# Patient Record
Sex: FEMALE | Race: WHITE | NOT HISPANIC OR LATINO | Employment: OTHER | URBAN - METROPOLITAN AREA
[De-identification: names, ages, dates, MRNs, and addresses within clinical notes are randomized per-mention and may not be internally consistent; named-entity substitution may affect disease eponyms.]

---

## 2017-01-09 DIAGNOSIS — K21.9 GASTROESOPHAGEAL REFLUX DISEASE, ESOPHAGITIS PRESENCE NOT SPECIFIED: ICD-10-CM

## 2017-01-16 ENCOUNTER — IMMUNIZATION (OUTPATIENT)
Dept: FAMILY MEDICINE | Facility: CLINIC | Age: 72
End: 2017-01-16
Payer: MEDICARE

## 2017-01-16 ENCOUNTER — OFFICE VISIT (OUTPATIENT)
Dept: NEPHROLOGY | Facility: CLINIC | Age: 72
End: 2017-01-16
Payer: MEDICARE

## 2017-01-16 ENCOUNTER — OFFICE VISIT (OUTPATIENT)
Dept: UROLOGY | Facility: CLINIC | Age: 72
End: 2017-01-16
Payer: MEDICARE

## 2017-01-16 VITALS
OXYGEN SATURATION: 98 % | HEART RATE: 84 BPM | TEMPERATURE: 99 F | HEIGHT: 63 IN | SYSTOLIC BLOOD PRESSURE: 126 MMHG | DIASTOLIC BLOOD PRESSURE: 74 MMHG | WEIGHT: 181.44 LBS | BODY MASS INDEX: 32.15 KG/M2

## 2017-01-16 VITALS
BODY MASS INDEX: 31.64 KG/M2 | HEART RATE: 88 BPM | HEIGHT: 63 IN | DIASTOLIC BLOOD PRESSURE: 74 MMHG | WEIGHT: 178.56 LBS | SYSTOLIC BLOOD PRESSURE: 127 MMHG

## 2017-01-16 DIAGNOSIS — D69.6 THROMBOCYTOPENIA: Chronic | ICD-10-CM

## 2017-01-16 DIAGNOSIS — I10 ESSENTIAL HYPERTENSION: Chronic | ICD-10-CM

## 2017-01-16 DIAGNOSIS — R35.0 INCREASED URINARY FREQUENCY: ICD-10-CM

## 2017-01-16 DIAGNOSIS — N39.41 URGE INCONTINENCE: ICD-10-CM

## 2017-01-16 DIAGNOSIS — E11.21 CONTROLLED TYPE 2 DIABETES MELLITUS WITH DIABETIC NEPHROPATHY, WITHOUT LONG-TERM CURRENT USE OF INSULIN: ICD-10-CM

## 2017-01-16 DIAGNOSIS — R76.8 ANA POSITIVE: ICD-10-CM

## 2017-01-16 DIAGNOSIS — J43.8 OTHER EMPHYSEMA: ICD-10-CM

## 2017-01-16 DIAGNOSIS — M35.00 SJOGREN'S SYNDROME: ICD-10-CM

## 2017-01-16 DIAGNOSIS — R76.8 RHEUMATOID FACTOR POSITIVE: ICD-10-CM

## 2017-01-16 DIAGNOSIS — D50.9 IRON DEFICIENCY ANEMIA, UNSPECIFIED IRON DEFICIENCY ANEMIA TYPE: ICD-10-CM

## 2017-01-16 DIAGNOSIS — I51.89 DIASTOLIC DYSFUNCTION: Chronic | ICD-10-CM

## 2017-01-16 DIAGNOSIS — N30.10 IC (INTERSTITIAL CYSTITIS): ICD-10-CM

## 2017-01-16 DIAGNOSIS — R33.9 URINARY RETENTION: Primary | ICD-10-CM

## 2017-01-16 DIAGNOSIS — N18.30 CKD (CHRONIC KIDNEY DISEASE) STAGE 3, GFR 30-59 ML/MIN: Primary | ICD-10-CM

## 2017-01-16 LAB — POC RESIDUAL URINE VOLUME: 73 ML (ref 0–100)

## 2017-01-16 PROCEDURE — 99999 PR PBB SHADOW E&M-EST. PATIENT-LVL III: CPT | Mod: PBBFAC,,, | Performed by: UROLOGY

## 2017-01-16 PROCEDURE — G0008 ADMIN INFLUENZA VIRUS VAC: HCPCS | Mod: PBBFAC,PO | Performed by: FAMILY MEDICINE

## 2017-01-16 PROCEDURE — 99204 OFFICE O/P NEW MOD 45 MIN: CPT | Mod: S$PBB,,, | Performed by: INTERNAL MEDICINE

## 2017-01-16 PROCEDURE — 99213 OFFICE O/P EST LOW 20 MIN: CPT | Mod: S$PBB,,, | Performed by: UROLOGY

## 2017-01-16 PROCEDURE — 99999 PR PBB SHADOW E&M-EST. PATIENT-LVL V: CPT | Mod: PBBFAC,,, | Performed by: INTERNAL MEDICINE

## 2017-01-16 RX ORDER — PEDIATRIC MULTIVITAMIN NO.238
1 TABLET,CHEWABLE ORAL DAILY
COMMUNITY

## 2017-01-16 RX ORDER — ASCORBIC ACID 500 MG
1000 TABLET,CHEWABLE ORAL DAILY
COMMUNITY
End: 2018-08-28

## 2017-01-16 NOTE — LETTER
January 18, 2017      Ninfa Wilson MD  [address]  [facility] [address]           [facility] Nephrology [address] [facility] [address] [place] [address]  Phone: [phone]          Patient: Pili Teixeira   MR Number: [id number]   YOB: 1945   Date of Visit: 1/16/2017       Dear Dr. Ninfa Wilson:    Thank you for referring Pili Teixeira to me for evaluation. Attached you will find relevant portions of my assessment and plan of care.    If you have questions, please do not hesitate to call me. I look forward to following Pili Teixeira along with you.    Sincerely,    Victor M Taylor MD    Enclosure  CC:  No Recipients    If you would like to receive this communication electronically, please contact [email or web address] or [phone] to request more information on [facility] access.    For providers and/or their staff who would like to refer a patient to [facility], please contact us through our one-stop-shop provider referral line, [facility], [facility], at [phone].    If you feel you have received this communication in error or would no longer like to receive these types of communications, please e-mail [email or web address]

## 2017-01-16 NOTE — MR AVS SNAPSHOT
[facility] Urology  [address] [facility]  [facility] [address]  Phone: [phone]                  Pili Teixeira   2017 11:00 AM   Office Visit    Description:  Female : 1945   Provider:  TIFF Crane MD   Department:  [facility] Urology           Reason for Visit     Follow-up     Cystitis                To Do List           Future Appointments        Provider Department Dept Phone    2017 2:00 PM [facility] [phone]    2017 4:00 PM Ninfa Wilson MD [place] - Rheumatology [phone]    2017 10:00 AM [facility] [phone]    2017 10:20 AM Do Huggins MD [place] - Hematology Oncology [phone]      Goals (5 Years of Data)     None      [facility] On Call     [facility] On Call Nurse Care Line -  Assistance  Registered nurses in the [facility] On Call Center provide clinical advisement, health education, appointment booking, and other advisory services.  Call for this free service at [phone].             Medications           Message regarding Medications     Verify the changes and/or additions to your medication regime listed below are the same as discussed with your clinician today.  If any of these changes or additions are incorrect, please notify your healthcare provider.             Verify that the below list of medications is an accurate representation of the medications you are currently taking.  If none reported, the list may be blank. If incorrect, please contact your healthcare provider. Carry this list with you in case of emergency.           Current Medications     (Magic mouthwash) 1:1:1 Benadryl 12.5mg/5ml liq, aluminum & magnesium hydroxide-simehticone (Maalox), lidocaine viscous 2% Swish and spit 15 mLs every 4 (four) hours. for mouth sores    ascorbic acid, vitamin C, (VITAMIN C) 500 mg Chew Take 1,000 mg by mouth once daily.    BENADRYL ALLERGY 25 mg  tablet Take 2 tablets (50 mg total) by mouth as directed. Take 50mg of Benadryl (Diphenhydramine) by mouth 1 hour prior to CT scan    blood sugar diagnostic (ACCU-CHEK JACINTO PLUS TEST STRP) Strp TEST TWICE DAILY ONLY    clopidogrel (PLAVIX) 75 mg tablet TAKE ONE TABLET BY MOUTH ONCE DAILY    cyanocobalamin (VITAMIN B-12) 1000 MCG tablet Take 100 mcg by mouth once daily.    cyclobenzaprine (FLEXERIL) 10 MG tablet Take 10 mg by mouth nightly as needed. Pt does not take often    diltiazem (CARDIZEM CD) 120 MG Cp24 TAKE ONE CAPSULE BY MOUTH IN THE EVENING    fexofenadine (ALLEGRA) 180 MG tablet Take 180 mg by mouth once daily.    fluticasone (FLONASE) 50 mcg/actuation nasal spray 2 sprays by Each Nare route once daily.    folic acid (FOLVITE) 1 MG tablet Take 1 tablet (1 mg total) by mouth once daily.    glimepiride (AMARYL) 4 MG tablet TAKE ONE TABLET BY MOUTH IN THE MORNING    hydroxychloroquine (PLAQUENIL) 200 mg tablet Take 1 tablet (200 mg total) by mouth 2 (two) times daily.    lactobac cmb #3-fos-pantethine (PROBIOTIC & ACIDOPHILUS) 300-250 million cell-mg Cap Take 1 capsule by mouth once daily.    lancets (ACCU-CHEK SOFTCLIX LANCETS) Misc Test TWICE DAILY;Twice a day    metformin (GLUCOPHAGE) 500 MG tablet TAKE ONE TABLET BY MOUTH TWICE DAILY WITH FOOD    methotrexate 2.5 MG Tab Take 4 tablets (10 mg total) by mouth every 7 days.    multivit with min-folic acid (WOMEN'S MULTIVITAMIN GUMMIES) 200 mcg Chew Take by mouth once daily.    olopatadine (PATANOL) 0.1 % ophthalmic solution Place 1 drop into both eyes daily as needed.     ondansetron (ZOFRAN-ODT) 8 MG TbDL Take 1 tablet (8 mg total) by mouth every 12 (twelve) hours as needed.    ONGLYZA 5 mg Tab tablet TAKE ONE TABLET BY MOUTH ONCE DAILY    oxybutynin (DITROPAN-XL) 10 MG 24 hr tablet Take 1 tablet (10 mg total) by mouth once daily.    oxycodone-acetaminophen  mg (PERCOCET)  mg per tablet Take 1 tablet by mouth 4 (four) times daily as needed.   "   pantoprazole (PROTONIX) 40 MG tablet TAKE ONE TABLET BY MOUTH ONCE DAILY    phenazopyridine (PYRIDIUM) 100 MG tablet 100 mg daily as needed.     ranitidine (ZANTAC) 300 MG tablet TAKE ONE TABLET BY MOUTH IN THE EVENING    saliva stimulant agents comb.3 (BIOTENE MOISTURIZING MOUTH) Spry 1-2 sprays by Mucous Membrane route 4 (four) times daily as needed (Non prescription).    simvastatin (ZOCOR) 40 MG tablet TAKE ONE TABLET BY MOUTH AT BEDTIME    sucralfate (CARAFATE) 1 gram tablet Take 1 tablet (1 g total) by mouth 4 (four) times daily before meals and nightly.    trazodone (DESYREL) 50 MG tablet TAKE ONE TABLET BY MOUTH IN THE EVENING    valsartan-hydrochlorothiazide (DIOVAN-HCT) 160-25 mg per tablet TAKE ONE TABLET BY MOUTH ONCE DAILY           Clinical Reference Information           Vital Signs - Last Recorded  Most recent update: 1/16/2017 11:13 AM by Andrews Rodriguez LPN    BP Pulse Ht Wt BMI    127/74 88 5' 3" (1.6 m) 81 kg (178 lb 9.2 oz) 31.63 kg/m2      Blood Pressure          Most Recent Value    BP  127/74      Allergies as of 1/16/2017     Codeine    Clindamycin    Iodinated Contrast Media - Iv Dye      Immunizations Administered on Date of Encounter - 1/16/2017     None      "

## 2017-01-16 NOTE — MR AVS SNAPSHOT
[facility] Nephrology  [address] [facility]  [facility] [address]  Phone: [phone]                  Pili Teixeira   2017 10:00 AM   Office Visit    Description:  Female : 1945   Provider:  Victor M Taylor MD   Department:  [facility] Nephrology                To Do List           Future Appointments        Provider Department Dept Phone    2017 11:00 AM TIFF Crane MD [facility] Urology [phone]    2017 2:00 PM [facility] [phone]    2017 4:00 PM Ninfa Wilsno MD [place] - Rheumatology [phone]    2017 10:00 AM LAB[facility] [phone]    2017 10:20 AM Do Huggins MD [place] - Hematology Oncology [phone]      Goals (5 Years of Data)     None      [facility] On Call     [facility] On [facility] Nurse Care Line -  Assistance  Registered nurses in the [facility] Center provide clinical advisement, health education, appointment booking, and other advisory services.  Call for this free service at [phone].             Medications           Message regarding Medications     Verify the changes and/or additions to your medication regime listed below are the same as discussed with your clinician today.  If any of these changes or additions are incorrect, please notify your healthcare provider.        STOP taking these medications     coconut oil 1,000 mg Cap Take 1 capsule by mouth once daily.    predniSONE (DELTASONE) 50 MG Tab Take 1 tablet (50 mg total) by mouth as directed.    silver sulfADIAZINE 1% (SILVADENE) 1 % cream Apply topically once daily.           Verify that the below list of medications is an accurate representation of the medications you are currently taking.  If none reported, the list may be blank. If incorrect, please contact your healthcare provider. Carry this list with you in case of emergency.           Current Medications     (Magic  mouthwash) 1:1:1 Benadryl 12.5mg/5ml liq, aluminum & magnesium hydroxide-simehticone (Maalox), lidocaine viscous 2% Swish and spit 15 mLs every 4 (four) hours. for mouth sores    ascorbic acid, vitamin C, (VITAMIN C) 500 mg Chew Take 1,000 mg by mouth once daily.    BENADRYL ALLERGY 25 mg tablet Take 2 tablets (50 mg total) by mouth as directed. Take 50mg of Benadryl (Diphenhydramine) by mouth 1 hour prior to CT scan    blood sugar diagnostic (ACCU-CHEK JACINTO PLUS TEST STRP) Strp TEST TWICE DAILY ONLY    clopidogrel (PLAVIX) 75 mg tablet TAKE ONE TABLET BY MOUTH ONCE DAILY    cyanocobalamin (VITAMIN B-12) 1000 MCG tablet Take 100 mcg by mouth once daily.    cyclobenzaprine (FLEXERIL) 10 MG tablet Take 10 mg by mouth nightly as needed. Pt does not take often    diltiazem (CARDIZEM CD) 120 MG Cp24 TAKE ONE CAPSULE BY MOUTH IN THE EVENING    fexofenadine (ALLEGRA) 180 MG tablet Take 180 mg by mouth once daily.    fluticasone (FLONASE) 50 mcg/actuation nasal spray 2 sprays by Each Nare route once daily.    folic acid (FOLVITE) 1 MG tablet Take 1 tablet (1 mg total) by mouth once daily.    glimepiride (AMARYL) 4 MG tablet TAKE ONE TABLET BY MOUTH IN THE MORNING    hydroxychloroquine (PLAQUENIL) 200 mg tablet Take 1 tablet (200 mg total) by mouth 2 (two) times daily.    lactobac cmb #3-fos-pantethine (PROBIOTIC & ACIDOPHILUS) 300-250 million cell-mg Cap Take 1 capsule by mouth once daily.    lancets (ACCU-CHEK SOFTCLIX LANCETS) Misc Test TWICE DAILY;Twice a day    metformin (GLUCOPHAGE) 500 MG tablet TAKE ONE TABLET BY MOUTH TWICE DAILY WITH FOOD    methotrexate 2.5 MG Tab Take 4 tablets (10 mg total) by mouth every 7 days.    multivit with min-folic acid (WOMEN'S MULTIVITAMIN GUMMIES) 200 mcg Chew Take by mouth once daily.    olopatadine (PATANOL) 0.1 % ophthalmic solution Place 1 drop into both eyes daily as needed.     ondansetron (ZOFRAN-ODT) 8 MG TbDL Take 1 tablet (8 mg total) by mouth every 12 (twelve) hours as  "needed.    ONGLYZA 5 mg Tab tablet TAKE ONE TABLET BY MOUTH ONCE DAILY    oxybutynin (DITROPAN-XL) 10 MG 24 hr tablet Take 1 tablet (10 mg total) by mouth once daily.    oxycodone-acetaminophen  mg (PERCOCET)  mg per tablet Take 1 tablet by mouth 4 (four) times daily as needed.     pantoprazole (PROTONIX) 40 MG tablet TAKE ONE TABLET BY MOUTH ONCE DAILY    phenazopyridine (PYRIDIUM) 100 MG tablet 100 mg daily as needed.     ranitidine (ZANTAC) 300 MG tablet TAKE ONE TABLET BY MOUTH IN THE EVENING    saliva stimulant agents comb.3 (BIOTENE MOISTURIZING MOUTH) Spry 1-2 sprays by Mucous Membrane route 4 (four) times daily as needed (Non prescription).    simvastatin (ZOCOR) 40 MG tablet TAKE ONE TABLET BY MOUTH AT BEDTIME    sucralfate (CARAFATE) 1 gram tablet Take 1 tablet (1 g total) by mouth 4 (four) times daily before meals and nightly.    trazodone (DESYREL) 50 MG tablet TAKE ONE TABLET BY MOUTH IN THE EVENING    valsartan-hydrochlorothiazide (DIOVAN-HCT) 160-25 mg per tablet TAKE ONE TABLET BY MOUTH ONCE DAILY           Clinical Reference Information           Vital Signs - Last Recorded  Most recent update: 1/16/2017 10:08 AM by Ina Stallworth    BP Pulse Temp Ht Wt SpO2    126/74 (BP Location: Right arm, Patient Position: Sitting) 84 98.5 °F (36.9 °C) 5' 3" (1.6 m) 82.3 kg (181 lb 7 oz) 98%    BMI                32.14 kg/m2          Blood Pressure          Most Recent Value    BP  126/74      Allergies as of 1/16/2017     Codeine    Clindamycin    Iodinated Contrast Media - Iv Dye      Immunizations Administered on Date of Encounter - 1/16/2017     None      "

## 2017-01-16 NOTE — PROGRESS NOTES
Subjective:       Patient ID: Pili Teixeira is a 71 y.o. female.    Chief Complaint: Follow-up (3 month ) and Cystitis    HPI     71 year old with intermittent gross hematuria for the last couple of years. Cysto 3 months ago was negative.  She also complains of incontinence.  She was given a trial of oxybutynin for the last 3 months that she really hasn't taking it consistently.  She denies dysuria.  She still complains of a crampy sensation in her lower abdomen.  She denies constipation.  Urine dipstick shows negative for all components.  post void residual 73 ml    Review of Systems   Constitutional: Negative for fever.   Genitourinary: Negative for dysuria and hematuria.       Objective:      Physical Exam   Constitutional: She is oriented to person, place, and time. She appears well-developed and well-nourished.   Pulmonary/Chest: Effort normal. No respiratory distress.   Neurological: She is alert and oriented to person, place, and time.   Skin: Skin is warm.   Psychiatric: She has a normal mood and affect. Her behavior is normal.   Vitals reviewed.      Assessment:       1. Urinary retention    2. Increased urinary frequency    3. Urge incontinence        Plan:       Urinary retention  -     POCT Bladder Scan; Future; Expected date: 1/16/17    Increased urinary frequency    Urge incontinence      take oxybutynin daily follow-up 4 months.

## 2017-01-18 ENCOUNTER — TELEPHONE (OUTPATIENT)
Dept: NEPHROLOGY | Facility: CLINIC | Age: 72
End: 2017-01-18

## 2017-01-18 PROBLEM — N18.30 CKD (CHRONIC KIDNEY DISEASE) STAGE 3, GFR 30-59 ML/MIN: Status: ACTIVE | Noted: 2017-01-18

## 2017-01-18 NOTE — PROGRESS NOTES
Subjective:       Patient ID: Pili Teixeira is a 71 y.o. White female who presents for new evaluation of Chronic Kidney Disease    HPI     She is referred by her Rheumatologist for decreased GFR    She has a significant history of DM, HTN, diastolic dysfunction and auto-immune disease. She also has a history of kidney stones. She has a history of frequent UTIs accompanied by gross hematuria, followed by Urology. She admits to a high sodium diet but does push fluids, mostly water. She can develop edema but no SOB. She is unsure if she has received a blood transfusion. No known kidney disease in her family    Review of Systems   Constitutional: Positive for fatigue. Negative for activity change, appetite change and unexpected weight change.   HENT: Negative for facial swelling.    Eyes: Negative for visual disturbance.   Respiratory: Negative for shortness of breath.    Cardiovascular: Positive for leg swelling. Negative for chest pain.   Gastrointestinal: Positive for abdominal pain (ventral hernias). Negative for constipation and diarrhea.   Genitourinary: Negative for difficulty urinating, dysuria, frequency and urgency.   Musculoskeletal: Positive for arthralgias, back pain and joint swelling.   Neurological: Negative for weakness and headaches.       Objective:      Physical Exam   Constitutional: She is oriented to person, place, and time. She appears well-nourished. No distress.   HENT:   Mouth/Throat: Oropharynx is clear and moist.   Neck: No JVD present.   Cardiovascular: S1 normal and S2 normal.  Exam reveals no friction rub.    Pulmonary/Chest: Breath sounds normal. She has no wheezes. She has no rales.   Abdominal: Soft.   Musculoskeletal: She exhibits edema.   Neurological: She is alert and oriented to person, place, and time.   Skin: Skin is warm and dry.   Psychiatric: She has a normal mood and affect.   Vitals reviewed.      Assessment:       1. CKD (chronic kidney disease) stage 3, GFR 30-59 ml/min     2. Essential hypertension    3. Iron deficiency anemia, unspecified iron deficiency anemia type    4. Controlled type 2 diabetes mellitus with diabetic nephropathy, without long-term current use of insulin    5. Sjogren's syndrome    6. Diastolic dysfunction    7. Thrombocytopenia    8. Rheumatoid factor positive    9. JOAO positive    10. Other emphysema    11. IC (interstitial cystitis)        Plan:             CKD appearing to be at Stage 3 with multiple risk factors. She will undergo further work up with urine studies, renal U/S and a few select serologies. Paraprotein disease will also be ruled out.    She was advised to continue good glycemic control, avoid NSAIDs and significantly reduce sodium intake      Labs and renal U/S next week (not on a Friday) then RTC to review results

## 2017-01-23 ENCOUNTER — HOSPITAL ENCOUNTER (OUTPATIENT)
Dept: RADIOLOGY | Facility: CLINIC | Age: 72
Discharge: HOME | End: 2017-01-23
Attending: INTERNAL MEDICINE
Payer: MEDICARE

## 2017-01-23 DIAGNOSIS — N18.30 CKD (CHRONIC KIDNEY DISEASE) STAGE 3, GFR 30-59 ML/MIN: ICD-10-CM

## 2017-01-23 PROCEDURE — 76770 US EXAM ABDO BACK WALL COMP: CPT | Mod: 26,,, | Performed by: RADIOLOGY

## 2017-01-23 PROCEDURE — 76770 US EXAM ABDO BACK WALL COMP: CPT | Mod: TC,PO

## 2017-02-01 RX ORDER — DILTIAZEM HYDROCHLORIDE 120 MG/1
CAPSULE, EXTENDED RELEASE ORAL
Qty: 90 CAPSULE | Refills: 5 | Status: SHIPPED | OUTPATIENT
Start: 2017-02-01 | End: 2017-06-12 | Stop reason: SDUPTHER

## 2017-03-01 DIAGNOSIS — G47.00 INSOMNIA: ICD-10-CM

## 2017-03-01 DIAGNOSIS — Z79.4 TYPE 2 DIABETES MELLITUS WITHOUT COMPLICATION, WITH LONG-TERM CURRENT USE OF INSULIN: ICD-10-CM

## 2017-03-01 DIAGNOSIS — E11.9 TYPE 2 DIABETES MELLITUS WITHOUT COMPLICATION, WITH LONG-TERM CURRENT USE OF INSULIN: ICD-10-CM

## 2017-03-01 DIAGNOSIS — E11.9 TYPE 2 DIABETES MELLITUS WITHOUT COMPLICATION: ICD-10-CM

## 2017-03-01 DIAGNOSIS — G47.00 INSOMNIA, UNSPECIFIED TYPE: ICD-10-CM

## 2017-03-01 DIAGNOSIS — K21.9 GASTROESOPHAGEAL REFLUX DISEASE, ESOPHAGITIS PRESENCE NOT SPECIFIED: ICD-10-CM

## 2017-03-02 NOTE — TELEPHONE ENCOUNTER
----- Message from Romana Lange sent at 3/2/2017  2:21 PM CST -----  Contact: self  Patient cell phone is [phone] (please call cell first) or [phone] (home) is calling to leave another message as she left a message yesterday about having her medications refilled/patient was scheduled to come into office today 03 02 17 but the doctor's office rescheduled the appt until 03 13 17 /  3 medications were diabetic medications and 1 was for sleeping medication and then there is another one that needs to be filled/she did not have the names of the medications/[facility] on [address] in [place] is saying they did send for refill request as patient is out of her medications as of today/please call patient to advise as Angie is saying they have not received anything back from Dr Dove's office

## 2017-03-03 ENCOUNTER — LAB VISIT (OUTPATIENT)
Dept: LAB | Facility: HOSPITAL | Age: 72
End: 2017-03-03
Attending: INTERNAL MEDICINE
Payer: MEDICARE

## 2017-03-03 DIAGNOSIS — E78.5 TYPE 2 DIABETES MELLITUS WITH HYPERLIPIDEMIA: ICD-10-CM

## 2017-03-03 DIAGNOSIS — E11.69 TYPE 2 DIABETES MELLITUS WITH HYPERLIPIDEMIA: ICD-10-CM

## 2017-03-03 LAB
CHOLEST/HDLC SERPL: 3.5 {RATIO}
HDL/CHOLESTEROL RATIO: 28.7 %
HDLC SERPL-MCNC: 157 MG/DL
HDLC SERPL-MCNC: 45 MG/DL
LDLC SERPL CALC-MCNC: 73.2 MG/DL
NONHDLC SERPL-MCNC: 112 MG/DL
TRIGL SERPL-MCNC: 194 MG/DL

## 2017-03-03 PROCEDURE — 36415 COLL VENOUS BLD VENIPUNCTURE: CPT | Mod: PO

## 2017-03-03 PROCEDURE — 80061 LIPID PANEL: CPT

## 2017-03-03 RX ORDER — TRAZODONE HYDROCHLORIDE 50 MG/1
50 TABLET ORAL NIGHTLY
Qty: 30 TABLET | Refills: 0 | Status: SHIPPED | OUTPATIENT
Start: 2017-03-03 | End: 2017-03-06 | Stop reason: SDUPTHER

## 2017-03-03 RX ORDER — METFORMIN HYDROCHLORIDE 500 MG/1
500 TABLET ORAL 2 TIMES DAILY WITH MEALS
Qty: 60 TABLET | Refills: 0 | Status: SHIPPED | OUTPATIENT
Start: 2017-03-03 | End: 2017-04-05 | Stop reason: SDUPTHER

## 2017-03-06 RX ORDER — SIMVASTATIN 40 MG/1
TABLET, FILM COATED ORAL
Qty: 90 TABLET | Refills: 0 | Status: SHIPPED | OUTPATIENT
Start: 2017-03-06 | End: 2017-06-12

## 2017-03-06 RX ORDER — TRAZODONE HYDROCHLORIDE 50 MG/1
50 TABLET ORAL NIGHTLY
Qty: 30 TABLET | Refills: 0 | Status: SHIPPED | OUTPATIENT
Start: 2017-03-06 | End: 2017-05-02 | Stop reason: SDUPTHER

## 2017-03-06 RX ORDER — SAXAGLIPTIN 5 MG/1
5 TABLET, FILM COATED ORAL DAILY
Qty: 30 TABLET | Refills: 0 | Status: SHIPPED | OUTPATIENT
Start: 2017-03-06 | End: 2017-04-05 | Stop reason: SDUPTHER

## 2017-03-06 RX ORDER — GLIMEPIRIDE 4 MG/1
4 TABLET ORAL EVERY MORNING
Qty: 30 TABLET | Refills: 0 | Status: SHIPPED | OUTPATIENT
Start: 2017-03-06 | End: 2017-04-05 | Stop reason: SDUPTHER

## 2017-03-06 NOTE — TELEPHONE ENCOUNTER
----- Message from July Wilkins sent at 3/6/2017  9:30 AM CST -----  Contact: Clyde Alejandre, patient [phone] or [phone], Patient is calling for a refill on her medication, Rx Metformin , Omeglyza,  Glimepiride and trazodone to [facility] on [address] at [phone] She was schedule to see the doctor but the office rescheduled her to later. She is out of medication and next appointment is 3/13/17and has called several times. Please advise. Thanks.

## 2017-03-06 NOTE — TELEPHONE ENCOUNTER
Dr Dove   Pt has upcoming appt and has put in request for diabetic meds. Says she is out and cant wait any longer

## 2017-03-13 ENCOUNTER — OFFICE VISIT (OUTPATIENT)
Dept: INTERNAL MEDICINE | Facility: CLINIC | Age: 72
End: 2017-03-13
Payer: MEDICARE

## 2017-03-13 VITALS
BODY MASS INDEX: 32.07 KG/M2 | OXYGEN SATURATION: 98 % | TEMPERATURE: 98 F | HEIGHT: 63 IN | HEART RATE: 81 BPM | DIASTOLIC BLOOD PRESSURE: 80 MMHG | WEIGHT: 181 LBS | SYSTOLIC BLOOD PRESSURE: 118 MMHG

## 2017-03-13 DIAGNOSIS — Z00.00 HEALTH CARE MAINTENANCE: ICD-10-CM

## 2017-03-13 DIAGNOSIS — Z12.31 ENCOUNTER FOR SCREENING MAMMOGRAM FOR MALIGNANT NEOPLASM OF BREAST: ICD-10-CM

## 2017-03-13 DIAGNOSIS — I15.2 HYPERTENSION ASSOCIATED WITH DIABETES: ICD-10-CM

## 2017-03-13 DIAGNOSIS — E78.5 TYPE 2 DIABETES MELLITUS WITH HYPERLIPIDEMIA: ICD-10-CM

## 2017-03-13 DIAGNOSIS — E11.59 HYPERTENSION ASSOCIATED WITH DIABETES: ICD-10-CM

## 2017-03-13 DIAGNOSIS — E11.69 TYPE 2 DIABETES MELLITUS WITH HYPERLIPIDEMIA: ICD-10-CM

## 2017-03-13 PROCEDURE — 99214 OFFICE O/P EST MOD 30 MIN: CPT | Mod: S$PBB,,, | Performed by: INTERNAL MEDICINE

## 2017-03-13 PROCEDURE — 99999 PR PBB SHADOW E&M-EST. PATIENT-LVL III: CPT | Mod: PBBFAC,,, | Performed by: INTERNAL MEDICINE

## 2017-03-13 PROCEDURE — 99213 OFFICE O/P EST LOW 20 MIN: CPT | Mod: PBBFAC,PO | Performed by: INTERNAL MEDICINE

## 2017-03-13 RX ORDER — NYSTATIN 100000 U/G
CREAM TOPICAL 3 TIMES DAILY
Refills: 3 | COMMUNITY
Start: 2017-02-04 | End: 2020-07-23 | Stop reason: SDUPTHER

## 2017-03-13 NOTE — PROGRESS NOTES
HISTORY OF PRESENT ILLNESS:  Pt. is a 71 y.o. female presents for monitoring of DM type 2, HTN, hyperlipidemia.  She is at goal for her LDL.  HTN is in control on current med that includes ARB.  She is having problems with both ears, itching and had problems getting cotton out of ear.    Lab Results   Component Value Date    WBC 8.10 12/16/2016    HGB 13.1 12/16/2016    HCT 39.3 12/16/2016     12/16/2016    CHOL 157 03/03/2017    TRIG 194 (H) 03/03/2017    HDL 45 03/03/2017    ALT 23 12/16/2016    AST 24 12/16/2016     01/23/2017    K 3.4 (L) 01/23/2017     01/23/2017    CREATININE 0.9 01/23/2017    BUN 18 01/23/2017    CO2 33 (H) 01/23/2017    TSH 1.581 07/17/2015    PSA <0.01 05/11/2009    INR 1.0 08/21/2014    HGBA1C 6.3 (H) 01/23/2017     Lab Results   Component Value Date    LDLCALC 73.2 03/03/2017             ROS:  GENERAL: No fever, chills, fatigability or weight loss.  SKIN: No rashes, itching or changes in color or texture of skin.  HEAD: No headaches or recent head trauma.  EARS: Denies ear pain, discharge or vertigo.  NOSE: No loss of smell, no epistaxis or postnasal drip.  MOUTH & THROAT: No hoarseness or change in voice. No excessive gum bleeding.  NODES: Denies swollen glands.  CHEST: Denies BONILLA, cyanosis, wheezing, cough and sputum production.  CARDIOVASCULAR: Denies chest pain, PND, orthopnea or reduced exercise tolerance.  ABDOMEN: Appetite fine. No weight loss. Positive constipation, no diarrhea, abdominal pain, hematemesis or blood in stool; positive hernia;  URINARY: No flank pain, dysuria or hematuria.  PERIPHERAL VASCULAR: No claudication or cyanosis. No edema.  MUSCULOSKELETAL: No joint stiffness or swelling. Positive back pain; positive cervicalgia;  NEUROLOGIC: Positive numbness to feet;    PE:   Vitals:   Vitals:    03/13/17 1416   BP: 118/80   Pulse: 81   Temp: 98.3 °F (36.8 °C)     GENERAL: no acute distress, A&Ox3, comfortable.  Female with BMI of 32   HEENT: tympanic  membranes clear, nasal mucosa pink, no pharyngeal erythema or exudate  NECK: supple, no cervical lymphadenopathy, no thyromegaly; no supraclavicular nodes;   CHEST:  clear to auscultation bilaterally, no crackles or wheeze; no increased work of breathing;  CARDIOVASCULAR: regular rate and rhythm, no rubs, murmurs or gallops.  ABDOMEN: normal bowel sounds, soft non-tender, non-distended; no palpable organomegaly;   EXT: no clubbing, cyanosis or edema.     ASSESSMENT/PLAN:    Diabetes is in control with HTN, on current med that includes ARB, Lipid panel is at goal on simvastatin 40 mg po q day;    Hypertension associated with diabetes  -     Hemoglobin A1c; Future; Expected date: 7/6/17  -     Comprehensive metabolic panel; Future; Expected date: 7/6/17    Type 2 diabetes mellitus with hyperlipidemia    Health care maintenance  -     Mammo Digital Screening Bilat with CAD; Future; Expected date: 3/13/17    Encounter for screening mammogram for malignant neoplasm of breast   -     Mammo Digital Screening Bilat with CAD; Future; Expected date: 3/13/17      Call if condition changes or worsens.

## 2017-03-13 NOTE — MR AVS SNAPSHOT
[facility]  [address]  [facility] [address]  Phone: [phone]  Fax: [phone]                  Pili Teixeira   3/13/2017 2:20 PM   Office Visit    Description:  Female : 1945   Provider:  Herlinda Dove MD   Department:  [facility]           Reason for Visit     Diabetes           Diagnoses this Visit        Comments    Health care maintenance    -  Primary     Hypertension associated with diabetes         Encounter for screening mammogram for malignant neoplasm of breast                To Do List           Future Appointments        Provider Department Dept Phone    2017 3:45 PM SLIC MAMMO1 [facility]- Mammography [phone]    5/15/2017 2:00 PM TIFF Crane MD [facility] Urology [phone]    2017 8:00 AM LAB, [place] [facility] [phone]    2017 10:00 AM LAB, [facility] [phone]    2017 10:20 AM Do Huggins MD [place] - Hematology Oncology [phone]      Goals (5 Years of Data)     None      [facility]     [facility] -  Assistance  Registered nurses in the [facility] provide clinical advisement, health education, appointment booking, and other advisory services.  Call for this free service at [phone].             Medications           Message regarding Medications     Verify the changes and/or additions to your medication regime listed below are the same as discussed with your clinician today.  If any of these changes or additions are incorrect, please notify your healthcare provider.             Verify that the below list of medications is an accurate representation of the medications you are currently taking.  If none reported, the list may be blank. If incorrect, please contact your healthcare provider. Carry this list with you in case of emergency.           Current Medications      (Magic mouthwash) 1:1:1 Benadryl 12.5mg/5ml liq, aluminum & magnesium hydroxide-simehticone (Maalox), lidocaine viscous 2% Swish and spit 15 mLs every 4 (four) hours. for mouth sores    ascorbic acid, vitamin C, (VITAMIN C) 500 mg Chew Take 1,000 mg by mouth once daily.    BENADRYL ALLERGY 25 mg tablet Take 2 tablets (50 mg total) by mouth as directed. Take 50mg of Benadryl (Diphenhydramine) by mouth 1 hour prior to CT scan    CARTIA  mg Cp24 TAKE ONE CAPSULE BY MOUTH IN THE EVENING    clopidogrel (PLAVIX) 75 mg tablet TAKE ONE TABLET BY MOUTH ONCE DAILY    cyanocobalamin (VITAMIN B-12) 1000 MCG tablet Take 100 mcg by mouth once daily.    cyclobenzaprine (FLEXERIL) 10 MG tablet Take 10 mg by mouth nightly as needed. Pt does not take often    fexofenadine (ALLEGRA) 180 MG tablet Take 180 mg by mouth once daily.    fluticasone (FLONASE) 50 mcg/actuation nasal spray 2 sprays by Each Nare route once daily.    folic acid (FOLVITE) 1 MG tablet Take 1 tablet (1 mg total) by mouth once daily.    glimepiride (AMARYL) 4 MG tablet Take 1 tablet (4 mg total) by mouth every morning.    hydroxychloroquine (PLAQUENIL) 200 mg tablet Take 1 tablet (200 mg total) by mouth 2 (two) times daily.    lactobac cmb #3-fos-pantethine (PROBIOTIC & ACIDOPHILUS) 300-250 million cell-mg Cap Take 1 capsule by mouth once daily.    lancets (ACCU-CHEK SOFTCLIX LANCETS) Misc Test TWICE DAILY;Twice a day    metformin (GLUCOPHAGE) 500 MG tablet Take 1 tablet (500 mg total) by mouth 2 (two) times daily with meals.    methotrexate 2.5 MG Tab Take 4 tablets (10 mg total) by mouth every 7 days.    multivit with min-folic acid (WOMEN'S MULTIVITAMIN GUMMIES) 200 mcg Chew Take by mouth once daily.    nystatin (MYCOSTATIN) cream Apply topically 3 (three) times daily.    olopatadine (PATANOL) 0.1 % ophthalmic solution Place 1 drop into both eyes daily as needed.     ondansetron (ZOFRAN-ODT) 8 MG TbDL DISSOLVE ONE TABLET IN MOUTH EVERY 12 HOURS AS NEEDED  "   oxybutynin (DITROPAN-XL) 10 MG 24 hr tablet Take 1 tablet (10 mg total) by mouth once daily.    oxycodone-acetaminophen  mg (PERCOCET)  mg per tablet Take 1 tablet by mouth 4 (four) times daily as needed.     pantoprazole (PROTONIX) 40 MG tablet TAKE ONE TABLET BY MOUTH ONCE DAILY    phenazopyridine (PYRIDIUM) 100 MG tablet 100 mg daily as needed.     ranitidine (ZANTAC) 300 MG tablet TAKE ONE TABLET BY MOUTH IN THE EVENING    saliva stimulant agents comb.3 (BIOTENE MOISTURIZING MOUTH) Spry 1-2 sprays by Mucous Membrane route 4 (four) times daily as needed (Non prescription).    SAXagliptin (ONGLYZA) 5 mg Tab tablet Take 1 tablet (5 mg total) by mouth once daily.    simvastatin (ZOCOR) 40 MG tablet TAKE ONE TABLET BY MOUTH AT BEDTIME    sucralfate (CARAFATE) 1 gram tablet Take 1 tablet (1 g total) by mouth 4 (four) times daily before meals and nightly.    trazodone (DESYREL) 50 MG tablet Take 1 tablet (50 mg total) by mouth every evening.    valsartan-hydrochlorothiazide (DIOVAN-HCT) 160-25 mg per tablet TAKE ONE TABLET BY MOUTH ONCE DAILY    blood sugar diagnostic (ACCU-CHEK JACINTO PLUS TEST STRP) Strp TEST TWICE DAILY ONLY           Clinical Reference Information           Your Vitals Were     BP Pulse Temp Height Weight SpO2    118/80 (BP Location: Left arm, Patient Position: Sitting, BP Method: Manual) 81 98.3 °F (36.8 °C) (Oral) 5' 3" (1.6 m) 82.1 kg (181 lb) 98%    BMI                32.06 kg/m2          Blood Pressure          Most Recent Value    BP  118/80      Allergies as of 3/13/2017     Codeine    Clindamycin    Iodinated Contrast Media - Iv Dye      Immunizations Administered on Date of Encounter - 3/13/2017     None      Orders Placed During Today's Visit     Future Labs/Procedures Expected by Expires    Mammo Digital Screening Bilat with CAD  3/13/2017 5/15/2018    Comprehensive metabolic panel  7/6/2017 6/11/2018    Hemoglobin A1c  7/6/2017 6/11/2018      Language Assistance Services  "    ATTENTION: Language assistance services are available, free of charge. Please call [phone].      ATENCIÓN: Si habla zach, tiene a ortez disposición servicios gratuitos de asistencia lingüística. Llame al [phone].     CHÚ Ý: N?u b?n nói Ti?ng Vi?t, có các d?ch v? h? tr? ngôn ng? mi?n phí dành cho b?n. G?i s? [phone].         [facility] complies with applicable Federal civil rights laws and does not discriminate on the basis of race, color, national origin, age, disability, or sex.

## 2017-03-14 PROBLEM — E11.59 HYPERTENSION ASSOCIATED WITH DIABETES: Status: ACTIVE | Noted: 2017-03-14

## 2017-03-14 PROBLEM — E78.5 TYPE 2 DIABETES MELLITUS WITH HYPERLIPIDEMIA: Status: ACTIVE | Noted: 2017-03-14

## 2017-03-14 PROBLEM — I15.2 HYPERTENSION ASSOCIATED WITH DIABETES: Status: ACTIVE | Noted: 2017-03-14

## 2017-03-14 PROBLEM — E11.69 TYPE 2 DIABETES MELLITUS WITH HYPERLIPIDEMIA: Status: ACTIVE | Noted: 2017-03-14

## 2017-03-29 ENCOUNTER — TELEPHONE (OUTPATIENT)
Dept: CARDIOLOGY | Facility: CLINIC | Age: 72
End: 2017-03-29

## 2017-03-29 DIAGNOSIS — K21.00 REFLUX ESOPHAGITIS: ICD-10-CM

## 2017-03-29 NOTE — TELEPHONE ENCOUNTER
Attempted to call pt re muscle cramp's per [facility]. No answer, unable to LVM as mailbox is not set up yet.

## 2017-03-29 NOTE — TELEPHONE ENCOUNTER
----- Message from Carly Liu sent at 3/29/2017  3:25 PM CDT -----  [facility] called stated that the pt is on Simvastatin 40 mg and she's having muscle cramps and pains wanted to know let you know to contact the pt regard this

## 2017-03-30 RX ORDER — PANTOPRAZOLE SODIUM 40 MG/1
TABLET, DELAYED RELEASE ORAL
Qty: 90 TABLET | Refills: 0 | Status: SHIPPED | OUTPATIENT
Start: 2017-03-30 | End: 2017-06-29 | Stop reason: SDUPTHER

## 2017-03-31 ENCOUNTER — TELEPHONE (OUTPATIENT)
Dept: CARDIOLOGY | Facility: CLINIC | Age: 72
End: 2017-03-31

## 2017-03-31 DIAGNOSIS — E78.5 HYPERLIPIDEMIA, UNSPECIFIED HYPERLIPIDEMIA TYPE: ICD-10-CM

## 2017-03-31 DIAGNOSIS — G47.33 OSA (OBSTRUCTIVE SLEEP APNEA): Primary | ICD-10-CM

## 2017-03-31 DIAGNOSIS — M79.10 MUSCLE PAIN: ICD-10-CM

## 2017-03-31 DIAGNOSIS — R06.02 SOB (SHORTNESS OF BREATH): ICD-10-CM

## 2017-03-31 DIAGNOSIS — Z13.220 SCREENING FOR HYPERLIPIDEMIA: ICD-10-CM

## 2017-03-31 NOTE — TELEPHONE ENCOUNTER
Dr Page,    FRANDY Re: your msg:     roslynnt seen her 2 yrs. Dc simvastatin start crestor 10mg 2 weeks later come see me in 6-8 weeks     Spoke with patient re: pharmacy's report of her muscle aches. 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    She is due appt with you. I scheduled labs: cmp, cbc, lipid panel and ck. She's scheduled to see you 6/12.    Pt opted to stay on simvastatin and wait to go on Crestor after her appt with you and lab results.

## 2017-04-05 DIAGNOSIS — E11.9 TYPE 2 DIABETES MELLITUS WITHOUT COMPLICATION, WITH LONG-TERM CURRENT USE OF INSULIN: ICD-10-CM

## 2017-04-05 DIAGNOSIS — Z79.4 TYPE 2 DIABETES MELLITUS WITHOUT COMPLICATION, WITH LONG-TERM CURRENT USE OF INSULIN: ICD-10-CM

## 2017-04-06 NOTE — TELEPHONE ENCOUNTER
----- Message from Leanne Rodriguez sent at 4/5/2017  9:27 AM CDT -----  Contact: Patient  Patient needs refills on Onglyza, 5 mg; Metformin, 500 mg; Glimepiride, 4 mg sent to [facility] in [place]. Any questions call patient [phone] or [phone]. Patient was under the impression that these prescriptions were to be sent at her last visit. No she is out of the,

## 2017-04-07 RX ORDER — TRAZODONE HYDROCHLORIDE 50 MG/1
TABLET ORAL
Qty: 30 TABLET | Refills: 0 | OUTPATIENT
Start: 2017-04-07

## 2017-04-07 RX ORDER — GLIMEPIRIDE 4 MG/1
TABLET ORAL
Qty: 30 TABLET | Refills: 0 | OUTPATIENT
Start: 2017-04-07

## 2017-04-07 RX ORDER — METFORMIN HYDROCHLORIDE 500 MG/1
TABLET ORAL
Qty: 60 TABLET | Refills: 0 | OUTPATIENT
Start: 2017-04-07

## 2017-04-07 RX ORDER — SAXAGLIPTIN 5 MG/1
TABLET, FILM COATED ORAL
Qty: 30 TABLET | Refills: 5 | Status: SHIPPED | OUTPATIENT
Start: 2017-04-07 | End: 2017-09-21 | Stop reason: SDUPTHER

## 2017-04-07 RX ORDER — SAXAGLIPTIN 5 MG/1
TABLET, FILM COATED ORAL
Qty: 30 TABLET | Refills: 0 | OUTPATIENT
Start: 2017-04-07

## 2017-04-07 RX ORDER — METFORMIN HYDROCHLORIDE 500 MG/1
TABLET ORAL
Qty: 60 TABLET | Refills: 5 | Status: SHIPPED | OUTPATIENT
Start: 2017-04-07 | End: 2017-09-21 | Stop reason: SDUPTHER

## 2017-04-07 RX ORDER — GLIMEPIRIDE 4 MG/1
TABLET ORAL
Qty: 30 TABLET | Refills: 5 | Status: SHIPPED | OUTPATIENT
Start: 2017-04-07 | End: 2017-09-21 | Stop reason: SDUPTHER

## 2017-04-17 ENCOUNTER — HOSPITAL ENCOUNTER (OUTPATIENT)
Dept: RADIOLOGY | Facility: CLINIC | Age: 72
Discharge: HOME | End: 2017-04-17
Attending: INTERNAL MEDICINE
Payer: MEDICARE

## 2017-04-17 DIAGNOSIS — Z00.00 HEALTH CARE MAINTENANCE: ICD-10-CM

## 2017-04-17 DIAGNOSIS — Z12.31 ENCOUNTER FOR SCREENING MAMMOGRAM FOR MALIGNANT NEOPLASM OF BREAST: ICD-10-CM

## 2017-04-17 PROCEDURE — 77067 SCR MAMMO BI INCL CAD: CPT | Mod: TC

## 2017-04-17 PROCEDURE — 77067 SCR MAMMO BI INCL CAD: CPT | Mod: 26,,, | Performed by: RADIOLOGY

## 2017-04-17 PROCEDURE — 77063 BREAST TOMOSYNTHESIS BI: CPT | Mod: 26,,, | Performed by: RADIOLOGY

## 2017-05-02 DIAGNOSIS — K21.9 GASTROESOPHAGEAL REFLUX DISEASE, ESOPHAGITIS PRESENCE NOT SPECIFIED: ICD-10-CM

## 2017-05-02 DIAGNOSIS — G47.00 INSOMNIA, UNSPECIFIED TYPE: ICD-10-CM

## 2017-05-02 RX ORDER — CLOPIDOGREL BISULFATE 75 MG/1
TABLET ORAL
Qty: 90 TABLET | Refills: 0 | Status: CANCELLED | OUTPATIENT
Start: 2017-05-02

## 2017-05-04 RX ORDER — TRAZODONE HYDROCHLORIDE 50 MG/1
TABLET ORAL
Qty: 30 TABLET | Refills: 5 | Status: SHIPPED | OUTPATIENT
Start: 2017-05-04 | End: 2017-10-20 | Stop reason: SDUPTHER

## 2017-05-04 NOTE — TELEPHONE ENCOUNTER
Spoke with patient. Patient is out of medication and last appt with dr Page was 7/13/2015.  She got medication refilled 4/2016 for a year.  She has an appt with Dr Page on June 12th.  Dr Page out of office this week. Sent script to Dr Romeo to see if he will fill until appt with Kaylee in June.

## 2017-05-04 NOTE — TELEPHONE ENCOUNTER
----- Message from Felicity Tyler sent at 5/4/2017  4:26 PM CDT -----  Contact: Patient  Patient called for rx refill of valsartan-hydrochlorothiazide (DIOVAN-HCT) 160-25 mg per tablet and clopidogrel (PLAVIX) 75 mg tablet - Please send [facility] [address], [place] - [address].

## 2017-05-05 RX ORDER — VALSARTAN AND HYDROCHLOROTHIAZIDE 160; 25 MG/1; MG/1
1 TABLET ORAL DAILY
Qty: 60 TABLET | Refills: 0 | Status: SHIPPED | OUTPATIENT
Start: 2017-05-05 | End: 2017-06-12

## 2017-05-05 RX ORDER — CLOPIDOGREL BISULFATE 75 MG/1
75 TABLET ORAL DAILY
Qty: 60 TABLET | Refills: 0 | Status: SHIPPED | OUTPATIENT
Start: 2017-05-05 | End: 2017-06-12 | Stop reason: SDUPTHER

## 2017-05-08 RX ORDER — VALSARTAN AND HYDROCHLOROTHIAZIDE 160; 25 MG/1; MG/1
TABLET ORAL
Qty: 90 TABLET | Refills: 0 | Status: SHIPPED | OUTPATIENT
Start: 2017-05-08 | End: 2017-06-12 | Stop reason: SDUPTHER

## 2017-05-23 DIAGNOSIS — M54.12 RADICULOPATHY, CERVICAL REGION: ICD-10-CM

## 2017-05-23 DIAGNOSIS — M47.22 OTHER SPONDYLOSIS WITH RADICULOPATHY, CERVICAL REGION: ICD-10-CM

## 2017-05-23 DIAGNOSIS — M50.20 OTHER CERVICAL DISC DISPLACEMENT, UNSPECIFIED CERVICAL REGION: Primary | ICD-10-CM

## 2017-05-23 DIAGNOSIS — M54.2 CERVICALGIA: ICD-10-CM

## 2017-05-29 ENCOUNTER — HOSPITAL ENCOUNTER (OUTPATIENT)
Dept: RADIOLOGY | Facility: HOSPITAL | Age: 72
Discharge: HOME | End: 2017-05-29
Attending: PAIN MEDICINE
Payer: MEDICARE

## 2017-05-29 DIAGNOSIS — M54.12 RADICULOPATHY, CERVICAL REGION: ICD-10-CM

## 2017-05-29 DIAGNOSIS — M54.2 CERVICALGIA: ICD-10-CM

## 2017-05-29 DIAGNOSIS — M47.22 OTHER SPONDYLOSIS WITH RADICULOPATHY, CERVICAL REGION: ICD-10-CM

## 2017-05-29 DIAGNOSIS — M50.20 OTHER CERVICAL DISC DISPLACEMENT, UNSPECIFIED CERVICAL REGION: ICD-10-CM

## 2017-05-29 PROCEDURE — 72141 MRI NECK SPINE W/O DYE: CPT | Mod: TC

## 2017-05-29 PROCEDURE — 72141 MRI NECK SPINE W/O DYE: CPT | Mod: 26,,, | Performed by: RADIOLOGY

## 2017-06-05 ENCOUNTER — LAB VISIT (OUTPATIENT)
Dept: LAB | Facility: HOSPITAL | Age: 72
End: 2017-06-05
Attending: INTERNAL MEDICINE
Payer: MEDICARE

## 2017-06-05 DIAGNOSIS — R06.02 SOB (SHORTNESS OF BREATH): ICD-10-CM

## 2017-06-05 DIAGNOSIS — M79.10 MUSCLE PAIN: ICD-10-CM

## 2017-06-05 DIAGNOSIS — Z13.220 SCREENING FOR HYPERLIPIDEMIA: ICD-10-CM

## 2017-06-05 DIAGNOSIS — G47.33 OSA (OBSTRUCTIVE SLEEP APNEA): ICD-10-CM

## 2017-06-05 DIAGNOSIS — E78.5 HYPERLIPIDEMIA, UNSPECIFIED HYPERLIPIDEMIA TYPE: ICD-10-CM

## 2017-06-05 LAB
ALBUMIN SERPL BCP-MCNC: 4 G/DL
ALP SERPL-CCNC: 57 U/L
ALT SERPL W/O P-5'-P-CCNC: 28 U/L
ANION GAP SERPL CALC-SCNC: 10 MMOL/L
AST SERPL-CCNC: 20 U/L
BASOPHILS # BLD AUTO: 0.04 K/UL
BASOPHILS NFR BLD: 0.4 %
BILIRUB SERPL-MCNC: 0.3 MG/DL
BUN SERPL-MCNC: 27 MG/DL
CALCIUM SERPL-MCNC: 10.3 MG/DL
CHLORIDE SERPL-SCNC: 100 MMOL/L
CHOLEST/HDLC SERPL: 3.1 {RATIO}
CK SERPL-CCNC: 70 U/L
CO2 SERPL-SCNC: 28 MMOL/L
CREAT SERPL-MCNC: 0.9 MG/DL
DIFFERENTIAL METHOD: ABNORMAL
EOSINOPHIL # BLD AUTO: 0.1 K/UL
EOSINOPHIL NFR BLD: 1.4 %
ERYTHROCYTE [DISTWIDTH] IN BLOOD BY AUTOMATED COUNT: 13.4 %
EST. GFR  (AFRICAN AMERICAN): >60 ML/MIN/1.73 M^2
EST. GFR  (NON AFRICAN AMERICAN): >60 ML/MIN/1.73 M^2
GLUCOSE SERPL-MCNC: 99 MG/DL
HCT VFR BLD AUTO: 38.6 %
HDL/CHOLESTEROL RATIO: 32.5 %
HDLC SERPL-MCNC: 157 MG/DL
HDLC SERPL-MCNC: 51 MG/DL
HGB BLD-MCNC: 12.4 G/DL
LDLC SERPL CALC-MCNC: 74.8 MG/DL
LYMPHOCYTES # BLD AUTO: 4.1 K/UL
LYMPHOCYTES NFR BLD: 42.2 %
MCH RBC QN AUTO: 28.4 PG
MCHC RBC AUTO-ENTMCNC: 32.1 %
MCV RBC AUTO: 88 FL
MONOCYTES # BLD AUTO: 0.8 K/UL
MONOCYTES NFR BLD: 8.4 %
NEUTROPHILS # BLD AUTO: 4.5 K/UL
NEUTROPHILS NFR BLD: 47.2 %
NONHDLC SERPL-MCNC: 106 MG/DL
PLATELET # BLD AUTO: 68 K/UL
PMV BLD AUTO: 12.4 FL
POTASSIUM SERPL-SCNC: 3.3 MMOL/L
PROT SERPL-MCNC: 7.3 G/DL
RBC # BLD AUTO: 4.37 M/UL
SODIUM SERPL-SCNC: 138 MMOL/L
TRIGL SERPL-MCNC: 156 MG/DL
WBC # BLD AUTO: 9.59 K/UL

## 2017-06-05 PROCEDURE — 80061 LIPID PANEL: CPT

## 2017-06-05 PROCEDURE — 82550 ASSAY OF CK (CPK): CPT

## 2017-06-05 PROCEDURE — 80053 COMPREHEN METABOLIC PANEL: CPT

## 2017-06-05 PROCEDURE — 85025 COMPLETE CBC W/AUTO DIFF WBC: CPT

## 2017-06-05 PROCEDURE — 36415 COLL VENOUS BLD VENIPUNCTURE: CPT | Mod: PO

## 2017-06-12 ENCOUNTER — OFFICE VISIT (OUTPATIENT)
Dept: CARDIOLOGY | Facility: CLINIC | Age: 72
End: 2017-06-12
Payer: MEDICARE

## 2017-06-12 VITALS
WEIGHT: 182.75 LBS | BODY MASS INDEX: 32.38 KG/M2 | SYSTOLIC BLOOD PRESSURE: 128 MMHG | HEART RATE: 85 BPM | DIASTOLIC BLOOD PRESSURE: 78 MMHG | HEIGHT: 63 IN

## 2017-06-12 DIAGNOSIS — G45.8 OTHER SPECIFIED TRANSIENT CEREBRAL ISCHEMIAS: Chronic | ICD-10-CM

## 2017-06-12 DIAGNOSIS — R06.02 SOB (SHORTNESS OF BREATH): ICD-10-CM

## 2017-06-12 DIAGNOSIS — G47.33 OSA (OBSTRUCTIVE SLEEP APNEA): ICD-10-CM

## 2017-06-12 DIAGNOSIS — E11.59 HYPERTENSION ASSOCIATED WITH DIABETES: Primary | ICD-10-CM

## 2017-06-12 DIAGNOSIS — N18.30 CKD (CHRONIC KIDNEY DISEASE) STAGE 3, GFR 30-59 ML/MIN: ICD-10-CM

## 2017-06-12 DIAGNOSIS — I15.2 HYPERTENSION ASSOCIATED WITH DIABETES: Primary | ICD-10-CM

## 2017-06-12 PROCEDURE — 99999 PR PBB SHADOW E&M-EST. PATIENT-LVL III: CPT | Mod: PBBFAC,,, | Performed by: INTERNAL MEDICINE

## 2017-06-12 PROCEDURE — 1159F MED LIST DOCD IN RCRD: CPT | Mod: ,,, | Performed by: INTERNAL MEDICINE

## 2017-06-12 PROCEDURE — 3044F HG A1C LEVEL LT 7.0%: CPT | Mod: ,,, | Performed by: INTERNAL MEDICINE

## 2017-06-12 PROCEDURE — 1126F AMNT PAIN NOTED NONE PRSNT: CPT | Mod: ,,, | Performed by: INTERNAL MEDICINE

## 2017-06-12 PROCEDURE — 99213 OFFICE O/P EST LOW 20 MIN: CPT | Mod: PBBFAC,PO | Performed by: INTERNAL MEDICINE

## 2017-06-12 PROCEDURE — 3066F NEPHROPATHY DOC TX: CPT | Mod: ,,, | Performed by: INTERNAL MEDICINE

## 2017-06-12 PROCEDURE — 99214 OFFICE O/P EST MOD 30 MIN: CPT | Mod: S$PBB,,, | Performed by: INTERNAL MEDICINE

## 2017-06-12 RX ORDER — VALSARTAN AND HYDROCHLOROTHIAZIDE 160; 25 MG/1; MG/1
TABLET ORAL
Qty: 90 TABLET | Refills: 5 | Status: SHIPPED | OUTPATIENT
Start: 2017-06-12 | End: 2018-07-25 | Stop reason: SDUPTHER

## 2017-06-12 RX ORDER — ROSUVASTATIN CALCIUM 10 MG/1
10 TABLET, COATED ORAL NIGHTLY
Qty: 90 TABLET | Refills: 4 | Status: ON HOLD | OUTPATIENT
Start: 2017-06-12 | End: 2017-07-08

## 2017-06-12 RX ORDER — DILTIAZEM HYDROCHLORIDE 120 MG/1
120 CAPSULE, COATED, EXTENDED RELEASE ORAL NIGHTLY
Qty: 90 CAPSULE | Refills: 5 | Status: SHIPPED | OUTPATIENT
Start: 2017-06-12 | End: 2018-08-10 | Stop reason: SDUPTHER

## 2017-06-12 RX ORDER — CLOPIDOGREL BISULFATE 75 MG/1
75 TABLET ORAL DAILY
Qty: 90 TABLET | Refills: 5 | Status: SHIPPED | OUTPATIENT
Start: 2017-06-12 | End: 2018-07-17 | Stop reason: SDUPTHER

## 2017-06-12 NOTE — PROGRESS NOTES
Subjective:    Patient ID:  Pili Teixeira is a 72 y.o. female who presents for follow-up of No chief complaint on file.      HPI   Here for f/u TIA/SOB/REJI. Patients states is doing well no chest pain, SOB or change in exertional tolerence. Patient does not exercise but remains very active with out change in exertional tolerance or chest pain. C/o diffuse myalgias.     Review of Systems   Constitution: Negative for chills, decreased appetite, weakness and night sweats.   HENT: Negative for headaches and nosebleeds.    Eyes: Negative for blurred vision and visual disturbance.   Cardiovascular: Negative for chest pain, claudication, cyanosis, dyspnea on exertion, irregular heartbeat, leg swelling, near-syncope, orthopnea, palpitations, paroxysmal nocturnal dyspnea and syncope.   Respiratory: Negative for cough and shortness of breath.    Endocrine: Negative for polyuria.   Hematologic/Lymphatic: Does not bruise/bleed easily.   Skin: Negative for flushing and rash.   Musculoskeletal: Negative for arthritis, joint pain, muscle cramps and myalgias.   Gastrointestinal: Negative for abdominal pain, change in bowel habit and heartburn.   Genitourinary: Negative for bladder incontinence.   Neurological: Negative for dizziness, light-headedness and loss of balance.   Psychiatric/Behavioral: Negative for altered mental status.        Objective:    Physical Exam   Constitutional: She is oriented to person, place, and time. She appears well-developed and well-nourished.   HENT:   Head: Normocephalic.   Eyes: Conjunctivae are normal.   Neck: Normal range of motion. Neck supple. No JVD present.   Cardiovascular: Normal rate, regular rhythm, normal heart sounds and intact distal pulses.    Pulses:       Carotid pulses are 2+ on the right side, and 2+ on the left side.       Radial pulses are 2+ on the right side, and 2+ on the left side.        Dorsalis pedis pulses are 2+ on the right side, and 2+ on the left side.         Posterior tibial pulses are 2+ on the right side, and 2+ on the left side.   Pulmonary/Chest: Effort normal and breath sounds normal.   Abdominal: Soft. Bowel sounds are normal.   Musculoskeletal: She exhibits no edema or tenderness.   Neurological: She is alert and oriented to person, place, and time. Gait normal.   Skin: Skin is warm, dry and intact. No cyanosis. Nails show no clubbing.   Psychiatric: She has a normal mood and affect. Her speech is normal and behavior is normal. Thought content normal.             ..    Chemistry        Component Value Date/Time     06/05/2017 0837    K 3.3 (L) 06/05/2017 0837     06/05/2017 0837    CO2 28 06/05/2017 0837    BUN 27 (H) 06/05/2017 0837    CREATININE 0.9 06/05/2017 0837    CREATININE 0.9 09/01/2012 1320    GLU 99 06/05/2017 0837        Component Value Date/Time    CALCIUM 10.3 06/05/2017 0837    CALCIUM 9.9 09/01/2012 1320    ALKPHOS 57 06/05/2017 0837    ALKPHOS 80 09/01/2012 1320    AST 20 06/05/2017 0837    AST 17 09/01/2012 1320    ALT 28 06/05/2017 0837    BILITOT 0.3 06/05/2017 0837            ..  Lab Results   Component Value Date    CHOL 157 06/05/2017    CHOL 157 03/03/2017    CHOL 140 07/17/2015     Lab Results   Component Value Date    HDL 51 06/05/2017    HDL 45 03/03/2017    HDL 40 07/17/2015     Lab Results   Component Value Date    LDLCALC 74.8 06/05/2017    LDLCALC 73.2 03/03/2017    LDLCALC 68.6 07/17/2015     Lab Results   Component Value Date    TRIG 156 (H) 06/05/2017    TRIG 194 (H) 03/03/2017    TRIG 157 (H) 07/17/2015     Lab Results   Component Value Date    CHOLHDL 32.5 06/05/2017    CHOLHDL 28.7 03/03/2017    CHOLHDL 28.6 07/17/2015     ..  Lab Results   Component Value Date    WBC 9.59 06/05/2017    HGB 12.4 06/05/2017    HCT 38.6 06/05/2017    MCV 88 06/05/2017    PLT 68 (L) 06/05/2017       Test(s) Reviewed  I have reviewed the following in detail:  [] Stress test   [] Angiography   [x] Echocardiogram   [x] Labs   [] Other:        Assessment:         ICD-10-CM ICD-9-CM   1. Hypertension associated with diabetes E11.59 250.80    I10 401.9   2. SOB (shortness of breath) R06.02 786.05   3. REJI (obstructive sleep apnea) G47.33 327.23   4. Other specified transient cerebral ischemias G45.8 435.8   5. CKD (chronic kidney disease) stage 3, GFR 30-59 ml/min N18.3 585.3     Problem List Items Addressed This Visit     CKD (chronic kidney disease) stage 3, GFR 30-59 ml/min    Hypertension associated with diabetes - Primary    REJI (obstructive sleep apnea)    SOB (shortness of breath)    Overview     [facility]  1. Normal coronary arteries.  2. Normal single renal arteries bilaterally.  3. Diastolic dysfunction.         TIA (transient ischemic attack) (Chronic)      Other Visit Diagnoses    None.          Plan:           Return to clinic 9 months   Aerobic exercise 5x's/wk. Heart healthy diet and risk factor modification.    See labs and med orders.  Told to [facility] rheumatologist due to thrombocytopenia since on med.  Hold simvastatin for 1 months then try lower dose crestor follow sx's.

## 2017-06-29 DIAGNOSIS — K21.00 REFLUX ESOPHAGITIS: ICD-10-CM

## 2017-06-29 RX ORDER — PANTOPRAZOLE SODIUM 40 MG/1
TABLET, DELAYED RELEASE ORAL
Qty: 90 TABLET | Refills: 0 | Status: SHIPPED | OUTPATIENT
Start: 2017-06-29 | End: 2017-10-03 | Stop reason: SDUPTHER

## 2017-06-30 DIAGNOSIS — K21.9 GASTROESOPHAGEAL REFLUX DISEASE, ESOPHAGITIS PRESENCE NOT SPECIFIED: ICD-10-CM

## 2017-07-08 ENCOUNTER — HOSPITAL ENCOUNTER (INPATIENT)
Facility: HOSPITAL | Age: 72
LOS: 6 days | Discharge: HOME | DRG: 395 | End: 2017-07-11
Attending: EMERGENCY MEDICINE
Payer: MEDICARE

## 2017-07-08 DIAGNOSIS — K56.609 SBO (SMALL BOWEL OBSTRUCTION): Primary | ICD-10-CM

## 2017-07-08 LAB
ALBUMIN SERPL BCP-MCNC: 4.3 G/DL
ALP SERPL-CCNC: 57 U/L
ALT SERPL W/O P-5'-P-CCNC: 27 U/L
ANION GAP SERPL CALC-SCNC: 14 MMOL/L
AST SERPL-CCNC: 23 U/L
BACTERIA #/AREA URNS HPF: ABNORMAL /HPF
BASOPHILS # BLD AUTO: 0 K/UL
BASOPHILS NFR BLD: 0.2 %
BILIRUB SERPL-MCNC: 0.5 MG/DL
BILIRUB UR QL STRIP: NEGATIVE
BUN SERPL-MCNC: 24 MG/DL
CALCIUM SERPL-MCNC: 10.5 MG/DL
CHLORIDE SERPL-SCNC: 98 MMOL/L
CLARITY UR: CLEAR
CO2 SERPL-SCNC: 27 MMOL/L
COLOR UR: YELLOW
CREAT SERPL-MCNC: 1 MG/DL
DIFFERENTIAL METHOD: ABNORMAL
EOSINOPHIL # BLD AUTO: 0.1 K/UL
EOSINOPHIL NFR BLD: 0.4 %
ERYTHROCYTE [DISTWIDTH] IN BLOOD BY AUTOMATED COUNT: 13.3 %
EST. GFR  (AFRICAN AMERICAN): >60 ML/MIN/1.73 M^2
EST. GFR  (NON AFRICAN AMERICAN): 57 ML/MIN/1.73 M^2
GLUCOSE SERPL-MCNC: 118 MG/DL
GLUCOSE UR QL STRIP: NEGATIVE
HCT VFR BLD AUTO: 42.6 %
HGB BLD-MCNC: 13.9 G/DL
HGB UR QL STRIP: ABNORMAL
KETONES UR QL STRIP: NEGATIVE
LEUKOCYTE ESTERASE UR QL STRIP: ABNORMAL
LIPASE SERPL-CCNC: 80 U/L
LYMPHOCYTES # BLD AUTO: 3.2 K/UL
LYMPHOCYTES NFR BLD: 20.7 %
MCH RBC QN AUTO: 28.3 PG
MCHC RBC AUTO-ENTMCNC: 32.7 %
MCV RBC AUTO: 86 FL
MICROSCOPIC COMMENT: ABNORMAL
MONOCYTES # BLD AUTO: 0.8 K/UL
MONOCYTES NFR BLD: 5.3 %
NEUTROPHILS # BLD AUTO: 11.5 K/UL
NEUTROPHILS NFR BLD: 73.4 %
NITRITE UR QL STRIP: NEGATIVE
PH UR STRIP: 6 [PH] (ref 5–8)
PLATELET # BLD AUTO: 135 K/UL
PMV BLD AUTO: 13.1 FL
POCT GLUCOSE: 100 MG/DL (ref 70–110)
POCT GLUCOSE: 118 MG/DL (ref 70–110)
POTASSIUM SERPL-SCNC: 3.7 MMOL/L
PROT SERPL-MCNC: 7.7 G/DL
PROT UR QL STRIP: NEGATIVE
RBC # BLD AUTO: 4.93 M/UL
RBC #/AREA URNS HPF: 4 /HPF (ref 0–4)
SODIUM SERPL-SCNC: 139 MMOL/L
SP GR UR STRIP: 1.02 (ref 1–1.03)
SQUAMOUS #/AREA URNS HPF: 8 /HPF
URATE CRY URNS QL MICRO: ABNORMAL
URN SPEC COLLECT METH UR: ABNORMAL
UROBILINOGEN UR STRIP-ACNC: NEGATIVE EU/DL
WBC # BLD AUTO: 15.7 K/UL
WBC #/AREA URNS HPF: 32 /HPF (ref 0–5)

## 2017-07-08 PROCEDURE — 96374 THER/PROPH/DIAG INJ IV PUSH: CPT

## 2017-07-08 PROCEDURE — 83690 ASSAY OF LIPASE: CPT

## 2017-07-08 PROCEDURE — 94761 N-INVAS EAR/PLS OXIMETRY MLT: CPT

## 2017-07-08 PROCEDURE — 25000242 PHARM REV CODE 250 ALT 637 W/ HCPCS: Performed by: NURSE PRACTITIONER

## 2017-07-08 PROCEDURE — 25000003 PHARM REV CODE 250: Performed by: NURSE PRACTITIONER

## 2017-07-08 PROCEDURE — 80053 COMPREHEN METABOLIC PANEL: CPT

## 2017-07-08 PROCEDURE — 96376 TX/PRO/DX INJ SAME DRUG ADON: CPT

## 2017-07-08 PROCEDURE — 25000003 PHARM REV CODE 250: Performed by: EMERGENCY MEDICINE

## 2017-07-08 PROCEDURE — 25000003 PHARM REV CODE 250: Performed by: HOSPITALIST

## 2017-07-08 PROCEDURE — 96361 HYDRATE IV INFUSION ADD-ON: CPT

## 2017-07-08 PROCEDURE — 96375 TX/PRO/DX INJ NEW DRUG ADDON: CPT

## 2017-07-08 PROCEDURE — 63600175 PHARM REV CODE 636 W HCPCS: Performed by: EMERGENCY MEDICINE

## 2017-07-08 PROCEDURE — 36415 COLL VENOUS BLD VENIPUNCTURE: CPT

## 2017-07-08 PROCEDURE — 63600175 PHARM REV CODE 636 W HCPCS: Performed by: NURSE PRACTITIONER

## 2017-07-08 PROCEDURE — 94640 AIRWAY INHALATION TREATMENT: CPT

## 2017-07-08 PROCEDURE — 85025 COMPLETE CBC W/AUTO DIFF WBC: CPT

## 2017-07-08 PROCEDURE — 99900035 HC TECH TIME PER 15 MIN (STAT)

## 2017-07-08 PROCEDURE — 99285 EMERGENCY DEPT VISIT HI MDM: CPT | Mod: 25

## 2017-07-08 PROCEDURE — 81000 URINALYSIS NONAUTO W/SCOPE: CPT

## 2017-07-08 PROCEDURE — 63600175 PHARM REV CODE 636 W HCPCS: Performed by: HOSPITALIST

## 2017-07-08 PROCEDURE — 99223 1ST HOSP IP/OBS HIGH 75: CPT | Mod: ,,, | Performed by: SURGERY

## 2017-07-08 PROCEDURE — 11000001 HC ACUTE MED/SURG PRIVATE ROOM

## 2017-07-08 RX ORDER — MORPHINE SULFATE 2 MG/ML
2 INJECTION, SOLUTION INTRAMUSCULAR; INTRAVENOUS EVERY 4 HOURS PRN
Status: DISCONTINUED | OUTPATIENT
Start: 2017-07-08 | End: 2017-07-08

## 2017-07-08 RX ORDER — GLUCAGON 1 MG
1 KIT INJECTION
Status: DISCONTINUED | OUTPATIENT
Start: 2017-07-08 | End: 2017-07-08

## 2017-07-08 RX ORDER — SODIUM CHLORIDE, SODIUM LACTATE, POTASSIUM CHLORIDE, CALCIUM CHLORIDE 600; 310; 30; 20 MG/100ML; MG/100ML; MG/100ML; MG/100ML
INJECTION, SOLUTION INTRAVENOUS CONTINUOUS
Status: DISCONTINUED | OUTPATIENT
Start: 2017-07-08 | End: 2017-07-11 | Stop reason: HOSPADM

## 2017-07-08 RX ORDER — ACETAMINOPHEN 10 MG/ML
1000 INJECTION, SOLUTION INTRAVENOUS EVERY 8 HOURS
Status: COMPLETED | OUTPATIENT
Start: 2017-07-08 | End: 2017-07-09

## 2017-07-08 RX ORDER — SIMVASTATIN 40 MG/1
40 TABLET, FILM COATED ORAL NIGHTLY
COMMUNITY
End: 2018-06-05 | Stop reason: SDUPTHER

## 2017-07-08 RX ORDER — IBUPROFEN 200 MG
16 TABLET ORAL
Status: DISCONTINUED | OUTPATIENT
Start: 2017-07-08 | End: 2017-07-08

## 2017-07-08 RX ORDER — DICYCLOMINE HYDROCHLORIDE 10 MG/ML
20 INJECTION INTRAMUSCULAR EVERY 6 HOURS PRN
Status: DISCONTINUED | OUTPATIENT
Start: 2017-07-08 | End: 2017-07-11 | Stop reason: HOSPADM

## 2017-07-08 RX ORDER — GLUCAGON 1 MG
1 KIT INJECTION
Status: DISCONTINUED | OUTPATIENT
Start: 2017-07-08 | End: 2017-07-11 | Stop reason: HOSPADM

## 2017-07-08 RX ORDER — ACETAMINOPHEN 325 MG/1
650 TABLET ORAL EVERY 4 HOURS PRN
Status: DISCONTINUED | OUTPATIENT
Start: 2017-07-08 | End: 2017-07-11 | Stop reason: HOSPADM

## 2017-07-08 RX ORDER — MORPHINE SULFATE 2 MG/ML
6 INJECTION, SOLUTION INTRAMUSCULAR; INTRAVENOUS
Status: COMPLETED | OUTPATIENT
Start: 2017-07-08 | End: 2017-07-08

## 2017-07-08 RX ORDER — METOPROLOL TARTRATE 1 MG/ML
5 INJECTION, SOLUTION INTRAVENOUS
Status: DISCONTINUED | OUTPATIENT
Start: 2017-07-08 | End: 2017-07-08

## 2017-07-08 RX ORDER — PREDNISONE 20 MG/1
50 TABLET ORAL DAILY PRN
COMMUNITY
End: 2020-07-31

## 2017-07-08 RX ORDER — MORPHINE SULFATE 4 MG/ML
4 INJECTION, SOLUTION INTRAMUSCULAR; INTRAVENOUS EVERY 4 HOURS PRN
Status: DISCONTINUED | OUTPATIENT
Start: 2017-07-08 | End: 2017-07-08

## 2017-07-08 RX ORDER — MORPHINE SULFATE 4 MG/ML
4 INJECTION, SOLUTION INTRAMUSCULAR; INTRAVENOUS ONCE
Status: COMPLETED | OUTPATIENT
Start: 2017-07-08 | End: 2017-07-08

## 2017-07-08 RX ORDER — ONDANSETRON 2 MG/ML
4 INJECTION INTRAMUSCULAR; INTRAVENOUS EVERY 6 HOURS PRN
Status: DISCONTINUED | OUTPATIENT
Start: 2017-07-08 | End: 2017-07-08

## 2017-07-08 RX ORDER — ONDANSETRON 2 MG/ML
4 INJECTION INTRAMUSCULAR; INTRAVENOUS
Status: COMPLETED | OUTPATIENT
Start: 2017-07-08 | End: 2017-07-08

## 2017-07-08 RX ORDER — ALBUTEROL SULFATE 2.5 MG/.5ML
2.5 SOLUTION RESPIRATORY (INHALATION) EVERY 6 HOURS
Status: DISCONTINUED | OUTPATIENT
Start: 2017-07-08 | End: 2017-07-08

## 2017-07-08 RX ORDER — IBUPROFEN 200 MG
24 TABLET ORAL
Status: DISCONTINUED | OUTPATIENT
Start: 2017-07-08 | End: 2017-07-08

## 2017-07-08 RX ORDER — ALBUTEROL SULFATE 2.5 MG/.5ML
2.5 SOLUTION RESPIRATORY (INHALATION)
Status: DISCONTINUED | OUTPATIENT
Start: 2017-07-09 | End: 2017-07-11 | Stop reason: HOSPADM

## 2017-07-08 RX ORDER — INSULIN ASPART 100 [IU]/ML
0-5 INJECTION, SOLUTION INTRAVENOUS; SUBCUTANEOUS EVERY 6 HOURS PRN
Status: DISCONTINUED | OUTPATIENT
Start: 2017-07-08 | End: 2017-07-11 | Stop reason: HOSPADM

## 2017-07-08 RX ORDER — MORPHINE SULFATE 10 MG/ML
5 INJECTION INTRAMUSCULAR; INTRAVENOUS; SUBCUTANEOUS EVERY 4 HOURS PRN
Status: DISCONTINUED | OUTPATIENT
Start: 2017-07-08 | End: 2017-07-11 | Stop reason: HOSPADM

## 2017-07-08 RX ORDER — HYDRALAZINE HYDROCHLORIDE 20 MG/ML
10 INJECTION INTRAMUSCULAR; INTRAVENOUS EVERY 4 HOURS PRN
Status: DISCONTINUED | OUTPATIENT
Start: 2017-07-08 | End: 2017-07-11 | Stop reason: HOSPADM

## 2017-07-08 RX ORDER — MORPHINE SULFATE 2 MG/ML
3 INJECTION, SOLUTION INTRAMUSCULAR; INTRAVENOUS EVERY 4 HOURS PRN
Status: DISCONTINUED | OUTPATIENT
Start: 2017-07-08 | End: 2017-07-11 | Stop reason: HOSPADM

## 2017-07-08 RX ORDER — METOPROLOL TARTRATE 1 MG/ML
5 INJECTION, SOLUTION INTRAVENOUS EVERY 6 HOURS
Status: DISCONTINUED | OUTPATIENT
Start: 2017-07-08 | End: 2017-07-08

## 2017-07-08 RX ORDER — INSULIN ASPART 100 [IU]/ML
0-5 INJECTION, SOLUTION INTRAVENOUS; SUBCUTANEOUS
Status: DISCONTINUED | OUTPATIENT
Start: 2017-07-08 | End: 2017-07-08

## 2017-07-08 RX ORDER — LEFLUNOMIDE 20 MG/1
20 TABLET ORAL DAILY
COMMUNITY
End: 2018-03-20

## 2017-07-08 RX ORDER — SODIUM CHLORIDE 9 MG/ML
INJECTION, SOLUTION INTRAVENOUS CONTINUOUS
Status: DISCONTINUED | OUTPATIENT
Start: 2017-07-08 | End: 2017-07-08

## 2017-07-08 RX ORDER — ONDANSETRON 2 MG/ML
8 INJECTION INTRAMUSCULAR; INTRAVENOUS EVERY 6 HOURS PRN
Status: DISCONTINUED | OUTPATIENT
Start: 2017-07-08 | End: 2017-07-11 | Stop reason: HOSPADM

## 2017-07-08 RX ADMIN — MORPHINE SULFATE 5 MG: 10 INJECTION INTRAMUSCULAR; INTRAVENOUS; SUBCUTANEOUS at 09:07

## 2017-07-08 RX ADMIN — MORPHINE SULFATE 6 MG: 2 INJECTION, SOLUTION INTRAMUSCULAR; INTRAVENOUS at 03:07

## 2017-07-08 RX ADMIN — MORPHINE SULFATE 4 MG: 4 INJECTION, SOLUTION INTRAMUSCULAR; INTRAVENOUS at 12:07

## 2017-07-08 RX ADMIN — SODIUM CHLORIDE, SODIUM LACTATE, POTASSIUM CHLORIDE, AND CALCIUM CHLORIDE: .6; .31; .03; .02 INJECTION, SOLUTION INTRAVENOUS at 09:07

## 2017-07-08 RX ADMIN — ACETAMINOPHEN 1000 MG: 10 INJECTION, SOLUTION INTRAVENOUS at 01:07

## 2017-07-08 RX ADMIN — ALBUTEROL SULFATE 2.5 MG: 2.5 SOLUTION RESPIRATORY (INHALATION) at 01:07

## 2017-07-08 RX ADMIN — MORPHINE SULFATE 4 MG: 4 INJECTION, SOLUTION INTRAMUSCULAR; INTRAVENOUS at 09:07

## 2017-07-08 RX ADMIN — MORPHINE SULFATE 5 MG: 10 INJECTION INTRAMUSCULAR; INTRAVENOUS; SUBCUTANEOUS at 03:07

## 2017-07-08 RX ADMIN — ONDANSETRON 4 MG: 2 INJECTION INTRAMUSCULAR; INTRAVENOUS at 03:07

## 2017-07-08 RX ADMIN — PROMETHAZINE HYDROCHLORIDE 25 MG: 25 INJECTION INTRAMUSCULAR; INTRAVENOUS at 07:07

## 2017-07-08 RX ADMIN — ACETAMINOPHEN 1000 MG: 10 INJECTION, SOLUTION INTRAVENOUS at 09:07

## 2017-07-08 RX ADMIN — SODIUM CHLORIDE 1000 ML: 0.9 INJECTION, SOLUTION INTRAVENOUS at 03:07

## 2017-07-08 RX ADMIN — SODIUM CHLORIDE: 0.9 INJECTION, SOLUTION INTRAVENOUS at 09:07

## 2017-07-08 RX ADMIN — MORPHINE SULFATE 6 MG: 2 INJECTION, SOLUTION INTRAMUSCULAR; INTRAVENOUS at 05:07

## 2017-07-08 RX ADMIN — ONDANSETRON 8 MG: 2 INJECTION INTRAMUSCULAR; INTRAVENOUS at 06:07

## 2017-07-08 NOTE — PROGRESS NOTES
Patient seen by Dr. Sebastian, patient current drinking contrast for SBS, patient daughter at bedside, no signs of distress at this time

## 2017-07-08 NOTE — HPI
72-year-old female with a past medical history of diabetes mellitus, hypertension, and diverticulitis presents with a chief complaint of lower abdominal pain.  She reports the pain started 1 day ago and is getting progressively worse.  She reports that it is associated with nausea and vomiting.  She denies any associated fever/chills, diarrhea, hematochezia, melena, or hematuria.  The pain is unrelieved with her home Percocet.  There are no alleviating factors.   She underwent CT scan in the ED which was suggestive of SBO secondary to incisional hernia.  She has a previous history of multiple surgeries, including bowel resection and ovarian cysts.  She has had the incisional hernia for several years but states it is getting larger lately

## 2017-07-08 NOTE — PROGRESS NOTES
[facility] met with patient and family at bedside to complete discharge planning assessment.  Patient verified all demographic information on facesheet is correct.  Patient verified PCP is Dr. Herlinda Dove. Patient verified primary health insurance is Medicare and secondary is [place] Medicaid.  Patient's states she do not feel she will need home health.              Discharge Planning Assessment    Patient Identification  Name: Pili Teixeira  Age: 72 y.o.   Gender: female.  Admitting Diagnosis: <principal problem not specified>  PCP: Herlinda Dove MD   PCP Address: [address] / [place] [address]  PCP Phone Number: [phone]     Telephone Contacts  Extended Emergency Contact Information  Primary Emergency Contact: AislinnKareen reyes  Address: [address] [place] [id number] [place] [address]  Mobile Phone: [phone]  Relation: Daughter  Preferred language: English   needed? No    Alert/Orientation: yes, xs4   If patient is unable to sign for self, who is handling affairs?: DaughterKareen  Is this person the HPOA? yes  Does patient have a living will? no    Arrived from: home  Lives with: roommate Raymond  Support System: family  Who do you want involved in your discharge planning? daughter  Are they available to help you at discharge? yes  Prior Level of Functioning: independent  Were you able to care for yourself at home? yes  Who assists with Medications, Meal Prep, Housekeeping, Laundry, Shopping, MD Appts?: self    Have you been in the hospital in the last 30 days?: no  If so, were you admitted for the same reason? n/a  If not, why were you in the hospital? n/a    Services received prior to admit:  none  DME used at home: CPAP, but do not use, nebulizer    Capacity for self-care: independent  Services patient will need at discharge: none  DME pt will need at discharge: none    [facility]  Able to afford  meds?: yes    How do you get to your MD appointments / Do you have transportation or depend on someone else: self  Are you taking Coumadin at home?:  no If so, who monitors your INR: n/a  Are you on Dialysis?:  no If so where do you attend dialysis: n/a    Community Resources & Referrals Needed: none      Discharge plan 1:  Home with family  Discharge plan 2:

## 2017-07-08 NOTE — SUBJECTIVE & OBJECTIVE
No current facility-administered medications on file prior to encounter.      Current Outpatient Prescriptions on File Prior to Encounter   Medication Sig    ascorbic acid, vitamin C, (VITAMIN C) 500 mg Chew Take 1,000 mg by mouth once daily.    BENADRYL ALLERGY 25 mg tablet Take 2 tablets (50 mg total) by mouth as directed. Take 50mg of Benadryl (Diphenhydramine) by mouth 1 hour prior to CT scan (Patient taking differently: Take 25 mg by mouth nightly as needed for Insomnia. )    clopidogrel (PLAVIX) 75 mg tablet Take 1 tablet (75 mg total) by mouth once daily. Patient needs appt for further refills. Appt June 12th    cyanocobalamin (VITAMIN B-12) 1000 MCG tablet Take 100 mcg by mouth once daily.    cyclobenzaprine (FLEXERIL) 10 MG tablet Take 10 mg by mouth nightly as needed. Pt does not take often    diltiaZEM (CARTIA XT) 120 MG Cp24 Take 1 capsule (120 mg total) by mouth every evening.    fexofenadine (ALLEGRA) 180 MG tablet Take 180 mg by mouth once daily.    folic acid (FOLVITE) 1 MG tablet Take 1 tablet (1 mg total) by mouth once daily.    glimepiride (AMARYL) 4 MG tablet TAKE ONE TABLET BY MOUTH IN THE MORNING    hydroxychloroquine (PLAQUENIL) 200 mg tablet Take 1 tablet (200 mg total) by mouth 2 (two) times daily.    lactobac cmb #3-fos-pantethine (PROBIOTIC & ACIDOPHILUS) 300-250 million cell-mg Cap Take 1 capsule by mouth once daily.    metformin (GLUCOPHAGE) 500 MG tablet TAKE ONE TABLET BY MOUTH TWICE DAILY WITH MEALS    multivit with min-folic acid (WOMEN'S MULTIVITAMIN GUMMIES) 200 mcg Chew Take by mouth once daily.    nystatin (MYCOSTATIN) cream Apply topically 3 (three) times daily.    olopatadine (PATANOL) 0.1 % ophthalmic solution Place 1 drop into both eyes daily as needed.     ondansetron (ZOFRAN-ODT) 8 MG TbDL DISSOLVE ONE TABLET IN MOUTH EVERY 12 HOURS AS NEEDED    ONGLYZA 5 mg Tab tablet TAKE ONE TABLET BY MOUTH ONCE DAILY    oxycodone-acetaminophen  mg (PERCOCET)   mg per tablet Take 1 tablet by mouth 4 (four) times daily as needed.     pantoprazole (PROTONIX) 40 MG tablet TAKE ONE TABLET BY MOUTH ONCE DAILY    ranitidine (ZANTAC) 300 MG tablet TAKE ONE TABLET BY MOUTH IN THE EVENING    sucralfate (CARAFATE) 1 gram tablet Take 1 tablet (1 g total) by mouth 4 (four) times daily before meals and nightly.    trazodone (DESYREL) 50 MG tablet TAKE ONE TABLET BY MOUTH IN THE EVENING    valsartan-hydrochlorothiazide (DIOVAN-HCT) 160-25 mg per tablet TAKE ONE TABLET BY MOUTH ONCE DAILY (NEED  APPT)    [DISCONTINUED] rosuvastatin (CRESTOR) 10 MG tablet Take 1 tablet (10 mg total) by mouth every evening.    blood sugar diagnostic (ACCU-CHEK JACINTO PLUS TEST STRP) Strp TEST TWICE DAILY ONLY    fluticasone (FLONASE) 50 mcg/actuation nasal spray 2 sprays by Each Nare route once daily. (Patient taking differently: 1 spray by Each Nare route as needed. )    lancets (ACCU-CHEK SOFTCLIX LANCETS) Misc Test TWICE DAILY;Twice a day    phenazopyridine (PYRIDIUM) 100 MG tablet 100 mg daily as needed.     saliva stimulant agents comb.3 (BIOTENE MOISTURIZING MOUTH) Spry 1-2 sprays by Mucous Membrane route 4 (four) times daily as needed (Non prescription).    [DISCONTINUED] (Magic mouthwash) 1:1:1 Benadryl 12.5mg/5ml liq, aluminum & magnesium hydroxide-simehticone (Maalox), lidocaine viscous 2% Swish and spit 15 mLs every 4 (four) hours. for mouth sores    [DISCONTINUED] methotrexate 2.5 MG Tab Take 4 tablets (10 mg total) by mouth every 7 days.       Review of patient's allergies indicates:   Allergen Reactions    Codeine Other (See Comments)     Chest pain    Clindamycin Other (See Comments)     Difficulty swallowing after taking, feels a something sits in epigastric area     Iodinated contrast- oral and iv dye Hives and Rash       Past Medical History:   Diagnosis Date    Allergy     Anemia 2012    with thrombocytopenia; iron deficiency    Arthritis     Cervical spinal  stenosis     with neuropathy, chronic neck,back,leg pain    Colon polyp     Coronary artery disease     Diabetes mellitus     , sugars run 190's per patient    Diverticulitis     Diverticulosis     Hx diverticulitis,still has chronic diarrhea, abd pain    Dyspnea on exertion     sometimes with nausea, fatigue,dizziness, had cardiac cath June 2012, normal    Fracture 2012    right thumb    GERD (gastroesophageal reflux disease)     Feb 2012, Hx H Pylori    Glaucoma     had LAser surgey, on no eye drops    HEARING LOSS     Hemangioma     fatty liver    History of earache     frequent    Hyperlipidemia     Hypertension     Laryngeal polyp     Myocardial infarction 2006 last    also MI in distant past    REJI (obstructive sleep apnea)     does not use CPAP    Otitis media     Renal stone     Sjogren's syndrome     Stroke     TIA, now on Plavix    TIA (transient ischemic attack)     TMJ disorder     UTI (lower urinary tract infection)     frequent    Venous insufficiency     with Hx blood clot in leg     Past Surgical History:   Procedure Laterality Date    ABDOMINAL SURGERY  2010 x2    expoloratory lap for pelvic cyst,adhesions; partial colonectomy     adhesiolysis   10/11/2012    CARDIAC CATHETERIZATION      no current blockages.    CHOLECYSTECTOMY      COLON SURGERY      r/t diverticuli    COLONOSCOPY  08/2014    repeat in 5 years    EPIDURAL BLOCK INJECTION  5/15/12    back,neck for pain    EXPLORATORY LAPAROTOMY W/ BOWEL RESECTION  10/11/2012    EYE SURGERY      Laser for glaucoma    EYE SURGERY  May 2012    cataracts    HYSTERECTOMY      LARYNX SURGERY      polypectomy    OOPHORECTOMY      TONSILLECTOMY      with adenoidectomy    UPPER GASTROINTESTINAL ENDOSCOPY  12/2015    VASCULAR SURGERY      laser to varicose vein leg     Family History     Problem Relation (Age of Onset)    Diverticulitis Sister    Pancreatic cancer Maternal Aunt (55), Cousin (58)    Stroke Father     Throat cancer Brother, Mother        Social History Main Topics    Smoking status: Never Smoker    Smokeless tobacco: Never Used    Alcohol use No    Drug use:      Types: Oxycodone    Sexual activity: No      Comment: hysterectomy     Review of Systems   Constitutional: Negative for appetite change, chills, diaphoresis, fatigue, fever and unexpected weight change.   HENT: Negative for hearing loss, sore throat, trouble swallowing and voice change.    Eyes: Negative for visual disturbance.   Respiratory: Negative for cough, shortness of breath and wheezing.    Cardiovascular: Negative for chest pain, palpitations and leg swelling.   Gastrointestinal: Positive for abdominal distention, abdominal pain, diarrhea, nausea and vomiting. Negative for anal bleeding, blood in stool, constipation and rectal pain.   Genitourinary: Negative for difficulty urinating, dysuria, flank pain, frequency, hematuria, menstrual problem and urgency.   Musculoskeletal: Negative for arthralgias, back pain, joint swelling, myalgias and neck pain.   Skin: Negative for pallor and rash.   Neurological: Negative for dizziness, syncope, weakness and headaches.   Hematological: Negative for adenopathy. Does not bruise/bleed easily.   Psychiatric/Behavioral: Negative for suicidal ideas. The patient is not nervous/anxious.      Objective:     Vital Signs (Most Recent):  Temp: 98.7 °F (37.1 °C) (07/08/17 0612)  Pulse: 80 (07/08/17 0612)  Resp: 18 (07/08/17 0612)  BP: 125/60 (07/08/17 0612)  SpO2: 97 % (07/08/17 0612) Vital Signs (24h Range):  Temp:  [98.1 °F (36.7 °C)-98.7 °F (37.1 °C)] 98.7 °F (37.1 °C)  Pulse:  [80-98] 80  Resp:  [16-18] 18  SpO2:  [94 %-100 %] 97 %  BP: (124-139)/(56-70) 125/60     Weight: 81.6 kg (180 lb)  Body mass index is 31.89 kg/m².    Physical Exam   Constitutional: She is oriented to person, place, and time. She appears well-developed and well-nourished. No distress.   HENT:   Head: Normocephalic and atraumatic.    Right Ear: External ear normal.   Left Ear: External ear normal.   Eyes: Conjunctivae are normal. Pupils are equal, round, and reactive to light. Right eye exhibits no discharge. Left eye exhibits no discharge.   Neck: No tracheal deviation present. No thyromegaly present.   Cardiovascular: Normal rate and regular rhythm.    Pulmonary/Chest: Effort normal. No respiratory distress.   Abdominal: Soft. She exhibits no distension. There is no guarding.       Musculoskeletal: She exhibits no edema or tenderness.   Lymphadenopathy:     She has no cervical adenopathy.   Neurological: She is alert and oriented to person, place, and time. No cranial nerve deficit.   Skin: Skin is warm and dry. No rash noted. She is not diaphoretic. No pallor.   Psychiatric: She has a normal mood and affect. Her behavior is normal. Judgment and thought content normal.       Significant Labs:  CBC:   Recent Labs  Lab 07/08/17 0328   WBC 15.70*   RBC 4.93   HGB 13.9   HCT 42.6   *   MCV 86   MCH 28.3   MCHC 32.7     CMP:   Recent Labs  Lab 07/08/17 0328   *   CALCIUM 10.5   ALBUMIN 4.3   PROT 7.7      K 3.7   CO2 27   CL 98   BUN 24*   CREATININE 1.0   ALKPHOS 57   ALT 27   AST 23   BILITOT 0.5       Significant Diagnostics:  I have reviewed all pertinent imaging results/findings within the past 24 hours.

## 2017-07-08 NOTE — ASSESSMENT & PLAN NOTE
Point of obstruction appears to be incisional hernia on CT.  This was easily and completely reduced by me at the bedside.  Will get SBS today to evaluate for other pathology, as CT scan was done without oral contrast.

## 2017-07-08 NOTE — ED NOTES
Pt presents with c/o generalized abdominal pain since yesterday in am with nausea; 1 episode of emesis PTA.

## 2017-07-08 NOTE — CONSULTS
[facility]  General Surgery  Consult Note    Patient Name: Pili Teixeira  MRN: [id number]  Code Status: Full Code  Admission Date: 7/8/2017  Hospital Length of Stay: 0 days  Attending Physician: Mc Juan MD  Primary Care Provider: Herlinda Dove MD    Patient information was obtained from patient, past medical records and ER records.     Inpatient consult to General Surgery  Consult performed by: DAVID MARROQUIN  Consult ordered by: ANGELA WALLACE        Subjective:     Principal Problem: <principal problem not specified>    History of Present Illness: 72-year-old female with a past medical history of diabetes mellitus, hypertension, and diverticulitis presents with a chief complaint of lower abdominal pain.  She reports the pain started 1 day ago and is getting progressively worse.  She reports that it is associated with nausea and vomiting.  She denies any associated fever/chills, diarrhea, hematochezia, melena, or hematuria.  The pain is unrelieved with her home Percocet.  There are no alleviating factors.   She underwent CT scan in the ED which was suggestive of SBO secondary to incisional hernia.  She has a previous history of multiple surgeries, including bowel resection and ovarian cysts.  She has had the incisional hernia for several years but states it is getting larger lately          No current facility-administered medications on file prior to encounter.      Current Outpatient Prescriptions on File Prior to Encounter   Medication Sig    ascorbic acid, vitamin C, (VITAMIN C) 500 mg Chew Take 1,000 mg by mouth once daily.    BENADRYL ALLERGY 25 mg tablet Take 2 tablets (50 mg total) by mouth as directed. Take 50mg of Benadryl (Diphenhydramine) by mouth 1 hour prior to CT scan (Patient taking differently: Take 25 mg by mouth nightly as needed for Insomnia. )    clopidogrel (PLAVIX) 75 mg tablet Take 1 tablet (75 mg total) by mouth once daily. Patient needs appt for further  refills. Appt June 12th    cyanocobalamin (VITAMIN B-12) 1000 MCG tablet Take 100 mcg by mouth once daily.    cyclobenzaprine (FLEXERIL) 10 MG tablet Take 10 mg by mouth nightly as needed. Pt does not take often    diltiaZEM (CARTIA XT) 120 MG Cp24 Take 1 capsule (120 mg total) by mouth every evening.    fexofenadine (ALLEGRA) 180 MG tablet Take 180 mg by mouth once daily.    folic acid (FOLVITE) 1 MG tablet Take 1 tablet (1 mg total) by mouth once daily.    glimepiride (AMARYL) 4 MG tablet TAKE ONE TABLET BY MOUTH IN THE MORNING    hydroxychloroquine (PLAQUENIL) 200 mg tablet Take 1 tablet (200 mg total) by mouth 2 (two) times daily.    lactobac cmb #3-fos-pantethine (PROBIOTIC & ACIDOPHILUS) 300-250 million cell-mg Cap Take 1 capsule by mouth once daily.    metformin (GLUCOPHAGE) 500 MG tablet TAKE ONE TABLET BY MOUTH TWICE DAILY WITH MEALS    multivit with min-folic acid (WOMEN'S MULTIVITAMIN GUMMIES) 200 mcg Chew Take by mouth once daily.    nystatin (MYCOSTATIN) cream Apply topically 3 (three) times daily.    olopatadine (PATANOL) 0.1 % ophthalmic solution Place 1 drop into both eyes daily as needed.     ondansetron (ZOFRAN-ODT) 8 MG TbDL DISSOLVE ONE TABLET IN MOUTH EVERY 12 HOURS AS NEEDED    ONGLYZA 5 mg Tab tablet TAKE ONE TABLET BY MOUTH ONCE DAILY    oxycodone-acetaminophen  mg (PERCOCET)  mg per tablet Take 1 tablet by mouth 4 (four) times daily as needed.     pantoprazole (PROTONIX) 40 MG tablet TAKE ONE TABLET BY MOUTH ONCE DAILY    ranitidine (ZANTAC) 300 MG tablet TAKE ONE TABLET BY MOUTH IN THE EVENING    sucralfate (CARAFATE) 1 gram tablet Take 1 tablet (1 g total) by mouth 4 (four) times daily before meals and nightly.    trazodone (DESYREL) 50 MG tablet TAKE ONE TABLET BY MOUTH IN THE EVENING    valsartan-hydrochlorothiazide (DIOVAN-HCT) 160-25 mg per tablet TAKE ONE TABLET BY MOUTH ONCE DAILY (NEED  APPT)    [DISCONTINUED] rosuvastatin (CRESTOR) 10 MG tablet Take  1 tablet (10 mg total) by mouth every evening.    blood sugar diagnostic (ACCU-CHEK JACINTO PLUS TEST STRP) Strp TEST TWICE DAILY ONLY    fluticasone (FLONASE) 50 mcg/actuation nasal spray 2 sprays by Each Nare route once daily. (Patient taking differently: 1 spray by Each Nare route as needed. )    lancets (ACCU-CHEK SOFTCLIX LANCETS) Misc Test TWICE DAILY;Twice a day    phenazopyridine (PYRIDIUM) 100 MG tablet 100 mg daily as needed.     saliva stimulant agents comb.3 (BIOTENE MOISTURIZING MOUTH) Spry 1-2 sprays by Mucous Membrane route 4 (four) times daily as needed (Non prescription).    [DISCONTINUED] (Magic mouthwash) 1:1:1 Benadryl 12.5mg/5ml liq, aluminum & magnesium hydroxide-simehticone (Maalox), lidocaine viscous 2% Swish and spit 15 mLs every 4 (four) hours. for mouth sores    [DISCONTINUED] methotrexate 2.5 MG Tab Take 4 tablets (10 mg total) by mouth every 7 days.       Review of patient's allergies indicates:   Allergen Reactions    Codeine Other (See Comments)     Chest pain    Clindamycin Other (See Comments)     Difficulty swallowing after taking, feels a something sits in epigastric area     Iodinated contrast- oral and iv dye Hives and Rash       Past Medical History:   Diagnosis Date    Allergy     Anemia 2012    with thrombocytopenia; iron deficiency    Arthritis     Cervical spinal stenosis     with neuropathy, chronic neck,back,leg pain    Colon polyp     Coronary artery disease     Diabetes mellitus     , sugars run 190's per patient    Diverticulitis     Diverticulosis     Hx diverticulitis,still has chronic diarrhea, abd pain    Dyspnea on exertion     sometimes with nausea, fatigue,dizziness, had cardiac cath June 2012, normal    Fracture 2012    right thumb    GERD (gastroesophageal reflux disease)     Feb 2012, Hx H Pylori    Glaucoma     had LAser surgey, on no eye drops    HEARING LOSS     Hemangioma     fatty liver    History of earache     frequent     Hyperlipidemia     Hypertension     Laryngeal polyp     Myocardial infarction 2006 last    also MI in distant past    REJI (obstructive sleep apnea)     does not use CPAP    Otitis media     Renal stone     Sjogren's syndrome     Stroke     TIA, now on Plavix    TIA (transient ischemic attack)     TMJ disorder     UTI (lower urinary tract infection)     frequent    Venous insufficiency     with Hx blood clot in leg     Past Surgical History:   Procedure Laterality Date    ABDOMINAL SURGERY  2010 x2    expoloratory lap for pelvic cyst,adhesions; partial colonectomy     adhesiolysis   10/11/2012    CARDIAC CATHETERIZATION      no current blockages.    CHOLECYSTECTOMY      COLON SURGERY      r/t diverticuli    COLONOSCOPY  08/2014    repeat in 5 years    EPIDURAL BLOCK INJECTION  5/15/12    back,neck for pain    EXPLORATORY LAPAROTOMY W/ BOWEL RESECTION  10/11/2012    EYE SURGERY      Laser for glaucoma    EYE SURGERY  May 2012    cataracts    HYSTERECTOMY      LARYNX SURGERY      polypectomy    OOPHORECTOMY      TONSILLECTOMY      with adenoidectomy    UPPER GASTROINTESTINAL ENDOSCOPY  12/2015    VASCULAR SURGERY      laser to varicose vein leg     Family History     Problem Relation (Age of Onset)    Diverticulitis Sister    Pancreatic cancer Maternal Aunt (55), Cousin (58)    Stroke Father    Throat cancer Brother, Mother        Social History Main Topics    Smoking status: Never Smoker    Smokeless tobacco: Never Used    Alcohol use No    Drug use:      Types: Oxycodone    Sexual activity: No      Comment: hysterectomy     Review of Systems   Constitutional: Negative for appetite change, chills, diaphoresis, fatigue, fever and unexpected weight change.   HENT: Negative for hearing loss, sore throat, trouble swallowing and voice change.    Eyes: Negative for visual disturbance.   Respiratory: Negative for cough, shortness of breath and wheezing.    Cardiovascular: Negative for chest  pain, palpitations and leg swelling.   Gastrointestinal: Positive for abdominal distention, abdominal pain, diarrhea, nausea and vomiting. Negative for anal bleeding, blood in stool, constipation and rectal pain.   Genitourinary: Negative for difficulty urinating, dysuria, flank pain, frequency, hematuria, menstrual problem and urgency.   Musculoskeletal: Negative for arthralgias, back pain, joint swelling, myalgias and neck pain.   Skin: Negative for pallor and rash.   Neurological: Negative for dizziness, syncope, weakness and headaches.   Hematological: Negative for adenopathy. Does not bruise/bleed easily.   Psychiatric/Behavioral: Negative for suicidal ideas. The patient is not nervous/anxious.      Objective:     Vital Signs (Most Recent):  Temp: 98.7 °F (37.1 °C) (07/08/17 0612)  Pulse: 80 (07/08/17 0612)  Resp: 18 (07/08/17 0612)  BP: 125/60 (07/08/17 0612)  SpO2: 97 % (07/08/17 0612) Vital Signs (24h Range):  Temp:  [98.1 °F (36.7 °C)-98.7 °F (37.1 °C)] 98.7 °F (37.1 °C)  Pulse:  [80-98] 80  Resp:  [16-18] 18  SpO2:  [94 %-100 %] 97 %  BP: (124-139)/(56-70) 125/60     Weight: 81.6 kg (180 lb)  Body mass index is 31.89 kg/m².    Physical Exam   Constitutional: She is oriented to person, place, and time. She appears well-developed and well-nourished. No distress.   HENT:   Head: Normocephalic and atraumatic.   Right Ear: External ear normal.   Left Ear: External ear normal.   Eyes: Conjunctivae are normal. Pupils are equal, round, and reactive to light. Right eye exhibits no discharge. Left eye exhibits no discharge.   Neck: No tracheal deviation present. No thyromegaly present.   Cardiovascular: Normal rate and regular rhythm.    Pulmonary/Chest: Effort normal. No respiratory distress.   Abdominal: Soft. She exhibits no distension. There is no guarding.       Musculoskeletal: She exhibits no edema or tenderness.   Lymphadenopathy:     She has no cervical adenopathy.   Neurological: She is alert and oriented  to person, place, and time. No cranial nerve deficit.   Skin: Skin is warm and dry. No rash noted. She is not diaphoretic. No pallor.   Psychiatric: She has a normal mood and affect. Her behavior is normal. Judgment and thought content normal.       Significant Labs:  CBC:   Recent Labs  Lab 07/08/17 0328   WBC 15.70*   RBC 4.93   HGB 13.9   HCT 42.6   *   MCV 86   MCH 28.3   MCHC 32.7     CMP:   Recent Labs  Lab 07/08/17 0328   *   CALCIUM 10.5   ALBUMIN 4.3   PROT 7.7      K 3.7   CO2 27   CL 98   BUN 24*   CREATININE 1.0   ALKPHOS 57   ALT 27   AST 23   BILITOT 0.5       Significant Diagnostics:  I have reviewed all pertinent imaging results/findings within the past 24 hours.    Assessment/Plan:     SBO (small bowel obstruction)    Point of obstruction appears to be incisional hernia on CT.  This was easily and completely reduced by me at the bedside.  Will get SBS today to evaluate for other pathology, as CT scan was done without oral contrast.          VTE Risk Mitigation         Ordered     Medium Risk of VTE  Once      07/08/17 0857     Place KELSEY hose  Until discontinued      07/08/17 0857     Place sequential compression device  Until discontinued      07/08/17 0857          Thank you for your consult. I will follow-up with patient. Please contact us if you have any additional questions.    Eloy Sebastian MD  General Surgery  [facility]

## 2017-07-08 NOTE — PROGRESS NOTES
Pt remains free of falls, VS stable, RR even and unlabored, Abd distended, BS in all four quadrants, clear speech, makes needs known, bed in low position with wheels locked call light in reach, teds and SCDS on, family at bedside.

## 2017-07-08 NOTE — ED PROVIDER NOTES
Encounter Date: 7/8/2017       History     Chief Complaint   Patient presents with    Abdominal Pain     and nausea and vomiting     72-year-old female with a past medical history of diabetes mellitus, hypertension, and diverticulitis presents with a chief complaint of lower abdominal pain.  She reports the pain started 1 day ago and is getting progressively worse.  She reports that it is associated with nausea and vomiting.  She denies any associated fever/chills, diarrhea, hematochezia, melena, or hematuria.  The pain is unrelieved with her home Percocet.  There are no alleviating factors.           Review of patient's allergies indicates:   Allergen Reactions    Codeine Other (See Comments)     Chest pain    Clindamycin Other (See Comments)     Difficulty swallowing after taking, feels a something sits in epigastric area     Iodinated contrast- oral and iv dye Hives and Rash     Past Medical History:   Diagnosis Date    Allergy     Anemia 2012    with thrombocytopenia; iron deficiency    Arthritis     Cervical spinal stenosis     with neuropathy, chronic neck,back,leg pain    Colon polyp     COPD (chronic obstructive pulmonary disease)     Coronary artery disease     Diabetes mellitus     , sugars run 190's per patient    Diverticulitis     Diverticulosis     Hx diverticulitis,still has chronic diarrhea, abd pain    Dyspnea on exertion     sometimes with nausea, fatigue,dizziness, had cardiac cath June 2012, normal    Fracture 2012    right thumb    GERD (gastroesophageal reflux disease)     Feb 2012, Hx H Pylori    Glaucoma     had LAser surgey, on no eye drops    HEARING LOSS     Hemangioma     fatty liver    History of earache     frequent    Hyperlipidemia     Hypertension     Laryngeal polyp     Myocardial infarction 2006 last    also MI in distant past    REJI (obstructive sleep apnea)     does not use CPAP    Otitis media     Renal stone     Sjogren's syndrome     Stroke      TIA, now on Plavix    TIA (transient ischemic attack)     TMJ disorder     UTI (lower urinary tract infection)     frequent    Venous insufficiency     with Hx blood clot in leg     Past Surgical History:   Procedure Laterality Date    ABDOMINAL SURGERY  2010 x2    expoloratory lap for pelvic cyst,adhesions; partial colonectomy     adhesiolysis   10/11/2012    CARDIAC CATHETERIZATION      no current blockages.    CHOLECYSTECTOMY      COLON SURGERY      r/t diverticuli    COLONOSCOPY  08/2014    repeat in 5 years    EPIDURAL BLOCK INJECTION  5/15/12    back,neck for pain    EXPLORATORY LAPAROTOMY W/ BOWEL RESECTION  10/11/2012    EYE SURGERY      Laser for glaucoma    EYE SURGERY  May 2012    cataracts    HYSTERECTOMY      LARYNX SURGERY      polypectomy    OOPHORECTOMY      TONSILLECTOMY      with adenoidectomy    UPPER GASTROINTESTINAL ENDOSCOPY  12/2015    VASCULAR SURGERY      laser to varicose vein leg     Family History   Problem Relation Age of Onset    Diverticulitis Sister     Throat cancer Brother     Throat cancer Mother     Pancreatic cancer Maternal Aunt 55    Pancreatic cancer Cousin 58    Breast cancer Neg Hx     Ovarian cancer Neg Hx     Colon cancer Neg Hx     Colon polyps Neg Hx     Celiac disease Neg Hx     Cirrhosis Neg Hx     Crohn's disease Neg Hx     Stomach cancer Neg Hx     Ulcerative colitis Neg Hx     Rectal cancer Neg Hx     Esophageal cancer Neg Hx     Inflammatory bowel disease Neg Hx      Social History   Substance Use Topics    Smoking status: Never Smoker    Smokeless tobacco: Never Used    Alcohol use No     Review of Systems   Constitutional: Negative for chills and fever.   HENT: Negative for congestion.    Respiratory: Negative for cough and shortness of breath.    Cardiovascular: Negative for chest pain.   Gastrointestinal: Positive for abdominal pain, nausea and vomiting.   Genitourinary: Negative for dysuria.   Musculoskeletal: Negative  for gait problem.   Skin: Negative for color change.   Neurological: Negative for dizziness and numbness.   Psychiatric/Behavioral: Negative for agitation.       Physical Exam     Initial Vitals [07/08/17 0301]   BP Pulse Resp Temp SpO2   138/70 98 18 98.1 °F (36.7 °C) 100 %      MAP       92.67         Physical Exam    Nursing note and vitals reviewed.  Constitutional: She appears well-developed and well-nourished.   HENT:   Head: Atraumatic.   Eyes: EOM are normal. Pupils are equal, round, and reactive to light.   Neck: Normal range of motion.   Cardiovascular: Normal rate and regular rhythm.   Pulmonary/Chest: Breath sounds normal.   Abdominal: Soft. Bowel sounds are normal. She exhibits no distension and no mass. There is tenderness. There is no rebound and no guarding.   Tenderness across the lower abdomen.  No rebound or guarding noted.  Large ventral hernia noted.   Musculoskeletal: Normal range of motion.        Right shoulder: Normal.        Left shoulder: Normal.   Neurological: She is alert and oriented to person, place, and time.   Skin: Skin is warm and dry.   Psychiatric: She has a normal mood and affect.         ED Course   Procedures  Labs Reviewed   CBC W/ AUTO DIFFERENTIAL   COMPREHENSIVE METABOLIC PANEL   LIPASE   URINALYSIS             Medical Decision Making:   Initial Assessment:   72-year-old female presented with a chief complaint of lower abdominal pain.  Differential Diagnosis:   Initial differential diagnosis included but not limited to diverticulitis, small bowel obstruction, ileus, and constipation.  Clinical Tests:   Lab Tests: Ordered and Reviewed  Radiological Study: Ordered and Reviewed  ED Management:  The patient was emergently evaluated in the ED, her evaluation was significant for an elderly female with lower abdominal tenderness.  The patient's symptoms were acutely treated with IV morphine, IV fluids, and IV Zofran.  The patient's labs were significant for an elevated white  blood cell count.  The patient's CT scan of her abdomen and pelvis was significant for a small bowel obstruction.  I will admit the patient to the hospitalist service for further care.  I have discussed the case with the [facility] on-call hospitalist service.  She has accepted the patient for admission.  Other:   I have discussed this case with another health care provider.                   ED Course     Clinical Impression:   The encounter diagnosis was SBO (small bowel obstruction).                           Liang Salomon MD  07/08/17 0558

## 2017-07-09 LAB
ANION GAP SERPL CALC-SCNC: 11 MMOL/L
BASOPHILS # BLD AUTO: 0 K/UL
BASOPHILS NFR BLD: 0.2 %
BUN SERPL-MCNC: 15 MG/DL
CALCIUM SERPL-MCNC: 9.8 MG/DL
CHLORIDE SERPL-SCNC: 103 MMOL/L
CO2 SERPL-SCNC: 27 MMOL/L
CREAT SERPL-MCNC: 0.8 MG/DL
DIFFERENTIAL METHOD: ABNORMAL
EOSINOPHIL # BLD AUTO: 0.1 K/UL
EOSINOPHIL NFR BLD: 0.7 %
ERYTHROCYTE [DISTWIDTH] IN BLOOD BY AUTOMATED COUNT: 13.5 %
EST. GFR  (AFRICAN AMERICAN): >60 ML/MIN/1.73 M^2
EST. GFR  (NON AFRICAN AMERICAN): >60 ML/MIN/1.73 M^2
ESTIMATED AVG GLUCOSE: 117 MG/DL
GLUCOSE SERPL-MCNC: 108 MG/DL
HBA1C MFR BLD HPLC: 5.7 %
HCT VFR BLD AUTO: 40.4 %
HGB BLD-MCNC: 13.6 G/DL
LYMPHOCYTES # BLD AUTO: 2.5 K/UL
LYMPHOCYTES NFR BLD: 24.9 %
MCH RBC QN AUTO: 29 PG
MCHC RBC AUTO-ENTMCNC: 33.6 %
MCV RBC AUTO: 86 FL
MONOCYTES # BLD AUTO: 1 K/UL
MONOCYTES NFR BLD: 9.6 %
NEUTROPHILS # BLD AUTO: 6.4 K/UL
NEUTROPHILS NFR BLD: 64.6 %
PLATELET # BLD AUTO: 135 K/UL
PMV BLD AUTO: 10.5 FL
POCT GLUCOSE: 106 MG/DL (ref 70–110)
POCT GLUCOSE: 108 MG/DL (ref 70–110)
POCT GLUCOSE: 152 MG/DL (ref 70–110)
POTASSIUM SERPL-SCNC: 3.6 MMOL/L
RBC # BLD AUTO: 4.68 M/UL
SODIUM SERPL-SCNC: 141 MMOL/L
WBC # BLD AUTO: 9.9 K/UL

## 2017-07-09 PROCEDURE — 94761 N-INVAS EAR/PLS OXIMETRY MLT: CPT

## 2017-07-09 PROCEDURE — 11000001 HC ACUTE MED/SURG PRIVATE ROOM

## 2017-07-09 PROCEDURE — 80048 BASIC METABOLIC PNL TOTAL CA: CPT

## 2017-07-09 PROCEDURE — 85025 COMPLETE CBC W/AUTO DIFF WBC: CPT

## 2017-07-09 PROCEDURE — 99232 SBSQ HOSP IP/OBS MODERATE 35: CPT | Mod: ,,, | Performed by: SURGERY

## 2017-07-09 PROCEDURE — 36415 COLL VENOUS BLD VENIPUNCTURE: CPT

## 2017-07-09 PROCEDURE — 63600175 PHARM REV CODE 636 W HCPCS: Performed by: HOSPITALIST

## 2017-07-09 PROCEDURE — 25000242 PHARM REV CODE 250 ALT 637 W/ HCPCS: Performed by: HOSPITALIST

## 2017-07-09 PROCEDURE — 25000003 PHARM REV CODE 250: Performed by: HOSPITALIST

## 2017-07-09 PROCEDURE — 83036 HEMOGLOBIN GLYCOSYLATED A1C: CPT

## 2017-07-09 PROCEDURE — 94640 AIRWAY INHALATION TREATMENT: CPT

## 2017-07-09 RX ORDER — LEFLUNOMIDE 20 MG/1
20 TABLET ORAL DAILY
Status: DISCONTINUED | OUTPATIENT
Start: 2017-07-09 | End: 2017-07-11 | Stop reason: HOSPADM

## 2017-07-09 RX ORDER — TRAZODONE HYDROCHLORIDE 50 MG/1
50 TABLET ORAL NIGHTLY
Status: DISCONTINUED | OUTPATIENT
Start: 2017-07-09 | End: 2017-07-11 | Stop reason: HOSPADM

## 2017-07-09 RX ORDER — SIMVASTATIN 40 MG/1
40 TABLET, FILM COATED ORAL NIGHTLY
Status: DISCONTINUED | OUTPATIENT
Start: 2017-07-09 | End: 2017-07-11 | Stop reason: HOSPADM

## 2017-07-09 RX ORDER — HYDROXYCHLOROQUINE SULFATE 200 MG/1
200 TABLET, FILM COATED ORAL 2 TIMES DAILY
Status: DISCONTINUED | OUTPATIENT
Start: 2017-07-09 | End: 2017-07-11 | Stop reason: HOSPADM

## 2017-07-09 RX ADMIN — SODIUM CHLORIDE, SODIUM LACTATE, POTASSIUM CHLORIDE, AND CALCIUM CHLORIDE: .6; .31; .03; .02 INJECTION, SOLUTION INTRAVENOUS at 11:07

## 2017-07-09 RX ADMIN — LEFLUNOMIDE 20 MG: 20 TABLET, FILM COATED ORAL at 04:07

## 2017-07-09 RX ADMIN — ALBUTEROL SULFATE 2.5 MG: 2.5 SOLUTION RESPIRATORY (INHALATION) at 07:07

## 2017-07-09 RX ADMIN — SIMVASTATIN 40 MG: 40 TABLET, FILM COATED ORAL at 08:07

## 2017-07-09 RX ADMIN — TRAZODONE HYDROCHLORIDE 50 MG: 50 TABLET ORAL at 08:07

## 2017-07-09 RX ADMIN — ACETAMINOPHEN 1000 MG: 10 INJECTION, SOLUTION INTRAVENOUS at 06:07

## 2017-07-09 RX ADMIN — MORPHINE SULFATE 3 MG: 2 INJECTION, SOLUTION INTRAMUSCULAR; INTRAVENOUS at 06:07

## 2017-07-09 RX ADMIN — ONDANSETRON 8 MG: 2 INJECTION INTRAMUSCULAR; INTRAVENOUS at 11:07

## 2017-07-09 RX ADMIN — MORPHINE SULFATE 3 MG: 2 INJECTION, SOLUTION INTRAMUSCULAR; INTRAVENOUS at 10:07

## 2017-07-09 RX ADMIN — HYDROXYCHLOROQUINE SULFATE 200 MG: 200 TABLET, FILM COATED ORAL at 08:07

## 2017-07-09 RX ADMIN — MORPHINE SULFATE 5 MG: 10 INJECTION INTRAMUSCULAR; INTRAVENOUS; SUBCUTANEOUS at 03:07

## 2017-07-09 RX ADMIN — MORPHINE SULFATE 5 MG: 10 INJECTION INTRAMUSCULAR; INTRAVENOUS; SUBCUTANEOUS at 01:07

## 2017-07-09 RX ADMIN — ONDANSETRON 8 MG: 2 INJECTION INTRAMUSCULAR; INTRAVENOUS at 03:07

## 2017-07-09 RX ADMIN — ALBUTEROL SULFATE 2.5 MG: 2.5 SOLUTION RESPIRATORY (INHALATION) at 01:07

## 2017-07-09 NOTE — SUBJECTIVE & OBJECTIVE
Past Medical History:   Diagnosis Date    Allergy     Anemia 2012    with thrombocytopenia; iron deficiency    Arthritis     Cervical spinal stenosis     with neuropathy, chronic neck,back,leg pain    Colon polyp     Coronary artery disease     Diabetes mellitus     , sugars run 190's per patient    Diverticulitis     Diverticulosis     Hx diverticulitis,still has chronic diarrhea, abd pain    Dyspnea on exertion     sometimes with nausea, fatigue,dizziness, had cardiac cath June 2012, normal    Fracture 2012    right thumb    GERD (gastroesophageal reflux disease)     Feb 2012, Hx H Pylori    Glaucoma     had LAser surgey, on no eye drops    HEARING LOSS     Hemangioma     fatty liver    History of earache     frequent    Hyperlipidemia     Hypertension     Laryngeal polyp     Myocardial infarction 2006 last    also MI in distant past    REJI (obstructive sleep apnea)     does not use CPAP    Otitis media     Renal stone     Sjogren's syndrome     Stroke     TIA, now on Plavix    TIA (transient ischemic attack)     TMJ disorder     UTI (lower urinary tract infection)     frequent    Venous insufficiency     with Hx blood clot in leg       Past Surgical History:   Procedure Laterality Date    ABDOMINAL SURGERY  2010 x2    expoloratory lap for pelvic cyst,adhesions; partial colonectomy     adhesiolysis   10/11/2012    CARDIAC CATHETERIZATION      no current blockages.    CHOLECYSTECTOMY      COLON SURGERY      r/t diverticuli    COLONOSCOPY  08/2014    repeat in 5 years    EPIDURAL BLOCK INJECTION  5/15/12    back,neck for pain    EXPLORATORY LAPAROTOMY W/ BOWEL RESECTION  10/11/2012    EYE SURGERY      Laser for glaucoma    EYE SURGERY  May 2012    cataracts    HYSTERECTOMY      LARYNX SURGERY      polypectomy    OOPHORECTOMY      TONSILLECTOMY      with adenoidectomy    UPPER GASTROINTESTINAL ENDOSCOPY  12/2015    VASCULAR SURGERY      laser to varicose vein leg        Review of patient's allergies indicates:   Allergen Reactions    Codeine Other (See Comments)     Chest pain    Clindamycin Other (See Comments)     Difficulty swallowing after taking, feels a something sits in epigastric area     Iodinated contrast- oral and iv dye Hives and Rash       No current facility-administered medications on file prior to encounter.      Current Outpatient Prescriptions on File Prior to Encounter   Medication Sig    ascorbic acid, vitamin C, (VITAMIN C) 500 mg Chew Take 1,000 mg by mouth once daily.    BENADRYL ALLERGY 25 mg tablet Take 2 tablets (50 mg total) by mouth as directed. Take 50mg of Benadryl (Diphenhydramine) by mouth 1 hour prior to CT scan (Patient taking differently: Take 25 mg by mouth nightly as needed for Insomnia. )    clopidogrel (PLAVIX) 75 mg tablet Take 1 tablet (75 mg total) by mouth once daily. Patient needs appt for further refills. Appt June 12th    cyanocobalamin (VITAMIN B-12) 1000 MCG tablet Take 100 mcg by mouth once daily.    cyclobenzaprine (FLEXERIL) 10 MG tablet Take 10 mg by mouth nightly as needed. Pt does not take often    diltiaZEM (CARTIA XT) 120 MG Cp24 Take 1 capsule (120 mg total) by mouth every evening.    fexofenadine (ALLEGRA) 180 MG tablet Take 180 mg by mouth once daily.    folic acid (FOLVITE) 1 MG tablet Take 1 tablet (1 mg total) by mouth once daily.    glimepiride (AMARYL) 4 MG tablet TAKE ONE TABLET BY MOUTH IN THE MORNING    hydroxychloroquine (PLAQUENIL) 200 mg tablet Take 1 tablet (200 mg total) by mouth 2 (two) times daily.    lactobac cmb #3-fos-pantethine (PROBIOTIC & ACIDOPHILUS) 300-250 million cell-mg Cap Take 1 capsule by mouth once daily.    metformin (GLUCOPHAGE) 500 MG tablet TAKE ONE TABLET BY MOUTH TWICE DAILY WITH MEALS    multivit with min-folic acid (WOMEN'S MULTIVITAMIN GUMMIES) 200 mcg Chew Take by mouth once daily.    nystatin (MYCOSTATIN) cream Apply topically 3 (three) times daily.     olopatadine (PATANOL) 0.1 % ophthalmic solution Place 1 drop into both eyes daily as needed.     ondansetron (ZOFRAN-ODT) 8 MG TbDL DISSOLVE ONE TABLET IN MOUTH EVERY 12 HOURS AS NEEDED    ONGLYZA 5 mg Tab tablet TAKE ONE TABLET BY MOUTH ONCE DAILY    oxycodone-acetaminophen  mg (PERCOCET)  mg per tablet Take 1 tablet by mouth 4 (four) times daily as needed.     pantoprazole (PROTONIX) 40 MG tablet TAKE ONE TABLET BY MOUTH ONCE DAILY    ranitidine (ZANTAC) 300 MG tablet TAKE ONE TABLET BY MOUTH IN THE EVENING    sucralfate (CARAFATE) 1 gram tablet Take 1 tablet (1 g total) by mouth 4 (four) times daily before meals and nightly.    trazodone (DESYREL) 50 MG tablet TAKE ONE TABLET BY MOUTH IN THE EVENING    valsartan-hydrochlorothiazide (DIOVAN-HCT) 160-25 mg per tablet TAKE ONE TABLET BY MOUTH ONCE DAILY (NEED  APPT)    [DISCONTINUED] rosuvastatin (CRESTOR) 10 MG tablet Take 1 tablet (10 mg total) by mouth every evening.    blood sugar diagnostic (ACCU-CHEK JACINTO PLUS TEST STRP) Strp TEST TWICE DAILY ONLY    fluticasone (FLONASE) 50 mcg/actuation nasal spray 2 sprays by Each Nare route once daily. (Patient taking differently: 1 spray by Each Nare route as needed. )    lancets (ACCU-CHEK SOFTCLIX LANCETS) Misc Test TWICE DAILY;Twice a day    phenazopyridine (PYRIDIUM) 100 MG tablet 100 mg daily as needed.     saliva stimulant agents comb.3 (BIOTENE MOISTURIZING MOUTH) Spry 1-2 sprays by Mucous Membrane route 4 (four) times daily as needed (Non prescription).    [DISCONTINUED] (Magic mouthwash) 1:1:1 Benadryl 12.5mg/5ml liq, aluminum & magnesium hydroxide-simehticone (Maalox), lidocaine viscous 2% Swish and spit 15 mLs every 4 (four) hours. for mouth sores    [DISCONTINUED] methotrexate 2.5 MG Tab Take 4 tablets (10 mg total) by mouth every 7 days.     Family History     Problem Relation (Age of Onset)    Diverticulitis Sister    Pancreatic cancer Maternal Aunt (55), Cousin (58)    Stroke  Father    Throat cancer Brother, Mother        Social History Main Topics    Smoking status: Never Smoker    Smokeless tobacco: Never Used    Alcohol use No    Drug use:      Types: Oxycodone    Sexual activity: No      Comment: hysterectomy     Review of Systems   Constitutional: Positive for fatigue. Negative for chills, diaphoresis and fever.   HENT: Negative for nosebleeds, sinus pressure, sore throat and trouble swallowing.    Eyes: Negative for pain, redness and visual disturbance.   Respiratory: Negative for cough, shortness of breath and wheezing.    Cardiovascular: Negative for chest pain, palpitations and leg swelling.   Gastrointestinal: Positive for abdominal distention, abdominal pain, nausea and vomiting. Negative for diarrhea.   Genitourinary: Negative for difficulty urinating and dysuria.   Musculoskeletal: Negative for arthralgias, back pain, joint swelling and myalgias.   Skin: Negative for color change and rash.   Neurological: Negative for weakness, numbness and headaches.   Hematological: Negative for adenopathy. Does not bruise/bleed easily.   Psychiatric/Behavioral: Positive for sleep disturbance. Negative for decreased concentration and dysphoric mood.     Objective:     Vital Signs (Most Recent):  Temp: 98.1 °F (36.7 °C) (07/08/17 1904)  Pulse: 84 (07/08/17 1926)  Resp: 20 (07/08/17 1926)  BP: 136/68 (07/08/17 1904)  SpO2: 96 % (07/08/17 1926) Vital Signs (24h Range):  Temp:  [98.1 °F (36.7 °C)-98.7 °F (37.1 °C)] 98.1 °F (36.7 °C)  Pulse:  [80-98] 84  Resp:  [16-20] 20  SpO2:  [94 %-100 %] 96 %  BP: (119-139)/(56-70) 136/68     Weight: 81.6 kg (180 lb)  Body mass index is 31.89 kg/m².    Physical Exam   Constitutional: She is oriented to person, place, and time.   Ill appearing   HENT:   Head: Normocephalic and atraumatic.   Eyes: Conjunctivae and EOM are normal. Pupils are equal, round, and reactive to light.   Neck: Normal range of motion. Neck supple. No JVD present.    Cardiovascular: Normal rate.    No murmur heard.  Pulmonary/Chest: Effort normal and breath sounds normal. No stridor. She has no wheezes. She has no rales.   Abdominal: Soft. She exhibits distension. There is tenderness. There is no rebound.   hypoactive bowel sounds, are present   Musculoskeletal: Normal range of motion. She exhibits no edema.   Neurological: She is alert and oriented to person, place, and time. No cranial nerve deficit.   Skin: Skin is warm and dry. Capillary refill takes less than 2 seconds. She is not diaphoretic. No erythema.   Psychiatric: She has a normal mood and affect. Her behavior is normal.        Significant Labs: All pertinent labs within the past 24 hours have been reviewed.    Significant Imaging: I have reviewed all pertinent imaging results/findings within the past 24 hours.

## 2017-07-09 NOTE — ASSESSMENT & PLAN NOTE
Monitor for now. Prn hydralazine.  Resume home agents when able. If persistently hypertensive, my start clonidine patch.

## 2017-07-09 NOTE — PROGRESS NOTES
SBS reviewed.  Contrast has passed into colon.  As hernia is reducible,  will get delayed film in AM.  If still evidence of obstruction, will decide on surgical intervention.

## 2017-07-09 NOTE — PLAN OF CARE
Problem: Patient Care Overview  Goal: Plan of Care Review  PT AAO X4. PT able to verbalize needs/want.Plan of care reviewed with patient at the beginning of the shift. Patient verbalized understanding. Pain/nausea  monitored and treated with prn IV pain medication. IV fluids infusing as ordered. Patient ambulates to bathroom with stand by assist. Diet advanced from NPO to full liquid, tolerating well. Daughter at bedside attentive to patient.  Patient has remained free from fall/injury. Side rails up X2, bed in lowest, locked position, needs attended to,  call light within reach will continue to monitor.

## 2017-07-09 NOTE — HPI
Elderly woman with h/o RA, HTN, CAD, DM presents with increased abdominal pain, nausea, vomiting since Friday am.  She had felt well previously. She was unable to eat. Initially she had brown, feculent vomitus. Nothing made symptoms better.  Pain in abdomen is generalized. No cough, fevers, chills, rashes, dysuria.

## 2017-07-09 NOTE — SUBJECTIVE & OBJECTIVE
Interval History: Feeling better.  Abdomen soft.  KUB:   There is residual oral contrast material present within the colon.  There are a few opacified prominent loops of small bowel in the midline of the lower pelvis.  No definite pneumoperitoneum.  There is degenerative change of the lower lumbar spine and hip joints.  There is a mild dextroconvex curvature of the lumbar spine.    Medications:  Continuous Infusions:   lactated Ringers 60 mL/hr at 07/08/17 2120     Scheduled Meds:   albuterol sulfate  2.5 mg Nebulization Q6H WAKE     PRN Meds:acetaminophen, dextrose 50%, dicyclomine, glucagon (human recombinant), hydrALAZINE, insulin aspart, morphine, morphine, ondansetron, promethazine (PHENERGAN) IVPB     Review of patient's allergies indicates:   Allergen Reactions    Codeine Other (See Comments)     Chest pain    Clindamycin Other (See Comments)     Difficulty swallowing after taking, feels a something sits in epigastric area     Iodinated contrast- oral and iv dye Hives and Rash     Objective:     Vital Signs (Most Recent):  Temp: 98.9 °F (37.2 °C) (07/09/17 0329)  Pulse: 89 (07/09/17 0717)  Resp: 18 (07/09/17 0717)  BP: (!) 167/81 (nurse notified) (07/09/17 0329)  SpO2: (!) 94 % (07/09/17 0717) Vital Signs (24h Range):  Temp:  [98.1 °F (36.7 °C)-98.9 °F (37.2 °C)] 98.9 °F (37.2 °C)  Pulse:  [82-90] 89  Resp:  [17-20] 18  SpO2:  [93 %-97 %] 94 %  BP: (119-167)/(58-81) 167/81     Weight: 81.6 kg (180 lb)  Body mass index is 31.89 kg/m².    Intake/Output - Last 3 Shifts       07/07 0700 - 07/08 0659 07/08 0700 - 07/09 0659 07/09 0700 - 07/10 0659           Stool Occurrence  4 x           Physical Exam   Constitutional: She is oriented to person, place, and time. She appears well-developed and well-nourished. No distress.   HENT:   Head: Normocephalic and atraumatic.   Right Ear: External ear normal.   Left Ear: External ear normal.   Eyes: Conjunctivae are normal. Pupils are equal, round, and reactive to  light. Right eye exhibits no discharge. Left eye exhibits no discharge.   Neck: No tracheal deviation present. No thyromegaly present.   Cardiovascular: Normal rate and regular rhythm.    Pulmonary/Chest: Effort normal. No respiratory distress.   Abdominal: Soft. She exhibits no distension. There is no guarding.       Musculoskeletal: She exhibits no edema or tenderness.   Lymphadenopathy:     She has no cervical adenopathy.   Neurological: She is alert and oriented to person, place, and time. No cranial nerve deficit.   Skin: Skin is warm and dry. No rash noted. She is not diaphoretic. No pallor.   Psychiatric: She has a normal mood and affect. Her behavior is normal. Judgment and thought content normal.       Significant Labs:  CBC:   Recent Labs  Lab 07/09/17  0513   WBC 9.90   RBC 4.68   HGB 13.6   HCT 40.4   *   MCV 86   MCH 29.0   MCHC 33.6     CMP:   Recent Labs  Lab 07/08/17  0328 07/09/17  0513   * 108   CALCIUM 10.5 9.8   ALBUMIN 4.3  --    PROT 7.7  --     141   K 3.7 3.6   CO2 27 27   CL 98 103   BUN 24* 15   CREATININE 1.0 0.8   ALKPHOS 57  --    ALT 27  --    AST 23  --    BILITOT 0.5  --        Significant Diagnostics:  I have reviewed all pertinent imaging results/findings within the past 24 hours.

## 2017-07-09 NOTE — ASSESSMENT & PLAN NOTE
Surgery consulted, appreciate assistance. Seen on CT ab/pelvis.  Small bowel follow through study pending. NPO, supportive care.  H/o adhesions, likely causative.

## 2017-07-09 NOTE — ASSESSMENT & PLAN NOTE
On immunosuppresive regimen outpatient. She stopped her MTX without calling Dr. Wilson.  No acute flare by exam presently.

## 2017-07-09 NOTE — PROGRESS NOTES
[facility]  General Surgery  Progress Note    Subjective:     History of Present Illness:  72-year-old female with a past medical history of diabetes mellitus, hypertension, and diverticulitis presents with a chief complaint of lower abdominal pain.  She reports the pain started 1 day ago and is getting progressively worse.  She reports that it is associated with nausea and vomiting.  She denies any associated fever/chills, diarrhea, hematochezia, melena, or hematuria.  The pain is unrelieved with her home Percocet.  There are no alleviating factors.   She underwent CT scan in the ED which was suggestive of SBO secondary to incisional hernia.  She has a previous history of multiple surgeries, including bowel resection and ovarian cysts.  She has had the incisional hernia for several years but states it is getting larger lately          Post-Op Info:  * No surgery found *         Interval History: Feeling better.  Abdomen soft.  KUB:   There is residual oral contrast material present within the colon.  There are a few opacified prominent loops of small bowel in the midline of the lower pelvis.  No definite pneumoperitoneum.  There is degenerative change of the lower lumbar spine and hip joints.  There is a mild dextroconvex curvature of the lumbar spine.    Medications:  Continuous Infusions:   lactated Ringers 60 mL/hr at 07/08/17 2120     Scheduled Meds:   albuterol sulfate  2.5 mg Nebulization Q6H WAKE     PRN Meds:acetaminophen, dextrose 50%, dicyclomine, glucagon (human recombinant), hydrALAZINE, insulin aspart, morphine, morphine, ondansetron, promethazine (PHENERGAN) IVPB     Review of patient's allergies indicates:   Allergen Reactions    Codeine Other (See Comments)     Chest pain    Clindamycin Other (See Comments)     Difficulty swallowing after taking, feels a something sits in epigastric area     Iodinated contrast- oral and iv dye Hives and Rash     Objective:     Vital Signs  (Most Recent):  Temp: 98.9 °F (37.2 °C) (07/09/17 0329)  Pulse: 89 (07/09/17 0717)  Resp: 18 (07/09/17 0717)  BP: (!) 167/81 (nurse notified) (07/09/17 0329)  SpO2: (!) 94 % (07/09/17 0717) Vital Signs (24h Range):  Temp:  [98.1 °F (36.7 °C)-98.9 °F (37.2 °C)] 98.9 °F (37.2 °C)  Pulse:  [82-90] 89  Resp:  [17-20] 18  SpO2:  [93 %-97 %] 94 %  BP: (119-167)/(58-81) 167/81     Weight: 81.6 kg (180 lb)  Body mass index is 31.89 kg/m².    Intake/Output - Last 3 Shifts       07/07 0700 - 07/08 0659 07/08 0700 - 07/09 0659 07/09 0700 - 07/10 0659           Stool Occurrence  4 x           Physical Exam   Constitutional: She is oriented to person, place, and time. She appears well-developed and well-nourished. No distress.   HENT:   Head: Normocephalic and atraumatic.   Right Ear: External ear normal.   Left Ear: External ear normal.   Eyes: Conjunctivae are normal. Pupils are equal, round, and reactive to light. Right eye exhibits no discharge. Left eye exhibits no discharge.   Neck: No tracheal deviation present. No thyromegaly present.   Cardiovascular: Normal rate and regular rhythm.    Pulmonary/Chest: Effort normal. No respiratory distress.   Abdominal: Soft. She exhibits no distension. There is no guarding.       Musculoskeletal: She exhibits no edema or tenderness.   Lymphadenopathy:     She has no cervical adenopathy.   Neurological: She is alert and oriented to person, place, and time. No cranial nerve deficit.   Skin: Skin is warm and dry. No rash noted. She is not diaphoretic. No pallor.   Psychiatric: She has a normal mood and affect. Her behavior is normal. Judgment and thought content normal.       Significant Labs:  CBC:   Recent Labs  Lab 07/09/17  0513   WBC 9.90   RBC 4.68   HGB 13.6   HCT 40.4   *   MCV 86   MCH 29.0   MCHC 33.6     CMP:   Recent Labs  Lab 07/08/17  0328 07/09/17  0513   * 108   CALCIUM 10.5 9.8   ALBUMIN 4.3  --    PROT 7.7  --     141   K 3.7 3.6   CO2 27 27   CL 98  103   BUN 24* 15   CREATININE 1.0 0.8   ALKPHOS 57  --    ALT 27  --    AST 23  --    BILITOT 0.5  --        Significant Diagnostics:  I have reviewed all pertinent imaging results/findings within the past 24 hours.    Assessment/Plan:     * SBO (small bowel obstruction)    Resolving SBO.  Begin po fluids.  Hernia repair discussed with patient who is very reluctant to have it done because of all her previous problems after surgery.  Wants to try abdominal binder first.  Advised to contact me if she changes her mind.  Will sign off.  Call prn.             Eloy Sebastian MD  General Surgery  [facility]

## 2017-07-09 NOTE — PLAN OF CARE
Problem: Patient Care Overview  Goal: Plan of Care Review  Outcome: Ongoing (interventions implemented as appropriate)  Pt on room air with Q6 while awake albuterol treatments,

## 2017-07-09 NOTE — ASSESSMENT & PLAN NOTE
Resolving SBO.  Begin po fluids.  Hernia repair discussed with patient who is very reluctant to have it done because of all her previous problems after surgery.  Wants to try abdominal binder first.  Advised to contact me if she changes her mind.  Will sign off.  Call prn.

## 2017-07-09 NOTE — PLAN OF CARE
Problem: Patient Care Overview  Goal: Plan of Care Review  Outcome: Ongoing (interventions implemented as appropriate)  Pt is ordered aerosol therapy Q6 while awake.  Pt refused 1900 round due to nausea.  RN was aware.

## 2017-07-10 LAB
ANION GAP SERPL CALC-SCNC: 10 MMOL/L
BASOPHILS # BLD AUTO: 0 K/UL
BASOPHILS NFR BLD: 0.5 %
BUN SERPL-MCNC: 15 MG/DL
CALCIUM SERPL-MCNC: 9.5 MG/DL
CHLORIDE SERPL-SCNC: 104 MMOL/L
CO2 SERPL-SCNC: 27 MMOL/L
CREAT SERPL-MCNC: 0.8 MG/DL
DIFFERENTIAL METHOD: ABNORMAL
EOSINOPHIL # BLD AUTO: 0.1 K/UL
EOSINOPHIL NFR BLD: 1.1 %
ERYTHROCYTE [DISTWIDTH] IN BLOOD BY AUTOMATED COUNT: 13.6 %
EST. GFR  (AFRICAN AMERICAN): >60 ML/MIN/1.73 M^2
EST. GFR  (NON AFRICAN AMERICAN): >60 ML/MIN/1.73 M^2
GLUCOSE SERPL-MCNC: 104 MG/DL
HCT VFR BLD AUTO: 35.7 %
HGB BLD-MCNC: 12.2 G/DL
LYMPHOCYTES # BLD AUTO: 3.2 K/UL
LYMPHOCYTES NFR BLD: 38.9 %
MCH RBC QN AUTO: 29.5 PG
MCHC RBC AUTO-ENTMCNC: 34.1 %
MCV RBC AUTO: 86 FL
MONOCYTES # BLD AUTO: 0.9 K/UL
MONOCYTES NFR BLD: 10.9 %
NEUTROPHILS # BLD AUTO: 4 K/UL
NEUTROPHILS NFR BLD: 48.6 %
PLATELET # BLD AUTO: ABNORMAL K/UL
PLATELET BLD QL SMEAR: ABNORMAL
PMV BLD AUTO: ABNORMAL FL
POCT GLUCOSE: 112 MG/DL (ref 70–110)
POCT GLUCOSE: 121 MG/DL (ref 70–110)
POCT GLUCOSE: 130 MG/DL (ref 70–110)
POTASSIUM SERPL-SCNC: 3.7 MMOL/L
RBC # BLD AUTO: 4.13 M/UL
SODIUM SERPL-SCNC: 141 MMOL/L
WBC # BLD AUTO: 8.2 K/UL

## 2017-07-10 PROCEDURE — 11000001 HC ACUTE MED/SURG PRIVATE ROOM

## 2017-07-10 PROCEDURE — 99900035 HC TECH TIME PER 15 MIN (STAT)

## 2017-07-10 PROCEDURE — 25000003 PHARM REV CODE 250: Performed by: HOSPITALIST

## 2017-07-10 PROCEDURE — 63600175 PHARM REV CODE 636 W HCPCS: Performed by: HOSPITALIST

## 2017-07-10 PROCEDURE — 94761 N-INVAS EAR/PLS OXIMETRY MLT: CPT

## 2017-07-10 PROCEDURE — 85025 COMPLETE CBC W/AUTO DIFF WBC: CPT

## 2017-07-10 PROCEDURE — 36415 COLL VENOUS BLD VENIPUNCTURE: CPT

## 2017-07-10 PROCEDURE — 25000242 PHARM REV CODE 250 ALT 637 W/ HCPCS: Performed by: HOSPITALIST

## 2017-07-10 PROCEDURE — 63600175 PHARM REV CODE 636 W HCPCS: Performed by: NURSE PRACTITIONER

## 2017-07-10 PROCEDURE — 94640 AIRWAY INHALATION TREATMENT: CPT

## 2017-07-10 PROCEDURE — 80048 BASIC METABOLIC PNL TOTAL CA: CPT

## 2017-07-10 RX ORDER — DIPHENHYDRAMINE HYDROCHLORIDE 50 MG/ML
25 INJECTION INTRAMUSCULAR; INTRAVENOUS EVERY 6 HOURS PRN
Status: DISCONTINUED | OUTPATIENT
Start: 2017-07-10 | End: 2017-07-11 | Stop reason: HOSPADM

## 2017-07-10 RX ORDER — BISACODYL 10 MG
10 SUPPOSITORY, RECTAL RECTAL DAILY PRN
Status: DISCONTINUED | OUTPATIENT
Start: 2017-07-10 | End: 2017-07-11 | Stop reason: HOSPADM

## 2017-07-10 RX ADMIN — MORPHINE SULFATE 5 MG: 10 INJECTION INTRAMUSCULAR; INTRAVENOUS; SUBCUTANEOUS at 06:07

## 2017-07-10 RX ADMIN — LEFLUNOMIDE 20 MG: 20 TABLET, FILM COATED ORAL at 08:07

## 2017-07-10 RX ADMIN — HYDROXYCHLOROQUINE SULFATE 200 MG: 200 TABLET, FILM COATED ORAL at 08:07

## 2017-07-10 RX ADMIN — TRAZODONE HYDROCHLORIDE 50 MG: 50 TABLET ORAL at 10:07

## 2017-07-10 RX ADMIN — ALBUTEROL SULFATE 2.5 MG: 2.5 SOLUTION RESPIRATORY (INHALATION) at 07:07

## 2017-07-10 RX ADMIN — SIMVASTATIN 40 MG: 40 TABLET, FILM COATED ORAL at 10:07

## 2017-07-10 RX ADMIN — DIPHENHYDRAMINE HYDROCHLORIDE 25 MG: 50 INJECTION, SOLUTION INTRAMUSCULAR; INTRAVENOUS at 10:07

## 2017-07-10 RX ADMIN — HYDROXYCHLOROQUINE SULFATE 200 MG: 200 TABLET, FILM COATED ORAL at 10:07

## 2017-07-10 RX ADMIN — MORPHINE SULFATE 5 MG: 10 INJECTION INTRAMUSCULAR; INTRAVENOUS; SUBCUTANEOUS at 01:07

## 2017-07-10 RX ADMIN — MORPHINE SULFATE 5 MG: 10 INJECTION INTRAMUSCULAR; INTRAVENOUS; SUBCUTANEOUS at 12:07

## 2017-07-10 RX ADMIN — MORPHINE SULFATE 5 MG: 10 INJECTION INTRAMUSCULAR; INTRAVENOUS; SUBCUTANEOUS at 11:07

## 2017-07-10 RX ADMIN — ALBUTEROL SULFATE 2.5 MG: 2.5 SOLUTION RESPIRATORY (INHALATION) at 01:07

## 2017-07-10 RX ADMIN — SODIUM CHLORIDE, SODIUM LACTATE, POTASSIUM CHLORIDE, AND CALCIUM CHLORIDE: .6; .31; .03; .02 INJECTION, SOLUTION INTRAVENOUS at 10:07

## 2017-07-10 NOTE — PLAN OF CARE
07/09/17 1954   Patient Assessment/Suction   All Lung Fields Breath Sounds clear   PRE-TX-O2-ETCO2   O2 Device (Oxygen Therapy) room air   SpO2 95 %   Pulse Oximetry Type Intermittent   $ Pulse Oximetry - Multiple Charge Pulse Oximetry - Multiple   Pulse 85   Resp 16   Aerosol Therapy   $ Aerosol Therapy Charges Aerosol Treatment   Respiratory Treatment Status given   SVN/Inhaler Treatment Route mouthpiece   Position During Treatment HOB at 45 degrees   Patient Tolerance good   Post-Treatment   Post-treatment Heart Rate (beats/min) 88   Post-treatment Resp Rate (breaths/min) 16   All Fields Breath Sounds unchanged

## 2017-07-10 NOTE — PLAN OF CARE
07/10/17 0733   Patient Assessment/Suction   Level of Consciousness (AVPU) alert   Respiratory Effort Normal;Unlabored   Expansion/Accessory Muscles/Retractions no retractions;no use of accessory muscles   All Lung Fields Breath Sounds clear;diminished   Rhythm/Pattern, Respiratory depth regular;pattern regular;unlabored   Cough Frequency infrequent   Cough Type good;nonproductive   PRE-TX-O2-ETCO2   O2 Device (Oxygen Therapy) room air   SpO2 96 %   Pulse Oximetry Type Intermittent   $ Pulse Oximetry - Multiple Charge Pulse Oximetry - Multiple   Pulse 86   Resp 18   Aerosol Therapy   $ Aerosol Therapy Charges Aerosol Treatment   Respiratory Treatment Status given   SVN/Inhaler Treatment Route mouthpiece   Position During Treatment HOB at 45 degrees   Patient Tolerance good   Post-Treatment   Post-treatment Heart Rate (beats/min) 89   Post-treatment Resp Rate (breaths/min) 18   All Fields Breath Sounds unchanged       Aerosol treatment q6 w/a. Patient tolerated well.

## 2017-07-10 NOTE — PROGRESS NOTES
[facility] Hospital Medicine  Progress Note    Patient Name: Pili Teixeira  MRN: [id number]  Patient Class: IP- Inpatient   Admission Date: 7/8/2017  Length of Stay: 2 days  Attending Physician: Mc Juan MD  Primary Care Provider: Herlinda Dove MD        Subjective:     Principal Problem:SBO (small bowel obstruction)    HPI:  Elderly woman with h/o RA, HTN, CAD, DM presents with increased abdominal pain, nausea, vomiting since Friday am.  She had felt well previously. She was unable to eat. Initially she had brown, feculent vomitus. Nothing made symptoms better.  Pain in abdomen is generalized. No cough, fevers, chills, rashes, dysuria.     Hospital Course:  No notes on file    Interval History: resolving SBO, advancing diet, normotensive today off of oral agents    Review of Systems   Constitutional: Negative for fever.   Respiratory: Negative for cough and shortness of breath.    Cardiovascular: Negative for chest pain and palpitations.   Gastrointestinal: Positive for abdominal distention, abdominal pain (improving) and nausea. Negative for vomiting.   Genitourinary: Negative for hematuria.   Neurological: Negative for weakness.   Psychiatric/Behavioral: Negative for dysphoric mood.     Objective:     Vital Signs (Most Recent):  Temp: 98.4 °F (36.9 °C) (07/09/17 2021)  Pulse: 95 (07/09/17 2021)  Resp: 17 (07/09/17 2021)  BP: 124/64 (07/09/17 2021)  SpO2: 95 % (07/09/17 2021) Vital Signs (24h Range):  Temp:  [98 °F (36.7 °C)-99 °F (37.2 °C)] 98.4 °F (36.9 °C)  Pulse:  [85-97] 95  Resp:  [16-18] 17  SpO2:  [93 %-97 %] 95 %  BP: (108-167)/(58-81) 124/64     Weight: 81.6 kg (180 lb)  Body mass index is 31.89 kg/m².    Intake/Output Summary (Last 24 hours) at 07/09/17 2111  Last data filed at 07/09/17 1300   Gross per 24 hour   Intake              240 ml   Output                0 ml   Net              240 ml      Physical Exam   Constitutional: She is oriented to person, place, and time.    Ill appearing   HENT:   Head: Normocephalic and atraumatic.   Eyes: Conjunctivae and EOM are normal. Pupils are equal, round, and reactive to light.   Neck: Normal range of motion. Neck supple. No JVD present.   Cardiovascular: Normal rate.    No murmur heard.  Pulmonary/Chest: Effort normal and breath sounds normal. No stridor. She has no wheezes. She has no rales.   Abdominal: Soft. She exhibits no distension. There is tenderness (less tender). There is no rebound.   hypoactive bowel sounds, are present   Musculoskeletal: Normal range of motion. She exhibits no edema.   Neurological: She is alert and oriented to person, place, and time. No cranial nerve deficit.   Skin: Skin is warm and dry. Capillary refill takes less than 2 seconds. She is not diaphoretic. No erythema.   Psychiatric: She has a normal mood and affect. Her behavior is normal.       Significant Labs: All pertinent labs within the past 24 hours have been reviewed.    Significant Imaging: I have reviewed all pertinent imaging results/findings within the past 24 hours.    Assessment/Plan:      Hypertension associated with diabetes    Monitor for now. Prn hydralazine.  Resume home agents when able.        CKD (chronic kidney disease) stage 3, GFR 30-59 ml/min    Baseline renal function. Avoid nephrotoxic drugs.           Inflammatory arthritis    On immunosuppresive regimen outpatient. She stopped her MTX without calling Dr. Wilson.  No acute flare by exam presently.           NICOLAS (iron deficiency anemia)    Stable, monitor in house on serial labs. Resume iron when able.           Diastolic dysfunction    Maintain euvolemia to prevent volume overload. Hypertensive heart disease.           COPD (chronic obstructive pulmonary disease)    No acute exacerbation present. Prn nebs.           History of TIA (transient ischemic attack)    Statin and antiplatelet therapy on hold. Resume when able.           REJI (obstructive sleep apnea)    Defer CPAP given  risk of vomiting and aspiration presently.           DM (diabetes mellitus)    NPO, sliding scale insulin, BG Q6h          * SBO (small bowel obstruction)    Surgery consulted, appreciate assistance. Resolving. Advance diet slowly. Supportive care. Patient wishes to defer repair of incarcerated hernia as well and would prefer abdominal binder.           VTE Risk Mitigation         Ordered     Medium Risk of VTE  Once      07/08/17 0857     Place KELSEY hose  Until discontinued      07/08/17 0857     Place sequential compression device  Until discontinued      07/08/17 0857          Mc Juan MD  Department of Hospital Medicine   [facility]

## 2017-07-10 NOTE — SUBJECTIVE & OBJECTIVE
Interval History: resolving SBO, advancing diet, normotensive today off of oral agents    Review of Systems   Constitutional: Negative for fever.   Respiratory: Negative for cough and shortness of breath.    Cardiovascular: Negative for chest pain and palpitations.   Gastrointestinal: Positive for abdominal distention, abdominal pain (improving) and nausea. Negative for vomiting.   Genitourinary: Negative for hematuria.   Neurological: Negative for weakness.   Psychiatric/Behavioral: Negative for dysphoric mood.     Objective:     Vital Signs (Most Recent):  Temp: 98.4 °F (36.9 °C) (07/09/17 2021)  Pulse: 95 (07/09/17 2021)  Resp: 17 (07/09/17 2021)  BP: 124/64 (07/09/17 2021)  SpO2: 95 % (07/09/17 2021) Vital Signs (24h Range):  Temp:  [98 °F (36.7 °C)-99 °F (37.2 °C)] 98.4 °F (36.9 °C)  Pulse:  [85-97] 95  Resp:  [16-18] 17  SpO2:  [93 %-97 %] 95 %  BP: (108-167)/(58-81) 124/64     Weight: 81.6 kg (180 lb)  Body mass index is 31.89 kg/m².    Intake/Output Summary (Last 24 hours) at 07/09/17 2111  Last data filed at 07/09/17 1300   Gross per 24 hour   Intake              240 ml   Output                0 ml   Net              240 ml      Physical Exam   Constitutional: She is oriented to person, place, and time.   Ill appearing   HENT:   Head: Normocephalic and atraumatic.   Eyes: Conjunctivae and EOM are normal. Pupils are equal, round, and reactive to light.   Neck: Normal range of motion. Neck supple. No JVD present.   Cardiovascular: Normal rate.    No murmur heard.  Pulmonary/Chest: Effort normal and breath sounds normal. No stridor. She has no wheezes. She has no rales.   Abdominal: Soft. She exhibits no distension. There is tenderness (less tender). There is no rebound.   hypoactive bowel sounds, are present   Musculoskeletal: Normal range of motion. She exhibits no edema.   Neurological: She is alert and oriented to person, place, and time. No cranial nerve deficit.   Skin: Skin is warm and dry. Capillary  refill takes less than 2 seconds. She is not diaphoretic. No erythema.   Psychiatric: She has a normal mood and affect. Her behavior is normal.       Significant Labs: All pertinent labs within the past 24 hours have been reviewed.    Significant Imaging: I have reviewed all pertinent imaging results/findings within the past 24 hours.

## 2017-07-10 NOTE — ASSESSMENT & PLAN NOTE
Maintain euvolemia to prevent volume overload. Hypertensive heart disease.   Gentle hydration overnight as po intake minimal today due to increased pain

## 2017-07-10 NOTE — ASSESSMENT & PLAN NOTE
Baseline renal function. Avoid nephrotoxic drugs.    CKD (chronic kidney disease) stage 3, GFR 30-59 ml/min renal dose all meds; no nephrotoxic agents

## 2017-07-10 NOTE — PLAN OF CARE
Problem: Patient Care Overview  Goal: Plan of Care Review  Outcome: Ongoing (interventions implemented as appropriate)  Hourly rounding utilized. Pt. Able to verbalize needs. POC reviewed with patient and her daughter at beginning of the shift. Both verbalized understanding to same. No complaints of nausea. Pain well controlled with IV pain medication. IV fluids infusing as ordered. Patient ambulates to bathroom with stand by assist. Tolerating full liquid diet. Patient free from injury. Side rails up X2, bed an locked. Call light within reach

## 2017-07-10 NOTE — ASSESSMENT & PLAN NOTE
Surgery consulted, appreciate assistance. Resolving. Advance diet slowly. Supportive care. Patient wishes to defer repair of incarcerated hernia as well and would prefer abdominal binder.

## 2017-07-10 NOTE — SUBJECTIVE & OBJECTIVE
Interval History: having slighlty more abdominal pain over hernia and feeling pressure. No n/v    Review of Systems   Respiratory: Negative.    Cardiovascular: Negative.      Objective:     Vital Signs (Most Recent):  Temp: 98.2 °F (36.8 °C) (07/10/17 1100)  Pulse: 81 (07/10/17 1325)  Resp: 18 (07/10/17 1325)  BP: 128/60 (07/10/17 1100)  SpO2: 98 % (07/10/17 1325) Vital Signs (24h Range):  Temp:  [97.9 °F (36.6 °C)-98.4 °F (36.9 °C)] 98.2 °F (36.8 °C)  Pulse:  [81-95] 81  Resp:  [16-18] 18  SpO2:  [93 %-98 %] 98 %  BP: (122-132)/(58-69) 128/60     Weight: 81.6 kg (180 lb)  Body mass index is 31.89 kg/m².    Intake/Output Summary (Last 24 hours) at 07/10/17 1524  Last data filed at 07/10/17 0600   Gross per 24 hour   Intake             2060 ml   Output                0 ml   Net             2060 ml      Physical Exam   Constitutional: She is oriented to person, place, and time.   Ill appearing   HENT:   Head: Normocephalic and atraumatic.   Eyes: Conjunctivae and EOM are normal. Pupils are equal, round, and reactive to light.   Neck: Normal range of motion. Neck supple. No JVD present.   Cardiovascular: Normal rate.    No murmur heard.  Pulmonary/Chest: Effort normal and breath sounds normal. No stridor. She has no wheezes. She has no rales.   Abdominal: Soft. She exhibits no distension. There is tenderness (over hernia lower abdomen). There is no rebound.   hypoactive bowel sounds, are present   Musculoskeletal: Normal range of motion. She exhibits no edema.   Neurological: She is alert and oriented to person, place, and time. No cranial nerve deficit.   Skin: Skin is warm and dry. Capillary refill takes less than 2 seconds. She is not diaphoretic. No erythema.   Psychiatric: She has a normal mood and affect. Her behavior is normal.       Significant Labs: All pertinent labs within the past 24 hours have been reviewed.    Significant Imaging: I have reviewed and interpreted all pertinent imaging results/findings  within the past 24 hours.

## 2017-07-10 NOTE — PROGRESS NOTES
[facility]  Progress Note    Patient Name: Pili Teixeira  MRN: [id number]  Patient Class: IP- Inpatient   Admission Date: 7/8/2017  Length of Stay: 4 days  Attending Physician: Carol Quigley MD  Primary Care Provider: Herlinda Dove MD        Subjective:     Principal Problem:SBO (small bowel obstruction)    HPI:  Elderly woman with h/o RA, HTN, CAD, DM presents with increased abdominal pain, nausea, vomiting since Friday am.  She had felt well previously. She was unable to eat. Initially she had brown, feculent vomitus. Nothing made symptoms better.  Pain in abdomen is generalized. No cough, fevers, chills, rashes, dysuria.     Hospital Course:  No notes on file    Interval History: having slighlty more abdominal pain over hernia and feeling pressure. No n/v    Review of Systems   Respiratory: Negative.    Cardiovascular: Negative.      Objective:     Vital Signs (Most Recent):  Temp: 98.2 °F (36.8 °C) (07/10/17 1100)  Pulse: 81 (07/10/17 1325)  Resp: 18 (07/10/17 1325)  BP: 128/60 (07/10/17 1100)  SpO2: 98 % (07/10/17 1325) Vital Signs (24h Range):  Temp:  [97.9 °F (36.6 °C)-98.4 °F (36.9 °C)] 98.2 °F (36.8 °C)  Pulse:  [81-95] 81  Resp:  [16-18] 18  SpO2:  [93 %-98 %] 98 %  BP: (122-132)/(58-69) 128/60     Weight: 81.6 kg (180 lb)  Body mass index is 31.89 kg/m².    Intake/Output Summary (Last 24 hours) at 07/10/17 1524  Last data filed at 07/10/17 0600   Gross per 24 hour   Intake             2060 ml   Output                0 ml   Net             2060 ml      Physical Exam   Constitutional: She is oriented to person, place, and time.   Ill appearing   HENT:   Head: Normocephalic and atraumatic.   Eyes: Conjunctivae and EOM are normal. Pupils are equal, round, and reactive to light.   Neck: Normal range of motion. Neck supple. No JVD present.   Cardiovascular: Normal rate.    No murmur heard.  Pulmonary/Chest: Effort normal and breath sounds normal. No stridor. She has  no wheezes. She has no rales.   Abdominal: Soft. She exhibits no distension. There is tenderness (over hernia lower abdomen). There is no rebound.   hypoactive bowel sounds, are present   Musculoskeletal: Normal range of motion. She exhibits no edema.   Neurological: She is alert and oriented to person, place, and time. No cranial nerve deficit.   Skin: Skin is warm and dry. Capillary refill takes less than 2 seconds. She is not diaphoretic. No erythema.   Psychiatric: She has a normal mood and affect. Her behavior is normal.       Significant Labs: All pertinent labs within the past 24 hours have been reviewed.    Significant Imaging: I have reviewed and interpreted all pertinent imaging results/findings within the past 24 hours.    Assessment/Plan:      Hypertension associated with diabetes    Monitor for now. Prn hydralazine.  Resume home agents when able.        CKD (chronic kidney disease) stage 3, GFR 30-59 ml/min    Baseline renal function. Avoid nephrotoxic drugs.    CKD (chronic kidney disease) stage 3, GFR 30-59 ml/min renal dose all meds; no nephrotoxic agents               Inflammatory arthritis    On immunosuppresive regimen outpatient. She stopped her MTX without calling Dr. Wilson.  No acute flare by exam presently.           NICOLAS (iron deficiency anemia)    Stable, monitor in house on serial labs. Resume iron when able.           Diastolic dysfunction    Maintain euvolemia to prevent volume overload. Hypertensive heart disease.   Gentle hydration overnight as po intake minimal today due to increased pain           COPD (chronic obstructive pulmonary disease)    No acute exacerbation present. Prn nebs.           History of TIA (transient ischemic attack)    Statin and antiplatelet therapy on hold. Resume when able.           REJI (obstructive sleep apnea)    Defer CPAP given risk of vomiting and aspiration presently.           DM (diabetes mellitus)    Diabetic clear liquids   sliding scale insulin, BG  Q6h          * SBO (small bowel obstruction)    Surgery consulted, appreciate assistance  . Resolving-slowly --pain increased with advance to full liquid-return to clear liquid and ivf/ivabx and restart in 24 hours if improved. -  . Advance diet slowly. Supportive care. Patient wishes to defer repair of incarcerated hernia as well and would prefer abdominal binder.           VTE Risk Mitigation         Ordered     Medium Risk of VTE  Once      07/08/17 0857     Place KELSEY hose  Until discontinued      07/08/17 0857     Place sequential compression device  Until discontinued      07/08/17 0857          Carol Quigley MD  Department of Hospital Medicine   [facility]

## 2017-07-10 NOTE — ASSESSMENT & PLAN NOTE
Surgery consulted, appreciate assistance  . Resolving-slowly --pain increased with advance to full liquid-return to clear liquid and ivf/ivabx and restart in 24 hours if improved. -  . Advance diet slowly. Supportive care. Patient wishes to defer repair of incarcerated hernia as well and would prefer abdominal binder.

## 2017-07-11 ENCOUNTER — TELEPHONE (OUTPATIENT)
Dept: MEDSURG UNIT | Facility: HOSPITAL | Age: 72
End: 2017-07-11

## 2017-07-11 VITALS
WEIGHT: 180 LBS | RESPIRATION RATE: 18 BRPM | BODY MASS INDEX: 31.89 KG/M2 | DIASTOLIC BLOOD PRESSURE: 63 MMHG | HEIGHT: 63 IN | SYSTOLIC BLOOD PRESSURE: 131 MMHG | HEART RATE: 92 BPM | TEMPERATURE: 98 F | OXYGEN SATURATION: 96 %

## 2017-07-11 LAB
ANION GAP SERPL CALC-SCNC: 7 MMOL/L
BASOPHILS NFR BLD: 0 %
BUN SERPL-MCNC: 10 MG/DL
CALCIUM SERPL-MCNC: 9.4 MG/DL
CHLORIDE SERPL-SCNC: 105 MMOL/L
CO2 SERPL-SCNC: 29 MMOL/L
CREAT SERPL-MCNC: 0.7 MG/DL
DIFFERENTIAL METHOD: ABNORMAL
EOSINOPHIL NFR BLD: 0 %
ERYTHROCYTE [DISTWIDTH] IN BLOOD BY AUTOMATED COUNT: 12.9 %
EST. GFR  (AFRICAN AMERICAN): >60 ML/MIN/1.73 M^2
EST. GFR  (NON AFRICAN AMERICAN): >60 ML/MIN/1.73 M^2
GLUCOSE SERPL-MCNC: 108 MG/DL
HCT VFR BLD AUTO: 31.8 %
HGB BLD-MCNC: 10.9 G/DL
LYMPHOCYTES NFR BLD: 25 %
MCH RBC QN AUTO: 29.3 PG
MCHC RBC AUTO-ENTMCNC: 34.5 %
MCV RBC AUTO: 85 FL
MONOCYTES NFR BLD: 6 %
NEUTROPHILS NFR BLD: 68 %
NEUTS BAND NFR BLD MANUAL: 1 %
PLATELET # BLD AUTO: ABNORMAL K/UL
PLATELET BLD QL SMEAR: ABNORMAL
PMV BLD AUTO: ABNORMAL FL
POCT GLUCOSE: 124 MG/DL (ref 70–110)
POTASSIUM SERPL-SCNC: 3.5 MMOL/L
RBC # BLD AUTO: 3.73 M/UL
SODIUM SERPL-SCNC: 141 MMOL/L
WBC # BLD AUTO: 8 K/UL

## 2017-07-11 PROCEDURE — 25000242 PHARM REV CODE 250 ALT 637 W/ HCPCS: Performed by: HOSPITALIST

## 2017-07-11 PROCEDURE — 80048 BASIC METABOLIC PNL TOTAL CA: CPT

## 2017-07-11 PROCEDURE — 25000003 PHARM REV CODE 250: Performed by: NURSE PRACTITIONER

## 2017-07-11 PROCEDURE — 94761 N-INVAS EAR/PLS OXIMETRY MLT: CPT

## 2017-07-11 PROCEDURE — 36415 COLL VENOUS BLD VENIPUNCTURE: CPT

## 2017-07-11 PROCEDURE — 94640 AIRWAY INHALATION TREATMENT: CPT

## 2017-07-11 PROCEDURE — 85007 BL SMEAR W/DIFF WBC COUNT: CPT

## 2017-07-11 PROCEDURE — 25000003 PHARM REV CODE 250: Performed by: HOSPITALIST

## 2017-07-11 PROCEDURE — 85027 COMPLETE CBC AUTOMATED: CPT

## 2017-07-11 RX ADMIN — ACETAMINOPHEN 650 MG: 325 TABLET, FILM COATED ORAL at 12:07

## 2017-07-11 RX ADMIN — LEFLUNOMIDE 20 MG: 20 TABLET, FILM COATED ORAL at 08:07

## 2017-07-11 RX ADMIN — HYDROXYCHLOROQUINE SULFATE 200 MG: 200 TABLET, FILM COATED ORAL at 08:07

## 2017-07-11 RX ADMIN — ALBUTEROL SULFATE 2.5 MG: 2.5 SOLUTION RESPIRATORY (INHALATION) at 07:07

## 2017-07-11 RX ADMIN — ALBUTEROL SULFATE 2.5 MG: 2.5 SOLUTION RESPIRATORY (INHALATION) at 01:07

## 2017-07-11 NOTE — DISCHARGE SUMMARY
[facility] Hospital Medicine  Discharge Summary      Patient Name: Benjy Ferris  MRN: [id number]  Admission Date: 7/8/2017  Hospital Length of Stay: 6 days  Discharge Date and Time:  07/11/2017 10:58 AM  Attending Physician: Carol Quigley MD   Discharging Provider: Carol Quigley MD  Primary Care Provider: Herlinda Dove MD      HPI:   Elderly woman with h/o RA, HTN, CAD, DM presents with increased abdominal pain, nausea, vomiting since Friday am.  She had felt well previously. She was unable to eat. Initially she had brown, feculent vomitus. Nothing made symptoms better.  Pain in abdomen is generalized. No cough, fevers, chills, rashes, dysuria.     * No surgery found *      Indwelling Lines/Drains at time of discharge:   Lines/Drains/Airways          No matching active lines, drains, or airways        Hospital Course:   Partial SBO--resolved with bowel rest.   Labs remained stable.      Consults:   Consults         Status Ordering Provider     Inpatient consult to General Surgery  Once     Specialty:  General Surgery  Provider:  Eloy Sebastian MD    Completed ANGELA WALLACE          Significant Diagnostic Studies:   Results for BENJY FERRIS (MRN [id number]) as of 7/11/2017 10:57   Ref. Range 7/9/2017 05:13 7/10/2017 05:32 7/11/2017 05:42   WBC Latest Ref Range: 3.90 - 12.70 K/uL 9.90 8.20 8.00   RBC Latest Ref Range: 4.00 - 5.40 M/uL 4.68 4.13 3.73 (L)   Hemoglobin Latest Ref Range: 12.0 - 16.0 g/dL 13.6 12.2 10.9 (L)   Hematocrit Latest Ref Range: 37.0 - 48.5 % 40.4 35.7 (L) 31.8 (L)   MCV Latest Ref Range: 82 - 98 fL 86 86 85   MCH Latest Ref Range: 27.0 - 31.0 pg 29.0 29.5 29.3   MCHC Latest Ref Range: 32.0 - 36.0 % 33.6 34.1 34.5   RDW Latest Ref Range: 11.5 - 14.5 % 13.5 13.6 12.9   Platelets Latest Ref Range: 150 - 350 K/uL 135 (L) SEE COMMENT SEE COMMENT   MPV Latest Ref Range: 9.2 - 12.9 fL 10.5 SEE COMMENT SEE COMMENT       Results for BENJY FERRIS (MRN [id number]) as of  7/11/2017 10:57   Ref. Range 7/10/2017 05:09 7/11/2017 05:42   Sodium Latest Ref Range: 136 - 145 mmol/L 141 141   Potassium Latest Ref Range: 3.5 - 5.1 mmol/L 3.7 3.5   Chloride Latest Ref Range: 95 - 110 mmol/L 104 105   CO2 Latest Ref Range: 23 - 29 mmol/L 27 29   Anion Gap Latest Ref Range: 8 - 16 mmol/L 10 7 (L)   BUN, Bld Latest Ref Range: 8 - 23 mg/dL 15 10   Creatinine Latest Ref Range: 0.5 - 1.4 mg/dL 0.8 0.7   eGFR if non African American Latest Ref Range: >60 mL/min/1.73 m^2 >60 >60   eGFR if  Latest Ref Range: >60 mL/min/1.73 m^2 >60 >60   Glucose Latest Ref Range: 70 - 110 mg/dL 104 108   Calcium Latest Ref Range: 8.7 - 10.5 mg/dL 9.5 9.4     Results for BENJY FERRIS (MRN [id number]) as of 7/11/2017 10:57   Ref. Range 7/9/2017 05:13   Hemoglobin A1C Latest Ref Range: 4.0 - 5.6 % 5.7 (H)   Estimated Avg Glucose Latest Ref Range: 68 - 131 mg/dL 117         Xray --  Narrative     Comparison: The small bowel biepym-qymuuch-1/8/2017    Technique: A KUB of the abdomen and pelvis was acquired.    Findings:     There is residual oral contrast material present within the colon.  There are a few opacified prominent loops of small bowel in the midline of the lower pelvis.  No definite pneumoperitoneum.  There is degenerative change of the lower lumbar spine and hip joints.  There is a mild dextroconvex curvature of the lumbar spine.         Pending Diagnostic Studies:     None        Final Active Diagnoses:    Diagnosis Date Noted POA    PRINCIPAL PROBLEM:  SBO (small bowel obstruction) [K56.69] 07/08/2017 Yes    Hypertension associated with diabetes [E11.59, I10] 03/14/2017 Yes    CKD (chronic kidney disease) stage 3, GFR 30-59 ml/min [N18.3] 01/18/2017 Yes    Inflammatory arthritis [M19.90] 01/11/2016 Yes    NICOLAS (iron deficiency anemia) [D50.9] 09/02/2015 Yes    Diastolic dysfunction [I51.9] 07/13/2015 Yes     Chronic    COPD (chronic obstructive pulmonary disease) [J44.9] 01/07/2014 Yes     History of TIA (transient ischemic attack) [Z86.73] 05/28/2013 Not Applicable    DM (diabetes mellitus) [E11.9] 03/12/2012 Yes    REJI (obstructive sleep apnea) [G47.33] 03/12/2012 Yes      Problems Resolved During this Admission:    Diagnosis Date Noted Date Resolved POA      No new Assessment & Plan notes have been filed under this hospital service since the last note was generated.  Service: Hospital Medicine      Discharged Condition: good    Disposition: Home or Self Care    Follow Up:  Follow-up Information     Herlinda Dove MD.    Specialty:  Internal Medicine  Contact information:  [address] [place] [address]  [phone]             Follow up In 1 week.               Patient Instructions:     Diet Diabetic 1800 Calories   Scheduling Instructions: Low fiber for 1-2 weeks and advance as tolerated     Activity as tolerated       Medications:  Reconciled Home Medications:   Current Discharge Medication List      CONTINUE these medications which have NOT CHANGED    Details   ascorbic acid, vitamin C, (VITAMIN C) 500 mg Chew Take 1,000 mg by mouth once daily.      BENADRYL ALLERGY 25 mg tablet Take 2 tablets (50 mg total) by mouth as directed. Take 50mg of Benadryl (Diphenhydramine) by mouth 1 hour prior to CT scan  Qty: 2 tablet, Refills: 0    Associated Diagnoses: Abdominal pain, unspecified abdominal location; Loose stools; Ventral incisional hernia      clopidogrel (PLAVIX) 75 mg tablet Take 1 tablet (75 mg total) by mouth once daily. Patient needs appt for further refills. Appt June 12th  Qty: 90 tablet, Refills: 5    Associated Diagnoses: Hypertension associated with diabetes; SOB (shortness of breath); Other specified transient cerebral ischemias; CKD (chronic kidney disease) stage 3, GFR 30-59 ml/min      cyanocobalamin (VITAMIN B-12) 1000 MCG tablet Take 100 mcg by mouth once daily.      cyclobenzaprine (FLEXERIL) 10 MG tablet Take 10 mg by mouth nightly as needed. Pt does not take  often  Refills: 5      diltiaZEM (CARTIA XT) 120 MG Cp24 Take 1 capsule (120 mg total) by mouth every evening.  Qty: 90 capsule, Refills: 5    Associated Diagnoses: Hypertension associated with diabetes; SOB (shortness of breath); Other specified transient cerebral ischemias; CKD (chronic kidney disease) stage 3, GFR 30-59 ml/min      fexofenadine (ALLEGRA) 180 MG tablet Take 180 mg by mouth once daily.      folic acid (FOLVITE) 1 MG tablet Take 1 tablet (1 mg total) by mouth once daily.  Qty: 30 tablet, Refills: 5    Associated Diagnoses: Rheumatoid factor positive      glimepiride (AMARYL) 4 MG tablet TAKE ONE TABLET BY MOUTH IN THE MORNING  Qty: 30 tablet, Refills: 5      hydroxychloroquine (PLAQUENIL) 200 mg tablet Take 1 tablet (200 mg total) by mouth 2 (two) times daily.  Qty: 180 tablet, Refills: 0    Comments: **Patient requests 90 days supply**  Associated Diagnoses: Rheumatoid factor positive      lactobac cmb #3-fos-pantethine (PROBIOTIC & ACIDOPHILUS) 300-250 million cell-mg Cap Take 1 capsule by mouth once daily.  Qty: 20 capsule, Refills: 0      leflunomide (ARAVA) 20 MG Tab Take 20 mg by mouth once daily.      metformin (GLUCOPHAGE) 500 MG tablet TAKE ONE TABLET BY MOUTH TWICE DAILY WITH MEALS  Qty: 60 tablet, Refills: 5    Associated Diagnoses: Type 2 diabetes mellitus without complication, with long-term current use of insulin      multivit with min-folic acid (WOMEN'S MULTIVITAMIN GUMMIES) 200 mcg Chew Take by mouth once daily.      nystatin (MYCOSTATIN) cream Apply topically 3 (three) times daily.  Refills: 3      olopatadine (PATANOL) 0.1 % ophthalmic solution Place 1 drop into both eyes daily as needed.       ondansetron (ZOFRAN-ODT) 8 MG TbDL DISSOLVE ONE TABLET IN MOUTH EVERY 12 HOURS AS NEEDED  Qty: 30 tablet, Refills: 0    Associated Diagnoses: Nausea and vomiting      ONGLYZA 5 mg Tab tablet TAKE ONE TABLET BY MOUTH ONCE DAILY  Qty: 30 tablet, Refills: 5      oxycodone-acetaminophen   mg (PERCOCET)  mg per tablet Take 1 tablet by mouth 4 (four) times daily as needed.       pantoprazole (PROTONIX) 40 MG tablet TAKE ONE TABLET BY MOUTH ONCE DAILY  Qty: 90 tablet, Refills: 0    Associated Diagnoses: Reflux esophagitis      predniSONE (DELTASONE) 20 MG tablet Take 50 mg by mouth once daily. As directed      ranitidine (ZANTAC) 300 MG tablet TAKE ONE TABLET BY MOUTH IN THE EVENING  Qty: 30 tablet, Refills: 0    Associated Diagnoses: Gastroesophageal reflux disease, esophagitis presence not specified      simvastatin (ZOCOR) 40 MG tablet Take 40 mg by mouth every evening.      sucralfate (CARAFATE) 1 gram tablet Take 1 tablet (1 g total) by mouth 4 (four) times daily before meals and nightly.  Qty: 60 tablet, Refills: 0      trazodone (DESYREL) 50 MG tablet TAKE ONE TABLET BY MOUTH IN THE EVENING  Qty: 30 tablet, Refills: 5    Associated Diagnoses: Insomnia, unspecified type      valsartan-hydrochlorothiazide (DIOVAN-HCT) 160-25 mg per tablet TAKE ONE TABLET BY MOUTH ONCE DAILY (NEED  APPT)  Qty: 90 tablet, Refills: 5    Associated Diagnoses: Hypertension associated with diabetes; SOB (shortness of breath); Other specified transient cerebral ischemias; CKD (chronic kidney disease) stage 3, GFR 30-59 ml/min      blood sugar diagnostic (ACCU-CHEK JACINTO PLUS TEST STRP) Strp TEST TWICE DAILY ONLY  Qty: 200 strip, Refills: 0    Comments: Dx: E11..9      fluticasone (FLONASE) 50 mcg/actuation nasal spray 2 sprays by Each Nare route once daily.  Qty: 1 Bottle, Refills: 0    Associated Diagnoses: AR (allergic rhinitis)      lancets (ACCU-CHEK SOFTCLIX LANCETS) Misc Test TWICE DAILY;Twice a day  Qty: 100 each, Refills: 3      phenazopyridine (PYRIDIUM) 100 MG tablet 100 mg daily as needed.   Refills: 1      saliva stimulant agents comb.3 (BIOTENE MOISTURIZING MOUTH) Spry 1-2 sprays by Mucous Membrane route 4 (four) times daily as needed (Non prescription).  Qty: 1 Bottle, Refills: 12         STOP taking  these medications       rosuvastatin (CRESTOR) 10 MG tablet Comments:   Reason for Stopping:         (Magic mouthwash) 1:1:1 Benadryl 12.5mg/5ml liq, aluminum & magnesium hydroxide-simehticone (Maalox), lidocaine viscous 2% Comments:   Reason for Stopping:         methotrexate 2.5 MG Tab Comments:   Reason for Stopping:             Time spent on the discharge of patient: 32 minutes  Discussed diet     Carol Quigley MD  Department of Hospital Medicine  [facility]

## 2017-07-11 NOTE — PLAN OF CARE
07/11/17 0730   Patient Assessment/Suction   Level of Consciousness (AVPU) alert   Respiratory Effort Normal;Unlabored   Expansion/Accessory Muscles/Retractions no retractions;no use of accessory muscles   All Lung Fields Breath Sounds clear;diminished   Rhythm/Pattern, Respiratory depth regular;pattern regular;unlabored   Cough Frequency infrequent   Cough Type good;nonproductive   PRE-TX-O2-ETCO2   O2 Device (Oxygen Therapy) room air   SpO2 97 %   Pulse Oximetry Type Intermittent   $ Pulse Oximetry - Multiple Charge Pulse Oximetry - Multiple   Pulse 69   Resp 18   Aerosol Therapy   $ Aerosol Therapy Charges Aerosol Treatment   Respiratory Treatment Status given   SVN/Inhaler Treatment Route mouthpiece   Position During Treatment HOB at 30 degrees   Patient Tolerance good   Post-Treatment   Post-treatment Heart Rate (beats/min) 74   Post-treatment Resp Rate (breaths/min) 18   All Fields Breath Sounds unchanged       Aerosol treatments q6 w/a. Patient tolerated well.

## 2017-07-11 NOTE — PLAN OF CARE
07/11/17 1311   Final Note   Assessment Type Final Discharge Note   Discharge Disposition Home   Discharge planning education complete? Yes

## 2017-07-12 ENCOUNTER — TELEPHONE (OUTPATIENT)
Dept: FAMILY MEDICINE | Facility: CLINIC | Age: 72
End: 2017-07-12

## 2017-07-12 ENCOUNTER — PATIENT OUTREACH (OUTPATIENT)
Dept: ADMINISTRATIVE | Facility: CLINIC | Age: 72
End: 2017-07-12

## 2017-07-12 NOTE — PROGRESS NOTES
C3 nurse attempted to contact patient. The following occurred:   C3 nurse attempted to contact Pili Teixeira for a TCC post hospital discharge follow up call. The patient is unable to conduct the call @ this time. The patient requested a callback.    The patient does not have a scheduled HOSFU appointment within 7-14 days post hospital discharge date 7/11/17. Message sent to Physician staff to assist with HOSFU appointment scheduling.

## 2017-07-12 NOTE — TELEPHONE ENCOUNTER
----- Message from Neha Garcia sent at 7/12/2017  9:49 AM CDT -----  Angelica CRAWFORD Staff  Caller: Estella navarrete/[facility] [phone] (Yesterday,  1:09 PM)         Please call patient to schedule a hospital follow up appt within 1 week, she is being discharged from [facility] today.  She was admitted for small bowel obstruction.  Thank you!

## 2017-07-28 DIAGNOSIS — K21.9 GASTROESOPHAGEAL REFLUX DISEASE, ESOPHAGITIS PRESENCE NOT SPECIFIED: ICD-10-CM

## 2017-08-17 DIAGNOSIS — R11.2 NAUSEA AND VOMITING: ICD-10-CM

## 2017-08-18 RX ORDER — ONDANSETRON 8 MG/1
TABLET, ORALLY DISINTEGRATING ORAL
Qty: 30 TABLET | Refills: 0 | Status: SHIPPED | OUTPATIENT
Start: 2017-08-18 | End: 2018-03-08 | Stop reason: SDUPTHER

## 2017-09-15 DIAGNOSIS — R76.8 RHEUMATOID FACTOR POSITIVE: ICD-10-CM

## 2017-09-20 RX ORDER — HYDROXYCHLOROQUINE SULFATE 200 MG/1
TABLET, FILM COATED ORAL
Qty: 180 TABLET | Refills: 0 | Status: SHIPPED | OUTPATIENT
Start: 2017-09-20 | End: 2018-03-20

## 2017-09-21 DIAGNOSIS — Z79.4 TYPE 2 DIABETES MELLITUS WITHOUT COMPLICATION, WITH LONG-TERM CURRENT USE OF INSULIN: ICD-10-CM

## 2017-09-21 DIAGNOSIS — E11.9 TYPE 2 DIABETES MELLITUS WITHOUT COMPLICATION, WITH LONG-TERM CURRENT USE OF INSULIN: ICD-10-CM

## 2017-09-21 RX ORDER — METFORMIN HYDROCHLORIDE 500 MG/1
TABLET ORAL
Qty: 60 TABLET | Refills: 0 | Status: SHIPPED | OUTPATIENT
Start: 2017-09-21 | End: 2017-10-20 | Stop reason: SDUPTHER

## 2017-09-21 RX ORDER — GLIMEPIRIDE 4 MG/1
TABLET ORAL
Qty: 30 TABLET | Refills: 0 | Status: SHIPPED | OUTPATIENT
Start: 2017-09-21 | End: 2017-10-20 | Stop reason: SDUPTHER

## 2017-09-21 RX ORDER — SAXAGLIPTIN 5 MG/1
TABLET, FILM COATED ORAL
Qty: 30 TABLET | Refills: 0 | Status: SHIPPED | OUTPATIENT
Start: 2017-09-21 | End: 2017-10-20 | Stop reason: SDUPTHER

## 2017-09-25 ENCOUNTER — TELEPHONE (OUTPATIENT)
Dept: CARDIOLOGY | Facility: CLINIC | Age: 72
End: 2017-09-25

## 2017-09-25 NOTE — TELEPHONE ENCOUNTER
Request for Cardiac Clearance    Pili S Adyas  is having Intervential Pain Management Procedure and needs clearance.     Patient is currently taking: Plavix     Please advise on clearance for procedure and holding medications.

## 2017-10-03 DIAGNOSIS — K21.00 REFLUX ESOPHAGITIS: ICD-10-CM

## 2017-10-03 RX ORDER — PANTOPRAZOLE SODIUM 40 MG/1
TABLET, DELAYED RELEASE ORAL
Qty: 90 TABLET | Refills: 0 | Status: SHIPPED | OUTPATIENT
Start: 2017-10-03 | End: 2017-12-27 | Stop reason: SDUPTHER

## 2017-10-13 DIAGNOSIS — K21.9 GASTROESOPHAGEAL REFLUX DISEASE, ESOPHAGITIS PRESENCE NOT SPECIFIED: ICD-10-CM

## 2017-10-20 ENCOUNTER — TELEPHONE (OUTPATIENT)
Dept: GASTROENTEROLOGY | Facility: CLINIC | Age: 72
End: 2017-10-20

## 2017-10-20 DIAGNOSIS — E11.9 TYPE 2 DIABETES MELLITUS WITHOUT COMPLICATION, WITH LONG-TERM CURRENT USE OF INSULIN: ICD-10-CM

## 2017-10-20 DIAGNOSIS — G47.00 INSOMNIA, UNSPECIFIED TYPE: ICD-10-CM

## 2017-10-20 DIAGNOSIS — Z79.4 TYPE 2 DIABETES MELLITUS WITHOUT COMPLICATION, WITH LONG-TERM CURRENT USE OF INSULIN: ICD-10-CM

## 2017-10-20 RX ORDER — METFORMIN HYDROCHLORIDE 500 MG/1
TABLET ORAL
Qty: 60 TABLET | Refills: 0 | Status: SHIPPED | OUTPATIENT
Start: 2017-10-20 | End: 2017-11-13 | Stop reason: SDUPTHER

## 2017-10-20 RX ORDER — SAXAGLIPTIN 5 MG/1
TABLET, FILM COATED ORAL
Qty: 30 TABLET | Refills: 0 | Status: SHIPPED | OUTPATIENT
Start: 2017-10-20 | End: 2017-11-13 | Stop reason: SDUPTHER

## 2017-10-20 RX ORDER — TRAZODONE HYDROCHLORIDE 50 MG/1
TABLET ORAL
Qty: 30 TABLET | Refills: 0 | Status: SHIPPED | OUTPATIENT
Start: 2017-10-20 | End: 2017-11-13 | Stop reason: SDUPTHER

## 2017-10-20 RX ORDER — GLIMEPIRIDE 4 MG/1
TABLET ORAL
Qty: 30 TABLET | Refills: 0 | Status: SHIPPED | OUTPATIENT
Start: 2017-10-20 | End: 2017-11-01 | Stop reason: DRUGHIGH

## 2017-10-20 NOTE — TELEPHONE ENCOUNTER
Pt. Needs appt. With me or available provider ASAP,  Her last hgb a1C was low and needs lab.  She may be able to decrease some of her medss.  Are her glucoses running low?  Have put in for repeat hgb A1C.

## 2017-10-20 NOTE — TELEPHONE ENCOUNTER
----- Message from Iwona Ornelas sent at 10/20/2017 11:45 AM CDT -----  Contact: pt  Pt needs refills.Rx-ONGLYZA 5 mg Tab tablet,glimepiride (AMARYL) 4 MG tablet,metformin (GLUCOPHAGE) 500 MG tablet,trazodone (DESYREL) 50 MG tablet,would like 90 day supply...[phone](please notify when sent)        .  [facility] MICHEL PERDOMO - [address] [place] [address]  [place] [address]  Phone: [phone] Fax: [phone]

## 2017-10-30 ENCOUNTER — LAB VISIT (OUTPATIENT)
Dept: LAB | Facility: HOSPITAL | Age: 72
End: 2017-10-30
Attending: INTERNAL MEDICINE
Payer: MEDICARE

## 2017-10-30 DIAGNOSIS — E11.9 TYPE 2 DIABETES MELLITUS WITHOUT COMPLICATION, WITH LONG-TERM CURRENT USE OF INSULIN: ICD-10-CM

## 2017-10-30 DIAGNOSIS — Z79.4 TYPE 2 DIABETES MELLITUS WITHOUT COMPLICATION, WITH LONG-TERM CURRENT USE OF INSULIN: ICD-10-CM

## 2017-10-30 PROCEDURE — 36415 COLL VENOUS BLD VENIPUNCTURE: CPT | Mod: PO

## 2017-10-30 PROCEDURE — 83036 HEMOGLOBIN GLYCOSYLATED A1C: CPT

## 2017-10-31 LAB
ESTIMATED AVG GLUCOSE: 123 MG/DL
HBA1C MFR BLD HPLC: 5.9 %

## 2017-11-01 ENCOUNTER — TELEPHONE (OUTPATIENT)
Dept: FAMILY MEDICINE | Facility: CLINIC | Age: 72
End: 2017-11-01

## 2017-11-01 DIAGNOSIS — E11.8 TYPE 2 DIABETES MELLITUS WITH COMPLICATION, UNSPECIFIED LONG TERM INSULIN USE STATUS: Primary | ICD-10-CM

## 2017-11-03 RX ORDER — GLIMEPIRIDE 2 MG/1
2 TABLET ORAL
Qty: 90 TABLET | Refills: 0 | Status: SHIPPED | OUTPATIENT
Start: 2017-11-03 | End: 2017-11-13 | Stop reason: SDUPTHER

## 2017-11-09 ENCOUNTER — TELEPHONE (OUTPATIENT)
Dept: INTERNAL MEDICINE | Facility: CLINIC | Age: 72
End: 2017-11-09

## 2017-11-09 NOTE — TELEPHONE ENCOUNTER
----- Message from Madalyn Da Silva sent at 11/9/2017 10:45 AM CST -----  Blood sugar reading for last week to current are as follows:  11-2/98 11-2/118, 11-3/92 11-3/80, 11-4/113 11-4/81, 11-5/80 11-5/130,  11-6/95 11-6/130, 11-7/107 11-7/130, 11-8/277 pm only and today 150 for the am.  Any questions call [phone].

## 2017-11-13 ENCOUNTER — OFFICE VISIT (OUTPATIENT)
Dept: FAMILY MEDICINE | Facility: CLINIC | Age: 72
End: 2017-11-13
Payer: MEDICARE

## 2017-11-13 VITALS
BODY MASS INDEX: 32.46 KG/M2 | HEART RATE: 83 BPM | DIASTOLIC BLOOD PRESSURE: 80 MMHG | WEIGHT: 183.19 LBS | HEIGHT: 63 IN | TEMPERATURE: 99 F | SYSTOLIC BLOOD PRESSURE: 124 MMHG | RESPIRATION RATE: 17 BRPM | OXYGEN SATURATION: 97 %

## 2017-11-13 DIAGNOSIS — E11.9 TYPE 2 DIABETES MELLITUS WITHOUT COMPLICATION, WITH LONG-TERM CURRENT USE OF INSULIN: ICD-10-CM

## 2017-11-13 DIAGNOSIS — E11.8 TYPE 2 DIABETES MELLITUS WITH COMPLICATION, UNSPECIFIED LONG TERM INSULIN USE STATUS: ICD-10-CM

## 2017-11-13 DIAGNOSIS — G47.00 INSOMNIA, UNSPECIFIED TYPE: ICD-10-CM

## 2017-11-13 DIAGNOSIS — K21.9 GASTROESOPHAGEAL REFLUX DISEASE, ESOPHAGITIS PRESENCE NOT SPECIFIED: ICD-10-CM

## 2017-11-13 DIAGNOSIS — Z79.4 TYPE 2 DIABETES MELLITUS WITHOUT COMPLICATION, WITH LONG-TERM CURRENT USE OF INSULIN: ICD-10-CM

## 2017-11-13 DIAGNOSIS — E11.21 CONTROLLED TYPE 2 DIABETES MELLITUS WITH DIABETIC NEPHROPATHY, WITHOUT LONG-TERM CURRENT USE OF INSULIN: Primary | ICD-10-CM

## 2017-11-13 DIAGNOSIS — F51.01 PRIMARY INSOMNIA: ICD-10-CM

## 2017-11-13 PROCEDURE — 99999 PR PBB SHADOW E&M-EST. PATIENT-LVL III: CPT | Mod: PBBFAC,,, | Performed by: PHYSICIAN ASSISTANT

## 2017-11-13 PROCEDURE — G0008 ADMIN INFLUENZA VIRUS VAC: HCPCS | Mod: PBBFAC,PO

## 2017-11-13 PROCEDURE — 99214 OFFICE O/P EST MOD 30 MIN: CPT | Mod: S$PBB,,, | Performed by: PHYSICIAN ASSISTANT

## 2017-11-13 PROCEDURE — 99213 OFFICE O/P EST LOW 20 MIN: CPT | Mod: PBBFAC,PO | Performed by: PHYSICIAN ASSISTANT

## 2017-11-13 RX ORDER — METFORMIN HYDROCHLORIDE 500 MG/1
500 TABLET ORAL 2 TIMES DAILY WITH MEALS
Qty: 180 TABLET | Refills: 1 | Status: SHIPPED | OUTPATIENT
Start: 2017-11-13 | End: 2018-03-02 | Stop reason: SDUPTHER

## 2017-11-13 RX ORDER — GLIMEPIRIDE 2 MG/1
2 TABLET ORAL
Qty: 90 TABLET | Refills: 1 | Status: SHIPPED | OUTPATIENT
Start: 2017-11-13 | End: 2018-03-20

## 2017-11-13 RX ORDER — SAXAGLIPTIN 5 MG/1
5 TABLET, FILM COATED ORAL DAILY
Qty: 90 TABLET | Refills: 1 | Status: SHIPPED | OUTPATIENT
Start: 2017-11-13 | End: 2018-03-05 | Stop reason: SDUPTHER

## 2017-11-13 RX ORDER — GABAPENTIN 100 MG/1
1 CAPSULE ORAL 3 TIMES DAILY
Refills: 6 | COMMUNITY
Start: 2017-09-15 | End: 2018-06-05 | Stop reason: SDUPTHER

## 2017-11-13 RX ORDER — TRAZODONE HYDROCHLORIDE 50 MG/1
50 TABLET ORAL NIGHTLY
Qty: 90 TABLET | Refills: 1 | Status: SHIPPED | OUTPATIENT
Start: 2017-11-13 | End: 2018-03-02 | Stop reason: SDUPTHER

## 2017-11-13 NOTE — PROGRESS NOTES
Subjective:       Patient ID: Pili Teixeira is a 72 y.o. female.    Chief Complaint: Medication Refill    HPI   Pt needs refill on diabetes meds and sleep med  Doing well  Glucose level checks good  Last A1c looks good  Review of Systems   Constitutional: Positive for fatigue. Negative for activity change, appetite change, chills, diaphoresis, fever and unexpected weight change.   HENT: Negative.    Eyes: Negative.    Respiratory: Negative.    Cardiovascular: Negative.    Gastrointestinal: Negative.    Endocrine: Negative.    Genitourinary: Negative.    Musculoskeletal: Negative.    Skin: Negative.  Negative for rash.   Psychiatric/Behavioral: Positive for sleep disturbance. Negative for dysphoric mood. The patient is nervous/anxious.        Objective:      Physical Exam   Constitutional: She appears well-developed and well-nourished. No distress.   HENT:   Head: Normocephalic and atraumatic.   Eyes: Conjunctivae are normal. No scleral icterus.   Neck: Normal range of motion. Neck supple. No tracheal deviation present. No thyromegaly present.   Cardiovascular: Normal rate, regular rhythm, normal heart sounds and intact distal pulses.  Exam reveals no gallop and no friction rub.    No murmur heard.  Pulmonary/Chest: Effort normal and breath sounds normal. No respiratory distress. She has no wheezes. She has no rales.   Musculoskeletal: She exhibits no edema.   Lymphadenopathy:     She has no cervical adenopathy.   Skin: Skin is dry. No rash noted.   Psychiatric: She has a normal mood and affect. Her behavior is normal. Judgment and thought content normal.   Vitals reviewed.      Assessment:       1. Controlled type 2 diabetes mellitus with diabetic nephropathy, without long-term current use of insulin    2. Primary insomnia    3. Type 2 diabetes mellitus with complication, unspecified long term insulin use status    4. Type 2 diabetes mellitus without complication, with long-term current use of insulin    5.  Insomnia, unspecified type        Plan:       Pili was seen today for medication refill.    Diagnoses and all orders for this visit:    Controlled type 2 diabetes mellitus with diabetic nephropathy, without long-term current use of insulin    Primary insomnia    Type 2 diabetes mellitus with complication, unspecified long term insulin use status  -     glimepiride (AMARYL) 2 MG tablet; Take 1 tablet (2 mg total) by mouth before breakfast.  -     SAXagliptin (ONGLYZA) 5 mg Tab tablet; Take 1 tablet (5 mg total) by mouth once daily.    Type 2 diabetes mellitus without complication, with long-term current use of insulin  -     metFORMIN (GLUCOPHAGE) 500 MG tablet; Take 1 tablet (500 mg total) by mouth 2 (two) times daily with meals.  -     SAXagliptin (ONGLYZA) 5 mg Tab tablet; Take 1 tablet (5 mg total) by mouth once daily.    Insomnia, unspecified type  -     traZODone (DESYREL) 50 MG tablet; Take 1 tablet (50 mg total) by mouth every evening.    Other orders  -     Influenza - High Dose (65+) (PF) (IM)    pt is wearing panty hose today and does not want foot exam today  Pt will f/u with PCP

## 2017-12-14 ENCOUNTER — TELEPHONE (OUTPATIENT)
Dept: HEMATOLOGY/ONCOLOGY | Facility: CLINIC | Age: 72
End: 2017-12-14

## 2017-12-14 NOTE — TELEPHONE ENCOUNTER
----- Message from Laura Alfred sent at 12/14/2017  3:30 PM CST -----  Contact: self   Patient need to cancel upcoming appointment on December 18,2017, any questions please call back at [phone] or [phone]

## 2017-12-22 DIAGNOSIS — R76.8 RHEUMATOID FACTOR POSITIVE: ICD-10-CM

## 2017-12-26 RX ORDER — HYDROXYCHLOROQUINE SULFATE 200 MG/1
TABLET, FILM COATED ORAL
Qty: 180 TABLET | Refills: 0 | OUTPATIENT
Start: 2017-12-26

## 2017-12-27 DIAGNOSIS — K21.00 REFLUX ESOPHAGITIS: ICD-10-CM

## 2017-12-27 RX ORDER — PANTOPRAZOLE SODIUM 40 MG/1
TABLET, DELAYED RELEASE ORAL
Qty: 90 TABLET | Refills: 0 | Status: SHIPPED | OUTPATIENT
Start: 2017-12-27 | End: 2018-03-29 | Stop reason: SDUPTHER

## 2017-12-29 NOTE — H&P
[facility] Medicine  History & Physical    Patient Name: Pili Teixeira  MRN: [id number]  Admission Date: 7/8/2017  Attending Physician: Mc Juan MD  Primary Care Provider: Herlinda Dove MD         Patient information was obtained from patient, relative(s), past medical records and ER records.     Subjective:     Principal Problem:SBO (small bowel obstruction)    Chief Complaint:   Chief Complaint   Patient presents with    Abdominal Pain     and nausea and vomiting        HPI: Elderly woman with h/o RA, HTN, CAD, DM presents with increased abdominal pain, nausea, vomiting since Friday am.  She had felt well previously. She was unable to eat. Initially she had brown, feculent vomitus. Nothing made symptoms better.  Pain in abdomen is generalized. No cough, fevers, chills, rashes, dysuria.     Past Medical History:   Diagnosis Date    Allergy     Anemia 2012    with thrombocytopenia; iron deficiency    Arthritis     Cervical spinal stenosis     with neuropathy, chronic neck,back,leg pain    Colon polyp     Coronary artery disease     Diabetes mellitus     , sugars run 190's per patient    Diverticulitis     Diverticulosis     Hx diverticulitis,still has chronic diarrhea, abd pain    Dyspnea on exertion     sometimes with nausea, fatigue,dizziness, had cardiac cath June 2012, normal    Fracture 2012    right thumb    GERD (gastroesophageal reflux disease)     Feb 2012, Hx H Pylori    Glaucoma     had LAser surgey, on no eye drops    HEARING LOSS     Hemangioma     fatty liver    History of earache     frequent    Hyperlipidemia     Hypertension     Laryngeal polyp     Myocardial infarction 2006 last    also MI in distant past    REJI (obstructive sleep apnea)     does not use CPAP    Otitis media     Renal stone     Sjogren's syndrome     Stroke     TIA, now on Plavix    TIA (transient ischemic attack)     TMJ disorder     UTI (lower urinary tract  infection)     frequent    Venous insufficiency     with Hx blood clot in leg       Past Surgical History:   Procedure Laterality Date    ABDOMINAL SURGERY  2010 x2    expoloratory lap for pelvic cyst,adhesions; partial colonectomy     adhesiolysis   10/11/2012    CARDIAC CATHETERIZATION      no current blockages.    CHOLECYSTECTOMY      COLON SURGERY      r/t diverticuli    COLONOSCOPY  08/2014    repeat in 5 years    EPIDURAL BLOCK INJECTION  5/15/12    back,neck for pain    EXPLORATORY LAPAROTOMY W/ BOWEL RESECTION  10/11/2012    EYE SURGERY      Laser for glaucoma    EYE SURGERY  May 2012    cataracts    HYSTERECTOMY      LARYNX SURGERY      polypectomy    OOPHORECTOMY      TONSILLECTOMY      with adenoidectomy    UPPER GASTROINTESTINAL ENDOSCOPY  12/2015    VASCULAR SURGERY      laser to varicose vein leg       Review of patient's allergies indicates:   Allergen Reactions    Codeine Other (See Comments)     Chest pain    Clindamycin Other (See Comments)     Difficulty swallowing after taking, feels a something sits in epigastric area     Iodinated contrast- oral and iv dye Hives and Rash       No current facility-administered medications on file prior to encounter.      Current Outpatient Prescriptions on File Prior to Encounter   Medication Sig    ascorbic acid, vitamin C, (VITAMIN C) 500 mg Chew Take 1,000 mg by mouth once daily.    BENADRYL ALLERGY 25 mg tablet Take 2 tablets (50 mg total) by mouth as directed. Take 50mg of Benadryl (Diphenhydramine) by mouth 1 hour prior to CT scan (Patient taking differently: Take 25 mg by mouth nightly as needed for Insomnia. )    clopidogrel (PLAVIX) 75 mg tablet Take 1 tablet (75 mg total) by mouth once daily. Patient needs appt for further refills. Appt June 12th    cyanocobalamin (VITAMIN B-12) 1000 MCG tablet Take 100 mcg by mouth once daily.    cyclobenzaprine (FLEXERIL) 10 MG tablet Take 10 mg by mouth nightly as needed. Pt does not take  shoulder pain often    diltiaZEM (CARTIA XT) 120 MG Cp24 Take 1 capsule (120 mg total) by mouth every evening.    fexofenadine (ALLEGRA) 180 MG tablet Take 180 mg by mouth once daily.    folic acid (FOLVITE) 1 MG tablet Take 1 tablet (1 mg total) by mouth once daily.    glimepiride (AMARYL) 4 MG tablet TAKE ONE TABLET BY MOUTH IN THE MORNING    hydroxychloroquine (PLAQUENIL) 200 mg tablet Take 1 tablet (200 mg total) by mouth 2 (two) times daily.    lactobac cmb #3-fos-pantethine (PROBIOTIC & ACIDOPHILUS) 300-250 million cell-mg Cap Take 1 capsule by mouth once daily.    metformin (GLUCOPHAGE) 500 MG tablet TAKE ONE TABLET BY MOUTH TWICE DAILY WITH MEALS    multivit with min-folic acid (WOMEN'S MULTIVITAMIN GUMMIES) 200 mcg Chew Take by mouth once daily.    nystatin (MYCOSTATIN) cream Apply topically 3 (three) times daily.    olopatadine (PATANOL) 0.1 % ophthalmic solution Place 1 drop into both eyes daily as needed.     ondansetron (ZOFRAN-ODT) 8 MG TbDL DISSOLVE ONE TABLET IN MOUTH EVERY 12 HOURS AS NEEDED    ONGLYZA 5 mg Tab tablet TAKE ONE TABLET BY MOUTH ONCE DAILY    oxycodone-acetaminophen  mg (PERCOCET)  mg per tablet Take 1 tablet by mouth 4 (four) times daily as needed.     pantoprazole (PROTONIX) 40 MG tablet TAKE ONE TABLET BY MOUTH ONCE DAILY    ranitidine (ZANTAC) 300 MG tablet TAKE ONE TABLET BY MOUTH IN THE EVENING    sucralfate (CARAFATE) 1 gram tablet Take 1 tablet (1 g total) by mouth 4 (four) times daily before meals and nightly.    trazodone (DESYREL) 50 MG tablet TAKE ONE TABLET BY MOUTH IN THE EVENING    valsartan-hydrochlorothiazide (DIOVAN-HCT) 160-25 mg per tablet TAKE ONE TABLET BY MOUTH ONCE DAILY (NEED  APPT)    [DISCONTINUED] rosuvastatin (CRESTOR) 10 MG tablet Take 1 tablet (10 mg total) by mouth every evening.    blood sugar diagnostic (ACCU-CHEK JACINTO PLUS TEST STRP) Strp TEST TWICE DAILY ONLY    fluticasone (FLONASE) 50 mcg/actuation nasal spray 2 sprays by Each  Nare route once daily. (Patient taking differently: 1 spray by Each Nare route as needed. )    lancets (ACCU-CHEK SOFTCLIX LANCETS) Misc Test TWICE DAILY;Twice a day    phenazopyridine (PYRIDIUM) 100 MG tablet 100 mg daily as needed.     saliva stimulant agents comb.3 (BIOTENE MOISTURIZING MOUTH) Spry 1-2 sprays by Mucous Membrane route 4 (four) times daily as needed (Non prescription).    [DISCONTINUED] (Magic mouthwash) 1:1:1 Benadryl 12.5mg/5ml liq, aluminum & magnesium hydroxide-simehticone (Maalox), lidocaine viscous 2% Swish and spit 15 mLs every 4 (four) hours. for mouth sores    [DISCONTINUED] methotrexate 2.5 MG Tab Take 4 tablets (10 mg total) by mouth every 7 days.     Family History     Problem Relation (Age of Onset)    Diverticulitis Sister    Pancreatic cancer Maternal Aunt (55), Cousin (58)    Stroke Father    Throat cancer Brother, Mother        Social History Main Topics    Smoking status: Never Smoker    Smokeless tobacco: Never Used    Alcohol use No    Drug use:      Types: Oxycodone    Sexual activity: No      Comment: hysterectomy     Review of Systems   Constitutional: Positive for fatigue. Negative for chills, diaphoresis and fever.   HENT: Negative for nosebleeds, sinus pressure, sore throat and trouble swallowing.    Eyes: Negative for pain, redness and visual disturbance.   Respiratory: Negative for cough, shortness of breath and wheezing.    Cardiovascular: Negative for chest pain, palpitations and leg swelling.   Gastrointestinal: Positive for abdominal distention, abdominal pain, nausea and vomiting. Negative for diarrhea.   Genitourinary: Negative for difficulty urinating and dysuria.   Musculoskeletal: Negative for arthralgias, back pain, joint swelling and myalgias.   Skin: Negative for color change and rash.   Neurological: Negative for weakness, numbness and headaches.   Hematological: Negative for adenopathy. Does not bruise/bleed easily.   Psychiatric/Behavioral:  Positive for sleep disturbance. Negative for decreased concentration and dysphoric mood.     Objective:     Vital Signs (Most Recent):  Temp: 98.1 °F (36.7 °C) (07/08/17 1904)  Pulse: 84 (07/08/17 1926)  Resp: 20 (07/08/17 1926)  BP: 136/68 (07/08/17 1904)  SpO2: 96 % (07/08/17 1926) Vital Signs (24h Range):  Temp:  [98.1 °F (36.7 °C)-98.7 °F (37.1 °C)] 98.1 °F (36.7 °C)  Pulse:  [80-98] 84  Resp:  [16-20] 20  SpO2:  [94 %-100 %] 96 %  BP: (119-139)/(56-70) 136/68     Weight: 81.6 kg (180 lb)  Body mass index is 31.89 kg/m².    Physical Exam   Constitutional: She is oriented to person, place, and time.   Ill appearing   HENT:   Head: Normocephalic and atraumatic.   Eyes: Conjunctivae and EOM are normal. Pupils are equal, round, and reactive to light.   Neck: Normal range of motion. Neck supple. No JVD present.   Cardiovascular: Normal rate.    No murmur heard.  Pulmonary/Chest: Effort normal and breath sounds normal. No stridor. She has no wheezes. She has no rales.   Abdominal: Soft. She exhibits distension. There is tenderness. There is no rebound.   hypoactive bowel sounds, are present   Musculoskeletal: Normal range of motion. She exhibits no edema.   Neurological: She is alert and oriented to person, place, and time. No cranial nerve deficit.   Skin: Skin is warm and dry. Capillary refill takes less than 2 seconds. She is not diaphoretic. No erythema.   Psychiatric: She has a normal mood and affect. Her behavior is normal.        Significant Labs: All pertinent labs within the past 24 hours have been reviewed.    Significant Imaging: I have reviewed all pertinent imaging results/findings within the past 24 hours.    Assessment/Plan:     Hypertension associated with diabetes    Monitor for now. Prn hydralazine.  Resume home agents when able. If persistently hypertensive, my start clonidine patch.         Inflammatory arthritis    On immunosuppresive regimen outpatient. She stopped her MTX without calling   Katie.  No acute flare by exam presently.           NICOLAS (iron deficiency anemia)    Stable, monitor in house on serial labs. Resume iron when able.           Diastolic dysfunction    Maintain euvolemia to prevent volume overload. Hypertensive heart disease.           COPD (chronic obstructive pulmonary disease)    No acute exacerbation present. Prn nebs.           History of TIA (transient ischemic attack)    Statin and antiplatelet therapy on hold. Resume when able.           REJI (obstructive sleep apnea)    Defer CPAP given risk of vomiting and aspiration presently.           DM (diabetes mellitus)    NPO, sliding scale insulin, BG Q6h          * SBO (small bowel obstruction)    Surgery consulted, appreciate assistance. Seen on CT ab/pelvis.  Small bowel follow through study pending. NPO, supportive care.  H/o adhesions, likely causative.           VTE Risk Mitigation         Ordered     Medium Risk of VTE  Once      07/08/17 0857     Place KELSEY hose  Until discontinued      07/08/17 0857     Place sequential compression device  Until discontinued      07/08/17 0857        Mc Juan MD  Department of Hospital Medicine   [facility]

## 2018-01-31 ENCOUNTER — OFFICE VISIT (OUTPATIENT)
Dept: INTERNAL MEDICINE | Facility: CLINIC | Age: 73
End: 2018-01-31
Payer: MEDICARE

## 2018-01-31 VITALS
BODY MASS INDEX: 33.17 KG/M2 | SYSTOLIC BLOOD PRESSURE: 102 MMHG | DIASTOLIC BLOOD PRESSURE: 76 MMHG | OXYGEN SATURATION: 97 % | HEART RATE: 75 BPM | HEIGHT: 63 IN | WEIGHT: 187.19 LBS

## 2018-01-31 DIAGNOSIS — E11.59 HYPERTENSION ASSOCIATED WITH DIABETES: ICD-10-CM

## 2018-01-31 DIAGNOSIS — M35.00 SJOGREN'S SYNDROME, WITH UNSPECIFIED ORGAN INVOLVEMENT: ICD-10-CM

## 2018-01-31 DIAGNOSIS — I15.2 HYPERTENSION ASSOCIATED WITH DIABETES: ICD-10-CM

## 2018-01-31 DIAGNOSIS — E78.5 TYPE 2 DIABETES MELLITUS WITH HYPERLIPIDEMIA: ICD-10-CM

## 2018-01-31 DIAGNOSIS — M06.9 RHEUMATOID ARTHRITIS, INVOLVING UNSPECIFIED SITE, UNSPECIFIED RHEUMATOID FACTOR PRESENCE: ICD-10-CM

## 2018-01-31 DIAGNOSIS — E11.69 TYPE 2 DIABETES MELLITUS WITH HYPERLIPIDEMIA: ICD-10-CM

## 2018-01-31 DIAGNOSIS — S31.109A WOUND OF ABDOMEN: Primary | ICD-10-CM

## 2018-01-31 PROCEDURE — 99214 OFFICE O/P EST MOD 30 MIN: CPT | Mod: PBBFAC,PO | Performed by: INTERNAL MEDICINE

## 2018-01-31 PROCEDURE — 1159F MED LIST DOCD IN RCRD: CPT | Mod: ,,, | Performed by: INTERNAL MEDICINE

## 2018-01-31 PROCEDURE — 99214 OFFICE O/P EST MOD 30 MIN: CPT | Mod: S$PBB,,, | Performed by: INTERNAL MEDICINE

## 2018-01-31 PROCEDURE — 1125F AMNT PAIN NOTED PAIN PRSNT: CPT | Mod: ,,, | Performed by: INTERNAL MEDICINE

## 2018-01-31 PROCEDURE — 99999 PR PBB SHADOW E&M-EST. PATIENT-LVL IV: CPT | Mod: PBBFAC,,, | Performed by: INTERNAL MEDICINE

## 2018-01-31 RX ORDER — DICLOFENAC SODIUM 10 MG/G
2 GEL TOPICAL DAILY
COMMUNITY

## 2018-01-31 RX ORDER — MELOXICAM 7.5 MG/1
7.5 TABLET ORAL DAILY
Status: ON HOLD | COMMUNITY
End: 2021-01-14 | Stop reason: HOSPADM

## 2018-01-31 RX ORDER — DOXYCYCLINE 100 MG/1
100 CAPSULE ORAL 2 TIMES DAILY
Qty: 14 CAPSULE | Refills: 0 | Status: SHIPPED | OUTPATIENT
Start: 2018-01-31 | End: 2018-02-07

## 2018-01-31 RX ORDER — MUPIROCIN 20 MG/G
OINTMENT TOPICAL 3 TIMES DAILY
Qty: 22 G | Refills: 0 | Status: ON HOLD | OUTPATIENT
Start: 2018-01-31 | End: 2021-01-14

## 2018-01-31 NOTE — PROGRESS NOTES
"HISTORY OF PRESENT ILLNESS:  Pt. is a 72 y.o. female presents  for monitoring of DM type 2, HTN, hyperlipidemia.  She is at goal for her LDL, with labs done 6.5/17.  HTN is on current med that includes ARB.  Her last hgb A1C was decreased at 5.9, sh saw the PA 3 months ago, unsure if changes were made to meds.  For the last several weeks, she has had a wound to her abdomen, unsure of how it started as a "sore", she has been treating with peroxide and more recently mupirocin.  She states she also said she needs to get appt. With Dr. Oliveira.  She states AMs: 100-120; PMs: 130-140.  She has been using apple cider vinegar and grape juice for her glucose control.  She had been on an antibiotic a month ago for URI, unknown type.    Health Maintenance Topics with due status: Not Due       Topic Last Completion Date    Colonoscopy 08/14/2014    DEXA SCAN 10/16/2015    TETANUS VACCINE 03/13/2017    Mammogram 04/19/2017    Pneumococcal (65+) 05/20/2017    Lipid Panel 06/05/2017    Eye Exam 06/27/2017    Hemoglobin A1c 10/30/2017     Health Maintenance Due   Topic Date Due    Foot Exam  07/01/2017       Lab Results   Component Value Date    WBC 8.00 07/11/2017    HGB 10.9 (L) 07/11/2017    HCT 31.8 (L) 07/11/2017    PLT SEE COMMENT 07/11/2017    CHOL 157 06/05/2017    TRIG 156 (H) 06/05/2017    HDL 51 06/05/2017    LDLCALC 74.8 06/05/2017    ALT 27 07/08/2017    AST 23 07/08/2017     07/11/2017    K 3.5 07/11/2017     07/11/2017    CREATININE 0.7 07/11/2017    BUN 10 07/11/2017    CO2 29 07/11/2017    ALBUMIN 4.3 07/08/2017    TSH 1.581 07/17/2015    PSA <0.01 05/11/2009    INR 1.0 08/21/2014    HGBA1C 5.9 (H) 10/30/2017       Past Medical History:   Diagnosis Date    Allergy     Anemia 2012    with thrombocytopenia; iron deficiency    Arthritis     Cervical spinal stenosis     with neuropathy, chronic neck,back,leg pain    Colon polyp     Coronary artery disease     Diabetes mellitus     , sugars run " 190's per patient    Diverticulitis     Diverticulosis     Hx diverticulitis,still has chronic diarrhea, abd pain    Dyspnea on exertion     sometimes with nausea, fatigue,dizziness, had cardiac cath June 2012, normal    Fracture 2012    right thumb    GERD (gastroesophageal reflux disease)     Feb 2012, Hx H Pylori    Glaucoma     had LAser surgey, on no eye drops    HEARING LOSS     Hemangioma     fatty liver    History of earache     frequent    Hyperlipidemia     Hypertension     Laryngeal polyp     Myocardial infarction 2006 last    also MI in distant past    REJI (obstructive sleep apnea)     does not use CPAP    Otitis media     Renal stone     Sjogren's syndrome     Stroke     TIA, now on Plavix    TIA (transient ischemic attack)     TMJ disorder     UTI (lower urinary tract infection)     frequent    Venous insufficiency     with Hx blood clot in leg       Past Surgical History:   Procedure Laterality Date    ABDOMINAL SURGERY  2010 x2    expoloratory lap for pelvic cyst,adhesions; partial colonectomy     adhesiolysis   10/11/2012    CARDIAC CATHETERIZATION      no current blockages.    CHOLECYSTECTOMY      COLON SURGERY      r/t diverticuli    COLONOSCOPY  08/2014    repeat in 5 years    EPIDURAL BLOCK INJECTION  5/15/12    back,neck for pain    EXPLORATORY LAPAROTOMY W/ BOWEL RESECTION  10/11/2012    EYE SURGERY      Laser for glaucoma    EYE SURGERY  May 2012    cataracts    HYSTERECTOMY      LARYNX SURGERY      polypectomy    OOPHORECTOMY      TONSILLECTOMY      with adenoidectomy    UPPER GASTROINTESTINAL ENDOSCOPY  12/2015    VASCULAR SURGERY      laser to varicose vein leg       Social History     Social History    Marital status:      Spouse name: N/A    Number of children: N/A    Years of education: N/A     Social History Main Topics    Smoking status: Never Smoker    Smokeless tobacco: Never Used    Alcohol use No    Drug use: Yes     Types:  Oxycodone    Sexual activity: No      Comment: hysterectomy     Other Topics Concern    Not on file     Social History Narrative    No narrative on file       ROS:  GENERAL: No fever, chills, positive fatigability; no weight loss.  SKIN: No rashes, itching or changes in color or texture of skin; positive wound to abdomen;  HEAD: No headaches or recent head trauma.  EARS: Denies ear pain, discharge or vertigo.  NOSE: No loss of smell, no epistaxis; positive postnasal drip.  MOUTH & THROAT: No hoarseness or change in voice. No excessive gum bleeding.  NODES: Denies swollen glands.  CHEST: Denies BONILLA, cyanosis, wheezing, cough and sputum production.  CARDIOVASCULAR: Denies chest pain, PND, orthopnea or reduced exercise tolerance.  ABDOMEN: Appetite fine. No weight loss. Denies constipation, diarrhea, abdominal pain, hematemesis or blood in stool.  URINARY: No flank pain, dysuria or hematuria.  PERIPHERAL VASCULAR: No claudication or cyanosis. No edema.  MUSCULOSKELETAL: Positive joint stiffness to hands/feet, legs , elbows, shoulders; occ swelling to hands. Positive back pain/goes to Dr. Rubio;   NEUROLOGIC: Positive numbness to hands/feet; L5S1;    PE:   Vitals:   Vitals:    01/31/18 1320   BP: 102/76   Pulse: 75     GENERAL: no acute distress, A&Ox3, comfortable.  Female with BMI of 33   HEENT: tympanic membranes clear, nasal mucosa pink, no pharyngeal erythema or exudate  NECK: supple, no cervical lymphadenopathy, no thyromegaly; no supraclavicular nodes;   CHEST:  clear to auscultation bilaterally, no crackles or wheeze; no increased work of breathing;  CARDIOVASCULAR: regular rate and rhythm, no rubs, murmurs or gallops.  ABDOMEN: normal bowel sounds, soft non-tender, non-distended; no palpable organomegaly; wound to abdomen;slight cellulitis around wound;  EXT: no clubbing, cyanosis or edema.     Protective Sensation (w/ 10 gram monofilament):  Right: intact  Left: Intact    Visual Inspection: dry;    Pedal  Pulses:   Right: Present  Left: Present    Posterior tibialis:   Right:Present  Left: Present        ASSESSMENT/PLAN:    Wound of abdomen  -     Cancel: Ambulatory Referral to General Surgery  -     CBC auto differential; Future; Expected date: 01/31/2018  -     Ambulatory Referral to General Surgery  -     doxycycline (MONODOX) 100 MG capsule; Take 1 capsule (100 mg total) by mouth 2 (two) times daily.  Dispense: 14 capsule; Refill: 0  -     mupirocin (BACTROBAN) 2 % ointment; Apply topically 3 (three) times daily.  Dispense: 22 g; Refill: 0    Rheumatoid arthritis, involving unspecified site, unspecified rheumatoid factor presence/Sjogren's syndrome, with unspecified organ involvement:  -     Ambulatory Referral to Rheumatology      Hypertension associated with diabetes  -     Comprehensive metabolic panel; Future; Expected date: 01/31/2018  -     Lipid panel; Future; Expected date: 01/31/2018  -     Hemoglobin A1c; Standing; Expected date: 01/31/2018  -     Ambulatory Referral to General Surgery    Type 2 diabetes mellitus with hyperlipidemia  -     Comprehensive metabolic panel; Future; Expected date: 01/31/2018  -     Lipid panel; Future; Expected date: 01/31/2018  -     Hemoglobin A1c; Standing; Expected date: 01/31/2018          Medication List with Changes/Refills   New Medications    DOXYCYCLINE (MONODOX) 100 MG CAPSULE    Take 1 capsule (100 mg total) by mouth 2 (two) times daily.    MUPIROCIN (BACTROBAN) 2 % OINTMENT    Apply topically 3 (three) times daily.   Current Medications    ASCORBIC ACID, VITAMIN C, (VITAMIN C) 500 MG CHEW    Take 1,000 mg by mouth once daily.    BENADRYL ALLERGY 25 MG TABLET    Take 2 tablets (50 mg total) by mouth as directed. Take 50mg of Benadryl (Diphenhydramine) by mouth 1 hour prior to CT scan    BLOOD SUGAR DIAGNOSTIC (ACCU-CHEK JACINTO PLUS TEST STRP) STRP    TEST TWICE DAILY ONLY    CLOPIDOGREL (PLAVIX) 75 MG TABLET    Take 1 tablet (75 mg total) by mouth once daily.  Patient needs appt for further refills. Appt June 12th    CYANOCOBALAMIN (VITAMIN B-12) 1000 MCG TABLET    Take 100 mcg by mouth once daily.    CYCLOBENZAPRINE (FLEXERIL) 10 MG TABLET    Take 10 mg by mouth nightly as needed. Pt does not take often    DICLOFENAC SODIUM 1 % GEL    Apply 2 g topically once daily.    DILTIAZEM (CARTIA XT) 120 MG CP24    Take 1 capsule (120 mg total) by mouth every evening.    FEXOFENADINE (ALLEGRA) 180 MG TABLET    Take 180 mg by mouth once daily.    FLUTICASONE (FLONASE) 50 MCG/ACTUATION NASAL SPRAY    2 sprays by Each Nare route once daily.    FOLIC ACID (FOLVITE) 1 MG TABLET    Take 1 tablet (1 mg total) by mouth once daily.    GABAPENTIN (NEURONTIN) 100 MG CAPSULE    Take 1 capsule by mouth 3 (three) times daily.    GLIMEPIRIDE (AMARYL) 2 MG TABLET    Take 1 tablet (2 mg total) by mouth before breakfast.    HYDROXYCHLOROQUINE (PLAQUENIL) 200 MG TABLET    TAKE 1 TABLET BY MOUTH TWICE DAILY    LACTOBAC CMB #3-FOS-PANTETHINE (PROBIOTIC & ACIDOPHILUS) 300-250 MILLION CELL-MG CAP    Take 1 capsule by mouth once daily.    LANCETS (ACCU-CHEK SOFTCLIX LANCETS) MISC    Test TWICE DAILY;Twice a day    LEFLUNOMIDE (ARAVA) 20 MG TAB    Take 20 mg by mouth once daily.    MELOXICAM (MOBIC) 7.5 MG TABLET    Take 7.5 mg by mouth once daily.    METFORMIN (GLUCOPHAGE) 500 MG TABLET    Take 1 tablet (500 mg total) by mouth 2 (two) times daily with meals.    MULTIVIT WITH MIN-FOLIC ACID (WOMEN'S MULTIVITAMIN GUMMIES) 200 MCG CHEW    Take by mouth once daily.    NYSTATIN (MYCOSTATIN) CREAM    Apply topically 3 (three) times daily.    OLOPATADINE (PATANOL) 0.1 % OPHTHALMIC SOLUTION    Place 1 drop into both eyes daily as needed.     ONDANSETRON (ZOFRAN-ODT) 8 MG TBDL    DISSOLVE ONE TABLET IN MOUTH EVERY 12 HOURS AS NEEDED    OXYCODONE-ACETAMINOPHEN  MG (PERCOCET)  MG PER TABLET    Take 1 tablet by mouth 4 (four) times daily as needed.     PANTOPRAZOLE (PROTONIX) 40 MG TABLET    TAKE ONE  TABLET BY MOUTH ONCE DAILY    PHENAZOPYRIDINE (PYRIDIUM) 100 MG TABLET    100 mg daily as needed.     PREDNISONE (DELTASONE) 20 MG TABLET    Take 50 mg by mouth daily as needed. As directed     RANITIDINE (ZANTAC) 300 MG TABLET    TAKE ONE TABLET BY MOUTH IN THE EVENING    SALIVA STIMULANT AGENTS COMB.3 (BIOTENE MOISTURIZING MOUTH) SPRY    1-2 sprays by Mucous Membrane route 4 (four) times daily as needed (Non prescription).    SAXAGLIPTIN (ONGLYZA) 5 MG TAB TABLET    Take 1 tablet (5 mg total) by mouth once daily.    SIMVASTATIN (ZOCOR) 40 MG TABLET    Take 40 mg by mouth every evening.    SUCRALFATE (CARAFATE) 1 GRAM TABLET    Take 1 tablet (1 g total) by mouth 4 (four) times daily before meals and nightly.    TRAZODONE (DESYREL) 50 MG TABLET    Take 1 tablet (50 mg total) by mouth every evening.    VALSARTAN-HYDROCHLOROTHIAZIDE (DIOVAN-HCT) 160-25 MG PER TABLET    TAKE ONE TABLET BY MOUTH ONCE DAILY (NEED  APPT)     Call if condition changes or worsens.

## 2018-02-01 ENCOUNTER — LAB VISIT (OUTPATIENT)
Dept: LAB | Facility: HOSPITAL | Age: 73
End: 2018-02-01
Attending: INTERNAL MEDICINE
Payer: MEDICARE

## 2018-02-01 ENCOUNTER — OFFICE VISIT (OUTPATIENT)
Dept: SURGERY | Facility: CLINIC | Age: 73
End: 2018-02-01
Payer: MEDICARE

## 2018-02-01 VITALS — WEIGHT: 187.19 LBS | HEIGHT: 63 IN | BODY MASS INDEX: 33.17 KG/M2

## 2018-02-01 DIAGNOSIS — S31.109A OPEN WOUND OF ABDOMINAL WALL, INITIAL ENCOUNTER: Primary | ICD-10-CM

## 2018-02-01 DIAGNOSIS — E78.5 TYPE 2 DIABETES MELLITUS WITH HYPERLIPIDEMIA: ICD-10-CM

## 2018-02-01 DIAGNOSIS — S31.109A WOUND OF ABDOMEN: ICD-10-CM

## 2018-02-01 DIAGNOSIS — E11.59 HYPERTENSION ASSOCIATED WITH DIABETES: ICD-10-CM

## 2018-02-01 DIAGNOSIS — E11.69 TYPE 2 DIABETES MELLITUS WITH HYPERLIPIDEMIA: ICD-10-CM

## 2018-02-01 DIAGNOSIS — I15.2 HYPERTENSION ASSOCIATED WITH DIABETES: ICD-10-CM

## 2018-02-01 LAB
ALBUMIN SERPL BCP-MCNC: 3.8 G/DL
ALP SERPL-CCNC: 56 U/L
ALT SERPL W/O P-5'-P-CCNC: 22 U/L
ANION GAP SERPL CALC-SCNC: 7 MMOL/L
AST SERPL-CCNC: 20 U/L
BASOPHILS # BLD AUTO: 0.04 K/UL
BASOPHILS NFR BLD: 0.5 %
BILIRUB SERPL-MCNC: 0.4 MG/DL
BUN SERPL-MCNC: 27 MG/DL
CALCIUM SERPL-MCNC: 10.5 MG/DL
CHLORIDE SERPL-SCNC: 102 MMOL/L
CHOLEST SERPL-MCNC: 144 MG/DL
CHOLEST/HDLC SERPL: 3.1 {RATIO}
CO2 SERPL-SCNC: 31 MMOL/L
CREAT SERPL-MCNC: 1 MG/DL
DIFFERENTIAL METHOD: ABNORMAL
EOSINOPHIL # BLD AUTO: 0.1 K/UL
EOSINOPHIL NFR BLD: 1.5 %
ERYTHROCYTE [DISTWIDTH] IN BLOOD BY AUTOMATED COUNT: 12.3 %
EST. GFR  (AFRICAN AMERICAN): >60 ML/MIN/1.73 M^2
EST. GFR  (NON AFRICAN AMERICAN): 56.8 ML/MIN/1.73 M^2
GLUCOSE SERPL-MCNC: 97 MG/DL
HCT VFR BLD AUTO: 34.7 %
HDLC SERPL-MCNC: 47 MG/DL
HDLC SERPL: 32.6 %
HGB BLD-MCNC: 11.3 G/DL
IMM GRANULOCYTES # BLD AUTO: 0.03 K/UL
IMM GRANULOCYTES NFR BLD AUTO: 0.4 %
LDLC SERPL CALC-MCNC: 64.8 MG/DL
LYMPHOCYTES # BLD AUTO: 3.8 K/UL
LYMPHOCYTES NFR BLD: 48.1 %
MCH RBC QN AUTO: 28.4 PG
MCHC RBC AUTO-ENTMCNC: 32.6 G/DL
MCV RBC AUTO: 87 FL
MONOCYTES # BLD AUTO: 0.7 K/UL
MONOCYTES NFR BLD: 8.9 %
NEUTROPHILS # BLD AUTO: 3.3 K/UL
NEUTROPHILS NFR BLD: 40.6 %
NONHDLC SERPL-MCNC: 97 MG/DL
NRBC BLD-RTO: 0 /100 WBC
PLATELET # BLD AUTO: 136 K/UL
PMV BLD AUTO: 13.1 FL
POTASSIUM SERPL-SCNC: 3.7 MMOL/L
PROT SERPL-MCNC: 7.2 G/DL
RBC # BLD AUTO: 3.98 M/UL
SODIUM SERPL-SCNC: 140 MMOL/L
TRIGL SERPL-MCNC: 161 MG/DL
WBC # BLD AUTO: 7.99 K/UL

## 2018-02-01 PROCEDURE — 80053 COMPREHEN METABOLIC PANEL: CPT

## 2018-02-01 PROCEDURE — 1159F MED LIST DOCD IN RCRD: CPT | Mod: ,,, | Performed by: SURGERY

## 2018-02-01 PROCEDURE — 99212 OFFICE O/P EST SF 10 MIN: CPT | Mod: PBBFAC,PO | Performed by: SURGERY

## 2018-02-01 PROCEDURE — 80061 LIPID PANEL: CPT

## 2018-02-01 PROCEDURE — 85025 COMPLETE CBC W/AUTO DIFF WBC: CPT

## 2018-02-01 PROCEDURE — 1126F AMNT PAIN NOTED NONE PRSNT: CPT | Mod: ,,, | Performed by: SURGERY

## 2018-02-01 PROCEDURE — 99214 OFFICE O/P EST MOD 30 MIN: CPT | Mod: S$PBB,,, | Performed by: SURGERY

## 2018-02-01 PROCEDURE — 99999 PR PBB SHADOW E&M-EST. PATIENT-LVL II: CPT | Mod: PBBFAC,,, | Performed by: SURGERY

## 2018-02-01 PROCEDURE — 36415 COLL VENOUS BLD VENIPUNCTURE: CPT | Mod: PO

## 2018-02-01 NOTE — PROGRESS NOTES
"Subjective:       Patient ID: Pili Teixeira is a 72 y.o. female.    Chief Complaint: Consult (wound on abdomen x 2 weeks)    Referred by Dr. Dove with non healing wound of abdomen.    For the last several weeks, she has had a wound to her abdomen, unsure of how it started as a "sore", she has been treating with peroxide and more recently mupirocin.  She saw Dr. Dove who ordered doxycycline but she hasn't started it yet.    Has history of multiple abdominal surgeries with complications, but this doesn't appear to be near a scar.      Review of Systems   Constitutional: Negative for appetite change, chills, diaphoresis, fatigue, fever and unexpected weight change.   HENT: Negative for hearing loss, sore throat, trouble swallowing and voice change.    Eyes: Negative for visual disturbance.   Respiratory: Negative for cough, shortness of breath and wheezing.    Cardiovascular: Negative for chest pain, palpitations and leg swelling.   Gastrointestinal: Negative for abdominal distention, abdominal pain, anal bleeding, blood in stool, constipation, diarrhea, nausea, rectal pain and vomiting.   Genitourinary: Negative for difficulty urinating, dysuria, flank pain, frequency, hematuria, menstrual problem and urgency.   Musculoskeletal: Negative for arthralgias, back pain, joint swelling, myalgias and neck pain.   Skin: Positive for wound. Negative for pallor and rash.   Neurological: Negative for dizziness, syncope, weakness and headaches.   Hematological: Negative for adenopathy. Does not bruise/bleed easily.   Psychiatric/Behavioral: Negative for suicidal ideas. The patient is not nervous/anxious.        Objective:      Physical Exam   Constitutional: She is oriented to person, place, and time. She appears well-developed and well-nourished. No distress.   HENT:   Head: Normocephalic and atraumatic.   Right Ear: External ear normal.   Left Ear: External ear normal.   Eyes: Conjunctivae are normal. Pupils are equal, " round, and reactive to light. Right eye exhibits no discharge. Left eye exhibits no discharge.   Neck: No tracheal deviation present. No thyromegaly present.   Cardiovascular: Normal rate and regular rhythm.    Pulmonary/Chest: Effort normal. No respiratory distress.   Abdominal: Soft. She exhibits no distension. There is no guarding.   Musculoskeletal: She exhibits no edema or tenderness.   Lymphadenopathy:     She has no cervical adenopathy.   Neurological: She is alert and oriented to person, place, and time. No cranial nerve deficit.   Skin: Skin is warm and dry. No rash noted. She is not diaphoretic. No pallor.        Psychiatric: She has a normal mood and affect. Her behavior is normal. Judgment and thought content normal.   Nursing note and vitals reviewed.          Assessment:       1. Open wound of abdominal wall, initial encounter        Plan:       Wound cleaned and redressed.  Advised to begin antibiotics and stop Plavix.  Will re-evaluate in 4 days and debride if necessary.  Call if worsens.

## 2018-02-01 NOTE — LETTER
February 1, 2018      Herlinda Dove MD  [address] [facility] [address] [place] [address]           [facility]  [address]  [facility] [address]  Phone: [phone]          Patient: Pili Teixeira   MR Number: [id number]   YOB: 1945   Date of Visit: 2/1/2018       Dear Dr. Herlinda Dove:    Thank you for referring Pili Teixeira to me for evaluation. Attached you will find relevant portions of my assessment and plan of care.    If you have questions, please do not hesitate to call me. I look forward to following Pili Teixeira along with you.    Sincerely,    Eloy Sebastian MD    Enclosure  CC:  No Recipients    If you would like to receive this communication electronically, please contact [email or web address] or [phone] to request more information on [facility] Link access.    For providers and/or their staff who would like to refer a patient to [facility], please contact us through our one-stop-shop provider referral line, [facility], at [phone].    If you feel you have received this communication in error or would no longer like to receive these types of communications, please e-mail [email or web address]

## 2018-02-05 ENCOUNTER — OFFICE VISIT (OUTPATIENT)
Dept: SURGERY | Facility: CLINIC | Age: 73
End: 2018-02-05
Payer: MEDICARE

## 2018-02-05 ENCOUNTER — APPOINTMENT (OUTPATIENT)
Dept: LAB | Facility: HOSPITAL | Age: 73
End: 2018-02-05
Attending: SURGERY
Payer: MEDICARE

## 2018-02-05 VITALS — BODY MASS INDEX: 33.17 KG/M2 | HEIGHT: 63 IN | WEIGHT: 187.19 LBS

## 2018-02-05 DIAGNOSIS — S31.109D OPEN WOUND OF ABDOMINAL WALL, SUBSEQUENT ENCOUNTER: Primary | ICD-10-CM

## 2018-02-05 PROCEDURE — 12032 INTMD RPR S/A/T/EXT 2.6-7.5: CPT | Mod: 51,PBBFAC,PO | Performed by: SURGERY

## 2018-02-05 PROCEDURE — 1159F MED LIST DOCD IN RCRD: CPT | Mod: ,,, | Performed by: SURGERY

## 2018-02-05 PROCEDURE — 99999 PR PBB SHADOW E&M-EST. PATIENT-LVL II: CPT | Mod: PBBFAC,,, | Performed by: SURGERY

## 2018-02-05 PROCEDURE — 1126F AMNT PAIN NOTED NONE PRSNT: CPT | Mod: ,,, | Performed by: SURGERY

## 2018-02-05 PROCEDURE — 11404 EXC TR-EXT B9+MARG 3.1-4 CM: CPT | Mod: PBBFAC,PO | Performed by: SURGERY

## 2018-02-05 PROCEDURE — 88305 TISSUE EXAM BY PATHOLOGIST: CPT | Performed by: PATHOLOGY

## 2018-02-05 PROCEDURE — 99212 OFFICE O/P EST SF 10 MIN: CPT | Mod: PBBFAC,PO | Performed by: SURGERY

## 2018-02-05 PROCEDURE — 12032 INTMD RPR S/A/T/EXT 2.6-7.5: CPT | Mod: S$PBB,,, | Performed by: SURGERY

## 2018-02-05 PROCEDURE — 99213 OFFICE O/P EST LOW 20 MIN: CPT | Mod: 25,S$PBB,, | Performed by: SURGERY

## 2018-02-05 PROCEDURE — 11404 EXC TR-EXT B9+MARG 3.1-4 CM: CPT | Mod: 51,S$PBB,, | Performed by: SURGERY

## 2018-02-05 NOTE — PROCEDURES
"Exc, Benign Lesion  Date/Time: 2/5/2018 4:28 PM  Performed by: DAVID MARROQUIN.  Authorized by: DAVID MARROQUIN.     Consent Done?:  Yes (Verbal)  Time out: Immediately prior to procedure a "time out" was called to verify the correct patient, procedure, equipment, support staff and site/side marked as required.      STAFF:  Assistants?: No    I was present for the entire procedure.  INDICATIONS:: non helaing wound.    ANESTHESIA:  Anesthesia:  Local infiltration  Local anesthetic:  Lidocaine 1% with epinephrine    PROCEDURE DETAILS:  Excision type:  Skin  Malignancy:  Benign  Excision size (cm):  3.2  Scalpel size:  15  Incision type:  Elliptical  Specimens?: Yes    Specimens submitted to pathology.  Hemostasis was obtained.  Estimated blood loss (cc):  2  Wound closure:  Intermediate layered  Wound repair size (cm):  3.2  Sutures:  4-0 Monocryl  Post-op diagnosis: Same as pre-op diagnosis.  Needle, instrument, and sponge counts were correct.  Patient tolerated the procedure well with no immediate complications.  Post-operative instructions were provided for the patient.      "

## 2018-02-05 NOTE — PROGRESS NOTES
"Subjective:       Patient ID: Pili Teixeira is a 72 y.o. female.    Chief Complaint: Follow-up    2/1/18    Referred by Dr. Dove with non healing wound of abdomen.    For the last several weeks, she has had a wound to her abdomen, unsure of how it started as a "sore", she has been treating with peroxide and more recently mupirocin.  She saw Dr. Dove who ordered doxycycline but she hasn't started it yet.    Has history of multiple abdominal surgeries with complications, but this doesn't appear to be near a scar.    2/5/18    Returns for re-evaluation of abdominal wound.  Less red, but center still necrotic  Off plavix.            Review of Systems   Constitutional: Negative for appetite change, chills, diaphoresis, fatigue, fever and unexpected weight change.   HENT: Negative for hearing loss, sore throat, trouble swallowing and voice change.    Eyes: Negative for visual disturbance.   Respiratory: Negative for cough, shortness of breath and wheezing.    Cardiovascular: Negative for chest pain, palpitations and leg swelling.   Gastrointestinal: Negative for abdominal distention, abdominal pain, anal bleeding, blood in stool, constipation, diarrhea, nausea, rectal pain and vomiting.   Genitourinary: Negative for difficulty urinating, dysuria, flank pain, frequency, hematuria, menstrual problem and urgency.   Musculoskeletal: Negative for arthralgias, back pain, joint swelling, myalgias and neck pain.   Skin: Positive for wound. Negative for pallor and rash.   Neurological: Negative for dizziness, syncope, weakness and headaches.   Hematological: Negative for adenopathy. Does not bruise/bleed easily.   Psychiatric/Behavioral: Negative for suicidal ideas. The patient is not nervous/anxious.        Objective:      Physical Exam   Constitutional: She is oriented to person, place, and time. She appears well-developed and well-nourished. No distress.   HENT:   Head: Normocephalic and atraumatic.   Right Ear: External " ear normal.   Left Ear: External ear normal.   Eyes: Conjunctivae are normal. Pupils are equal, round, and reactive to light. Right eye exhibits no discharge. Left eye exhibits no discharge.   Neck: No tracheal deviation present. No thyromegaly present.   Cardiovascular: Normal rate and regular rhythm.    Pulmonary/Chest: Effort normal. No respiratory distress.   Abdominal: Soft. She exhibits no distension. There is no guarding.   Musculoskeletal: She exhibits no edema or tenderness.   Lymphadenopathy:     She has no cervical adenopathy.   Neurological: She is alert and oriented to person, place, and time. No cranial nerve deficit.   Skin: Skin is warm and dry. No rash noted. She is not diaphoretic. No pallor.        Psychiatric: She has a normal mood and affect. Her behavior is normal. Judgment and thought content normal.   Nursing note and vitals reviewed.          Assessment:       1. Open wound of abdominal wall, subsequent encounter        Plan:       See procedure note.

## 2018-03-02 ENCOUNTER — TELEPHONE (OUTPATIENT)
Dept: FAMILY MEDICINE | Facility: CLINIC | Age: 73
End: 2018-03-02

## 2018-03-02 DIAGNOSIS — G47.00 INSOMNIA, UNSPECIFIED TYPE: ICD-10-CM

## 2018-03-02 DIAGNOSIS — E11.9 TYPE 2 DIABETES MELLITUS WITHOUT COMPLICATION, WITH LONG-TERM CURRENT USE OF INSULIN: ICD-10-CM

## 2018-03-02 DIAGNOSIS — Z79.4 TYPE 2 DIABETES MELLITUS WITHOUT COMPLICATION, WITH LONG-TERM CURRENT USE OF INSULIN: ICD-10-CM

## 2018-03-03 RX ORDER — TRAZODONE HYDROCHLORIDE 50 MG/1
50 TABLET ORAL NIGHTLY
Qty: 90 TABLET | Refills: 9 | Status: SHIPPED | OUTPATIENT
Start: 2018-03-03 | End: 2018-06-05 | Stop reason: SDUPTHER

## 2018-03-03 RX ORDER — METFORMIN HYDROCHLORIDE 500 MG/1
500 TABLET ORAL 2 TIMES DAILY WITH MEALS
Qty: 180 TABLET | Refills: 0 | Status: SHIPPED | OUTPATIENT
Start: 2018-03-03 | End: 2018-06-05 | Stop reason: SDUPTHER

## 2018-03-05 ENCOUNTER — TELEPHONE (OUTPATIENT)
Dept: FAMILY MEDICINE | Facility: CLINIC | Age: 73
End: 2018-03-05

## 2018-03-05 DIAGNOSIS — E11.8 TYPE 2 DIABETES MELLITUS WITH COMPLICATION, UNSPECIFIED LONG TERM INSULIN USE STATUS: ICD-10-CM

## 2018-03-05 DIAGNOSIS — E11.9 TYPE 2 DIABETES MELLITUS WITHOUT COMPLICATION, WITH LONG-TERM CURRENT USE OF INSULIN: ICD-10-CM

## 2018-03-05 DIAGNOSIS — Z79.4 TYPE 2 DIABETES MELLITUS WITHOUT COMPLICATION, WITH LONG-TERM CURRENT USE OF INSULIN: ICD-10-CM

## 2018-03-05 RX ORDER — SAXAGLIPTIN 5 MG/1
5 TABLET, FILM COATED ORAL DAILY
Qty: 90 TABLET | Refills: 0 | Status: SHIPPED | OUTPATIENT
Start: 2018-03-05 | End: 2018-06-05 | Stop reason: SDUPTHER

## 2018-03-05 NOTE — TELEPHONE ENCOUNTER
Please notify pt. That I would like her to try to stop her amaryl, as her glucose average returned very low.  Please adjust medlist and have her call ine week with prebreakfast and predinner readings.

## 2018-03-05 NOTE — TELEPHONE ENCOUNTER
Notified patient of advise to stop the amaryl and to call back with Blood sugars reading in one week patient verbalized understanding.

## 2018-03-08 ENCOUNTER — TELEPHONE (OUTPATIENT)
Dept: HEMATOLOGY/ONCOLOGY | Facility: CLINIC | Age: 73
End: 2018-03-08

## 2018-03-08 DIAGNOSIS — R11.2 NAUSEA AND VOMITING: ICD-10-CM

## 2018-03-08 RX ORDER — ONDANSETRON 8 MG/1
TABLET, ORALLY DISINTEGRATING ORAL
Qty: 30 TABLET | Refills: 0 | Status: SHIPPED | OUTPATIENT
Start: 2018-03-08 | End: 2018-11-12 | Stop reason: SDUPTHER

## 2018-03-15 ENCOUNTER — TELEPHONE (OUTPATIENT)
Dept: FAMILY MEDICINE | Facility: CLINIC | Age: 73
End: 2018-03-15

## 2018-03-15 NOTE — TELEPHONE ENCOUNTER
----- Message from Frank Box sent at 3/15/2018 10:29 AM CDT -----  Contact: Patient  Pili, [phone], calling to give sugar readings for seven days as requested by Dr. Dove. Please advise. Thanks.

## 2018-03-15 NOTE — TELEPHONE ENCOUNTER
----- Message from Dru Marquez sent at 3/15/2018 10:40 AM CDT -----  Contact: same  Unsuccessful call placed to office.  Patient called in and stated she missed a call back about her blood readings?      3/6/18 @ 9:30am -- reading was 95  3/6/18 @ 5;15pm -- reading was 114    3/7/18 @ 9:15am -- reading was 115  3/7/18 @ 6:10pm -- reading was 112    3/8/18 @ 9:45am -- reading was 139  3/8/18 @ 5:20pm -- reading was 119    3/9/18 @ 10am -- reading was 121  3/9/18 @ 6:30pm -- reading was 90    3/10/18 @ 9:30am -- reading was 113  3/10/18 @ 6:pm  --  reading was 159    3/11/18 @ 10am -- reading was 111  3/11/18 @ 6:15pm -- reading was 132    3/12/18 @ 9:30am -- reading was 103  3/12/18 @ 5:45pm -- reading was 131    3/13/18 @ 9:30am -- reading was 113  3/13/18 @ 6:00pm -- reading was 120    3/14/18 @ 10:30am -- reading was 126  3/14/18 @ 6:pm -- reading was 120    3/15/18 @ 10:30am -- reading was 126    Patient call back number is [phone]

## 2018-03-20 ENCOUNTER — OFFICE VISIT (OUTPATIENT)
Dept: RHEUMATOLOGY | Facility: CLINIC | Age: 73
End: 2018-03-20
Payer: MEDICARE

## 2018-03-20 VITALS
SYSTOLIC BLOOD PRESSURE: 133 MMHG | WEIGHT: 182.75 LBS | HEART RATE: 84 BPM | DIASTOLIC BLOOD PRESSURE: 75 MMHG | BODY MASS INDEX: 32.38 KG/M2 | HEIGHT: 63 IN

## 2018-03-20 DIAGNOSIS — K21.9 GASTROESOPHAGEAL REFLUX DISEASE, ESOPHAGITIS PRESENCE NOT SPECIFIED: ICD-10-CM

## 2018-03-20 DIAGNOSIS — M79.7 FIBROMYALGIA: ICD-10-CM

## 2018-03-20 DIAGNOSIS — E87.6 HYPOKALEMIA: ICD-10-CM

## 2018-03-20 DIAGNOSIS — R76.8 ANA POSITIVE: ICD-10-CM

## 2018-03-20 DIAGNOSIS — Z91.199 NONCOMPLIANCE: ICD-10-CM

## 2018-03-20 DIAGNOSIS — R76.8 RHEUMATOID FACTOR POSITIVE: Primary | ICD-10-CM

## 2018-03-20 DIAGNOSIS — M35.01 SJOGREN'S SYNDROME WITH KERATOCONJUNCTIVITIS SICCA: ICD-10-CM

## 2018-03-20 DIAGNOSIS — M19.90 INFLAMMATORY ARTHRITIS: ICD-10-CM

## 2018-03-20 PROCEDURE — 99213 OFFICE O/P EST LOW 20 MIN: CPT | Mod: PBBFAC,PO | Performed by: INTERNAL MEDICINE

## 2018-03-20 PROCEDURE — 99999 PR PBB SHADOW E&M-EST. PATIENT-LVL III: CPT | Mod: PBBFAC,,, | Performed by: INTERNAL MEDICINE

## 2018-03-20 PROCEDURE — 99215 OFFICE O/P EST HI 40 MIN: CPT | Mod: S$PBB,,, | Performed by: INTERNAL MEDICINE

## 2018-03-20 RX ORDER — FOLIC ACID 1 MG/1
1 TABLET ORAL DAILY
Qty: 30 TABLET | Refills: 5 | Status: SHIPPED | OUTPATIENT
Start: 2018-03-20 | End: 2018-12-07 | Stop reason: SDUPTHER

## 2018-03-20 RX ORDER — VALACYCLOVIR HYDROCHLORIDE 500 MG/1
TABLET, FILM COATED ORAL
Refills: 1 | COMMUNITY
Start: 2018-02-10 | End: 2020-07-31

## 2018-03-20 ASSESSMENT — ROUTINE ASSESSMENT OF PATIENT INDEX DATA (RAPID3)
MDHAQ FUNCTION SCORE: 1.2
WHEN YOU AWAKENED IN THE MORNING OVER THE LAST WEEK, PLEASE INDICATE THE AMOUNT OF TIME IT TAKES UNTIL YOU ARE AS LIMBER AS YOU WILL BE FOR THE DAY: 30 MIN
PSYCHOLOGICAL DISTRESS SCORE: 3.3
AM STIFFNESS SCORE: 1, YES
FATIGUE SCORE: 8
TOTAL RAPID3 SCORE: 5.67
PAIN SCORE: 9
PATIENT GLOBAL ASSESSMENT SCORE: 4

## 2018-03-20 NOTE — PROGRESS NOTES
"Subjective:       Patient ID: Pili Teixeira is a 72 y.o. female.    Chief Complaint: Sjogren syndrome and severe flare of inflammatory arthritis  HPI 71y/o female is here for follow-up for Sjogren syndrome and inflammatory arthritis.  Last seen by me in December 2016.  She has been lost to follow-up since then.   Previously on  leflunomide caused diarrhea and abdominal pain.  Plaquenil was expensive.  Never took methotrexate  She returns with severe flare of inflammatory arthritis.  She complains of bilateral wrist pain and swelling.  Grades her pain as 9 out of 10 Pain in is constant, aching, worse at night, better with rest.   Has dry eyes for which she takes over the counter drops. Water helps dry mouth   She denies fever, weight loss,photosensitivity, rash, ulcer, raynaud's phenomenon, alopecia, dysphagia, diarrhea or blood in the stools  Has FH of Lymphoma and throat cancer     Review of Systems   Constitutional: Positive for fatigue.   HENT: Negative for ear pain and facial swelling.    Eyes: Negative for pain and redness.   Respiratory: Negative for cough and shortness of breath.    Cardiovascular: Negative for chest pain and leg swelling.   Gastrointestinal: Negative for abdominal pain and abdominal distention.   Genitourinary: Negative for hematuria and genital sores.   Neurological: Negative for tremors and seizures.   Psychiatric/Behavioral: Negative for behavioral problems and agitation.   Skin: Negative for rash and wounds    Objective:     /75 (BP Location: Right arm, Patient Position: Sitting)   Pulse 84   Ht 5' 3" (1.6 m)   Wt 82.9 kg (182 lb 12.2 oz)   BMI 32.37 kg/m²      Physical Exam   Constitutional: She is oriented to person, place, and time and well-developed, well-nourished, and in no distress.   HENT:   Head: Normocephalic and atraumatic.   Neck: Normal range of motion. Neck supple.   Cardiovascular: Normal rate and regular rhythm.    Pulmonary/Chest: Effort normal and breath " sounds normal.   Abdominal: Soft. Bowel sounds are normal.  no mass      Right Side Rheumatological Exam     Examination finds the shoulder, elbow, knee, 1st PIP, 1st MCP, 2nd PIP,2nd MCP 3rd PIP, 3rd MCP, 4th PIP, 4th MCP, 5th PIP and 5th MCP normal.    The patient is tender to palpation of the wrist    She has swelling of the wrist    Left Side Rheumatological Exam     Examination finds the shoulder, elbow, knee, 1st PIP, 1st MCP, 2nd PIP,2nd MCP 3rd PIP, 3rd MCP, 4th PIP, 4th MCP, 5th PIP and 5th MCP normal.    The patient is tender to palpation of the wrist    She has swelling of the wrist      Neurological: She is alert and oriented to person, place, and time.  normal tone  Skin: Skin is warm and dry.     Psychiatric: Mood and affect normal.         Lab Results   Component Value Date    WBC 7.80 03/21/2018    HGB 12.4 03/21/2018    HCT 38.6 03/21/2018    MCV 85 03/21/2018     (L) 03/21/2018     CMP  Sodium   Date Value Ref Range Status   03/21/2018 139 136 - 145 mmol/L Final     Potassium   Date Value Ref Range Status   03/21/2018 3.1 (L) 3.5 - 5.1 mmol/L Final     Chloride   Date Value Ref Range Status   03/21/2018 99 95 - 110 mmol/L Final     CO2   Date Value Ref Range Status   03/21/2018 29 23 - 29 mmol/L Final     Glucose   Date Value Ref Range Status   03/21/2018 123 (H) 70 - 110 mg/dL Final     BUN, Bld   Date Value Ref Range Status   03/21/2018 19 8 - 23 mg/dL Final     Creatinine   Date Value Ref Range Status   03/21/2018 0.9 0.5 - 1.4 mg/dL Final   09/01/2012 0.9 0.2 - 1.4 mg/dl Final     Calcium   Date Value Ref Range Status   03/21/2018 10.2 8.7 - 10.5 mg/dL Final   09/01/2012 9.9 8.6 - 10.2 mg/dl Final     Total Protein   Date Value Ref Range Status   03/21/2018 7.6 6.0 - 8.4 g/dL Final     Albumin   Date Value Ref Range Status   03/21/2018 4.1 3.5 - 5.2 g/dL Final     Total Bilirubin   Date Value Ref Range Status   03/21/2018 0.6 0.1 - 1.0 mg/dL Final     Comment:     For infants and  newborns, interpretation of results should be based  on gestational age, weight and in agreement with clinical  observations.  Premature Infant recommended reference ranges:  Up to 24 hours.............<8.0 mg/dL  Up to 48 hours............<12.0 mg/dL  3-5 days..................<15.0 mg/dL  6-29 days.................<15.0 mg/dL       Alkaline Phosphatase   Date Value Ref Range Status   03/21/2018 53 (L) 55 - 135 U/L Final   09/01/2012 80 23 - 119 UNIT/L Final     AST   Date Value Ref Range Status   03/21/2018 25 10 - 40 U/L Final   09/01/2012 17 10 - 30 UNIT/L Final     ALT   Date Value Ref Range Status   03/21/2018 26 10 - 44 U/L Final     Anion Gap   Date Value Ref Range Status   03/21/2018 11 8 - 16 mmol/L Final   09/01/2012 13 5 - 15 meq/L Final     eGFR if    Date Value Ref Range Status   03/21/2018 >60 >60 mL/min/1.73 m^2 Final     eGFR if non    Date Value Ref Range Status   03/21/2018 >60 >60 mL/min/1.73 m^2 Final     Comment:     Calculation used to obtain the estimated glomerular filtration  rate (eGFR) is the CKD-EPI equation.        Lab Results   Component Value Date    RF 21.0 (H) 11/17/2015     Lab Results   Component Value Date    JOAO Pos, Titer to follow (A) 07/16/2013     Lab Results   Component Value Date    SEDRATE 14 03/21/2018     Lab Results   Component Value Date    CRP 1.3 03/21/2018       Assessment:   Severe flare of inflammatory arthritis RUSS 28- 3.09   Sjogren's syndrome--with sicca symptoms  Noncompliance   Fibromyalgia-  Thrombocytopenia-monitor counts   Hypokalemia - eat a banana every day   Noncompliance  Plan:   Start methotrexate 10 mg a week   Discussed side effects of methotrexate  Check QuantiFERON gold and baseline chest x-ray  We will check toxicity labs in 1 month  Start folic acid 1 mg a day   Monitor platelet counts  Advised to eat a banana every day   Continue conservative treatment for sicca symptoms  Patient educated about Sjogren's  syndrome and inflammatory arthritis.  Discussed consequences and prognosis of untreated disease.  She voiced understanding  Counseled to be compliant  Return to clinic in 3 months with labs    -Patient seen face to face for 45 minutes and greater than 50% spent in counseling regarding above diagnosis and chart

## 2018-03-20 NOTE — LETTER
March 22, 2018      Herlinda Dove MD  [address] [facility] [address] [place] [address]           [facility]  [address]  [facility] [address]  Phone: [phone]  Fax: [phone]          Patient: Pili Teixeira   MR Number: [id number]   YOB: 1945   Date of Visit: 3/20/2018       Dear Dr. Herlinda Dove:    Thank you for referring Pili Teixeira to me for evaluation. Attached you will find relevant portions of my assessment and plan of care.    If you have questions, please do not hesitate to call me. I look forward to following Pili Teixeira along with you.    Sincerely,    Ninfa Wilson MD    Enclosure  CC:  No Recipients    If you would like to receive this communication electronically, please contact [email or web address] or [phone] to request more information on [facility] Link access.    For providers and/or their staff who would like to refer a patient to [facility], please contact us through our one-stop-shop provider referral line, [facility], at [phone].    If you feel you have received this communication in error or would no longer like to receive these types of communications, please e-mail [email or web address]

## 2018-03-21 ENCOUNTER — HOSPITAL ENCOUNTER (OUTPATIENT)
Dept: RADIOLOGY | Facility: HOSPITAL | Age: 73
Discharge: HOME | End: 2018-03-21
Attending: INTERNAL MEDICINE
Payer: MEDICARE

## 2018-03-21 DIAGNOSIS — R76.8 RHEUMATOID FACTOR POSITIVE: ICD-10-CM

## 2018-03-21 DIAGNOSIS — M19.90 INFLAMMATORY ARTHRITIS: ICD-10-CM

## 2018-03-21 PROBLEM — Z91.199 NONCOMPLIANCE: Status: ACTIVE | Noted: 2018-03-21

## 2018-03-21 PROCEDURE — 71046 X-RAY EXAM CHEST 2 VIEWS: CPT | Mod: TC,FY

## 2018-03-21 PROCEDURE — 77077 JOINT SURVEY SINGLE VIEW: CPT | Mod: TC

## 2018-03-21 PROCEDURE — 77077 JOINT SURVEY SINGLE VIEW: CPT | Mod: 26,,, | Performed by: RADIOLOGY

## 2018-03-21 PROCEDURE — 71046 X-RAY EXAM CHEST 2 VIEWS: CPT | Mod: 26,,, | Performed by: RADIOLOGY

## 2018-03-21 ASSESSMENT — DISEASE ACTIVITY SCORE (DAS28)
SWOLLEN_JOINTS_COUNT: 2
GLOBAL_HEALTH_SCORE: 4
ESR_MM_PER_HR: 14
TENDER_JOINTS_COUNT: 2
TOTAL_SCORE_ESR: 3.09

## 2018-03-22 RX ORDER — METHOTREXATE 2.5 MG/1
10 TABLET ORAL
Qty: 20 TABLET | Refills: 0 | Status: SHIPPED | OUTPATIENT
Start: 2018-03-22 | End: 2018-04-24 | Stop reason: SDUPTHER

## 2018-03-23 DIAGNOSIS — E11.9 TYPE 2 DIABETES MELLITUS WITHOUT COMPLICATION: ICD-10-CM

## 2018-03-26 NOTE — TELEPHONE ENCOUNTER
----- Message from Mary Farias sent at 3/26/2018  2:55 PM CDT -----  Contact: self  [phone]  Patient returning the nurse phone call. Thanks!

## 2018-03-29 DIAGNOSIS — K21.00 REFLUX ESOPHAGITIS: ICD-10-CM

## 2018-03-29 RX ORDER — PANTOPRAZOLE SODIUM 40 MG/1
TABLET, DELAYED RELEASE ORAL
Qty: 90 TABLET | Refills: 0 | Status: SHIPPED | OUTPATIENT
Start: 2018-03-29 | End: 2018-06-30 | Stop reason: SDUPTHER

## 2018-04-10 DIAGNOSIS — K21.9 GASTROESOPHAGEAL REFLUX DISEASE, ESOPHAGITIS PRESENCE NOT SPECIFIED: ICD-10-CM

## 2018-04-24 ENCOUNTER — LAB VISIT (OUTPATIENT)
Dept: LAB | Facility: HOSPITAL | Age: 73
End: 2018-04-24
Attending: INTERNAL MEDICINE
Payer: MEDICARE

## 2018-04-24 ENCOUNTER — PATIENT MESSAGE (OUTPATIENT)
Dept: RHEUMATOLOGY | Facility: CLINIC | Age: 73
End: 2018-04-24

## 2018-04-24 DIAGNOSIS — M19.90 INFLAMMATORY ARTHRITIS: ICD-10-CM

## 2018-04-24 DIAGNOSIS — R76.8 RHEUMATOID FACTOR POSITIVE: ICD-10-CM

## 2018-04-24 DIAGNOSIS — R76.8 ANA POSITIVE: ICD-10-CM

## 2018-04-24 LAB
ALBUMIN SERPL BCP-MCNC: 4.1 G/DL
ALP SERPL-CCNC: 62 U/L
ALT SERPL W/O P-5'-P-CCNC: 27 U/L
ANION GAP SERPL CALC-SCNC: 12 MMOL/L
AST SERPL-CCNC: 27 U/L
BASOPHILS # BLD AUTO: 0 K/UL
BASOPHILS NFR BLD: 0.3 %
BILIRUB SERPL-MCNC: 0.3 MG/DL
BUN SERPL-MCNC: 22 MG/DL
CALCIUM SERPL-MCNC: 10 MG/DL
CHLORIDE SERPL-SCNC: 103 MMOL/L
CO2 SERPL-SCNC: 27 MMOL/L
CREAT SERPL-MCNC: 1 MG/DL
DIFFERENTIAL METHOD: ABNORMAL
EOSINOPHIL # BLD AUTO: 0.1 K/UL
EOSINOPHIL NFR BLD: 1 %
ERYTHROCYTE [DISTWIDTH] IN BLOOD BY AUTOMATED COUNT: 13.4 %
EST. GFR  (AFRICAN AMERICAN): >60 ML/MIN/1.73 M^2
EST. GFR  (NON AFRICAN AMERICAN): 57 ML/MIN/1.73 M^2
GLUCOSE SERPL-MCNC: 91 MG/DL
HCT VFR BLD AUTO: 36.4 %
HGB BLD-MCNC: 12.1 G/DL
LYMPHOCYTES # BLD AUTO: 3.1 K/UL
LYMPHOCYTES NFR BLD: 29.4 %
MCH RBC QN AUTO: 28.4 PG
MCHC RBC AUTO-ENTMCNC: 33.4 G/DL
MCV RBC AUTO: 85 FL
MONOCYTES # BLD AUTO: 0.8 K/UL
MONOCYTES NFR BLD: 7.2 %
NEUTROPHILS # BLD AUTO: 6.6 K/UL
NEUTROPHILS NFR BLD: 62.1 %
PLATELET # BLD AUTO: 132 K/UL
PMV BLD AUTO: 11.6 FL
POTASSIUM SERPL-SCNC: 3.5 MMOL/L
PROT SERPL-MCNC: 7.4 G/DL
RBC # BLD AUTO: 4.28 M/UL
SODIUM SERPL-SCNC: 142 MMOL/L
WBC # BLD AUTO: 10.6 K/UL

## 2018-04-24 PROCEDURE — 85025 COMPLETE CBC W/AUTO DIFF WBC: CPT

## 2018-04-24 PROCEDURE — 80053 COMPREHEN METABOLIC PANEL: CPT

## 2018-04-24 PROCEDURE — 36415 COLL VENOUS BLD VENIPUNCTURE: CPT

## 2018-04-24 RX ORDER — METHOTREXATE 2.5 MG/1
15 TABLET ORAL
Qty: 30 TABLET | Refills: 0 | Status: SHIPPED | OUTPATIENT
Start: 2018-04-24 | End: 2018-05-24 | Stop reason: SDUPTHER

## 2018-05-23 ENCOUNTER — LAB VISIT (OUTPATIENT)
Dept: LAB | Facility: HOSPITAL | Age: 73
End: 2018-05-23
Attending: INTERNAL MEDICINE
Payer: MEDICARE

## 2018-05-23 DIAGNOSIS — M19.90 INFLAMMATORY ARTHRITIS: ICD-10-CM

## 2018-05-23 DIAGNOSIS — R76.8 RHEUMATOID FACTOR POSITIVE: ICD-10-CM

## 2018-05-23 LAB
ALBUMIN SERPL BCP-MCNC: 4.2 G/DL
ALP SERPL-CCNC: 60 U/L
ALT SERPL W/O P-5'-P-CCNC: 28 U/L
ANION GAP SERPL CALC-SCNC: 9 MMOL/L
AST SERPL-CCNC: 25 U/L
BASOPHILS # BLD AUTO: 0 K/UL
BASOPHILS NFR BLD: 0.3 %
BILIRUB SERPL-MCNC: 0.5 MG/DL
BUN SERPL-MCNC: 23 MG/DL
CALCIUM SERPL-MCNC: 10.7 MG/DL
CHLORIDE SERPL-SCNC: 102 MMOL/L
CO2 SERPL-SCNC: 30 MMOL/L
CREAT SERPL-MCNC: 0.9 MG/DL
CRP SERPL-MCNC: 1.2 MG/L
DIFFERENTIAL METHOD: ABNORMAL
EOSINOPHIL # BLD AUTO: 0.1 K/UL
EOSINOPHIL NFR BLD: 1.1 %
ERYTHROCYTE [DISTWIDTH] IN BLOOD BY AUTOMATED COUNT: 13.8 %
ERYTHROCYTE [SEDIMENTATION RATE] IN BLOOD BY WESTERGREN METHOD: 12 MM/HR
EST. GFR  (AFRICAN AMERICAN): >60 ML/MIN/1.73 M^2
EST. GFR  (NON AFRICAN AMERICAN): >60 ML/MIN/1.73 M^2
GLUCOSE SERPL-MCNC: 145 MG/DL
HCT VFR BLD AUTO: 36.2 %
HGB BLD-MCNC: 12.2 G/DL
LYMPHOCYTES # BLD AUTO: 3.5 K/UL
LYMPHOCYTES NFR BLD: 39 %
MCH RBC QN AUTO: 28.7 PG
MCHC RBC AUTO-ENTMCNC: 33.6 G/DL
MCV RBC AUTO: 85 FL
MONOCYTES # BLD AUTO: 0.7 K/UL
MONOCYTES NFR BLD: 8.1 %
NEUTROPHILS # BLD AUTO: 4.6 K/UL
NEUTROPHILS NFR BLD: 51.5 %
PLATELET # BLD AUTO: ABNORMAL K/UL
PLATELET BLD QL SMEAR: ABNORMAL
PMV BLD AUTO: ABNORMAL FL
POTASSIUM SERPL-SCNC: 3.6 MMOL/L
PROT SERPL-MCNC: 7.4 G/DL
RBC # BLD AUTO: 4.24 M/UL
SODIUM SERPL-SCNC: 141 MMOL/L
WBC # BLD AUTO: 9 K/UL

## 2018-05-23 PROCEDURE — 36415 COLL VENOUS BLD VENIPUNCTURE: CPT

## 2018-05-23 PROCEDURE — 86140 C-REACTIVE PROTEIN: CPT

## 2018-05-23 PROCEDURE — 80053 COMPREHEN METABOLIC PANEL: CPT

## 2018-05-23 PROCEDURE — 85651 RBC SED RATE NONAUTOMATED: CPT

## 2018-05-23 PROCEDURE — 85007 BL SMEAR W/DIFF WBC COUNT: CPT

## 2018-05-23 PROCEDURE — 85027 COMPLETE CBC AUTOMATED: CPT

## 2018-05-24 ENCOUNTER — OFFICE VISIT (OUTPATIENT)
Dept: RHEUMATOLOGY | Facility: CLINIC | Age: 73
End: 2018-05-24
Payer: MEDICARE

## 2018-05-24 VITALS
HEART RATE: 82 BPM | HEIGHT: 63 IN | DIASTOLIC BLOOD PRESSURE: 73 MMHG | SYSTOLIC BLOOD PRESSURE: 116 MMHG | BODY MASS INDEX: 32.23 KG/M2 | WEIGHT: 181.88 LBS

## 2018-05-24 DIAGNOSIS — M19.90 INFLAMMATORY ARTHRITIS: Primary | ICD-10-CM

## 2018-05-24 DIAGNOSIS — Z79.631 METHOTREXATE, LONG TERM, CURRENT USE: ICD-10-CM

## 2018-05-24 DIAGNOSIS — D69.6 THROMBOCYTOPENIA: ICD-10-CM

## 2018-05-24 PROCEDURE — 99213 OFFICE O/P EST LOW 20 MIN: CPT | Mod: PBBFAC,PO | Performed by: INTERNAL MEDICINE

## 2018-05-24 PROCEDURE — 99214 OFFICE O/P EST MOD 30 MIN: CPT | Mod: S$PBB,,, | Performed by: INTERNAL MEDICINE

## 2018-05-24 PROCEDURE — 99999 PR PBB SHADOW E&M-EST. PATIENT-LVL III: CPT | Mod: PBBFAC,,, | Performed by: INTERNAL MEDICINE

## 2018-05-24 RX ORDER — METHOTREXATE 2.5 MG/1
15 TABLET ORAL
Qty: 30 TABLET | Refills: 2 | Status: SHIPPED | OUTPATIENT
Start: 2018-05-24 | End: 2018-08-28 | Stop reason: SDUPTHER

## 2018-05-24 ASSESSMENT — ROUTINE ASSESSMENT OF PATIENT INDEX DATA (RAPID3)
PSYCHOLOGICAL DISTRESS SCORE: 4.4
WHEN YOU AWAKENED IN THE MORNING OVER THE LAST WEEK, PLEASE INDICATE THE AMOUNT OF TIME IT TAKES UNTIL YOU ARE AS LIMBER AS YOU WILL BE FOR THE DAY: 45 MIN
FATIGUE SCORE: 8.5
MDHAQ FUNCTION SCORE: 1.2
TOTAL RAPID3 SCORE: 5.5
PAIN SCORE: 8.5
PATIENT GLOBAL ASSESSMENT SCORE: 4
AM STIFFNESS SCORE: 1, YES

## 2018-05-24 NOTE — PROGRESS NOTES
"Subjective:       Patient ID: Pili Teixeira is a 73 y.o. female.    Chief Complaint: Sjogren syndrome and  inflammatory arthritis  HPI 72y/o female is here for follow-up for Sjogren syndrome and inflammatory arthritis.    Previously on  leflunomide caused diarrhea and abdominal pain.  Plaquenil was expensive.     currently on methotrexate 15 mg a week and folic acid 1 mg daily.  Reports significant improvement in joint pain and swelling.  Still has OA pain in knees.  Pain in is constant, aching, worse at night, better with rest.   She denies fever, weight loss,photosensitivity, rash, ulcer, raynaud's phenomenon, alopecia, dysphagia, diarrhea or blood in the stools  Has FH of Lymphoma and throat cancer     Review of Systems   Constitutional: Positive for fatigue.   HENT: Negative for ear pain and facial swelling.    Eyes: Negative for pain and redness.   Respiratory: Negative for cough and shortness of breath.    Cardiovascular: Negative for chest pain and leg swelling.   Gastrointestinal: Negative for abdominal pain and abdominal distention.   Genitourinary: Negative for hematuria and genital sores.    musculoskeletal:  Positive for arthralgia and joint swelling  Skin: Negative for rash and wounds    Objective:     /73 (BP Location: Left arm, Patient Position: Sitting)   Pulse 82   Ht 5' 3" (1.6 m)   Wt 82.5 kg (181 lb 14.1 oz)   BMI 32.22 kg/m²      Physical Exam   Constitutional: She is oriented to person, place, and time and well-developed, well-nourished, and in no distress.   HENT:   Head: Normocephalic and atraumatic.   Neck: Normal range of motion. Neck supple.   Cardiovascular: Normal rate and regular rhythm.    Pulmonary/Chest: Effort normal and breath sounds normal.   Abdominal: Soft. Bowel sounds are normal.  no mass      Right Side Rheumatological Exam     Examination finds the shoulder, elbow, knee,wrist 1st PIP, 1st MCP, 2nd PIP,2nd MCP 3rd PIP, 3rd MCP, 4th PIP, 4th MCP, 5th PIP and 5th " MCP normal.        Left Side Rheumatological Exam     Examination finds the shoulder, elbow, knee, [id number] PIP, 1st MCP, 2nd PIP,2nd MCP 3rd PIP, 3rd MCP, 4th PIP, 4th MCP, 5th PIP and 5th MCP normal.          Neurological: She is alert and oriented to person, place, and time.    Skin: Skin is warm and dry.     Psychiatric: Mood and affect normal.         Lab Results   Component Value Date    WBC 9.00 05/23/2018    HGB 12.2 05/23/2018    HCT 36.2 (L) 05/23/2018    MCV 85 05/23/2018    PLT SEE COMMENT 05/23/2018     CMP  Sodium   Date Value Ref Range Status   05/23/2018 141 136 - 145 mmol/L Final     Potassium   Date Value Ref Range Status   05/23/2018 3.6 3.5 - 5.1 mmol/L Final     Chloride   Date Value Ref Range Status   05/23/2018 102 95 - 110 mmol/L Final     CO2   Date Value Ref Range Status   05/23/2018 30 (H) 23 - 29 mmol/L Final     Glucose   Date Value Ref Range Status   05/23/2018 145 (H) 70 - 110 mg/dL Final     BUN, Bld   Date Value Ref Range Status   05/23/2018 23 8 - 23 mg/dL Final     Creatinine   Date Value Ref Range Status   05/23/2018 0.9 0.5 - 1.4 mg/dL Final   09/01/2012 0.9 0.2 - 1.4 mg/dl Final     Calcium   Date Value Ref Range Status   05/23/2018 10.7 (H) 8.7 - 10.5 mg/dL Final   09/01/2012 9.9 8.6 - 10.2 mg/dl Final     Total Protein   Date Value Ref Range Status   05/23/2018 7.4 6.0 - 8.4 g/dL Final     Albumin   Date Value Ref Range Status   05/23/2018 4.2 3.5 - 5.2 g/dL Final     Total Bilirubin   Date Value Ref Range Status   05/23/2018 0.5 0.1 - 1.0 mg/dL Final     Comment:     For infants and newborns, interpretation of results should be based  on gestational age, weight and in agreement with clinical  observations.  Premature Infant recommended reference ranges:  Up to 24 hours.............<8.0 mg/dL  Up to 48 hours............<12.0 mg/dL  3-5 days..................<15.0 mg/dL  6-29 days.................<15.0 mg/dL       Alkaline Phosphatase   Date Value Ref Range Status   05/23/2018  60 55 - 135 U/L Final   09/01/2012 80 23 - 119 UNIT/L Final     AST   Date Value Ref Range Status   05/23/2018 25 10 - 40 U/L Final   09/01/2012 17 10 - 30 UNIT/L Final     ALT   Date Value Ref Range Status   05/23/2018 28 10 - 44 U/L Final     Anion Gap   Date Value Ref Range Status   05/23/2018 9 8 - 16 mmol/L Final   09/01/2012 13 5 - 15 meq/L Final     eGFR if    Date Value Ref Range Status   05/23/2018 >60 >60 mL/min/1.73 m^2 Final     eGFR if non    Date Value Ref Range Status   05/23/2018 >60 >60 mL/min/1.73 m^2 Final     Comment:     Calculation used to obtain the estimated glomerular filtration  rate (eGFR) is the CKD-EPI equation.        Lab Results   Component Value Date    RF 21.0 (H) 11/17/2015     Lab Results   Component Value Date    JOAO Pos, Titer to follow (A) 07/16/2013     Lab Results   Component Value Date    SEDRATE 12 05/23/2018     Lab Results   Component Value Date    CRP 1.2 05/23/2018       Assessment:   inflammatory arthritis RUSS 28- 1.8  Sjogren's syndrome--with sicca symptoms   thrombocytopenia- recheck platelets count  Fibromyalgia  Plan:   Cont  methotrexate 15 mg a week   Cont folic acid 1 mg a day    Recheck platelets count  Return to clinic in 3 months with labs

## 2018-05-25 ENCOUNTER — TELEPHONE (OUTPATIENT)
Dept: RHEUMATOLOGY | Facility: CLINIC | Age: 73
End: 2018-05-25

## 2018-05-25 ASSESSMENT — DISEASE ACTIVITY SCORE (DAS28)
GLOBAL_HEALTH_SCORE: 4
ESR_MM_PER_HR: 12
SWOLLEN_JOINTS_COUNT: 0
TOTAL_SCORE_ESR: 1.8
TENDER_JOINTS_COUNT: 0

## 2018-05-26 ENCOUNTER — LAB VISIT (OUTPATIENT)
Dept: LAB | Facility: HOSPITAL | Age: 73
End: 2018-05-26
Attending: INTERNAL MEDICINE
Payer: MEDICARE

## 2018-05-26 DIAGNOSIS — Z79.899 NEED FOR PROPHYLACTIC CHEMOTHERAPY: Primary | ICD-10-CM

## 2018-05-26 LAB
PLATELET # BLD AUTO: 245 K/UL
PMV BLD AUTO: 7.6 FL

## 2018-05-26 PROCEDURE — 85049 AUTOMATED PLATELET COUNT: CPT

## 2018-05-26 PROCEDURE — 36415 COLL VENOUS BLD VENIPUNCTURE: CPT

## 2018-05-31 DIAGNOSIS — K21.9 GASTROESOPHAGEAL REFLUX DISEASE, ESOPHAGITIS PRESENCE NOT SPECIFIED: ICD-10-CM

## 2018-06-05 ENCOUNTER — LAB VISIT (OUTPATIENT)
Dept: LAB | Facility: HOSPITAL | Age: 73
End: 2018-06-05
Attending: FAMILY MEDICINE
Payer: MEDICARE

## 2018-06-05 ENCOUNTER — OFFICE VISIT (OUTPATIENT)
Dept: FAMILY MEDICINE | Facility: CLINIC | Age: 73
End: 2018-06-05
Payer: MEDICARE

## 2018-06-05 VITALS
HEART RATE: 81 BPM | TEMPERATURE: 99 F | SYSTOLIC BLOOD PRESSURE: 119 MMHG | WEIGHT: 182.13 LBS | BODY MASS INDEX: 32.27 KG/M2 | RESPIRATION RATE: 18 BRPM | DIASTOLIC BLOOD PRESSURE: 80 MMHG | HEIGHT: 63 IN | OXYGEN SATURATION: 95 %

## 2018-06-05 DIAGNOSIS — E11.9 TYPE 2 DIABETES MELLITUS WITHOUT COMPLICATION, WITH LONG-TERM CURRENT USE OF INSULIN: Primary | ICD-10-CM

## 2018-06-05 DIAGNOSIS — Z79.4 TYPE 2 DIABETES MELLITUS WITHOUT COMPLICATION, WITH LONG-TERM CURRENT USE OF INSULIN: ICD-10-CM

## 2018-06-05 DIAGNOSIS — Z23 NEED FOR PNEUMOCOCCAL VACCINATION: ICD-10-CM

## 2018-06-05 DIAGNOSIS — E11.9 TYPE 2 DIABETES MELLITUS WITHOUT COMPLICATION, WITH LONG-TERM CURRENT USE OF INSULIN: ICD-10-CM

## 2018-06-05 DIAGNOSIS — E78.5 HYPERLIPIDEMIA, UNSPECIFIED HYPERLIPIDEMIA TYPE: ICD-10-CM

## 2018-06-05 DIAGNOSIS — I15.2 HYPERTENSION ASSOCIATED WITH DIABETES: ICD-10-CM

## 2018-06-05 DIAGNOSIS — Z79.4 TYPE 2 DIABETES MELLITUS WITHOUT COMPLICATION, WITH LONG-TERM CURRENT USE OF INSULIN: Primary | ICD-10-CM

## 2018-06-05 DIAGNOSIS — I51.89 DIASTOLIC DYSFUNCTION: Chronic | ICD-10-CM

## 2018-06-05 DIAGNOSIS — E11.59 HYPERTENSION ASSOCIATED WITH DIABETES: ICD-10-CM

## 2018-06-05 DIAGNOSIS — M79.7 FIBROMYALGIA: ICD-10-CM

## 2018-06-05 DIAGNOSIS — G47.00 INSOMNIA, UNSPECIFIED TYPE: ICD-10-CM

## 2018-06-05 LAB
ESTIMATED AVG GLUCOSE: 123 MG/DL
HBA1C MFR BLD HPLC: 5.9 %

## 2018-06-05 PROCEDURE — 99214 OFFICE O/P EST MOD 30 MIN: CPT | Mod: S$PBB,,, | Performed by: FAMILY MEDICINE

## 2018-06-05 PROCEDURE — 99214 OFFICE O/P EST MOD 30 MIN: CPT | Mod: PBBFAC,PO,25 | Performed by: FAMILY MEDICINE

## 2018-06-05 PROCEDURE — 99999 PR PBB SHADOW E&M-EST. PATIENT-LVL IV: CPT | Mod: PBBFAC,,, | Performed by: FAMILY MEDICINE

## 2018-06-05 PROCEDURE — 36415 COLL VENOUS BLD VENIPUNCTURE: CPT | Mod: PO

## 2018-06-05 PROCEDURE — 83036 HEMOGLOBIN GLYCOSYLATED A1C: CPT

## 2018-06-05 PROCEDURE — G0009 ADMIN PNEUMOCOCCAL VACCINE: HCPCS | Mod: PBBFAC,PO

## 2018-06-05 RX ORDER — MUPIROCIN 20 MG/G
OINTMENT TOPICAL 3 TIMES DAILY
Qty: 22 G | Refills: 0 | Status: CANCELLED | OUTPATIENT
Start: 2018-06-05

## 2018-06-05 RX ORDER — GABAPENTIN 100 MG/1
100 CAPSULE ORAL 3 TIMES DAILY
Qty: 270 CAPSULE | Refills: 1 | Status: SHIPPED | OUTPATIENT
Start: 2018-06-05 | End: 2018-12-07 | Stop reason: SDUPTHER

## 2018-06-05 RX ORDER — SAXAGLIPTIN 5 MG/1
5 TABLET, FILM COATED ORAL DAILY
Qty: 90 TABLET | Refills: 3 | Status: SHIPPED | OUTPATIENT
Start: 2018-06-05 | End: 2019-06-03 | Stop reason: SDUPTHER

## 2018-06-05 RX ORDER — METFORMIN HYDROCHLORIDE 500 MG/1
500 TABLET ORAL 2 TIMES DAILY WITH MEALS
Qty: 180 TABLET | Refills: 3 | Status: SHIPPED | OUTPATIENT
Start: 2018-06-05 | End: 2019-04-16 | Stop reason: SDUPTHER

## 2018-06-05 RX ORDER — NYSTATIN 100000 U/G
CREAM TOPICAL 3 TIMES DAILY
Refills: 3 | Status: CANCELLED | OUTPATIENT
Start: 2018-06-05

## 2018-06-05 RX ORDER — TRAZODONE HYDROCHLORIDE 50 MG/1
50 TABLET ORAL NIGHTLY
Qty: 180 TABLET | Refills: 1 | Status: SHIPPED | OUTPATIENT
Start: 2018-06-05 | End: 2018-12-07 | Stop reason: SDUPTHER

## 2018-06-05 RX ORDER — SIMVASTATIN 40 MG/1
40 TABLET, FILM COATED ORAL NIGHTLY
Qty: 90 TABLET | Refills: 3 | Status: SHIPPED | OUTPATIENT
Start: 2018-06-05 | End: 2018-07-13

## 2018-06-05 NOTE — PROGRESS NOTES
"Subjective:       Patient ID: Pili Teixeira is a 73 y.o. female.    Chief Complaint: Establish Care and Medication Refill    This pt is new to me.  Pt is here for followup of chronic medical issues.  She is back on 2 mg glimepiride--her glucoses ar home are 110 fasting--not over 160 post prandial.  Pt is followed by rheumatology and pain mgt--she is almost a 1 year s/p a nerve block and is starting to hurt in her neck again.  Pt is unsure of all the meds she takes--she cannot name them.      Review of Systems   Constitutional: Negative for activity change, appetite change, fatigue and unexpected weight change.   Eyes: Negative for visual disturbance.   Respiratory: Negative for cough, chest tightness and shortness of breath.    Cardiovascular: Negative for chest pain, palpitations and leg swelling.   Gastrointestinal: Negative for abdominal pain, constipation, diarrhea, nausea and vomiting.   Endocrine: Negative for cold intolerance, heat intolerance and polyuria.   Genitourinary: Negative for decreased urine volume and dysuria.   Musculoskeletal: Positive for arthralgias, gait problem, myalgias and neck pain. Negative for back pain.   Skin: Negative for rash.   Neurological: Positive for numbness (hands and feet). Negative for headaches.   Psychiatric/Behavioral: Positive for sleep disturbance (trazodone 50 mg does not work--she often takes 2 at a time).       Objective:       Vitals:    06/05/18 1421   BP: 119/80   BP Location: Left arm   Patient Position: Sitting   BP Method: Large (Manual)   Pulse: 81   Resp: 18   Temp: 98.7 °F (37.1 °C)   TempSrc: Oral   SpO2: 95%   Weight: 82.6 kg (182 lb 1.6 oz)   Height: 5' 3" (1.6 m)     Physical Exam   Constitutional: She appears well-developed and well-nourished. No distress.   HENT:   Head: Normocephalic and atraumatic.   Eyes: Conjunctivae are normal. No scleral icterus.   Neck: Normal range of motion. Neck supple. No tracheal deviation present. No thyromegaly " present.   Cardiovascular: Normal rate, regular rhythm, normal heart sounds and intact distal pulses.  Exam reveals no gallop and no friction rub.    No murmur heard.  Pulmonary/Chest: Effort normal and breath sounds normal. No respiratory distress. She has no wheezes. She has no rales.   Musculoskeletal: She exhibits tenderness (diffuse muscles). She exhibits no edema.   Lymphadenopathy:     She has no cervical adenopathy.   Skin: Skin is dry. No rash noted.   Psychiatric: She has a normal mood and affect. Her behavior is normal. Judgment and thought content normal.   Vitals reviewed.      Assessment:       1. Type 2 diabetes mellitus without complication, with long-term current use of insulin    2. Insomnia, unspecified type    3. Need for pneumococcal vaccination    4. Fibromyalgia    5. Hypertension associated with diabetes    6. Diastolic dysfunction    7. Hyperlipidemia, unspecified hyperlipidemia type        Plan:       Pili was seen today for establish care and medication refill.    Diagnoses and all orders for this visit:    Type 2 diabetes mellitus without complication, with long-term current use of insulin  -     metFORMIN (GLUCOPHAGE) 500 MG tablet; Take 1 tablet (500 mg total) by mouth 2 (two) times daily with meals.  -     SAXagliptin (ONGLYZA) 5 mg Tab tablet; Take 1 tablet (5 mg total) by mouth once daily.  -     Hemoglobin A1c; Future    Insomnia, unspecified type  -     traZODone (DESYREL) 50 MG tablet; Take 1 tablet (50 mg total) by mouth every evening. May take an additional tab as needed    Need for pneumococcal vaccination  -     (In Office Administered) Pneumococcal Polysaccharide Vaccine (23 Valent) (SQ/IM)    Fibromyalgia  -     gabapentin (NEURONTIN) 100 MG capsule; Take 1 capsule (100 mg total) by mouth 3 (three) times daily.    Hypertension associated with diabetes    Diastolic dysfunction    Hyperlipidemia, unspecified hyperlipidemia type  -     simvastatin (ZOCOR) 40 MG tablet; Take  1 tablet (40 mg total) by mouth every evening.    Other orders  -     Cancel: mupirocin (BACTROBAN) 2 % ointment; Apply topically 3 (three) times daily.  -     Cancel: nystatin (MYCOSTATIN) cream; Apply topically 3 (three) times daily.      During this visit, I reviewed the pt's history, medications, allergies, and problem list.

## 2018-06-06 ENCOUNTER — TELEPHONE (OUTPATIENT)
Dept: FAMILY MEDICINE | Facility: CLINIC | Age: 73
End: 2018-06-06

## 2018-06-30 DIAGNOSIS — K21.00 REFLUX ESOPHAGITIS: ICD-10-CM

## 2018-06-30 RX ORDER — PANTOPRAZOLE SODIUM 40 MG/1
TABLET, DELAYED RELEASE ORAL
Qty: 90 TABLET | Refills: 0 | Status: SHIPPED | OUTPATIENT
Start: 2018-06-30 | End: 2018-09-17 | Stop reason: SDUPTHER

## 2018-07-13 DIAGNOSIS — N18.30 CKD (CHRONIC KIDNEY DISEASE) STAGE 3, GFR 30-59 ML/MIN: ICD-10-CM

## 2018-07-13 DIAGNOSIS — G45.8 OTHER SPECIFIED TRANSIENT CEREBRAL ISCHEMIAS: Chronic | ICD-10-CM

## 2018-07-13 DIAGNOSIS — R06.02 SOB (SHORTNESS OF BREATH): ICD-10-CM

## 2018-07-13 DIAGNOSIS — E11.59 HYPERTENSION ASSOCIATED WITH DIABETES: ICD-10-CM

## 2018-07-13 DIAGNOSIS — I15.2 HYPERTENSION ASSOCIATED WITH DIABETES: ICD-10-CM

## 2018-07-13 RX ORDER — ROSUVASTATIN CALCIUM 10 MG/1
TABLET, COATED ORAL
Qty: 80 TABLET | Refills: 5 | Status: SHIPPED | OUTPATIENT
Start: 2018-07-13 | End: 2019-08-31 | Stop reason: SDUPTHER

## 2018-07-17 DIAGNOSIS — G45.8 OTHER SPECIFIED TRANSIENT CEREBRAL ISCHEMIAS: Chronic | ICD-10-CM

## 2018-07-17 DIAGNOSIS — R06.02 SOB (SHORTNESS OF BREATH): ICD-10-CM

## 2018-07-17 DIAGNOSIS — E11.59 HYPERTENSION ASSOCIATED WITH DIABETES: ICD-10-CM

## 2018-07-17 DIAGNOSIS — K21.9 GASTROESOPHAGEAL REFLUX DISEASE, ESOPHAGITIS PRESENCE NOT SPECIFIED: ICD-10-CM

## 2018-07-17 DIAGNOSIS — N18.30 CKD (CHRONIC KIDNEY DISEASE) STAGE 3, GFR 30-59 ML/MIN: ICD-10-CM

## 2018-07-17 DIAGNOSIS — I15.2 HYPERTENSION ASSOCIATED WITH DIABETES: ICD-10-CM

## 2018-07-18 RX ORDER — CLOPIDOGREL BISULFATE 75 MG/1
TABLET ORAL
Qty: 90 TABLET | Refills: 4 | Status: SHIPPED | OUTPATIENT
Start: 2018-07-18 | End: 2019-08-08 | Stop reason: SDUPTHER

## 2018-07-19 DIAGNOSIS — E11.8 TYPE 2 DIABETES MELLITUS WITH COMPLICATION: ICD-10-CM

## 2018-07-19 RX ORDER — GLIMEPIRIDE 2 MG/1
TABLET ORAL
Qty: 90 TABLET | Refills: 0 | Status: SHIPPED | OUTPATIENT
Start: 2018-07-19 | End: 2018-11-27 | Stop reason: SDUPTHER

## 2018-07-25 DIAGNOSIS — E11.59 HYPERTENSION ASSOCIATED WITH DIABETES: ICD-10-CM

## 2018-07-25 DIAGNOSIS — G45.8 OTHER SPECIFIED TRANSIENT CEREBRAL ISCHEMIAS: Chronic | ICD-10-CM

## 2018-07-25 DIAGNOSIS — N18.30 CKD (CHRONIC KIDNEY DISEASE) STAGE 3, GFR 30-59 ML/MIN: ICD-10-CM

## 2018-07-25 DIAGNOSIS — R06.02 SOB (SHORTNESS OF BREATH): ICD-10-CM

## 2018-07-25 DIAGNOSIS — I15.2 HYPERTENSION ASSOCIATED WITH DIABETES: ICD-10-CM

## 2018-07-26 RX ORDER — VALSARTAN AND HYDROCHLOROTHIAZIDE 160; 25 MG/1; MG/1
TABLET ORAL
Qty: 80 TABLET | Refills: 4 | Status: SHIPPED | OUTPATIENT
Start: 2018-07-26 | End: 2019-05-16 | Stop reason: SDUPTHER

## 2018-08-07 RX ORDER — BLOOD SUGAR DIAGNOSTIC
STRIP MISCELLANEOUS
Qty: 100 STRIP | Refills: 1 | Status: CANCELLED | OUTPATIENT
Start: 2018-08-07

## 2018-08-10 DIAGNOSIS — R06.02 SOB (SHORTNESS OF BREATH): ICD-10-CM

## 2018-08-10 DIAGNOSIS — N18.30 CKD (CHRONIC KIDNEY DISEASE) STAGE 3, GFR 30-59 ML/MIN: ICD-10-CM

## 2018-08-10 DIAGNOSIS — G45.8 OTHER SPECIFIED TRANSIENT CEREBRAL ISCHEMIAS: Chronic | ICD-10-CM

## 2018-08-10 DIAGNOSIS — I15.2 HYPERTENSION ASSOCIATED WITH DIABETES: ICD-10-CM

## 2018-08-10 DIAGNOSIS — E11.59 HYPERTENSION ASSOCIATED WITH DIABETES: ICD-10-CM

## 2018-08-10 RX ORDER — DILTIAZEM HYDROCHLORIDE 120 MG/1
CAPSULE, EXTENDED RELEASE ORAL
Qty: 90 CAPSULE | Refills: 5 | Status: SHIPPED | OUTPATIENT
Start: 2018-08-10 | End: 2019-09-06 | Stop reason: SDUPTHER

## 2018-08-10 NOTE — TELEPHONE ENCOUNTER
----- Message from Bhaavna Randolph sent at 8/10/2018 12:03 PM CDT -----  Contact: Patient  Type:  RX Refill Request    Who Called:  Patient  Refill or New Rx:  refill  RX Name and Strength:  blood sugar diagnostic (ACCU-CHEK JACINTO PLUS TEST STRP) Strp  How is the patient currently taking it? (ex. 1XDay):  2x   Is this a 30 day or 90 day RX:  30 day  Preferred Pharmacy with phone number:    [facility] [address] [place], [place] - [address] [place], [place] [address] [address]  Phone: [phone] Fax: [phone]    Local or Mail Order: Local  Ordering Provider:Priscilla Huggins  Best Call Back Number:  [phone] (home)

## 2018-08-24 ENCOUNTER — LAB VISIT (OUTPATIENT)
Dept: LAB | Facility: HOSPITAL | Age: 73
End: 2018-08-24
Attending: INTERNAL MEDICINE
Payer: MEDICARE

## 2018-08-24 DIAGNOSIS — Z79.631 METHOTREXATE, LONG TERM, CURRENT USE: ICD-10-CM

## 2018-08-24 DIAGNOSIS — M19.90 INFLAMMATORY ARTHRITIS: ICD-10-CM

## 2018-08-24 LAB
ALBUMIN SERPL BCP-MCNC: 4.3 G/DL
ALP SERPL-CCNC: 59 U/L
ALT SERPL W/O P-5'-P-CCNC: 32 U/L
ANION GAP SERPL CALC-SCNC: 13 MMOL/L
AST SERPL-CCNC: 29 U/L
BASOPHILS # BLD AUTO: 0.1 K/UL
BASOPHILS NFR BLD: 0.8 %
BILIRUB SERPL-MCNC: 0.6 MG/DL
BUN SERPL-MCNC: 32 MG/DL
CALCIUM SERPL-MCNC: 10.6 MG/DL
CHLORIDE SERPL-SCNC: 99 MMOL/L
CO2 SERPL-SCNC: 27 MMOL/L
CREAT SERPL-MCNC: 1 MG/DL
CRP SERPL-MCNC: 1.4 MG/L
DIFFERENTIAL METHOD: NORMAL
EOSINOPHIL # BLD AUTO: 0.1 K/UL
EOSINOPHIL NFR BLD: 1 %
ERYTHROCYTE [DISTWIDTH] IN BLOOD BY AUTOMATED COUNT: 14.1 %
ERYTHROCYTE [SEDIMENTATION RATE] IN BLOOD BY WESTERGREN METHOD: 20 MM/HR
EST. GFR  (AFRICAN AMERICAN): >60 ML/MIN/1.73 M^2
EST. GFR  (NON AFRICAN AMERICAN): 56 ML/MIN/1.73 M^2
GLUCOSE SERPL-MCNC: 110 MG/DL
HCT VFR BLD AUTO: 37.3 %
HGB BLD-MCNC: 12 G/DL
LYMPHOCYTES # BLD AUTO: 3.4 K/UL
LYMPHOCYTES NFR BLD: 42 %
MCH RBC QN AUTO: 28.5 PG
MCHC RBC AUTO-ENTMCNC: 32.1 G/DL
MCV RBC AUTO: 89 FL
MONOCYTES # BLD AUTO: 0.5 K/UL
MONOCYTES NFR BLD: 6.8 %
NEUTROPHILS # BLD AUTO: 4 K/UL
NEUTROPHILS NFR BLD: 49.4 %
PLATELET # BLD AUTO: 180 K/UL
PMV BLD AUTO: 10 FL
POTASSIUM SERPL-SCNC: 3.6 MMOL/L
PROT SERPL-MCNC: 7.5 G/DL
RBC # BLD AUTO: 4.21 M/UL
SODIUM SERPL-SCNC: 139 MMOL/L
WBC # BLD AUTO: 8 K/UL

## 2018-08-24 PROCEDURE — 36415 COLL VENOUS BLD VENIPUNCTURE: CPT

## 2018-08-24 PROCEDURE — 85025 COMPLETE CBC W/AUTO DIFF WBC: CPT

## 2018-08-24 PROCEDURE — 80053 COMPREHEN METABOLIC PANEL: CPT

## 2018-08-24 PROCEDURE — 86140 C-REACTIVE PROTEIN: CPT

## 2018-08-24 PROCEDURE — 85651 RBC SED RATE NONAUTOMATED: CPT

## 2018-08-28 ENCOUNTER — OFFICE VISIT (OUTPATIENT)
Dept: RHEUMATOLOGY | Facility: CLINIC | Age: 73
End: 2018-08-28
Payer: MEDICARE

## 2018-08-28 VITALS
DIASTOLIC BLOOD PRESSURE: 60 MMHG | SYSTOLIC BLOOD PRESSURE: 106 MMHG | HEIGHT: 63 IN | BODY MASS INDEX: 30.86 KG/M2 | HEART RATE: 78 BPM | WEIGHT: 174.19 LBS

## 2018-08-28 DIAGNOSIS — M05.742 RHEUMATOID ARTHRITIS INVOLVING BOTH HANDS WITH POSITIVE RHEUMATOID FACTOR: ICD-10-CM

## 2018-08-28 DIAGNOSIS — Z79.631 LONG TERM METHOTREXATE USER: Primary | ICD-10-CM

## 2018-08-28 DIAGNOSIS — M05.741 RHEUMATOID ARTHRITIS INVOLVING BOTH HANDS WITH POSITIVE RHEUMATOID FACTOR: ICD-10-CM

## 2018-08-28 DIAGNOSIS — R76.8 RHEUMATOID FACTOR POSITIVE: ICD-10-CM

## 2018-08-28 PROCEDURE — 99213 OFFICE O/P EST LOW 20 MIN: CPT | Mod: PBBFAC,PO | Performed by: INTERNAL MEDICINE

## 2018-08-28 PROCEDURE — 99213 OFFICE O/P EST LOW 20 MIN: CPT | Mod: S$PBB,,, | Performed by: INTERNAL MEDICINE

## 2018-08-28 PROCEDURE — 99999 PR PBB SHADOW E&M-EST. PATIENT-LVL III: CPT | Mod: PBBFAC,,, | Performed by: INTERNAL MEDICINE

## 2018-08-28 RX ORDER — FOLIC ACID 1 MG/1
1 TABLET ORAL DAILY
Qty: 30 TABLET | Refills: 11 | Status: SHIPPED | OUTPATIENT
Start: 2018-08-28 | End: 2019-08-28

## 2018-08-28 RX ORDER — METHOTREXATE 2.5 MG/1
15 TABLET ORAL
Qty: 30 TABLET | Refills: 3 | Status: SHIPPED | OUTPATIENT
Start: 2018-08-28 | End: 2019-12-02

## 2018-08-28 ASSESSMENT — DISEASE ACTIVITY SCORE (DAS28)
TOTAL_SCORE_ESR: 2.21
TENDER_JOINTS_COUNT: 0
ESR_MM_PER_HR: 20
SWOLLEN_JOINTS_COUNT: 0
GLOBAL_HEALTH_SCORE: 8

## 2018-08-28 ASSESSMENT — ROUTINE ASSESSMENT OF PATIENT INDEX DATA (RAPID3)
WHEN YOU AWAKENED IN THE MORNING OVER THE LAST WEEK, PLEASE INDICATE THE AMOUNT OF TIME IT TAKES UNTIL YOU ARE AS LIMBER AS YOU WILL BE FOR THE DAY: ALL DAY
AM STIFFNESS SCORE: 1, YES
FATIGUE SCORE: 8

## 2018-09-17 DIAGNOSIS — K21.00 REFLUX ESOPHAGITIS: ICD-10-CM

## 2018-09-17 RX ORDER — PANTOPRAZOLE SODIUM 40 MG/1
TABLET, DELAYED RELEASE ORAL
Qty: 90 TABLET | Refills: 0 | Status: SHIPPED | OUTPATIENT
Start: 2018-09-17 | End: 2018-12-29 | Stop reason: SDUPTHER

## 2018-10-13 NOTE — TELEPHONE ENCOUNTER
----- Message from Ninfa Wilson MD sent at 5/25/2018  8:22 AM CDT -----  She did not get lab for platelet count yesterday? SS  
Spoke to pt states she went for labs. Spoke to lab and they stated patients blood keeps clotting. They have drawn the blood twice and patients blood is still clotting. Lab states they will call patient back for another draw as they also tried using a heal warmer to keep blood from clotting.  
normal bilaterally

## 2018-10-30 ENCOUNTER — TELEPHONE (OUTPATIENT)
Dept: CARDIOLOGY | Facility: CLINIC | Age: 73
End: 2018-10-30

## 2018-10-30 NOTE — TELEPHONE ENCOUNTER
----- Message from Pretty Monique sent at 10/30/2018 10:55 AM CDT -----  Type:  Pharmacy Calling to Clarify an RX    Name of Caller:  Alfie  Pharmacy Name:  [facility]  Prescription Name:  rosuvastatin (CRESTOR) 10 MG tablet  What do they need to clarify?: clarification on duplicate  Best Call Back Number:  [phone]  Additional Information:

## 2018-10-30 NOTE — TELEPHONE ENCOUNTER
Spoke to oleg, stated he needed to know what is the pt. Current therapy with the statins, pt. Stated she is taking two different medications. Last medication was prescribed by PCP. Informed oleg he may need to speak to pt. PCP due to pt. Medication list only has Crestor on it. Oleg verbalized understanding.

## 2018-10-31 ENCOUNTER — TELEPHONE (OUTPATIENT)
Dept: FAMILY MEDICINE | Facility: CLINIC | Age: 73
End: 2018-10-31

## 2018-10-31 NOTE — TELEPHONE ENCOUNTER
----- Message from Laura Alfred sent at 10/30/2018  2:51 PM CDT -----  Contact: Alfie with [facility] [employer]   Alfie with [employer] called, he need clarification on patient simvastatin. Please call back at [phone].    [facility] [id number]  [place] [place] - [address] [facility].  [address] [place] [address]  [place] [address]  Phone: [phone] Fax: [phone]

## 2018-10-31 NOTE — TELEPHONE ENCOUNTER
Spoke with Alfie- pharmacist to verify that simvastatin was d/c in June and to just confirm that it was not on current medication list. He verbally understood.

## 2018-11-12 DIAGNOSIS — K21.9 GASTROESOPHAGEAL REFLUX DISEASE, ESOPHAGITIS PRESENCE NOT SPECIFIED: ICD-10-CM

## 2018-11-12 DIAGNOSIS — R11.2 NAUSEA AND VOMITING: ICD-10-CM

## 2018-11-13 RX ORDER — ONDANSETRON 8 MG/1
TABLET, ORALLY DISINTEGRATING ORAL
Qty: 30 TABLET | Refills: 0 | Status: SHIPPED | OUTPATIENT
Start: 2018-11-13 | End: 2019-12-02 | Stop reason: SDUPTHER

## 2018-11-27 DIAGNOSIS — E11.8 TYPE 2 DIABETES MELLITUS WITH COMPLICATION: ICD-10-CM

## 2018-11-28 RX ORDER — GLIMEPIRIDE 2 MG/1
TABLET ORAL
Qty: 90 TABLET | Refills: 0 | Status: SHIPPED | OUTPATIENT
Start: 2018-11-28 | End: 2019-02-22 | Stop reason: SDUPTHER

## 2018-12-03 ENCOUNTER — LAB VISIT (OUTPATIENT)
Dept: LAB | Facility: HOSPITAL | Age: 73
End: 2018-12-03
Attending: FAMILY MEDICINE
Payer: MEDICARE

## 2018-12-03 ENCOUNTER — OFFICE VISIT (OUTPATIENT)
Dept: FAMILY MEDICINE | Facility: CLINIC | Age: 73
End: 2018-12-03
Payer: MEDICARE

## 2018-12-03 VITALS
DIASTOLIC BLOOD PRESSURE: 78 MMHG | HEART RATE: 88 BPM | OXYGEN SATURATION: 97 % | TEMPERATURE: 99 F | HEIGHT: 63 IN | WEIGHT: 173.5 LBS | SYSTOLIC BLOOD PRESSURE: 118 MMHG | BODY MASS INDEX: 30.74 KG/M2

## 2018-12-03 DIAGNOSIS — I15.2 HYPERTENSION ASSOCIATED WITH DIABETES: Primary | ICD-10-CM

## 2018-12-03 DIAGNOSIS — E11.69 TYPE 2 DIABETES MELLITUS WITH HYPERLIPIDEMIA: ICD-10-CM

## 2018-12-03 DIAGNOSIS — I51.89 DIASTOLIC DYSFUNCTION: Chronic | ICD-10-CM

## 2018-12-03 DIAGNOSIS — E11.59 HYPERTENSION ASSOCIATED WITH DIABETES: Primary | ICD-10-CM

## 2018-12-03 DIAGNOSIS — E78.5 TYPE 2 DIABETES MELLITUS WITH HYPERLIPIDEMIA: ICD-10-CM

## 2018-12-03 DIAGNOSIS — M47.817 DJD (DEGENERATIVE JOINT DISEASE), LUMBOSACRAL: ICD-10-CM

## 2018-12-03 DIAGNOSIS — Z12.39 BREAST CANCER SCREENING: ICD-10-CM

## 2018-12-03 DIAGNOSIS — Z78.0 POST-MENOPAUSAL: ICD-10-CM

## 2018-12-03 DIAGNOSIS — J43.8 OTHER EMPHYSEMA: ICD-10-CM

## 2018-12-03 DIAGNOSIS — Z79.631 LONG TERM METHOTREXATE USER: ICD-10-CM

## 2018-12-03 PROBLEM — N18.30 CKD (CHRONIC KIDNEY DISEASE) STAGE 3, GFR 30-59 ML/MIN: Status: RESOLVED | Noted: 2017-01-18 | Resolved: 2018-12-03

## 2018-12-03 PROBLEM — K56.609 SBO (SMALL BOWEL OBSTRUCTION): Status: RESOLVED | Noted: 2017-07-08 | Resolved: 2018-12-03

## 2018-12-03 LAB
ALBUMIN SERPL BCP-MCNC: 4.3 G/DL
ALP SERPL-CCNC: 79 U/L
ALT SERPL W/O P-5'-P-CCNC: 26 U/L
ANION GAP SERPL CALC-SCNC: 9 MMOL/L
AST SERPL-CCNC: 27 U/L
BASOPHILS # BLD AUTO: 0.08 K/UL
BASOPHILS NFR BLD: 0.5 %
BILIRUB SERPL-MCNC: 0.3 MG/DL
BUN SERPL-MCNC: 28 MG/DL
CALCIUM SERPL-MCNC: 10.8 MG/DL
CHLORIDE SERPL-SCNC: 100 MMOL/L
CO2 SERPL-SCNC: 29 MMOL/L
CREAT SERPL-MCNC: 1.1 MG/DL
CRP SERPL-MCNC: 1.1 MG/L
DIFFERENTIAL METHOD: ABNORMAL
EOSINOPHIL # BLD AUTO: 0.1 K/UL
EOSINOPHIL NFR BLD: 0.8 %
ERYTHROCYTE [DISTWIDTH] IN BLOOD BY AUTOMATED COUNT: 12.8 %
ERYTHROCYTE [SEDIMENTATION RATE] IN BLOOD BY WESTERGREN METHOD: 15 MM/HR
EST. GFR  (AFRICAN AMERICAN): 58 ML/MIN/1.73 M^2
EST. GFR  (NON AFRICAN AMERICAN): 50.3 ML/MIN/1.73 M^2
ESTIMATED AVG GLUCOSE: 103 MG/DL
GLUCOSE SERPL-MCNC: 56 MG/DL
HBA1C MFR BLD HPLC: 5.2 %
HCT VFR BLD AUTO: 39.9 %
HGB BLD-MCNC: 12.8 G/DL
IMM GRANULOCYTES # BLD AUTO: 0.05 K/UL
IMM GRANULOCYTES NFR BLD AUTO: 0.3 %
LYMPHOCYTES # BLD AUTO: 6.4 K/UL
LYMPHOCYTES NFR BLD: 41.6 %
MCH RBC QN AUTO: 28.1 PG
MCHC RBC AUTO-ENTMCNC: 32.1 G/DL
MCV RBC AUTO: 88 FL
MONOCYTES # BLD AUTO: 1.2 K/UL
MONOCYTES NFR BLD: 7.7 %
NEUTROPHILS # BLD AUTO: 7.5 K/UL
NEUTROPHILS NFR BLD: 49.1 %
NRBC BLD-RTO: 0 /100 WBC
PLATELET # BLD AUTO: 125 K/UL
PMV BLD AUTO: 13 FL
POTASSIUM SERPL-SCNC: 4 MMOL/L
PROT SERPL-MCNC: 8.1 G/DL
RBC # BLD AUTO: 4.55 M/UL
SODIUM SERPL-SCNC: 138 MMOL/L
WBC # BLD AUTO: 15.38 K/UL

## 2018-12-03 PROCEDURE — 83036 HEMOGLOBIN GLYCOSYLATED A1C: CPT

## 2018-12-03 PROCEDURE — 86140 C-REACTIVE PROTEIN: CPT

## 2018-12-03 PROCEDURE — 85025 COMPLETE CBC W/AUTO DIFF WBC: CPT

## 2018-12-03 PROCEDURE — 99999 PR PBB SHADOW E&M-EST. PATIENT-LVL III: CPT | Mod: PBBFAC,,, | Performed by: FAMILY MEDICINE

## 2018-12-03 PROCEDURE — 90662 IIV NO PRSV INCREASED AG IM: CPT | Mod: PBBFAC,PO

## 2018-12-03 PROCEDURE — 99213 OFFICE O/P EST LOW 20 MIN: CPT | Mod: PBBFAC,PO,25 | Performed by: FAMILY MEDICINE

## 2018-12-03 PROCEDURE — 80053 COMPREHEN METABOLIC PANEL: CPT

## 2018-12-03 PROCEDURE — 36415 COLL VENOUS BLD VENIPUNCTURE: CPT | Mod: PO

## 2018-12-03 PROCEDURE — 99214 OFFICE O/P EST MOD 30 MIN: CPT | Mod: S$PBB,,, | Performed by: FAMILY MEDICINE

## 2018-12-03 PROCEDURE — 85651 RBC SED RATE NONAUTOMATED: CPT | Mod: PO

## 2018-12-03 NOTE — PROGRESS NOTES
"Subjective:       Patient ID: Pili Teixeira is a 73 y.o. female.    Chief Complaint: Follow-up    Pt is known to me.  Pt is here for followup of chronic medical issues.  The pt thinks that her glucoses are "about the same"--good control.  The pt has a dx of COPD--she has never smoked but had a significant hx of second hand smoke--she is not having any issues with cough.  She says that pain mgt told her the only help for her chronic back pain is surgery--she says she is high risk for anesthesia ("I flat lined.")        Review of Systems   Constitutional: Negative for activity change, appetite change, fatigue and unexpected weight change.   Eyes: Negative for visual disturbance.   Respiratory: Negative for cough, chest tightness and shortness of breath.    Cardiovascular: Negative for chest pain, palpitations and leg swelling.   Gastrointestinal: Negative for abdominal pain, constipation, diarrhea, nausea and vomiting.   Endocrine: Negative for cold intolerance, heat intolerance and polyuria.   Genitourinary: Negative for decreased urine volume and dysuria.   Musculoskeletal: Positive for arthralgias, gait problem, myalgias and neck pain. Negative for back pain.   Skin: Negative for rash.   Neurological: Positive for numbness (hands and feet). Negative for headaches.   Psychiatric/Behavioral: Negative for sleep disturbance.       Objective:       Vitals:    12/03/18 1327   BP: 118/78   Pulse: 88   Temp: 98.7 °F (37.1 °C)   TempSrc: Oral   SpO2: 97%   Weight: 78.7 kg (173 lb 8 oz)   Height: 5' 3" (1.6 m)     Physical Exam   Constitutional: She is oriented to person, place, and time. She appears well-developed and well-nourished.   HENT:   Head: Normocephalic.   Eyes: Conjunctivae and EOM are normal. Pupils are equal, round, and reactive to light.   Neck: Normal range of motion. Neck supple. No thyromegaly present.   Cardiovascular: Normal rate, regular rhythm and normal heart sounds.   Pulmonary/Chest: Effort normal " and breath sounds normal.   Abdominal: Soft. Bowel sounds are normal. There is no tenderness.   Musculoskeletal: Normal range of motion. She exhibits no tenderness or deformity.   Lymphadenopathy:     She has no cervical adenopathy.   Neurological: She is alert and oriented to person, place, and time. She displays normal reflexes. No cranial nerve deficit. She exhibits normal muscle tone. Coordination normal.   Skin: Skin is warm and dry.   Psychiatric: She has a normal mood and affect. Her behavior is normal.       Assessment:       1. Hypertension associated with diabetes    2. Post-menopausal    3. Diastolic dysfunction    4. Other emphysema    5. Type 2 diabetes mellitus with hyperlipidemia    6. DJD (degenerative joint disease), lumbosacral    7. Breast cancer screening        Plan:       Pili was seen today for follow-up.    Diagnoses and all orders for this visit:    Hypertension associated with diabetes    Post-menopausal  -     DXA Bone Density Spine And Hip; Future    Diastolic dysfunction    Other emphysema    Type 2 diabetes mellitus with hyperlipidemia  -     Hemoglobin A1c; Future    DJD (degenerative joint disease), lumbosacral    Breast cancer screening  -     Mammo Digital Screening Bilateral With CAD; Future      During this visit, I reviewed the pt's history, medications, allergies, and problem list.

## 2018-12-07 DIAGNOSIS — R76.8 RHEUMATOID FACTOR POSITIVE: ICD-10-CM

## 2018-12-07 DIAGNOSIS — M79.7 FIBROMYALGIA: ICD-10-CM

## 2018-12-07 DIAGNOSIS — M19.90 INFLAMMATORY ARTHRITIS: ICD-10-CM

## 2018-12-07 DIAGNOSIS — G47.00 INSOMNIA, UNSPECIFIED TYPE: ICD-10-CM

## 2018-12-07 RX ORDER — GABAPENTIN 100 MG/1
CAPSULE ORAL
Qty: 270 CAPSULE | Refills: 1 | Status: SHIPPED | OUTPATIENT
Start: 2018-12-07 | End: 2019-06-03 | Stop reason: SDUPTHER

## 2018-12-07 RX ORDER — FOLIC ACID 1 MG/1
TABLET ORAL
Qty: 30 TABLET | Refills: 5 | Status: SHIPPED | OUTPATIENT
Start: 2018-12-07 | End: 2019-12-11 | Stop reason: SDUPTHER

## 2018-12-07 RX ORDER — TRAZODONE HYDROCHLORIDE 50 MG/1
TABLET ORAL
Qty: 180 TABLET | Refills: 1 | Status: SHIPPED | OUTPATIENT
Start: 2018-12-07 | End: 2019-06-26 | Stop reason: SDUPTHER

## 2018-12-07 RX ORDER — BLOOD SUGAR DIAGNOSTIC
STRIP MISCELLANEOUS
Qty: 100 STRIP | Refills: 1 | Status: SHIPPED | OUTPATIENT
Start: 2018-12-07 | End: 2019-08-30 | Stop reason: SDUPTHER

## 2018-12-29 DIAGNOSIS — K21.00 REFLUX ESOPHAGITIS: ICD-10-CM

## 2018-12-29 RX ORDER — PANTOPRAZOLE SODIUM 40 MG/1
TABLET, DELAYED RELEASE ORAL
Qty: 90 TABLET | Refills: 0 | Status: SHIPPED | OUTPATIENT
Start: 2018-12-29 | End: 2019-03-29 | Stop reason: SDUPTHER

## 2019-01-10 ENCOUNTER — HOSPITAL ENCOUNTER (OUTPATIENT)
Dept: RADIOLOGY | Facility: CLINIC | Age: 74
Discharge: HOME | End: 2019-01-10
Attending: FAMILY MEDICINE
Payer: MEDICARE

## 2019-01-10 DIAGNOSIS — Z78.0 POST-MENOPAUSAL: ICD-10-CM

## 2019-01-10 DIAGNOSIS — Z12.39 BREAST CANCER SCREENING: ICD-10-CM

## 2019-01-10 PROCEDURE — 77080 DEXA BONE DENSITY SPINE HIP: ICD-10-PCS | Mod: 26,,, | Performed by: RADIOLOGY

## 2019-01-10 PROCEDURE — 77067 MAMMO DIGITAL SCREENING BILAT WITH TOMOSYNTHESIS_CAD: ICD-10-PCS | Mod: 26,,, | Performed by: RADIOLOGY

## 2019-01-10 PROCEDURE — 77063 BREAST TOMOSYNTHESIS BI: CPT | Mod: 26,,, | Performed by: RADIOLOGY

## 2019-01-10 PROCEDURE — 77063 MAMMO DIGITAL SCREENING BILAT WITH TOMOSYNTHESIS_CAD: ICD-10-PCS | Mod: 26,,, | Performed by: RADIOLOGY

## 2019-01-10 PROCEDURE — 77067 SCR MAMMO BI INCL CAD: CPT | Mod: 26,,, | Performed by: RADIOLOGY

## 2019-01-10 PROCEDURE — 77080 DXA BONE DENSITY AXIAL: CPT | Mod: TC,PO

## 2019-01-10 PROCEDURE — 77067 SCR MAMMO BI INCL CAD: CPT | Mod: TC,PO

## 2019-01-10 PROCEDURE — 77080 DXA BONE DENSITY AXIAL: CPT | Mod: 26,,, | Performed by: RADIOLOGY

## 2019-01-22 ENCOUNTER — TELEPHONE (OUTPATIENT)
Dept: FAMILY MEDICINE | Facility: CLINIC | Age: 74
End: 2019-01-22

## 2019-01-22 NOTE — TELEPHONE ENCOUNTER
----- Message from Yuki Newsome sent at 1/22/2019 10:47 AM CST -----  Contact: Brock navarrete/ [facility] ph# [phone]  Brock navarrete/ [employer] ph# [phone]  Patient need a new script which includes diagnosis code

## 2019-01-23 DIAGNOSIS — E08.00 DIABETES MELLITUS DUE TO UNDERLYING CONDITION WITH HYPEROSMOLARITY WITHOUT COMA, WITHOUT LONG-TERM CURRENT USE OF INSULIN: Primary | ICD-10-CM

## 2019-01-23 DIAGNOSIS — E11.69 TYPE 2 DIABETES MELLITUS WITH HYPERLIPIDEMIA: ICD-10-CM

## 2019-01-23 DIAGNOSIS — E78.5 TYPE 2 DIABETES MELLITUS WITH HYPERLIPIDEMIA: ICD-10-CM

## 2019-01-23 RX ORDER — LANCETS
EACH MISCELLANEOUS
Qty: 100 EACH | Refills: 2 | Status: SHIPPED | OUTPATIENT
Start: 2019-01-23 | End: 2022-01-07 | Stop reason: SDUPTHER

## 2019-01-23 RX ORDER — INSULIN PUMP SYRINGE, 3 ML
EACH MISCELLANEOUS
Qty: 1 EACH | Refills: 0 | Status: SHIPPED | OUTPATIENT
Start: 2019-01-23 | End: 2023-08-01

## 2019-01-23 NOTE — TELEPHONE ENCOUNTER
----- Message from Yuki Newsome sent at 1/23/2019 11:57 AM CST -----  Contact: Lana navarrete/ [facility] ph# [phone]  Lana navarrete/ [facility] ph# [phone]   calling in regards to test script prescription that been pending since 12/7/18, need diagnosis code and medical necessity on actual script, has to be escribed over

## 2019-02-22 DIAGNOSIS — E11.8 TYPE 2 DIABETES MELLITUS WITH COMPLICATION: ICD-10-CM

## 2019-02-25 RX ORDER — GLIMEPIRIDE 2 MG/1
TABLET ORAL
Qty: 90 TABLET | Refills: 0 | Status: SHIPPED | OUTPATIENT
Start: 2019-02-25 | End: 2019-04-16 | Stop reason: SDUPTHER

## 2019-03-29 DIAGNOSIS — K21.9 GASTROESOPHAGEAL REFLUX DISEASE, ESOPHAGITIS PRESENCE NOT SPECIFIED: ICD-10-CM

## 2019-03-29 DIAGNOSIS — K21.00 REFLUX ESOPHAGITIS: ICD-10-CM

## 2019-03-29 RX ORDER — PANTOPRAZOLE SODIUM 40 MG/1
TABLET, DELAYED RELEASE ORAL
Qty: 90 TABLET | Refills: 0 | Status: SHIPPED | OUTPATIENT
Start: 2019-03-29 | End: 2019-06-25 | Stop reason: SDUPTHER

## 2019-04-15 DIAGNOSIS — K21.9 GASTROESOPHAGEAL REFLUX DISEASE, ESOPHAGITIS PRESENCE NOT SPECIFIED: ICD-10-CM

## 2019-04-16 DIAGNOSIS — E11.8 TYPE 2 DIABETES MELLITUS WITH COMPLICATION: ICD-10-CM

## 2019-04-16 DIAGNOSIS — E11.9 TYPE 2 DIABETES MELLITUS WITHOUT COMPLICATION, WITH LONG-TERM CURRENT USE OF INSULIN: ICD-10-CM

## 2019-04-16 DIAGNOSIS — Z79.4 TYPE 2 DIABETES MELLITUS WITHOUT COMPLICATION, WITH LONG-TERM CURRENT USE OF INSULIN: ICD-10-CM

## 2019-04-17 RX ORDER — GLIMEPIRIDE 2 MG/1
TABLET ORAL
Qty: 90 TABLET | Refills: 0 | Status: SHIPPED | OUTPATIENT
Start: 2019-04-17 | End: 2019-06-03 | Stop reason: SDUPTHER

## 2019-04-17 RX ORDER — METFORMIN HYDROCHLORIDE 500 MG/1
TABLET ORAL
Qty: 180 TABLET | Refills: 3 | Status: SHIPPED | OUTPATIENT
Start: 2019-04-17 | End: 2019-06-03 | Stop reason: SDUPTHER

## 2019-05-16 DIAGNOSIS — I15.2 HYPERTENSION ASSOCIATED WITH DIABETES: ICD-10-CM

## 2019-05-16 DIAGNOSIS — R06.02 SOB (SHORTNESS OF BREATH): ICD-10-CM

## 2019-05-16 DIAGNOSIS — E11.59 HYPERTENSION ASSOCIATED WITH DIABETES: ICD-10-CM

## 2019-05-16 DIAGNOSIS — N18.30 CKD (CHRONIC KIDNEY DISEASE) STAGE 3, GFR 30-59 ML/MIN: ICD-10-CM

## 2019-05-16 DIAGNOSIS — G45.8 OTHER SPECIFIED TRANSIENT CEREBRAL ISCHEMIAS: Chronic | ICD-10-CM

## 2019-05-17 DIAGNOSIS — K21.9 GASTROESOPHAGEAL REFLUX DISEASE, ESOPHAGITIS PRESENCE NOT SPECIFIED: ICD-10-CM

## 2019-05-17 RX ORDER — VALSARTAN AND HYDROCHLOROTHIAZIDE 160; 25 MG/1; MG/1
TABLET ORAL
Qty: 90 TABLET | Refills: 4 | Status: SHIPPED | OUTPATIENT
Start: 2019-05-17 | End: 2020-01-15

## 2019-05-22 ENCOUNTER — PATIENT OUTREACH (OUTPATIENT)
Dept: ADMINISTRATIVE | Facility: HOSPITAL | Age: 74
End: 2019-05-22

## 2019-05-22 ENCOUNTER — TELEPHONE (OUTPATIENT)
Dept: FAMILY MEDICINE | Facility: CLINIC | Age: 74
End: 2019-05-22

## 2019-05-22 NOTE — LETTER
May 28, 2019    Pili Teixeira  [address] [place] [id number] [facility] is committed to your overall health.  To help you get the most out of each of your visits, we will review your information to make sure you are up to date on all of your recommended tests and/or procedures.       ANKIT Huggins MD  has found that your chart shows you may be due for the following:     Foot Exam   Lipid Panel     If you have had any of the above done at another facility, please bring the records with you or FAX them to [phone] so that your record at [facility] will be complete.  If you have not had any of these tests or procedures done recently and would like to complete this testing, please call [phone] or send a message through your [facility] portal to your provider's office.     If you have an upcoming scheduled appointment for the above test and/or procedures, please disregard this letter.        Sincerely,     Fernanda William MA  [phone]

## 2019-05-22 NOTE — PROGRESS NOTES
Health Maintenance Due   Topic Date Due    Foot Exam  01/31/2019    Lipid Panel  02/01/2019       Chart reviewed/scrubbed on 05/22/2019\

## 2019-06-03 ENCOUNTER — OFFICE VISIT (OUTPATIENT)
Dept: FAMILY MEDICINE | Facility: CLINIC | Age: 74
End: 2019-06-03
Payer: MEDICARE

## 2019-06-03 VITALS
SYSTOLIC BLOOD PRESSURE: 114 MMHG | HEART RATE: 80 BPM | BODY MASS INDEX: 30.59 KG/M2 | HEIGHT: 63 IN | WEIGHT: 172.63 LBS | DIASTOLIC BLOOD PRESSURE: 70 MMHG

## 2019-06-03 DIAGNOSIS — D50.9 IRON DEFICIENCY ANEMIA, UNSPECIFIED IRON DEFICIENCY ANEMIA TYPE: ICD-10-CM

## 2019-06-03 DIAGNOSIS — M05.741 RHEUMATOID ARTHRITIS INVOLVING BOTH HANDS WITH POSITIVE RHEUMATOID FACTOR: ICD-10-CM

## 2019-06-03 DIAGNOSIS — M47.817 DJD (DEGENERATIVE JOINT DISEASE), LUMBOSACRAL: ICD-10-CM

## 2019-06-03 DIAGNOSIS — M35.01 SJOGREN'S SYNDROME WITH KERATOCONJUNCTIVITIS SICCA: ICD-10-CM

## 2019-06-03 DIAGNOSIS — I15.2 HYPERTENSION ASSOCIATED WITH DIABETES: Primary | ICD-10-CM

## 2019-06-03 DIAGNOSIS — E11.8 TYPE 2 DIABETES MELLITUS WITH COMPLICATION: ICD-10-CM

## 2019-06-03 DIAGNOSIS — E78.2 MIXED HYPERLIPIDEMIA: ICD-10-CM

## 2019-06-03 DIAGNOSIS — E11.9 TYPE 2 DIABETES MELLITUS WITHOUT COMPLICATION, WITH LONG-TERM CURRENT USE OF INSULIN: ICD-10-CM

## 2019-06-03 DIAGNOSIS — M05.742 RHEUMATOID ARTHRITIS INVOLVING BOTH HANDS WITH POSITIVE RHEUMATOID FACTOR: ICD-10-CM

## 2019-06-03 DIAGNOSIS — E11.69 TYPE 2 DIABETES MELLITUS WITH HYPERLIPIDEMIA: ICD-10-CM

## 2019-06-03 DIAGNOSIS — M79.7 FIBROMYALGIA: ICD-10-CM

## 2019-06-03 DIAGNOSIS — E78.5 TYPE 2 DIABETES MELLITUS WITH HYPERLIPIDEMIA: ICD-10-CM

## 2019-06-03 DIAGNOSIS — Z79.4 TYPE 2 DIABETES MELLITUS WITHOUT COMPLICATION, WITH LONG-TERM CURRENT USE OF INSULIN: ICD-10-CM

## 2019-06-03 DIAGNOSIS — E11.59 HYPERTENSION ASSOCIATED WITH DIABETES: Primary | ICD-10-CM

## 2019-06-03 PROCEDURE — 99999 PR PBB SHADOW E&M-EST. PATIENT-LVL III: ICD-10-PCS | Mod: PBBFAC,,, | Performed by: FAMILY MEDICINE

## 2019-06-03 PROCEDURE — 99213 OFFICE O/P EST LOW 20 MIN: CPT | Mod: PBBFAC,PO | Performed by: FAMILY MEDICINE

## 2019-06-03 PROCEDURE — 99214 OFFICE O/P EST MOD 30 MIN: CPT | Mod: S$PBB,,, | Performed by: FAMILY MEDICINE

## 2019-06-03 PROCEDURE — 99214 PR OFFICE/OUTPT VISIT, EST, LEVL IV, 30-39 MIN: ICD-10-PCS | Mod: S$PBB,,, | Performed by: FAMILY MEDICINE

## 2019-06-03 PROCEDURE — 99999 PR PBB SHADOW E&M-EST. PATIENT-LVL III: CPT | Mod: PBBFAC,,, | Performed by: FAMILY MEDICINE

## 2019-06-03 RX ORDER — SAXAGLIPTIN 5 MG/1
5 TABLET, FILM COATED ORAL DAILY
Qty: 90 TABLET | Refills: 3 | Status: SHIPPED | OUTPATIENT
Start: 2019-06-03 | End: 2019-09-09 | Stop reason: SDUPTHER

## 2019-06-03 RX ORDER — GLIMEPIRIDE 2 MG/1
TABLET ORAL
Qty: 90 TABLET | Refills: 3 | Status: SHIPPED | OUTPATIENT
Start: 2019-06-03 | End: 2020-05-20 | Stop reason: SDUPTHER

## 2019-06-03 RX ORDER — GABAPENTIN 100 MG/1
100 CAPSULE ORAL 3 TIMES DAILY
Qty: 270 CAPSULE | Refills: 1 | Status: SHIPPED | OUTPATIENT
Start: 2019-06-03 | End: 2021-02-04 | Stop reason: SDUPTHER

## 2019-06-03 RX ORDER — METFORMIN HYDROCHLORIDE 500 MG/1
500 TABLET ORAL 2 TIMES DAILY WITH MEALS
Qty: 180 TABLET | Refills: 3 | Status: SHIPPED | OUTPATIENT
Start: 2019-06-03 | End: 2020-06-17

## 2019-06-03 NOTE — PROGRESS NOTES
"Subjective:       Patient ID: Pili Teixeira is a 74 y.o. female.    Chief Complaint: Annual Exam    Pt is known to me.  Pt is here for followup of chronic medical issues.  The pt is doing well and says her glucose are "up and down."  Her high glucose is about 170 but that is rare.  She continues to see her rheumatologist--she is still on oxycodone.  She sees pain mgt.    Review of Systems   Constitutional: Negative for activity change, appetite change, fatigue and unexpected weight change.   Eyes: Negative for visual disturbance.   Respiratory: Negative for cough, chest tightness and shortness of breath.    Cardiovascular: Negative for chest pain, palpitations and leg swelling.   Gastrointestinal: Negative for abdominal pain, constipation, diarrhea, nausea and vomiting.   Endocrine: Negative for cold intolerance, heat intolerance and polyuria.   Genitourinary: Negative for decreased urine volume and dysuria.   Musculoskeletal: Positive for arthralgias. Negative for back pain.   Skin: Negative for rash.   Neurological: Negative for numbness and headaches.       Objective:       Vitals:    06/03/19 1347   BP: 114/70   BP Location: Right arm   Patient Position: Sitting   BP Method: Medium (Manual)   Pulse: 80   Weight: 78.3 kg (172 lb 9.9 oz)   Height: 5' 3" (1.6 m)     Physical Exam   Constitutional: She appears well-developed and well-nourished. No distress.   HENT:   Head: Normocephalic and atraumatic.   Eyes: Conjunctivae are normal. No scleral icterus.   Neck: Normal range of motion. Neck supple. No tracheal deviation present. No thyromegaly present.   Cardiovascular: Normal rate, regular rhythm, normal heart sounds and intact distal pulses. Exam reveals no gallop and no friction rub.   No murmur heard.  Pulses:       Dorsalis pedis pulses are 2+ on the right side, and 2+ on the left side.        Posterior tibial pulses are 2+ on the right side, and 2+ on the left side.   Pulmonary/Chest: Effort normal and " breath sounds normal. No respiratory distress. She has no wheezes. She has no rales.   Musculoskeletal: She exhibits tenderness (diffuse muscles). She exhibits no edema.        Right foot: There is normal range of motion and no deformity.        Left foot: There is normal range of motion and no deformity.   Feet:   Right Foot:   Protective Sensation: 7 sites tested. 7 sites sensed.   Skin Integrity: Negative for ulcer.   Left Foot:   Protective Sensation: 7 sites tested. 7 sites sensed.   Skin Integrity: Negative for ulcer.   Lymphadenopathy:     She has no cervical adenopathy.   Skin: Skin is dry. No rash noted.   Psychiatric: She has a normal mood and affect. Her behavior is normal. Judgment and thought content normal.   Vitals reviewed.      Assessment:       1. Hypertension associated with diabetes    2. Mixed hyperlipidemia    3. DJD (degenerative joint disease), lumbosacral    4. Sjogren's syndrome with keratoconjunctivitis sicca    5. Iron deficiency anemia, unspecified iron deficiency anemia type    6. Type 2 diabetes mellitus with hyperlipidemia    7. Rheumatoid arthritis involving both hands with positive rheumatoid factor    8. Type 2 diabetes mellitus without complication, with long-term current use of insulin    9. Type 2 diabetes mellitus with complication    10. Fibromyalgia        Plan:       Pili was seen today for annual exam.    Diagnoses and all orders for this visit:    Hypertension associated with diabetes  -     Comprehensive metabolic panel; Future    Mixed hyperlipidemia  -     Lipid panel; Future    DJD (degenerative joint disease), lumbosacral    Sjogren's syndrome with keratoconjunctivitis sicca    Iron deficiency anemia, unspecified iron deficiency anemia type  -     CBC auto differential; Future    Type 2 diabetes mellitus with hyperlipidemia  -     Hemoglobin A1c; Future    Rheumatoid arthritis involving both hands with positive rheumatoid factor    Type 2 diabetes mellitus without  complication, with long-term current use of insulin  -     metFORMIN (GLUCOPHAGE) 500 MG tablet; Take 1 tablet (500 mg total) by mouth 2 (two) times daily with meals.  -     SAXagliptin (ONGLYZA) 5 mg Tab tablet; Take 1 tablet (5 mg total) by mouth once daily.    Type 2 diabetes mellitus with complication  -     glimepiride (AMARYL) 2 MG tablet; TAKE 1 TABLET BY MOUTH IN THE MORNING BEFORE BREAKFAST    Fibromyalgia  -     gabapentin (NEURONTIN) 100 MG capsule; Take 1 capsule (100 mg total) by mouth 3 (three) times daily.      During this visit, I reviewed the pt's history, medications, allergies, and problem list.

## 2019-06-25 DIAGNOSIS — K21.00 REFLUX ESOPHAGITIS: ICD-10-CM

## 2019-06-26 DIAGNOSIS — G47.00 INSOMNIA, UNSPECIFIED TYPE: ICD-10-CM

## 2019-06-26 RX ORDER — TRAZODONE HYDROCHLORIDE 50 MG/1
TABLET ORAL
Qty: 180 TABLET | Refills: 1 | Status: SHIPPED | OUTPATIENT
Start: 2019-06-26 | End: 2021-02-13 | Stop reason: SDUPTHER

## 2019-06-26 RX ORDER — PANTOPRAZOLE SODIUM 40 MG/1
TABLET, DELAYED RELEASE ORAL
Qty: 90 TABLET | Refills: 0 | Status: SHIPPED | OUTPATIENT
Start: 2019-06-26 | End: 2019-09-21 | Stop reason: SDUPTHER

## 2019-07-02 ENCOUNTER — OUTPATIENT CASE MANAGEMENT (OUTPATIENT)
Dept: ADMINISTRATIVE | Facility: OTHER | Age: 74
End: 2019-07-02

## 2019-07-02 DIAGNOSIS — E11.59 HYPERTENSION ASSOCIATED WITH DIABETES: ICD-10-CM

## 2019-07-02 DIAGNOSIS — I15.2 HYPERTENSION ASSOCIATED WITH DIABETES: ICD-10-CM

## 2019-07-02 DIAGNOSIS — D69.6 THROMBOCYTOPENIA: Primary | Chronic | ICD-10-CM

## 2019-07-02 NOTE — PROGRESS NOTES
Please note, patient's information has been sent to Outpatient Care Management for high risk screening.    Reason for Referral:   Thrombocytopenia  hypertension    Please contact O[facility] with any questions (ext. [phone]).    Thank you,    Nimisha Carter RN,CCM

## 2019-07-12 ENCOUNTER — OUTPATIENT CASE MANAGEMENT (OUTPATIENT)
Dept: ADMINISTRATIVE | Facility: OTHER | Age: 74
End: 2019-07-12

## 2019-07-12 NOTE — LETTER
July 15, 2019    Pili Teixeira  [address] [facility] [place] [address]             [facility] [address] [place] [address] Dear MS Teixeira:    Welcome to [facility]s Complex Care Management Program.  It was a pleasure talking with you today.  My name is Yana Gallagher RN CCM and I look forward to being your Care Manager.  My goal is to help you function at the healthiest and highest level possible.  You can contact me directly at [phone].    As an [facility] patient, some of the services we may be able to provide include:      Development of an individualized care plan with a Registered Nurse    Connection with a    Connection with available resources and services     Coordinate communication among your care team members    Provide coaching and education    Help you understand your doctors treatment plan   Help you obtain information about your insurance coverage.     All services provided by [facility]s Complex Care Managers and other care team members are coordinated with and communicated to your primary care team.    As part of your enrollment, you will be receiving education materials and more information about these services in your [facility] account, by phone or through the mail.  If you do not wish to participate or receive information, please contact our office at [phone].      Sincerely,        Yana Gallagher RN CCM   [facility]   Out-patient RN Complex Care Manager

## 2019-07-15 NOTE — PROGRESS NOTES
07/15/19-SUMMARY-  Called and spoke to Pili Teixeira, 74 year old female with a PMhx of HTN, diabetes, DJD, falls, iron deficiency anemia,Sjogren's syndrome and RA. Patient lives alone. Her children and grandchildren are in and out of the house. Patient or daughter, Kareen drives patient to her appointments. A1C on 12/03/18 was 5.9. Patient checks her blood glucose two times a day. Patient knows the signs and symptoms of hypoglycemia/hyperglycemia. Patient's son in law had a massive MI on 04/26/19. Patient is grieving but declines grief support information. Patient eats two to three meals a day but does not like the taste of Glucerna. Patient drinks milk. Patient has a blood pressure cuff and takes her blood pressure if needed. Patient needs a hearing aide but can not afford one. Patient has sleep apena but has not wore her CPAP in years. The machine is over 10 years old. Patient understands she would need to have a new sleep study. Patient complaining of fatigue and shortness of breath. Patient has iron deficiency anemia. Patient is going to come this week to get her labs drawn. Patient receives $900.00 a month but states that she is ok financially. Continue to educate about falls/safety and anemia. Review signs and symptoms of  Anemia. Encourage patient to follow medication and treatment regiment. Encourage patient to maintain follow up with doctors.     INTERVENTIONS-  Med Rec done  Message to Dr. Wilson- patient stopped taking Methotrexate 4 months ago- asking if she sHOULD  be taking this medication.  Patient declines [facility] LCSW at this time.     PLAN-  Follow up in two weeks- prefers to be called in the am   Mail [facility]  info for hearing aides.   Mail senior resource guide - patient asking about dental services   Mail educational info for falls/safety, anemia and nutrition      Todays O[facility] Self-Management Care Plan was developed with the patients/caregivers input and was based on  identified barriers from todays assessment.  Goals were written today with the patient/caregiver and the patient has agreed to work towards these goals to improve his/her overall well-being. Patient verbalized understanding of the care plan, goals, and all of today's instructions. Encouraged patient/caregiver to communicate with his/her physician and health care team about health conditions and the treatment plan.  Provided my contact information today and encouraged patient/caregiver to call me with any questions as needed.

## 2019-07-29 ENCOUNTER — OUTPATIENT CASE MANAGEMENT (OUTPATIENT)
Dept: ADMINISTRATIVE | Facility: OTHER | Age: 74
End: 2019-07-29

## 2019-07-29 NOTE — PROGRESS NOTES
07/29/19-SUMMARY-  Attempted to follow up with  patient for outpatient case management. Patient is leaving to go to her eye appointment. Will follow up with patient tomorrow afternoon per her request. 1'st follow up attempt.   07/30/19-  Called and spoke to patient. States that she is lying in bed. She picked up a case of water and now her hernias are hurting her. Patient declines going to the ER. Patient states that she needs surgery but she is a high risk. Patient declines having a message sent to her PCP. Patient received the material mailed to her but has not looked at it. Will follow up with patient next week.     INTERVENTIONS-  Message to Dr. Wilson- patient stopped taking Methotrexate 4 months ago- asking if she sHOULD  be taking this medication.  Patient declines [facility] LCSW at this time.     PLAN-  Follow up next week - prefers to be called in the am   Review  [facility]  info for hearing aides.   Review senior resource guide - patient asking about dental services   Review  educational info for falls/safety, anemia and nutrition      Todays [facility] Self-Management Care Plan was developed with the patients/caregivers input and was based on identified barriers from todays assessment.  Goals were written today with the patient/caregiver and the patient has agreed to work towards these goals to improve his/her overall well-being. Patient verbalized understanding of the care plan, goals, and all of today's instructions. Encouraged patient/caregiver to communicate with his/her physician and health care team about health conditions and the treatment plan.  Provided my contact information today and encouraged patient/caregiver to call me with any questions as needed.

## 2019-08-06 ENCOUNTER — OUTPATIENT CASE MANAGEMENT (OUTPATIENT)
Dept: ADMINISTRATIVE | Facility: OTHER | Age: 74
End: 2019-08-06

## 2019-08-06 NOTE — PROGRESS NOTES
08/06/19-SUMMARY-  Attempted to follow up with  patient for outpatient case management. No answer. Left message requesting call back. RN OPCM 1'st follow up attempt.   08/13/19-  Called and spoke to patient. Patient states that she is tired but she feels better. Gave patient the phone number to call to have her labs drawn since she missed her appointment on 06/11/19. Reviewed with patient, signs  and symptoms of anemia and the importance of early intervention, verbalized understanding.Patint with no falls. Patient received educational material mailed to her but has not looked at it. Patient is going to have her daughter look at the information mailed to her.  Continue to educate about falls/safety and anemia. Review signs and symptoms of  Anemia. Encourage patient to follow medication and treatment regiment. Encourage patient to maintain follow up with doctors.      INTERVENTIONS-  Follow up   · Encourage Dietary Compliance.  · Encourage Medication Compliance.  · Recognize and provide educational material (JEISON).  · Review eating/nutrition habits.    PLAN-  Follow up in two weeks- prefers to be called in the am   Mailed [facility]/deaf  info for hearing aides. - done   Mailed senior resource guide - patient asking about dental services - done   Patient/caregiver will verbalize importance of having a safe home environment by keeping room free of clutter, making sure no electrical cords placed in walk ways, making sure rooms are well lit, placing non-skid bath mats and removing throw rugs within 2 weeks.           [facility] Self-Management Care Plan was developed with the patients/caregivers input and was based on identified barriers from todays assessment.  Goals were written today with the patient/caregiver and the patient has agreed to work towards these goals to improve his/her overall well-being. Patient verbalized understanding of the care plan, goals, and all of today's instructions.  Encouraged patient/caregiver to communicate with his/her physician and health care team about health conditions and the treatment plan.  Provided my contact information today and encouraged patient/caregiver to call me with any questions as needed.

## 2019-08-08 DIAGNOSIS — I15.2 HYPERTENSION ASSOCIATED WITH DIABETES: ICD-10-CM

## 2019-08-08 DIAGNOSIS — N18.30 CKD (CHRONIC KIDNEY DISEASE) STAGE 3, GFR 30-59 ML/MIN: ICD-10-CM

## 2019-08-08 DIAGNOSIS — E11.59 HYPERTENSION ASSOCIATED WITH DIABETES: ICD-10-CM

## 2019-08-08 DIAGNOSIS — G45.8 OTHER SPECIFIED TRANSIENT CEREBRAL ISCHEMIAS: Chronic | ICD-10-CM

## 2019-08-08 DIAGNOSIS — R06.02 SOB (SHORTNESS OF BREATH): ICD-10-CM

## 2019-08-08 RX ORDER — CLOPIDOGREL BISULFATE 75 MG/1
TABLET ORAL
Qty: 90 TABLET | Refills: 4 | Status: SHIPPED | OUTPATIENT
Start: 2019-08-08 | End: 2020-10-20

## 2019-08-21 ENCOUNTER — LAB VISIT (OUTPATIENT)
Dept: LAB | Facility: HOSPITAL | Age: 74
End: 2019-08-21
Attending: FAMILY MEDICINE
Payer: MEDICARE

## 2019-08-21 DIAGNOSIS — I15.2 HYPERTENSION ASSOCIATED WITH DIABETES: ICD-10-CM

## 2019-08-21 DIAGNOSIS — E78.2 MIXED HYPERLIPIDEMIA: ICD-10-CM

## 2019-08-21 DIAGNOSIS — E11.69 TYPE 2 DIABETES MELLITUS WITH HYPERLIPIDEMIA: ICD-10-CM

## 2019-08-21 DIAGNOSIS — E11.59 HYPERTENSION ASSOCIATED WITH DIABETES: ICD-10-CM

## 2019-08-21 DIAGNOSIS — D50.9 IRON DEFICIENCY ANEMIA, UNSPECIFIED IRON DEFICIENCY ANEMIA TYPE: ICD-10-CM

## 2019-08-21 DIAGNOSIS — E78.5 TYPE 2 DIABETES MELLITUS WITH HYPERLIPIDEMIA: ICD-10-CM

## 2019-08-21 LAB
ALBUMIN SERPL BCP-MCNC: 4 G/DL (ref 3.5–5.2)
ALP SERPL-CCNC: 57 U/L (ref 55–135)
ALT SERPL W/O P-5'-P-CCNC: 23 U/L (ref 10–44)
ANION GAP SERPL CALC-SCNC: 11 MMOL/L (ref 8–16)
AST SERPL-CCNC: 29 U/L (ref 10–40)
BASOPHILS # BLD AUTO: 0.05 K/UL (ref 0–0.2)
BASOPHILS NFR BLD: 0.6 % (ref 0–1.9)
BILIRUB SERPL-MCNC: 0.5 MG/DL (ref 0.1–1)
BUN SERPL-MCNC: 19 MG/DL (ref 8–23)
CALCIUM SERPL-MCNC: 10.8 MG/DL (ref 8.7–10.5)
CHLORIDE SERPL-SCNC: 102 MMOL/L (ref 95–110)
CHOLEST SERPL-MCNC: 137 MG/DL (ref 120–199)
CHOLEST/HDLC SERPL: 2.7 {RATIO} (ref 2–5)
CO2 SERPL-SCNC: 28 MMOL/L (ref 23–29)
CREAT SERPL-MCNC: 0.9 MG/DL (ref 0.5–1.4)
DIFFERENTIAL METHOD: ABNORMAL
EOSINOPHIL # BLD AUTO: 0.1 K/UL (ref 0–0.5)
EOSINOPHIL NFR BLD: 1.4 % (ref 0–8)
ERYTHROCYTE [DISTWIDTH] IN BLOOD BY AUTOMATED COUNT: 12.9 % (ref 11.5–14.5)
EST. GFR  (AFRICAN AMERICAN): >60 ML/MIN/1.73 M^2
EST. GFR  (NON AFRICAN AMERICAN): >60 ML/MIN/1.73 M^2
ESTIMATED AVG GLUCOSE: 117 MG/DL (ref 68–131)
GLUCOSE SERPL-MCNC: 113 MG/DL (ref 70–110)
HBA1C MFR BLD HPLC: 5.7 % (ref 4–5.6)
HCT VFR BLD AUTO: 39.7 % (ref 37–48.5)
HDLC SERPL-MCNC: 51 MG/DL (ref 40–75)
HDLC SERPL: 37.2 % (ref 20–50)
HGB BLD-MCNC: 12.4 G/DL (ref 12–16)
IMM GRANULOCYTES # BLD AUTO: 0.04 K/UL (ref 0–0.04)
IMM GRANULOCYTES NFR BLD AUTO: 0.5 % (ref 0–0.5)
LDLC SERPL CALC-MCNC: 62.4 MG/DL (ref 63–159)
LYMPHOCYTES # BLD AUTO: 3 K/UL (ref 1–4.8)
LYMPHOCYTES NFR BLD: 37.5 % (ref 18–48)
MCH RBC QN AUTO: 27.6 PG (ref 27–31)
MCHC RBC AUTO-ENTMCNC: 31.2 G/DL (ref 32–36)
MCV RBC AUTO: 88 FL (ref 82–98)
MONOCYTES # BLD AUTO: 0.6 K/UL (ref 0.3–1)
MONOCYTES NFR BLD: 7.5 % (ref 4–15)
NEUTROPHILS # BLD AUTO: 4.3 K/UL (ref 1.8–7.7)
NEUTROPHILS NFR BLD: 52.5 % (ref 38–73)
NONHDLC SERPL-MCNC: 86 MG/DL
NRBC BLD-RTO: 0 /100 WBC
PLATELET # BLD AUTO: 129 K/UL (ref 150–350)
PMV BLD AUTO: 12.8 FL (ref 9.2–12.9)
POTASSIUM SERPL-SCNC: 3.7 MMOL/L (ref 3.5–5.1)
PROT SERPL-MCNC: 7.4 G/DL (ref 6–8.4)
RBC # BLD AUTO: 4.49 M/UL (ref 4–5.4)
SODIUM SERPL-SCNC: 141 MMOL/L (ref 136–145)
TRIGL SERPL-MCNC: 118 MG/DL (ref 30–150)
WBC # BLD AUTO: 8.11 K/UL (ref 3.9–12.7)

## 2019-08-21 PROCEDURE — 80061 LIPID PANEL: CPT

## 2019-08-21 PROCEDURE — 83036 HEMOGLOBIN GLYCOSYLATED A1C: CPT

## 2019-08-21 PROCEDURE — 36415 COLL VENOUS BLD VENIPUNCTURE: CPT | Mod: PO

## 2019-08-21 PROCEDURE — 80053 COMPREHEN METABOLIC PANEL: CPT

## 2019-08-21 PROCEDURE — 85025 COMPLETE CBC W/AUTO DIFF WBC: CPT

## 2019-08-28 ENCOUNTER — OUTPATIENT CASE MANAGEMENT (OUTPATIENT)
Dept: ADMINISTRATIVE | Facility: OTHER | Age: 74
End: 2019-08-28

## 2019-08-28 NOTE — PROGRESS NOTES
08/28/19-SUMMARY-  Attempt to follow up with  patient for outpatient case management. No answer. Left message requesting call back. RN OPCM 1'st follow up attempt.   09/05/19-  Called and patient is not available at this time. RN OPCM 2'nd follow up attempt. Will mail letter to patient.   09/16/19-   Called and spoke to patient who states that she is doing good. She has my contact information if she would need OPCM in the future. Will close case and dis-enroll patient since she does not require OPCM at this time.     INTERVENTIONS-  Case Closrue     PLAN-  Case Closure            Todays OPCM Self-Management Care Plan was developed with the patients/caregivers input and was based on identified barriers from todays assessment.  Goals were written today with the patient/caregiver and the patient has agreed to work towards these goals to improve his/her overall well-being. Patient verbalized understanding of the care plan, goals, and all of today's instructions. Encouraged patient/caregiver to communicate with his/her physician and health care team about health conditions and the treatment plan.  Provided my contact information today and encouraged patient/caregiver to call me with any questions as needed.

## 2019-08-28 NOTE — LETTER
September 5, 2019    Pili Teixeira  [address] [address]             [facility] [address] [place] [address] Dear MS Teixeira:    I work with [facility]s Outpatient Case Management Department. I recently tried to contact you to see how you have been doing. I was unable to reach you. If you would like to discuss any concerns with your health maintenance or progress or services, you may call Outpatient Case Management. My phone number is [phone].    The Outpatient Case Management Department can be reached at [phone] from 8:00 am to 4:30 pm on Monday thru Friday. [facility] also has a program where a nurse is available 24/7 to answer questions or provide medical advice, their phone number is      [phone].    Sincerely,       Yana Gallagher RN [place] [facility]  Outpatient Complex Care Management    [phone]

## 2019-08-29 NOTE — TELEPHONE ENCOUNTER
----- Message from Angelica Meza sent at 8/29/2019  3:09 PM CDT -----  Contact: self [phone]  She is calling to follow up on the test strips.  Shun told her it needs a prior authorization.  She is out of them and unable to test her blood.  Thank you!

## 2019-08-29 NOTE — TELEPHONE ENCOUNTER
----- Message from Marjorie Lind sent at 8/29/2019  9:29 AM CDT -----  Contact: Anna navarrete/ [facility]  Type:  Pharmacy Calling to Clarify an RX    Name of Caller:  Anna  Ph[facility] Name:  [facility]  Prescription Name:  ACCU-CHEK JACINTO PLUS TEST STRP Strp  What do they need to clarify?:  Anna adv'd their system will not allow them to fill it. Pls send over a new script for this pt. They are not able to pull up the rx/computer issues  Best Call Back Number:  [phone]  Additional Information:  Dx code  and  pls send in dx code on script. Pls call if any questions. Pls send electronically

## 2019-08-29 NOTE — TELEPHONE ENCOUNTER
Pharmacy requesting a new Rx for ACCU-CHEK JACINTO PLUS TEST STRP, because pharmacy is unable to fill rx due to computer issues and they are unable to pull it up. Please advise.    Dx code  and  pls send in dx code on script. Pls call if any questions. Pls send electronically per Pharmacy.

## 2019-08-30 DIAGNOSIS — E78.5 TYPE 2 DIABETES MELLITUS WITH HYPERLIPIDEMIA: ICD-10-CM

## 2019-08-30 DIAGNOSIS — E11.69 TYPE 2 DIABETES MELLITUS WITH HYPERLIPIDEMIA: ICD-10-CM

## 2019-08-30 DIAGNOSIS — E08.00 DIABETES MELLITUS DUE TO UNDERLYING CONDITION WITH HYPEROSMOLARITY WITHOUT COMA, WITHOUT LONG-TERM CURRENT USE OF INSULIN: ICD-10-CM

## 2019-08-30 RX ORDER — BLOOD SUGAR DIAGNOSTIC
STRIP MISCELLANEOUS
Qty: 300 STRIP | Refills: 1 | Status: SHIPPED | OUTPATIENT
Start: 2019-08-30 | End: 2020-12-29 | Stop reason: SDUPTHER

## 2019-08-30 NOTE — TELEPHONE ENCOUNTER
Spoke with pt informed her that medication refill request has been sent to Dr Huggins for review she verbalized that she understood

## 2019-08-30 NOTE — TELEPHONE ENCOUNTER
"Pharmacy is requesting you send the Rx exactly how you sent it however in notes add "diagnosis code E11.9" in order to bill Medicare Part B.  "

## 2019-08-30 NOTE — TELEPHONE ENCOUNTER
----- Message from Faby Bryan sent at 8/30/2019  9:53 AM CDT -----  Contact: pt [phone]  Patient called Monday she has been trying to get a refill and Authorization on her Test strips to be called into [facility]'s pharmacy. Patient is out of her test strips.

## 2019-08-30 NOTE — TELEPHONE ENCOUNTER
----- Message from Suzie Cotto sent at 8/30/2019 10:06 AM CDT -----  Contact: Jadyn/[facility]  Type:  Pharmacy Calling to Clarify an RX    Name of Caller:  Jadyn  Pharmacy Name:  [facility]  Prescription Name:  ACCU-ENDER JACINTO PLUS TEST STRP Strp  What do they need to clarify?:  Medicare requires diagnosis code before they will pay for it   Best Call Back Number:  [phone]  Additional Information:na

## 2019-08-31 DIAGNOSIS — R06.02 SOB (SHORTNESS OF BREATH): ICD-10-CM

## 2019-08-31 DIAGNOSIS — N18.30 CKD (CHRONIC KIDNEY DISEASE) STAGE 3, GFR 30-59 ML/MIN: ICD-10-CM

## 2019-08-31 DIAGNOSIS — G45.8 OTHER SPECIFIED TRANSIENT CEREBRAL ISCHEMIAS: Chronic | ICD-10-CM

## 2019-08-31 DIAGNOSIS — E11.59 HYPERTENSION ASSOCIATED WITH DIABETES: ICD-10-CM

## 2019-08-31 DIAGNOSIS — I15.2 HYPERTENSION ASSOCIATED WITH DIABETES: ICD-10-CM

## 2019-09-02 RX ORDER — ROSUVASTATIN CALCIUM 10 MG/1
TABLET, COATED ORAL
Qty: 70 TABLET | Refills: 4 | Status: SHIPPED | OUTPATIENT
Start: 2019-09-02 | End: 2020-09-15

## 2019-09-06 DIAGNOSIS — E11.59 HYPERTENSION ASSOCIATED WITH DIABETES: ICD-10-CM

## 2019-09-06 DIAGNOSIS — N18.30 CKD (CHRONIC KIDNEY DISEASE) STAGE 3, GFR 30-59 ML/MIN: ICD-10-CM

## 2019-09-06 DIAGNOSIS — R06.02 SOB (SHORTNESS OF BREATH): ICD-10-CM

## 2019-09-06 DIAGNOSIS — I15.2 HYPERTENSION ASSOCIATED WITH DIABETES: ICD-10-CM

## 2019-09-06 DIAGNOSIS — G45.8 OTHER SPECIFIED TRANSIENT CEREBRAL ISCHEMIAS: Chronic | ICD-10-CM

## 2019-09-09 DIAGNOSIS — Z79.4 TYPE 2 DIABETES MELLITUS WITHOUT COMPLICATION, WITH LONG-TERM CURRENT USE OF INSULIN: ICD-10-CM

## 2019-09-09 DIAGNOSIS — E11.9 TYPE 2 DIABETES MELLITUS WITHOUT COMPLICATION, WITH LONG-TERM CURRENT USE OF INSULIN: ICD-10-CM

## 2019-09-09 RX ORDER — DILTIAZEM HYDROCHLORIDE 120 MG/1
CAPSULE, EXTENDED RELEASE ORAL
Qty: 90 CAPSULE | Refills: 4 | Status: SHIPPED | OUTPATIENT
Start: 2019-09-09 | End: 2020-11-16

## 2019-09-09 RX ORDER — SAXAGLIPTIN 5 MG/1
5 TABLET, FILM COATED ORAL DAILY
Qty: 90 TABLET | Refills: 3 | Status: SHIPPED | OUTPATIENT
Start: 2019-09-09 | End: 2020-04-02 | Stop reason: SDUPTHER

## 2019-09-09 NOTE — TELEPHONE ENCOUNTER
----- Message from Mary Farias sent at 9/7/2019 10:28 AM CDT -----  Type:  RX Refill Request    Who Called:  Self   Refill or New Rx:  Refill   RX Name and Strength:  SAXagliptin (ONGLYZA) 5 mg Tab tablet  How is the patient currently taking it? (ex. 1XDay):  Is this a 30 day or 90 day RX:    Preferred Pharmacy with phone number: [facility] pharmacy - [phone]  Local or Mail Order:  Local Ordering Provider:  [facility] Best Call Back Number:  [phone]  Additional Information:

## 2019-09-10 ENCOUNTER — DOCUMENTATION ONLY (OUTPATIENT)
Dept: FAMILY MEDICINE | Facility: CLINIC | Age: 74
End: 2019-09-10

## 2019-09-10 NOTE — PROGRESS NOTES
PA initiated for Onglyza 5 mg tablets  M:  [id number]  ADELINA  -----------------------------------------  Per DEANDRE / Adelina: Medication is available without an authorization

## 2019-09-21 DIAGNOSIS — K21.00 REFLUX ESOPHAGITIS: ICD-10-CM

## 2019-09-23 RX ORDER — PANTOPRAZOLE SODIUM 40 MG/1
TABLET, DELAYED RELEASE ORAL
Qty: 90 TABLET | Refills: 0 | Status: SHIPPED | OUTPATIENT
Start: 2019-09-23 | End: 2019-12-23

## 2019-10-14 ENCOUNTER — PES CALL (OUTPATIENT)
Dept: ADMINISTRATIVE | Facility: CLINIC | Age: 74
End: 2019-10-14

## 2019-12-02 ENCOUNTER — LAB VISIT (OUTPATIENT)
Dept: LAB | Facility: HOSPITAL | Age: 74
End: 2019-12-02
Attending: FAMILY MEDICINE
Payer: MEDICARE

## 2019-12-02 ENCOUNTER — OFFICE VISIT (OUTPATIENT)
Dept: FAMILY MEDICINE | Facility: CLINIC | Age: 74
End: 2019-12-02
Payer: MEDICARE

## 2019-12-02 VITALS
BODY MASS INDEX: 28.59 KG/M2 | WEIGHT: 161.38 LBS | HEIGHT: 63 IN | HEART RATE: 84 BPM | SYSTOLIC BLOOD PRESSURE: 126 MMHG | DIASTOLIC BLOOD PRESSURE: 80 MMHG | OXYGEN SATURATION: 97 %

## 2019-12-02 VITALS
TEMPERATURE: 99 F | DIASTOLIC BLOOD PRESSURE: 80 MMHG | HEIGHT: 63 IN | BODY MASS INDEX: 28.59 KG/M2 | OXYGEN SATURATION: 97 % | WEIGHT: 161.38 LBS | SYSTOLIC BLOOD PRESSURE: 126 MMHG | HEART RATE: 84 BPM

## 2019-12-02 DIAGNOSIS — E11.59 HYPERTENSION ASSOCIATED WITH DIABETES: ICD-10-CM

## 2019-12-02 DIAGNOSIS — E11.69 TYPE 2 DIABETES MELLITUS WITH HYPERLIPIDEMIA: ICD-10-CM

## 2019-12-02 DIAGNOSIS — R63.4 WEIGHT LOSS: ICD-10-CM

## 2019-12-02 DIAGNOSIS — R10.13 EPIGASTRIC PAIN: ICD-10-CM

## 2019-12-02 DIAGNOSIS — I15.2 HYPERTENSION ASSOCIATED WITH DIABETES: ICD-10-CM

## 2019-12-02 DIAGNOSIS — M05.741 RHEUMATOID ARTHRITIS INVOLVING BOTH HANDS WITH POSITIVE RHEUMATOID FACTOR: ICD-10-CM

## 2019-12-02 DIAGNOSIS — Z86.73 HISTORY OF TIA (TRANSIENT ISCHEMIC ATTACK): ICD-10-CM

## 2019-12-02 DIAGNOSIS — Z86.010 HISTORY OF COLON POLYPS: ICD-10-CM

## 2019-12-02 DIAGNOSIS — K21.9 GASTROESOPHAGEAL REFLUX DISEASE, ESOPHAGITIS PRESENCE NOT SPECIFIED: ICD-10-CM

## 2019-12-02 DIAGNOSIS — J43.8 OTHER EMPHYSEMA: ICD-10-CM

## 2019-12-02 DIAGNOSIS — D69.6 THROMBOCYTOPENIA: Chronic | ICD-10-CM

## 2019-12-02 DIAGNOSIS — Z00.00 ENCOUNTER FOR PREVENTIVE HEALTH EXAMINATION: Primary | ICD-10-CM

## 2019-12-02 DIAGNOSIS — E78.2 MIXED HYPERLIPIDEMIA: ICD-10-CM

## 2019-12-02 DIAGNOSIS — E78.5 TYPE 2 DIABETES MELLITUS WITH HYPERLIPIDEMIA: ICD-10-CM

## 2019-12-02 DIAGNOSIS — Z12.31 ENCOUNTER FOR SCREENING MAMMOGRAM FOR BREAST CANCER: ICD-10-CM

## 2019-12-02 DIAGNOSIS — R11.0 NAUSEA: Primary | ICD-10-CM

## 2019-12-02 DIAGNOSIS — M05.742 RHEUMATOID ARTHRITIS INVOLVING BOTH HANDS WITH POSITIVE RHEUMATOID FACTOR: ICD-10-CM

## 2019-12-02 DIAGNOSIS — M35.01 SJOGREN'S SYNDROME WITH KERATOCONJUNCTIVITIS SICCA: ICD-10-CM

## 2019-12-02 DIAGNOSIS — G47.33 OSA (OBSTRUCTIVE SLEEP APNEA): ICD-10-CM

## 2019-12-02 PROCEDURE — 84439 ASSAY OF FREE THYROXINE: CPT

## 2019-12-02 PROCEDURE — 99999 PR PBB SHADOW E&M-EST. PATIENT-LVL III: CPT | Mod: PBBFAC,,, | Performed by: FAMILY MEDICINE

## 2019-12-02 PROCEDURE — 80053 COMPREHEN METABOLIC PANEL: CPT

## 2019-12-02 PROCEDURE — G0439 PR MEDICARE ANNUAL WELLNESS SUBSEQUENT VISIT: ICD-10-PCS | Mod: S$GLB,,, | Performed by: NURSE PRACTITIONER

## 2019-12-02 PROCEDURE — 99213 OFFICE O/P EST LOW 20 MIN: CPT | Mod: PBBFAC,PO | Performed by: FAMILY MEDICINE

## 2019-12-02 PROCEDURE — 85025 COMPLETE CBC W/AUTO DIFF WBC: CPT

## 2019-12-02 PROCEDURE — G0439 PPPS, SUBSEQ VISIT: HCPCS | Mod: S$GLB,,, | Performed by: NURSE PRACTITIONER

## 2019-12-02 PROCEDURE — 99214 PR OFFICE/OUTPT VISIT, EST, LEVL IV, 30-39 MIN: ICD-10-PCS | Mod: S$PBB,,, | Performed by: FAMILY MEDICINE

## 2019-12-02 PROCEDURE — 1126F AMNT PAIN NOTED NONE PRSNT: CPT | Mod: ,,, | Performed by: FAMILY MEDICINE

## 2019-12-02 PROCEDURE — 99999 PR PBB SHADOW E&M-EST. PATIENT-LVL V: ICD-10-PCS | Mod: PBBFAC,,, | Performed by: NURSE PRACTITIONER

## 2019-12-02 PROCEDURE — 99214 OFFICE O/P EST MOD 30 MIN: CPT | Mod: S$PBB,,, | Performed by: FAMILY MEDICINE

## 2019-12-02 PROCEDURE — 1159F MED LIST DOCD IN RCRD: CPT | Mod: ,,, | Performed by: FAMILY MEDICINE

## 2019-12-02 PROCEDURE — 1159F PR MEDICATION LIST DOCUMENTED IN MEDICAL RECORD: ICD-10-PCS | Mod: ,,, | Performed by: FAMILY MEDICINE

## 2019-12-02 PROCEDURE — 99215 OFFICE O/P EST HI 40 MIN: CPT | Mod: PBBFAC,27,PO | Performed by: NURSE PRACTITIONER

## 2019-12-02 PROCEDURE — 1126F PR PAIN SEVERITY QUANTIFIED, NO PAIN PRESENT: ICD-10-PCS | Mod: ,,, | Performed by: FAMILY MEDICINE

## 2019-12-02 PROCEDURE — 84443 ASSAY THYROID STIM HORMONE: CPT

## 2019-12-02 PROCEDURE — 99999 PR PBB SHADOW E&M-EST. PATIENT-LVL III: ICD-10-PCS | Mod: PBBFAC,,, | Performed by: FAMILY MEDICINE

## 2019-12-02 PROCEDURE — 36415 COLL VENOUS BLD VENIPUNCTURE: CPT | Mod: PO

## 2019-12-02 PROCEDURE — 99999 PR PBB SHADOW E&M-EST. PATIENT-LVL V: CPT | Mod: PBBFAC,,, | Performed by: NURSE PRACTITIONER

## 2019-12-02 RX ORDER — ONDANSETRON 8 MG/1
TABLET, ORALLY DISINTEGRATING ORAL
Qty: 30 TABLET | Refills: 1 | Status: ON HOLD | OUTPATIENT
Start: 2019-12-02 | End: 2021-01-14 | Stop reason: SDUPTHER

## 2019-12-02 NOTE — PATIENT INSTRUCTIONS
Counseling and Referral of Other Preventative  (Italic type indicates deductible and co-insurance are waived)    Patient Name: Pili Teixeira  Today's Date: 12/2/2019    Health Maintenance       Date Due Completion Date    Shingles Vaccine (1 of 2) 05/21/1996 ---    Colonoscopy 08/14/2019 8/14/2014    Influenza Vaccine (1) 09/01/2019 12/3/2018    Override on 2/1/2016: Declined    Mammogram 01/10/2020 1/10/2019    Override on 2/5/2013: (N/S)    Hemoglobin A1c 02/21/2020 8/21/2019    Foot Exam 06/03/2020 6/3/2019    Override on 6/3/2019: Done    Eye Exam 07/30/2020 7/30/2019    Override on 1/28/2016: Done (Dr. Garcia)    Override on 5/8/2014: (N/S)    Lipid Panel 08/21/2020 8/21/2019    DEXA SCAN 01/10/2022 1/10/2019    TETANUS VACCINE 03/13/2027 3/13/2017        No orders of the defined types were placed in this encounter.    The following information is provided to all patients.  This information is to help you find resources for any of the problems found today that may be affecting your health:                Living healthy guide: www.[facility].[place].gov      Understanding Diabetes: www.diabetes.org      Eating healthy: www.cdc.gov/healthyweight      CDC home safety checklist: www.cdc.gov/steadi/patient.html      Agency on Aging: www.goea.[place].gov      Alcoholics anonymous (AA): www.aa.org      Physical Activity: www.paris.nih.gov/cg0ilhc      Tobacco use: www.quitwithusla.org

## 2019-12-02 NOTE — PROGRESS NOTES
"Subjective:       Patient ID: Pili Teixeira is a 74 y.o. female.    Chief Complaint: Follow-up (6 month~pt feeling off balance with some nausea )    Pt is known to me.  Pt is here for followup of chronic medical issues.  The pt is concerned that her appetite is decreased.  She says she gets full quickly.  She has lost 11 pounds since June.  She occasionally gets nauseated and she feels tired.  She is having some upper abd pain.  She is overdue for her colonoscopy--she has a hx of polyps.  She denies any changes in bowel habits.  The pt does not often check her glucoses--her HgA1c is good.  She continues to see rheumatologist Dr. Wilson.  She continues to see pain mgt and she is still on oxycodone.  The pt is unsure about what meds she takes.    Review of Systems    Objective:       Vitals:    12/02/19 1335   BP: 126/80   BP Location: Right arm   Patient Position: Sitting   BP Method: Medium (Manual)   Pulse: 84   Temp: 98.5 °F (36.9 °C)   TempSrc: Oral   SpO2: 97%   Weight: 73.2 kg (161 lb 6 oz)   Height: 5' 3" (1.6 m)     Physical Exam   Constitutional: She appears well-developed and well-nourished. No distress.   HENT:   Head: Normocephalic and atraumatic.   Eyes: Conjunctivae are normal. No scleral icterus.   Neck: Normal range of motion. Neck supple. No tracheal deviation present. No thyromegaly present.   Cardiovascular: Normal rate, regular rhythm, normal heart sounds and intact distal pulses. Exam reveals no gallop and no friction rub.   No murmur heard.  Pulses:       Dorsalis pedis pulses are 2+ on the right side, and 2+ on the left side.        Posterior tibial pulses are 2+ on the right side, and 2+ on the left side.   Pulmonary/Chest: Effort normal and breath sounds normal. No respiratory distress. She has no wheezes. She has no rales.   Abdominal: Soft. Bowel sounds are normal. She exhibits no mass. There is no tenderness.   Musculoskeletal: She exhibits tenderness (diffuse muscles). She exhibits no " edema.        Right foot: There is normal range of motion and no deformity.        Left foot: There is normal range of motion and no deformity.   Feet:   Right Foot:   Protective Sensation: 7 sites tested. 7 sites sensed.   Skin Integrity: Negative for ulcer.   Left Foot:   Protective Sensation: 7 sites tested. 7 sites sensed.   Skin Integrity: Negative for ulcer.   Lymphadenopathy:     She has no cervical adenopathy.   Skin: Skin is dry. No rash noted.   Psychiatric: She has a normal mood and affect. Her behavior is normal. Judgment and thought content normal.   Vitals reviewed.      Assessment:       1. Nausea    2. Epigastric pain    3. Weight loss    4. History of colon polyps    5. Encounter for screening mammogram for breast cancer    6. Hypertension associated with diabetes    7. Mixed hyperlipidemia    8. Type 2 diabetes mellitus with hyperlipidemia    9. Nausea and vomiting        Plan:       Pili was seen today for follow-up.    Diagnoses and all orders for this visit:    Nausea  -     Ambulatory consult to Gastroenterology  -     ondansetron (ZOFRAN-ODT) 8 MG TbDL; DISSOLVE 1 TABLET IN MOUTH EVERY 12 HOURS AS NEEDED for nausea    Epigastric pain  -     Ambulatory consult to Gastroenterology  -     CBC auto differential; Future    Weight loss  -     Ambulatory consult to Gastroenterology  -     CBC auto differential; Future  -     Comprehensive metabolic panel; Future  -     TSH; Future  -     T4, free; Future    History of colon polyps  -     Ambulatory consult to Gastroenterology    Encounter for screening mammogram for breast cancer  -     Mammo Digital Screening Bilateral With CAD; Future    Hypertension associated with diabetes  -     Comprehensive metabolic panel; Future    Mixed hyperlipidemia    Type 2 diabetes mellitus with hyperlipidemia    Nausea and vomiting      During this visit, I reviewed the pt's history, medications, allergies, and problem list.

## 2019-12-02 NOTE — PROGRESS NOTES
"Pili Teixeira presented for a  Medicare AWV and comprehensive Health Risk Assessment today. The following components were reviewed and updated:    · Medical history  · Family History  · Social history  · Allergies and Current Medications  · Health Risk Assessment  · Health Maintenance  · Care Team     ** See Completed Assessments for Annual Wellness Visit within the encounter summary.**       The following assessments were completed:  · Living Situation  · CAGE  · Depression Screening  · Timed Get Up and Go  · Whisper Test  · Cognitive Function Screening          · Nutrition Screening  · ADL Screening  · PAQ Screening    Vitals:    12/02/19 1453   BP: 126/80   BP Location: Left arm   Patient Position: Sitting   BP Method: Medium (Manual)   Pulse: 84   SpO2: 97%   Weight: 73.2 kg (161 lb 6 oz)   Height: 5' 3" (1.6 m)     Body mass index is 28.59 kg/m².  Physical Exam   Constitutional: She is oriented to person, place, and time. She appears well-developed and well-nourished.   HENT:   Head: Normocephalic.   Eyes: Conjunctivae are normal.   Neck: Neck supple. No thyromegaly present.   Cardiovascular: Normal rate, regular rhythm and normal heart sounds.   Pulmonary/Chest: Effort normal and breath sounds normal. No respiratory distress. She has no wheezes.   Musculoskeletal: Normal range of motion. She exhibits no tenderness or deformity.   Neurological: She is alert and oriented to person, place, and time. She displays normal reflexes. No cranial nerve deficit. She exhibits normal muscle tone.   Skin: Skin is warm and dry.   Psychiatric: She has a normal mood and affect. Her behavior is normal.         Diagnoses and health risks identified today and associated recommendations/orders:    1. Encounter for preventive health examination  Reviewed health maintenance and provided recommendations   Flu vaccine today    2. History of TIA (transient ischemic attack)    - Ambulatory referral to Cardiology    3. Hypertension " associated with diabetes    - Ambulatory referral to Cardiology    4. REJI (obstructive sleep apnea)    - Ambulatory referral to Cardiology    5. Other emphysema  Continue to monitor  Followed by ANKIT Huggins MD .      6. Sjogren's syndrome with keratoconjunctivitis sicca  Continue to monitor  Followed by Katie.      7. Thrombocytopenia  Continue to monitor  Followed by ANKIT Huggins MD .      8. Rheumatoid arthritis involving both hands with positive rheumatoid factor  Continue to monitor  Followed by Katie  Follow rheum recs.        Provided Pili with a 5-10 year written screening schedule and personal prevention plan. Recommendations were developed using the USPSTF age appropriate recommendations. Education, counseling, and referrals were provided as needed. After Visit Summary printed and given to patient which includes a list of additional screenings\tests needed.    Follow up in about 1 year (around 12/2/2020).    Lupe Gutierrez NP  I offered to discuss end of life issues, including information on how to make advance directives that the patient could use to name someone who would make medical decisions on their behalf if they became too ill to make themselves.    ___Patient declined  _X_Patient is interested, I provided paper work and offered to discuss.

## 2019-12-03 LAB
ALBUMIN SERPL BCP-MCNC: 4.4 G/DL (ref 3.5–5.2)
ALP SERPL-CCNC: 72 U/L (ref 55–135)
ALT SERPL W/O P-5'-P-CCNC: 20 U/L (ref 10–44)
ANION GAP SERPL CALC-SCNC: 12 MMOL/L (ref 8–16)
AST SERPL-CCNC: 22 U/L (ref 10–40)
BASOPHILS # BLD AUTO: 0.05 K/UL (ref 0–0.2)
BASOPHILS NFR BLD: 0.4 % (ref 0–1.9)
BILIRUB SERPL-MCNC: 0.3 MG/DL (ref 0.1–1)
BUN SERPL-MCNC: 25 MG/DL (ref 8–23)
CALCIUM SERPL-MCNC: 11 MG/DL (ref 8.7–10.5)
CHLORIDE SERPL-SCNC: 101 MMOL/L (ref 95–110)
CO2 SERPL-SCNC: 27 MMOL/L (ref 23–29)
CREAT SERPL-MCNC: 1 MG/DL (ref 0.5–1.4)
DIFFERENTIAL METHOD: ABNORMAL
EOSINOPHIL # BLD AUTO: 0.1 K/UL (ref 0–0.5)
EOSINOPHIL NFR BLD: 1 % (ref 0–8)
ERYTHROCYTE [DISTWIDTH] IN BLOOD BY AUTOMATED COUNT: 12.6 % (ref 11.5–14.5)
EST. GFR  (AFRICAN AMERICAN): >60 ML/MIN/1.73 M^2
EST. GFR  (NON AFRICAN AMERICAN): 56 ML/MIN/1.73 M^2
GLUCOSE SERPL-MCNC: 63 MG/DL (ref 70–110)
HCT VFR BLD AUTO: 41.2 % (ref 37–48.5)
HGB BLD-MCNC: 12.5 G/DL (ref 12–16)
IMM GRANULOCYTES # BLD AUTO: 0.07 K/UL (ref 0–0.04)
IMM GRANULOCYTES NFR BLD AUTO: 0.6 % (ref 0–0.5)
LYMPHOCYTES # BLD AUTO: 4.2 K/UL (ref 1–4.8)
LYMPHOCYTES NFR BLD: 33.9 % (ref 18–48)
MCH RBC QN AUTO: 27.3 PG (ref 27–31)
MCHC RBC AUTO-ENTMCNC: 30.3 G/DL (ref 32–36)
MCV RBC AUTO: 90 FL (ref 82–98)
MONOCYTES # BLD AUTO: 1 K/UL (ref 0.3–1)
MONOCYTES NFR BLD: 7.7 % (ref 4–15)
NEUTROPHILS # BLD AUTO: 7 K/UL (ref 1.8–7.7)
NEUTROPHILS NFR BLD: 56.4 % (ref 38–73)
NRBC BLD-RTO: 0 /100 WBC
PLATELET # BLD AUTO: 222 K/UL (ref 150–350)
PLATELET BLD QL SMEAR: ABNORMAL
PMV BLD AUTO: 13.1 FL (ref 9.2–12.9)
POTASSIUM SERPL-SCNC: 3.9 MMOL/L (ref 3.5–5.1)
PROT SERPL-MCNC: 8 G/DL (ref 6–8.4)
RBC # BLD AUTO: 4.58 M/UL (ref 4–5.4)
SODIUM SERPL-SCNC: 140 MMOL/L (ref 136–145)
T4 FREE SERPL-MCNC: 1.02 NG/DL (ref 0.71–1.51)
TSH SERPL DL<=0.005 MIU/L-ACNC: 1.41 UIU/ML (ref 0.4–4)
WBC # BLD AUTO: 12.36 K/UL (ref 3.9–12.7)

## 2019-12-11 DIAGNOSIS — M19.90 INFLAMMATORY ARTHRITIS: ICD-10-CM

## 2019-12-11 DIAGNOSIS — R76.8 RHEUMATOID FACTOR POSITIVE: ICD-10-CM

## 2019-12-11 RX ORDER — FOLIC ACID 1 MG/1
1000 TABLET ORAL DAILY
Qty: 30 TABLET | Refills: 5 | Status: SHIPPED | OUTPATIENT
Start: 2019-12-11 | End: 2020-06-02

## 2019-12-11 NOTE — PROGRESS NOTES
Refill Routing Note     Medication(s) are appropriate for refill:    Medication Outside of Protocol    Appointments  past 15m or future 3m with PCP    Date Provider   Last Visit   12/2/2019 LUÍS Huggins MD   Next Visit   Visit date not found LUÍS Huggins MD       Automatic Epic Protocol Generated Data:    Requested Prescriptions   Pending Prescriptions Disp Refills    folic acid (FOLVITE) 1 MG tablet 30 tablet 5     Sig: Take 1 tablet (1,000 mcg total) by mouth once daily.       There is no refill protocol information for this order

## 2019-12-11 NOTE — TELEPHONE ENCOUNTER
----- Message from Ruthie Miguel sent at 12/11/2019 12:29 PM CST -----  Contact: Pt  Pt would like to know if Doctor can refill this medication    Type:  RX Refill Request    Who Called:  Pt  Refill or New Rx:  Refill  RX Name and Strength:  folic acid (FOLVITE) 1 MG tablet  How is the patient currently taking it? (ex. 1XDay):  As directed  Is this a 30 day or 90 day RX:  30  Preferred Pharmacy with phone number:    [id number]  MICHEL PERDOMO - [address] [place] [address]  [place] [address]  Phone: [phone] Fax: [phone]  Local or Mail Order:  local  Ordering Provider:  Maddy Mendes Call Back Number:  [phone]  Additional Information:  Please Advise ---Thank you

## 2019-12-11 NOTE — TELEPHONE ENCOUNTER
----- Message from Keila Martinez LPN sent at 12/11/2019  2:44 PM CST -----  Contact: PT      ----- Message -----  From: Ruthie Miguel  Sent: 12/11/2019   2:30 PM CST  To: Janneth MATTHEW Staff    Type: Needs Medical Advice    Who Called:  Pt  Best Call Back Number: [phone]  Additional Information: Requesting a call back regarding getting a steroid injection   Please Advise ---Thank you

## 2019-12-18 ENCOUNTER — PATIENT MESSAGE (OUTPATIENT)
Dept: FAMILY MEDICINE | Facility: CLINIC | Age: 74
End: 2019-12-18

## 2019-12-18 DIAGNOSIS — E83.52 HYPERCALCEMIA: Primary | ICD-10-CM

## 2019-12-18 NOTE — PROGRESS NOTES
Let pt know that on her recent lab her calcium level is high.  I would like to repeat it and check her parathyroid hormone level.  I will order.

## 2019-12-21 ENCOUNTER — LAB VISIT (OUTPATIENT)
Dept: LAB | Facility: HOSPITAL | Age: 74
End: 2019-12-21
Attending: FAMILY MEDICINE
Payer: MEDICARE

## 2019-12-21 DIAGNOSIS — E83.52 HYPERCALCEMIA: ICD-10-CM

## 2019-12-21 LAB
ALBUMIN SERPL BCP-MCNC: 4.2 G/DL (ref 3.5–5.2)
ALP SERPL-CCNC: 59 U/L (ref 55–135)
ALT SERPL W/O P-5'-P-CCNC: 17 U/L (ref 10–44)
ANION GAP SERPL CALC-SCNC: 11 MMOL/L (ref 8–16)
AST SERPL-CCNC: 23 U/L (ref 10–40)
BILIRUB SERPL-MCNC: 0.5 MG/DL (ref 0.1–1)
BUN SERPL-MCNC: 24 MG/DL (ref 8–23)
CALCIUM SERPL-MCNC: 10.6 MG/DL (ref 8.7–10.5)
CHLORIDE SERPL-SCNC: 99 MMOL/L (ref 95–110)
CO2 SERPL-SCNC: 29 MMOL/L (ref 23–29)
CREAT SERPL-MCNC: 1.1 MG/DL (ref 0.5–1.4)
EST. GFR  (AFRICAN AMERICAN): 57.6 ML/MIN/1.73 M^2
EST. GFR  (NON AFRICAN AMERICAN): 49.9 ML/MIN/1.73 M^2
GLUCOSE SERPL-MCNC: 122 MG/DL (ref 70–110)
POTASSIUM SERPL-SCNC: 3.7 MMOL/L (ref 3.5–5.1)
PROT SERPL-MCNC: 7.4 G/DL (ref 6–8.4)
PTH-INTACT SERPL-MCNC: 76 PG/ML (ref 9–77)
SODIUM SERPL-SCNC: 139 MMOL/L (ref 136–145)

## 2019-12-21 PROCEDURE — 80053 COMPREHEN METABOLIC PANEL: CPT

## 2019-12-21 PROCEDURE — 36415 COLL VENOUS BLD VENIPUNCTURE: CPT | Mod: PO

## 2019-12-21 PROCEDURE — 83970 ASSAY OF PARATHORMONE: CPT

## 2019-12-23 DIAGNOSIS — K21.00 REFLUX ESOPHAGITIS: ICD-10-CM

## 2019-12-23 RX ORDER — PANTOPRAZOLE SODIUM 40 MG/1
TABLET, DELAYED RELEASE ORAL
Qty: 90 TABLET | Refills: 0 | Status: SHIPPED | OUTPATIENT
Start: 2019-12-23 | End: 2020-03-16

## 2019-12-24 NOTE — PROGRESS NOTES
Let pt know her parathyroid hormone level is normal and her calcium has gone down.  We will continue to follow.

## 2019-12-27 ENCOUNTER — TELEPHONE (OUTPATIENT)
Dept: GASTROENTEROLOGY | Facility: CLINIC | Age: 74
End: 2019-12-27

## 2019-12-27 NOTE — TELEPHONE ENCOUNTER
Spoke with pt. Pt rescheduled appointment to be seen sooner in [place] as she could not wait until January 9. Pt verbalized understanding to appointment date, time & location. Pt also advised to go to ER if pain becomes unbearable. Pt verbalized understanding.

## 2019-12-27 NOTE — TELEPHONE ENCOUNTER
----- Message from Cristobal Huggins sent at 12/27/2019  1:38 PM CST -----  Contact: pt   Type:  Sooner Apoointment Request    Caller is requesting a sooner appointment.  Caller declined first available appointment listed below.  Caller will not accept being placed on the waitlist and is requesting a message be sent to doctor.    Name of Caller:  Pt   When is the first available appointment?  01/09/2020   Symptoms:    Best Call Back Number:     Additional Information:  Pt would like to be seen earlier than the 01/09/2020 if possible. Pt says she cant eat and she losing weight. She thinks she cant make it.

## 2019-12-30 ENCOUNTER — OFFICE VISIT (OUTPATIENT)
Dept: GASTROENTEROLOGY | Facility: CLINIC | Age: 74
End: 2019-12-30
Payer: MEDICARE

## 2019-12-30 VITALS
HEART RATE: 74 BPM | RESPIRATION RATE: 18 BRPM | BODY MASS INDEX: 27.93 KG/M2 | SYSTOLIC BLOOD PRESSURE: 117 MMHG | HEIGHT: 63 IN | WEIGHT: 157.63 LBS | DIASTOLIC BLOOD PRESSURE: 56 MMHG

## 2019-12-30 DIAGNOSIS — K21.9 GASTROESOPHAGEAL REFLUX DISEASE WITHOUT ESOPHAGITIS: ICD-10-CM

## 2019-12-30 DIAGNOSIS — K55.1 MESENTERIC ISCHEMIA, CHRONIC: ICD-10-CM

## 2019-12-30 DIAGNOSIS — R10.32 ABDOMINAL PAIN, LLQ (LEFT LOWER QUADRANT): Primary | ICD-10-CM

## 2019-12-30 PROCEDURE — 99215 OFFICE O/P EST HI 40 MIN: CPT | Mod: PBBFAC,PO | Performed by: INTERNAL MEDICINE

## 2019-12-30 PROCEDURE — 1125F PR PAIN SEVERITY QUANTIFIED, PAIN PRESENT: ICD-10-PCS | Mod: ,,, | Performed by: INTERNAL MEDICINE

## 2019-12-30 PROCEDURE — 99999 PR PBB SHADOW E&M-EST. PATIENT-LVL V: ICD-10-PCS | Mod: PBBFAC,,, | Performed by: INTERNAL MEDICINE

## 2019-12-30 PROCEDURE — 99215 PR OFFICE/OUTPT VISIT, EST, LEVL V, 40-54 MIN: ICD-10-PCS | Mod: S$PBB,,, | Performed by: INTERNAL MEDICINE

## 2019-12-30 PROCEDURE — 1159F MED LIST DOCD IN RCRD: CPT | Mod: ,,, | Performed by: INTERNAL MEDICINE

## 2019-12-30 PROCEDURE — 99215 OFFICE O/P EST HI 40 MIN: CPT | Mod: S$PBB,,, | Performed by: INTERNAL MEDICINE

## 2019-12-30 PROCEDURE — 1125F AMNT PAIN NOTED PAIN PRSNT: CPT | Mod: ,,, | Performed by: INTERNAL MEDICINE

## 2019-12-30 PROCEDURE — 1159F PR MEDICATION LIST DOCUMENTED IN MEDICAL RECORD: ICD-10-PCS | Mod: ,,, | Performed by: INTERNAL MEDICINE

## 2019-12-30 PROCEDURE — 99999 PR PBB SHADOW E&M-EST. PATIENT-LVL V: CPT | Mod: PBBFAC,,, | Performed by: INTERNAL MEDICINE

## 2019-12-30 NOTE — LETTER
December 30, 2019      LUÍS Huggins MD  [address] [facility] [address] [place] [address]           [facility] Gastroenterology  [address]  [facility] [address]  Phone: [phone]          Patient: Pili Teixeira   MR Number: [id number]   YOB: 1945   Date of Visit: 12/30/2019       Dear Dr. LUÍS Huggins:    Thank you for referring Pili Teixeira to me for evaluation. Attached you will find relevant portions of my assessment and plan of care.    If you have questions, please do not hesitate to call me. I look forward to following Pili Teixeira along with you.    Sincerely,    Kb Nguyen MD    Enclosure  CC:  No Recipients    If you would like to receive this communication electronically, please contact [email or web address] or [phone] to request more information on [employer] Link access.    For providers and/or their staff who would like to refer a patient to [facility], please contact us through our one-stop-shop provider referral line, [facility], at [phone].    If you feel you have received this communication in error or would no longer like to receive these types of communications, please e-mail [email or web address]

## 2019-12-30 NOTE — PROGRESS NOTES
CC: abdominal pain    HPI 74 y.o. female with history of COPD, DMII, fibromyalgia, GERD, TIA (on plavix) who presents for evaluation of chronic, persistent, moderate epigastric abdominal pain associated with nausea. She states the nausea has been so severe over the past several months that she has lost approximately 12 lbs. She has not tried losing weight but has difficulty eating due to nausea and early satiety. This has caused her to avoid meals all together per her daughter. She has been taking Zofran as needed but it causes constipation. She is also on opioid medications for chronic pain. She endorses constipation but tries not to strain during bowel movements due to abdominal hernias. She also has a history of colon polyps and is also due for colonoscopy.    Medical records reviewed. Additional history supplemented by daughter at bedside.    Past Medical History:   Diagnosis Date    Allergy     Anemia 2012    with thrombocytopenia; iron deficiency    Arthritis     Cervical spinal stenosis     with neuropathy, chronic neck,back,leg pain    Colon polyp     COPD (chronic obstructive pulmonary disease)     Coronary artery disease     Diabetes mellitus     , sugars run 190's per patient    Diverticulitis     Diverticulosis     Hx diverticulitis,still has chronic diarrhea, abd pain    Dyspnea on exertion     sometimes with nausea, fatigue,dizziness, had cardiac cath June 2012, normal    Fibromyalgia     Fracture 2012    right thumb    GERD (gastroesophageal reflux disease)     Feb 2012, Hx H Pylori    Glaucoma     had LAser surgey, on no eye drops    HEARING LOSS     Hemangioma     fatty liver    History of earache     frequent    Hyperlipidemia     Hypertension     Laryngeal polyp     Myocardial infarction 2006 last    also MI in distant past    REJI (obstructive sleep apnea)     does not use CPAP    Otitis media     Renal stone     Sjogren's syndrome     Stroke     TIA, now on Plavix     TIA (transient ischemic attack)     TMJ disorder     UTI (lower urinary tract infection)     frequent    Venous insufficiency     with Hx blood clot in leg     Past Surgical History:   Procedure Laterality Date    ABDOMINAL SURGERY  2010 x2    expoloratory lap for pelvic cyst,adhesions; partial colonectomy     adhesiolysis   10/11/2012    CARDIAC CATHETERIZATION      no current blockages.    CHOLECYSTECTOMY      COLON SURGERY      r/t diverticuli    COLONOSCOPY  08/2014    repeat in 5 years    EPIDURAL BLOCK INJECTION  5/15/12    back,neck for pain    EXPLORATORY LAPAROTOMY W/ BOWEL RESECTION  10/11/2012    EYE SURGERY      Laser for glaucoma    EYE SURGERY  May 2012    cataracts    HYSTERECTOMY      LARYNX SURGERY      polypectomy    OOPHORECTOMY      TONSILLECTOMY      with adenoidectomy    UPPER GASTROINTESTINAL ENDOSCOPY  12/2015    VASCULAR SURGERY      laser to varicose vein leg     Social History  Social History     Tobacco Use    Smoking status: Never Smoker    Smokeless tobacco: Never Used   Substance Use Topics    Alcohol use: No    Drug use: Yes     Types: Oxycodone     Family History   Problem Relation Age of Onset    Diverticulitis Sister     Throat cancer Brother     Cancer Brother     Throat cancer Mother     Cancer Mother     Diabetes Mellitus Mother     Stroke Father     Hypertension Father     Heart disease Father     Pancreatic cancer Maternal Aunt 55    Pancreatic cancer Cousin 58    Thyroid disease Daughter     Lupus Daughter     Breast cancer Neg Hx     Ovarian cancer Neg Hx     Colon cancer Neg Hx     Colon polyps Neg Hx     Celiac disease Neg Hx     Cirrhosis Neg Hx     Crohn's disease Neg Hx     Stomach cancer Neg Hx     Ulcerative colitis Neg Hx     Rectal cancer Neg Hx     Esophageal cancer Neg Hx     Inflammatory bowel disease Neg Hx     Rheum arthritis Neg Hx     Psoriasis Neg Hx     Osteoarthritis Neg Hx     Kidney disease Neg Hx      "Hyperlipidemia Neg Hx     COPD Neg Hx     Depression Neg Hx     Chronic back pain Neg Hx     Asthma Neg Hx     Alcohol abuse Neg Hx      Review of Systems  General ROS: negative for chills, fever, +weight loss  Psychological ROS: negative for hallucination, depression or suicidal ideation  Ophthalmic ROS: negative for blurry vision, photophobia or eye pain  ENT ROS: negative for epistaxis, sore throat or rhinorrhea  Respiratory ROS: no cough, shortness of breath, or wheezing  Cardiovascular ROS: no chest pain, +shortness of breath   Gastrointestinal ROS: +nausea, +early satiety, no abdominal pain, +constipation  Genito-Urinary ROS: no dysuria, trouble voiding, or hematuria  Musculoskeletal ROS: negative for gait disturbance or muscular weakness  Neurological ROS: no syncope or seizures; no ataxia  Dermatological ROS: negative for pruritis, rash and jaundice    Physical Examination  BP (!) 117/56   Pulse 74   Resp 18   Ht 5' 3" (1.6 m)   Wt 71.5 kg (157 lb 10.1 oz)   BMI 27.92 kg/m²   General appearance: elderly female, alert, cooperative, no distress  HENT: Normocephalic, atraumatic, neck symmetrical, no nasal discharge   Eyes: conjunctivae/corneas clear, PERRL, EOM's intact  Lungs: clear to auscultation bilaterally, symmetric chest wall expansion bilaterally  Heart: regular rate and rhythm without rub; no displacement of the PMI   Abdomen: soft, non-tender; bowel sounds normoactive; no organomegaly  Extremities: extremities symmetric; no clubbing, cyanosis, or edema  Integument: Skin color, texture, turgor normal; no rashes; hair distrubution normal  Neurologic: Alert and oriented X 3, normal strength, normal coordination and gait  Psychiatric: no pressured speech; normal affect; no evidence of impaired cognition     Labs:  H/H 12.5/41.2  Plt 222    Imaging:  CXR: No acute cardiopulmonary disease    Independently reviewed    Assessment:   74 y.o. female with history of COPD, DMII, fibromyalgia, GERD, TIA " (on plavix) who presents for evaluation of epigastric abdominal pain associated with nausea and early satiety, weight loss. Symptoms could be related to underlying constipation and I have asked her to increase bowel regimen with Miralax 17 g daily. CTA will be ordered to rule out chronic mesenteric ischemia. EGD/Colonoscopy for further evaluation of abdominal pain and history of colon polyps    1. Epigastric abdominal pain  2. Early satiety  3. Weight loss  4. History of colon polyps    Plan:   -Schedule EGD for evaluation of abdominal pain and colonoscopy due to history of colon polyps  -Hold plavix for 5 days prior to procedure  -CTA abd/pelvis for further evaluation of chronic mesenteric ischemia  -Miralax 17 g PO daily for management of constipation, if fails to improve consider Linzess or Amitiza in future    Kb Nguyen MD  [facility]  [address] [place] [address]  Office: [phone]  Fax: [phone]

## 2019-12-31 ENCOUNTER — TELEPHONE (OUTPATIENT)
Dept: GASTROENTEROLOGY | Facility: CLINIC | Age: 74
End: 2019-12-31

## 2019-12-31 ENCOUNTER — HOSPITAL ENCOUNTER (OUTPATIENT)
Dept: RADIOLOGY | Facility: HOSPITAL | Age: 74
Discharge: HOME | End: 2019-12-31
Attending: INTERNAL MEDICINE
Payer: MEDICARE

## 2019-12-31 DIAGNOSIS — K55.1 MESENTERIC ISCHEMIA, CHRONIC: ICD-10-CM

## 2019-12-31 NOTE — TELEPHONE ENCOUNTER
----- Message from Romana Lange sent at 12/31/2019  9:24 AM CST -----  Type: Needs Medical Advice    Who Called:  Gabriella with [facility]  Symptoms (please be specific):  na  How long has patient had these symptoms:  na  Pharmacy name and phone #:    [id number] - [place] - [address] [facility]  [address]  [place] [address]  Phone: [phone] Fax: [phone]  Best Call Back Number: [phone]  Additional Information: patient is needing a prescription for prednisone because she has a contrast allergy/her CT scan  is rescheduled for 01 02 20 for 10:30am/please order for patient

## 2019-12-31 NOTE — TELEPHONE ENCOUNTER
----- Message from Janie Tolbert sent at 12/31/2019 11:10 AM CST -----  Contact: Patient  Type: Needs Medical Advice    Who Called:  Patient  Pharmacy name and phone #:    [id number] - [place] - [address]  [address]  [place] [address]  Phone: [phone] Fax: [phone]    Best Call Back Number: [phone]  Additional Information: Patient is stating that she needs another script for Bendaryl to do the ct scan with contrast because she is allergic to the DYE.Please call back and advise.

## 2020-01-02 ENCOUNTER — TELEPHONE (OUTPATIENT)
Dept: GASTROENTEROLOGY | Facility: CLINIC | Age: 75
End: 2020-01-02

## 2020-01-02 NOTE — TELEPHONE ENCOUNTER
----- Message from Faby Bryan sent at 1/2/2020 12:20 PM CST -----  Contact: pt [phone]  Patient called and asked if you will send a prescription for Benadryl for her test to be done tomorrow . Send to [facility]  [address]. Patient sent a request this morning she is just asking for a call back to confirm this will be done today.  Please call back the test needs to be reschedule due to the Benadryl.

## 2020-01-07 ENCOUNTER — TELEPHONE (OUTPATIENT)
Dept: GASTROENTEROLOGY | Facility: CLINIC | Age: 75
End: 2020-01-07

## 2020-01-07 ENCOUNTER — ANESTHESIA (OUTPATIENT)
Dept: ENDOSCOPY | Facility: HOSPITAL | Age: 75
End: 2020-01-07
Payer: MEDICARE

## 2020-01-07 ENCOUNTER — HOSPITAL ENCOUNTER (OUTPATIENT)
Facility: HOSPITAL | Age: 75
Discharge: HOME | End: 2020-01-07
Attending: INTERNAL MEDICINE | Admitting: INTERNAL MEDICINE
Payer: MEDICARE

## 2020-01-07 ENCOUNTER — ANESTHESIA EVENT (OUTPATIENT)
Dept: ENDOSCOPY | Facility: HOSPITAL | Age: 75
End: 2020-01-07
Payer: MEDICARE

## 2020-01-07 DIAGNOSIS — R07.9 CHEST PAIN: ICD-10-CM

## 2020-01-07 DIAGNOSIS — R19.4 CHANGE IN BOWEL HABITS: ICD-10-CM

## 2020-01-07 DIAGNOSIS — Z86.010 HISTORY OF COLON POLYPS: Primary | ICD-10-CM

## 2020-01-07 PROCEDURE — D9220A PRA ANESTHESIA: ICD-10-PCS | Mod: PT,ANES,, | Performed by: ANESTHESIOLOGY

## 2020-01-07 PROCEDURE — D9220A PRA ANESTHESIA: ICD-10-PCS | Mod: PT,CRNA,, | Performed by: NURSE ANESTHETIST, CERTIFIED REGISTERED

## 2020-01-07 PROCEDURE — 88342 IMHCHEM/IMCYTCHM 1ST ANTB: CPT | Performed by: PATHOLOGY

## 2020-01-07 PROCEDURE — 43239 EGD BIOPSY SINGLE/MULTIPLE: CPT | Performed by: INTERNAL MEDICINE

## 2020-01-07 PROCEDURE — 27201012 HC FORCEPS, HOT/COLD, DISP: Performed by: INTERNAL MEDICINE

## 2020-01-07 PROCEDURE — 43239 PR EGD, FLEX, W/BIOPSY, SGL/MULTI: ICD-10-PCS | Mod: 51,,, | Performed by: INTERNAL MEDICINE

## 2020-01-07 PROCEDURE — D9220A PRA ANESTHESIA: Mod: PT,ANES,, | Performed by: ANESTHESIOLOGY

## 2020-01-07 PROCEDURE — 88305 TISSUE EXAM BY PATHOLOGIST: ICD-10-PCS | Mod: 26,,, | Performed by: PATHOLOGY

## 2020-01-07 PROCEDURE — 37000008 HC ANESTHESIA 1ST 15 MINUTES: Performed by: INTERNAL MEDICINE

## 2020-01-07 PROCEDURE — 88342 CHG IMMUNOCYTOCHEMISTRY: ICD-10-PCS | Mod: 26,,, | Performed by: PATHOLOGY

## 2020-01-07 PROCEDURE — 93005 ELECTROCARDIOGRAM TRACING: CPT | Mod: 59

## 2020-01-07 PROCEDURE — 45380 COLONOSCOPY AND BIOPSY: CPT | Performed by: INTERNAL MEDICINE

## 2020-01-07 PROCEDURE — 88305 TISSUE EXAM BY PATHOLOGIST: CPT | Mod: 26,,, | Performed by: PATHOLOGY

## 2020-01-07 PROCEDURE — D9220A PRA ANESTHESIA: Mod: PT,CRNA,, | Performed by: NURSE ANESTHETIST, CERTIFIED REGISTERED

## 2020-01-07 PROCEDURE — 43239 EGD BIOPSY SINGLE/MULTIPLE: CPT | Mod: 51,,, | Performed by: INTERNAL MEDICINE

## 2020-01-07 PROCEDURE — 88305 TISSUE EXAM BY PATHOLOGIST: CPT | Performed by: PATHOLOGY

## 2020-01-07 PROCEDURE — 63600175 PHARM REV CODE 636 W HCPCS: Performed by: NURSE ANESTHETIST, CERTIFIED REGISTERED

## 2020-01-07 PROCEDURE — 37000009 HC ANESTHESIA EA ADD 15 MINS: Performed by: INTERNAL MEDICINE

## 2020-01-07 PROCEDURE — 45380 PR COLONOSCOPY,BIOPSY: ICD-10-PCS | Mod: PT,,, | Performed by: INTERNAL MEDICINE

## 2020-01-07 PROCEDURE — 88342 IMHCHEM/IMCYTCHM 1ST ANTB: CPT | Mod: 26,,, | Performed by: PATHOLOGY

## 2020-01-07 PROCEDURE — 45380 COLONOSCOPY AND BIOPSY: CPT | Mod: PT,,, | Performed by: INTERNAL MEDICINE

## 2020-01-07 PROCEDURE — 63600175 PHARM REV CODE 636 W HCPCS: Performed by: INTERNAL MEDICINE

## 2020-01-07 RX ORDER — LIDOCAINE HCL/PF 100 MG/5ML
SYRINGE (ML) INTRAVENOUS
Status: DISCONTINUED | OUTPATIENT
Start: 2020-01-07 | End: 2020-01-07

## 2020-01-07 RX ORDER — PREDNISONE 10 MG/1
50 TABLET ORAL SEE ADMIN INSTRUCTIONS
Qty: 15 TABLET | Refills: 0 | Status: SHIPPED | OUTPATIENT
Start: 2020-01-07 | End: 2020-07-31

## 2020-01-07 RX ORDER — SODIUM CHLORIDE 9 MG/ML
INJECTION, SOLUTION INTRAVENOUS CONTINUOUS
Status: DISCONTINUED | OUTPATIENT
Start: 2020-01-07 | End: 2020-01-07 | Stop reason: HOSPADM

## 2020-01-07 RX ORDER — PROPOFOL 10 MG/ML
VIAL (ML) INTRAVENOUS
Status: DISCONTINUED | OUTPATIENT
Start: 2020-01-07 | End: 2020-01-07

## 2020-01-07 RX ORDER — PHENYLEPHRINE HYDROCHLORIDE 10 MG/ML
INJECTION INTRAVENOUS
Status: DISCONTINUED | OUTPATIENT
Start: 2020-01-07 | End: 2020-01-07

## 2020-01-07 RX ADMIN — PROPOFOL 30 MG: 10 INJECTION, EMULSION INTRAVENOUS at 01:01

## 2020-01-07 RX ADMIN — PHENYLEPHRINE HYDROCHLORIDE 200 MCG: 10 INJECTION INTRAVENOUS at 01:01

## 2020-01-07 RX ADMIN — PROPOFOL 20 MG: 10 INJECTION, EMULSION INTRAVENOUS at 01:01

## 2020-01-07 RX ADMIN — LIDOCAINE HYDROCHLORIDE 50 MG: 20 INJECTION, SOLUTION INTRAVENOUS at 01:01

## 2020-01-07 RX ADMIN — SODIUM CHLORIDE 1000 ML: 0.9 INJECTION, SOLUTION INTRAVENOUS at 12:01

## 2020-01-07 RX ADMIN — PROPOFOL 70 MG: 10 INJECTION, EMULSION INTRAVENOUS at 01:01

## 2020-01-07 NOTE — TELEPHONE ENCOUNTER
----- Message from Kb Nguyen MD sent at 1/7/2020  2:19 PM CST -----  Please schedule CTA for patient. I prescribed steroid pre-medication for the contrast allergy and went over it with the patient. She will take Prednisone 50 mg at 13 hours prior to imaging study, then 7 hours prior then again 1 hour prior with Benadryl 50 mg oral once.

## 2020-01-07 NOTE — ANESTHESIA PREPROCEDURE EVALUATION
01/07/2020  Pili Teixeira is a 74 y.o., female.    Pre-op Assessment      I have reviewed the Medications.     Review of Systems  Anesthesia Hx:  No problems with previous Anesthesia    Social:  Non-Smoker, No Alcohol Use    Cardiovascular:   Hypertension Past MI CAD   hyperlipidemia 2015:  Impression: NORMAL MYOCARDIAL PERFUSION  1. The perfusion scan is free of evidence for myocardial ischemia or injury.   2. Resting wall motion is physiologic.   3. Resting LV function is normal.   4. The ventricular volumes are normal at rest and stress.   5. The extracardiac distribution of radioactivity is normal.    Pulmonary:   COPD Shortness of breath Sleep Apnea    Renal/:   renal calculi    Hepatic/GI:   Bowel Prep. GERD Epigastric pain   Musculoskeletal:   Arthritis   Spine Disorders: cervical Degenerative disease    Neurological:   TIA, (2010) CVA Neuromuscular Disease,    Endocrine:   Diabetes, type 2        Physical Exam  General:  Obesity    Airway/Jaw/Neck:  Airway Findings: Mouth Opening: Normal General Airway Assessment: Adult  Mallampati: II  Jaw/Neck Findings:  Neck ROM: Extension Decreased, Mild      Dental:  Dental Findings: Upper Dentures, Lower Dentures   Chest/Lungs:  Chest/Lungs Findings: Clear to auscultation, Normal Respiratory Rate     Heart/Vascular:  Heart Findings: Rate: Normal  Rhythm: Regular Rhythm  Sounds: Normal  Heart murmur: negative Vascular Findings: Normal (No carotid bruits.)       Mental Status:  Mental Status Findings:  Cooperative, Alert and Oriented         Anesthesia Plan  Type of Anesthesia, risks & benefits discussed:  Anesthesia Type:  general  Patient's Preference:   Intra-op Monitoring Plan:   Intra-op Monitoring Plan Comments:   Post Op Pain Control Plan:   Post Op Pain Control Plan Comments:   Induction:   IV  Beta Blocker:  Patient is not currently on a Beta-Blocker  "(No further documentation required).       Informed Consent: Patient understands risks and agrees with Anesthesia plan.  Questions answered. Anesthesia consent signed with patient.  ASA Score: 3     Day of Surgery Review of History & Physical:        Anesthesia Plan Notes: Pt endorses some "chest pain" and SOB although it seems unrelated to activity.  She states it is present at rest and sems related to eating.  She states she is to see cardiology on 1/15/20.  EKG obtained prior to procedure which is unchanged from prior EKG.          Ready For Surgery From Anesthesia Perspective.       "

## 2020-01-07 NOTE — PLAN OF CARE
Vss,denies pain, ambulates easily. IV removed, catheter intact. Discharge instructions provided and states understanding. States ready to go home.  Discharged from facility with family per wheelchair.

## 2020-01-07 NOTE — [employer] PATIENT INSTRUCTIONS
Discharge Summary/Instructions after an Endoscopic Procedure  Patient Name: Pili Teixeira  Patient MRN: [id number]  Patient YOB: 1945 Tuesday, January 07, 2020  Kb Nguyen MD  RESTRICTIONS:  During your procedure today, you received medications for sedation.  These   medications may affect your judgment, balance and coordination.  Therefore,   for 24 hours, you have the following restrictions:   - DO NOT drive a car, operate machinery, make legal/financial decisions,   sign important papers or drink alcohol.    ACTIVITY:  Today: no heavy lifting, straining or running due to procedural   sedation/anesthesia.  The following day: return to full activity including work.  DIET:  Eat and drink normally unless instructed otherwise.     TREATMENT FOR COMMON SIDE EFFECTS:  - Mild abdominal pain, nausea, belching, bloating or excessive gas:  rest,   eat lightly and use a heating pad.  - Sore Throat: treat with throat lozenges and/or gargle with warm salt   water.  - Because air was used during the procedure, expelling large amounts of air   from your rectum or belching is normal.  - If a bowel prep was taken, you may not have a bowel movement for 1-3 days.    This is normal.  SYMPTOMS TO WATCH FOR AND REPORT TO YOUR PHYSICIAN:  1. Abdominal pain or bloating, other than gas cramps.  2. Chest pain.  3. Back pain.  4. Signs of infection such as: chills or fever occurring within 24 hours   after the procedure.  5. Rectal bleeding, which would show as bright red, maroon, or black stools.   (A tablespoon of blood from the rectum is not serious, especially if   hemorrhoids are present.)  6. Vomiting.  7. Weakness or dizziness.  GO DIRECTLY TO THE NEAREST EMERGENCY ROOM IF YOU HAVE ANY OF THE FOLLOWING:      Difficulty breathing              Chills and/or fever over 101 F   Persistent vomiting and/or vomiting blood   Severe abdominal pain   Severe chest pain   Black, tarry stools   Bleeding- more than one  tablespoon   Any other symptom or condition that you feel may need urgent attention  Your doctor recommends these additional instructions:  If any biopsies were taken, your doctors clinic will contact you in 1 to 2   weeks with any results.  - Discharge patient to home.   - Advance diet as tolerated.   - Continue present medications.   - Await pathology results.   - Nausea and early satiety could be related to constipation, opioid use or   reducible abdominal hernias that have previously caused small bowel   obstruction in the past   - Recommend symptomatic treatment with anti-emetics, bowel regimen and   minimizing opioid medications  For questions, problems or results please call your physician - Kb Nguyen MD at Work:  [phone].  [facility], EMERGENCY ROOM PHONE NUMBER: [phone]  IF A COMPLICATION OR EMERGENCY SITUATION ARISES AND YOU ARE UNABLE TO REACH   YOUR PHYSICIAN - GO DIRECTLY TO THE EMERGENCY ROOM.  Kb Nguyen MD  1/7/2020 1:44:35 PM  This report has been verified and signed electronically.  [employer]

## 2020-01-07 NOTE — DISCHARGE INSTRUCTIONS

## 2020-01-07 NOTE — PR[employer]ATION PATIENT INSTRUCTIONS
Discharge Summary/Instructions after an Endoscopic Procedure  Patient Name: Pili Teixeira  Patient MRN: [id number]  Patient YOB: 1945 Tuesday, January 07, 2020  Kb Nguyen MD  RESTRICTIONS:  During your procedure today, you received medications for sedation.  These   medications may affect your judgment, balance and coordination.  Therefore,   for 24 hours, you have the following restrictions:   - DO NOT drive a car, operate machinery, make legal/financial decisions,   sign important papers or drink alcohol.    ACTIVITY:  Today: no heavy lifting, straining or running due to procedural   sedation/anesthesia.  The following day: return to full activity including work.  DIET:  Eat and drink normally unless instructed otherwise.     TREATMENT FOR COMMON SIDE EFFECTS:  - Mild abdominal pain, nausea, belching, bloating or excessive gas:  rest,   eat lightly and use a heating pad.  - Sore Throat: treat with throat lozenges and/or gargle with warm salt   water.  - Because air was used during the procedure, expelling large amounts of air   from your rectum or belching is normal.  - If a bowel prep was taken, you may not have a bowel movement for 1-3 days.    This is normal.  SYMPTOMS TO WATCH FOR AND REPORT TO YOUR PHYSICIAN:  1. Abdominal pain or bloating, other than gas cramps.  2. Chest pain.  3. Back pain.  4. Signs of infection such as: chills or fever occurring within 24 hours   after the procedure.  5. Rectal bleeding, which would show as bright red, maroon, or black stools.   (A tablespoon of blood from the rectum is not serious, especially if   hemorrhoids are present.)  6. Vomiting.  7. Weakness or dizziness.  GO DIRECTLY TO THE NEAREST EMERGENCY ROOM IF YOU HAVE ANY OF THE FOLLOWING:      Difficulty breathing              Chills and/or fever over 101 F   Persistent vomiting and/or vomiting blood   Severe abdominal pain   Severe chest pain   Black, tarry stools   Bleeding- more than one  tablespoon   Any other symptom or condition that you feel may need urgent attention  Your doctor recommends these additional instructions:  If any biopsies were taken, your doctors clinic will contact you in 1 to 2   weeks with any results.  - Discharge patient to home.   - Advance diet as tolerated.   - Continue present medications.   - Await pathology results.   - Repeat colonoscopy in 5 years for surveillance  if polyp returns   adenomatous  - Written discharge instructions were provided to the patient.  For questions, problems or results please call your physician - Kb Nguyen MD at Work:  [phone].  [facility], EMERGENCY ROOM PHONE NUMBER: [phone]  IF A COMPLICATION OR EMERGENCY SITUATION ARISES AND YOU ARE UNABLE TO REACH   YOUR PHYSICIAN - GO DIRECTLY TO THE EMERGENCY ROOM.  Kb Nguyen MD  1/7/2020 1:53:39 PM  This report has been verified and signed electronically.  [employer]

## 2020-01-07 NOTE — TRANSFER OF CARE
"Anesthesia Transfer of Care Note    Patient: Pili Teixeira    Procedure(s) Performed: Procedure(s) (LRB):  EGD (ESOPHAGOGASTRODUODENOSCOPY) (N/A)  COLONOSCOPY (N/A)    Patient location: PACU    Anesthesia Type: general    Transport from OR: Transported from OR on room air with adequate spontaneous ventilation    Post pain: adequate analgesia    Post assessment: no apparent anesthetic complications    Post vital signs: stable    Level of consciousness: awake    Nausea/Vomiting: no nausea/vomiting    Complications: none    Transfer of care protocol was followed      Last vitals:   Visit Vitals  BP (!) 119/56   Pulse 61   Temp 36.6 °C (97.9 °F) (Skin)   Resp 20   Ht 5' 3" (1.6 m)   Wt 70.3 kg (155 lb)   SpO2 99%   Breastfeeding? No   BMI 27.46 kg/m²     "

## 2020-01-07 NOTE — H&P
CC: abdominal pain    HPI 74 y.o. female with history of COPD, DMII, fibromyalgia, GERD, TIA (on plavix) who presents for evaluation of chronic, persistent, moderate epigastric abdominal pain associated with nausea. She states the nausea has been so severe over the past several months that she has lost approximately 12 lbs. She has not tried losing weight but has difficulty eating due to nausea and early satiety. This has caused her to avoid meals all together per her daughter. She has been taking Zofran as needed but it causes constipation. She is also on opioid medications for chronic pain. She endorses constipation but tries not to strain during bowel movements due to abdominal hernias. She also has a history of colon polyps and is also due for colonoscopy.    Past Medical History:   Diagnosis Date    Allergy     Anemia 2012    with thrombocytopenia; iron deficiency    Arthritis     Cervical spinal stenosis     with neuropathy, chronic neck,back,leg pain    Colon polyp     COPD (chronic obstructive pulmonary disease)     Coronary artery disease     Diabetes mellitus     , sugars run 190's per patient    Diverticulitis     Diverticulosis     Hx diverticulitis,still has chronic diarrhea, abd pain    Dyspnea on exertion     sometimes with nausea, fatigue,dizziness, had cardiac cath June 2012, normal    Fibromyalgia     Fracture 2012    right thumb    GERD (gastroesophageal reflux disease)     Feb 2012, Hx H Pylori    Glaucoma     had LAser surgey, on no eye drops    HEARING LOSS     Hemangioma     fatty liver    History of earache     frequent    Hyperlipidemia     Hypertension     Laryngeal polyp     Myocardial infarction 2006 last    also MI in distant past    REJI (obstructive sleep apnea)     does not use CPAP    Otitis media     Renal stone     Sjogren's syndrome     Stroke     TIA, now on Plavix    TIA (transient ischemic attack)     TMJ disorder     UTI (lower urinary tract  "infection)     frequent    Venous insufficiency     with Hx blood clot in leg     Review of Systems  General ROS: negative for chills, fever, +weight loss  Psychological ROS: negative for hallucination, depression or suicidal ideation  Ophthalmic ROS: negative for blurry vision, photophobia or eye pain  ENT ROS: negative for epistaxis, sore throat or rhinorrhea  Respiratory ROS: no cough, shortness of breath, or wheezing  Cardiovascular ROS: no chest pain, +shortness of breath   Gastrointestinal ROS: +nausea, +early satiety, no abdominal pain, +constipation  Genito-Urinary ROS: no dysuria, trouble voiding, or hematuria  Musculoskeletal ROS: negative for gait disturbance or muscular weakness  Neurological ROS: no syncope or seizures; no ataxia  Dermatological ROS: negative for pruritis, rash and jaundice    Physical Examination  /64   Pulse 71   Temp 97.9 °F (36.6 °C) (Skin)   Resp 20   Ht 5' 3" (1.6 m)   Wt 70.3 kg (155 lb)   SpO2 (!) 93%   Breastfeeding? No   BMI 27.46 kg/m²   General appearance: elderly female, alert, cooperative, no distress  HENT: Normocephalic, atraumatic, neck symmetrical, no nasal discharge   Eyes: conjunctivae/corneas clear, PERRL, EOM's intact  Lungs: clear to auscultation bilaterally, symmetric chest wall expansion bilaterally  Heart: regular rate and rhythm without rub; no displacement of the PMI   Abdomen: soft, non-tender; bowel sounds normoactive; no organomegaly  Extremities: extremities symmetric; no clubbing, cyanosis, or edema  Integument: Skin color, texture, turgor normal; no rashes; hair distrubution normal  Neurologic: Alert and oriented X 3, normal strength, normal coordination and gait  Psychiatric: no pressured speech; normal affect; no evidence of impaired cognition     Assessment:   74 y.o. female with history of COPD, DMII, fibromyalgia, GERD, TIA (on plavix) who presents for evaluation of epigastric abdominal pain associated with nausea and early satiety, " weight loss. Symptoms could be related to underlying constipation and I have asked her to increase bowel regimen with Miralax 17 g daily. CTA will be ordered to rule out chronic mesenteric ischemia. EGD/Colonoscopy for further evaluation of abdominal pain and history of colon polyps    1. Epigastric abdominal pain  2. Early satiety  3. Weight loss  4. History of colon polyps    Plan:   - EGD for evaluation of abdominal pain and colonoscopy due to history of colon polyps  -Hold plavix for 5 days prior to procedure    Kb Nguyen MD  [facility]  [address] [place] [address]  Office: [phone]  Fax: [phone]

## 2020-01-08 VITALS
TEMPERATURE: 98 F | SYSTOLIC BLOOD PRESSURE: 137 MMHG | OXYGEN SATURATION: 100 % | HEART RATE: 65 BPM | WEIGHT: 155 LBS | RESPIRATION RATE: 20 BRPM | HEIGHT: 63 IN | DIASTOLIC BLOOD PRESSURE: 64 MMHG | BODY MASS INDEX: 27.46 KG/M2

## 2020-01-08 NOTE — ANESTHESIA POSTPROCEDURE EVALUATION
Anesthesia Post Evaluation    Patient: Pili Teixeira    Procedure(s) Performed: Procedure(s) (LRB):  EGD (ESOPHAGOGASTRODUODENOSCOPY) (N/A)  COLONOSCOPY (N/A)    Final Anesthesia Type: general    Patient location during evaluation: PACU  Patient participation: Yes- Able to Participate  Level of consciousness: awake and alert and oriented  Post-procedure vital signs: reviewed and stable  Pain management: adequate  Airway patency: patent    PONV status at discharge: No PONV  Anesthetic complications: no      Cardiovascular status: blood pressure returned to baseline  Respiratory status: unassisted, spontaneous ventilation and room air  Hydration status: euvolemic  Follow-up not needed.          Vitals Value Taken Time   /64 1/7/2020  2:05 PM   Temp 36.4 °C (97.5 °F) 1/7/2020  1:40 PM   Pulse 65 1/7/2020  2:05 PM   Resp 20 1/7/2020  2:05 PM   SpO2 100 % 1/7/2020  2:05 PM         Event Time     Out of Recovery 14:23:23          Pain/Enoc Score: Enoc Score: 10 (1/7/2020  2:05 PM)

## 2020-01-10 ENCOUNTER — HOSPITAL ENCOUNTER (OUTPATIENT)
Dept: RADIOLOGY | Facility: HOSPITAL | Age: 75
Discharge: HOME | End: 2020-01-10
Attending: INTERNAL MEDICINE
Payer: MEDICARE

## 2020-01-10 DIAGNOSIS — K55.1 MESENTERIC ISCHEMIA, CHRONIC: ICD-10-CM

## 2020-01-10 PROCEDURE — 25500020 PHARM REV CODE 255: Performed by: INTERNAL MEDICINE

## 2020-01-10 PROCEDURE — 74174 CTA ABDOMEN AND PELVIS: ICD-10-PCS | Mod: 26,,, | Performed by: RADIOLOGY

## 2020-01-10 PROCEDURE — 74174 CTA ABD&PLVS W/CONTRAST: CPT | Mod: 26,,, | Performed by: RADIOLOGY

## 2020-01-10 PROCEDURE — 74174 CTA ABD&PLVS W/CONTRAST: CPT | Mod: TC

## 2020-01-10 RX ADMIN — IOHEXOL 75 ML: 350 INJECTION, SOLUTION INTRAVENOUS at 10:01

## 2020-01-15 ENCOUNTER — OFFICE VISIT (OUTPATIENT)
Dept: CARDIOLOGY | Facility: CLINIC | Age: 75
End: 2020-01-15
Payer: MEDICARE

## 2020-01-15 VITALS
HEART RATE: 80 BPM | DIASTOLIC BLOOD PRESSURE: 58 MMHG | SYSTOLIC BLOOD PRESSURE: 122 MMHG | HEIGHT: 63 IN | WEIGHT: 155.19 LBS | BODY MASS INDEX: 27.5 KG/M2

## 2020-01-15 DIAGNOSIS — E78.2 MIXED HYPERLIPIDEMIA: Primary | ICD-10-CM

## 2020-01-15 DIAGNOSIS — I15.2 HYPERTENSION ASSOCIATED WITH DIABETES: ICD-10-CM

## 2020-01-15 DIAGNOSIS — D69.6 THROMBOCYTOPENIA: Chronic | ICD-10-CM

## 2020-01-15 DIAGNOSIS — E11.59 HYPERTENSION ASSOCIATED WITH DIABETES: ICD-10-CM

## 2020-01-15 DIAGNOSIS — R06.02 SOB (SHORTNESS OF BREATH): ICD-10-CM

## 2020-01-15 DIAGNOSIS — I50.813 ACUTE ON CHRONIC RIGHT HEART FAILURE: ICD-10-CM

## 2020-01-15 PROCEDURE — 99999 PR PBB SHADOW E&M-EST. PATIENT-LVL III: CPT | Mod: PBBFAC,,, | Performed by: INTERNAL MEDICINE

## 2020-01-15 PROCEDURE — 1126F PR PAIN SEVERITY QUANTIFIED, NO PAIN PRESENT: ICD-10-PCS | Mod: ,,, | Performed by: INTERNAL MEDICINE

## 2020-01-15 PROCEDURE — 99999 PR PBB SHADOW E&M-EST. PATIENT-LVL III: ICD-10-PCS | Mod: PBBFAC,,, | Performed by: INTERNAL MEDICINE

## 2020-01-15 PROCEDURE — 1159F MED LIST DOCD IN RCRD: CPT | Mod: ,,, | Performed by: INTERNAL MEDICINE

## 2020-01-15 PROCEDURE — 99215 OFFICE O/P EST HI 40 MIN: CPT | Mod: S$PBB,,, | Performed by: INTERNAL MEDICINE

## 2020-01-15 PROCEDURE — 99215 PR OFFICE/OUTPT VISIT, EST, LEVL V, 40-54 MIN: ICD-10-PCS | Mod: S$PBB,,, | Performed by: INTERNAL MEDICINE

## 2020-01-15 PROCEDURE — 1159F PR MEDICATION LIST DOCUMENTED IN MEDICAL RECORD: ICD-10-PCS | Mod: ,,, | Performed by: INTERNAL MEDICINE

## 2020-01-15 PROCEDURE — 99213 OFFICE O/P EST LOW 20 MIN: CPT | Mod: PBBFAC,PO | Performed by: INTERNAL MEDICINE

## 2020-01-15 PROCEDURE — 1126F AMNT PAIN NOTED NONE PRSNT: CPT | Mod: ,,, | Performed by: INTERNAL MEDICINE

## 2020-01-15 RX ORDER — VALSARTAN AND HYDROCHLOROTHIAZIDE 160; 12.5 MG/1; MG/1
1 TABLET, FILM COATED ORAL DAILY
Qty: 90 TABLET | Refills: 3 | Status: SHIPPED | OUTPATIENT
Start: 2020-01-15 | End: 2020-05-20 | Stop reason: SDUPTHER

## 2020-01-15 NOTE — PROGRESS NOTES
Subjective:    Patient ID:  Pili Teixeira is a 74 y.o. female who presents for follow-up of Consult HTN; Shortness of Breath; and Chest Pain      HPI  Here for f/u TIA/SOB/REJI. Last seen 2 years ago. C/o sever BONILLA and SOB minimal exertion. Has lost wt. C/o early satiety.     Review of Systems   Constitution: Negative for malaise/fatigue.   Eyes: Negative for blurred vision.   Cardiovascular: Negative for chest pain, claudication, cyanosis, dyspnea on exertion, irregular heartbeat, leg swelling, near-syncope, orthopnea, palpitations, paroxysmal nocturnal dyspnea and syncope.   Respiratory: Negative for cough and shortness of breath.    Hematologic/Lymphatic: Does not bruise/bleed easily.   Musculoskeletal: Negative for back pain, falls, joint pain, muscle cramps, muscle weakness and myalgias.   Gastrointestinal: Negative for abdominal pain, change in bowel habit, nausea and vomiting.   Genitourinary: Negative for urgency.   Neurological: Negative for dizziness, focal weakness and light-headedness.       Past Medical History:   Diagnosis Date    Allergy     Anemia 2012    with thrombocytopenia; iron deficiency    Arthritis     Cervical spinal stenosis     with neuropathy, chronic neck,back,leg pain    Colon polyp     COPD (chronic obstructive pulmonary disease)     Coronary artery disease     Diabetes mellitus     , sugars run 190's per patient    Diastolic dysfunction 7/13/2015    Diverticulitis     Diverticulosis     Hx diverticulitis,still has chronic diarrhea, abd pain    Dyspnea on exertion     sometimes with nausea, fatigue,dizziness, had cardiac cath June 2012, normal    Fibromyalgia     Fracture 2012    right thumb    GERD (gastroesophageal reflux disease)     Feb 2012, Hx H Pylori    Glaucoma     had LAser surgey, on no eye drops    HEARING LOSS     Hemangioma     fatty liver    History of earache     frequent    History of TIA (transient ischemic attack) 5/28/2013    Hyperlipidemia      Hypertension     Hypertension associated with diabetes 3/14/2017    Laryngeal polyp     Myocardial infarction 2006 last    also MI in distant past    REJI (obstructive sleep apnea)     does not use CPAP    Otitis media     Renal stone     Sjogren's syndrome     SOB (shortness of breath) 6/8/2012    [address] [place] [address] 1. Normal coronary arteries. 2. Normal single renal arteries bilaterally. 3. Diastolic dysfunction.     Stroke     TIA, now on Plavix    TIA (transient ischemic attack)     TMJ disorder     Type II or unspecified type diabetes mellitus without mention of complication, uncontrolled 5/8/2014    UTI (lower urinary tract infection)     frequent    Venous insufficiency     with Hx blood clot in leg          Objective:     Vitals:    01/15/20 1256   BP: (!) 122/58   Pulse: 80        Physical Exam   Constitutional: She is oriented to person, place, and time. She appears well-developed and well-nourished.   HENT:   Head: Normocephalic.   Eyes: Conjunctivae are normal.   Neck: Normal range of motion. Neck supple. No JVD present.   Cardiovascular: Normal rate, regular rhythm, normal heart sounds and intact distal pulses.   Pulses:       Carotid pulses are 2+ on the right side, and 2+ on the left side.       Radial pulses are 2+ on the right side, and 2+ on the left side.        Dorsalis pedis pulses are 2+ on the right side, and 2+ on the left side.        Posterior tibial pulses are 2+ on the right side, and 2+ on the left side.   Pulmonary/Chest: Effort normal and breath sounds normal.   Abdominal: Soft. Bowel sounds are normal.   Musculoskeletal: She exhibits no edema or tenderness.   Neurological: She is alert and oriented to person, place, and time. Gait normal.   Skin: Skin is warm, dry and intact. No cyanosis. Nails show no clubbing.   Psychiatric: She has a normal mood and affect. Her speech is normal and behavior is normal. Thought content normal.   Nursing note and vitals reviewed.             ..    Chemistry        Component Value Date/Time     12/21/2019 1046    K 3.7 12/21/2019 1046    CL 99 12/21/2019 1046    CO2 29 12/21/2019 1046    BUN 24 (H) 12/21/2019 1046    CREATININE 1.1 12/21/2019 1046    CREATININE 0.9 09/01/2012 1320     (H) 12/21/2019 1046        Component Value Date/Time    CALCIUM 10.6 (H) 12/21/2019 1046    CALCIUM 9.9 09/01/2012 1320    ALKPHOS 59 12/21/2019 1046    ALKPHOS 80 09/01/2012 1320    AST 23 12/21/2019 1046    AST 17 09/01/2012 1320    ALT 17 12/21/2019 1046    BILITOT 0.5 12/21/2019 1046    ESTGFRAFRICA 57.6 (A) 12/21/2019 1046    EGFRNONAA 49.9 (A) 12/21/2019 1046            ..  Lab Results   Component Value Date    CHOL 137 08/21/2019    CHOL 144 02/01/2018    CHOL 157 06/05/2017     Lab Results   Component Value Date    HDL 51 08/21/2019    HDL 47 02/01/2018    HDL 51 06/05/2017     Lab Results   Component Value Date    LDLCALC 62.4 (L) 08/21/2019    LDLCALC 64.8 02/01/2018    LDLCALC 74.8 06/05/2017     Lab Results   Component Value Date    TRIG 118 08/21/2019    TRIG 161 (H) 02/01/2018    TRIG 156 (H) 06/05/2017     Lab Results   Component Value Date    CHOLHDL 37.2 08/21/2019    CHOLHDL 32.6 02/01/2018    CHOLHDL 32.5 06/05/2017     ..  Lab Results   Component Value Date    WBC 12.36 12/02/2019    HGB 12.5 12/02/2019    HCT 41.2 12/02/2019    MCV 90 12/02/2019     12/02/2019       Test(s) Reviewed  I have reviewed the following in detail:  [] Stress test   [] Angiography   [x] Echocardiogram   [x] Labs   [x] Other:       Assessment:         ICD-10-CM ICD-9-CM   1. Mixed hyperlipidemia E78.2 272.2   2. Hypertension associated with diabetes E11.59 250.80    I10 401.9   3. Thrombocytopenia D69.6 287.5   4. SOB (shortness of breath) R06.02 786.05     Problem List Items Addressed This Visit     Thrombocytopenia (Chronic)    SOB (shortness of breath)    Overview     2012 [facility]  1. Normal coronary arteries.  2. Normal single renal arteries  bilaterally.  3. Diastolic dysfunction.         Hypertension associated with diabetes    Hyperlipidemia - Primary           Plan:           Return to clinic 3 months   Low level/low impact aerobic exercise 5x's/wk. Heart healthy diet and risk factor modification.    See labs and med orders.  marcus stress cfd  Blood pressure log at home (different times a day) bring log and machine here next visit.  Decrease HCTZ to 12.5    Portions of this note may have been created with voice recognition software.  Grammatical, syntax and spelling errors may be inevitable.

## 2020-01-15 NOTE — LETTER
January 15, 2020      Lupe Gutierrez, ZIA  [address] [facility] [address] [place] [address]           [place] - Cardiology  [address] [place] [address]  Phone: [phone]          Patient: Pili Teixeira   MR Number: [id number]   YOB: 1945   Date of Visit: 1/15/2020       Dear Lupe Gutierrez:    Thank you for referring Pili Teixeira to me for evaluation. Attached you will find relevant portions of my assessment and plan of care.    If you have questions, please do not hesitate to call me. I look forward to following Pili Teixeira along with you.    Sincerely,    Leonidas Page MD    Enclosure  CC:  No Recipients    If you would like to receive this communication electronically, please contact [email or web address] or [phone] to request more information on [employer] access.    For providers and/or their staff who would like to refer a patient to [facility], please contact us through our one-stop-shop provider referral line, [facility] , at [phone].    If you feel you have received this communication in error or would no longer like to receive these types of communications, please e-mail [email or web address]

## 2020-01-24 ENCOUNTER — CLINICAL SUPPORT (OUTPATIENT)
Dept: CARDIOLOGY | Facility: CLINIC | Age: 75
End: 2020-01-24
Attending: INTERNAL MEDICINE
Payer: MEDICARE

## 2020-01-24 VITALS — HEIGHT: 63 IN | WEIGHT: 155 LBS | BODY MASS INDEX: 27.46 KG/M2 | HEART RATE: 75 BPM

## 2020-01-24 PROCEDURE — 93306 TTE W/DOPPLER COMPLETE: CPT | Mod: PBBFAC,PO | Performed by: INTERNAL MEDICINE

## 2020-01-24 PROCEDURE — 99999 PR PBB SHADOW E&M-EST. PATIENT-LVL I: CPT | Mod: PBBFAC,,,

## 2020-01-24 PROCEDURE — 93306 ECHO (CUPID ONLY): ICD-10-PCS | Mod: 26,S$PBB,, | Performed by: INTERNAL MEDICINE

## 2020-01-24 PROCEDURE — 99211 OFF/OP EST MAY X REQ PHY/QHP: CPT | Mod: PBBFAC,PO,25

## 2020-01-24 PROCEDURE — 99999 PR PBB SHADOW E&M-EST. PATIENT-LVL I: ICD-10-PCS | Mod: PBBFAC,,,

## 2020-01-27 LAB
ASCENDING AORTA: 2.63 CM
AV INDEX (PROSTH): 0.73
AV MEAN GRADIENT: 3 MMHG
AV PEAK GRADIENT: 5 MMHG
AV VALVE AREA: 2.72 CM2
AV VELOCITY RATIO: 0.64
BSA FOR ECHO PROCEDURE: 1.77 M2
CV ECHO LV RWT: 0.48 CM
DOP CALC AO PEAK VEL: 1.08 M/S
DOP CALC AO VTI: 23.75 CM
DOP CALC LVOT AREA: 3.7 CM2
DOP CALC LVOT DIAMETER: 2.18 CM
DOP CALC LVOT PEAK VEL: 0.69 M/S
DOP CALC LVOT STROKE VOLUME: 64.61 CM3
DOP CALCLVOT PEAK VEL VTI: 17.32 CM
E WAVE DECELERATION TIME: 166.98 MSEC
E/A RATIO: 0.84
E/E' RATIO: 11.29 M/S
ECHO LV POSTERIOR WALL: 1.04 CM (ref 0.6–1.1)
FRACTIONAL SHORTENING: 43 % (ref 28–44)
INTERVENTRICULAR SEPTUM: 1.02 CM (ref 0.6–1.1)
IVRT: 0.1 MSEC
LA MAJOR: 4.36 CM
LA MINOR: 4.58 CM
LA WIDTH: 2.69 CM
LEFT ATRIUM SIZE: 3.35 CM
LEFT ATRIUM VOLUME INDEX: 19.7 ML/M2
LEFT ATRIUM VOLUME: 34.22 CM3
LEFT INTERNAL DIMENSION IN SYSTOLE: 2.48 CM (ref 2.1–4)
LEFT VENTRICLE DIASTOLIC VOLUME INDEX: 48.94 ML/M2
LEFT VENTRICLE DIASTOLIC VOLUME: 84.93 ML
LEFT VENTRICLE MASS INDEX: 87 G/M2
LEFT VENTRICLE SYSTOLIC VOLUME INDEX: 12.6 ML/M2
LEFT VENTRICLE SYSTOLIC VOLUME: 21.83 ML
LEFT VENTRICULAR INTERNAL DIMENSION IN DIASTOLE: 4.34 CM (ref 3.5–6)
LEFT VENTRICULAR MASS: 150.69 G
LV LATERAL E/E' RATIO: 11.29 M/S
LV SEPTAL E/E' RATIO: 11.29 M/S
MV PEAK A VEL: 0.94 M/S
MV PEAK E VEL: 0.79 M/S
PISA TR MAX VEL: 3.03 M/S
PULM VEIN S/D RATIO: 1.78
PV PEAK D VEL: 0.32 M/S
PV PEAK S VEL: 0.57 M/S
RA MAJOR: 3.91 CM
RA PRESSURE: 3 MMHG
RA WIDTH: 3.21 CM
RIGHT VENTRICULAR END-DIASTOLIC DIMENSION: 3.07 CM
SINUS: 2.65 CM
STJ: 2.36 CM
TDI LATERAL: 0.07 M/S
TDI SEPTAL: 0.07 M/S
TDI: 0.07 M/S
TR MAX PG: 37 MMHG
TRICUSPID ANNULAR PLANE SYSTOLIC EXCURSION: 2.11 CM
TV REST PULMONARY ARTERY PRESSURE: 40 MMHG

## 2020-01-30 ENCOUNTER — HOSPITAL ENCOUNTER (OUTPATIENT)
Dept: RADIOLOGY | Facility: HOSPITAL | Age: 75
Discharge: HOME | End: 2020-01-30
Attending: INTERNAL MEDICINE
Payer: MEDICARE

## 2020-01-30 DIAGNOSIS — E78.2 MIXED HYPERLIPIDEMIA: ICD-10-CM

## 2020-01-30 DIAGNOSIS — R06.02 SOB (SHORTNESS OF BREATH): ICD-10-CM

## 2020-01-30 DIAGNOSIS — I15.2 HYPERTENSION ASSOCIATED WITH DIABETES: ICD-10-CM

## 2020-01-30 DIAGNOSIS — E11.59 HYPERTENSION ASSOCIATED WITH DIABETES: ICD-10-CM

## 2020-01-30 PROCEDURE — 78452 HT MUSCLE IMAGE SPECT MULT: CPT | Mod: 26,,, | Performed by: INTERNAL MEDICINE

## 2020-01-30 PROCEDURE — 93016 CV STRESS TEST SUPVJ ONLY: CPT | Mod: ,,, | Performed by: INTERNAL MEDICINE

## 2020-01-30 PROCEDURE — 93018 PR CARDIAC STRESS TST,INTERP/REPT ONLY: ICD-10-PCS | Mod: ,,, | Performed by: INTERNAL MEDICINE

## 2020-01-30 PROCEDURE — 93016 STRESS TEST WITH MYOCARDIAL PERFUSION (CUPID ONLY): ICD-10-PCS | Mod: ,,, | Performed by: INTERNAL MEDICINE

## 2020-01-30 PROCEDURE — 93018 CV STRESS TEST I&R ONLY: CPT | Mod: ,,, | Performed by: INTERNAL MEDICINE

## 2020-01-30 PROCEDURE — 78452 STRESS TEST WITH MYOCARDIAL PERFUSION (CUPID ONLY): ICD-10-PCS | Mod: 26,,, | Performed by: INTERNAL MEDICINE

## 2020-01-31 LAB
CV STRESS BASE HR: 66 BPM
DIASTOLIC BLOOD PRESSURE: 67 MMHG
NUC REST DIASTOLIC VOLUME INDEX: 53
NUC REST EJECTION FRACTION: 78
NUC REST SYSTOLIC VOLUME INDEX: 13
OHS CV CPX 1 MINUTE RECOVERY HEART RATE: 94 BPM
OHS CV CPX 85 PERCENT MAX PREDICTED HEART RATE MALE: 120
OHS CV CPX MAX PREDICTED HEART RATE: 142
OHS CV CPX PATIENT IS FEMALE: 1
OHS CV CPX PATIENT IS MALE: 0
OHS CV CPX PEAK DIASTOLIC BLOOD PRESSURE: 61 MMHG
OHS CV CPX PEAK HEAR RATE: 100 BPM
OHS CV CPX PEAK RATE PRESSURE PRODUCT: NORMAL
OHS CV CPX PEAK SYSTOLIC BLOOD PRESSURE: 129 MMHG
OHS CV CPX PERCENT MAX PREDICTED HEART RATE ACHIEVED: 71
OHS CV CPX RATE PRESSURE PRODUCT PRESENTING: 8382
STRESS ECHO TARGET HR: 125 BPM
SYSTOLIC BLOOD PRESSURE: 127 MMHG

## 2020-02-07 LAB
COMMENT: NORMAL
FINAL PATHOLOGIC DIAGNOSIS: NORMAL
GROSS: NORMAL

## 2020-02-17 ENCOUNTER — TELEPHONE (OUTPATIENT)
Dept: CARDIOLOGY | Facility: CLINIC | Age: 75
End: 2020-02-17

## 2020-03-16 DIAGNOSIS — K21.00 REFLUX ESOPHAGITIS: ICD-10-CM

## 2020-03-16 RX ORDER — PANTOPRAZOLE SODIUM 40 MG/1
TABLET, DELAYED RELEASE ORAL
Qty: 90 TABLET | Refills: 0 | Status: SHIPPED | OUTPATIENT
Start: 2020-03-16 | End: 2020-06-15

## 2020-04-02 DIAGNOSIS — Z79.4 TYPE 2 DIABETES MELLITUS WITHOUT COMPLICATION, WITH LONG-TERM CURRENT USE OF INSULIN: ICD-10-CM

## 2020-04-02 DIAGNOSIS — E11.9 TYPE 2 DIABETES MELLITUS WITHOUT COMPLICATION, WITH LONG-TERM CURRENT USE OF INSULIN: ICD-10-CM

## 2020-04-02 NOTE — TELEPHONE ENCOUNTER
----- Message from Angelica Duvallza sent at 4/2/2020  4:49 PM CDT -----  Type:  RX Refill Request    Who Called:  Pili  Refill or New Rx: refill  RX Name and Strength: SAXagliptin (ONGLYZA) 5 mg Tab tablet  How is the patient currently taking it? (ex. 1XDay):  One daily  Is this a 30 day or 90 day RX:  90  Preferred Pharmacy with phone number:  /  [id number] [address] [place] [address] [place] [address]  Phone: [phone] Fax: [phone]  Local or Mail Order:  local  Ordering Provider:  Dr Joseluis Mendes Call Back Number:  [phone]  Additional Information:  Thank you!

## 2020-04-03 ENCOUNTER — DOCUMENTATION ONLY (OUTPATIENT)
Dept: FAMILY MEDICINE | Facility: CLINIC | Age: 75
End: 2020-04-03

## 2020-04-03 ENCOUNTER — TELEPHONE (OUTPATIENT)
Dept: FAMILY MEDICINE | Facility: CLINIC | Age: 75
End: 2020-04-03

## 2020-04-03 RX ORDER — SAXAGLIPTIN 5 MG/1
5 TABLET, FILM COATED ORAL DAILY
Qty: 90 TABLET | Refills: 1 | Status: SHIPPED | OUTPATIENT
Start: 2020-04-03 | End: 2020-10-20 | Stop reason: SDUPTHER

## 2020-04-03 NOTE — PROGRESS NOTES
PA:   ONGLYZA 5mg tablet    [facility] key:  [id number]  Case ID:   [id number]  [employer]  ----------------------------  4/4/2020  APPROVED  Valid 4/3/2020 - 12/31/2020

## 2020-04-03 NOTE — TELEPHONE ENCOUNTER
----- Message from Pretty Monique sent at 4/3/2020 11:02 AM CDT -----  Type:  RX Refill Request    Who Called:  Patient  RX Name and Strength:  SAXagliptin (ONGLYZA) 5 mg Tab tablet  Preferred Pharmacy with phone number: [facility]  Best Call Back Number:  [phone]  Additional Information:  Diabetic patient left previous message/no response/has been without medication for two days

## 2020-04-03 NOTE — TELEPHONE ENCOUNTER
----- Message from Dru WAHL Jimmy sent at 4/3/2020  2:27 PM CDT -----  Contact: same  Patient called in and stated she is having issues refilling her Rx for Onglyza 5 mg.  Patient has 5 refills but her insurance company is now requiring a PA.      [id number] - [place] - [address] [place] [address]  [place] [address]  Phone: [phone] Fax: [phone]    Patient call back number is [phone]

## 2020-04-03 NOTE — PROGRESS NOTES
Refill Authorization Note     is requesting a refill authorization.    Brief assessment and rationale for refill: APPROVE: prr          Medication Therapy Plan: Last ordered 9/19 for 12 month supply by PCP; Approved 6 more to reflect previous order                              Comments:     Refill Center Care Gap Closure protocols temporarily suspended.   Requested Prescriptions   Signed Prescriptions Disp Refills    SAXagliptin (ONGLYZA) 5 mg Tab tablet 90 tablet 1     Sig: Take 1 tablet (5 mg total) by mouth once daily.       Endocrinology:  Diabetes - DPP-4 Inhibitors Failed - 4/3/2020 11:18 AM        Failed - HBA1C is 7.9 or below and within 180 days     Hemoglobin A1C   Date Value Ref Range Status   08/21/2019 5.7 (H) 4.0 - 5.6 % Final     Comment:     ADA Screening Guidelines:  5.7-6.4%  Consistent with prediabetes  >or=6.5%  Consistent with diabetes  High levels of fetal hemoglobin interfere with the HbA1C  assay. Heterozygous hemoglobin variants (HbS, HgC, etc)do  not significantly interfere with this assay.   However, presence of multiple variants may affect accuracy.     12/03/2018 5.2 4.0 - 5.6 % Final     Comment:     ADA Screening Guidelines:  5.7-6.4%  Consistent with prediabetes  >or=6.5%  Consistent with diabetes  High levels of fetal hemoglobin interfere with the HbA1C  assay. Heterozygous hemoglobin variants (HbS, HgC, etc)do  not significantly interfere with this assay.   However, presence of multiple variants may affect accuracy.     06/05/2018 5.9 (H) 4.0 - 5.6 % Final     Comment:     ADA Screening Guidelines:  5.7-6.4%  Consistent with prediabetes  >or=6.5%  Consistent with diabetes  High levels of fetal hemoglobin interfere with the HbA1C  assay. Heterozygous hemoglobin variants (HbS, HgC, etc)do  not significantly interfere with this assay.   However, presence of multiple variants may affect accuracy.                Failed - eGFR is 60 or above and within 360 days     Nisa Serna  Rate, Est    Date Value Ref Range Status   09/01/2012 >60 >60 Final     Nisa samuels Rate, Est non-   Date Value Ref Range Status   09/01/2012 >60 >60 Final     Comment:     eGFR INTERPRETATION: THE eGFR VALUES ARE CALCULATED USING THE ISOTOPE  DILUTION MASS SPECTROMETRY (IDMS)-TRACEABLE MODIFICATION OF DIET IN RENAL  DISEASE (MDRD) STUDY EQUIATION. VALUES ARE RACE, SEX AND AGE DEPENDENT AND  UNITS ARE ml/min/1.732 m2. IN THE FOLLOWING CLINICAL SETTINGS AN ACTUAL  CLEARANCE MEASUREMENT MAY BE REQUIRED: EXTREMES OF AGE OR BODY SIZE,  SEVERE MALNUTRITION OR OBESITY, DISEASES OF SKELETAL MUSCLE, PARAPLEGIA OR  QUADRAPLAGIA, STRICT VEGETARIAN DIET, RAPIDLY CHANGING KIDNEY FUNCTION OR  PRIOR TO DOSING DRUGS WITH SIGNIFICANT TOXICITY THAT ARE EXCRETED BY THE  KIDNEY. DECREASED eGFR FOR >3 MONTHS TO LEVELS OF 30-59 IS CLASSIFIED BY  THE NATIONAL [employer] AS CHRONIC RENAL DISEASE, STAGE 3. 15-29  IS CLASSIFIED AS STAGE 4.     eGFR if non    Date Value Ref Range Status   12/21/2019 49.9 (A) >60 mL/min/1.73 m^2 Final     Comment:     Calculation used to obtain the estimated glomerular filtration  rate (eGFR) is the CKD-EPI equation.      12/02/2019 56.0 (A) >60 mL/min/1.73 m^2 Final     Comment:     Calculation used to obtain the estimated glomerular filtration  rate (eGFR) is the CKD-EPI equation.      08/21/2019 >60.0 >60 mL/min/1.73 m^2 Final     Comment:     Calculation used to obtain the estimated glomerular filtration  rate (eGFR) is the CKD-EPI equation.        eGFR if    Date Value Ref Range Status   12/21/2019 57.6 (A) >60 mL/min/1.73 m^2 Final   12/02/2019 >60.0 >60 mL/min/1.73 m^2 Final   08/21/2019 >60.0 >60 mL/min/1.73 m^2 Final              Passed - Patient is at least 18 years old        Passed - Office visit in past 12 months or future 90 days.     Recent Outpatient Visits            2 months ago Mixed hyperlipidemia    [place] -  Cardiology Leonidas Page MD    3 months ago Abdominal pain, LLQ (left lower quadrant)    [place] [facility] - Gastroenterology Kb Nguyen MD    4 months ago Encounter for preventive health examination    [facility] Lupe Gutierrez, NP    4 months ago [facility]ausea    [facility]    10 months ago Hypertension associated with diabetes    [facility] LUÍS Huggins MD          Future Appointments              In 2 months Leonidas Page MD [facility], [place]                Passed - Cr is 1.3 or below and within 360 days     Creatinine   Date Value Ref Range Status   12/21/2019 1.1 0.5 - 1.4 mg/dL Final   12/02/2019 1.0 0.5 - 1.4 mg/dL Final   08/21/2019 0.9 0.5 - 1.4 mg/dL Final   09/01/2012 0.9 0.2 - 1.4 mg/dl Final               Appointments  past 12m or future 3m with PCP    Date Provider   Last Visit   12/2/2019 LUÍS Huggins MD   Next Visit   Visit date not found LUÍS Huggins MD   .  ED visits in past 90 days: 0       Note composed:11:23 AM 04/03/2020

## 2020-05-05 ENCOUNTER — PATIENT MESSAGE (OUTPATIENT)
Dept: ADMINISTRATIVE | Facility: HOSPITAL | Age: 75
End: 2020-05-05

## 2020-05-15 DIAGNOSIS — E11.8 TYPE 2 DIABETES MELLITUS WITH COMPLICATION: ICD-10-CM

## 2020-05-18 DIAGNOSIS — I15.2 HYPERTENSION ASSOCIATED WITH DIABETES: ICD-10-CM

## 2020-05-18 DIAGNOSIS — N18.30 CKD (CHRONIC KIDNEY DISEASE) STAGE 3, GFR 30-59 ML/MIN: ICD-10-CM

## 2020-05-18 DIAGNOSIS — E11.59 HYPERTENSION ASSOCIATED WITH DIABETES: ICD-10-CM

## 2020-05-18 DIAGNOSIS — E11.8 TYPE 2 DIABETES MELLITUS WITH COMPLICATION: ICD-10-CM

## 2020-05-18 DIAGNOSIS — G45.8 OTHER SPECIFIED TRANSIENT CEREBRAL ISCHEMIAS: Chronic | ICD-10-CM

## 2020-05-18 DIAGNOSIS — R06.02 SOB (SHORTNESS OF BREATH): ICD-10-CM

## 2020-05-18 NOTE — PROGRESS NOTES
Refill Routing Note    Medication(s) are not appropriate for processing by [facility]:       Patient has been hospitalized since the last PCP visit         Will follow up with your staff to schedule labs after your decision.    Medication-related problems identified: Requires labs  Medication Therapy Plan: NTBO(A1C); HOSPITALIZATION(1/7/20-History of colon polyps, change in bowel movements, chest pain), defer to you  Medication reconciliation completed: No      Automatic Epic Protocol Generated Data:    Requested Prescriptions   Pending Prescriptions Disp Refills    glimepiride (AMARYL) 2 MG tablet [Pharmacy Med Name: Glimepiride 2 MG Oral Tablet] 90 tablet 0     Sig: TAKE 1 TABLET BY MOUTH IN THE MORNING BEFORE BREAKFAST       Endocrinology:  Diabetes - Sulfonylureas Failed - 5/15/2020 10:21 AM        Failed - HBA1C is 7.9 or below and within 180 days     Hemoglobin A1C   Date Value Ref Range Status   08/21/2019 5.7 (H) 4.0 - 5.6 % Final     Comment:     ADA Screening Guidelines:  5.7-6.4%  Consistent with prediabetes  >or=6.5%  Consistent with diabetes  High levels of fetal hemoglobin interfere with the HbA1C  assay. Heterozygous hemoglobin variants (HbS, HgC, etc)do  not significantly interfere with this assay.   However, presence of multiple variants may affect accuracy.     12/03/2018 5.2 4.0 - 5.6 % Final     Comment:     ADA Screening Guidelines:  5.7-6.4%  Consistent with prediabetes  >or=6.5%  Consistent with diabetes  High levels of fetal hemoglobin interfere with the HbA1C  assay. Heterozygous hemoglobin variants (HbS, HgC, etc)do  not significantly interfere with this assay.   However, presence of multiple variants may affect accuracy.     06/05/2018 5.9 (H) 4.0 - 5.6 % Final     Comment:     ADA Screening Guidelines:  5.7-6.4%  Consistent with prediabetes  >or=6.5%  Consistent with diabetes  High levels of fetal hemoglobin interfere with the HbA1C  assay. Heterozygous hemoglobin variants  (HbS, HgC, etc)do  not significantly interfere with this assay.   However, presence of multiple variants may affect accuracy.                Passed - Patient is at least 18 years old        Passed - Office visit in past 12 months or future 90 days.     Recent Outpatient Visits            4 months ago Mixed hyperlipidemia    [facility] Cardiology Leonidas Page MD    4 months ago Abdominal pain, LLQ (left lower quadrant)    [facility] - Gastroenterology Kb Nguyen MD    5 months ago Encounter for preventive health examination    [facility] Lupe Gutierrez NP    5 months ago Nausea    [facility] LUÍS Huggins MD    11 months ago Hypertension associated with diabetes    [facility] LUÍS Huggins MD          Future Appointments              In 4 weeks Leonidas Page MD [facility]                Passed - Cr is 1.3 or below and within 360 days     Creatinine   Date Value Ref Range Status   12/21/2019 1.1 0.5 - 1.4 mg/dL Final   12/02/2019 1.0 0.5 - 1.4 mg/dL Final   08/21/2019 0.9 0.5 - 1.4 mg/dL Final   09/01/2012 0.9 0.2 - 1.4 mg/dl Final              Passed - eGFR is 30 or above and within 360 days     Glom Filt Rate, Est    Date Value Ref Range Status   09/01/2012 >60 >60 Final     Glom filt Rate, Est non-   Date Value Ref Range Status   09/01/2012 >60 >60 Final     Comment:     eGFR INTERPRETATION: THE eGFR VALUES ARE CALCULATED USING THE ISOTOPE  DILUTION MASS SPECTROMETRY (IDMS)-TRACEABLE MODIFICATION OF DIET IN RENAL  DISEASE (MDRD) STUDY EQUIATION. VALUES ARE RACE, SEX AND AGE DEPENDENT AND  UNITS ARE ml/min/1.732 m2. IN THE FOLLOWING CLINICAL SETTINGS AN ACTUAL  CLEARANCE MEASUREMENT MAY BE REQUIRED: EXTREMES OF AGE OR BODY SIZE,  SEVERE MALNUTRITION OR OBESITY, DISEASES OF SKELETAL MUSCLE, PARAPLEGIA OR  QUADRAPLAGIA, STRICT VEGETARIAN DIET, RAPIDLY CHANGING KIDNEY FUNCTION OR  PRIOR TO  DOSING DRUGS WITH SIGNIFICANT TOXICITY THAT ARE EXCRETED BY THE  KIDNEY. DECREASED eGFR FOR >3 MONTHS TO LEVELS OF 30-59 IS CLASSIFIED BY  THE [employer] AS CHRONIC RENAL DISEASE, STAGE 3. 15-29  IS CLASSIFIED AS STAGE 4.     eGFR if non    Date Value Ref Range Status   12/21/2019 49.9 (A) >60 mL/min/1.73 m^2 Final     Comment:     Calculation used to obtain the estimated glomerular filtration  rate (eGFR) is the CKD-EPI equation.      12/02/2019 56.0 (A) >60 mL/min/1.73 m^2 Final     Comment:     Calculation used to obtain the estimated glomerular filtration  rate (eGFR) is the CKD-EPI equation.      08/21/2019 >60.0 >60 mL/min/1.73 m^2 Final     Comment:     Calculation used to obtain the estimated glomerular filtration  rate (eGFR) is the CKD-EPI equation.        eGFR if    Date Value Ref Range Status   12/21/2019 57.6 (A) >60 mL/min/1.73 m^2 Final   12/02/2019 >60.0 >60 mL/min/1.73 m^2 Final   08/21/2019 >60.0 >60 mL/min/1.73 m^2 Final                 Appointments  past 12m or future 3m with PCP    Date Provider   Last Visit   12/2/2019 LUÍS Huggins MD   Next Visit   5/18/2020 LUÍS Huggins MD   ED visits in past 90 days: 0     Note composed:1:41 PM 05/18/2020

## 2020-05-18 NOTE — TELEPHONE ENCOUNTER
----- Message from Janie Tolbert sent at 5/18/2020  1:29 PM CDT -----  Contact: Pt  Type: Needs Medical Advice  Who Called:  pt  Best Call Back Number: [phone] (home)   Additional Information: Patient is calling to check refill request on glimepiride (AMARYL) 2 MG tablet.Please call back and advise.

## 2020-05-18 NOTE — TELEPHONE ENCOUNTER
Care Due:                  Date            Visit Type   Department     Provider  --------------------------------------------------------------------------------                                ESTABLISHED                              PATIENT      [facility] FAMILY  Last Visit: 12-      [facility]        STIVEN GOULD  Next Visit: None Scheduled  None         None Found                                                            Last  Test          Frequency    Reason                     Performed    Due Date  --------------------------------------------------------------------------------    Office Visit  6 months...  predniSONE,                Not Found    Overdue                             valsartan-hydrochlorothia                             kirstinde.....................    Cr..........  6 months...  valsartan-hydrochlorothia  12- 06-                             zide.....................    eGFR........  6 months...  SAXagliptin, gabapentin,   Not Found    Overdue                             metFORMIN,                             valsartan-hydrochlorothia                             zide.....................    HBA1C.......  6 months...  SAXagliptin, metFORMIN...  08- 02-    K...........  6 months...  valsartan-hydrochlorothia  12- 06-                             zide.....................    Na..........  6 months...  valsartan-hydrochlorothia  12- 06-                             zide.....................    Powered by [employer]. Reference number: [id number]. 5/18/2020 1:44:43 PM CDT

## 2020-05-19 NOTE — TELEPHONE ENCOUNTER
No new care gaps identified.  Powered by [employer]. Reference number: [id number]. 5/19/2020 3:00:41 PM CDT

## 2020-05-19 NOTE — TELEPHONE ENCOUNTER
----- Message from Jelena Rodriguez sent at 5/19/2020  2:50 PM CDT -----  Contact: Self  Type: Needs Medical Advice  Who Called:  Patient  Pharmacy name and phone #:  [facility]   Best Call Back Number: [phone] (home)   Additional Information: the patient is calling about the Rx glimepride 2mg called in to the pharm today she is out of this medication and she needs it today

## 2020-05-20 DIAGNOSIS — I15.2 HYPERTENSION ASSOCIATED WITH DIABETES: Primary | ICD-10-CM

## 2020-05-20 DIAGNOSIS — E11.8 TYPE 2 DIABETES MELLITUS WITH COMPLICATION: ICD-10-CM

## 2020-05-20 DIAGNOSIS — E11.59 HYPERTENSION ASSOCIATED WITH DIABETES: Primary | ICD-10-CM

## 2020-05-20 RX ORDER — VALSARTAN AND HYDROCHLOROTHIAZIDE 160; 12.5 MG/1; MG/1
1 TABLET, FILM COATED ORAL DAILY
Qty: 90 TABLET | Refills: 3 | Status: ON HOLD | OUTPATIENT
Start: 2020-05-20 | End: 2021-01-14

## 2020-05-20 RX ORDER — GLIMEPIRIDE 2 MG/1
TABLET ORAL
Qty: 90 TABLET | Refills: 0 | Status: SHIPPED | OUTPATIENT
Start: 2020-05-20 | End: 2020-07-31

## 2020-05-20 RX ORDER — GLIMEPIRIDE 2 MG/1
TABLET ORAL
Qty: 90 TABLET | Refills: 3 | OUTPATIENT
Start: 2020-05-20

## 2020-05-20 NOTE — TELEPHONE ENCOUNTER
----- Message from Angelica Meza sent at 5/20/2020 11:08 AM CDT -----  Patient is calling to follow up on the refill request for glimepiride (AMARYL) 2 MG tablet. She said she requested the refill on Saturday and no one has returned her call.  Please call her with the status at [phone].  She is out of this medication. Thank you!

## 2020-05-20 NOTE — TELEPHONE ENCOUNTER
Quick DC. Duplicate Request   Refill Authorization Note    is requesting a refill authorization.    Brief assessment and rationale for refill: QUICK DC: duplicate               Medication reconciliation completed: No                          Comments:   Pended Medication(s)   Requested Prescriptions     Refused Prescriptions Disp Refills    glimepiride (AMARYL) 2 MG tablet 90 tablet 3     Sig: TAKE 1 TABLET BY MOUTH IN THE MORNING BEFORE BREAKFAST     Refused By: ALYCE LESTER     Reason for Refusal: Request already responded to by other means (e.g. phone or fax)        Duplicate Pended Encounter(s)/ Last Prescribed Details:    Pharmacy:  [facility] [address]. [place] #:  --     Pharmacy Comments:  --          Fill quantity remaining:  -- Fill quantity used:  -- Next fill due: --       Outpatient Medication Detail      Disp Refills Start End    glimepiride (AMARYL) 2 MG tablet 90 tablet 0 5/20/2020     Sig: TAKE 1 TABLET BY MOUTH IN THE MORNING BEFORE BREAKFAST    Sent to pharmacy as: glimepiride (AMARYL) 2 MG tablet    E-Prescribing Status: Receipt confirmed by pharmacy (5/20/2020 11:31 AM CDT)    Ordering Encounter Report     Associated Reports   View Encounter                Note composed:11:34 AM 05/20/2020

## 2020-05-20 NOTE — TELEPHONE ENCOUNTER
Provider Staff:     Please schedule patient for Labs (A1C)    Please also check with your provider if any further labs need to be added and scheduled together.    Thanks!  [facility]     Appointments  past 12m or future 3m with PCP    Date Provider   Last Visit   12/2/2019 LUÍS Huggins MD   Next Visit   Visit date not found LUÍS Huggins MD     Note composed:11:32 AM 05/20/2020

## 2020-05-20 NOTE — TELEPHONE ENCOUNTER
----- Message from Mary Farias sent at 5/20/2020 11:38 AM CDT -----  Type:  RX Refill Request    Who Called:  self  Refill or New Rx:  Refill RX Name and Strength:  glimepiride (AMARYL) 2 MG tablet  How is the patient currently taking it? (ex. 1XDay):    Is this a 30 day or 90 day RX:  30 day supply   Preferred Pharmacy with phone number:  [facility]  Local or Mail Order:  local  Ordering Provider:  Dr Martha Huggins  Best Call Back Number:  [phone]  Additional Information:  Patient is out of the medication requesting to get rx refilled

## 2020-05-22 RX ORDER — GLIMEPIRIDE 2 MG/1
TABLET ORAL
Qty: 90 TABLET | Refills: 0 | OUTPATIENT
Start: 2020-05-22

## 2020-05-25 RX ORDER — VALSARTAN AND HYDROCHLOROTHIAZIDE 160; 25 MG/1; MG/1
TABLET ORAL
Qty: 90 TABLET | Refills: 0 | Status: SHIPPED | OUTPATIENT
Start: 2020-05-25 | End: 2021-03-22

## 2020-06-02 DIAGNOSIS — M19.90 INFLAMMATORY ARTHRITIS: ICD-10-CM

## 2020-06-02 DIAGNOSIS — R76.8 RHEUMATOID FACTOR POSITIVE: ICD-10-CM

## 2020-06-02 RX ORDER — FOLIC ACID 1 MG/1
TABLET ORAL
Qty: 30 TABLET | Refills: 5 | Status: SHIPPED | OUTPATIENT
Start: 2020-06-02 | End: 2021-02-13 | Stop reason: SDUPTHER

## 2020-06-02 NOTE — PROGRESS NOTES
Refill Routing Note    Medication(s) are not appropriate for processing by [facility]:       Outside of protocol           Medication reconciliation completed: No      Automatic Epic Protocol Generated Data:    Requested Prescriptions   Pending Prescriptions Disp Refills    folic acid (FOLVITE) 1 MG tablet [Pharmacy Med Name: Folic Acid 1 MG Oral Tablet] 30 tablet 0     Sig: Take 1 tablet by mouth once daily       There is no refill protocol information for this order           Appointments  past 12m or future 3m with PCP    Date Provider   Last Visit   12/2/2019 LUÍS Huggins MD   Next Visit   7/31/2020 LUÍS Huggins MD   ED visits in past 90 days: 0     Note composed:11:02 AM 06/02/2020

## 2020-06-17 DIAGNOSIS — Z79.4 TYPE 2 DIABETES MELLITUS WITHOUT COMPLICATION, WITH LONG-TERM CURRENT USE OF INSULIN: ICD-10-CM

## 2020-06-17 DIAGNOSIS — E11.9 TYPE 2 DIABETES MELLITUS WITHOUT COMPLICATION, WITH LONG-TERM CURRENT USE OF INSULIN: ICD-10-CM

## 2020-06-17 DIAGNOSIS — E78.2 MIXED HYPERLIPIDEMIA: Primary | ICD-10-CM

## 2020-06-17 RX ORDER — METFORMIN HYDROCHLORIDE 500 MG/1
TABLET ORAL
Qty: 180 TABLET | Refills: 0 | Status: SHIPPED | OUTPATIENT
Start: 2020-06-17 | End: 2020-09-24 | Stop reason: SDUPTHER

## 2020-06-17 NOTE — PROGRESS NOTES
Refill Routing Note    Medication(s) are not appropriate for processing by [facility]:       Drug-Disease Interaction (DIARRHEA)     Will follow up with your staff to schedule labs after your decision.    Medication-related problems identified:   Drug-disease interaction  Requires labs  Medication Therapy Plan: DDzi(DIARRHEA); NTBS(A1C)/NTBO(LIPID), DEFER TO YOU    Medication reconciliation completed: No      Automatic Epic Protocol Generated Data:    Requested Prescriptions   Pending Prescriptions Disp Refills    metFORMIN (GLUCOPHAGE) 500 MG tablet [Pharmacy Med Name: metFORMIN HCl 500 MG Oral Tablet] 180 tablet 0     Sig: TAKE 1 TABLET BY MOUTH TWICE DAILY WITH MEALS       Endocrinology:  Diabetes - Biguanides Failed - 6/17/2020 12:18 PM        Failed - HBA1C is 7.9 or below and within 180 days     Hemoglobin A1C   Date Value Ref Range Status   08/21/2019 5.7 (H) 4.0 - 5.6 % Final     Comment:     ADA Screening Guidelines:  5.7-6.4%  Consistent with prediabetes  >or=6.5%  Consistent with diabetes  High levels of fetal hemoglobin interfere with the HbA1C  assay. Heterozygous hemoglobin variants (HbS, HgC, etc)do  not significantly interfere with this assay.   However, presence of multiple variants may affect accuracy.     12/03/2018 5.2 4.0 - 5.6 % Final     Comment:     ADA Screening Guidelines:  5.7-6.4%  Consistent with prediabetes  >or=6.5%  Consistent with diabetes  High levels of fetal hemoglobin interfere with the HbA1C  assay. Heterozygous hemoglobin variants (HbS, HgC, etc)do  not significantly interfere with this assay.   However, presence of multiple variants may affect accuracy.     06/05/2018 5.9 (H) 4.0 - 5.6 % Final     Comment:     ADA Screening Guidelines:  5.7-6.4%  Consistent with prediabetes  >or=6.5%  Consistent with diabetes  High levels of fetal hemoglobin interfere with the HbA1C  assay. Heterozygous hemoglobin variants (HbS, HgC, etc)do  not significantly interfere with this  assay.   However, presence of multiple variants may affect accuracy.                Passed - Patient is at least 18 years old        Passed - Office visit in past 12 months or future 90 days.     Recent Outpatient Visits            5 months ago Mixed hyperlipidemia    [facility] Leonidas Page MD    5 months ago Abdominal pain, LLQ (left lower quadrant)    [facility] [place]    6 months ago Encounter for preventive health examination    [facility] Lupe Gutierrez NP    6 months ago Nausea    [facility] LUÍS Huggins MD    1 year ago Hypertension associated with diabetes    [facility] LUÍS Huggins MD          Future Appointments              In 1 month LAB, [facility] [place]    In 1 month LUÍS Huggins MD [facility]                Passed - Cr is 1.3 or below and within 360 days     Creatinine   Date Value Ref Range Status   12/21/2019 1.1 0.5 - 1.4 mg/dL Final   12/02/2019 1.0 0.5 - 1.4 mg/dL Final   08/21/2019 0.9 0.5 - 1.4 mg/dL Final   09/01/2012 0.9 0.2 - 1.4 mg/dl Final              Passed - eGFR is 30 or above and within 360 days     Glom Filt Rate, Est    Date Value Ref Range Status   09/01/2012 >60 >60 Final     Glom filt Rate, Est non-   Date Value Ref Range Status   09/01/2012 >60 >60 Final     Comment:     eGFR INTERPRETATION: THE eGFR VALUES ARE CALCULATED USING THE ISOTOPE  DILUTION MASS SPECTROMETRY (IDMS)-TRACEABLE MODIFICATION OF DIET IN RENAL  DISEASE (MDRD) STUDY EQUIATION. VALUES ARE RACE, SEX AND AGE DEPENDENT AND  UNITS ARE ml/min/1.732 m2. IN THE FOLLOWING CLINICAL SETTINGS AN ACTUAL  CLEARANCE MEASUREMENT MAY BE REQUIRED: EXTREMES OF AGE OR BODY SIZE,  SEVERE MALNUTRITION OR OBESITY, DISEASES OF SKELETAL MUSCLE, PARAPLEGIA OR  QUADRAPLAGIA, STRICT VEGETARIAN DIET, RAPIDLY CHANGING KIDNEY FUNCTION  OR  PRIOR TO DOSING DRUGS WITH SIGNIFICANT TOXICITY THAT ARE EXCRETED BY THE  KIDNEY. DECREASED eGFR FOR >3 MONTHS TO LEVELS OF 30-59 IS CLASSIFIED BY  THE N[employer] AS CHRONIC RENAL DISEASE, STAGE 3. 15-29  IS CLASSIFIED AS STAGE 4.     eGFR if non    Date Value Ref Range Status   12/21/2019 49.9 (A) >60 mL/min/1.73 m^2 Final     Comment:     Calculation used to obtain the estimated glomerular filtration  rate (eGFR) is the CKD-EPI equation.      12/02/2019 56.0 (A) >60 mL/min/1.73 m^2 Final     Comment:     Calculation used to obtain the estimated glomerular filtration  rate (eGFR) is the CKD-EPI equation.      08/21/2019 >60.0 >60 mL/min/1.73 m^2 Final     Comment:     Calculation used to obtain the estimated glomerular filtration  rate (eGFR) is the CKD-EPI equation.        eGFR if    Date Value Ref Range Status   12/21/2019 57.6 (A) >60 mL/min/1.73 m^2 Final   12/02/2019 >60.0 >60 mL/min/1.73 m^2 Final   08/21/2019 >60.0 >60 mL/min/1.73 m^2 Final                    Appointments  past 12m or future 3m with PCP    Date Provider   Last Visit   12/2/2019 LUÍS Huggins MD   Next Visit   7/31/2020 LUÍS Huggins MD   ED visits in past 90 days: 0     Note composed:12:47 PM 06/17/2020

## 2020-07-20 ENCOUNTER — LAB VISIT (OUTPATIENT)
Dept: LAB | Facility: HOSPITAL | Age: 75
End: 2020-07-20
Attending: FAMILY MEDICINE
Payer: MEDICARE

## 2020-07-20 DIAGNOSIS — E11.8 TYPE 2 DIABETES MELLITUS WITH COMPLICATION: ICD-10-CM

## 2020-07-20 LAB
ESTIMATED AVG GLUCOSE: 91 MG/DL (ref 68–131)
HBA1C MFR BLD HPLC: 4.8 % (ref 4–5.6)

## 2020-07-20 PROCEDURE — 36415 COLL VENOUS BLD VENIPUNCTURE: CPT | Mod: PO

## 2020-07-20 PROCEDURE — 83036 HEMOGLOBIN GLYCOSYLATED A1C: CPT

## 2020-07-23 ENCOUNTER — OFFICE VISIT (OUTPATIENT)
Dept: FAMILY MEDICINE | Facility: CLINIC | Age: 75
End: 2020-07-23
Payer: MEDICARE

## 2020-07-23 ENCOUNTER — TELEPHONE (OUTPATIENT)
Dept: NEUROLOGY | Facility: CLINIC | Age: 75
End: 2020-07-23

## 2020-07-23 DIAGNOSIS — Z00.00 ENCOUNTER FOR PREVENTIVE HEALTH EXAMINATION: Primary | ICD-10-CM

## 2020-07-23 DIAGNOSIS — M05.742 RHEUMATOID ARTHRITIS INVOLVING BOTH HANDS WITH POSITIVE RHEUMATOID FACTOR: ICD-10-CM

## 2020-07-23 DIAGNOSIS — I15.2 HYPERTENSION ASSOCIATED WITH DIABETES: ICD-10-CM

## 2020-07-23 DIAGNOSIS — R26.9 ABNORMALITY OF GAIT AND MOBILITY: ICD-10-CM

## 2020-07-23 DIAGNOSIS — M35.01 SJOGREN'S SYNDROME WITH KERATOCONJUNCTIVITIS SICCA: ICD-10-CM

## 2020-07-23 DIAGNOSIS — M05.741 RHEUMATOID ARTHRITIS INVOLVING BOTH HANDS WITH POSITIVE RHEUMATOID FACTOR: ICD-10-CM

## 2020-07-23 DIAGNOSIS — J44.9 CHRONIC OBSTRUCTIVE PULMONARY DISEASE, UNSPECIFIED COPD TYPE: ICD-10-CM

## 2020-07-23 DIAGNOSIS — J43.8 OTHER EMPHYSEMA: ICD-10-CM

## 2020-07-23 DIAGNOSIS — M79.7 FIBROMYALGIA: ICD-10-CM

## 2020-07-23 DIAGNOSIS — I51.89 DIASTOLIC DYSFUNCTION: Chronic | ICD-10-CM

## 2020-07-23 DIAGNOSIS — E11.59 HYPERTENSION ASSOCIATED WITH DIABETES: ICD-10-CM

## 2020-07-23 DIAGNOSIS — D69.6 THROMBOCYTOPENIA: Chronic | ICD-10-CM

## 2020-07-23 DIAGNOSIS — E78.5 TYPE 2 DIABETES MELLITUS WITH HYPERLIPIDEMIA: ICD-10-CM

## 2020-07-23 DIAGNOSIS — E78.2 MIXED HYPERLIPIDEMIA: ICD-10-CM

## 2020-07-23 DIAGNOSIS — R41.3 MEMORY DEFICIT: ICD-10-CM

## 2020-07-23 DIAGNOSIS — E11.69 TYPE 2 DIABETES MELLITUS WITH HYPERLIPIDEMIA: ICD-10-CM

## 2020-07-23 DIAGNOSIS — E11.9 DIABETES MELLITUS, STABLE: ICD-10-CM

## 2020-07-23 PROCEDURE — 99214 OFFICE O/P EST MOD 30 MIN: CPT | Mod: 95,G0,ICN, | Performed by: NURSE PRACTITIONER

## 2020-07-23 PROCEDURE — 99999 PR PBB SHADOW E&M-EST. PATIENT-LVL IV: ICD-10-PCS | Mod: PBBFAC,,, | Performed by: NURSE PRACTITIONER

## 2020-07-23 PROCEDURE — 99999 PR PBB SHADOW E&M-EST. PATIENT-LVL IV: CPT | Mod: PBBFAC,,, | Performed by: NURSE PRACTITIONER

## 2020-07-23 PROCEDURE — 99214 PR OFFICE/OUTPT VISIT, EST, LEVL IV, 30-39 MIN: ICD-10-PCS | Mod: 95,G0,ICN, | Performed by: NURSE PRACTITIONER

## 2020-07-23 PROCEDURE — 99214 OFFICE O/P EST MOD 30 MIN: CPT | Mod: PBBFAC,PO | Performed by: NURSE PRACTITIONER

## 2020-07-23 RX ORDER — NYSTATIN 100000 U/G
CREAM TOPICAL 3 TIMES DAILY
Qty: 30 G | Refills: 3 | Status: ON HOLD | OUTPATIENT
Start: 2020-07-23 | End: 2021-01-14 | Stop reason: HOSPADM

## 2020-07-23 NOTE — TELEPHONE ENCOUNTER
----- Message from Mary Farias sent at 7/23/2020  2:42 PM CDT -----  Regarding: appointment  Contact: self  Type:  Sooner Apoointment Request    Caller is requesting a sooner appointment.  Caller declined first available appointment listed below.  Caller will not accept being placed on the waitlist and is requesting a message be sent to doctor.    Name of Caller:  self  When is the first available appointment?  No appointments  Symptoms: memory loss  Best Call Back Number:  [phone]  Additional Information:  Patient requesting a late morning appointment.

## 2020-07-23 NOTE — PATIENT INSTRUCTIONS
Counseling and Referral of Other Preventative  (Italic type indicates deductible and co-insurance are waived)    Patient Name: Pili Teixeira  Today's Date: 7/23/2020    Health Maintenance       Date Due Completion Date    Shingles Vaccine (1 of 2) 05/21/1996 ---    Mammogram 01/10/2020 1/10/2019    Override on 2/5/2013: (N/S)    Eye Exam 07/30/2020 7/30/2019    Override on 1/28/2016: Done (Dr. Garcia)    Override on 5/8/2014: (N/S)    Lipid Panel 08/21/2020 8/21/2019    Influenza Vaccine (1) 09/01/2020 2/4/2020    Override on 2/1/2016: Declined    Foot Exam 12/02/2020 12/2/2019    Override on 6/3/2019: Done    Hemoglobin A1c 01/20/2021 7/20/2020    DEXA SCAN 01/10/2022 1/10/2019    Colorectal Cancer Screening 01/07/2025 1/7/2020    TETANUS VACCINE 03/13/2027 3/13/2017        Orders Placed This Encounter   Procedures    Ambulatory referral/consult to Neurology     The following information is provided to all patients.  This information is to help you find resources for any of the problems found today that may be affecting your health:                Living healthy guide: www.[facility].[place].gov      Understanding Diabetes: www.diabetes.org      Eating healthy: www.cdc.gov/healthyweight      CDC home safety checklist: www.cdc.gov/steadi/patient.html      Agency on Aging: www.goea.[place].[facility]      Alcoholics anonymous (AA): www.aa.org      Physical Activity: www.paris.nih.gov/om0hgxy      Tobacco use: www.quitwithusla.org

## 2020-07-23 NOTE — TELEPHONE ENCOUNTER
Returned call to pt and scheduled memory consult with Dr. Morales. Date/time/location confirmed. Verbalized understanding.

## 2020-07-24 NOTE — PROGRESS NOTES
The patient location is: home in [place]  The chief complaint leading to consultation is: Annual Wellness    Visit type: audiovisual    Face to Face time with patient: 45 minutes with poor connection. Called patient via phone to complete visit  65 minutes of total time spent on the encounter, which includes face to face time and non-face to face time preparing to see the patient (eg, review of tests), Obtaining and/or reviewing separately obtained history, Documenting clinical information in the electronic or other health record, Independently interpreting results (not separately reported) and communicating results to the patient/family/caregiver, or Care coordination (not separately reported).         Each patient to whom he or she provides medical services by telemedicine is:  (1) informed of the relationship between the physician and patient and the respective role of any other health care provider with respect to management of the patient; and (2) notified that he or she may decline to receive medical services by telemedicine and may withdraw from such care at any time.      Pili Teixeira presented for a  Medicare AWV and comprehensive Health Risk Assessment today. The following components were reviewed and updated:    · Medical history  · Family History  · Social history  · Allergies and Current Medications  · Health Risk Assessment  · Health Maintenance  · Care Team     ** See Completed Assessments for Annual Wellness Visit within the encounter summary.**       The following assessments were completed:  · Living Situation  · CAGE  · Depression Screening  · Fall Risk Assessment (MACH 10)  · Hearing Assessment(HHI)  · Cognitive Function Screening  · Nutrition Screening  · ADL Screening  · PAQ Screening      There were no vitals filed for this visit.  There is no height or weight on file to calculate BMI.  Physical Exam  Vitals signs reviewed.   Constitutional:       Appearance: Normal appearance.    Cardiovascular:      Rate and Rhythm: Normal rate and regular rhythm.      Heart sounds: Normal heart sounds.   Pulmonary:      Effort: Pulmonary effort is normal.      Breath sounds: Normal breath sounds.   Skin:     General: Skin is warm and dry.   Neurological:      Mental Status: She is alert and oriented to person, place, and time.           Diagnoses and health risks identified today and associated recommendations/orders:    1. Encounter for preventive health examination  Reviewed and discussed health maintenance.      2. Memory deficit  - Ambulatory referral/consult to Neurology; Future    3. Abnormality of gait and mobility  Safety issues discussed and precautions reviewed    4. Other emphysema  Stable- continue current treatment and follow up routinely with PCP     5. Diastolic dysfunction  Stable- continue current treatment and follow up routinely with PCP     6. Mixed hyperlipidemia  Stable- continue current treatment and follow up routinely with PCP     7. Hypertension associated with diabetes  Stable- continue current treatment and follow up routinely with PCP      8. Type 2 diabetes mellitus with hyperlipidemia  Stable- continue current treatment and follow up routinely with PCP     9. Rheumatoid arthritis involving both hands with positive rheumatoid factor  Stable- continue current treatment and follow up routinely with PCP     10. Fibromyalgia  Stable- continue current treatment and follow up routinely with PCP     11. Thrombocytopenia  Stable- continue current treatment and follow up routinely with PCP     12. Sjogren's syndrome with keratoconjunctivitis sicca  Stable- continue current treatment and follow up routinely with PCP     13. Chronic obstructive pulmonary disease, unspecified COPD type  Stable- continue current treatment and follow up routinely with PCP     14. Diabetes mellitus, stable  Stable- continue current treatment and follow up routinely with PCP   Encouraged healthy eating,  weight loss and routine exercise     Provided Pili with a 5-10 year written screening schedule and personal prevention plan. Recommendations were developed using the USPSTF age appropriate recommendations. Education, counseling, and referrals were provided as needed. After Visit Summary printed and given to patient which includes a list of additional screenings\tests needed.    Lilian Avendaño NP

## 2020-07-27 ENCOUNTER — HOSPITAL ENCOUNTER (OUTPATIENT)
Dept: RADIOLOGY | Facility: CLINIC | Age: 75
Discharge: HOME | End: 2020-07-27
Attending: FAMILY MEDICINE
Payer: MEDICARE

## 2020-07-27 DIAGNOSIS — Z12.31 ENCOUNTER FOR SCREENING MAMMOGRAM FOR BREAST CANCER: ICD-10-CM

## 2020-07-27 PROCEDURE — 77067 SCR MAMMO BI INCL CAD: CPT | Mod: 26,,, | Performed by: RADIOLOGY

## 2020-07-27 PROCEDURE — 77067 SCR MAMMO BI INCL CAD: CPT | Mod: TC,PO

## 2020-07-27 PROCEDURE — 77063 BREAST TOMOSYNTHESIS BI: CPT | Mod: 26,,, | Performed by: RADIOLOGY

## 2020-07-27 PROCEDURE — 77063 MAMMO DIGITAL SCREENING BILAT WITH TOMOSYNTHESIS_CAD: ICD-10-PCS | Mod: 26,,, | Performed by: RADIOLOGY

## 2020-07-27 PROCEDURE — 77067 MAMMO DIGITAL SCREENING BILAT WITH TOMOSYNTHESIS_CAD: ICD-10-PCS | Mod: 26,,, | Performed by: RADIOLOGY

## 2020-07-28 ENCOUNTER — TELEPHONE (OUTPATIENT)
Dept: RADIOLOGY | Facility: HOSPITAL | Age: 75
End: 2020-07-28

## 2020-07-31 ENCOUNTER — OFFICE VISIT (OUTPATIENT)
Dept: FAMILY MEDICINE | Facility: CLINIC | Age: 75
End: 2020-07-31
Payer: MEDICARE

## 2020-07-31 VITALS
HEIGHT: 63 IN | TEMPERATURE: 98 F | HEART RATE: 73 BPM | DIASTOLIC BLOOD PRESSURE: 77 MMHG | WEIGHT: 142.88 LBS | BODY MASS INDEX: 25.32 KG/M2 | OXYGEN SATURATION: 97 % | SYSTOLIC BLOOD PRESSURE: 120 MMHG

## 2020-07-31 DIAGNOSIS — E78.5 TYPE 2 DIABETES MELLITUS WITH HYPERLIPIDEMIA: ICD-10-CM

## 2020-07-31 DIAGNOSIS — R10.13 EPIGASTRIC PAIN: ICD-10-CM

## 2020-07-31 DIAGNOSIS — I15.2 HYPERTENSION ASSOCIATED WITH DIABETES: Primary | ICD-10-CM

## 2020-07-31 DIAGNOSIS — E11.69 TYPE 2 DIABETES MELLITUS WITH HYPERLIPIDEMIA: ICD-10-CM

## 2020-07-31 DIAGNOSIS — E11.59 HYPERTENSION ASSOCIATED WITH DIABETES: Primary | ICD-10-CM

## 2020-07-31 DIAGNOSIS — E78.2 MIXED HYPERLIPIDEMIA: ICD-10-CM

## 2020-07-31 DIAGNOSIS — R76.8 RHEUMATOID FACTOR POSITIVE: ICD-10-CM

## 2020-07-31 DIAGNOSIS — M35.01 SJOGREN'S SYNDROME WITH KERATOCONJUNCTIVITIS SICCA: ICD-10-CM

## 2020-07-31 PROCEDURE — 99215 OFFICE O/P EST HI 40 MIN: CPT | Mod: PBBFAC,PO | Performed by: FAMILY MEDICINE

## 2020-07-31 PROCEDURE — 99999 PR PBB SHADOW E&M-EST. PATIENT-LVL V: ICD-10-PCS | Mod: PBBFAC,,, | Performed by: FAMILY MEDICINE

## 2020-07-31 PROCEDURE — 99999 PR PBB SHADOW E&M-EST. PATIENT-LVL V: CPT | Mod: PBBFAC,,, | Performed by: FAMILY MEDICINE

## 2020-07-31 PROCEDURE — 99214 PR OFFICE/OUTPT VISIT, EST, LEVL IV, 30-39 MIN: ICD-10-PCS | Mod: S$PBB,,, | Performed by: FAMILY MEDICINE

## 2020-07-31 PROCEDURE — 99214 OFFICE O/P EST MOD 30 MIN: CPT | Mod: S$PBB,,, | Performed by: FAMILY MEDICINE

## 2020-07-31 NOTE — PROGRESS NOTES
"Subjective:       Patient ID: Pili Teixeira is a 75 y.o. female.    Chief Complaint: Follow-up    Pt is known to me.  Pt is here for followup of chronic medical issues.  The pt had a workup per GI for chronic abd pain.  She had an EGD, colonoscopy and CT with no findings to explain her pain and nausea after eating.  The pt has lost weight.  She eats very little.  The pt recently had a HbA1c of 4.8.  We stopped her glimepiride about a week ago.  She says her glucoses are up a little.  She has been taking a half of a BP pill daily due to low blood pressures.  The pt has an upcoming diagnostic mammogram and ultrasound.  She has pain with lifting her right arm--she lies on that side in her bed.  She is no longer seeing a rheumatologist--Dr. Wilson has moved.    Review of Systems   Constitutional: Negative for activity change, appetite change, fatigue and unexpected weight change.   Eyes: Negative for visual disturbance.   Respiratory: Negative for cough, chest tightness and shortness of breath.    Cardiovascular: Negative for chest pain, palpitations and leg swelling.   Gastrointestinal: Positive for abdominal pain and nausea. Negative for constipation, diarrhea and vomiting.   Endocrine: Negative for cold intolerance, heat intolerance and polyuria.   Genitourinary: Negative for decreased urine volume and dysuria.   Musculoskeletal: Positive for arthralgias. Negative for back pain.   Skin: Negative for rash.   Neurological: Negative for numbness and headaches.       Objective:       Vitals:    07/31/20 1351   BP: 120/77   Pulse: 73   Temp: 98.1 °F (36.7 °C)   TempSrc: Oral   SpO2: 97%   Weight: 64.8 kg (142 lb 13.7 oz)   Height: 5' 3" (1.6 m)     Physical Exam  Vitals signs reviewed.   Constitutional:       General: She is not in acute distress.     Appearance: She is well-developed.   HENT:      Head: Normocephalic and atraumatic.   Eyes:      General: No scleral icterus.     Conjunctiva/sclera: Conjunctivae normal. "   Neck:      Musculoskeletal: Normal range of motion and neck supple.      Thyroid: No thyromegaly.      Trachea: No tracheal deviation.   Cardiovascular:      Rate and Rhythm: Normal rate and regular rhythm.      Pulses:           Dorsalis pedis pulses are 2+ on the right side and 2+ on the left side.        Posterior tibial pulses are 2+ on the right side and 2+ on the left side.      Heart sounds: Normal heart sounds. No murmur. No friction rub. No gallop.    Pulmonary:      Effort: Pulmonary effort is normal. No respiratory distress.      Breath sounds: Normal breath sounds. No wheezing or rales.   Musculoskeletal:         General: Tenderness (diffuse muscles; right shoulder over rotator cuff) present.      Right foot: Normal range of motion. No deformity.      Left foot: Normal range of motion. No deformity.   Feet:      Right foot:      Protective Sensation: 7 sites tested. 7 sites sensed.      Skin integrity: No ulcer.      Left foot:      Protective Sensation: 7 sites tested. 7 sites sensed.      Skin integrity: No ulcer.   Lymphadenopathy:      Cervical: No cervical adenopathy.   Skin:     General: Skin is dry.      Findings: No rash.   Psychiatric:         Behavior: Behavior normal.         Thought Content: Thought content normal.         Judgment: Judgment normal.         Assessment:       1. Hypertension associated with diabetes    2. Mixed hyperlipidemia    3. Rheumatoid factor positive    4. Sjogren's syndrome with keratoconjunctivitis sicca    5. Type 2 diabetes mellitus with hyperlipidemia    6. Epigastric pain        Plan:       Pili was seen today for follow-up.    Diagnoses and all orders for this visit:    Hypertension associated with diabetes    Mixed hyperlipidemia  -     Lipid Panel; Future    Rheumatoid factor positive  -     Ambulatory referral/consult to Rheumatology; Future    Sjogren's syndrome with keratoconjunctivitis sicca  -     Ambulatory referral/consult to Rheumatology;  Future    Type 2 diabetes mellitus with hyperlipidemia  -     Hemoglobin A1C; Future    Epigastric pain    Pt refuses an appetite stimulant.     During this visit, I reviewed the pt's history, medications, allergies, and problem list.

## 2020-08-05 ENCOUNTER — HOSPITAL ENCOUNTER (OUTPATIENT)
Dept: RADIOLOGY | Facility: HOSPITAL | Age: 75
Discharge: HOME | End: 2020-08-05
Attending: FAMILY MEDICINE
Payer: MEDICARE

## 2020-08-05 DIAGNOSIS — R92.8 ABNORMAL MAMMOGRAM OF RIGHT BREAST: ICD-10-CM

## 2020-08-05 PROCEDURE — 77061 MAMMO DIGITAL DIAGNOSTIC LEFT WITH TOMOSYNTHESIS_CAD: ICD-10-PCS | Mod: 26,LT,, | Performed by: RADIOLOGY

## 2020-08-05 PROCEDURE — 76642 US BREAST LEFT LIMITED: ICD-10-PCS | Mod: 26,LT,, | Performed by: RADIOLOGY

## 2020-08-05 PROCEDURE — 77065 DX MAMMO INCL CAD UNI: CPT | Mod: TC,LT

## 2020-08-05 PROCEDURE — 77061 BREAST TOMOSYNTHESIS UNI: CPT | Mod: 26,LT,, | Performed by: RADIOLOGY

## 2020-08-05 PROCEDURE — 76642 ULTRASOUND BREAST LIMITED: CPT | Mod: TC,LT

## 2020-08-05 PROCEDURE — 77065 DX MAMMO INCL CAD UNI: CPT | Mod: 26,LT,, | Performed by: RADIOLOGY

## 2020-08-05 PROCEDURE — 76642 ULTRASOUND BREAST LIMITED: CPT | Mod: 26,LT,, | Performed by: RADIOLOGY

## 2020-08-05 PROCEDURE — 77065 MAMMO DIGITAL DIAGNOSTIC LEFT WITH TOMOSYNTHESIS_CAD: ICD-10-PCS | Mod: 26,LT,, | Performed by: RADIOLOGY

## 2020-08-28 ENCOUNTER — PATIENT OUTREACH (OUTPATIENT)
Dept: ADMINISTRATIVE | Facility: OTHER | Age: 75
End: 2020-08-28

## 2020-08-28 NOTE — PROGRESS NOTES
Health Maintenance Due   Topic Date Due    Shingles Vaccine (1 of 2) 05/21/1996    Eye Exam  07/30/2020     Updates were requested from care everywhere.  Chart was reviewed for overdue Proactive [facility] Encounters (TOYA) topics (CRS, Breast Cancer Screening, Eye exam)  Health Maintenance has been updated.  LINKS immunization registry triggered.  Immunizations were reconciled.

## 2020-09-02 ENCOUNTER — TELEPHONE (OUTPATIENT)
Dept: NEUROLOGY | Facility: CLINIC | Age: 75
End: 2020-09-02

## 2020-09-02 ENCOUNTER — OFFICE VISIT (OUTPATIENT)
Dept: NEUROLOGY | Facility: CLINIC | Age: 75
End: 2020-09-02
Payer: MEDICARE

## 2020-09-02 VITALS
TEMPERATURE: 98 F | BODY MASS INDEX: 24.43 KG/M2 | SYSTOLIC BLOOD PRESSURE: 126 MMHG | WEIGHT: 137.88 LBS | RESPIRATION RATE: 16 BRPM | HEIGHT: 63 IN | DIASTOLIC BLOOD PRESSURE: 67 MMHG | HEART RATE: 82 BPM

## 2020-09-02 DIAGNOSIS — E78.5 TYPE 2 DIABETES MELLITUS WITH HYPERLIPIDEMIA: ICD-10-CM

## 2020-09-02 DIAGNOSIS — G60.3 IDIOPATHIC PROGRESSIVE NEUROPATHY: ICD-10-CM

## 2020-09-02 DIAGNOSIS — R41.3 MEMORY DEFICIT: Primary | ICD-10-CM

## 2020-09-02 DIAGNOSIS — E11.69 TYPE 2 DIABETES MELLITUS WITH HYPERLIPIDEMIA: ICD-10-CM

## 2020-09-02 DIAGNOSIS — Z79.899 ON FOUR OR MORE MEDICATIONS: ICD-10-CM

## 2020-09-02 DIAGNOSIS — Z86.73 HISTORY OF TIA (TRANSIENT ISCHEMIC ATTACK): ICD-10-CM

## 2020-09-02 PROCEDURE — 99215 OFFICE O/P EST HI 40 MIN: CPT | Mod: PBBFAC,PN | Performed by: PSYCHIATRY & NEUROLOGY

## 2020-09-02 PROCEDURE — 99204 OFFICE O/P NEW MOD 45 MIN: CPT | Mod: S$PBB,,, | Performed by: PSYCHIATRY & NEUROLOGY

## 2020-09-02 PROCEDURE — 99204 PR OFFICE/OUTPT VISIT, NEW, LEVL IV, 45-59 MIN: ICD-10-PCS | Mod: S$PBB,,, | Performed by: PSYCHIATRY & NEUROLOGY

## 2020-09-02 PROCEDURE — 99999 PR PBB SHADOW E&M-EST. PATIENT-LVL V: ICD-10-PCS | Mod: PBBFAC,,, | Performed by: PSYCHIATRY & NEUROLOGY

## 2020-09-02 PROCEDURE — 99999 PR PBB SHADOW E&M-EST. PATIENT-LVL V: CPT | Mod: PBBFAC,,, | Performed by: PSYCHIATRY & NEUROLOGY

## 2020-09-02 NOTE — PROGRESS NOTES
Date: 9/2/2020    Patient ID: Pili Teixeira is a 75 y.o. female.    Referring Provider:  Lilian Avendaño NP    Chief Complaint: Memory Loss      History of Present Illness:  Ms. Teixeira is a 75 y.o. female who presents referred by Lilian Avendaño NP today for evaluation of memory loss and dizziness.     She has been more forgetful. She will go to the fridge and forget why she was looking there. She will forget why she walked into a room. She is misplacing objects like her keys. She is not really forgetting conversations. She has some hearing loss. No getting lost when driving. Sometimes she gets briefly disoriented when driving like her mind wanders.     She has also noticed dizziness for the past year. She denies spinning and it is more a lightheaded feeling. It happens with sitting or standing. She doesn't have it when lying down or rolling over. Moving her head doesn't make the dizziness come on. She notices it is worse when she goes from sitting to standing. She takes her blood pressure and sugar during the dizziness and she states those are fine. She feels that she is off balance when she walks.     She has numbness in her hands and toes. She is always cold. She has some pains in the back of her head bilaterally. She has back and neck pains. She has rheumatoid arthritis.     She has been losing weight. She has lost 50 pounds in the past year. She has no appetite and she has had nausea after she eats. She has had some sinus issues. She has tinnitus.     Her mother's sister had dementia (age in her late 70s/early 80s). No other family with dementia. Her dad had a stroke, her mother had cancer, her brother had cancer.     She had a TIA. She had a headache and left facial numbness. They diagnosed TIA. She is on plavix.     Review of Systems:   14 systems reviewed - All other systems were reviewed and negative except as mentioned in the HPI    Allergies:  Review of patient's allergies indicates:   Allergen  Reactions    Codeine Other (See Comments)     Chest pain    Clindamycin Other (See Comments)     Difficulty swallowing after taking, feels a something sits in epigastric area     Iodinated contrast media Hives and Rash       Current Medications:  Current Outpatient Medications   Medication Sig Dispense Refill    ACCU-CHEK JACINTO PLUS TEST STRP Strp USE 1 STRIP TO CHECK GLUCOSE TWICE DAILY 300 strip 1    BENADRYL ALLERGY 25 mg tablet Take 2 tablets (50 mg total) by mouth as directed. Take 50mg of Benadryl (Diphenhydramine) by mouth 1 hour prior to CT scan (Patient taking differently: Take 25 mg by mouth nightly as needed for Insomnia. ) 2 tablet 0    blood sugar diagnostic Strp To check BG  2 times daily, to use with insurance preferred meter, medical necessity. 100 strip 3    blood-glucose meter kit To check BG 2 times daily, to use with insurance preferred meter. Medical necessity. 1 each 0    CARTIA  mg Cp24 TAKE 1 CAPSULE BY MOUTH IN THE EVENING 90 capsule 4    clopidogrel (PLAVIX) 75 mg tablet TAKE 1 TABLET BY MOUTH ONCE DAILY (NEED  APPT) 90 tablet 4    cyanocobalamin (VITAMIN B-12) 1000 MCG tablet Take 100 mcg by mouth once daily.      diclofenac sodium 1 % Gel Apply 2 g topically once daily.      fluticasone (FLONASE) 50 mcg/actuation nasal spray 2 sprays by Each Nare route once daily. 1 Bottle 0    folic acid (FOLVITE) 1 MG tablet Take 1 tablet by mouth once daily 30 tablet 5    gabapentin (NEURONTIN) 100 MG capsule Take 1 capsule (100 mg total) by mouth 3 (three) times daily. 270 capsule 1    lactobac cmb #3-fos-pantethine (PROBIOTIC & ACIDOPHILUS) 300-250 million cell-mg Cap Take 1 capsule by mouth once daily. 20 capsule 0    lancets (ACCU-CHEK SOFTCLIX LANCETS) Misc Test TWICE DAILY;Twice a day 100 each 3    lancets Misc To check BG  2 times daily, to use with insurance preferred meter, medical necessity. 100 each 2    meloxicam (MOBIC) 7.5 MG tablet Take 7.5 mg by mouth once daily.       metFORMIN (GLUCOPHAGE) 500 MG tablet TAKE 1 TABLET BY MOUTH TWICE DAILY WITH MEALS 180 tablet 0    multivit with min-folic acid (WOMEN'S MULTIVITAMIN GUMMIES) 200 mcg Chew Take by mouth once daily.      mupirocin (BACTROBAN) 2 % ointment Apply topically 3 (three) times daily. 22 g 0    nystatin (MYCOSTATIN) cream Apply topically 3 (three) times daily. 30 g 3    olopatadine (PATANOL) 0.1 % ophthalmic solution Place 1 drop into both eyes daily as needed.       ondansetron (ZOFRAN-ODT) 8 MG TbDL DISSOLVE 1 TABLET IN MOUTH EVERY 12 HOURS AS NEEDED for nausea 30 tablet 1    oxyCODONE-acetaminophen 7.5-325 mg TbBO Take 1 tablet by mouth 4 (four) times daily as needed.       pantoprazole (PROTONIX) 40 MG tablet TAKE 1 TABLET BY MOUTH EVERY DAY 90 tablet 0    ranitidine (ZANTAC) 300 MG tablet TAKE 1 TABLET BY MOUTH IN THE EVENING 30 tablet 0    rosuvastatin (CRESTOR) 10 MG tablet TAKE 1 TABLET BY MOUTH IN THE EVENING 70 tablet 4    SAXagliptin (ONGLYZA) 5 mg Tab tablet Take 1 tablet (5 mg total) by mouth once daily. 90 tablet 1    sucralfate (CARAFATE) 1 gram tablet Take 1 tablet (1 g total) by mouth 4 (four) times daily before meals and nightly. 60 tablet 0    traZODone (DESYREL) 50 MG tablet TAKE 1 TABLET BY MOUTH IN THE EVENING - (MAY TAKE AN ADDITIONAL TABLET AS NEEDED) 180 tablet 1    valsartan-hydrochlorothiazide (DIOVAN-HCT) 160-12.5 mg per tablet Take 1 tablet by mouth once daily. 90 tablet 3    valsartan-hydrochlorothiazide (DIOVAN-HCT) 160-25 mg per tablet Take 1 tablet by mouth once daily 90 tablet 0     No current facility-administered medications for this visit.        Past Medical History:  Past Medical History:   Diagnosis Date    Allergy     Anemia 2012    with thrombocytopenia; iron deficiency    Arthritis     Cervical spinal stenosis     with neuropathy, chronic neck,back,leg pain    Colon polyp     COPD (chronic obstructive pulmonary disease)     Coronary artery disease      Diabetes mellitus     , sugars run 190's per patient    Diastolic dysfunction 7/13/2015    Diverticulitis     Diverticulosis     Hx diverticulitis,still has chronic diarrhea, abd pain    Dyspnea on exertion     sometimes with nausea, fatigue,dizziness, had cardiac cath June 2012, normal    Fibromyalgia     Fracture 2012    right thumb    GERD (gastroesophageal reflux disease)     Feb 2012, Hx H Pylori    Glaucoma     had LAser surgey, on no eye drops    HEARING LOSS     Hemangioma     fatty liver    History of earache     frequent    History of TIA (transient ischemic attack) 5/28/2013    Hyperlipidemia     Hypertension     Hypertension associated with diabetes 3/14/2017    Laryngeal polyp     Myocardial infarction 2006 last    also MI in distant past    REJI (obstructive sleep apnea)     does not use CPAP    Otitis media     Renal stone     Sjogren's syndrome     SOB (shortness of breath) 6/8/2012 2012 [facility] 1. Normal coronary arteries. 2. Normal single renal arteries bilaterally. 3. Diastolic dysfunction.     Stroke     TIA, now on Plavix    TIA (transient ischemic attack)     TMJ disorder     Type II or unspecified type diabetes mellitus without mention of complication, uncontrolled 5/8/2014    UTI (lower urinary tract infection)     frequent    Venous insufficiency     with Hx blood clot in leg       Past Surgical History:  Past Surgical History:   Procedure Laterality Date    ABDOMINAL SURGERY  2010 x2    expoloratory lap for pelvic cyst,adhesions; partial colonectomy     adhesiolysis   10/11/2012    CARDIAC CATHETERIZATION      no current blockages.    CHOLECYSTECTOMY      COLON SURGERY      r/t diverticuli    COLONOSCOPY  08/2014    repeat in 5 years    COLONOSCOPY N/A 1/7/2020    Procedure: COLONOSCOPY;  Surgeon: Kb Nguyen MD;  Location[facility];  Service: Endoscopy;  Laterality: N/A;    EPIDURAL BLOCK INJECTION  5/15/12    back,neck for pain     "ESOPHAGOGASTRODUODENOSCOPY N/A 1/7/2020    Procedure: EGD (ESOPHAGOGASTRODUODENOSCOPY);  Surgeon: Kb Nguyen MD;  Location[facility];  Service: Endoscopy;  Laterality: N/A;    EXPLORATORY LAPAROTOMY W/ BOWEL RESECTION  10/11/2012    EYE SURGERY      Laser for glaucoma    EYE SURGERY  May 2012    cataracts    HYSTERECTOMY      LARYNX SURGERY      polypectomy    OOPHORECTOMY      TONSILLECTOMY      with adenoidectomy    UPPER GASTROINTESTINAL ENDOSCOPY  12/2015    VASCULAR SURGERY      laser to varicose vein leg       Family History:  family history includes Cancer in her brother and mother; Diabetes Mellitus in her mother; Diverticulitis in her sister; Heart disease in her father; Hypertension in her father; Lupus in her daughter; Pancreatic cancer (age of onset: 55) in her maternal aunt; Pancreatic cancer (age of onset: 58) in her cousin; Stroke in her father; Throat cancer in her brother and mother; Thyroid disease in her daughter.    Social History:   reports that she has never smoked. She has never used smokeless tobacco. She reports current drug use. Drug: Oxycodone. She reports that she does not drink alcohol.    Physical Exam:  Vitals:    09/02/20 1258 09/02/20 1415 09/02/20 1416 09/02/20 1417   BP: 123/64 (!) 118/59 (!) 118/57 126/67   Pulse: 83 85 80 82   Resp: 16      Temp: 98.3 °F (36.8 °C)      Weight: 62.5 kg (137 lb 14.4 oz)      Height: 5' 3" (1.6 m)        Body mass index is 24.43 kg/m².  General: Well developed, well nourished.  No acute distress.  Eyes: no ocular hemorrhages  Musculoskeletal: No obvious joint deformities, moves all extremities well.  Peripheral vascular: No edema noted    Neurological Exam:  Mental status: Awake, alert, and oriented to person, place, and time. Recent and remote memory appear to be intact. Attention, concentration, and fund of knowledge regarding recent events normal. MOCA 23/30  Speech/Language: No dysarthria or aphasia on conversation.   Cranial " nerves: Visual fields full. Pupils equal round and reactive to light, extraocular movements intact, facial strength and sensation intact bilaterally, palate and tongue midline, hearing grossly intact bilaterally. Shoulder shrug normal bilaterally.   Motor: 5 out of 5 strength throughout the upper and lower extremities bilaterally. Normal bulk and tone.   Sensation: Intact to light touch and vibration bilaterally. Diminished to pinprick to the lower shin bilaterally.   DTR: 2+ at the knees and biceps bilaterally.  Coordination: Finger-nose-finger testing and rapid alternating movements normal bilaterally. No tremor.   Gait: Normal gait on straightaway but cannot tandem walk. Negative Romberg.             Data:  I have personally reviewed the referring provider's notes, labs, & imaging made available to me today.       Labs:  CBC:   Lab Results   Component Value Date    WBC 12.36 12/02/2019    HGB 12.5 12/02/2019    HCT 41.2 12/02/2019     12/02/2019    MCV 90 12/02/2019    RDW 12.6 12/02/2019     BMP:   Lab Results   Component Value Date     12/21/2019    K 3.7 12/21/2019    CL 99 12/21/2019    CO2 29 12/21/2019    BUN 24 (H) 12/21/2019    CREATININE 1.1 12/21/2019     (H) 12/21/2019    CALCIUM 10.6 (H) 12/21/2019    MG 1.5 (L) 09/22/2016    PHOS 2.5 (L) 01/23/2017     LFTS;   Lab Results   Component Value Date    PROT 7.4 12/21/2019    ALBUMIN 4.2 12/21/2019    BILITOT 0.5 12/21/2019    AST 23 12/21/2019    ALKPHOS 59 12/21/2019    ALT 17 12/21/2019     COAGS:   Lab Results   Component Value Date    INR 1.0 08/21/2014     FLP:   Lab Results   Component Value Date    CHOL 137 08/21/2019    HDL 51 08/21/2019    LDLCALC 62.4 (L) 08/21/2019    TRIG 118 08/21/2019    CHOLHDL 37.2 08/21/2019         Imaging:  MRI brain 2011: 1.  SPHENOID, ETHMOID, AND MAXILLARY SINUS DISEASE.       2.  MILD PERIVENTRICULAR WHITE MATTER SIGNAL ALTERATION WHICH IS LIKELY   MICROVASCULAR IN ORIGIN.         3.  OTHERWISE  UNREMARKABLE MRI OF THE BRAIN WITH AND WITHOUT GADOLINIUM   FOR THE PATIENT'S AGE.       Assessment and Plan:  Ms. Teixeira is a 75 y.o. female referred to me by Lilian Avendaño NP for evaluation of dizziness and memory concerns.     For the dizziness, it sounds orthostatic but her BP on orthostatic vitals is stable. This could be related to the neuropathy leading to her feeling unsteady.     She has peripheral neuropathy, likely due to diabetes but we will check labs for other secondary causes.     For the memory concerns, we will obtain MRI brain, labs, and neuropsych testing.     She has a h/o TIA and is on plavix. Diabetes, BP, and lipid control per PCP.      Memory deficit  -     Ambulatory referral/consult to Neurology  -     MRI Brain Without Contrast; Future; Expected date: 09/02/2020  -     Ambulatory referral/consult to Neuropsychology; Future; Expected date: 09/09/2020  -     Vitamin B12; Future; Expected date: 09/02/2020  -     TSH; Future; Expected date: 09/02/2020  -     VITAMIN D; Future; Expected date: 09/02/2020  -     FOLATE; Future; Expected date: 09/02/2020  -     RPR; Future; Expected date: 09/02/2020  -     IMMUNOFIXATION ELECTROPHORESIS, SERUM; Future; Expected date: 09/02/2020  -     PROTEIN ELECTROPHORESIS, SERUM; Future; Expected date: 09/02/2020    Idiopathic progressive neuropathy  -     Vitamin B12; Future; Expected date: 09/02/2020  -     TSH; Future; Expected date: 09/02/2020  -     VITAMIN D; Future; Expected date: 09/02/2020  -     FOLATE; Future; Expected date: 09/02/2020  -     RPR; Future; Expected date: 09/02/2020  -     IMMUNOFIXATION ELECTROPHORESIS, SERUM; Future; Expected date: 09/02/2020  -     PROTEIN ELECTROPHORESIS, SERUM; Future; Expected date: 09/02/2020    On four or more medications  -     MRI Brain Without Contrast; Future; Expected date: 09/02/2020  -     Vitamin B12; Future; Expected date: 09/02/2020  -     TSH; Future; Expected date: 09/02/2020  -     VITAMIN D;  Future; Expected date: 09/02/2020  -     FOLATE; Future; Expected date: 09/02/2020  -     RPR; Future; Expected date: 09/02/2020  -     IMMUNOFIXATION ELECTROPHORESIS, SERUM; Future; Expected date: 09/02/2020  -     PROTEIN ELECTROPHORESIS, SERUM; Future; Expected date: 09/02/2020    History of TIA (transient ischemic attack)  -     MRI Brain Without Contrast; Future; Expected date: 09/02/2020  -     Vitamin B12; Future; Expected date: 09/02/2020  -     TSH; Future; Expected date: 09/02/2020  -     VITAMIN D; Future; Expected date: 09/02/2020  -     FOLATE; Future; Expected date: 09/02/2020  -     RPR; Future; Expected date: 09/02/2020  -     IMMUNOFIXATION ELECTROPHORESIS, SERUM; Future; Expected date: 09/02/2020  -     PROTEIN ELECTROPHORESIS, SERUM; Future; Expected date: 09/02/2020    Type 2 diabetes mellitus with hyperlipidemia  -     MRI Brain Without Contrast; Future; Expected date: 09/02/2020  -     Vitamin B12; Future; Expected date: 09/02/2020  -     TSH; Future; Expected date: 09/02/2020  -     VITAMIN D; Future; Expected date: 09/02/2020  -     FOLATE; Future; Expected date: 09/02/2020  -     RPR; Future; Expected date: 09/02/2020  -     IMMUNOFIXATION ELECTROPHORESIS, SERUM; Future; Expected date: 09/02/2020  -     PROTEIN ELECTROPHORESIS, SERUM; Future; Expected date: 09/02/2020

## 2020-09-02 NOTE — PATIENT INSTRUCTIONS
In regards to the memory, your memory screening test score (MOCA) is a 23/30 or just below normal (which is 26 points). We will get some labs looking for causes of memory trouble including vitamin deficiencies, thyroid function, etc. We will get a MRI of the brain to look for other causes like stroke. We will also send you for formal memory testing (neuropsych testing) for diagnosis.     You have neuropathy in the feet, probably from diabetes but also we will check labs for other causes. This can lead to the dizziness as well.     Your dizziness could be from the blood pressure dropping when you stand up too. If this is the case, drinking more fluids and wearing compression hose can help. Also change positions more slowly can help.

## 2020-09-02 NOTE — LETTER
September 2, 2020      Lilian Avendaño, NP  [address] [facility] [address] [place] [address]           [facility]  [address] [place] [address]  Phone: [phone]  Fax: [phone]          Patient: Pili Teixeira   MR Number: [id number]   YOB: 1945   Date of Visit: 9/2/2020       Dear Lilian Avendaño:    Thank you for referring Pili Teixeira to me for evaluation. Attached you will find relevant portions of my assessment and plan of care.    If you have questions, please do not hesitate to call me. I look forward to following Pili Teixeira along with you.    Sincerely,    Carol Morales MD    Enclosure  CC:  No Recipients    If you would like to receive this communication electronically, please contact [email or web address] or [phone] to request more information on [employer] access.    For providers and/or their staff who would like to refer a patient to [facility], please contact us through our one-stop-shop provider referral line, [facility], at [phone].    If you feel you have received this communication in error or would no longer like to receive these types of communications, please e-mail [email or web address]

## 2020-09-08 ENCOUNTER — HOSPITAL ENCOUNTER (OUTPATIENT)
Dept: RADIOLOGY | Facility: HOSPITAL | Age: 75
Discharge: HOME | End: 2020-09-08
Attending: PAIN MEDICINE
Payer: MEDICARE

## 2020-09-08 DIAGNOSIS — M47.892 OTHER SPONDYLOSIS, CERVICAL REGION: ICD-10-CM

## 2020-09-08 DIAGNOSIS — M47.817 SPONDYLOSIS WITHOUT MYELOPATHY OR RADICULOPATHY, LUMBOSACRAL REGION: ICD-10-CM

## 2020-09-08 DIAGNOSIS — M47.22 OTHER SPONDYLOSIS WITH RADICULOPATHY, CERVICAL REGION: ICD-10-CM

## 2020-09-08 DIAGNOSIS — M54.50 LOW BACK PAIN: ICD-10-CM

## 2020-09-08 DIAGNOSIS — G89.4 CHRONIC PAIN SYNDROME: ICD-10-CM

## 2020-09-08 DIAGNOSIS — M54.2 CERVICALGIA: ICD-10-CM

## 2020-09-08 PROCEDURE — 72148 MRI LUMBAR SPINE W/O DYE: CPT | Mod: TC

## 2020-09-08 PROCEDURE — 72141 MRI NECK SPINE W/O DYE: CPT | Mod: TC

## 2020-09-12 ENCOUNTER — HOSPITAL ENCOUNTER (EMERGENCY)
Facility: HOSPITAL | Age: 75
Discharge: HOME | End: 2020-09-12
Attending: EMERGENCY MEDICINE
Payer: MEDICARE

## 2020-09-12 VITALS
DIASTOLIC BLOOD PRESSURE: 67 MMHG | TEMPERATURE: 98 F | HEART RATE: 85 BPM | RESPIRATION RATE: 16 BRPM | SYSTOLIC BLOOD PRESSURE: 117 MMHG | OXYGEN SATURATION: 99 %

## 2020-09-12 DIAGNOSIS — S40.022A ARM CONTUSION, LEFT, INITIAL ENCOUNTER: ICD-10-CM

## 2020-09-12 DIAGNOSIS — S00.83XA CONTUSION OF FOREHEAD, INITIAL ENCOUNTER: ICD-10-CM

## 2020-09-12 DIAGNOSIS — S00.33XA NASAL CONTUSION: ICD-10-CM

## 2020-09-12 DIAGNOSIS — S80.12XA CONTUSION OF LEFT LOWER EXTREMITY, INITIAL ENCOUNTER: ICD-10-CM
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
## 2020-09-12 PROCEDURE — 99284 EMERGENCY DEPT VISIT MOD MDM: CPT | Mod: 25

## 2020-09-12 NOTE — PROGRESS NOTES
I have evaluated and performed a medical screening assessment on this patient while awaiting a thorough evaluation and treatment. All of the emergency department beds are occupied at this time. When appropriate, laboratory studies will be ordered from triage. The patient has been advised of this process and care plan. Patient complains of fall two days ago. Patient complains of headache. No visual changes. Feels dizzy and off balance. Nausea this AM. No LOC.     Orders pending: CT head  Disposition: q track

## 2020-09-13 NOTE — ED PROVIDER NOTES
Encounter Date: 9/12/2020    SCRIBE #1 NOTE: I, Kirt López, am scribing for, and in the presence of, oIn Krishna MD.       History     Chief Complaint   Patient presents with    Fall     Thursday night, struck forehead, bruising to face, chest, BUE, pain to back. Concerned b/c she is on coumadin.        Time seen by provider: 7:03 PM on 09/12/2020    Pili Teixeira is a 75 y.o. female with PMHx of HTN, HLD, venous insufficiency, DM, MI, and TIA who presents to the ED with an onset of left knee pain secondary to mechanically falling x2 days ago. Associated symptoms includes headache and light confusion noticed from daughter since the fall. The patient explains during the fall she hit her forehead, her left upper arm, and both shins. The patient endorses being on Plavix. The patient endorses taking percocet for chronic neck and back pain. The patient denies any other symptoms at this time. Pertinent PSHx includes cardiac catheterization.      The history is provided by the patient and a relative.     Review of patient's allergies indicates:   Allergen Reactions    Codeine Other (See Comments)     Chest pain    Clindamycin Other (See Comments)     Difficulty swallowing after taking, feels a something sits in epigastric area     Iodinated contrast media Hives and Rash     Past Medical History:   Diagnosis Date    Allergy     Anemia 2012    with thrombocytopenia; iron deficiency    Arthritis     Cervical spinal stenosis     with neuropathy, chronic neck,back,leg pain    Colon polyp     COPD (chronic obstructive pulmonary disease)     Coronary artery disease     Diabetes mellitus     , sugars run 190's per patient    Diastolic dysfunction 7/13/2015    Diverticulitis     Diverticulosis     Hx diverticulitis,still has chronic diarrhea, abd pain    Dyspnea on exertion     sometimes with nausea, fatigue,dizziness, had cardiac cath June 2012, normal    Fibromyalgia     Fracture 2012    right thumb     GERD (gastroesophageal reflux disease)     Feb 2012, Hx H Pylori    Glaucoma     had LAser surgey, on no eye drops    HEARING LOSS     Hemangioma     fatty liver    History of earache     frequent    History of TIA (transient ischemic attack) 5/28/2013    Hyperlipidemia     Hypertension     Hypertension associated with diabetes 3/14/2017    Laryngeal polyp     Myocardial infarction 2006 last    also MI in distant past    REJI (obstructive sleep apnea)     does not use CPAP    Otitis media     Renal stone     Sjogren's syndrome     SOB (shortness of breath) 6/8/2012    2012 [facility] 1. Normal coronary arteries. 2. Normal single renal arteries bilaterally. 3. Diastolic dysfunction.     Stroke     TIA, now on Plavix    TIA (transient ischemic attack)     TMJ disorder     Type II or unspecified type diabetes mellitus without mention of complication, uncontrolled 5/8/2014    UTI (lower urinary tract infection)     frequent    Venous insufficiency     with Hx blood clot in leg     Past Surgical History:   Procedure Laterality Date    ABDOMINAL SURGERY  2010 x2    expoloratory lap for pelvic cyst,adhesions; partial colonectomy     adhesiolysis   10/11/2012    CARDIAC CATHETERIZATION      no current blockages.    CHOLECYSTECTOMY      COLON SURGERY      r/t diverticuli    COLONOSCOPY  08/2014    repeat in 5 years    COLONOSCOPY N/A 1/7/2020    Procedure: COLONOSCOPY;  Surgeon: Kb Nguyen MD;  Location: [facility] ENDO;  Service: Endoscopy;  Laterality: N/A;    EPIDURAL BLOCK INJECTION  5/15/12    back,neck for pain    ESOPHAGOGASTRODUODENOSCOPY N/A 1/7/2020    Procedure: EGD (ESOPHAGOGASTRODUODENOSCOPY);  Surgeon: Kb Nguyen MD;  Location: [facility] ENDO;  Service: Endoscopy;  Laterality: N/A;    EXPLORATORY LAPAROTOMY W/ BOWEL RESECTION  10/11/2012    EYE SURGERY      Laser for glaucoma    EYE SURGERY  May 2012    cataracts    HYSTERECTOMY      LARYNX SURGERY      polypectomy     OOPHORECTOMY      TONSILLECTOMY      with adenoidectomy    UPPER GASTROINTESTINAL ENDOSCOPY  12/2015    VASCULAR SURGERY      laser to varicose vein leg     Family History   Problem Relation Age of Onset    Diverticulitis Sister     Throat cancer Brother     Cancer Brother     Throat cancer Mother     Cancer Mother     Diabetes Mellitus Mother     Stroke Father     Hypertension Father     Heart disease Father     Pancreatic cancer Maternal Aunt 55    Pancreatic cancer Cousin 58    Thyroid disease Daughter     Lupus Daughter     Breast cancer Neg Hx     Ovarian cancer Neg Hx     Colon cancer Neg Hx     Colon polyps Neg Hx     Celiac disease Neg Hx     Cirrhosis Neg Hx     Crohn's disease Neg Hx     Stomach cancer Neg Hx     Ulcerative colitis Neg Hx     Rectal cancer Neg Hx     Esophageal cancer Neg Hx     Inflammatory bowel disease Neg Hx     Rheum arthritis Neg Hx     Psoriasis Neg Hx     Osteoarthritis Neg Hx     Kidney disease Neg Hx     Hyperlipidemia Neg Hx     COPD Neg Hx     Depression Neg Hx     Chronic back pain Neg Hx     Asthma Neg Hx     Alcohol abuse Neg Hx      Social History     Tobacco Use    Smoking status: Never Smoker    Smokeless tobacco: Never Used   Substance Use Topics    Alcohol use: No    Drug use: Yes     Types: Oxycodone     Review of Systems   Constitutional: Negative for fever.   HENT: Negative for sore throat.    Respiratory: Negative for shortness of breath.    Cardiovascular: Negative for chest pain.   Gastrointestinal: Negative for nausea.   Genitourinary: Negative for dysuria.   Musculoskeletal: Negative for back pain.   Skin: Negative for rash.   Neurological: Negative for weakness.   Hematological: Does not bruise/bleed easily.       Physical Exam     Initial Vitals [09/12/20 1640]   BP Pulse Resp Temp SpO2   117/67 85 16 98.4 °F (36.9 °C) 99 %      MAP       --         Physical Exam    Nursing note and vitals reviewed.  Constitutional:  She appears well-nourished.   HENT:   Head: Normocephalic and atraumatic.   Swelling of the nasal bridge with ecchymosis. No septal hematoma. Mild frontal forehead contusion.    Eyes: Conjunctivae and EOM are normal.   Neck: Normal range of motion. Neck supple. No thyroid mass present.   Cardiovascular: Normal rate, regular rhythm and normal heart sounds. Exam reveals no gallop and no friction rub.    No murmur heard.  Pulmonary/Chest: Breath sounds normal. She has no wheezes. She has no rhonchi. She has no rales.   Abdominal: Soft. Normal appearance and bowel sounds are normal. There is no abdominal tenderness.   Neurological: She is alert and oriented to person, place, and time. She has normal strength. No cranial nerve deficit or sensory deficit.   Neurovascularly intact.   Skin: Skin is warm and dry. No rash noted. No erythema.   Bruising of the left arm with no gross bony abnormality. Mild bruising of the left anterior knee with underlying bony abnormality or open wounds.    Psychiatric: She has a normal mood and affect. Her speech is normal. Cognition and memory are normal.         ED Course   Procedures  Labs Reviewed - No data to display       Imaging Results          CT Head Without Contrast (In process)                  Medical Decision Making:   History:   Old Medical Records: I decided to obtain old medical records.  Clinical Tests:   Radiological Study: Ordered and Reviewed  ED Management:  Pt interviewed and examined emergengtly. VSS. Neurointact. CT head neg for evidence of trauma including SAH. No additional findings concerning for extremity fx of dislocation. Nose XR neg. Patient and daughter educated about contusions, possible mild post-concussive symptoms. Asked to f/u with PCP as needed and return to the ED immediately for any new, worsening or concerning symptoms. Pt DC'd in stable condition with daughter as .            Scribe Attestation:   Scribe #1: I performed the above scribed  service and the documentation accurately describes the services I performed. I attest to the accuracy of the note.    I, Dr. Ion Krishna, personally performed the services described in this documentation. All medical record entries made by the scribe were at my direction and in my presence.  I have reviewed the chart and agree that the record reflects my personal performance and is accurate and complete. Ion Krishna MD.  4:55 PM 09/13/2020                    Clinical Impression:       ICD-10-CM ICD-9-CM   1. Fall on same level from slipping, tripping or stumbling, initial encounter  W01.0XXA E885.9   2. Nasal contusion  S00.33XA 920   3. Contusion of forehead, initial encounter  S00.83XA 920   4. Arm contusion, left, initial encounter  S40.022A 923.9   5. Contusion of left lower extremity, initial encounter  S80.12XA 924.5         Disposition:   Disposition: Discharged  Condition: Stable                          Ion Krishna MD  09/13/20 3311

## 2020-09-14 ENCOUNTER — HOSPITAL ENCOUNTER (OUTPATIENT)
Dept: RADIOLOGY | Facility: HOSPITAL | Age: 75
Discharge: HOME | End: 2020-09-14
Attending: PSYCHIATRY & NEUROLOGY
Payer: MEDICARE

## 2020-09-14 ENCOUNTER — HOSPITAL ENCOUNTER (OUTPATIENT)
Dept: RADIOLOGY | Facility: HOSPITAL | Age: 75
Discharge: HOME | End: 2020-09-14
Attending: PAIN MEDICINE
Payer: MEDICARE

## 2020-09-14 DIAGNOSIS — M47.22 OTHER SPONDYLOSIS WITH RADICULOPATHY, CERVICAL REGION: ICD-10-CM

## 2020-09-14 DIAGNOSIS — M47.892 OTHER SPONDYLOSIS, CERVICAL REGION: ICD-10-CM

## 2020-09-14 DIAGNOSIS — M54.50 LOW BACK PAIN: ICD-10-CM

## 2020-09-14 DIAGNOSIS — G89.4 CHRONIC PAIN SYNDROME: ICD-10-CM

## 2020-09-14 DIAGNOSIS — M47.817 SPONDYLOSIS WITHOUT MYELOPATHY OR RADICULOPATHY, LUMBOSACRAL REGION: ICD-10-CM

## 2020-09-14 DIAGNOSIS — M54.2 CERVICALGIA: ICD-10-CM

## 2020-09-14 DIAGNOSIS — Z79.899 ON FOUR OR MORE MEDICATIONS: ICD-10-CM

## 2020-09-14 DIAGNOSIS — M54.16 RADICULOPATHY, LUMBAR REGION: ICD-10-CM

## 2020-09-14 DIAGNOSIS — E78.5 TYPE 2 DIABETES MELLITUS WITH HYPERLIPIDEMIA: ICD-10-CM

## 2020-09-14 DIAGNOSIS — R41.3 MEMORY DEFICIT: ICD-10-CM

## 2020-09-14 DIAGNOSIS — Z86.73 HISTORY OF TIA (TRANSIENT ISCHEMIC ATTACK): ICD-10-CM

## 2020-09-14 DIAGNOSIS — E11.69 TYPE 2 DIABETES MELLITUS WITH HYPERLIPIDEMIA: ICD-10-CM

## 2020-09-14 PROCEDURE — 70551 MRI BRAIN STEM W/O DYE: CPT | Mod: 26,,, | Performed by: RADIOLOGY

## 2020-09-14 PROCEDURE — 73030 X-RAY EXAM OF SHOULDER: CPT | Mod: 26,50,, | Performed by: RADIOLOGY

## 2020-09-14 PROCEDURE — 73030 XR SHOULDER COMPLETE 2 OR MORE VIEWS BILATERAL: ICD-10-PCS | Mod: 26,50,, | Performed by: RADIOLOGY

## 2020-09-14 PROCEDURE — 72120 X-RAY BEND ONLY L-S SPINE: CPT | Mod: TC,FY

## 2020-09-14 PROCEDURE — 72040 X-RAY EXAM NECK SPINE 2-3 VW: CPT | Mod: 26,,, | Performed by: RADIOLOGY

## 2020-09-14 PROCEDURE — 70551 MRI BRAIN STEM W/O DYE: CPT | Mod: TC

## 2020-09-14 PROCEDURE — 72040 X-RAY EXAM NECK SPINE 2-3 VW: CPT | Mod: TC,FY

## 2020-09-14 PROCEDURE — 72120 XR LUMBAR SPINE FLEXION AND EXTENSION ONLY: ICD-10-PCS | Mod: 26,,, | Performed by: RADIOLOGY

## 2020-09-14 PROCEDURE — 72120 X-RAY BEND ONLY L-S SPINE: CPT | Mod: 26,,, | Performed by: RADIOLOGY

## 2020-09-14 PROCEDURE — 70551 MRI BRAIN WITHOUT CONTRAST: ICD-10-PCS | Mod: 26,,, | Performed by: RADIOLOGY

## 2020-09-14 PROCEDURE — 73030 X-RAY EXAM OF SHOULDER: CPT | Mod: TC,50,FY

## 2020-09-14 PROCEDURE — 72040 XR CERVICAL SPINE FLEXION  AND EXTENSION ONLY: ICD-10-PCS | Mod: 26,,, | Performed by: RADIOLOGY

## 2020-09-15 DIAGNOSIS — E08.00 DIABETES MELLITUS DUE TO UNDERLYING CONDITION WITH HYPEROSMOLARITY WITHOUT COMA, WITHOUT LONG-TERM CURRENT USE OF INSULIN: ICD-10-CM

## 2020-09-15 DIAGNOSIS — G45.8 OTHER SPECIFIED TRANSIENT CEREBRAL ISCHEMIAS: Chronic | ICD-10-CM

## 2020-09-15 DIAGNOSIS — E78.5 TYPE 2 DIABETES MELLITUS WITH HYPERLIPIDEMIA: ICD-10-CM

## 2020-09-15 DIAGNOSIS — E11.69 TYPE 2 DIABETES MELLITUS WITH HYPERLIPIDEMIA: ICD-10-CM

## 2020-09-15 DIAGNOSIS — I15.2 HYPERTENSION ASSOCIATED WITH DIABETES: ICD-10-CM

## 2020-09-15 DIAGNOSIS — E11.59 HYPERTENSION ASSOCIATED WITH DIABETES: ICD-10-CM

## 2020-09-15 DIAGNOSIS — R06.02 SOB (SHORTNESS OF BREATH): ICD-10-CM

## 2020-09-15 DIAGNOSIS — N18.30 CKD (CHRONIC KIDNEY DISEASE) STAGE 3, GFR 30-59 ML/MIN: ICD-10-CM

## 2020-09-15 RX ORDER — ROSUVASTATIN CALCIUM 10 MG/1
TABLET, COATED ORAL
Qty: 80 TABLET | Refills: 4 | Status: SHIPPED | OUTPATIENT
Start: 2020-09-15 | End: 2020-09-21

## 2020-09-15 NOTE — TELEPHONE ENCOUNTER
No new care gaps identified.  Powered by [employer]. Reference number: [id number]. 9/15/2020 9:34:05 AM DONNA

## 2020-09-16 ENCOUNTER — PATIENT MESSAGE (OUTPATIENT)
Dept: NEUROLOGY | Facility: CLINIC | Age: 75
End: 2020-09-16

## 2020-09-16 RX ORDER — BLOOD SUGAR DIAGNOSTIC
STRIP MISCELLANEOUS
Qty: 200 EACH | Refills: 3 | Status: SHIPPED | OUTPATIENT
Start: 2020-09-16 | End: 2021-07-14 | Stop reason: SDUPTHER

## 2020-09-16 NOTE — PROGRESS NOTES
Refill Routing Note   Medication(s) are not appropriate for processing by [facility]:       - Patient has been seen in the Emergency Room/Department since the last PCP visit        Medication Therapy Plan: CDMR; Pt visited ED 9/19 for fall; Future OV with you; Defer to you       Automatic Epic Generated Protocol Data:        Requested Prescriptions   Pending Prescriptions Disp Refills    ACCU-CHEK JACINTO PLUS TEST STRP Strp [Pharmacy Med Name: Accu-Chek Jacinto Plus In Vitro Strip] 200 each 3     Sig: USE 1 STRIP TO CHECK GLUCOSE TWICE DAILY       There is no refill protocol information for this order           Appointments  past 12m or future 3m with PCP    Date Provider   Last Visit   7/31/2020 LUÍS Huggins MD   Next Visit   2/1/2021 LUÍS Huggins MD   ED visits in past 90 days: 1     Note composed:3:49 PM 09/16/2020

## 2020-09-16 NOTE — TELEPHONE ENCOUNTER
Pt advised that per Dr. Morales, her B12 level is lower than she wants to see it and  would recommend she take an additional 1000 mcg daily in addition to what she is  already taking. Pt verbalized understanding.

## 2020-09-19 DIAGNOSIS — E11.59 HYPERTENSION ASSOCIATED WITH DIABETES: ICD-10-CM

## 2020-09-19 DIAGNOSIS — N18.30 CKD (CHRONIC KIDNEY DISEASE) STAGE 3, GFR 30-59 ML/MIN: ICD-10-CM

## 2020-09-19 DIAGNOSIS — I15.2 HYPERTENSION ASSOCIATED WITH DIABETES: ICD-10-CM

## 2020-09-19 DIAGNOSIS — R06.02 SOB (SHORTNESS OF BREATH): ICD-10-CM

## 2020-09-19 DIAGNOSIS — G45.8 OTHER SPECIFIED TRANSIENT CEREBRAL ISCHEMIAS: Chronic | ICD-10-CM

## 2020-09-21 RX ORDER — ROSUVASTATIN CALCIUM 10 MG/1
TABLET, COATED ORAL
Qty: 80 TABLET | Refills: 4 | Status: SHIPPED | OUTPATIENT
Start: 2020-09-21 | End: 2020-11-20

## 2020-09-29 ENCOUNTER — PATIENT MESSAGE (OUTPATIENT)
Dept: OTHER | Facility: OTHER | Age: 75
End: 2020-09-29

## 2020-10-20 DIAGNOSIS — E11.9 TYPE 2 DIABETES MELLITUS WITHOUT COMPLICATION, WITH LONG-TERM CURRENT USE OF INSULIN: ICD-10-CM

## 2020-10-20 DIAGNOSIS — Z79.4 TYPE 2 DIABETES MELLITUS WITHOUT COMPLICATION, WITH LONG-TERM CURRENT USE OF INSULIN: ICD-10-CM

## 2020-10-20 RX ORDER — SAXAGLIPTIN 5 MG/1
TABLET, FILM COATED ORAL
Qty: 90 TABLET | Refills: 0 | Status: SHIPPED | OUTPATIENT
Start: 2020-10-20 | End: 2021-01-11 | Stop reason: CLARIF

## 2020-10-20 NOTE — TELEPHONE ENCOUNTER
Care Due:                  Date            Visit Type   Department     Provider  --------------------------------------------------------------------------------                                ESTABLISHED   [facility]  Last Visit: 07-      PATIENT      MEDICINE       LUÍS GOULD                              ESTABLISHED   [facility]  Next Visit: 02-      PATIENT      MEDICINE       LUÍS GOULD                                                            Last  Test          Frequency    Reason                     Performed    Due Date  --------------------------------------------------------------------------------    Cr..........  12 months..  SAXagliptin, metFORMIN...  12-   12-    HBA1C.......  6 months...  SAXagliptin, metFORMIN...  07- 01-    Powered by [employer]. Reference number: [id number]. 10/20/2020 9:43:24 AM   CDT

## 2020-10-20 NOTE — PROGRESS NOTES
Refill Routing Note   Medication(s) are not appropriate for processing by [facility] for the following reason(s):     - Patient has been seen in the Emergency Room/Department since the last PCP visit    [facility] actions taken in this encounter: Defer    Medication-related problems identified:   Requires labs  Requires appointment  Medication Therapy Plan: CDMR: Labs (CMP)-Link to labs on 11/2; Appt (ED f/u)  Medication reconciliation completed: No   Automatic Epic Generated Protocol Data:        Requested Prescriptions   Pending Prescriptions Disp Refills    ONGLYZA 5 mg Tab tablet [Pharmacy Med Name: Onglyza 5 MG Oral Tablet] 90 tablet 0     Sig: Take 1 tablet by mouth once daily       Endocrinology:  Diabetes - DPP-4 Inhibitors Failed - 10/20/2020  9:42 AM        Failed - eGFR is 60 or above and within 360 days     Glom Filt Rate, Est    Date Value Ref Range Status   09/01/2012 >60 >60 Final     Glom filt Rate, Est non-   Date Value Ref Range Status   09/01/2012 >60 >60 Final     Comment:     eGFR INTERPRETATION: THE eGFR VALUES ARE CALCULATED USING THE ISOTOPE  DILUTION MASS SPECTROMETRY (IDMS)-TRACEABLE MODIFICATION OF DIET IN RENAL  DISEASE (MDRD) STUDY EQUIATION. VALUES ARE RACE, SEX AND AGE DEPENDENT AND  UNITS ARE ml/min/1.732 m2. IN THE FOLLOWING CLINICAL SETTINGS AN ACTUAL  CLEARANCE MEASUREMENT MAY BE REQUIRED: EXTREMES OF AGE OR BODY SIZE,  SEVERE MALNUTRITION OR OBESITY, DISEASES OF SKELETAL MUSCLE, PARAPLEGIA OR  QUADRAPLAGIA, STRICT VEGETARIAN DIET, RAPIDLY CHANGING KIDNEY FUNCTION OR  PRIOR TO DOSING DRUGS WITH SIGNIFICANT TOXICITY THAT ARE EXCRETED BY THE  KIDNEY. DECREASED eGFR FOR >3 MONTHS TO LEVELS OF 30-59 IS CLASSIFIED BY  THE [employer] AS CHRONIC RENAL DISEASE, STAGE 3. 15-29  IS CLASSIFIED AS STAGE 4.     eGFR if non    Date Value Ref Range Status   12/21/2019 49.9 (A) >60 mL/min/1.73 m^2 Final     Comment:      Calculation used to obtain the estimated glomerular filtration  rate (eGFR) is the CKD-EPI equation.      12/02/2019 56.0 (A) >60 mL/min/1.73 m^2 Final     Comment:     Calculation used to obtain the estimated glomerular filtration  rate (eGFR) is the CKD-EPI equation.      08/21/2019 >60.0 >60 mL/min/1.73 m^2 Final     Comment:     Calculation used to obtain the estimated glomerular filtration  rate (eGFR) is the CKD-EPI equation.        eGFR if    Date Value Ref Range Status   12/21/2019 57.6 (A) >60 mL/min/1.73 m^2 Final   12/02/2019 >60.0 >60 mL/min/1.73 m^2 Final   08/21/2019 >60.0 >60 mL/min/1.73 m^2 Final              Passed - Patient is at least 18 years old        Passed - Office Visit within last 12 months or future 90 days.     Recent Outpatient Visits            1 month ago Memory deficit    [place] - Neurology Carol Morales MD    2 months ago Hypertension associated with diabetes    [facility] LUÍS Huggins MD    2 months ago Encounter for preventive health examination    [facility] Lilian Avendaño NP    9 months ago Mixed hyperlipidemia    [place] - Cardiology Leonidas Page MD    9 months ago Abdominal pain, LLQ (left lower quadrant)    [place] [facility] - Gastroenterology Kb Nguyen MD          Future Appointments              In 1 week LAB, [facility] Lab, [place]    In 2 weeks [facility] Monique MD [facility]    In 3 months LUÍS Huggins MD [facility], [place]                Passed - HBA1C is 7.9 or below and within 180 days     Hemoglobin A1C   Date Value Ref Range Status   07/20/2020 4.8 4.0 - 5.6 % Final     Comment:     ADA Screening Guidelines:  5.7-6.4%  Consistent with prediabetes  >or=6.5%  Consistent with diabetes  High levels of fetal hemoglobin interfere with the HbA1C  assay. Heterozygous hemoglobin variants (HbS, HgC, etc)do  not significantly interfere with this assay.    However, presence of multiple variants may affect accuracy.     08/21/2019 5.7 (H) 4.0 - 5.6 % Final     Comment:     ADA Screening Guidelines:  5.7-6.4%  Consistent with prediabetes  >or=6.5%  Consistent with diabetes  High levels of fetal hemoglobin interfere with the HbA1C  assay. Heterozygous hemoglobin variants (HbS, HgC, etc)do  not significantly interfere with this assay.   However, presence of multiple variants may affect accuracy.     12/03/2018 5.2 4.0 - 5.6 % Final     Comment:     ADA Screening Guidelines:  5.7-6.4%  Consistent with prediabetes  >or=6.5%  Consistent with diabetes  High levels of fetal hemoglobin interfere with the HbA1C  assay. Heterozygous hemoglobin variants (HbS, HgC, etc)do  not significantly interfere with this assay.   However, presence of multiple variants may affect accuracy.                Passed - Cr is 1.3 or below and within 360 days     Creatinine   Date Value Ref Range Status   12/21/2019 1.1 0.5 - 1.4 mg/dL Final   12/02/2019 1.0 0.5 - 1.4 mg/dL Final   08/21/2019 0.9 0.5 - 1.4 mg/dL Final   09/01/2012 0.9 0.2 - 1.4 mg/dl Final                    Appointments  past 12m or future 3m with PCP    Date Provider   Last Visit   7/31/2020 LUÍS Huggins MD   Next Visit   2/1/2021 LUÍS Huggins MD   ED visits in past 90 days: 1        Note composed:4:21 PM 10/20/2020

## 2020-10-27 ENCOUNTER — TELEPHONE (OUTPATIENT)
Dept: CARDIOLOGY | Facility: CLINIC | Age: 75
End: 2020-10-27

## 2020-10-31 DIAGNOSIS — E11.9 TYPE 2 DIABETES MELLITUS WITHOUT COMPLICATION, WITH LONG-TERM CURRENT USE OF INSULIN: ICD-10-CM

## 2020-10-31 DIAGNOSIS — Z79.4 TYPE 2 DIABETES MELLITUS WITHOUT COMPLICATION, WITH LONG-TERM CURRENT USE OF INSULIN: ICD-10-CM

## 2020-10-31 NOTE — TELEPHONE ENCOUNTER
No new care gaps identified.  Powered by [employer]. Reference number: [id number]. 10/31/2020 10:11:31 AM   DONNA

## 2020-11-02 ENCOUNTER — LAB VISIT (OUTPATIENT)
Dept: LAB | Facility: HOSPITAL | Age: 75
End: 2020-11-02
Attending: FAMILY MEDICINE
Payer: MEDICARE

## 2020-11-02 DIAGNOSIS — Z79.4 TYPE 2 DIABETES MELLITUS WITHOUT COMPLICATION, WITH LONG-TERM CURRENT USE OF INSULIN: ICD-10-CM

## 2020-11-02 DIAGNOSIS — E78.5 TYPE 2 DIABETES MELLITUS WITH HYPERLIPIDEMIA: ICD-10-CM

## 2020-11-02 DIAGNOSIS — E78.2 MIXED HYPERLIPIDEMIA: ICD-10-CM

## 2020-11-02 DIAGNOSIS — E11.9 TYPE 2 DIABETES MELLITUS WITHOUT COMPLICATION, WITH LONG-TERM CURRENT USE OF INSULIN: ICD-10-CM

## 2020-11-02 DIAGNOSIS — E11.69 TYPE 2 DIABETES MELLITUS WITH HYPERLIPIDEMIA: ICD-10-CM

## 2020-11-02 LAB
CHOLEST SERPL-MCNC: 127 MG/DL (ref 120–199)
CHOLEST/HDLC SERPL: 2.6 {RATIO} (ref 2–5)
ESTIMATED AVG GLUCOSE: 105 MG/DL (ref 68–131)
HBA1C MFR BLD HPLC: 5.3 % (ref 4–5.6)
HDLC SERPL-MCNC: 48 MG/DL (ref 40–75)
HDLC SERPL: 37.8 % (ref 20–50)
LDLC SERPL CALC-MCNC: 57.6 MG/DL (ref 63–159)
NONHDLC SERPL-MCNC: 79 MG/DL
TRIGL SERPL-MCNC: 107 MG/DL (ref 30–150)

## 2020-11-02 PROCEDURE — 36415 COLL VENOUS BLD VENIPUNCTURE: CPT

## 2020-11-02 PROCEDURE — 83036 HEMOGLOBIN GLYCOSYLATED A1C: CPT

## 2020-11-02 PROCEDURE — 80061 LIPID PANEL: CPT

## 2020-11-02 RX ORDER — SAXAGLIPTIN 5 MG/1
TABLET, FILM COATED ORAL
Qty: 90 TABLET | Refills: 0 | OUTPATIENT
Start: 2020-11-02

## 2020-11-02 NOTE — PROGRESS NOTES
Bautista EDWARD. Duplicate Request    Refill Authorization Note   Pili Teixeira is requesting a refill authorization.    Brief assessment and rationale for refill: Quick Discontinue       Medication Therapy Plan: CD  Medication reconciliation completed: No    Comments:   Pended Medication(s)       Requested Prescriptions     Refused Prescriptions Disp Refills    ONGLYZA 5 mg Tab tablet [Pharmacy Med Name: Onglyza 5 MG Oral Tablet] 90 tablet 0     Sig: Take 1 tablet by mouth once daily     Refused By: GUSTAVO SALGADO     Reason for Refusal: Duplicate        Duplicate Pended Encounter(s)/ Last Prescribed Details:    Ordering Provider: -- SOLOMON #:  -- NPI:  --    Authorizing Provider: LUÍS Huggins MD SOLOMON #:  [id number] NPI:  [id number]    Ordering User:  LUÍS Huggins MD            Diagnosis Association: Type 2 diabetes mellitus without complication, with long-term current use of insulin (E11.9 , Z79.4)      Original Order:  SAXagliptin (ONGLYZA) 5 mg Tab tablet [[id number]]      Pharmacy:  [facility] [address] [place] [address]   SOLOMON #:  --   Pharmacy Comments: --          Fill quantity remaining: -- Fill quantity used: -- Next fill due: --       Outpatient Medication Detail     Disp Refills Start End LALITO   ONGLYZA 5 mg Tab tablet 90 tablet 0 10/20/2020  No   Sig: Take 1 tablet by mouth once daily   Sent to pharmacy as: ONGLYZA 5 mg Tab tablet   Class: Normal   Order: [id number]   Date/Time Signed: 10/20/2020 22:53       E-Prescribing Status: Receipt confirmed by pharmacy (10/20/2020 10:53 PM CDT)        Note composed:1:19 PM 11/02/2020

## 2020-11-02 NOTE — TELEPHONE ENCOUNTER
----- Message from Thi Blow sent at 11/2/2020 10:08 AM CST -----  Regarding: Refill  Contact: pt  Type:  RX Refill Request    Who Called:  pt  Refill or New Rx:  refill  RX Name and Strength:  ONGLYZA 5 mg Tab tablet  How is the patient currently taking it? (ex. 1XDay):  5 mg 1 x day   Is this a 30 day or 90 day RX:  90  Preferred Pharmacy with phone number:    [facility] [address] [place], [place] - [address] [address]  Phone: [phone] Fax: [phone]  Local or Mail Order:  Local  Ordering Provider:  DONG Huggins Call Back Number:  [phone] (home)   Additional Information:  Pt states it was requested by pharmacy last week. She is now out of medication. Please call pt to advise. Thanks!

## 2020-11-02 NOTE — TELEPHONE ENCOUNTER
No new care gaps identified.  Powered by [employer]. Reference number: [id number]. 11/02/2020 10:14:37 AM   CST

## 2020-11-03 NOTE — PROGRESS NOTES
Bautista EDWARD. Duplicate Request    Refill Authorization Note   Pili Teixeira is requesting a refill authorization.    Brief assessment and rationale for refill: Quick Discontinue       Medication Therapy Plan: CDMR.   Medication reconciliation completed: Yes    Comments:   Pended Medication(s)       Requested Prescriptions     Refused Prescriptions Disp Refills    SAXagliptin (ONGLYZA) 5 mg Tab tablet 90 tablet 0     Sig: Take 1 tablet (5 mg total) by mouth once daily.     Refused By: GUSTAVO SALGADO     Reason for Refusal: Duplicate        Duplicate Pended Encounter(s)/ Last Prescribed Details:    Ordering Provider: -- SOLOMON #:  -- NPI:  --    Authorizing Provider: LUÍS Huggins MD SOLOMON #:  [id number] NPI:  [id number]    Ordering User:  LUÍS Huggins MD            Diagnosis Association: Type 2 diabetes mellitus without complication, with long-term current use of insulin (E11.9 , Z79.4)      Original Order:  SAXagliptin (ONGLYZA) 5 mg Tab tablet [[id number]]      Pharmacy:  [facility] [address] [place] [address]   SOLOMON #:  --   Pharmacy Comments: --          Fill quantity remaining: -- Fill quantity used: -- Next fill due: --       Outpatient Medication Detail     Disp Refills Start End LALITO   ONGLYZA 5 mg Tab tablet 90 tablet 0 10/20/2020  No   Sig: Take 1 tablet by mouth once daily   Sent to pharmacy as: ONGLYZA 5 mg Tab tablet   Class: Normal   Order: [id number]   Date/Time Signed: 10/20/2020 22:53       E-Prescribing Status: Receipt confirmed by pharmacy (10/20/2020 10:53 PM CDT)        Note composed:8:11 AM 11/04/2020

## 2020-11-04 RX ORDER — SAXAGLIPTIN 5 MG/1
5 TABLET, FILM COATED ORAL DAILY
Qty: 90 TABLET | Refills: 0 | OUTPATIENT
Start: 2020-11-04

## 2020-11-04 NOTE — PROGRESS NOTES
Pharmacy Call Documentation    Pharmacy Called:  DANNIE Call Time: 4:14PM   Spoke with: ATILIO 11/03/2020      Called to verify that the script that was sent on: 10/20/20 for 90   day supply was received by the pharmacy.     Script was received.  request is duplicate or refill too soon.    Request will be: refused     Additional actions required: no     Current Medication Requested: (refill encounter)   Requested Prescriptions     Refused Prescriptions Disp Refills    SAXagliptin (ONGLYZA) 5 mg Tab tablet 90 tablet 0     Sig: Take 1 tablet (5 mg total) by mouth once daily.     Refused By: GUSTAVO SALGADO     Reason for Refusal: Duplicate      [facility]     Note composed: 11/04/2020 8:48 AM

## 2020-11-09 ENCOUNTER — HOSPITAL ENCOUNTER (OUTPATIENT)
Dept: RADIOLOGY | Facility: HOSPITAL | Age: 75
Discharge: HOME | End: 2020-11-09
Attending: INTERNAL MEDICINE
Payer: MEDICARE

## 2020-11-09 ENCOUNTER — OFFICE VISIT (OUTPATIENT)
Dept: RHEUMATOLOGY | Facility: CLINIC | Age: 75
End: 2020-11-09
Payer: MEDICARE

## 2020-11-09 VITALS
HEART RATE: 80 BPM | HEIGHT: 63 IN | BODY MASS INDEX: 24.45 KG/M2 | WEIGHT: 138 LBS | SYSTOLIC BLOOD PRESSURE: 127 MMHG | DIASTOLIC BLOOD PRESSURE: 82 MMHG | TEMPERATURE: 97 F

## 2020-11-09 DIAGNOSIS — M35.01 SJOGREN'S SYNDROME WITH KERATOCONJUNCTIVITIS SICCA: ICD-10-CM

## 2020-11-09 DIAGNOSIS — R76.8 RHEUMATOID FACTOR POSITIVE: ICD-10-CM

## 2020-11-09 PROCEDURE — 73630 X-RAY EXAM OF FOOT: CPT | Mod: TC,50

## 2020-11-09 PROCEDURE — 99203 PR OFFICE/OUTPT VISIT, NEW, LEVL III, 30-44 MIN: ICD-10-PCS | Mod: S$GLB,,, | Performed by: INTERNAL MEDICINE

## 2020-11-09 PROCEDURE — 73560 X-RAY EXAM OF KNEE 1 OR 2: CPT | Mod: TC,50

## 2020-11-09 PROCEDURE — 73130 X-RAY EXAM OF HAND: CPT | Mod: TC,50

## 2020-11-09 PROCEDURE — 99203 OFFICE O/P NEW LOW 30 MIN: CPT | Mod: S$GLB,,, | Performed by: INTERNAL MEDICINE

## 2020-11-09 NOTE — PROGRESS NOTES
[facility] RHEUMATOLOGY        NEW PATIENT      Subjective:       Patient ID:   NAME: Pili Teixeira : 1945     75 y.o. female    Referring Doc: LUÍS Huggins MD  Other Physicians:    Chief Complaint:  Initial Visit      History of Present Illness:     New patient  with hx of Sjogren's and inflammatory arthritis,FM,OA ( +RF,JOAO 1:160,- CCP)diagnosed by rheum, Dr Wilson in   Pt states she took Methotrexate ,only took for a few wks ,acc to pt although notes state she was on it in 2018.  Has chronic low back pain, for 10 years ( cervical and lumbar) sees pain management. Has had epidural injections C and L spine years ago.  No hx of serositis  No prolonged AM stiffness.  Pain in wrists,PIP's,DIP's.Denies joint swelling      ROS:   GEN: no fevers night sweats or significant weight changes  +  Fatigue ( insomnia and interrupted sleep)  HEENT: no HA's( mainly from neck to occiput,occ sec to sinus drainage), changes in vision , no mouth ulcers, + sicca symptoms 3-5 years), no scalp tenderness, jaw claudication  CV: + occ  CP( sees cardiologist ,Dr Acosta)No SOB, PND,+ BONILLA ,No orthopnea,no palpitations  PULM:no SOB, cough, hemoptysis, sputum or pleuritic pain  GI: no abdominal pain,+ nausea,No vomiting, constipation, diarrhea, melanotic stools, BRBPR, or hematemesis, no dysphagia  : no hematuria, dysuria  NEURO:+ paresthesias arms,hands,feet for last year( sees neuro), headaches, visual disturbances, muscle weakness  SKIN:  no rashes , erythema, bruising, or swelling, no Raynauds, no photosensitivity  MUSCULOSKELETAL:no joint swelling, no prolonged AM stiffness, + back pain   PSYCH:   + Insomnia, -  depression, + occ anxiety  Upper endoscopy and colonoscopy 6-8 months ago.All ok except for benign polyps  Medications:    Current Outpatient Medications:     ACCU-CHEK JACINTO PLUS TEST STRP Strp, USE 1 STRIP TO CHECK GLUCOSE TWICE DAILY, Disp: 300 strip, Rfl: 1    ACCU-CHEK JACINTO PLUS TEST STRP Strp, USE 1  STRIP TO CHECK GLUCOSE TWICE DAILY, Disp: 200 each, Rfl: 3    BENADRYL ALLERGY 25 mg tablet, Take 2 tablets (50 mg total) by mouth as directed. Take 50mg of Benadryl (Diphenhydramine) by mouth 1 hour prior to CT scan (Patient taking differently: Take 25 mg by mouth nightly as needed for Insomnia. ), Disp: 2 tablet, Rfl: 0    blood-glucose meter kit, To check BG 2 times daily, to use with insurance preferred meter. Medical necessity., Disp: 1 each, Rfl: 0    CARTIA  mg Cp24, TAKE 1 CAPSULE BY MOUTH IN THE EVENING, Disp: 90 capsule, Rfl: 4    clopidogreL (PLAVIX) 75 mg tablet, TAKE 1 TABLET BY MOUTH ONCE DAILY (NEED  APPOINTMENT), Disp: 90 tablet, Rfl: 0    cyanocobalamin (VITAMIN B-12) 1000 MCG tablet, Take 100 mcg by mouth once daily., Disp: , Rfl:     diclofenac sodium 1 % Gel, Apply 2 g topically once daily., Disp: , Rfl:     fluticasone (FLONASE) 50 mcg/actuation nasal spray, 2 sprays by Each Nare route once daily., Disp: 1 Bottle, Rfl: 0    folic acid (FOLVITE) 1 MG tablet, Take 1 tablet by mouth once daily, Disp: 30 tablet, Rfl: 5    gabapentin (NEURONTIN) 100 MG capsule, Take 1 capsule (100 mg total) by mouth 3 (three) times daily., Disp: 270 capsule, Rfl: 1    lactobac cmb #3-fos-pantethine (PROBIOTIC & ACIDOPHILUS) 300-250 million cell-mg Cap, Take 1 capsule by mouth once daily., Disp: 20 capsule, Rfl: 0    lancets (ACCU-CHEK SOFTCLIX LANCETS) Misc, Test TWICE DAILY;Twice a day, Disp: 100 each, Rfl: 3    lancets Misc, To check BG  2 times daily, to use with insurance preferred meter, medical necessity., Disp: 100 each, Rfl: 2    meloxicam (MOBIC) 7.5 MG tablet, Take 7.5 mg by mouth once daily., Disp: , Rfl:     metFORMIN (GLUCOPHAGE) 500 MG tablet, TAKE 1 TABLET BY MOUTH TWICE DAILY WITH MEALS, Disp: 180 tablet, Rfl: 0    multivit with min-folic acid (WOMEN'S MULTIVITAMIN GUMMIES) 200 mcg Chew, Take by mouth once daily., Disp: , Rfl:     mupirocin (BACTROBAN) 2 % ointment, Apply  topically 3 (three) times daily., Disp: 22 g, Rfl: 0    nystatin (MYCOSTATIN) cream, Apply topically 3 (three) times daily., Disp: 30 g, Rfl: 3    olopatadine (PATANOL) 0.1 % ophthalmic solution, Place 1 drop into both eyes daily as needed. , Disp: , Rfl:     ondansetron (ZOFRAN-ODT) 8 MG TbDL, DISSOLVE 1 TABLET IN MOUTH EVERY 12 HOURS AS NEEDED for nausea, Disp: 30 tablet, Rfl: 1    ONGLYZA 5 mg Tab tablet, Take 1 tablet by mouth once daily, Disp: 90 tablet, Rfl: 0    oxyCODONE-acetaminophen 7.5-325 mg TbBO, Take 1 tablet by mouth 4 (four) times daily as needed. , Disp: , Rfl:     pantoprazole (PROTONIX) 40 MG tablet, TAKE 1 TABLET BY MOUTH EVERY DAY, Disp: 90 tablet, Rfl: 0    ranitidine (ZANTAC) 300 MG tablet, TAKE 1 TABLET BY MOUTH IN THE EVENING, Disp: 30 tablet, Rfl: 0    rosuvastatin (CRESTOR) 10 MG tablet, Take 1 tablet by mouth in the evening, Disp: 80 tablet, Rfl: 4    sucralfate (CARAFATE) 1 gram tablet, Take 1 tablet (1 g total) by mouth 4 (four) times daily before meals and nightly., Disp: 60 tablet, Rfl: 0    traZODone (DESYREL) 50 MG tablet, TAKE 1 TABLET BY MOUTH IN THE EVENING - (MAY TAKE AN ADDITIONAL TABLET AS NEEDED), Disp: 180 tablet, Rfl: 1    valsartan-hydrochlorothiazide (DIOVAN-HCT) 160-12.5 mg per tablet, Take 1 tablet by mouth once daily., Disp: 90 tablet, Rfl: 3    valsartan-hydrochlorothiazide (DIOVAN-HCT) 160-25 mg per tablet, Take 1 tablet by mouth once daily, Disp: 90 tablet, Rfl: 0    FAMILY HISTORY: negative for Connective Tissue Disease      PAST MEDICAL HISTORY:  Nephrolithiasis ? Type  DM for 6-7 years  HTN  TIA : 7 years ago  PAST SURGICAL HISTORY:  Cholecystectomy,Tonsillectomy  Partial colectomy sec Diverticulitis( pt states she had complications,hives,resp failure.Flat lined in ICU)  Hysterectomy  SOCIAL HISTORY:Retired Restaurant manag  Smoking: never  ETOH: rarely  ALLERGIES:  Codeine: abd pain        Objective:     Vitals:  Blood pressure 127/82, pulse 80,  "temperature 97.4 °F (36.3 °C), height 5' 3" (1.6 m), weight 62.6 kg (138 lb).    Physical Examination:   GEN: no apparent distress, comfortable; AAOx3  SKIN: no rashes, no lesions, no sclerodactyly or induration, no Raynaud's, no periungual erythema  HEAD: normal  EYES: no pallor, no icterus, PERRLA  ENT:  no thrush,no mucosal dryness or ulcerations  NECK: no masses, thyroid normal, trachea midline, no LAD/LN's, supple  CV:   S1 and S2 regular, no murmurs, gallop or rubs  CHEST: Normal respiratory effort;  normal breath sounds; no rubs, no wheezes, no crackles.   ABDOM: nontender and nondistended; soft; ; no rebound/guarding,no masses  MUSC/Skeletal: ROM normal; no crepitus; joints without synovitis, no deformities   EXTREM: no clubbing, cyanosis, edema, normal pulses.  NEURO: grossly intact; motorWNL; AAOx3; no tremors  PSYCH: normal mood, affect and behavior  LYMPH: normal cervical, supraclavicular            Labs:   @RESUFAST(WBC,HGB,HCT,MCV,PLT)  )@RESUFAST(NA,K,CL,CO2,GLU,BUN,Creatinine,Calcium,PROT,Albumin,Bilitot,Alkphos,AST,ALT,JOAO,Sed Rate,CRP,RF,CCP)      Radiology/Diagnostic Studies:    I have reviewed all available labs and XRay reports    Assessment/Plan:   75 y.o. female with history of Sjogren's and inflammatory arthritis.  No synovitis on physical exam.  She was never compliant with methotrexate or Plaquenil.  Osteoarthritis and fibromyalgia.  More symptomatic with fibromyalgia      PLAN:  Labs  X-rays of hands feet and knees  Follow-up in 4-5 weeks        Discussion:     I have explained all of the above in detail and the patient understands all of the current recommendation(s). I have answered all of their questions to the best of my ability and to their complete satisfaction.      I have reviewed the risks and benefits of the medication in detail with patient, who understands and wishes to proceed. Printed information regarding the disease and/or medication was also " provided.        RTC        Electronically signed by Bee Monique MD

## 2020-11-09 NOTE — LETTER
November 9, 2020      LUÍS Huggins MD  [address] [facility] [address] [place] [address]           [facility] - Rheumatology  [address]  [address]  [facility] [address]  Phone: [phone]  Fax: [phone]          Patient: Pili Teixeira   MR Number: [id number]   YOB: 1945   Date of Visit: 11/9/2020       Dear Dr. LUÍS Huggins:    Thank you for referring Pili Teixeira to me for evaluation. Attached you will find relevant portions of my assessment and plan of care.    If you have questions, please do not hesitate to call me. I look forward to following Pili Teixeira along with you.    Sincerely,    Bee Monique MD    Enclosure  CC:  No Recipients    If you would like to receive this communication electronically, please contact [email or web address] or [phone] to request more information on [employer] access.    For providers and/or their staff who would like to refer a patient to [facility], please contact us through our one-stop-shop provider referral line, [facility], at [phone].    If you feel you have received this communication in error or would no longer like to receive these types of communications, please e-mail [email or web address]

## 2020-11-13 DIAGNOSIS — G45.8 OTHER SPECIFIED TRANSIENT CEREBRAL ISCHEMIAS: Chronic | ICD-10-CM

## 2020-11-13 DIAGNOSIS — I15.2 HYPERTENSION ASSOCIATED WITH DIABETES: ICD-10-CM

## 2020-11-13 DIAGNOSIS — N18.30 CKD (CHRONIC KIDNEY DISEASE) STAGE 3, GFR 30-59 ML/MIN: ICD-10-CM

## 2020-11-13 DIAGNOSIS — R06.02 SOB (SHORTNESS OF BREATH): ICD-10-CM

## 2020-11-13 DIAGNOSIS — E11.59 HYPERTENSION ASSOCIATED WITH DIABETES: ICD-10-CM

## 2020-11-16 RX ORDER — DILTIAZEM HYDROCHLORIDE 120 MG/1
CAPSULE, COATED, EXTENDED RELEASE ORAL
Qty: 90 CAPSULE | Refills: 4 | Status: SHIPPED | OUTPATIENT
Start: 2020-11-16 | End: 2022-02-14 | Stop reason: SDUPTHER

## 2020-11-19 DIAGNOSIS — N18.30 CKD (CHRONIC KIDNEY DISEASE) STAGE 3, GFR 30-59 ML/MIN: ICD-10-CM

## 2020-11-19 DIAGNOSIS — R06.02 SOB (SHORTNESS OF BREATH): ICD-10-CM

## 2020-11-19 DIAGNOSIS — G45.8 OTHER SPECIFIED TRANSIENT CEREBRAL ISCHEMIAS: Chronic | ICD-10-CM

## 2020-11-19 DIAGNOSIS — E11.59 HYPERTENSION ASSOCIATED WITH DIABETES: ICD-10-CM

## 2020-11-19 DIAGNOSIS — I15.2 HYPERTENSION ASSOCIATED WITH DIABETES: ICD-10-CM

## 2020-11-20 RX ORDER — ROSUVASTATIN CALCIUM 10 MG/1
TABLET, COATED ORAL
Qty: 70 TABLET | Refills: 4 | Status: SHIPPED | OUTPATIENT
Start: 2020-11-20 | End: 2021-11-08

## 2020-12-09 ENCOUNTER — TELEPHONE (OUTPATIENT)
Dept: CARDIOLOGY | Facility: CLINIC | Age: 75
End: 2020-12-09

## 2020-12-09 ENCOUNTER — TELEPHONE (OUTPATIENT)
Dept: FAMILY MEDICINE | Facility: CLINIC | Age: 75
End: 2020-12-09

## 2020-12-09 NOTE — TELEPHONE ENCOUNTER
----- Message from Yuki Newsome sent at 12/7/2020 11:07 AM CST -----  Regarding: pharmacy  Contact: ion [facility]te/ [facility]   [id number] - [place] - [address]  [place] [address]  Phone: [phone] Fax: [phone]    Ion requesting a new prescription with new directions.   Ion reports medicare will only pay for non insulin dependent to test once a day  On the new prescription include diagnosis code.

## 2020-12-30 DIAGNOSIS — E11.9 TYPE 2 DIABETES MELLITUS WITHOUT COMPLICATION, WITH LONG-TERM CURRENT USE OF INSULIN: ICD-10-CM

## 2020-12-30 DIAGNOSIS — Z79.4 TYPE 2 DIABETES MELLITUS WITHOUT COMPLICATION, WITH LONG-TERM CURRENT USE OF INSULIN: ICD-10-CM

## 2020-12-30 RX ORDER — METFORMIN HYDROCHLORIDE 500 MG/1
500 TABLET ORAL 2 TIMES DAILY WITH MEALS
Qty: 180 TABLET | Refills: 1 | Status: SHIPPED | OUTPATIENT
Start: 2020-12-30 | End: 2021-02-13 | Stop reason: SDUPTHER

## 2020-12-30 NOTE — PROGRESS NOTES
Refill Authorization Note   Pili Teixeira  is requesting a refill authorization.  Brief Assessment and Rationale for Refill:  Approve     Medication Therapy Plan:  [facility]    Medication Reconciliation Completed: No   Comments:   Orders Placed This Encounter    metFORMIN (GLUCOPHAGE) 500 MG tablet      Requested Prescriptions   Signed Prescriptions Disp Refills    metFORMIN (GLUCOPHAGE) 500 MG tablet 180 tablet 1     Sig: Take 1 tablet (500 mg total) by mouth 2 (two) times daily with meals.       Endocrinology:  Diabetes - Biguanides Passed - 12/30/2020  1:23 PM        Passed - Patient is at least 18 years old        Passed - Office visit in past 12 months or future 90 days     Recent Outpatient Visits            1 month ago Rheumatoid factor positive    [facility] Rheumatology Bee Monique MD    3 months ago Memory deficit    [facility] Neurology Carol Morales MD    5 months ago Hypertension associated with diabetes    [facility] Family Medicine LUÍS Huggins MD    5 months ago Encounter for preventive health examination    [facility] Lilian Avendaño NP    11 months ago Mixed hyperlipidemia    [facility] Cardiology Leonidas Page MD          Future Appointments              In 1 month LUÍS Huggins MD [facility] Medicine, [place]    In 1 month Bee Monique MD [facility] Rheumatology, [id number]                Passed - Cr is 1.4 or below and within 360 days     Creatinine   Date Value Ref Range Status   11/09/2020 0.8 0.5 - 1.4 mg/dL Final   12/21/2019 1.1 0.5 - 1.4 mg/dL Final   12/02/2019 1.0 0.5 - 1.4 mg/dL Final   09/01/2012 0.9 0.2 - 1.4 mg/dl Final              Passed - HBA1C is 8 or below and within 180 days     Hemoglobin A1C   Date Value Ref Range Status   11/02/2020 5.3 4.0 - 5.6 % Final     Comment:     ADA Screening Guidelines:  5.7-6.4%  Consistent with prediabetes  >or=6.5%  Consistent with diabetes  High levels of fetal hemoglobin interfere with the HbA1C  assay.  Heterozygous hemoglobin variants (HbS, HgC, etc)do  not significantly interfere with this assay.   However, presence of multiple variants may affect accuracy.     07/20/2020 4.8 4.0 - 5.6 % Final     Comment:     ADA Screening Guidelines:  5.7-6.4%  Consistent with prediabetes  >or=6.5%  Consistent with diabetes  High levels of fetal hemoglobin interfere with the HbA1C  assay. Heterozygous hemoglobin variants (HbS, HgC, etc)do  not significantly interfere with this assay.   However, presence of multiple variants may affect accuracy.     08/21/2019 5.7 (H) 4.0 - 5.6 % Final     Comment:     ADA Screening Guidelines:  5.7-6.4%  Consistent with prediabetes  >or=6.5%  Consistent with diabetes  High levels of fetal hemoglobin interfere with the HbA1C  assay. Heterozygous hemoglobin variants (HbS, HgC, etc)do  not significantly interfere with this assay.   However, presence of multiple variants may affect accuracy.                Passed - eGFR is 30 or above and within 360 days     Glom Filt Rate, Est    Date Value Ref Range Status   09/01/2012 >60 >60 Final     Glom filt Rate, Est non-   Date Value Ref Range Status   09/01/2012 >60 >60 Final     Comment:     eGFR INTERPRETATION: THE eGFR VALUES ARE CALCULATED USING THE ISOTOPE  DILUTION MASS SPECTROMETRY (IDMS)-TRACEABLE MODIFICATION OF DIET IN RENAL  DISEASE (MDRD) STUDY EQUIATION. VALUES ARE RACE, SEX AND AGE DEPENDENT AND  UNITS ARE ml/min/1.732 m2. IN THE FOLLOWING CLINICAL SETTINGS AN ACTUAL  CLEARANCE MEASUREMENT MAY BE REQUIRED: EXTREMES OF AGE OR BODY SIZE,  SEVERE MALNUTRITION OR OBESITY, DISEASES OF SKELETAL MUSCLE, PARAPLEGIA OR  QUADRAPLAGIA, STRICT VEGETARIAN DIET, RAPIDLY CHANGING KIDNEY FUNCTION OR  PRIOR TO DOSING DRUGS WITH SIGNIFICANT TOXICITY THAT ARE EXCRETED BY THE  KIDNEY. DECREASED eGFR FOR >3 MONTHS TO LEVELS OF 30-59 IS CLASSIFIED BY  THE N[employer] AS CHRONIC RENAL DISEASE, STAGE 3. 15-29  IS  CLASSIFIED AS STAGE 4.     eGFR if non    Date Value Ref Range Status   11/09/2020 >60.0 >60 mL/min/1.73 m^2 Final     Comment:     Calculation used to obtain the estimated glomerular filtration  rate (eGFR) is the CKD-EPI equation.      12/21/2019 49.9 (A) >60 mL/min/1.73 m^2 Final     Comment:     Calculation used to obtain the estimated glomerular filtration  rate (eGFR) is the CKD-EPI equation.      12/02/2019 56.0 (A) >60 mL/min/1.73 m^2 Final     Comment:     Calculation used to obtain the estimated glomerular filtration  rate (eGFR) is the CKD-EPI equation.        eGFR if    Date Value Ref Range Status   11/09/2020 >60.0 >60 mL/min/1.73 m^2 Final   12/21/2019 57.6 (A) >60 mL/min/1.73 m^2 Final   12/02/2019 >60.0 >60 mL/min/1.73 m^2 Final                  Appointments  past 12m or future 3m with PCP    Date Provider   Last Visit   7/31/2020 LUÍS Huggins MD   Next Visit   2/1/2021 LUÍS Huggins MD   ED visits in past 90 days: 0     Note composed:4:19 PM 12/30/2020

## 2020-12-30 NOTE — TELEPHONE ENCOUNTER
----- Message from Birdie Galan sent at 12/30/2020 12:32 PM CST -----  Regarding: refill  Contact: patient  Type:  RX Refill Request    Who Called:  patient  Refill or New Rx:  refill  RX Name and Strength:  Metformin  How is the patient currently taking it? (ex. 1XDay):  n/a  Is this a 30 day or 90 day RX:  90    Preferred Pharmacy with phone number:    [facility] - [place], [place] - [address] [address]  Phone: [phone] Fax: [phone]    Local or Mail Order:  local  Ordering Provider:  Joseluis Mendes Call Back Number:  [phone] (home)     Additional Information:  please call to advise, sayligia person sent request 2 days ago also

## 2020-12-30 NOTE — TELEPHONE ENCOUNTER
No new care gaps identified.  Powered by [employer]. Reference number: [id number]. 12/30/2020 1:24:46 PM   CST

## 2021-01-04 ENCOUNTER — PATIENT MESSAGE (OUTPATIENT)
Dept: ADMINISTRATIVE | Facility: HOSPITAL | Age: 76
End: 2021-01-04

## 2021-01-06 ENCOUNTER — TELEPHONE (OUTPATIENT)
Dept: FAMILY MEDICINE | Facility: CLINIC | Age: 76
End: 2021-01-06

## 2021-01-06 ENCOUNTER — OFFICE VISIT (OUTPATIENT)
Dept: FAMILY MEDICINE | Facility: CLINIC | Age: 76
End: 2021-01-06
Payer: MEDICARE

## 2021-01-06 ENCOUNTER — PATIENT MESSAGE (OUTPATIENT)
Dept: FAMILY MEDICINE | Facility: CLINIC | Age: 76
End: 2021-01-06

## 2021-01-06 ENCOUNTER — HOSPITAL ENCOUNTER (OUTPATIENT)
Dept: RADIOLOGY | Facility: HOSPITAL | Age: 76
Discharge: HOME | End: 2021-01-06
Attending: PHYSICIAN ASSISTANT
Payer: MEDICARE

## 2021-01-06 VITALS
HEIGHT: 63 IN | TEMPERATURE: 98 F | SYSTOLIC BLOOD PRESSURE: 130 MMHG | HEART RATE: 87 BPM | WEIGHT: 138.44 LBS | DIASTOLIC BLOOD PRESSURE: 66 MMHG | RESPIRATION RATE: 16 BRPM | OXYGEN SATURATION: 99 % | BODY MASS INDEX: 24.53 KG/M2

## 2021-01-06 DIAGNOSIS — E11.9 TYPE 2 DIABETES MELLITUS WITHOUT COMPLICATION, UNSPECIFIED WHETHER LONG TERM INSULIN USE: ICD-10-CM

## 2021-01-06 DIAGNOSIS — K57.92 DIVERTICULITIS: ICD-10-CM

## 2021-01-06 DIAGNOSIS — R30.0 DYSURIA: Primary | ICD-10-CM

## 2021-01-06 DIAGNOSIS — N39.0 URINARY TRACT INFECTION WITH HEMATURIA, SITE UNSPECIFIED: ICD-10-CM

## 2021-01-06 DIAGNOSIS — R31.9 URINARY TRACT INFECTION WITH HEMATURIA, SITE UNSPECIFIED: ICD-10-CM

## 2021-01-06 DIAGNOSIS — R10.9 ABDOMINAL PAIN, UNSPECIFIED ABDOMINAL LOCATION: ICD-10-CM

## 2021-01-06 LAB
BILIRUB SERPL-MCNC: ABNORMAL MG/DL
BLOOD URINE, POC: ABNORMAL
CLARITY, POC UA: ABNORMAL
COLOR, POC UA: ABNORMAL
GLUCOSE UR QL STRIP: NORMAL
KETONES UR QL STRIP: ABNORMAL
LEUKOCYTE ESTERASE URINE, POC: ABNORMAL
NITRITE, POC UA: ABNORMAL
PH, POC UA: 5
PROTEIN, POC: ABNORMAL
SPECIFIC GRAVITY, POC UA: ABNORMAL
UROBILINOGEN, POC UA: NORMAL

## 2021-01-06 PROCEDURE — 74176 CT ABD & PELVIS W/O CONTRAST: CPT | Mod: 26,,, | Performed by: RADIOLOGY

## 2021-01-06 PROCEDURE — 96372 THER/PROPH/DIAG INJ SC/IM: CPT | Mod: PBBFAC,PO

## 2021-01-06 PROCEDURE — 25500020 PHARM REV CODE 255: Performed by: PHYSICIAN ASSISTANT

## 2021-01-06 PROCEDURE — 99999 PR PBB SHADOW E&M-EST. PATIENT-LVL V: CPT | Mod: PBBFAC,,, | Performed by: PHYSICIAN ASSISTANT

## 2021-01-06 PROCEDURE — 99215 OFFICE O/P EST HI 40 MIN: CPT | Mod: PBBFAC,PO | Performed by: PHYSICIAN ASSISTANT

## 2021-01-06 PROCEDURE — 87086 URINE CULTURE/COLONY COUNT: CPT

## 2021-01-06 PROCEDURE — 99999 PR PBB SHADOW E&M-EST. PATIENT-LVL V: ICD-10-PCS | Mod: PBBFAC,,, | Performed by: PHYSICIAN ASSISTANT

## 2021-01-06 PROCEDURE — 99214 OFFICE O/P EST MOD 30 MIN: CPT | Mod: S$PBB,,, | Performed by: PHYSICIAN ASSISTANT

## 2021-01-06 PROCEDURE — 81002 URINALYSIS NONAUTO W/O SCOPE: CPT | Mod: PBBFAC,PO | Performed by: PHYSICIAN ASSISTANT

## 2021-01-06 PROCEDURE — 74176 CT ABD & PELVIS W/O CONTRAST: CPT | Mod: TC

## 2021-01-06 PROCEDURE — A9698 NON-RAD CONTRAST MATERIALNOC: HCPCS | Performed by: PHYSICIAN ASSISTANT

## 2021-01-06 PROCEDURE — 99214 PR OFFICE/OUTPT VISIT, EST, LEVL IV, 30-39 MIN: ICD-10-PCS | Mod: S$PBB,,, | Performed by: PHYSICIAN ASSISTANT

## 2021-01-06 PROCEDURE — 74176 CT ABDOMEN PELVIS WITHOUT CONTRAST: ICD-10-PCS | Mod: 26,,, | Performed by: RADIOLOGY

## 2021-01-06 RX ORDER — CEFTRIAXONE 500 MG/1
500 INJECTION, POWDER, FOR SOLUTION INTRAMUSCULAR; INTRAVENOUS
Status: COMPLETED | OUTPATIENT
Start: 2021-01-06 | End: 2021-01-06

## 2021-01-06 RX ORDER — NITROFURANTOIN 25; 75 MG/1; MG/1
CAPSULE ORAL
COMMUNITY
Start: 2021-01-03 | End: 2021-01-11

## 2021-01-06 RX ORDER — AMOXICILLIN AND CLAVULANATE POTASSIUM 875; 125 MG/1; MG/1
1 TABLET, FILM COATED ORAL 2 TIMES DAILY
Qty: 20 TABLET | Refills: 0 | Status: ON HOLD | OUTPATIENT
Start: 2021-01-06 | End: 2021-01-14 | Stop reason: HOSPADM

## 2021-01-06 RX ORDER — FLUCONAZOLE 100 MG/1
100 TABLET ORAL DAILY
Qty: 30 TABLET | Refills: 0 | Status: ON HOLD | OUTPATIENT
Start: 2021-01-06 | End: 2021-01-14 | Stop reason: HOSPADM

## 2021-01-06 RX ADMIN — BARIUM SULFATE 900 ML: 20 SUSPENSION ORAL at 05:01

## 2021-01-06 RX ADMIN — CEFTRIAXONE SODIUM 500 MG: 500 INJECTION, POWDER, FOR SOLUTION INTRAMUSCULAR; INTRAVENOUS at 03:01

## 2021-01-08 LAB — BACTERIA UR CULT: NORMAL

## 2021-01-11 ENCOUNTER — HOSPITAL ENCOUNTER (INPATIENT)
Facility: HOSPITAL | Age: 76
LOS: 2 days | Discharge: HOME | DRG: 392 | End: 2021-01-14
Attending: EMERGENCY MEDICINE | Admitting: INTERNAL MEDICINE
Payer: MEDICARE

## 2021-01-11 ENCOUNTER — HOSPITAL ENCOUNTER (OUTPATIENT)
Dept: RADIOLOGY | Facility: HOSPITAL | Age: 76
Discharge: HOME | End: 2021-01-11
Attending: FAMILY MEDICINE
Payer: MEDICARE

## 2021-01-11 ENCOUNTER — OFFICE VISIT (OUTPATIENT)
Dept: FAMILY MEDICINE | Facility: CLINIC | Age: 76
End: 2021-01-11
Payer: MEDICARE

## 2021-01-11 VITALS
HEIGHT: 63 IN | SYSTOLIC BLOOD PRESSURE: 122 MMHG | HEART RATE: 77 BPM | WEIGHT: 135.56 LBS | OXYGEN SATURATION: 99 % | TEMPERATURE: 98 F | DIASTOLIC BLOOD PRESSURE: 62 MMHG | BODY MASS INDEX: 24.02 KG/M2

## 2021-01-11 DIAGNOSIS — R11.0 NAUSEA: ICD-10-CM

## 2021-01-11 DIAGNOSIS — I15.2 HYPERTENSION ASSOCIATED WITH DIABETES: ICD-10-CM

## 2021-01-11 DIAGNOSIS — R10.32 LEFT LOWER QUADRANT PAIN: ICD-10-CM

## 2021-01-11 DIAGNOSIS — R10.32 ABDOMINAL PAIN, LLQ (LEFT LOWER QUADRANT): ICD-10-CM

## 2021-01-11 DIAGNOSIS — K52.9 ENTERITIS: Primary | ICD-10-CM

## 2021-01-11 DIAGNOSIS — R07.9 CHEST PAIN: ICD-10-CM

## 2021-01-11 DIAGNOSIS — E11.59 HYPERTENSION ASSOCIATED WITH DIABETES: ICD-10-CM

## 2021-01-11 DIAGNOSIS — R10.32 ABDOMINAL PAIN, LLQ (LEFT LOWER QUADRANT): Primary | ICD-10-CM

## 2021-01-11 DIAGNOSIS — K52.9 COLITIS: ICD-10-CM

## 2021-01-11 LAB
ALBUMIN SERPL BCP-MCNC: 4.1 G/DL (ref 3.5–5.2)
ALP SERPL-CCNC: 43 U/L (ref 55–135)
ALT SERPL W/O P-5'-P-CCNC: 14 U/L (ref 10–44)
ANION GAP SERPL CALC-SCNC: 10 MMOL/L (ref 8–16)
AST SERPL-CCNC: 18 U/L (ref 10–40)
BASOPHILS # BLD AUTO: 0.06 K/UL (ref 0–0.2)
BASOPHILS NFR BLD: 0.5 % (ref 0–1.9)
BILIRUB SERPL-MCNC: 0.7 MG/DL (ref 0.1–1)
BUN SERPL-MCNC: 25 MG/DL (ref 8–23)
CALCIUM SERPL-MCNC: 10.2 MG/DL (ref 8.7–10.5)
CHLORIDE SERPL-SCNC: 101 MMOL/L (ref 95–110)
CO2 SERPL-SCNC: 25 MMOL/L (ref 23–29)
CREAT SERPL-MCNC: 0.9 MG/DL (ref 0.5–1.4)
DIFFERENTIAL METHOD: NORMAL
EOSINOPHIL # BLD AUTO: 0.1 K/UL (ref 0–0.5)
EOSINOPHIL NFR BLD: 0.7 % (ref 0–8)
ERYTHROCYTE [DISTWIDTH] IN BLOOD BY AUTOMATED COUNT: 12.3 % (ref 11.5–14.5)
EST. GFR  (AFRICAN AMERICAN): >60 ML/MIN/1.73 M^2
EST. GFR  (NON AFRICAN AMERICAN): >60 ML/MIN/1.73 M^2
GLUCOSE SERPL-MCNC: 91 MG/DL (ref 70–110)
HCT VFR BLD AUTO: 37.3 % (ref 37–48.5)
HGB BLD-MCNC: 12 G/DL (ref 12–16)
IMM GRANULOCYTES # BLD AUTO: 0.04 K/UL (ref 0–0.04)
IMM GRANULOCYTES NFR BLD AUTO: 0.3 % (ref 0–0.5)
INR PPP: 1
LIPASE SERPL-CCNC: 56 U/L (ref 4–60)
LYMPHOCYTES # BLD AUTO: 4.1 K/UL (ref 1–4.8)
LYMPHOCYTES NFR BLD: 35.1 % (ref 18–48)
MCH RBC QN AUTO: 28.2 PG (ref 27–31)
MCHC RBC AUTO-ENTMCNC: 32.2 G/DL (ref 32–36)
MCV RBC AUTO: 88 FL (ref 82–98)
MONOCYTES # BLD AUTO: 0.9 K/UL (ref 0.3–1)
MONOCYTES NFR BLD: 7.7 % (ref 4–15)
NEUTROPHILS # BLD AUTO: 6.5 K/UL (ref 1.8–7.7)
NEUTROPHILS NFR BLD: 55.7 % (ref 38–73)
NRBC BLD-RTO: 0 /100 WBC
PLATELET # BLD AUTO: 155 K/UL (ref 150–350)
PMV BLD AUTO: 12.9 FL (ref 9.2–12.9)
POTASSIUM SERPL-SCNC: 3.7 MMOL/L (ref 3.5–5.1)
PROT SERPL-MCNC: 7.2 G/DL (ref 6–8.4)
PROTHROMBIN TIME: 13.1 SEC (ref 10.6–14.8)
RBC # BLD AUTO: 4.26 M/UL (ref 4–5.4)
SARS-COV-2 RDRP RESP QL NAA+PROBE: NEGATIVE
SODIUM SERPL-SCNC: 136 MMOL/L (ref 136–145)
WBC # BLD AUTO: 11.6 K/UL (ref 3.9–12.7)

## 2021-01-11 PROCEDURE — 83690 ASSAY OF LIPASE: CPT

## 2021-01-11 PROCEDURE — 99214 OFFICE O/P EST MOD 30 MIN: CPT | Mod: S$PBB,,, | Performed by: FAMILY MEDICINE

## 2021-01-11 PROCEDURE — 80053 COMPREHEN METABOLIC PANEL: CPT

## 2021-01-11 PROCEDURE — 87209 SMEAR COMPLEX STAIN: CPT

## 2021-01-11 PROCEDURE — 74176 CT ABD & PELVIS W/O CONTRAST: CPT | Mod: TC,PO

## 2021-01-11 PROCEDURE — U0002 COVID-19 LAB TEST NON-CDC: HCPCS

## 2021-01-11 PROCEDURE — 74176 CT ABD & PELVIS W/O CONTRAST: CPT | Mod: 26,,, | Performed by: RADIOLOGY

## 2021-01-11 PROCEDURE — 87046 STOOL CULTR AEROBIC BACT EA: CPT

## 2021-01-11 PROCEDURE — 63600175 PHARM REV CODE 636 W HCPCS: Performed by: EMERGENCY MEDICINE

## 2021-01-11 PROCEDURE — 87045 FECES CULTURE AEROBIC BACT: CPT

## 2021-01-11 PROCEDURE — G0378 HOSPITAL OBSERVATION PER HR: HCPCS

## 2021-01-11 PROCEDURE — 85610 PROTHROMBIN TIME: CPT

## 2021-01-11 PROCEDURE — 96375 TX/PRO/DX INJ NEW DRUG ADDON: CPT

## 2021-01-11 PROCEDURE — 87186 SC STD MICRODIL/AGAR DIL: CPT

## 2021-01-11 PROCEDURE — 99999 PR PBB SHADOW E&M-EST. PATIENT-LVL V: ICD-10-PCS | Mod: PBBFAC,,, | Performed by: FAMILY MEDICINE

## 2021-01-11 PROCEDURE — 25500020 PHARM REV CODE 255: Mod: PO | Performed by: FAMILY MEDICINE

## 2021-01-11 PROCEDURE — 87077 CULTURE AEROBIC IDENTIFY: CPT

## 2021-01-11 PROCEDURE — 99999 PR PBB SHADOW E&M-EST. PATIENT-LVL V: CPT | Mod: PBBFAC,,, | Performed by: FAMILY MEDICINE

## 2021-01-11 PROCEDURE — 85025 COMPLETE CBC W/AUTO DIFF WBC: CPT

## 2021-01-11 PROCEDURE — 25000003 PHARM REV CODE 250: Performed by: EMERGENCY MEDICINE

## 2021-01-11 PROCEDURE — 99285 EMERGENCY DEPT VISIT HI MDM: CPT | Mod: 25

## 2021-01-11 PROCEDURE — 99214 PR OFFICE/OUTPT VISIT, EST, LEVL IV, 30-39 MIN: ICD-10-PCS | Mod: S$PBB,,, | Performed by: FAMILY MEDICINE

## 2021-01-11 PROCEDURE — 96374 THER/PROPH/DIAG INJ IV PUSH: CPT

## 2021-01-11 PROCEDURE — 89055 LEUKOCYTE ASSESSMENT FECAL: CPT

## 2021-01-11 PROCEDURE — 74176 CT ABDOMEN PELVIS WITHOUT CONTRAST: ICD-10-PCS | Mod: 26,,, | Performed by: RADIOLOGY

## 2021-01-11 PROCEDURE — S0030 INJECTION, METRONIDAZOLE: HCPCS | Performed by: EMERGENCY MEDICINE

## 2021-01-11 PROCEDURE — 99215 OFFICE O/P EST HI 40 MIN: CPT | Mod: PBBFAC,25,PO | Performed by: FAMILY MEDICINE

## 2021-01-11 PROCEDURE — A9698 NON-RAD CONTRAST MATERIALNOC: HCPCS | Mod: PO | Performed by: FAMILY MEDICINE

## 2021-01-11 PROCEDURE — 87015 SPECIMEN INFECT AGNT CONCNTJ: CPT

## 2021-01-11 RX ORDER — HYDROMORPHONE HYDROCHLORIDE 1 MG/ML
0.5 INJECTION, SOLUTION INTRAMUSCULAR; INTRAVENOUS; SUBCUTANEOUS
Status: COMPLETED | OUTPATIENT
Start: 2021-01-11 | End: 2021-01-11

## 2021-01-11 RX ORDER — OXYCODONE AND ACETAMINOPHEN 7.5; 325 MG/1; MG/1
1 TABLET ORAL 4 TIMES DAILY PRN
COMMUNITY
Start: 2021-01-05

## 2021-01-11 RX ORDER — ONDANSETRON 2 MG/ML
4 INJECTION INTRAMUSCULAR; INTRAVENOUS
Status: COMPLETED | OUTPATIENT
Start: 2021-01-11 | End: 2021-01-11

## 2021-01-11 RX ORDER — PANTOPRAZOLE SODIUM 40 MG/1
40 TABLET, DELAYED RELEASE ORAL DAILY
Status: ON HOLD | COMMUNITY
End: 2021-02-18 | Stop reason: SDUPTHER

## 2021-01-11 RX ORDER — CLOPIDOGREL BISULFATE 75 MG/1
75 TABLET ORAL DAILY
COMMUNITY
End: 2021-02-02

## 2021-01-11 RX ORDER — SODIUM CHLORIDE 9 MG/ML
INJECTION, SOLUTION INTRAVENOUS
Status: COMPLETED | OUTPATIENT
Start: 2021-01-11 | End: 2021-01-12

## 2021-01-11 RX ORDER — SAXAGLIPTIN 5 MG/1
5 TABLET, FILM COATED ORAL DAILY
COMMUNITY
End: 2021-02-02 | Stop reason: SDUPTHER

## 2021-01-11 RX ORDER — MORPHINE SULFATE 4 MG/ML
4 INJECTION, SOLUTION INTRAMUSCULAR; INTRAVENOUS
Status: COMPLETED | OUTPATIENT
Start: 2021-01-11 | End: 2021-01-11

## 2021-01-11 RX ORDER — METRONIDAZOLE 500 MG/100ML
500 INJECTION, SOLUTION INTRAVENOUS
Status: COMPLETED | OUTPATIENT
Start: 2021-01-11 | End: 2021-01-12

## 2021-01-11 RX ADMIN — HYDROMORPHONE HYDROCHLORIDE 0.5 MG: 1 INJECTION, SOLUTION INTRAMUSCULAR; INTRAVENOUS; SUBCUTANEOUS at 10:01

## 2021-01-11 RX ADMIN — BARIUM SULFATE 900 ML: 20 SUSPENSION ORAL at 04:01

## 2021-01-11 RX ADMIN — ONDANSETRON 4 MG: 2 INJECTION INTRAMUSCULAR; INTRAVENOUS at 09:01

## 2021-01-11 RX ADMIN — MORPHINE SULFATE 4 MG: 4 INJECTION INTRAVENOUS at 09:01

## 2021-01-12 PROBLEM — G89.29 CHRONIC BACK PAIN: Chronic | Status: ACTIVE | Noted: 2021-01-12

## 2021-01-12 PROBLEM — M54.9 CHRONIC BACK PAIN: Chronic | Status: ACTIVE | Noted: 2021-01-12

## 2021-01-12 PROBLEM — R91.1 PULMONARY NODULE: Chronic | Status: ACTIVE | Noted: 2021-01-12

## 2021-01-12 PROBLEM — E83.42 HYPOMAGNESEMIA: Status: ACTIVE | Noted: 2021-01-12

## 2021-01-12 PROBLEM — E11.9 TYPE 2 DIABETES MELLITUS, WITHOUT LONG-TERM CURRENT USE OF INSULIN: Chronic | Status: ACTIVE | Noted: 2021-01-12

## 2021-01-12 LAB
ALBUMIN SERPL BCP-MCNC: 3.7 G/DL (ref 3.5–5.2)
ALP SERPL-CCNC: 35 U/L (ref 55–135)
ALT SERPL W/O P-5'-P-CCNC: 13 U/L (ref 10–44)
ANION GAP SERPL CALC-SCNC: 6 MMOL/L (ref 8–16)
AST SERPL-CCNC: 16 U/L (ref 10–40)
BASOPHILS # BLD AUTO: 0.05 K/UL (ref 0–0.2)
BASOPHILS NFR BLD: 0.5 % (ref 0–1.9)
BILIRUB SERPL-MCNC: 0.7 MG/DL (ref 0.1–1)
BILIRUB UR QL STRIP: NEGATIVE
BUN SERPL-MCNC: 21 MG/DL (ref 8–23)
C DIFF GDH STL QL: NEGATIVE
C DIFF TOX A+B STL QL IA: NEGATIVE
CALCIUM SERPL-MCNC: 9.5 MG/DL (ref 8.7–10.5)
CHLORIDE SERPL-SCNC: 105 MMOL/L (ref 95–110)
CLARITY UR: CLEAR
CO2 SERPL-SCNC: 27 MMOL/L (ref 23–29)
COLOR UR: YELLOW
CREAT SERPL-MCNC: 0.8 MG/DL (ref 0.5–1.4)
DIFFERENTIAL METHOD: ABNORMAL
EOSINOPHIL # BLD AUTO: 0.1 K/UL (ref 0–0.5)
EOSINOPHIL NFR BLD: 1.1 % (ref 0–8)
ERYTHROCYTE [DISTWIDTH] IN BLOOD BY AUTOMATED COUNT: 12.2 % (ref 11.5–14.5)
EST. GFR  (AFRICAN AMERICAN): >60 ML/MIN/1.73 M^2
EST. GFR  (NON AFRICAN AMERICAN): >60 ML/MIN/1.73 M^2
GLUCOSE SERPL-MCNC: 101 MG/DL (ref 70–110)
GLUCOSE SERPL-MCNC: 106 MG/DL (ref 70–110)
GLUCOSE SERPL-MCNC: 87 MG/DL (ref 70–110)
GLUCOSE SERPL-MCNC: 89 MG/DL (ref 70–110)
GLUCOSE UR QL STRIP: NEGATIVE
HCT VFR BLD AUTO: 33.8 % (ref 37–48.5)
HGB BLD-MCNC: 11 G/DL (ref 12–16)
HGB UR QL STRIP: ABNORMAL
IMM GRANULOCYTES # BLD AUTO: 0.03 K/UL (ref 0–0.04)
IMM GRANULOCYTES NFR BLD AUTO: 0.3 % (ref 0–0.5)
KETONES UR QL STRIP: NEGATIVE
LEUKOCYTE ESTERASE UR QL STRIP: NEGATIVE
LYMPHOCYTES # BLD AUTO: 4.7 K/UL (ref 1–4.8)
LYMPHOCYTES NFR BLD: 46.8 % (ref 18–48)
MAGNESIUM SERPL-MCNC: 1.1 MG/DL (ref 1.6–2.6)
MCH RBC QN AUTO: 28.4 PG (ref 27–31)
MCHC RBC AUTO-ENTMCNC: 32.5 G/DL (ref 32–36)
MCV RBC AUTO: 87 FL (ref 82–98)
MONOCYTES # BLD AUTO: 0.9 K/UL (ref 0.3–1)
MONOCYTES NFR BLD: 9.3 % (ref 4–15)
NEUTROPHILS # BLD AUTO: 4.3 K/UL (ref 1.8–7.7)
NEUTROPHILS NFR BLD: 42 % (ref 38–73)
NITRITE UR QL STRIP: NEGATIVE
NRBC BLD-RTO: 0 /100 WBC
PH UR STRIP: 6 [PH] (ref 5–8)
PLATELET # BLD AUTO: 142 K/UL (ref 150–350)
PMV BLD AUTO: 13 FL (ref 9.2–12.9)
POTASSIUM SERPL-SCNC: 3.7 MMOL/L (ref 3.5–5.1)
PROT SERPL-MCNC: 6.5 G/DL (ref 6–8.4)
PROT UR QL STRIP: NEGATIVE
RBC # BLD AUTO: 3.87 M/UL (ref 4–5.4)
SODIUM SERPL-SCNC: 138 MMOL/L (ref 136–145)
SP GR UR STRIP: 1.02 (ref 1–1.03)
URN SPEC COLLECT METH UR: ABNORMAL
UROBILINOGEN UR STRIP-ACNC: NEGATIVE EU/DL
WBC # BLD AUTO: 10.13 K/UL (ref 3.9–12.7)
WBC #/AREA STL HPF: NORMAL /[HPF]

## 2021-01-12 PROCEDURE — 25000003 PHARM REV CODE 250: Performed by: EMERGENCY MEDICINE

## 2021-01-12 PROCEDURE — 99223 PR INITIAL HOSPITAL CARE,LEVL III: ICD-10-PCS | Mod: ,,, | Performed by: STUDENT IN AN ORGANIZED HEALTH CARE EDUCATION/TRAINING PROGRAM

## 2021-01-12 PROCEDURE — 83735 ASSAY OF MAGNESIUM: CPT

## 2021-01-12 PROCEDURE — 85025 COMPLETE CBC W/AUTO DIFF WBC: CPT

## 2021-01-12 PROCEDURE — 80053 COMPREHEN METABOLIC PANEL: CPT

## 2021-01-12 PROCEDURE — 25000003 PHARM REV CODE 250: Performed by: INTERNAL MEDICINE

## 2021-01-12 PROCEDURE — 63600175 PHARM REV CODE 636 W HCPCS: Performed by: INTERNAL MEDICINE

## 2021-01-12 PROCEDURE — 81003 URINALYSIS AUTO W/O SCOPE: CPT

## 2021-01-12 PROCEDURE — 25000242 PHARM REV CODE 250 ALT 637 W/ HCPCS: Performed by: INTERNAL MEDICINE

## 2021-01-12 PROCEDURE — 87449 NOS EACH ORGANISM AG IA: CPT

## 2021-01-12 PROCEDURE — 99223 1ST HOSP IP/OBS HIGH 75: CPT | Mod: ,,, | Performed by: STUDENT IN AN ORGANIZED HEALTH CARE EDUCATION/TRAINING PROGRAM

## 2021-01-12 PROCEDURE — 87324 CLOSTRIDIUM AG IA: CPT

## 2021-01-12 PROCEDURE — S0030 INJECTION, METRONIDAZOLE: HCPCS | Performed by: EMERGENCY MEDICINE

## 2021-01-12 PROCEDURE — 12000002 HC ACUTE/MED SURGE SEMI-PRIVATE ROOM

## 2021-01-12 PROCEDURE — 63600175 PHARM REV CODE 636 W HCPCS: Performed by: EMERGENCY MEDICINE

## 2021-01-12 RX ORDER — POTASSIUM CHLORIDE 7.45 MG/ML
40 INJECTION INTRAVENOUS
Status: DISCONTINUED | OUTPATIENT
Start: 2021-01-12 | End: 2021-01-14 | Stop reason: HOSPADM

## 2021-01-12 RX ORDER — FLUTICASONE PROPIONATE 50 MCG
2 SPRAY, SUSPENSION (ML) NASAL DAILY
Status: DISCONTINUED | OUTPATIENT
Start: 2021-01-12 | End: 2021-01-14 | Stop reason: HOSPADM

## 2021-01-12 RX ORDER — ENOXAPARIN SODIUM 100 MG/ML
40 INJECTION SUBCUTANEOUS EVERY 24 HOURS
Status: DISCONTINUED | OUTPATIENT
Start: 2021-01-12 | End: 2021-01-14 | Stop reason: HOSPADM

## 2021-01-12 RX ORDER — IBUPROFEN 200 MG
16 TABLET ORAL
Status: DISCONTINUED | OUTPATIENT
Start: 2021-01-12 | End: 2021-01-14 | Stop reason: HOSPADM

## 2021-01-12 RX ORDER — FOLIC ACID 1 MG/1
1000 TABLET ORAL DAILY
Status: DISCONTINUED | OUTPATIENT
Start: 2021-01-12 | End: 2021-01-14 | Stop reason: HOSPADM

## 2021-01-12 RX ORDER — CALCIUM CHLORIDE IN 0.9 % NACL 1 G/100 ML
1 INTRAVENOUS SOLUTION, PIGGYBACK (ML) INTRAVENOUS
Status: DISCONTINUED | OUTPATIENT
Start: 2021-01-12 | End: 2021-01-14 | Stop reason: HOSPADM

## 2021-01-12 RX ORDER — TRAZODONE HYDROCHLORIDE 50 MG/1
50 TABLET ORAL NIGHTLY
Status: DISCONTINUED | OUTPATIENT
Start: 2021-01-12 | End: 2021-01-14 | Stop reason: HOSPADM

## 2021-01-12 RX ORDER — GLUCAGON 1 MG
1 KIT INJECTION
Status: DISCONTINUED | OUTPATIENT
Start: 2021-01-12 | End: 2021-01-14 | Stop reason: HOSPADM

## 2021-01-12 RX ORDER — HYDROCODONE BITARTRATE AND ACETAMINOPHEN 5; 325 MG/1; MG/1
1 TABLET ORAL EVERY 6 HOURS PRN
Status: DISCONTINUED | OUTPATIENT
Start: 2021-01-12 | End: 2021-01-12

## 2021-01-12 RX ORDER — DILTIAZEM HYDROCHLORIDE 120 MG/1
120 CAPSULE, COATED, EXTENDED RELEASE ORAL DAILY
Status: DISCONTINUED | OUTPATIENT
Start: 2021-01-12 | End: 2021-01-14 | Stop reason: HOSPADM

## 2021-01-12 RX ORDER — MAGNESIUM SULFATE HEPTAHYDRATE 40 MG/ML
2 INJECTION, SOLUTION INTRAVENOUS ONCE
Status: COMPLETED | OUTPATIENT
Start: 2021-01-12 | End: 2021-01-12

## 2021-01-12 RX ORDER — PANTOPRAZOLE SODIUM 40 MG/10ML
40 INJECTION, POWDER, LYOPHILIZED, FOR SOLUTION INTRAVENOUS DAILY
Status: DISCONTINUED | OUTPATIENT
Start: 2021-01-13 | End: 2021-01-14 | Stop reason: HOSPADM

## 2021-01-12 RX ORDER — POTASSIUM CHLORIDE 20 MEQ/1
20 TABLET, EXTENDED RELEASE ORAL
Status: DISCONTINUED | OUTPATIENT
Start: 2021-01-12 | End: 2021-01-14 | Stop reason: HOSPADM

## 2021-01-12 RX ORDER — HYDROCODONE BITARTRATE AND ACETAMINOPHEN 5; 325 MG/1; MG/1
1 TABLET ORAL EVERY 4 HOURS PRN
Status: DISCONTINUED | OUTPATIENT
Start: 2021-01-12 | End: 2021-01-14 | Stop reason: HOSPADM

## 2021-01-12 RX ORDER — MAGNESIUM SULFATE HEPTAHYDRATE 40 MG/ML
2 INJECTION, SOLUTION INTRAVENOUS
Status: DISCONTINUED | OUTPATIENT
Start: 2021-01-12 | End: 2021-01-14 | Stop reason: HOSPADM

## 2021-01-12 RX ORDER — PANTOPRAZOLE SODIUM 40 MG/1
40 TABLET, DELAYED RELEASE ORAL
Status: DISCONTINUED | OUTPATIENT
Start: 2021-01-12 | End: 2021-01-12

## 2021-01-12 RX ORDER — ROSUVASTATIN CALCIUM 10 MG/1
10 TABLET, COATED ORAL NIGHTLY
Status: DISCONTINUED | OUTPATIENT
Start: 2021-01-12 | End: 2021-01-14 | Stop reason: HOSPADM

## 2021-01-12 RX ORDER — MORPHINE SULFATE 2 MG/ML
2 INJECTION, SOLUTION INTRAMUSCULAR; INTRAVENOUS EVERY 4 HOURS PRN
Status: DISCONTINUED | OUTPATIENT
Start: 2021-01-12 | End: 2021-01-14 | Stop reason: HOSPADM

## 2021-01-12 RX ORDER — MAGNESIUM SULFATE HEPTAHYDRATE 40 MG/ML
4 INJECTION, SOLUTION INTRAVENOUS
Status: DISCONTINUED | OUTPATIENT
Start: 2021-01-12 | End: 2021-01-14 | Stop reason: HOSPADM

## 2021-01-12 RX ORDER — TALC
9 POWDER (GRAM) TOPICAL NIGHTLY PRN
Status: DISCONTINUED | OUTPATIENT
Start: 2021-01-12 | End: 2021-01-14 | Stop reason: HOSPADM

## 2021-01-12 RX ORDER — POTASSIUM CHLORIDE 20 MEQ/1
40 TABLET, EXTENDED RELEASE ORAL
Status: DISCONTINUED | OUTPATIENT
Start: 2021-01-12 | End: 2021-01-14 | Stop reason: HOSPADM

## 2021-01-12 RX ORDER — IBUPROFEN 200 MG
24 TABLET ORAL
Status: DISCONTINUED | OUTPATIENT
Start: 2021-01-12 | End: 2021-01-14 | Stop reason: HOSPADM

## 2021-01-12 RX ORDER — SODIUM CHLORIDE 0.9 % (FLUSH) 0.9 %
10 SYRINGE (ML) INJECTION EVERY 6 HOURS PRN
Status: DISCONTINUED | OUTPATIENT
Start: 2021-01-12 | End: 2021-01-14 | Stop reason: HOSPADM

## 2021-01-12 RX ORDER — MAGNESIUM SULFATE 1 G/100ML
1 INJECTION INTRAVENOUS
Status: DISCONTINUED | OUTPATIENT
Start: 2021-01-12 | End: 2021-01-14 | Stop reason: HOSPADM

## 2021-01-12 RX ORDER — POTASSIUM CHLORIDE 7.45 MG/ML
20 INJECTION INTRAVENOUS
Status: DISCONTINUED | OUTPATIENT
Start: 2021-01-12 | End: 2021-01-14 | Stop reason: HOSPADM

## 2021-01-12 RX ORDER — GABAPENTIN 100 MG/1
100 CAPSULE ORAL 3 TIMES DAILY
Status: DISCONTINUED | OUTPATIENT
Start: 2021-01-12 | End: 2021-01-14 | Stop reason: HOSPADM

## 2021-01-12 RX ORDER — CLOPIDOGREL BISULFATE 75 MG/1
75 TABLET ORAL DAILY
Status: DISCONTINUED | OUTPATIENT
Start: 2021-01-12 | End: 2021-01-14 | Stop reason: HOSPADM

## 2021-01-12 RX ORDER — ONDANSETRON 4 MG/1
8 TABLET, ORALLY DISINTEGRATING ORAL EVERY 8 HOURS PRN
Status: DISCONTINUED | OUTPATIENT
Start: 2021-01-12 | End: 2021-01-14 | Stop reason: HOSPADM

## 2021-01-12 RX ORDER — ACETAMINOPHEN 325 MG/1
650 TABLET ORAL EVERY 4 HOURS PRN
Status: DISCONTINUED | OUTPATIENT
Start: 2021-01-12 | End: 2021-01-14 | Stop reason: HOSPADM

## 2021-01-12 RX ORDER — LANOLIN ALCOHOL/MO/W.PET/CERES
800 CREAM (GRAM) TOPICAL
Status: DISCONTINUED | OUTPATIENT
Start: 2021-01-12 | End: 2021-01-14 | Stop reason: HOSPADM

## 2021-01-12 RX ADMIN — ROSUVASTATIN CALCIUM 10 MG: 10 TABLET, FILM COATED ORAL at 02:01

## 2021-01-12 RX ADMIN — METRONIDAZOLE 500 MG: 500 INJECTION, SOLUTION INTRAVENOUS at 12:01

## 2021-01-12 RX ADMIN — PIPERACILLIN AND TAZOBACTAM 4.5 G: 4; .5 INJECTION, POWDER, LYOPHILIZED, FOR SOLUTION INTRAVENOUS; PARENTERAL at 12:01

## 2021-01-12 RX ADMIN — FLUTICASONE PROPIONATE 100 MCG: 50 SPRAY, METERED NASAL at 12:01

## 2021-01-12 RX ADMIN — PIPERACILLIN SODIUM AND TAZOBACTAM SODIUM 3.38 G: 3; .375 INJECTION, POWDER, LYOPHILIZED, FOR SOLUTION INTRAVENOUS at 04:01

## 2021-01-12 RX ADMIN — TRAZODONE HYDROCHLORIDE 50 MG: 50 TABLET ORAL at 08:01

## 2021-01-12 RX ADMIN — MAGNESIUM SULFATE 2 G: 2 INJECTION INTRAVENOUS at 10:01

## 2021-01-12 RX ADMIN — ROSUVASTATIN CALCIUM 10 MG: 10 TABLET, FILM COATED ORAL at 08:01

## 2021-01-12 RX ADMIN — PANTOPRAZOLE SODIUM 40 MG: 40 TABLET, DELAYED RELEASE ORAL at 05:01

## 2021-01-12 RX ADMIN — PIPERACILLIN SODIUM AND TAZOBACTAM SODIUM 3.38 G: 3; .375 INJECTION, POWDER, LYOPHILIZED, FOR SOLUTION INTRAVENOUS at 08:01

## 2021-01-12 RX ADMIN — GABAPENTIN 100 MG: 100 CAPSULE ORAL at 08:01

## 2021-01-12 RX ADMIN — GABAPENTIN 100 MG: 100 CAPSULE ORAL at 04:01

## 2021-01-12 RX ADMIN — HYDROCODONE BITARTRATE AND ACETAMINOPHEN 1 TABLET: 5; 325 TABLET ORAL at 11:01

## 2021-01-12 RX ADMIN — DILTIAZEM HYDROCHLORIDE 120 MG: 120 CAPSULE, COATED, EXTENDED RELEASE ORAL at 10:01

## 2021-01-12 RX ADMIN — SODIUM CHLORIDE 1000 ML/HR: 0.9 INJECTION, SOLUTION INTRAVENOUS at 12:01

## 2021-01-12 RX ADMIN — ENOXAPARIN SODIUM 40 MG: 40 INJECTION SUBCUTANEOUS at 04:01

## 2021-01-12 RX ADMIN — FOLIC ACID 1000 MCG: 1 TABLET ORAL at 10:01

## 2021-01-12 RX ADMIN — CLOPIDOGREL BISULFATE 75 MG: 75 TABLET, FILM COATED ORAL at 10:01

## 2021-01-12 RX ADMIN — HYDROCODONE BITARTRATE AND ACETAMINOPHEN 1 TABLET: 5; 325 TABLET ORAL at 06:01

## 2021-01-12 RX ADMIN — TRAZODONE HYDROCHLORIDE 50 MG: 50 TABLET ORAL at 02:01

## 2021-01-12 RX ADMIN — GABAPENTIN 100 MG: 100 CAPSULE ORAL at 10:01

## 2021-01-12 RX ADMIN — HYDROCODONE BITARTRATE AND ACETAMINOPHEN 1 TABLET: 5; 325 TABLET ORAL at 12:01

## 2021-01-13 ENCOUNTER — ANESTHESIA EVENT (OUTPATIENT)
Dept: SURGERY | Facility: HOSPITAL | Age: 76
DRG: 392 | End: 2021-01-13
Payer: MEDICARE

## 2021-01-13 ENCOUNTER — ANESTHESIA (OUTPATIENT)
Dept: SURGERY | Facility: HOSPITAL | Age: 76
DRG: 392 | End: 2021-01-13
Payer: MEDICARE

## 2021-01-13 DIAGNOSIS — E11.9 TYPE 2 DIABETES MELLITUS WITHOUT COMPLICATION: ICD-10-CM

## 2021-01-13 PROBLEM — E87.6 HYPOKALEMIA: Status: ACTIVE | Noted: 2021-01-13

## 2021-01-13 PROBLEM — E83.52 HYPERCALCEMIA: Status: ACTIVE | Noted: 2021-01-13

## 2021-01-13 LAB
ANION GAP SERPL CALC-SCNC: 10 MMOL/L (ref 8–16)
BASOPHILS # BLD AUTO: 0.04 K/UL (ref 0–0.2)
BASOPHILS NFR BLD: 0.5 % (ref 0–1.9)
BUN SERPL-MCNC: 13 MG/DL (ref 8–23)
CALCIUM SERPL-MCNC: 10.6 MG/DL (ref 8.7–10.5)
CHLORIDE SERPL-SCNC: 101 MMOL/L (ref 95–110)
CO2 SERPL-SCNC: 29 MMOL/L (ref 23–29)
CREAT SERPL-MCNC: 0.7 MG/DL (ref 0.5–1.4)
DIFFERENTIAL METHOD: NORMAL
EOSINOPHIL # BLD AUTO: 0.1 K/UL (ref 0–0.5)
EOSINOPHIL NFR BLD: 1.3 % (ref 0–8)
ERYTHROCYTE [DISTWIDTH] IN BLOOD BY AUTOMATED COUNT: 12.4 % (ref 11.5–14.5)
EST. GFR  (AFRICAN AMERICAN): >60 ML/MIN/1.73 M^2
EST. GFR  (NON AFRICAN AMERICAN): >60 ML/MIN/1.73 M^2
GLUCOSE SERPL-MCNC: 166 MG/DL (ref 70–110)
GLUCOSE SERPL-MCNC: 189 MG/DL (ref 70–110)
GLUCOSE SERPL-MCNC: 87 MG/DL (ref 70–110)
GLUCOSE SERPL-MCNC: 96 MG/DL (ref 70–110)
HCT VFR BLD AUTO: 37.5 % (ref 37–48.5)
HGB BLD-MCNC: 12.1 G/DL (ref 12–16)
IMM GRANULOCYTES # BLD AUTO: 0.02 K/UL (ref 0–0.04)
IMM GRANULOCYTES NFR BLD AUTO: 0.3 % (ref 0–0.5)
LYMPHOCYTES # BLD AUTO: 3.4 K/UL (ref 1–4.8)
LYMPHOCYTES NFR BLD: 45.3 % (ref 18–48)
MAGNESIUM SERPL-MCNC: 1.3 MG/DL (ref 1.6–2.6)
MCH RBC QN AUTO: 28.1 PG (ref 27–31)
MCHC RBC AUTO-ENTMCNC: 32.3 G/DL (ref 32–36)
MCV RBC AUTO: 87 FL (ref 82–98)
MONOCYTES # BLD AUTO: 0.6 K/UL (ref 0.3–1)
MONOCYTES NFR BLD: 8.2 % (ref 4–15)
NEUTROPHILS # BLD AUTO: 3.4 K/UL (ref 1.8–7.7)
NEUTROPHILS NFR BLD: 44.4 % (ref 38–73)
NRBC BLD-RTO: 0 /100 WBC
PLATELET # BLD AUTO: 160 K/UL (ref 150–350)
PMV BLD AUTO: 12.1 FL (ref 9.2–12.9)
POTASSIUM SERPL-SCNC: 3.4 MMOL/L (ref 3.5–5.1)
RBC # BLD AUTO: 4.3 M/UL (ref 4–5.4)
SODIUM SERPL-SCNC: 140 MMOL/L (ref 136–145)
WBC # BLD AUTO: 7.59 K/UL (ref 3.9–12.7)

## 2021-01-13 PROCEDURE — 43237 ENDOSCOPIC US EXAM ESOPH: CPT | Performed by: INTERNAL MEDICINE

## 2021-01-13 PROCEDURE — 88305 TISSUE EXAM BY PATHOLOGIST: CPT | Mod: TC

## 2021-01-13 PROCEDURE — 80048 BASIC METABOLIC PNL TOTAL CA: CPT

## 2021-01-13 PROCEDURE — 63600175 PHARM REV CODE 636 W HCPCS: Performed by: INTERNAL MEDICINE

## 2021-01-13 PROCEDURE — C9113 INJ PANTOPRAZOLE SODIUM, VIA: HCPCS | Performed by: INTERNAL MEDICINE

## 2021-01-13 PROCEDURE — 82962 GLUCOSE BLOOD TEST: CPT

## 2021-01-13 PROCEDURE — 43239 EGD BIOPSY SINGLE/MULTIPLE: CPT | Performed by: INTERNAL MEDICINE

## 2021-01-13 PROCEDURE — 37000009 HC ANESTHESIA EA ADD 15 MINS: Performed by: INTERNAL MEDICINE

## 2021-01-13 PROCEDURE — 12000002 HC ACUTE/MED SURGE SEMI-PRIVATE ROOM

## 2021-01-13 PROCEDURE — 25000003 PHARM REV CODE 250: Performed by: INTERNAL MEDICINE

## 2021-01-13 PROCEDURE — 36415 COLL VENOUS BLD VENIPUNCTURE: CPT

## 2021-01-13 PROCEDURE — 27200043 HC FORCEPS, BIOPSY: Performed by: INTERNAL MEDICINE

## 2021-01-13 PROCEDURE — 83735 ASSAY OF MAGNESIUM: CPT

## 2021-01-13 PROCEDURE — 25000003 PHARM REV CODE 250: Performed by: NURSE ANESTHETIST, CERTIFIED REGISTERED

## 2021-01-13 PROCEDURE — 63600175 PHARM REV CODE 636 W HCPCS: Performed by: NURSE ANESTHETIST, CERTIFIED REGISTERED

## 2021-01-13 PROCEDURE — 85025 COMPLETE CBC W/AUTO DIFF WBC: CPT

## 2021-01-13 PROCEDURE — 37000008 HC ANESTHESIA 1ST 15 MINUTES: Performed by: INTERNAL MEDICINE

## 2021-01-13 RX ORDER — SODIUM CHLORIDE 9 MG/ML
INJECTION, SOLUTION INTRAVENOUS CONTINUOUS PRN
Status: DISCONTINUED | OUTPATIENT
Start: 2021-01-13 | End: 2021-01-13

## 2021-01-13 RX ORDER — PROPOFOL 10 MG/ML
VIAL (ML) INTRAVENOUS
Status: DISCONTINUED | OUTPATIENT
Start: 2021-01-13 | End: 2021-01-13

## 2021-01-13 RX ORDER — LEVOFLOXACIN 500 MG/1
500 TABLET, FILM COATED ORAL DAILY
Status: DISCONTINUED | OUTPATIENT
Start: 2021-01-13 | End: 2021-01-14 | Stop reason: HOSPADM

## 2021-01-13 RX ORDER — LANOLIN ALCOHOL/MO/W.PET/CERES
400 CREAM (GRAM) TOPICAL DAILY
Status: DISCONTINUED | OUTPATIENT
Start: 2021-01-14 | End: 2021-01-14 | Stop reason: HOSPADM

## 2021-01-13 RX ORDER — LIDOCAINE HYDROCHLORIDE 20 MG/ML
INJECTION, SOLUTION EPIDURAL; INFILTRATION; INTRACAUDAL; PERINEURAL
Status: DISCONTINUED | OUTPATIENT
Start: 2021-01-13 | End: 2021-01-13

## 2021-01-13 RX ORDER — MAGNESIUM SULFATE HEPTAHYDRATE 40 MG/ML
2 INJECTION, SOLUTION INTRAVENOUS ONCE
Status: COMPLETED | OUTPATIENT
Start: 2021-01-13 | End: 2021-01-13

## 2021-01-13 RX ORDER — LEVOFLOXACIN 500 MG/1
500 TABLET, FILM COATED ORAL DAILY
Status: DISCONTINUED | OUTPATIENT
Start: 2021-01-14 | End: 2021-01-13

## 2021-01-13 RX ADMIN — PROPOFOL 30 MG: 10 INJECTION, EMULSION INTRAVENOUS at 09:01

## 2021-01-13 RX ADMIN — GABAPENTIN 100 MG: 100 CAPSULE ORAL at 09:01

## 2021-01-13 RX ADMIN — MORPHINE SULFATE 2 MG: 2 INJECTION, SOLUTION INTRAMUSCULAR; INTRAVENOUS at 04:01

## 2021-01-13 RX ADMIN — ENOXAPARIN SODIUM 40 MG: 40 INJECTION SUBCUTANEOUS at 04:01

## 2021-01-13 RX ADMIN — MORPHINE SULFATE 2 MG: 2 INJECTION, SOLUTION INTRAMUSCULAR; INTRAVENOUS at 06:01

## 2021-01-13 RX ADMIN — GABAPENTIN 100 MG: 100 CAPSULE ORAL at 10:01

## 2021-01-13 RX ADMIN — ROSUVASTATIN CALCIUM 10 MG: 10 TABLET, FILM COATED ORAL at 09:01

## 2021-01-13 RX ADMIN — LIDOCAINE HYDROCHLORIDE 50 MG: 20 INJECTION, SOLUTION EPIDURAL; INFILTRATION; INTRACAUDAL; PERINEURAL at 08:01

## 2021-01-13 RX ADMIN — PROPOFOL 20 MG: 10 INJECTION, EMULSION INTRAVENOUS at 09:01

## 2021-01-13 RX ADMIN — GABAPENTIN 100 MG: 100 CAPSULE ORAL at 03:01

## 2021-01-13 RX ADMIN — LEVOFLOXACIN 500 MG: 500 TABLET, FILM COATED ORAL at 04:01

## 2021-01-13 RX ADMIN — FOLIC ACID 1000 MCG: 1 TABLET ORAL at 10:01

## 2021-01-13 RX ADMIN — PROPOFOL 60 MG: 10 INJECTION, EMULSION INTRAVENOUS at 08:01

## 2021-01-13 RX ADMIN — HUMAN INSULIN 1 UNITS: 100 INJECTION, SOLUTION SUBCUTANEOUS at 10:01

## 2021-01-13 RX ADMIN — HYDROCODONE BITARTRATE AND ACETAMINOPHEN 1 TABLET: 5; 325 TABLET ORAL at 09:01

## 2021-01-13 RX ADMIN — PROPOFOL 20 MG: 10 INJECTION, EMULSION INTRAVENOUS at 08:01

## 2021-01-13 RX ADMIN — LACTOBACILLUS TAB 2 TABLET: TAB at 04:01

## 2021-01-13 RX ADMIN — SODIUM CHLORIDE: 9 INJECTION, SOLUTION INTRAVENOUS at 08:01

## 2021-01-13 RX ADMIN — CLOPIDOGREL BISULFATE 75 MG: 75 TABLET, FILM COATED ORAL at 10:01

## 2021-01-13 RX ADMIN — PROPOFOL 10 MG: 10 INJECTION, EMULSION INTRAVENOUS at 09:01

## 2021-01-13 RX ADMIN — DILTIAZEM HYDROCHLORIDE 120 MG: 120 CAPSULE, COATED, EXTENDED RELEASE ORAL at 10:01

## 2021-01-13 RX ADMIN — MAGNESIUM SULFATE IN WATER 2 G: 40 INJECTION, SOLUTION INTRAVENOUS at 04:01

## 2021-01-13 RX ADMIN — PANTOPRAZOLE SODIUM 40 MG: 40 INJECTION, POWDER, FOR SOLUTION INTRAVENOUS at 10:01

## 2021-01-13 RX ADMIN — HYDROCODONE BITARTRATE AND ACETAMINOPHEN 1 TABLET: 5; 325 TABLET ORAL at 01:01

## 2021-01-13 RX ADMIN — TRAZODONE HYDROCHLORIDE 50 MG: 50 TABLET ORAL at 09:01

## 2021-01-13 RX ADMIN — FLUTICASONE PROPIONATE 100 MCG: 50 SPRAY, METERED NASAL at 10:01

## 2021-01-14 VITALS
TEMPERATURE: 98 F | OXYGEN SATURATION: 96 % | BODY MASS INDEX: 23.91 KG/M2 | HEIGHT: 63 IN | DIASTOLIC BLOOD PRESSURE: 73 MMHG | HEART RATE: 64 BPM | WEIGHT: 134.94 LBS | SYSTOLIC BLOOD PRESSURE: 122 MMHG | RESPIRATION RATE: 18 BRPM

## 2021-01-14 PROBLEM — E83.42 HYPOMAGNESEMIA: Status: RESOLVED | Noted: 2021-01-12 | Resolved: 2021-01-14

## 2021-01-14 PROBLEM — E83.52 HYPERCALCEMIA: Status: RESOLVED | Noted: 2021-01-13 | Resolved: 2021-01-14

## 2021-01-14 LAB
ANION GAP SERPL CALC-SCNC: 10 MMOL/L (ref 8–16)
BASOPHILS # BLD AUTO: 0.05 K/UL (ref 0–0.2)
BASOPHILS NFR BLD: 0.6 % (ref 0–1.9)
BUN SERPL-MCNC: 16 MG/DL (ref 8–23)
CALCIUM SERPL-MCNC: 9.8 MG/DL (ref 8.7–10.5)
CHLORIDE SERPL-SCNC: 101 MMOL/L (ref 95–110)
CO2 SERPL-SCNC: 28 MMOL/L (ref 23–29)
CREAT SERPL-MCNC: 0.8 MG/DL (ref 0.5–1.4)
DIFFERENTIAL METHOD: ABNORMAL
EOSINOPHIL # BLD AUTO: 0.1 K/UL (ref 0–0.5)
EOSINOPHIL NFR BLD: 1.4 % (ref 0–8)
ERYTHROCYTE [DISTWIDTH] IN BLOOD BY AUTOMATED COUNT: 12.2 % (ref 11.5–14.5)
EST. GFR  (AFRICAN AMERICAN): >60 ML/MIN/1.73 M^2
EST. GFR  (NON AFRICAN AMERICAN): >60 ML/MIN/1.73 M^2
GLUCOSE SERPL-MCNC: 104 MG/DL (ref 70–110)
GLUCOSE SERPL-MCNC: 98 MG/DL (ref 70–110)
HCT VFR BLD AUTO: 36 % (ref 37–48.5)
HGB BLD-MCNC: 11.3 G/DL (ref 12–16)
IMM GRANULOCYTES # BLD AUTO: 0.01 K/UL (ref 0–0.04)
IMM GRANULOCYTES NFR BLD AUTO: 0.1 % (ref 0–0.5)
LYMPHOCYTES # BLD AUTO: 3.3 K/UL (ref 1–4.8)
LYMPHOCYTES NFR BLD: 42.5 % (ref 18–48)
MAGNESIUM SERPL-MCNC: 1.6 MG/DL (ref 1.6–2.6)
MCH RBC QN AUTO: 27.4 PG (ref 27–31)
MCHC RBC AUTO-ENTMCNC: 31.4 G/DL (ref 32–36)
MCV RBC AUTO: 87 FL (ref 82–98)
MONOCYTES # BLD AUTO: 0.7 K/UL (ref 0.3–1)
MONOCYTES NFR BLD: 9.3 % (ref 4–15)
NEUTROPHILS # BLD AUTO: 3.6 K/UL (ref 1.8–7.7)
NEUTROPHILS NFR BLD: 46.1 % (ref 38–73)
NRBC BLD-RTO: 0 /100 WBC
O+P STL TRI STN: NORMAL
PLATELET # BLD AUTO: 147 K/UL (ref 150–350)
PMV BLD AUTO: 12.6 FL (ref 9.2–12.9)
POTASSIUM SERPL-SCNC: 3.2 MMOL/L (ref 3.5–5.1)
RBC # BLD AUTO: 4.12 M/UL (ref 4–5.4)
SODIUM SERPL-SCNC: 139 MMOL/L (ref 136–145)
STOOL CULTURE: ABNORMAL
WBC # BLD AUTO: 7.84 K/UL (ref 3.9–12.7)

## 2021-01-14 PROCEDURE — 25000003 PHARM REV CODE 250: Performed by: INTERNAL MEDICINE

## 2021-01-14 PROCEDURE — 63600175 PHARM REV CODE 636 W HCPCS: Performed by: INTERNAL MEDICINE

## 2021-01-14 PROCEDURE — 80048 BASIC METABOLIC PNL TOTAL CA: CPT

## 2021-01-14 PROCEDURE — 85025 COMPLETE CBC W/AUTO DIFF WBC: CPT

## 2021-01-14 PROCEDURE — C9113 INJ PANTOPRAZOLE SODIUM, VIA: HCPCS | Performed by: INTERNAL MEDICINE

## 2021-01-14 PROCEDURE — 83735 ASSAY OF MAGNESIUM: CPT

## 2021-01-14 PROCEDURE — 36415 COLL VENOUS BLD VENIPUNCTURE: CPT

## 2021-01-14 RX ORDER — ONDANSETRON 8 MG/1
8 TABLET, ORALLY DISINTEGRATING ORAL EVERY 12 HOURS PRN
Qty: 28 TABLET | Refills: 0 | Status: SHIPPED | OUTPATIENT
Start: 2021-01-14 | End: 2021-01-28

## 2021-01-14 RX ORDER — CIPROFLOXACIN 500 MG/1
500 TABLET ORAL 2 TIMES DAILY
Qty: 8 TABLET | Refills: 0 | Status: SHIPPED | OUTPATIENT
Start: 2021-01-14 | End: 2021-01-18

## 2021-01-14 RX ORDER — SAW/VIT E/SOD SEL/LYC/BETA/PYG 160-100
1 TABLET ORAL DAILY
Qty: 30 CAPSULE | Refills: 0 | Status: SHIPPED | OUTPATIENT
Start: 2021-01-14 | End: 2021-02-13

## 2021-01-14 RX ADMIN — LEVOFLOXACIN 500 MG: 500 TABLET, FILM COATED ORAL at 05:01

## 2021-01-14 RX ADMIN — CLOPIDOGREL BISULFATE 75 MG: 75 TABLET, FILM COATED ORAL at 09:01

## 2021-01-14 RX ADMIN — PANTOPRAZOLE SODIUM 40 MG: 40 INJECTION, POWDER, FOR SOLUTION INTRAVENOUS at 09:01

## 2021-01-14 RX ADMIN — MORPHINE SULFATE 2 MG: 2 INJECTION, SOLUTION INTRAMUSCULAR; INTRAVENOUS at 04:01

## 2021-01-14 RX ADMIN — ONDANSETRON 8 MG: 4 TABLET, ORALLY DISINTEGRATING ORAL at 06:01

## 2021-01-14 RX ADMIN — MAGNESIUM OXIDE 400 MG: 400 TABLET ORAL at 09:01

## 2021-01-14 RX ADMIN — GABAPENTIN 100 MG: 100 CAPSULE ORAL at 09:01

## 2021-01-14 RX ADMIN — FOLIC ACID 1000 MCG: 1 TABLET ORAL at 09:01

## 2021-01-14 RX ADMIN — FLUTICASONE PROPIONATE 100 MCG: 50 SPRAY, METERED NASAL at 09:01

## 2021-01-14 RX ADMIN — LACTOBACILLUS TAB 2 TABLET: TAB at 09:01

## 2021-01-14 RX ADMIN — DILTIAZEM HYDROCHLORIDE 120 MG: 120 CAPSULE, COATED, EXTENDED RELEASE ORAL at 09:01

## 2021-01-28 ENCOUNTER — OFFICE VISIT (OUTPATIENT)
Dept: NEUROLOGY | Facility: CLINIC | Age: 76
End: 2021-01-28
Payer: MEDICARE

## 2021-01-28 DIAGNOSIS — F43.23 ADJUSTMENT DISORDER WITH MIXED ANXIETY AND DEPRESSED MOOD: ICD-10-CM

## 2021-01-28 DIAGNOSIS — R41.3 MEMORY DEFICIT: ICD-10-CM

## 2021-01-28 PROCEDURE — 90791 PR PSYCHIATRIC DIAGNOSTIC EVALUATION: ICD-10-PCS | Mod: 95,,, | Performed by: PSYCHIATRY & NEUROLOGY

## 2021-01-28 PROCEDURE — 99499 NO LOS: ICD-10-PCS | Mod: 95,,, | Performed by: PSYCHIATRY & NEUROLOGY

## 2021-01-28 PROCEDURE — 90791 PSYCH DIAGNOSTIC EVALUATION: CPT | Mod: 95,,, | Performed by: PSYCHIATRY & NEUROLOGY

## 2021-01-28 PROCEDURE — 99499 UNLISTED E&M SERVICE: CPT | Mod: 95,,, | Performed by: PSYCHIATRY & NEUROLOGY

## 2021-01-31 PROBLEM — F43.23 ADJUSTMENT DISORDER WITH MIXED ANXIETY AND DEPRESSED MOOD: Status: ACTIVE | Noted: 2021-01-31

## 2021-01-31 PROBLEM — R41.3 MEMORY DEFICIT: Status: ACTIVE | Noted: 2021-01-31

## 2021-02-02 ENCOUNTER — OFFICE VISIT (OUTPATIENT)
Dept: RHEUMATOLOGY | Facility: CLINIC | Age: 76
End: 2021-02-02
Payer: MEDICARE

## 2021-02-02 VITALS
WEIGHT: 136.31 LBS | DIASTOLIC BLOOD PRESSURE: 74 MMHG | SYSTOLIC BLOOD PRESSURE: 118 MMHG | TEMPERATURE: 97 F | BODY MASS INDEX: 24.14 KG/M2

## 2021-02-02 DIAGNOSIS — R76.8 RHEUMATOID FACTOR POSITIVE: Primary | ICD-10-CM

## 2021-02-02 DIAGNOSIS — E11.69 TYPE 2 DIABETES MELLITUS WITH HYPERLIPIDEMIA: Primary | ICD-10-CM

## 2021-02-02 DIAGNOSIS — E78.5 TYPE 2 DIABETES MELLITUS WITH HYPERLIPIDEMIA: Primary | ICD-10-CM

## 2021-02-02 DIAGNOSIS — M19.90 OSTEOARTHRITIS, UNSPECIFIED OSTEOARTHRITIS TYPE, UNSPECIFIED SITE: ICD-10-CM

## 2021-02-02 DIAGNOSIS — M79.7 FIBROMYALGIA: ICD-10-CM

## 2021-02-02 PROCEDURE — 99213 PR OFFICE/OUTPT VISIT, EST, LEVL III, 20-29 MIN: ICD-10-PCS | Mod: S$GLB,,, | Performed by: INTERNAL MEDICINE

## 2021-02-02 PROCEDURE — 99213 OFFICE O/P EST LOW 20 MIN: CPT | Mod: S$GLB,,, | Performed by: INTERNAL MEDICINE

## 2021-02-02 RX ORDER — CLOPIDOGREL BISULFATE 75 MG/1
TABLET ORAL
Qty: 90 TABLET | Refills: 4 | Status: SHIPPED | OUTPATIENT
Start: 2021-02-02 | End: 2022-02-14 | Stop reason: SDUPTHER

## 2021-02-04 ENCOUNTER — INITIAL CONSULT (OUTPATIENT)
Dept: NEUROLOGY | Facility: CLINIC | Age: 76
End: 2021-02-04
Payer: MEDICARE

## 2021-02-04 DIAGNOSIS — Z86.73 HISTORY OF TIA (TRANSIENT ISCHEMIC ATTACK): Primary | ICD-10-CM

## 2021-02-04 DIAGNOSIS — R41.3 MEMORY DEFICIT: ICD-10-CM

## 2021-02-04 DIAGNOSIS — F06.70 MILD NEUROCOGNITIVE DISORDER DUE TO ANOTHER MEDICAL CONDITION: ICD-10-CM

## 2021-02-04 DIAGNOSIS — F43.23 ADJUSTMENT DISORDER WITH MIXED ANXIETY AND DEPRESSED MOOD: ICD-10-CM

## 2021-02-04 PROCEDURE — 99211 OFF/OP EST MAY X REQ PHY/QHP: CPT | Mod: PBBFAC,PO | Performed by: PSYCHIATRY & NEUROLOGY

## 2021-02-04 PROCEDURE — 96133 PR NEUROPSYCHOLOGIC TEST EVAL SVCS, EA ADDTL HR: ICD-10-PCS | Mod: S$PBB,,, | Performed by: PSYCHIATRY & NEUROLOGY

## 2021-02-04 PROCEDURE — 96132 NRPSYC TST EVAL PHYS/QHP 1ST: CPT | Mod: S$PBB,,, | Performed by: PSYCHIATRY & NEUROLOGY

## 2021-02-04 PROCEDURE — 99999 PR PBB SHADOW E&M-EST. PATIENT-LVL I: ICD-10-PCS | Mod: PBBFAC,,, | Performed by: PSYCHIATRY & NEUROLOGY

## 2021-02-04 PROCEDURE — 96138 PR PSYCH/NEUROPSYCH TEST ADMIN/SCORING, BY TECH, 2+ TESTS, 1ST 30 MIN: ICD-10-PCS | Mod: S$PBB,,, | Performed by: PSYCHIATRY & NEUROLOGY

## 2021-02-04 PROCEDURE — 99499 NO LOS: ICD-10-PCS | Mod: S$PBB,,, | Performed by: PSYCHIATRY & NEUROLOGY

## 2021-02-04 PROCEDURE — 96139 PSYCL/NRPSYC TST TECH EA: CPT | Mod: S$PBB,,, | Performed by: PSYCHIATRY & NEUROLOGY

## 2021-02-04 PROCEDURE — 96139 PR PSYCH/NEUROPSYCH TEST ADMIN/SCORING, BY TECH, 2+ TESTS, EA ADDTL 30 MIN: ICD-10-PCS | Mod: S$PBB,,, | Performed by: PSYCHIATRY & NEUROLOGY

## 2021-02-04 PROCEDURE — 99999 PR PBB SHADOW E&M-EST. PATIENT-LVL I: CPT | Mod: PBBFAC,,, | Performed by: PSYCHIATRY & NEUROLOGY

## 2021-02-04 PROCEDURE — 99499 UNLISTED E&M SERVICE: CPT | Mod: S$PBB,,, | Performed by: PSYCHIATRY & NEUROLOGY

## 2021-02-04 PROCEDURE — 96133 NRPSYC TST EVAL PHYS/QHP EA: CPT | Mod: S$PBB,,, | Performed by: PSYCHIATRY & NEUROLOGY

## 2021-02-04 PROCEDURE — 96132 PR NEUROPSYCHOLOGIC TEST EVAL SVCS, 1ST HR: ICD-10-PCS | Mod: S$PBB,,, | Performed by: PSYCHIATRY & NEUROLOGY

## 2021-02-04 PROCEDURE — 96138 PSYCL/NRPSYC TECH 1ST: CPT | Mod: S$PBB,,, | Performed by: PSYCHIATRY & NEUROLOGY

## 2021-02-04 RX ORDER — GABAPENTIN 100 MG/1
CAPSULE ORAL
Qty: 270 CAPSULE | Refills: 0 | Status: SHIPPED | OUTPATIENT
Start: 2021-02-04 | End: 2021-02-20 | Stop reason: CLARIF

## 2021-02-04 RX ORDER — SAXAGLIPTIN 5 MG/1
TABLET, FILM COATED ORAL
Qty: 90 TABLET | Refills: 0 | Status: SHIPPED | OUTPATIENT
Start: 2021-02-04 | End: 2022-02-03 | Stop reason: SDUPTHER

## 2021-02-10 PROBLEM — F06.70 MILD NEUROCOGNITIVE DISORDER DUE TO ANOTHER MEDICAL CONDITION: Status: ACTIVE | Noted: 2021-01-31

## 2021-02-11 ENCOUNTER — TELEPHONE (OUTPATIENT)
Dept: NEUROLOGY | Facility: CLINIC | Age: 76
End: 2021-02-11

## 2021-02-13 ENCOUNTER — NURSE TRIAGE (OUTPATIENT)
Dept: ADMINISTRATIVE | Facility: CLINIC | Age: 76
End: 2021-02-13

## 2021-02-13 DIAGNOSIS — E11.9 TYPE 2 DIABETES MELLITUS WITHOUT COMPLICATION, WITH LONG-TERM CURRENT USE OF INSULIN: ICD-10-CM

## 2021-02-13 DIAGNOSIS — Z79.4 TYPE 2 DIABETES MELLITUS WITHOUT COMPLICATION, WITH LONG-TERM CURRENT USE OF INSULIN: ICD-10-CM

## 2021-02-13 DIAGNOSIS — R76.8 RHEUMATOID FACTOR POSITIVE: ICD-10-CM

## 2021-02-13 DIAGNOSIS — G47.00 INSOMNIA, UNSPECIFIED TYPE: ICD-10-CM

## 2021-02-13 DIAGNOSIS — M19.90 INFLAMMATORY ARTHRITIS: ICD-10-CM

## 2021-02-15 RX ORDER — TRAZODONE HYDROCHLORIDE 50 MG/1
TABLET ORAL
Qty: 90 TABLET | Refills: 3 | Status: SHIPPED | OUTPATIENT
Start: 2021-02-15 | End: 2022-03-14 | Stop reason: SDUPTHER

## 2021-02-15 RX ORDER — FOLIC ACID 1 MG/1
1000 TABLET ORAL DAILY
Qty: 30 TABLET | Refills: 5 | Status: SHIPPED | OUTPATIENT
Start: 2021-02-15 | End: 2023-05-18 | Stop reason: SDUPTHER

## 2021-02-15 RX ORDER — METFORMIN HYDROCHLORIDE 500 MG/1
500 TABLET ORAL 2 TIMES DAILY WITH MEALS
Qty: 180 TABLET | Refills: 1 | Status: SHIPPED | OUTPATIENT
Start: 2021-02-15 | End: 2021-05-25

## 2021-02-20 ENCOUNTER — HOSPITAL ENCOUNTER (INPATIENT)
Facility: HOSPITAL | Age: 76
LOS: 3 days | Discharge: HOME | DRG: 177 | End: 2021-02-23
Attending: EMERGENCY MEDICINE | Admitting: STUDENT IN AN ORGANIZED HEALTH CARE EDUCATION/TRAINING PROGRAM
Payer: MEDICARE

## 2021-02-20 DIAGNOSIS — J12.82 PNEUMONIA DUE TO COVID-19 VIRUS: Primary | ICD-10-CM

## 2021-02-20 DIAGNOSIS — K59.00 CONSTIPATION: ICD-10-CM

## 2021-02-20 DIAGNOSIS — U07.1 PNEUMONIA DUE TO COVID-19 VIRUS: Primary | ICD-10-CM

## 2021-02-20 DIAGNOSIS — Z20.822 SUSPECTED COVID-19 VIRUS INFECTION: ICD-10-CM

## 2021-02-20 LAB
ALBUMIN SERPL BCP-MCNC: 3.7 G/DL (ref 3.5–5.2)
ALP SERPL-CCNC: 51 U/L (ref 55–135)
ALT SERPL W/O P-5'-P-CCNC: 24 U/L (ref 10–44)
ANION GAP SERPL CALC-SCNC: 15 MMOL/L (ref 8–16)
AST SERPL-CCNC: 43 U/L (ref 10–40)
BASOPHILS # BLD AUTO: 0.01 K/UL (ref 0–0.2)
BASOPHILS NFR BLD: 0.3 % (ref 0–1.9)
BILIRUB SERPL-MCNC: 0.7 MG/DL (ref 0.1–1)
BNP SERPL-MCNC: 36 PG/ML (ref 0–99)
BUN SERPL-MCNC: 16 MG/DL (ref 8–23)
CALCIUM SERPL-MCNC: 9.5 MG/DL (ref 8.7–10.5)
CHLORIDE SERPL-SCNC: 96 MMOL/L (ref 95–110)
CK SERPL-CCNC: 98 U/L (ref 20–180)
CO2 SERPL-SCNC: 24 MMOL/L (ref 23–29)
CREAT SERPL-MCNC: 0.9 MG/DL (ref 0.5–1.4)
CRP SERPL-MCNC: 5.73 MG/DL
CRP SERPL-MCNC: 5.93 MG/DL
D DIMER PPP IA.FEU-MCNC: 1.02 UG/ML FEU
DIFFERENTIAL METHOD: ABNORMAL
EOSINOPHIL # BLD AUTO: 0 K/UL (ref 0–0.5)
EOSINOPHIL NFR BLD: 0 % (ref 0–8)
ERYTHROCYTE [DISTWIDTH] IN BLOOD BY AUTOMATED COUNT: 12.6 % (ref 11.5–14.5)
ERYTHROCYTE [SEDIMENTATION RATE] IN BLOOD BY WESTERGREN METHOD: 45 MM/HR (ref 0–20)
EST. GFR  (AFRICAN AMERICAN): >60 ML/MIN/1.73 M^2
EST. GFR  (NON AFRICAN AMERICAN): >60 ML/MIN/1.73 M^2
FERRITIN SERPL-MCNC: 479 NG/ML (ref 20–300)
GLUCOSE SERPL-MCNC: 163 MG/DL (ref 70–110)
GLUCOSE SERPL-MCNC: 84 MG/DL (ref 70–110)
HCT VFR BLD AUTO: 36.8 % (ref 37–48.5)
HGB BLD-MCNC: 12.1 G/DL (ref 12–16)
IMM GRANULOCYTES # BLD AUTO: 0.01 K/UL (ref 0–0.04)
IMM GRANULOCYTES NFR BLD AUTO: 0.3 % (ref 0–0.5)
LACTATE SERPL-SCNC: 0.8 MMOL/L (ref 0.5–1.9)
LDH SERPL L TO P-CCNC: 179 U/L (ref 110–260)
LYMPHOCYTES # BLD AUTO: 1.1 K/UL (ref 1–4.8)
LYMPHOCYTES NFR BLD: 27.5 % (ref 18–48)
MCH RBC QN AUTO: 28 PG (ref 27–31)
MCHC RBC AUTO-ENTMCNC: 32.9 G/DL (ref 32–36)
MCV RBC AUTO: 85 FL (ref 82–98)
MONOCYTES # BLD AUTO: 0.4 K/UL (ref 0.3–1)
MONOCYTES NFR BLD: 10.3 % (ref 4–15)
NEUTROPHILS # BLD AUTO: 2.4 K/UL (ref 1.8–7.7)
NEUTROPHILS NFR BLD: 61.6 % (ref 38–73)
NRBC BLD-RTO: 0 /100 WBC
PLATELET # BLD AUTO: 194 K/UL (ref 150–350)
PMV BLD AUTO: ABNORMAL FL (ref 9.2–12.9)
POTASSIUM SERPL-SCNC: 4.2 MMOL/L (ref 3.5–5.1)
PROCALCITONIN SERPL IA-MCNC: 0.06 NG/ML (ref 0–0.5)
PROT SERPL-MCNC: 7.3 G/DL (ref 6–8.4)
RBC # BLD AUTO: 4.32 M/UL (ref 4–5.4)
SODIUM SERPL-SCNC: 135 MMOL/L (ref 136–145)
TROPONIN I SERPL DL<=0.01 NG/ML-MCNC: <0.03 NG/ML
WBC # BLD AUTO: 3.89 K/UL (ref 3.9–12.7)

## 2021-02-20 PROCEDURE — 93005 ELECTROCARDIOGRAM TRACING: CPT | Performed by: SPECIALIST

## 2021-02-20 PROCEDURE — 93010 ELECTROCARDIOGRAM REPORT: CPT | Mod: ,,, | Performed by: SPECIALIST

## 2021-02-20 PROCEDURE — 83615 LACTATE (LD) (LDH) ENZYME: CPT

## 2021-02-20 PROCEDURE — 99285 EMERGENCY DEPT VISIT HI MDM: CPT | Mod: 25

## 2021-02-20 PROCEDURE — 63600175 PHARM REV CODE 636 W HCPCS: Performed by: NURSE PRACTITIONER

## 2021-02-20 PROCEDURE — 94640 AIRWAY INHALATION TREATMENT: CPT

## 2021-02-20 PROCEDURE — 87040 BLOOD CULTURE FOR BACTERIA: CPT | Mod: 59

## 2021-02-20 PROCEDURE — 82728 ASSAY OF FERRITIN: CPT

## 2021-02-20 PROCEDURE — 96374 THER/PROPH/DIAG INJ IV PUSH: CPT

## 2021-02-20 PROCEDURE — 85025 COMPLETE CBC W/AUTO DIFF WBC: CPT

## 2021-02-20 PROCEDURE — 86140 C-REACTIVE PROTEIN: CPT

## 2021-02-20 PROCEDURE — 82550 ASSAY OF CK (CPK): CPT

## 2021-02-20 PROCEDURE — 25000242 PHARM REV CODE 250 ALT 637 W/ HCPCS: Performed by: NURSE PRACTITIONER

## 2021-02-20 PROCEDURE — 94761 N-INVAS EAR/PLS OXIMETRY MLT: CPT

## 2021-02-20 PROCEDURE — 25000003 PHARM REV CODE 250: Performed by: NURSE PRACTITIONER

## 2021-02-20 PROCEDURE — 86140 C-REACTIVE PROTEIN: CPT | Mod: 91

## 2021-02-20 PROCEDURE — 83605 ASSAY OF LACTIC ACID: CPT

## 2021-02-20 PROCEDURE — 84145 PROCALCITONIN (PCT): CPT

## 2021-02-20 PROCEDURE — 99900035 HC TECH TIME PER 15 MIN (STAT)

## 2021-02-20 PROCEDURE — 83880 ASSAY OF NATRIURETIC PEPTIDE: CPT

## 2021-02-20 PROCEDURE — 21400001 HC TELEMETRY ROOM

## 2021-02-20 PROCEDURE — 80053 COMPREHEN METABOLIC PANEL: CPT

## 2021-02-20 PROCEDURE — 93010 EKG 12-LEAD: ICD-10-PCS | Mod: ,,, | Performed by: SPECIALIST

## 2021-02-20 PROCEDURE — 96375 TX/PRO/DX INJ NEW DRUG ADDON: CPT

## 2021-02-20 PROCEDURE — 85651 RBC SED RATE NONAUTOMATED: CPT

## 2021-02-20 PROCEDURE — 85379 FIBRIN DEGRADATION QUANT: CPT

## 2021-02-20 PROCEDURE — 84484 ASSAY OF TROPONIN QUANT: CPT

## 2021-02-20 PROCEDURE — 36415 COLL VENOUS BLD VENIPUNCTURE: CPT

## 2021-02-20 RX ORDER — IBUPROFEN 200 MG
24 TABLET ORAL
Status: DISCONTINUED | OUTPATIENT
Start: 2021-02-20 | End: 2021-02-23 | Stop reason: HOSPADM

## 2021-02-20 RX ORDER — ONDANSETRON 4 MG/1
8 TABLET, ORALLY DISINTEGRATING ORAL EVERY 8 HOURS PRN
Status: DISCONTINUED | OUTPATIENT
Start: 2021-02-20 | End: 2021-02-23 | Stop reason: HOSPADM

## 2021-02-20 RX ORDER — IBUPROFEN 200 MG
16 TABLET ORAL
Status: DISCONTINUED | OUTPATIENT
Start: 2021-02-20 | End: 2021-02-23 | Stop reason: HOSPADM

## 2021-02-20 RX ORDER — CHOLECALCIFEROL (VITAMIN D3) 25 MCG
1000 TABLET ORAL DAILY
Status: DISCONTINUED | OUTPATIENT
Start: 2021-02-21 | End: 2021-02-23 | Stop reason: HOSPADM

## 2021-02-20 RX ORDER — IPRATROPIUM BROMIDE AND ALBUTEROL SULFATE 2.5; .5 MG/3ML; MG/3ML
3 SOLUTION RESPIRATORY (INHALATION)
COMMUNITY
End: 2024-01-03

## 2021-02-20 RX ORDER — BENZONATATE 100 MG/1
100 CAPSULE ORAL 3 TIMES DAILY PRN
Status: DISCONTINUED | OUTPATIENT
Start: 2021-02-20 | End: 2021-02-23 | Stop reason: HOSPADM

## 2021-02-20 RX ORDER — ACETAMINOPHEN 325 MG/1
650 TABLET ORAL EVERY 4 HOURS PRN
Status: DISCONTINUED | OUTPATIENT
Start: 2021-02-20 | End: 2021-02-23 | Stop reason: HOSPADM

## 2021-02-20 RX ORDER — OXYCODONE AND ACETAMINOPHEN 5; 325 MG/1; MG/1
1 TABLET ORAL
Status: COMPLETED | OUTPATIENT
Start: 2021-02-20 | End: 2021-02-20

## 2021-02-20 RX ORDER — ASCORBIC ACID 500 MG
500 TABLET ORAL 2 TIMES DAILY
Status: DISCONTINUED | OUTPATIENT
Start: 2021-02-20 | End: 2021-02-23 | Stop reason: HOSPADM

## 2021-02-20 RX ORDER — ALBUTEROL SULFATE 90 UG/1
2 AEROSOL, METERED RESPIRATORY (INHALATION) EVERY 6 HOURS
Status: DISCONTINUED | OUTPATIENT
Start: 2021-02-20 | End: 2021-02-21

## 2021-02-20 RX ORDER — POLYETHYLENE GLYCOL 3350 17 G/17G
17 POWDER, FOR SOLUTION ORAL DAILY
Status: DISCONTINUED | OUTPATIENT
Start: 2021-02-21 | End: 2021-02-23 | Stop reason: HOSPADM

## 2021-02-20 RX ORDER — TRAZODONE HYDROCHLORIDE 50 MG/1
50 TABLET ORAL NIGHTLY
Status: DISCONTINUED | OUTPATIENT
Start: 2021-02-20 | End: 2021-02-23 | Stop reason: HOSPADM

## 2021-02-20 RX ORDER — VALSARTAN 80 MG/1
160 TABLET ORAL DAILY
Status: DISCONTINUED | OUTPATIENT
Start: 2021-02-21 | End: 2021-02-23 | Stop reason: HOSPADM

## 2021-02-20 RX ORDER — OXYCODONE AND ACETAMINOPHEN 5; 325 MG/1; MG/1
1 TABLET ORAL EVERY 6 HOURS PRN
Status: DISCONTINUED | OUTPATIENT
Start: 2021-02-20 | End: 2021-02-23 | Stop reason: HOSPADM

## 2021-02-20 RX ORDER — DILTIAZEM HYDROCHLORIDE 120 MG/1
120 CAPSULE, COATED, EXTENDED RELEASE ORAL DAILY
Status: DISCONTINUED | OUTPATIENT
Start: 2021-02-21 | End: 2021-02-23 | Stop reason: HOSPADM

## 2021-02-20 RX ORDER — SODIUM CHLORIDE 0.9 % (FLUSH) 0.9 %
10 SYRINGE (ML) INJECTION
Status: DISCONTINUED | OUTPATIENT
Start: 2021-02-20 | End: 2021-02-23 | Stop reason: HOSPADM

## 2021-02-20 RX ORDER — GABAPENTIN 100 MG/1
100 CAPSULE ORAL 3 TIMES DAILY
COMMUNITY
End: 2021-05-19 | Stop reason: SDUPTHER

## 2021-02-20 RX ORDER — LANOLIN ALCOHOL/MO/W.PET/CERES
800 CREAM (GRAM) TOPICAL
Status: DISCONTINUED | OUTPATIENT
Start: 2021-02-20 | End: 2021-02-23 | Stop reason: HOSPADM

## 2021-02-20 RX ORDER — GABAPENTIN 100 MG/1
100 CAPSULE ORAL 3 TIMES DAILY
Status: DISCONTINUED | OUTPATIENT
Start: 2021-02-20 | End: 2021-02-23 | Stop reason: HOSPADM

## 2021-02-20 RX ORDER — GLUCAGON 1 MG
1 KIT INJECTION
Status: DISCONTINUED | OUTPATIENT
Start: 2021-02-20 | End: 2021-02-23 | Stop reason: HOSPADM

## 2021-02-20 RX ORDER — OXYCODONE AND ACETAMINOPHEN 5; 325 MG/1; MG/1
1 TABLET ORAL EVERY 6 HOURS PRN
Status: CANCELLED | OUTPATIENT
Start: 2021-02-20

## 2021-02-20 RX ORDER — DEXAMETHASONE SODIUM PHOSPHATE 4 MG/ML
6 INJECTION, SOLUTION INTRA-ARTICULAR; INTRALESIONAL; INTRAMUSCULAR; INTRAVENOUS; SOFT TISSUE
Status: COMPLETED | OUTPATIENT
Start: 2021-02-20 | End: 2021-02-20

## 2021-02-20 RX ORDER — HYDROCHLOROTHIAZIDE 25 MG/1
25 TABLET ORAL DAILY
Status: DISCONTINUED | OUTPATIENT
Start: 2021-02-21 | End: 2021-02-21

## 2021-02-20 RX ORDER — CLOPIDOGREL BISULFATE 75 MG/1
75 TABLET ORAL DAILY
Status: DISCONTINUED | OUTPATIENT
Start: 2021-02-21 | End: 2021-02-23 | Stop reason: HOSPADM

## 2021-02-20 RX ORDER — ENOXAPARIN SODIUM 100 MG/ML
40 INJECTION SUBCUTANEOUS EVERY 24 HOURS
Status: DISCONTINUED | OUTPATIENT
Start: 2021-02-20 | End: 2021-02-23 | Stop reason: HOSPADM

## 2021-02-20 RX ORDER — PANTOPRAZOLE SODIUM 40 MG/1
40 TABLET, DELAYED RELEASE ORAL DAILY
Status: DISCONTINUED | OUTPATIENT
Start: 2021-02-21 | End: 2021-02-23 | Stop reason: HOSPADM

## 2021-02-20 RX ORDER — ONDANSETRON 2 MG/ML
4 INJECTION INTRAMUSCULAR; INTRAVENOUS
Status: COMPLETED | OUTPATIENT
Start: 2021-02-20 | End: 2021-02-20

## 2021-02-20 RX ORDER — ROSUVASTATIN CALCIUM 10 MG/1
10 TABLET, COATED ORAL NIGHTLY
Status: DISCONTINUED | OUTPATIENT
Start: 2021-02-20 | End: 2021-02-23 | Stop reason: HOSPADM

## 2021-02-20 RX ORDER — SODIUM CHLORIDE 9 MG/ML
INJECTION, SOLUTION INTRAVENOUS CONTINUOUS
Status: DISCONTINUED | OUTPATIENT
Start: 2021-02-20 | End: 2021-02-21

## 2021-02-20 RX ORDER — INSULIN ASPART 100 [IU]/ML
0-5 INJECTION, SOLUTION INTRAVENOUS; SUBCUTANEOUS
Status: DISCONTINUED | OUTPATIENT
Start: 2021-02-20 | End: 2021-02-23 | Stop reason: HOSPADM

## 2021-02-20 RX ADMIN — TRAZODONE HYDROCHLORIDE 50 MG: 50 TABLET ORAL at 08:02

## 2021-02-20 RX ADMIN — SODIUM CHLORIDE: 0.9 INJECTION, SOLUTION INTRAVENOUS at 04:02

## 2021-02-20 RX ADMIN — DEXAMETHASONE SODIUM PHOSPHATE 6 MG: 4 INJECTION, SOLUTION INTRAMUSCULAR; INTRAVENOUS at 03:02

## 2021-02-20 RX ADMIN — OXYCODONE AND ACETAMINOPHEN 1 TABLET: 5; 325 TABLET ORAL at 10:02

## 2021-02-20 RX ADMIN — GABAPENTIN 100 MG: 100 CAPSULE ORAL at 08:02

## 2021-02-20 RX ADMIN — ALBUTEROL SULFATE 2 PUFF: 90 AEROSOL, METERED RESPIRATORY (INHALATION) at 10:02

## 2021-02-20 RX ADMIN — ONDANSETRON 4 MG: 2 INJECTION INTRAMUSCULAR; INTRAVENOUS at 04:02

## 2021-02-20 RX ADMIN — ROSUVASTATIN 10 MG: 10 TABLET, FILM COATED ORAL at 08:02

## 2021-02-20 RX ADMIN — ENOXAPARIN SODIUM 40 MG: 40 INJECTION SUBCUTANEOUS at 08:02

## 2021-02-20 RX ADMIN — REMDESIVIR 200 MG: 100 INJECTION, POWDER, LYOPHILIZED, FOR SOLUTION INTRAVENOUS at 07:02

## 2021-02-20 RX ADMIN — OXYCODONE HYDROCHLORIDE AND ACETAMINOPHEN 500 MG: 500 TABLET ORAL at 08:02

## 2021-02-20 RX ADMIN — OXYCODONE AND ACETAMINOPHEN 1 TABLET: 5; 325 TABLET ORAL at 04:02

## 2021-02-21 PROBLEM — E83.42 HYPOMAGNESEMIA: Status: ACTIVE | Noted: 2021-02-21

## 2021-02-21 LAB
ALBUMIN SERPL BCP-MCNC: 3.1 G/DL (ref 3.5–5.2)
ALP SERPL-CCNC: 40 U/L (ref 55–135)
ALT SERPL W/O P-5'-P-CCNC: 19 U/L (ref 10–44)
ANION GAP SERPL CALC-SCNC: 8 MMOL/L (ref 8–16)
AST SERPL-CCNC: 34 U/L (ref 10–40)
BASOPHILS # BLD AUTO: 0 K/UL (ref 0–0.2)
BASOPHILS NFR BLD: 0 % (ref 0–1.9)
BILIRUB SERPL-MCNC: 0.7 MG/DL (ref 0.1–1)
BUN SERPL-MCNC: 21 MG/DL (ref 8–23)
CALCIUM SERPL-MCNC: 8.7 MG/DL (ref 8.7–10.5)
CHLORIDE SERPL-SCNC: 105 MMOL/L (ref 95–110)
CO2 SERPL-SCNC: 24 MMOL/L (ref 23–29)
CREAT SERPL-MCNC: 0.7 MG/DL (ref 0.5–1.4)
DIFFERENTIAL METHOD: ABNORMAL
EOSINOPHIL # BLD AUTO: 0 K/UL (ref 0–0.5)
EOSINOPHIL NFR BLD: 0 % (ref 0–8)
ERYTHROCYTE [DISTWIDTH] IN BLOOD BY AUTOMATED COUNT: 12.5 % (ref 11.5–14.5)
EST. GFR  (AFRICAN AMERICAN): >60 ML/MIN/1.73 M^2
EST. GFR  (NON AFRICAN AMERICAN): >60 ML/MIN/1.73 M^2
GLUCOSE SERPL-MCNC: 151 MG/DL (ref 70–110)
GLUCOSE SERPL-MCNC: 188 MG/DL (ref 70–110)
GLUCOSE SERPL-MCNC: 201 MG/DL (ref 70–110)
GLUCOSE SERPL-MCNC: 203 MG/DL (ref 70–110)
GLUCOSE SERPL-MCNC: 277 MG/DL (ref 70–110)
HCT VFR BLD AUTO: 34.4 % (ref 37–48.5)
HGB BLD-MCNC: 11 G/DL (ref 12–16)
IMM GRANULOCYTES # BLD AUTO: 0.01 K/UL (ref 0–0.04)
IMM GRANULOCYTES NFR BLD AUTO: 0.4 % (ref 0–0.5)
LYMPHOCYTES # BLD AUTO: 0.9 K/UL (ref 1–4.8)
LYMPHOCYTES NFR BLD: 39.3 % (ref 18–48)
MAGNESIUM SERPL-MCNC: 1.2 MG/DL (ref 1.6–2.6)
MCH RBC QN AUTO: 27.5 PG (ref 27–31)
MCHC RBC AUTO-ENTMCNC: 32 G/DL (ref 32–36)
MCV RBC AUTO: 86 FL (ref 82–98)
MONOCYTES # BLD AUTO: 0.1 K/UL (ref 0.3–1)
MONOCYTES NFR BLD: 5.7 % (ref 4–15)
NEUTROPHILS # BLD AUTO: 1.3 K/UL (ref 1.8–7.7)
NEUTROPHILS NFR BLD: 54.6 % (ref 38–73)
NRBC BLD-RTO: 0 /100 WBC
PHOSPHATE SERPL-MCNC: 2 MG/DL (ref 2.7–4.5)
PLATELET # BLD AUTO: 126 K/UL (ref 150–350)
PLATELET BLD QL SMEAR: ABNORMAL
PMV BLD AUTO: 12.3 FL (ref 9.2–12.9)
POTASSIUM SERPL-SCNC: 3.7 MMOL/L (ref 3.5–5.1)
PROT SERPL-MCNC: 6.5 G/DL (ref 6–8.4)
RBC # BLD AUTO: 4 M/UL (ref 4–5.4)
SODIUM SERPL-SCNC: 137 MMOL/L (ref 136–145)
WBC # BLD AUTO: 2.29 K/UL (ref 3.9–12.7)

## 2021-02-21 PROCEDURE — 94799 UNLISTED PULMONARY SVC/PX: CPT

## 2021-02-21 PROCEDURE — 82962 GLUCOSE BLOOD TEST: CPT

## 2021-02-21 PROCEDURE — 63600175 PHARM REV CODE 636 W HCPCS: Performed by: NURSE PRACTITIONER

## 2021-02-21 PROCEDURE — 21400001 HC TELEMETRY ROOM

## 2021-02-21 PROCEDURE — 80053 COMPREHEN METABOLIC PANEL: CPT

## 2021-02-21 PROCEDURE — 84100 ASSAY OF PHOSPHORUS: CPT

## 2021-02-21 PROCEDURE — 25000003 PHARM REV CODE 250: Performed by: NURSE PRACTITIONER

## 2021-02-21 PROCEDURE — 36415 COLL VENOUS BLD VENIPUNCTURE: CPT

## 2021-02-21 PROCEDURE — 94640 AIRWAY INHALATION TREATMENT: CPT

## 2021-02-21 PROCEDURE — 27000221 HC OXYGEN, UP TO 24 HOURS

## 2021-02-21 PROCEDURE — 94761 N-INVAS EAR/PLS OXIMETRY MLT: CPT

## 2021-02-21 PROCEDURE — 99900035 HC TECH TIME PER 15 MIN (STAT)

## 2021-02-21 PROCEDURE — 99900031 HC PATIENT EDUCATION (STAT)

## 2021-02-21 PROCEDURE — 25000242 PHARM REV CODE 250 ALT 637 W/ HCPCS: Performed by: NURSE PRACTITIONER

## 2021-02-21 PROCEDURE — 25000003 PHARM REV CODE 250: Performed by: INTERNAL MEDICINE

## 2021-02-21 PROCEDURE — 63600175 PHARM REV CODE 636 W HCPCS: Performed by: HOSPITALIST

## 2021-02-21 PROCEDURE — 85025 COMPLETE CBC W/AUTO DIFF WBC: CPT

## 2021-02-21 PROCEDURE — 83735 ASSAY OF MAGNESIUM: CPT

## 2021-02-21 PROCEDURE — 25000003 PHARM REV CODE 250: Performed by: HOSPITALIST

## 2021-02-21 RX ORDER — ALBUTEROL SULFATE 90 UG/1
2 AEROSOL, METERED RESPIRATORY (INHALATION) EVERY 4 HOURS PRN
Status: DISCONTINUED | OUTPATIENT
Start: 2021-02-21 | End: 2021-02-23 | Stop reason: HOSPADM

## 2021-02-21 RX ORDER — MAGNESIUM SULFATE HEPTAHYDRATE 40 MG/ML
2 INJECTION, SOLUTION INTRAVENOUS ONCE
Status: COMPLETED | OUTPATIENT
Start: 2021-02-21 | End: 2021-02-21

## 2021-02-21 RX ORDER — ALBUTEROL SULFATE 90 UG/1
2 AEROSOL, METERED RESPIRATORY (INHALATION)
Status: DISCONTINUED | OUTPATIENT
Start: 2021-02-21 | End: 2021-02-23 | Stop reason: HOSPADM

## 2021-02-21 RX ORDER — SODIUM,POTASSIUM PHOSPHATES 280-250MG
2 POWDER IN PACKET (EA) ORAL
Status: COMPLETED | OUTPATIENT
Start: 2021-02-21 | End: 2021-02-22

## 2021-02-21 RX ORDER — DOCUSATE SODIUM 100 MG/1
100 CAPSULE, LIQUID FILLED ORAL 2 TIMES DAILY
Status: DISCONTINUED | OUTPATIENT
Start: 2021-02-21 | End: 2021-02-23 | Stop reason: HOSPADM

## 2021-02-21 RX ORDER — HYDROCORTISONE 25 MG/G
CREAM TOPICAL 2 TIMES DAILY
Status: DISCONTINUED | OUTPATIENT
Start: 2021-02-21 | End: 2021-02-23 | Stop reason: HOSPADM

## 2021-02-21 RX ADMIN — ALBUTEROL SULFATE 2 PUFF: 90 AEROSOL, METERED RESPIRATORY (INHALATION) at 09:02

## 2021-02-21 RX ADMIN — ROSUVASTATIN 10 MG: 10 TABLET, FILM COATED ORAL at 08:02

## 2021-02-21 RX ADMIN — HYDROCORTISONE: 25 CREAM TOPICAL at 08:02

## 2021-02-21 RX ADMIN — MAGNESIUM SULFATE 2 G: 2 INJECTION INTRAVENOUS at 01:02

## 2021-02-21 RX ADMIN — DILTIAZEM HYDROCHLORIDE 120 MG: 120 CAPSULE, COATED, EXTENDED RELEASE ORAL at 08:02

## 2021-02-21 RX ADMIN — TRAZODONE HYDROCHLORIDE 50 MG: 50 TABLET ORAL at 08:02

## 2021-02-21 RX ADMIN — ALBUTEROL SULFATE 2 PUFF: 90 AEROSOL, METERED RESPIRATORY (INHALATION) at 08:02

## 2021-02-21 RX ADMIN — SODIUM CHLORIDE: 0.9 INJECTION, SOLUTION INTRAVENOUS at 04:02

## 2021-02-21 RX ADMIN — DOCUSATE SODIUM 100 MG: 100 CAPSULE, LIQUID FILLED ORAL at 09:02

## 2021-02-21 RX ADMIN — ENOXAPARIN SODIUM 40 MG: 40 INJECTION SUBCUTANEOUS at 05:02

## 2021-02-21 RX ADMIN — REMDESIVIR 100 MG: 100 INJECTION, POWDER, LYOPHILIZED, FOR SOLUTION INTRAVENOUS at 06:02

## 2021-02-21 RX ADMIN — POTASSIUM, SODIUM PHOSPHATES 280 MG-160 MG-250 MG ORAL POWDER PACKET 2 PACKET: POWDER IN PACKET at 05:02

## 2021-02-21 RX ADMIN — HYDROCHLOROTHIAZIDE 25 MG: 25 TABLET ORAL at 08:02

## 2021-02-21 RX ADMIN — POTASSIUM, SODIUM PHOSPHATES 280 MG-160 MG-250 MG ORAL POWDER PACKET 2 PACKET: POWDER IN PACKET at 12:02

## 2021-02-21 RX ADMIN — ONDANSETRON 8 MG: 4 TABLET, ORALLY DISINTEGRATING ORAL at 07:02

## 2021-02-21 RX ADMIN — POTASSIUM, SODIUM PHOSPHATES 280 MG-160 MG-250 MG ORAL POWDER PACKET 2 PACKET: POWDER IN PACKET at 08:02

## 2021-02-21 RX ADMIN — DEXAMETHASONE 6 MG: 4 TABLET ORAL at 08:02

## 2021-02-21 RX ADMIN — MAGNESIUM OXIDE 800 MG: 400 TABLET ORAL at 06:02

## 2021-02-21 RX ADMIN — GABAPENTIN 100 MG: 100 CAPSULE ORAL at 01:02

## 2021-02-21 RX ADMIN — OXYCODONE AND ACETAMINOPHEN 1 TABLET: 5; 325 TABLET ORAL at 07:02

## 2021-02-21 RX ADMIN — VALSARTAN 160 MG: 80 TABLET ORAL at 08:02

## 2021-02-21 RX ADMIN — CLOPIDOGREL BISULFATE 75 MG: 75 TABLET, FILM COATED ORAL at 08:02

## 2021-02-21 RX ADMIN — GABAPENTIN 100 MG: 100 CAPSULE ORAL at 08:02

## 2021-02-21 RX ADMIN — GABAPENTIN 100 MG: 100 CAPSULE ORAL at 07:02

## 2021-02-21 RX ADMIN — OXYCODONE AND ACETAMINOPHEN 1 TABLET: 5; 325 TABLET ORAL at 04:02

## 2021-02-21 RX ADMIN — PANTOPRAZOLE SODIUM 40 MG: 40 TABLET, DELAYED RELEASE ORAL at 06:02

## 2021-02-21 RX ADMIN — OXYCODONE AND ACETAMINOPHEN 1 TABLET: 5; 325 TABLET ORAL at 01:02

## 2021-02-21 RX ADMIN — INSULIN ASPART 1 UNITS: 100 INJECTION, SOLUTION INTRAVENOUS; SUBCUTANEOUS at 09:02

## 2021-02-21 RX ADMIN — OXYCODONE HYDROCHLORIDE AND ACETAMINOPHEN 500 MG: 500 TABLET ORAL at 08:02

## 2021-02-21 RX ADMIN — Medication 1000 UNITS: at 08:02

## 2021-02-21 RX ADMIN — ALBUTEROL SULFATE 2 PUFF: 90 AEROSOL, METERED RESPIRATORY (INHALATION) at 02:02

## 2021-02-22 ENCOUNTER — TELEPHONE (OUTPATIENT)
Dept: NEUROLOGY | Facility: CLINIC | Age: 76
End: 2021-02-22

## 2021-02-22 LAB
ALBUMIN SERPL BCP-MCNC: 3.2 G/DL (ref 3.5–5.2)
ALP SERPL-CCNC: 48 U/L (ref 55–135)
ALT SERPL W/O P-5'-P-CCNC: 22 U/L (ref 10–44)
ANION GAP SERPL CALC-SCNC: 10 MMOL/L (ref 8–16)
AST SERPL-CCNC: 32 U/L (ref 10–40)
BASOPHILS # BLD AUTO: 0 K/UL (ref 0–0.2)
BASOPHILS NFR BLD: 0 % (ref 0–1.9)
BILIRUB SERPL-MCNC: 0.3 MG/DL (ref 0.1–1)
BUN SERPL-MCNC: 21 MG/DL (ref 8–23)
CALCIUM SERPL-MCNC: 9.3 MG/DL (ref 8.7–10.5)
CHLORIDE SERPL-SCNC: 103 MMOL/L (ref 95–110)
CO2 SERPL-SCNC: 26 MMOL/L (ref 23–29)
CREAT SERPL-MCNC: 0.7 MG/DL (ref 0.5–1.4)
CRP SERPL-MCNC: 3.08 MG/DL
D DIMER PPP IA.FEU-MCNC: 0.65 UG/ML FEU
DIFFERENTIAL METHOD: ABNORMAL
EOSINOPHIL # BLD AUTO: 0 K/UL (ref 0–0.5)
EOSINOPHIL NFR BLD: 0 % (ref 0–8)
ERYTHROCYTE [DISTWIDTH] IN BLOOD BY AUTOMATED COUNT: 12.7 % (ref 11.5–14.5)
EST. GFR  (AFRICAN AMERICAN): >60 ML/MIN/1.73 M^2
EST. GFR  (NON AFRICAN AMERICAN): >60 ML/MIN/1.73 M^2
FERRITIN SERPL-MCNC: 536 NG/ML (ref 20–300)
GLUCOSE SERPL-MCNC: 161 MG/DL (ref 70–110)
GLUCOSE SERPL-MCNC: 174 MG/DL (ref 70–110)
GLUCOSE SERPL-MCNC: 179 MG/DL (ref 70–110)
GLUCOSE SERPL-MCNC: 224 MG/DL (ref 70–110)
GLUCOSE SERPL-MCNC: 230 MG/DL (ref 70–110)
HCT VFR BLD AUTO: 35.4 % (ref 37–48.5)
HGB BLD-MCNC: 11.4 G/DL (ref 12–16)
IMM GRANULOCYTES # BLD AUTO: 0.02 K/UL (ref 0–0.04)
IMM GRANULOCYTES NFR BLD AUTO: 0.3 % (ref 0–0.5)
LDH SERPL L TO P-CCNC: 155 U/L (ref 110–260)
LYMPHOCYTES # BLD AUTO: 1.3 K/UL (ref 1–4.8)
LYMPHOCYTES NFR BLD: 22.2 % (ref 18–48)
MAGNESIUM SERPL-MCNC: 1.6 MG/DL (ref 1.6–2.6)
MCH RBC QN AUTO: 27.6 PG (ref 27–31)
MCHC RBC AUTO-ENTMCNC: 32.2 G/DL (ref 32–36)
MCV RBC AUTO: 86 FL (ref 82–98)
MONOCYTES # BLD AUTO: 0.5 K/UL (ref 0.3–1)
MONOCYTES NFR BLD: 9.3 % (ref 4–15)
NEUTROPHILS # BLD AUTO: 4 K/UL (ref 1.8–7.7)
NEUTROPHILS NFR BLD: 68.2 % (ref 38–73)
NRBC BLD-RTO: 0 /100 WBC
PHOSPHATE SERPL-MCNC: 2.8 MG/DL (ref 2.7–4.5)
PLATELET # BLD AUTO: 208 K/UL (ref 150–350)
PMV BLD AUTO: 11.7 FL (ref 9.2–12.9)
POTASSIUM SERPL-SCNC: 4 MMOL/L (ref 3.5–5.1)
PROT SERPL-MCNC: 6.6 G/DL (ref 6–8.4)
RBC # BLD AUTO: 4.13 M/UL (ref 4–5.4)
SODIUM SERPL-SCNC: 139 MMOL/L (ref 136–145)
WBC # BLD AUTO: 5.8 K/UL (ref 3.9–12.7)

## 2021-02-22 PROCEDURE — 25000003 PHARM REV CODE 250: Performed by: NURSE PRACTITIONER

## 2021-02-22 PROCEDURE — 94640 AIRWAY INHALATION TREATMENT: CPT

## 2021-02-22 PROCEDURE — 25000242 PHARM REV CODE 250 ALT 637 W/ HCPCS: Performed by: STUDENT IN AN ORGANIZED HEALTH CARE EDUCATION/TRAINING PROGRAM

## 2021-02-22 PROCEDURE — 21400001 HC TELEMETRY ROOM

## 2021-02-22 PROCEDURE — 99900031 HC PATIENT EDUCATION (STAT)

## 2021-02-22 PROCEDURE — 27000221 HC OXYGEN, UP TO 24 HOURS

## 2021-02-22 PROCEDURE — 83615 LACTATE (LD) (LDH) ENZYME: CPT

## 2021-02-22 PROCEDURE — 85379 FIBRIN DEGRADATION QUANT: CPT

## 2021-02-22 PROCEDURE — 25000003 PHARM REV CODE 250: Performed by: INTERNAL MEDICINE

## 2021-02-22 PROCEDURE — 94761 N-INVAS EAR/PLS OXIMETRY MLT: CPT

## 2021-02-22 PROCEDURE — 82728 ASSAY OF FERRITIN: CPT

## 2021-02-22 PROCEDURE — 83735 ASSAY OF MAGNESIUM: CPT

## 2021-02-22 PROCEDURE — 85025 COMPLETE CBC W/AUTO DIFF WBC: CPT

## 2021-02-22 PROCEDURE — 80053 COMPREHEN METABOLIC PANEL: CPT

## 2021-02-22 PROCEDURE — 25000242 PHARM REV CODE 250 ALT 637 W/ HCPCS: Performed by: NURSE PRACTITIONER

## 2021-02-22 PROCEDURE — 25000003 PHARM REV CODE 250: Performed by: HOSPITALIST

## 2021-02-22 PROCEDURE — 86140 C-REACTIVE PROTEIN: CPT

## 2021-02-22 PROCEDURE — 36415 COLL VENOUS BLD VENIPUNCTURE: CPT

## 2021-02-22 PROCEDURE — 99900035 HC TECH TIME PER 15 MIN (STAT)

## 2021-02-22 PROCEDURE — 84100 ASSAY OF PHOSPHORUS: CPT

## 2021-02-22 PROCEDURE — 63600175 PHARM REV CODE 636 W HCPCS: Performed by: NURSE PRACTITIONER

## 2021-02-22 RX ORDER — PANTOPRAZOLE SODIUM 40 MG/1
40 TABLET, DELAYED RELEASE ORAL DAILY
Qty: 30 TABLET | Refills: 3 | Status: SHIPPED | OUTPATIENT
Start: 2021-02-22 | End: 2021-06-23

## 2021-02-22 RX ADMIN — GABAPENTIN 100 MG: 100 CAPSULE ORAL at 08:02

## 2021-02-22 RX ADMIN — INSULIN ASPART 1 UNITS: 100 INJECTION, SOLUTION INTRAVENOUS; SUBCUTANEOUS at 09:02

## 2021-02-22 RX ADMIN — DILTIAZEM HYDROCHLORIDE 120 MG: 120 CAPSULE, COATED, EXTENDED RELEASE ORAL at 08:02

## 2021-02-22 RX ADMIN — PANTOPRAZOLE SODIUM 40 MG: 40 TABLET, DELAYED RELEASE ORAL at 06:02

## 2021-02-22 RX ADMIN — OXYCODONE AND ACETAMINOPHEN 1 TABLET: 5; 325 TABLET ORAL at 04:02

## 2021-02-22 RX ADMIN — OXYCODONE HYDROCHLORIDE AND ACETAMINOPHEN 500 MG: 500 TABLET ORAL at 08:02

## 2021-02-22 RX ADMIN — OXYCODONE AND ACETAMINOPHEN 1 TABLET: 5; 325 TABLET ORAL at 11:02

## 2021-02-22 RX ADMIN — REMDESIVIR 100 MG: 100 INJECTION, POWDER, LYOPHILIZED, FOR SOLUTION INTRAVENOUS at 07:02

## 2021-02-22 RX ADMIN — ONDANSETRON 8 MG: 4 TABLET, ORALLY DISINTEGRATING ORAL at 07:02

## 2021-02-22 RX ADMIN — ENOXAPARIN SODIUM 40 MG: 40 INJECTION SUBCUTANEOUS at 04:02

## 2021-02-22 RX ADMIN — OXYCODONE HYDROCHLORIDE AND ACETAMINOPHEN 500 MG: 500 TABLET ORAL at 09:02

## 2021-02-22 RX ADMIN — CLOPIDOGREL BISULFATE 75 MG: 75 TABLET, FILM COATED ORAL at 08:02

## 2021-02-22 RX ADMIN — POTASSIUM, SODIUM PHOSPHATES 280 MG-160 MG-250 MG ORAL POWDER PACKET 2 PACKET: POWDER IN PACKET at 08:02

## 2021-02-22 RX ADMIN — Medication 1000 UNITS: at 08:02

## 2021-02-22 RX ADMIN — GABAPENTIN 100 MG: 100 CAPSULE ORAL at 09:02

## 2021-02-22 RX ADMIN — INSULIN ASPART 2 UNITS: 100 INJECTION, SOLUTION INTRAVENOUS; SUBCUTANEOUS at 04:02

## 2021-02-22 RX ADMIN — DOCUSATE SODIUM 100 MG: 100 CAPSULE, LIQUID FILLED ORAL at 08:02

## 2021-02-22 RX ADMIN — OXYCODONE AND ACETAMINOPHEN 1 TABLET: 5; 325 TABLET ORAL at 05:02

## 2021-02-22 RX ADMIN — HYDROCORTISONE: 25 CREAM TOPICAL at 09:02

## 2021-02-22 RX ADMIN — VALSARTAN 160 MG: 80 TABLET ORAL at 08:02

## 2021-02-22 RX ADMIN — POLYETHYLENE GLYCOL 3350 17 G: 17 POWDER, FOR SOLUTION ORAL at 08:02

## 2021-02-22 RX ADMIN — TRAZODONE HYDROCHLORIDE 50 MG: 50 TABLET ORAL at 09:02

## 2021-02-22 RX ADMIN — ROSUVASTATIN 10 MG: 10 TABLET, FILM COATED ORAL at 09:02

## 2021-02-22 RX ADMIN — DEXAMETHASONE 6 MG: 4 TABLET ORAL at 08:02

## 2021-02-22 RX ADMIN — GABAPENTIN 100 MG: 100 CAPSULE ORAL at 04:02

## 2021-02-22 RX ADMIN — ALBUTEROL SULFATE 2 PUFF: 90 AEROSOL, METERED RESPIRATORY (INHALATION) at 08:02

## 2021-02-22 RX ADMIN — ALBUTEROL SULFATE 2 PUFF: 90 AEROSOL, METERED RESPIRATORY (INHALATION) at 01:02

## 2021-02-23 VITALS
BODY MASS INDEX: 23.91 KG/M2 | HEART RATE: 79 BPM | SYSTOLIC BLOOD PRESSURE: 113 MMHG | WEIGHT: 134.94 LBS | DIASTOLIC BLOOD PRESSURE: 56 MMHG | RESPIRATION RATE: 16 BRPM | TEMPERATURE: 98 F | OXYGEN SATURATION: 97 % | HEIGHT: 63 IN

## 2021-02-23 PROBLEM — E83.42 HYPOMAGNESEMIA: Status: RESOLVED | Noted: 2021-02-21 | Resolved: 2021-02-23

## 2021-02-23 LAB
ALBUMIN SERPL BCP-MCNC: 3.1 G/DL (ref 3.5–5.2)
ALP SERPL-CCNC: 42 U/L (ref 55–135)
ALT SERPL W/O P-5'-P-CCNC: 22 U/L (ref 10–44)
ANION GAP SERPL CALC-SCNC: 10 MMOL/L (ref 8–16)
AST SERPL-CCNC: 26 U/L (ref 10–40)
BASOPHILS # BLD AUTO: 0.01 K/UL (ref 0–0.2)
BASOPHILS NFR BLD: 0.1 % (ref 0–1.9)
BILIRUB SERPL-MCNC: 0.4 MG/DL (ref 0.1–1)
BUN SERPL-MCNC: 26 MG/DL (ref 8–23)
CALCIUM SERPL-MCNC: 8.9 MG/DL (ref 8.7–10.5)
CHLORIDE SERPL-SCNC: 103 MMOL/L (ref 95–110)
CO2 SERPL-SCNC: 23 MMOL/L (ref 23–29)
CREAT SERPL-MCNC: 0.7 MG/DL (ref 0.5–1.4)
CRP SERPL-MCNC: 1.43 MG/DL
D DIMER PPP IA.FEU-MCNC: 0.65 UG/ML FEU
DIFFERENTIAL METHOD: ABNORMAL
EOSINOPHIL # BLD AUTO: 0 K/UL (ref 0–0.5)
EOSINOPHIL NFR BLD: 0 % (ref 0–8)
ERYTHROCYTE [DISTWIDTH] IN BLOOD BY AUTOMATED COUNT: 12.8 % (ref 11.5–14.5)
EST. GFR  (AFRICAN AMERICAN): >60 ML/MIN/1.73 M^2
EST. GFR  (NON AFRICAN AMERICAN): >60 ML/MIN/1.73 M^2
FERRITIN SERPL-MCNC: 450 NG/ML (ref 20–300)
GLUCOSE SERPL-MCNC: 140 MG/DL (ref 70–110)
GLUCOSE SERPL-MCNC: 167 MG/DL (ref 70–110)
GLUCOSE SERPL-MCNC: 191 MG/DL (ref 70–110)
HCT VFR BLD AUTO: 31.9 % (ref 37–48.5)
HGB BLD-MCNC: 10.4 G/DL (ref 12–16)
IMM GRANULOCYTES # BLD AUTO: 0.07 K/UL (ref 0–0.04)
IMM GRANULOCYTES NFR BLD AUTO: 0.9 % (ref 0–0.5)
LDH SERPL L TO P-CCNC: 128 U/L (ref 110–260)
LYMPHOCYTES # BLD AUTO: 1.5 K/UL (ref 1–4.8)
LYMPHOCYTES NFR BLD: 20.3 % (ref 18–48)
MAGNESIUM SERPL-MCNC: 1.4 MG/DL (ref 1.6–2.6)
MCH RBC QN AUTO: 27.8 PG (ref 27–31)
MCHC RBC AUTO-ENTMCNC: 32.6 G/DL (ref 32–36)
MCV RBC AUTO: 85 FL (ref 82–98)
MONOCYTES # BLD AUTO: 0.6 K/UL (ref 0.3–1)
MONOCYTES NFR BLD: 7.9 % (ref 4–15)
NEUTROPHILS # BLD AUTO: 5.4 K/UL (ref 1.8–7.7)
NEUTROPHILS NFR BLD: 70.8 % (ref 38–73)
NRBC BLD-RTO: 0 /100 WBC
PHOSPHATE SERPL-MCNC: 2.9 MG/DL (ref 2.7–4.5)
PLATELET # BLD AUTO: 269 K/UL (ref 150–350)
PMV BLD AUTO: 11 FL (ref 9.2–12.9)
POTASSIUM SERPL-SCNC: 4.1 MMOL/L (ref 3.5–5.1)
PROT SERPL-MCNC: 6 G/DL (ref 6–8.4)
RBC # BLD AUTO: 3.74 M/UL (ref 4–5.4)
SODIUM SERPL-SCNC: 136 MMOL/L (ref 136–145)
WBC # BLD AUTO: 7.57 K/UL (ref 3.9–12.7)

## 2021-02-23 PROCEDURE — 82728 ASSAY OF FERRITIN: CPT

## 2021-02-23 PROCEDURE — 25000242 PHARM REV CODE 250 ALT 637 W/ HCPCS: Performed by: NURSE PRACTITIONER

## 2021-02-23 PROCEDURE — 99900035 HC TECH TIME PER 15 MIN (STAT)

## 2021-02-23 PROCEDURE — 85025 COMPLETE CBC W/AUTO DIFF WBC: CPT

## 2021-02-23 PROCEDURE — 83615 LACTATE (LD) (LDH) ENZYME: CPT

## 2021-02-23 PROCEDURE — 63600175 PHARM REV CODE 636 W HCPCS: Performed by: NURSE PRACTITIONER

## 2021-02-23 PROCEDURE — 85379 FIBRIN DEGRADATION QUANT: CPT

## 2021-02-23 PROCEDURE — 36415 COLL VENOUS BLD VENIPUNCTURE: CPT

## 2021-02-23 PROCEDURE — 94761 N-INVAS EAR/PLS OXIMETRY MLT: CPT

## 2021-02-23 PROCEDURE — 27000221 HC OXYGEN, UP TO 24 HOURS

## 2021-02-23 PROCEDURE — 84100 ASSAY OF PHOSPHORUS: CPT

## 2021-02-23 PROCEDURE — 86140 C-REACTIVE PROTEIN: CPT

## 2021-02-23 PROCEDURE — 83735 ASSAY OF MAGNESIUM: CPT

## 2021-02-23 PROCEDURE — 80053 COMPREHEN METABOLIC PANEL: CPT

## 2021-02-23 PROCEDURE — 25000003 PHARM REV CODE 250: Performed by: NURSE PRACTITIONER

## 2021-02-23 RX ORDER — DEXAMETHASONE 6 MG/1
6 TABLET ORAL DAILY
Qty: 6 TABLET | Refills: 0 | Status: SHIPPED | OUTPATIENT
Start: 2021-02-24 | End: 2021-03-02

## 2021-02-23 RX ORDER — BENZONATATE 200 MG/1
200 CAPSULE ORAL 3 TIMES DAILY PRN
Qty: 30 CAPSULE | Refills: 0 | Status: SHIPPED | OUTPATIENT
Start: 2021-02-23 | End: 2021-03-05

## 2021-02-23 RX ORDER — GUAIFENESIN/DEXTROMETHORPHAN 100-10MG/5
5 SYRUP ORAL EVERY 6 HOURS PRN
Qty: 118 ML | Refills: 0 | Status: SHIPPED | OUTPATIENT
Start: 2021-02-23 | End: 2021-02-28

## 2021-02-23 RX ADMIN — ALBUTEROL SULFATE 2 PUFF: 90 AEROSOL, METERED RESPIRATORY (INHALATION) at 08:02

## 2021-02-23 RX ADMIN — MAGNESIUM OXIDE 800 MG: 400 TABLET ORAL at 09:02

## 2021-02-23 RX ADMIN — MAGNESIUM OXIDE 800 MG: 400 TABLET ORAL at 05:02

## 2021-02-23 RX ADMIN — Medication 1000 UNITS: at 09:02

## 2021-02-23 RX ADMIN — VALSARTAN 160 MG: 80 TABLET ORAL at 09:02

## 2021-02-23 RX ADMIN — PANTOPRAZOLE SODIUM 40 MG: 40 TABLET, DELAYED RELEASE ORAL at 05:02

## 2021-02-23 RX ADMIN — CLOPIDOGREL BISULFATE 75 MG: 75 TABLET, FILM COATED ORAL at 09:02

## 2021-02-23 RX ADMIN — DEXAMETHASONE 6 MG: 4 TABLET ORAL at 09:02

## 2021-02-23 RX ADMIN — REMDESIVIR 100 MG: 100 INJECTION, POWDER, LYOPHILIZED, FOR SOLUTION INTRAVENOUS at 11:02

## 2021-02-23 RX ADMIN — DILTIAZEM HYDROCHLORIDE 120 MG: 120 CAPSULE, COATED, EXTENDED RELEASE ORAL at 09:02

## 2021-02-23 RX ADMIN — HYDROCORTISONE: 25 CREAM TOPICAL at 09:02

## 2021-02-23 RX ADMIN — OXYCODONE AND ACETAMINOPHEN 1 TABLET: 5; 325 TABLET ORAL at 09:02

## 2021-02-23 RX ADMIN — OXYCODONE HYDROCHLORIDE AND ACETAMINOPHEN 500 MG: 500 TABLET ORAL at 09:02

## 2021-02-23 RX ADMIN — OXYCODONE AND ACETAMINOPHEN 1 TABLET: 5; 325 TABLET ORAL at 02:02

## 2021-02-23 RX ADMIN — GABAPENTIN 100 MG: 100 CAPSULE ORAL at 09:02

## 2021-02-25 LAB
BACTERIA BLD CULT: NORMAL
BACTERIA BLD CULT: NORMAL

## 2021-03-11 DIAGNOSIS — E11.69 TYPE 2 DIABETES MELLITUS WITH HYPERLIPIDEMIA: Primary | ICD-10-CM

## 2021-03-11 DIAGNOSIS — E78.5 TYPE 2 DIABETES MELLITUS WITH HYPERLIPIDEMIA: Primary | ICD-10-CM

## 2021-03-11 RX ORDER — SAXAGLIPTIN 5 MG/1
5 TABLET, FILM COATED ORAL DAILY
Qty: 90 TABLET | Refills: 3 | Status: SHIPPED | OUTPATIENT
Start: 2021-03-11 | End: 2022-02-03 | Stop reason: SDUPTHER

## 2021-03-12 ENCOUNTER — TELEPHONE (OUTPATIENT)
Dept: FAMILY MEDICINE | Facility: CLINIC | Age: 76
End: 2021-03-12

## 2021-03-15 ENCOUNTER — TELEPHONE (OUTPATIENT)
Dept: FAMILY MEDICINE | Facility: CLINIC | Age: 76
End: 2021-03-15

## 2021-03-15 ENCOUNTER — DOCUMENTATION ONLY (OUTPATIENT)
Dept: FAMILY MEDICINE | Facility: CLINIC | Age: 76
End: 2021-03-15

## 2021-03-16 ENCOUNTER — TELEPHONE (OUTPATIENT)
Dept: FAMILY MEDICINE | Facility: CLINIC | Age: 76
End: 2021-03-16

## 2021-03-17 ENCOUNTER — TELEPHONE (OUTPATIENT)
Dept: FAMILY MEDICINE | Facility: CLINIC | Age: 76
End: 2021-03-17

## 2021-03-18 DIAGNOSIS — G45.8 OTHER SPECIFIED TRANSIENT CEREBRAL ISCHEMIAS: Chronic | ICD-10-CM

## 2021-03-18 DIAGNOSIS — R06.02 SOB (SHORTNESS OF BREATH): ICD-10-CM

## 2021-03-18 DIAGNOSIS — N18.30 CKD (CHRONIC KIDNEY DISEASE) STAGE 3, GFR 30-59 ML/MIN: ICD-10-CM

## 2021-03-18 DIAGNOSIS — I15.2 HYPERTENSION ASSOCIATED WITH DIABETES: ICD-10-CM

## 2021-03-18 DIAGNOSIS — E11.59 HYPERTENSION ASSOCIATED WITH DIABETES: ICD-10-CM

## 2021-03-22 RX ORDER — VALSARTAN AND HYDROCHLOROTHIAZIDE 160; 25 MG/1; MG/1
TABLET ORAL
Qty: 90 TABLET | Refills: 4 | Status: SHIPPED | OUTPATIENT
Start: 2021-03-22 | End: 2022-02-14 | Stop reason: SDUPTHER

## 2021-04-12 ENCOUNTER — PATIENT MESSAGE (OUTPATIENT)
Dept: NEUROLOGY | Facility: CLINIC | Age: 76
End: 2021-04-12

## 2021-04-12 ENCOUNTER — OFFICE VISIT (OUTPATIENT)
Dept: NEUROLOGY | Facility: CLINIC | Age: 76
End: 2021-04-12
Payer: MEDICARE

## 2021-04-12 DIAGNOSIS — F06.70 MILD NEUROCOGNITIVE DISORDER DUE TO ANOTHER MEDICAL CONDITION: ICD-10-CM

## 2021-04-12 DIAGNOSIS — Z86.73 HISTORY OF TIA (TRANSIENT ISCHEMIC ATTACK): Primary | ICD-10-CM

## 2021-04-12 DIAGNOSIS — F43.23 ADJUSTMENT DISORDER WITH MIXED ANXIETY AND DEPRESSED MOOD: ICD-10-CM

## 2021-04-12 PROCEDURE — 99499 UNLISTED E&M SERVICE: CPT | Mod: GT,95,, | Performed by: PSYCHIATRY & NEUROLOGY

## 2021-04-12 PROCEDURE — 99499 NO LOS: ICD-10-PCS | Mod: 95,,, | Performed by: PSYCHIATRY & NEUROLOGY

## 2021-04-30 ENCOUNTER — EXTERNAL CHRONIC CARE MANAGEMENT (OUTPATIENT)
Dept: PRIMARY CARE CLINIC | Facility: CLINIC | Age: 76
End: 2021-04-30
Payer: MEDICARE

## 2021-04-30 PROCEDURE — 99490 PR CHRONIC CARE MGMT, 1ST 20 MIN: ICD-10-PCS | Mod: S$PBB,,, | Performed by: FAMILY MEDICINE

## 2021-04-30 PROCEDURE — 99490 CHRNC CARE MGMT STAFF 1ST 20: CPT | Mod: PBBFAC,PO | Performed by: FAMILY MEDICINE

## 2021-04-30 PROCEDURE — 99490 CHRNC CARE MGMT STAFF 1ST 20: CPT | Mod: S$PBB,,, | Performed by: FAMILY MEDICINE

## 2021-05-19 RX ORDER — GABAPENTIN 100 MG/1
CAPSULE ORAL
Qty: 270 CAPSULE | Refills: 0 | OUTPATIENT
Start: 2021-05-19

## 2021-05-19 RX ORDER — GABAPENTIN 100 MG/1
100 CAPSULE ORAL 3 TIMES DAILY
Qty: 180 CAPSULE | Refills: 1 | Status: SHIPPED | OUTPATIENT
Start: 2021-05-19 | End: 2021-07-26 | Stop reason: SDUPTHER

## 2021-05-31 ENCOUNTER — EXTERNAL CHRONIC CARE MANAGEMENT (OUTPATIENT)
Dept: PRIMARY CARE CLINIC | Facility: CLINIC | Age: 76
End: 2021-05-31
Payer: MEDICARE

## 2021-05-31 PROCEDURE — 99490 PR CHRONIC CARE MGMT, 1ST 20 MIN: ICD-10-PCS | Mod: S$PBB,,, | Performed by: FAMILY MEDICINE

## 2021-05-31 PROCEDURE — 99490 CHRNC CARE MGMT STAFF 1ST 20: CPT | Mod: S$PBB,,, | Performed by: FAMILY MEDICINE

## 2021-05-31 PROCEDURE — 99490 CHRNC CARE MGMT STAFF 1ST 20: CPT | Mod: PBBFAC,PO | Performed by: FAMILY MEDICINE

## 2021-06-30 ENCOUNTER — EXTERNAL CHRONIC CARE MANAGEMENT (OUTPATIENT)
Dept: PRIMARY CARE CLINIC | Facility: CLINIC | Age: 76
End: 2021-06-30
Payer: MEDICARE

## 2021-06-30 PROCEDURE — 99490 CHRNC CARE MGMT STAFF 1ST 20: CPT | Mod: PBBFAC,PO | Performed by: FAMILY MEDICINE

## 2021-06-30 PROCEDURE — 99490 CHRNC CARE MGMT STAFF 1ST 20: CPT | Mod: S$PBB,,, | Performed by: FAMILY MEDICINE

## 2021-06-30 PROCEDURE — 99490 PR CHRONIC CARE MGMT, 1ST 20 MIN: ICD-10-PCS | Mod: S$PBB,,, | Performed by: FAMILY MEDICINE

## 2021-07-07 ENCOUNTER — PATIENT MESSAGE (OUTPATIENT)
Dept: ADMINISTRATIVE | Facility: HOSPITAL | Age: 76
End: 2021-07-07

## 2021-07-14 DIAGNOSIS — E78.5 TYPE 2 DIABETES MELLITUS WITH HYPERLIPIDEMIA: ICD-10-CM

## 2021-07-14 DIAGNOSIS — E08.00 DIABETES MELLITUS DUE TO UNDERLYING CONDITION WITH HYPEROSMOLARITY WITHOUT COMA, WITHOUT LONG-TERM CURRENT USE OF INSULIN: ICD-10-CM

## 2021-07-14 DIAGNOSIS — E11.69 TYPE 2 DIABETES MELLITUS WITH HYPERLIPIDEMIA: ICD-10-CM

## 2021-07-26 ENCOUNTER — OFFICE VISIT (OUTPATIENT)
Dept: FAMILY MEDICINE | Facility: CLINIC | Age: 76
End: 2021-07-26
Payer: MEDICARE

## 2021-07-26 ENCOUNTER — LAB VISIT (OUTPATIENT)
Dept: LAB | Facility: HOSPITAL | Age: 76
End: 2021-07-26
Attending: FAMILY MEDICINE
Payer: MEDICARE

## 2021-07-26 VITALS
OXYGEN SATURATION: 97 % | WEIGHT: 145.31 LBS | SYSTOLIC BLOOD PRESSURE: 116 MMHG | HEART RATE: 84 BPM | HEIGHT: 63 IN | DIASTOLIC BLOOD PRESSURE: 74 MMHG | BODY MASS INDEX: 25.75 KG/M2

## 2021-07-26 DIAGNOSIS — E11.69 TYPE 2 DIABETES MELLITUS WITH HYPERLIPIDEMIA: ICD-10-CM

## 2021-07-26 DIAGNOSIS — I15.2 HYPERTENSION ASSOCIATED WITH DIABETES: ICD-10-CM

## 2021-07-26 DIAGNOSIS — E11.59 HYPERTENSION ASSOCIATED WITH DIABETES: ICD-10-CM

## 2021-07-26 DIAGNOSIS — I50.813 ACUTE ON CHRONIC RIGHT HEART FAILURE: ICD-10-CM

## 2021-07-26 DIAGNOSIS — E78.5 TYPE 2 DIABETES MELLITUS WITH HYPERLIPIDEMIA: ICD-10-CM

## 2021-07-26 DIAGNOSIS — M05.742 RHEUMATOID ARTHRITIS INVOLVING BOTH HANDS WITH POSITIVE RHEUMATOID FACTOR: ICD-10-CM

## 2021-07-26 DIAGNOSIS — M05.741 RHEUMATOID ARTHRITIS INVOLVING BOTH HANDS WITH POSITIVE RHEUMATOID FACTOR: ICD-10-CM

## 2021-07-26 DIAGNOSIS — E78.2 MIXED HYPERLIPIDEMIA: ICD-10-CM

## 2021-07-26 DIAGNOSIS — D69.6 THROMBOCYTOPENIA: Chronic | ICD-10-CM

## 2021-07-26 DIAGNOSIS — Z00.00 ENCOUNTER FOR PREVENTIVE HEALTH EXAMINATION: Primary | ICD-10-CM

## 2021-07-26 LAB
ESTIMATED AVG GLUCOSE: 108 MG/DL (ref 68–131)
HBA1C MFR BLD: 5.4 % (ref 4–5.6)

## 2021-07-26 PROCEDURE — 99999 PR PBB SHADOW E&M-EST. PATIENT-LVL III: CPT | Mod: PBBFAC,,, | Performed by: NURSE PRACTITIONER

## 2021-07-26 PROCEDURE — 99213 OFFICE O/P EST LOW 20 MIN: CPT | Mod: PBBFAC,PO | Performed by: NURSE PRACTITIONER

## 2021-07-26 PROCEDURE — G0439 PR MEDICARE ANNUAL WELLNESS SUBSEQUENT VISIT: ICD-10-PCS | Mod: ,,, | Performed by: NURSE PRACTITIONER

## 2021-07-26 PROCEDURE — 99999 PR PBB SHADOW E&M-EST. PATIENT-LVL III: ICD-10-PCS | Mod: PBBFAC,,, | Performed by: NURSE PRACTITIONER

## 2021-07-26 PROCEDURE — 83036 HEMOGLOBIN GLYCOSYLATED A1C: CPT | Performed by: FAMILY MEDICINE

## 2021-07-26 PROCEDURE — G0439 PPPS, SUBSEQ VISIT: HCPCS | Mod: ,,, | Performed by: NURSE PRACTITIONER

## 2021-07-26 PROCEDURE — 36415 COLL VENOUS BLD VENIPUNCTURE: CPT | Mod: PO | Performed by: FAMILY MEDICINE

## 2021-07-26 RX ORDER — LORATADINE 10 MG/1
10 TABLET ORAL DAILY
COMMUNITY
End: 2023-07-14

## 2021-07-26 RX ORDER — GABAPENTIN 100 MG/1
100 CAPSULE ORAL 3 TIMES DAILY
Qty: 270 CAPSULE | Refills: 1 | Status: SHIPPED | OUTPATIENT
Start: 2021-07-26 | End: 2021-07-28 | Stop reason: SDUPTHER

## 2021-07-26 RX ORDER — ASPIRIN 81 MG/1
81 TABLET ORAL DAILY
Status: ON HOLD | COMMUNITY
End: 2023-05-05 | Stop reason: HOSPADM

## 2021-07-26 RX ORDER — MELOXICAM 7.5 MG/1
7.5 TABLET ORAL DAILY
COMMUNITY
End: 2022-03-14 | Stop reason: SDUPTHER

## 2021-07-27 ENCOUNTER — TELEPHONE (OUTPATIENT)
Dept: FAMILY MEDICINE | Facility: CLINIC | Age: 76
End: 2021-07-27

## 2021-07-27 DIAGNOSIS — G89.4 CHRONIC PAIN SYNDROME: Primary | ICD-10-CM

## 2021-07-28 RX ORDER — GABAPENTIN 100 MG/1
100 CAPSULE ORAL 3 TIMES DAILY
Qty: 270 CAPSULE | Refills: 1 | Status: SHIPPED | OUTPATIENT
Start: 2021-07-28 | End: 2022-01-24

## 2021-07-31 ENCOUNTER — EXTERNAL CHRONIC CARE MANAGEMENT (OUTPATIENT)
Dept: PRIMARY CARE CLINIC | Facility: CLINIC | Age: 76
End: 2021-07-31
Payer: MEDICARE

## 2021-07-31 PROCEDURE — 99490 CHRNC CARE MGMT STAFF 1ST 20: CPT | Mod: S$PBB,,, | Performed by: FAMILY MEDICINE

## 2021-07-31 PROCEDURE — 99490 PR CHRONIC CARE MGMT, 1ST 20 MIN: ICD-10-PCS | Mod: S$PBB,,, | Performed by: FAMILY MEDICINE

## 2021-07-31 PROCEDURE — 99490 CHRNC CARE MGMT STAFF 1ST 20: CPT | Mod: PBBFAC,PO | Performed by: FAMILY MEDICINE

## 2021-08-09 ENCOUNTER — HOSPITAL ENCOUNTER (OUTPATIENT)
Dept: RADIOLOGY | Facility: HOSPITAL | Age: 76
Discharge: HOME | End: 2021-08-09
Attending: FAMILY MEDICINE
Payer: MEDICARE

## 2021-08-09 DIAGNOSIS — R92.8 ABNORMAL MAMMOGRAM OF LEFT BREAST: ICD-10-CM

## 2021-08-09 PROCEDURE — 76642 US BREAST LEFT LIMITED: ICD-10-PCS | Mod: 26,LT,, | Performed by: RADIOLOGY

## 2021-08-09 PROCEDURE — 77066 MAMMO DIGITAL DIAGNOSTIC BILAT WITH TOMO: ICD-10-PCS | Mod: 26,,, | Performed by: RADIOLOGY

## 2021-08-09 PROCEDURE — 76642 ULTRASOUND BREAST LIMITED: CPT | Mod: 26,LT,, | Performed by: RADIOLOGY

## 2021-08-09 PROCEDURE — 77066 DX MAMMO INCL CAD BI: CPT | Mod: 26,,, | Performed by: RADIOLOGY

## 2021-08-09 PROCEDURE — 77066 DX MAMMO INCL CAD BI: CPT | Mod: TC

## 2021-08-09 PROCEDURE — 77062 BREAST TOMOSYNTHESIS BI: CPT | Mod: 26,,, | Performed by: RADIOLOGY

## 2021-08-09 PROCEDURE — 77062 MAMMO DIGITAL DIAGNOSTIC BILAT WITH TOMO: ICD-10-PCS | Mod: 26,,, | Performed by: RADIOLOGY

## 2021-08-09 PROCEDURE — 76642 ULTRASOUND BREAST LIMITED: CPT | Mod: TC,LT

## 2021-08-31 ENCOUNTER — EXTERNAL CHRONIC CARE MANAGEMENT (OUTPATIENT)
Dept: PRIMARY CARE CLINIC | Facility: CLINIC | Age: 76
End: 2021-08-31
Payer: MEDICARE

## 2021-08-31 PROCEDURE — 99490 PR CHRONIC CARE MGMT, 1ST 20 MIN: ICD-10-PCS | Mod: S$PBB,,, | Performed by: FAMILY MEDICINE

## 2021-08-31 PROCEDURE — 99490 CHRNC CARE MGMT STAFF 1ST 20: CPT | Mod: S$PBB,,, | Performed by: FAMILY MEDICINE

## 2021-08-31 PROCEDURE — 99490 CHRNC CARE MGMT STAFF 1ST 20: CPT | Mod: PBBFAC,PO | Performed by: FAMILY MEDICINE

## 2021-09-30 ENCOUNTER — EXTERNAL CHRONIC CARE MANAGEMENT (OUTPATIENT)
Dept: PRIMARY CARE CLINIC | Facility: CLINIC | Age: 76
End: 2021-09-30
Payer: MEDICARE

## 2021-09-30 PROCEDURE — 99490 PR CHRONIC CARE MGMT, 1ST 20 MIN: ICD-10-PCS | Mod: S$PBB,,, | Performed by: FAMILY MEDICINE

## 2021-09-30 PROCEDURE — 99490 CHRNC CARE MGMT STAFF 1ST 20: CPT | Mod: PBBFAC,PO | Performed by: FAMILY MEDICINE

## 2021-09-30 PROCEDURE — 99490 CHRNC CARE MGMT STAFF 1ST 20: CPT | Mod: S$PBB,,, | Performed by: FAMILY MEDICINE

## 2021-10-13 ENCOUNTER — HOSPITAL ENCOUNTER (EMERGENCY)
Facility: HOSPITAL | Age: 76
Discharge: HOME | End: 2021-10-13
Attending: EMERGENCY MEDICINE
Payer: MEDICARE

## 2021-10-13 VITALS
BODY MASS INDEX: 24.8 KG/M2 | OXYGEN SATURATION: 96 % | DIASTOLIC BLOOD PRESSURE: 58 MMHG | SYSTOLIC BLOOD PRESSURE: 112 MMHG | HEIGHT: 63 IN | WEIGHT: 140 LBS | RESPIRATION RATE: 18 BRPM | TEMPERATURE: 100 F | HEART RATE: 85 BPM
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
## 2021-10-13 DIAGNOSIS — S93.402A SPRAIN OF LEFT ANKLE, UNSPECIFIED LIGAMENT, INITIAL ENCOUNTER: ICD-10-CM

## 2021-10-13 PROCEDURE — 99283 EMERGENCY DEPT VISIT LOW MDM: CPT | Mod: 25

## 2021-10-21 ENCOUNTER — OFFICE VISIT (OUTPATIENT)
Dept: ORTHOPEDICS | Facility: CLINIC | Age: 76
End: 2021-10-21
Payer: MEDICARE

## 2021-10-21 ENCOUNTER — PATIENT OUTREACH (OUTPATIENT)
Dept: ADMINISTRATIVE | Facility: OTHER | Age: 76
End: 2021-10-21

## 2021-10-21 VITALS — BODY MASS INDEX: 24.8 KG/M2 | HEIGHT: 63 IN | WEIGHT: 140 LBS | RESPIRATION RATE: 16 BRPM

## 2021-10-21 DIAGNOSIS — S93.402A MODERATE ANKLE SPRAIN, LEFT, INITIAL ENCOUNTER: Primary | ICD-10-CM

## 2021-10-21 PROCEDURE — 99203 OFFICE O/P NEW LOW 30 MIN: CPT | Mod: S$PBB,,, | Performed by: ORTHOPAEDIC SURGERY

## 2021-10-21 PROCEDURE — 99999 PR PBB SHADOW E&M-EST. PATIENT-LVL III: CPT | Mod: PBBFAC,,, | Performed by: ORTHOPAEDIC SURGERY

## 2021-10-21 PROCEDURE — 99213 OFFICE O/P EST LOW 20 MIN: CPT | Mod: PBBFAC,PN | Performed by: ORTHOPAEDIC SURGERY

## 2021-10-21 PROCEDURE — 99999 PR PBB SHADOW E&M-EST. PATIENT-LVL III: ICD-10-PCS | Mod: PBBFAC,,, | Performed by: ORTHOPAEDIC SURGERY

## 2021-10-21 PROCEDURE — 99203 PR OFFICE/OUTPT VISIT, NEW, LEVL III, 30-44 MIN: ICD-10-PCS | Mod: S$PBB,,, | Performed by: ORTHOPAEDIC SURGERY

## 2021-10-28 ENCOUNTER — CLINICAL SUPPORT (OUTPATIENT)
Dept: REHABILITATION | Facility: HOSPITAL | Age: 76
End: 2021-10-28
Attending: ORTHOPAEDIC SURGERY
Payer: MEDICARE

## 2021-10-28 DIAGNOSIS — R26.9 GAIT ABNORMALITY: ICD-10-CM

## 2021-10-28 DIAGNOSIS — S93.402A MODERATE ANKLE SPRAIN, LEFT, INITIAL ENCOUNTER: ICD-10-CM

## 2021-10-28 PROCEDURE — 97161 PT EVAL LOW COMPLEX 20 MIN: CPT | Mod: PN

## 2021-10-31 ENCOUNTER — EXTERNAL CHRONIC CARE MANAGEMENT (OUTPATIENT)
Dept: PRIMARY CARE CLINIC | Facility: CLINIC | Age: 76
End: 2021-10-31
Payer: MEDICARE

## 2021-10-31 PROCEDURE — 99490 CHRNC CARE MGMT STAFF 1ST 20: CPT | Mod: PBBFAC,PO | Performed by: FAMILY MEDICINE

## 2021-10-31 PROCEDURE — 99490 CHRNC CARE MGMT STAFF 1ST 20: CPT | Mod: S$PBB,,, | Performed by: FAMILY MEDICINE

## 2021-10-31 PROCEDURE — 99490 PR CHRONIC CARE MGMT, 1ST 20 MIN: ICD-10-PCS | Mod: S$PBB,,, | Performed by: FAMILY MEDICINE

## 2021-11-01 ENCOUNTER — TELEPHONE (OUTPATIENT)
Dept: ORTHOPEDICS | Facility: CLINIC | Age: 76
End: 2021-11-01
Payer: MEDICARE

## 2021-11-01 ENCOUNTER — CLINICAL SUPPORT (OUTPATIENT)
Dept: REHABILITATION | Facility: HOSPITAL | Age: 76
End: 2021-11-01
Attending: ORTHOPAEDIC SURGERY
Payer: MEDICARE

## 2021-11-01 DIAGNOSIS — R26.9 GAIT ABNORMALITY: ICD-10-CM

## 2021-11-01 PROCEDURE — 97110 THERAPEUTIC EXERCISES: CPT | Mod: PN

## 2021-11-01 PROCEDURE — 97112 NEUROMUSCULAR REEDUCATION: CPT | Mod: PN

## 2021-11-03 ENCOUNTER — CLINICAL SUPPORT (OUTPATIENT)
Dept: REHABILITATION | Facility: HOSPITAL | Age: 76
End: 2021-11-03
Attending: ORTHOPAEDIC SURGERY
Payer: MEDICARE

## 2021-11-03 DIAGNOSIS — R26.9 GAIT ABNORMALITY: ICD-10-CM

## 2021-11-03 PROCEDURE — 97110 THERAPEUTIC EXERCISES: CPT | Mod: PN

## 2021-11-03 PROCEDURE — 97022 WHIRLPOOL THERAPY: CPT | Mod: PN

## 2021-11-08 ENCOUNTER — CLINICAL SUPPORT (OUTPATIENT)
Dept: REHABILITATION | Facility: HOSPITAL | Age: 76
End: 2021-11-08
Attending: ORTHOPAEDIC SURGERY
Payer: MEDICARE

## 2021-11-08 DIAGNOSIS — R26.9 GAIT ABNORMALITY: ICD-10-CM

## 2021-11-08 DIAGNOSIS — E11.59 HYPERTENSION ASSOCIATED WITH DIABETES: ICD-10-CM

## 2021-11-08 DIAGNOSIS — N18.30 CKD (CHRONIC KIDNEY DISEASE) STAGE 3, GFR 30-59 ML/MIN: ICD-10-CM

## 2021-11-08 DIAGNOSIS — I15.2 HYPERTENSION ASSOCIATED WITH DIABETES: ICD-10-CM

## 2021-11-08 DIAGNOSIS — G45.8 OTHER SPECIFIED TRANSIENT CEREBRAL ISCHEMIAS: Chronic | ICD-10-CM

## 2021-11-08 DIAGNOSIS — R06.02 SOB (SHORTNESS OF BREATH): ICD-10-CM

## 2021-11-08 PROCEDURE — 97110 THERAPEUTIC EXERCISES: CPT | Mod: PN,CQ

## 2021-11-08 RX ORDER — ROSUVASTATIN CALCIUM 10 MG/1
TABLET, COATED ORAL
Qty: 70 TABLET | Refills: 4 | Status: SHIPPED | OUTPATIENT
Start: 2021-11-08 | End: 2022-03-08 | Stop reason: SDUPTHER

## 2021-11-10 ENCOUNTER — CLINICAL SUPPORT (OUTPATIENT)
Dept: REHABILITATION | Facility: HOSPITAL | Age: 76
End: 2021-11-10
Attending: ORTHOPAEDIC SURGERY
Payer: MEDICARE

## 2021-11-10 DIAGNOSIS — R26.9 GAIT ABNORMALITY: ICD-10-CM

## 2021-11-10 PROCEDURE — 97110 THERAPEUTIC EXERCISES: CPT | Mod: PN

## 2021-11-10 PROCEDURE — 97022 WHIRLPOOL THERAPY: CPT | Mod: PN

## 2021-11-11 ENCOUNTER — OFFICE VISIT (OUTPATIENT)
Dept: ORTHOPEDICS | Facility: CLINIC | Age: 76
End: 2021-11-11
Payer: MEDICARE

## 2021-11-11 VITALS — BODY MASS INDEX: 24.8 KG/M2 | HEIGHT: 63 IN | RESPIRATION RATE: 16 BRPM | WEIGHT: 140 LBS

## 2021-11-11 DIAGNOSIS — S93.402D MODERATE ANKLE SPRAIN, LEFT, SUBSEQUENT ENCOUNTER: Primary | ICD-10-CM

## 2021-11-11 PROCEDURE — 99999 PR PBB SHADOW E&M-EST. PATIENT-LVL III: CPT | Mod: PBBFAC,,, | Performed by: ORTHOPAEDIC SURGERY

## 2021-11-11 PROCEDURE — 99213 OFFICE O/P EST LOW 20 MIN: CPT | Mod: S$PBB,,, | Performed by: ORTHOPAEDIC SURGERY

## 2021-11-11 PROCEDURE — 99213 PR OFFICE/OUTPT VISIT, EST, LEVL III, 20-29 MIN: ICD-10-PCS | Mod: S$PBB,,, | Performed by: ORTHOPAEDIC SURGERY

## 2021-11-11 PROCEDURE — 99213 OFFICE O/P EST LOW 20 MIN: CPT | Mod: PBBFAC,PN | Performed by: ORTHOPAEDIC SURGERY

## 2021-11-11 PROCEDURE — 99999 PR PBB SHADOW E&M-EST. PATIENT-LVL III: ICD-10-PCS | Mod: PBBFAC,,, | Performed by: ORTHOPAEDIC SURGERY

## 2021-11-15 ENCOUNTER — CLINICAL SUPPORT (OUTPATIENT)
Dept: REHABILITATION | Facility: HOSPITAL | Age: 76
End: 2021-11-15
Attending: ORTHOPAEDIC SURGERY
Payer: MEDICARE

## 2021-11-15 DIAGNOSIS — R26.9 GAIT ABNORMALITY: ICD-10-CM

## 2021-11-15 PROCEDURE — 97110 THERAPEUTIC EXERCISES: CPT | Mod: PN,CQ

## 2021-11-17 ENCOUNTER — CLINICAL SUPPORT (OUTPATIENT)
Dept: REHABILITATION | Facility: HOSPITAL | Age: 76
End: 2021-11-17
Attending: ORTHOPAEDIC SURGERY
Payer: MEDICARE

## 2021-11-17 DIAGNOSIS — R26.9 GAIT ABNORMALITY: ICD-10-CM

## 2021-11-17 PROCEDURE — 97022 WHIRLPOOL THERAPY: CPT | Mod: PN

## 2021-11-17 PROCEDURE — 97110 THERAPEUTIC EXERCISES: CPT | Mod: PN

## 2021-11-22 ENCOUNTER — CLINICAL SUPPORT (OUTPATIENT)
Dept: REHABILITATION | Facility: HOSPITAL | Age: 76
End: 2021-11-22
Attending: ORTHOPAEDIC SURGERY
Payer: MEDICARE

## 2021-11-22 DIAGNOSIS — R26.9 GAIT ABNORMALITY: ICD-10-CM

## 2021-11-22 PROCEDURE — 97110 THERAPEUTIC EXERCISES: CPT | Mod: PN,CQ

## 2021-11-29 ENCOUNTER — CLINICAL SUPPORT (OUTPATIENT)
Dept: REHABILITATION | Facility: HOSPITAL | Age: 76
End: 2021-11-29
Attending: ORTHOPAEDIC SURGERY
Payer: MEDICARE

## 2021-11-29 DIAGNOSIS — R26.9 GAIT ABNORMALITY: ICD-10-CM

## 2021-11-29 PROCEDURE — 97110 THERAPEUTIC EXERCISES: CPT | Mod: PN,CQ

## 2021-11-29 PROCEDURE — 97112 NEUROMUSCULAR REEDUCATION: CPT | Mod: PN,CQ

## 2021-11-30 ENCOUNTER — DOCUMENTATION ONLY (OUTPATIENT)
Dept: REHABILITATION | Facility: HOSPITAL | Age: 76
End: 2021-11-30
Payer: MEDICARE

## 2021-12-01 ENCOUNTER — CLINICAL SUPPORT (OUTPATIENT)
Dept: REHABILITATION | Facility: HOSPITAL | Age: 76
End: 2021-12-01
Attending: ORTHOPAEDIC SURGERY
Payer: MEDICARE

## 2021-12-01 DIAGNOSIS — R26.9 GAIT ABNORMALITY: ICD-10-CM

## 2021-12-01 PROCEDURE — 97110 THERAPEUTIC EXERCISES: CPT | Mod: PN

## 2021-12-08 ENCOUNTER — PATIENT OUTREACH (OUTPATIENT)
Dept: ADMINISTRATIVE | Facility: OTHER | Age: 76
End: 2021-12-08
Payer: MEDICARE

## 2021-12-08 ENCOUNTER — CLINICAL SUPPORT (OUTPATIENT)
Dept: REHABILITATION | Facility: HOSPITAL | Age: 76
End: 2021-12-08
Attending: ORTHOPAEDIC SURGERY
Payer: MEDICARE

## 2021-12-08 DIAGNOSIS — E11.9 TYPE 2 DIABETES MELLITUS WITHOUT COMPLICATION, WITH LONG-TERM CURRENT USE OF INSULIN: ICD-10-CM

## 2021-12-08 DIAGNOSIS — R26.9 GAIT ABNORMALITY: ICD-10-CM

## 2021-12-08 DIAGNOSIS — Z79.4 TYPE 2 DIABETES MELLITUS WITHOUT COMPLICATION, WITH LONG-TERM CURRENT USE OF INSULIN: ICD-10-CM

## 2021-12-08 PROCEDURE — 97110 THERAPEUTIC EXERCISES: CPT | Mod: PN

## 2021-12-08 PROCEDURE — 97022 WHIRLPOOL THERAPY: CPT | Mod: PN

## 2021-12-09 ENCOUNTER — OFFICE VISIT (OUTPATIENT)
Dept: ORTHOPEDICS | Facility: CLINIC | Age: 76
End: 2021-12-09
Payer: MEDICARE

## 2021-12-09 VITALS — WEIGHT: 139 LBS | BODY MASS INDEX: 24.63 KG/M2 | RESPIRATION RATE: 18 BRPM | HEIGHT: 63 IN

## 2021-12-09 DIAGNOSIS — S93.402D MODERATE ANKLE SPRAIN, LEFT, SUBSEQUENT ENCOUNTER: Primary | ICD-10-CM

## 2021-12-09 PROCEDURE — 99999 PR PBB SHADOW E&M-EST. PATIENT-LVL II: CPT | Mod: PBBFAC,,, | Performed by: ORTHOPAEDIC SURGERY

## 2021-12-09 PROCEDURE — 99213 PR OFFICE/OUTPT VISIT, EST, LEVL III, 20-29 MIN: ICD-10-PCS | Mod: S$PBB,,, | Performed by: ORTHOPAEDIC SURGERY

## 2021-12-09 PROCEDURE — 99213 OFFICE O/P EST LOW 20 MIN: CPT | Mod: S$PBB,,, | Performed by: ORTHOPAEDIC SURGERY

## 2021-12-09 PROCEDURE — 99999 PR PBB SHADOW E&M-EST. PATIENT-LVL II: ICD-10-PCS | Mod: PBBFAC,,, | Performed by: ORTHOPAEDIC SURGERY

## 2021-12-09 PROCEDURE — 99212 OFFICE O/P EST SF 10 MIN: CPT | Mod: PBBFAC,PN | Performed by: ORTHOPAEDIC SURGERY

## 2021-12-09 RX ORDER — METFORMIN HYDROCHLORIDE 500 MG/1
TABLET ORAL
Qty: 180 TABLET | Refills: 0 | Status: SHIPPED | OUTPATIENT
Start: 2021-12-09 | End: 2022-03-02 | Stop reason: SDUPTHER

## 2021-12-31 ENCOUNTER — EXTERNAL CHRONIC CARE MANAGEMENT (OUTPATIENT)
Dept: PRIMARY CARE CLINIC | Facility: CLINIC | Age: 76
End: 2021-12-31
Payer: MEDICARE

## 2021-12-31 PROCEDURE — 99490 CHRNC CARE MGMT STAFF 1ST 20: CPT | Mod: PBBFAC,PO | Performed by: FAMILY MEDICINE

## 2021-12-31 PROCEDURE — 99490 PR CHRONIC CARE MGMT, 1ST 20 MIN: ICD-10-PCS | Mod: S$PBB,,, | Performed by: FAMILY MEDICINE

## 2021-12-31 PROCEDURE — 99490 CHRNC CARE MGMT STAFF 1ST 20: CPT | Mod: S$PBB,,, | Performed by: FAMILY MEDICINE

## 2022-01-07 DIAGNOSIS — E11.69 TYPE 2 DIABETES MELLITUS WITH HYPERLIPIDEMIA: ICD-10-CM

## 2022-01-07 DIAGNOSIS — E08.00 DIABETES MELLITUS DUE TO UNDERLYING CONDITION WITH HYPEROSMOLARITY WITHOUT COMA, WITHOUT LONG-TERM CURRENT USE OF INSULIN: ICD-10-CM

## 2022-01-07 DIAGNOSIS — E78.5 TYPE 2 DIABETES MELLITUS WITH HYPERLIPIDEMIA: ICD-10-CM

## 2022-01-07 NOTE — TELEPHONE ENCOUNTER
No new care gaps identified.  Powered by [employer] by [employer]. Reference number: [id number].   1/07/2022 11:08:20 AM CST

## 2022-01-07 NOTE — TELEPHONE ENCOUNTER
Refill Routing Note   Medication(s) are not appropriate for processing by [facility] for the following reason(s):      - Medication requested is a request for dosage adjustment    [facility] action(s):  Defer Medication-related problems identified: Dose adjustment     Medication Therapy Plan: Dosage Adjustment requested- unable to locate in chart the change to qd; defer to pcp   --->Care Gap information included in message below if applicable.   Medication reconciliation completed: No   Automatic Epic Generated Protocol Data:        Requested Prescriptions   Pending Prescriptions Disp Refills    blood sugar diagnostic (ACCU-CHEK JACINTO PLUS TEST STRP) Strp 100 each 3     Sig: Use to test blood glucose one (1) times daily as directed with insurance preferred meter and supplies       Endocrinology: Diabetes - Supplies Passed - 1/7/2022 11:07 AM        Passed - Patient is at least 18 years old        Passed - Valid encounter within last 15 months     Recent Visits  Date Type Provider Dept   01/11/21 Office Visit LUÍS Huggins MD [facility] Family Medicine   07/31/20 Office Visit LUÍS Huggins MD [facility] Family Medicine   Showing recent visits within past 720 days and meeting all other requirements  Future Appointments  No visits were found meeting these conditions.  Showing future appointments within next 150 days and meeting all other requirements                Passed - HBA1C within 1080 days     Lab Results   Component Value Date    HGBA1C 5.4 07/26/2021    HGBA1C 5.3 11/02/2020    HGBA1C 4.8 07/20/2020                lancets Misc 100 each 3     Sig: Use to test blood glucose one (1) times daily as directed with insurance preferred meter and supplies       There is no refill protocol information for this order           Appointments  past 12m or future 3m with PCP    Date Provider   Last Visit   1/11/2021 LUÍS Huggins MD   Next Visit   Visit date not found LUÍS Huggins MD   ED visits in past 90 days: 1         Note composed:11:11 AM 01/07/2022

## 2022-01-07 NOTE — TELEPHONE ENCOUNTER
----- Message from Aleksey Soliman, Patient Care Assistant sent at 1/7/2022 10:56 AM CST -----  Contact: [facility]  Type:  RX Refill Request    Who Called:  [facility]  Refill or New Rx:  Refill  RX Name and Strength:  ACCuChek Test Srips  How is the patient currently taking it? (ex. 1XDay):  As Directed  Is this a 30 day or 90 day RX:  90  Preferred Pharmacy with phone number:    [id number] - [place], [place] - [address] [address]  Phone: [phone] Fax: [phone]    Local or Mail Order:  Local  Ordering Provider:  Dr Joseluis Mendes Call Back Number:  [phone]    Additional Information: Need Diagnosis Code and should say Test Once a day. Pt has Medicare Part B and they will not pay if it is not prescribed as above.   Please contact pt upon completion-Thank you~

## 2022-01-09 RX ORDER — LANCETS
EACH MISCELLANEOUS
Qty: 100 EACH | Refills: 3 | Status: ON HOLD | OUTPATIENT
Start: 2022-01-09 | End: 2023-05-05 | Stop reason: HOSPADM

## 2022-01-31 ENCOUNTER — EXTERNAL CHRONIC CARE MANAGEMENT (OUTPATIENT)
Dept: PRIMARY CARE CLINIC | Facility: CLINIC | Age: 77
End: 2022-01-31
Payer: MEDICARE

## 2022-01-31 PROCEDURE — 99490 CHRNC CARE MGMT STAFF 1ST 20: CPT | Mod: PBBFAC,PO | Performed by: FAMILY MEDICINE

## 2022-01-31 PROCEDURE — 99490 CHRNC CARE MGMT STAFF 1ST 20: CPT | Mod: S$PBB,,, | Performed by: FAMILY MEDICINE

## 2022-01-31 PROCEDURE — 99490 PR CHRONIC CARE MGMT, 1ST 20 MIN: ICD-10-PCS | Mod: S$PBB,,, | Performed by: FAMILY MEDICINE

## 2022-02-03 DIAGNOSIS — E78.5 TYPE 2 DIABETES MELLITUS WITH HYPERLIPIDEMIA: ICD-10-CM

## 2022-02-03 DIAGNOSIS — E11.69 TYPE 2 DIABETES MELLITUS WITH HYPERLIPIDEMIA: ICD-10-CM

## 2022-02-03 RX ORDER — SAXAGLIPTIN 5 MG/1
5 TABLET, FILM COATED ORAL DAILY
Qty: 30 TABLET | Refills: 0 | Status: SHIPPED | OUTPATIENT
Start: 2022-02-03 | End: 2022-03-07 | Stop reason: SDUPTHER

## 2022-02-03 NOTE — TELEPHONE ENCOUNTER
No new care gaps identified.  Powered by [employer] by [employer]. Reference number: [id number].   2/03/2022 1:06:27 PM CST

## 2022-02-03 NOTE — TELEPHONE ENCOUNTER
----- Message from Tavo Whitehead sent at 2/3/2022 11:46 AM CST -----  Type:  RX Refill Request  Who Called: Patient  Refill or New Rx: refill  RX Name and Strength: ONGLYZA 5 mg Tab tablet 90 tablet   How is the patient currently taking it? (ex. 1XDay):    Is this a 30 day or 90 day RX:    Preferred Pharmacy with phone number:  [facility], [address]   Phone:  [phone]  Fax:  [phone]  Local or Mail Order:  Local  Ordering Provider:  LUÍS Huggins MD  Best Call Back Number:  [phone]  Additional Information: Pt requesting refill on Rx ONGLYZA 5 mg Tab tablet 90 tablet , but pt was told the script required a prior authorization per [facility]. Pt

## 2022-02-14 DIAGNOSIS — R06.02 SOB (SHORTNESS OF BREATH): ICD-10-CM

## 2022-02-14 DIAGNOSIS — I15.2 HYPERTENSION ASSOCIATED WITH DIABETES: ICD-10-CM

## 2022-02-14 DIAGNOSIS — G45.8 OTHER SPECIFIED TRANSIENT CEREBRAL ISCHEMIAS: Chronic | ICD-10-CM

## 2022-02-14 DIAGNOSIS — E11.59 HYPERTENSION ASSOCIATED WITH DIABETES: ICD-10-CM

## 2022-02-14 DIAGNOSIS — N18.30 CKD (CHRONIC KIDNEY DISEASE) STAGE 3, GFR 30-59 ML/MIN: ICD-10-CM

## 2022-02-14 RX ORDER — VALSARTAN AND HYDROCHLOROTHIAZIDE 160; 12.5 MG/1; MG/1
1 TABLET, FILM COATED ORAL DAILY
Qty: 30 TABLET | Refills: 0 | Status: SHIPPED | OUTPATIENT
Start: 2022-02-14 | End: 2022-03-08 | Stop reason: SDUPTHER

## 2022-02-14 RX ORDER — CLOPIDOGREL BISULFATE 75 MG/1
75 TABLET ORAL DAILY
Qty: 30 TABLET | Refills: 0 | Status: SHIPPED | OUTPATIENT
Start: 2022-02-14 | End: 2022-03-08 | Stop reason: SDUPTHER

## 2022-02-14 RX ORDER — DILTIAZEM HYDROCHLORIDE 120 MG/1
CAPSULE, COATED, EXTENDED RELEASE ORAL
Qty: 30 CAPSULE | Refills: 0 | Status: SHIPPED | OUTPATIENT
Start: 2022-02-14 | End: 2022-03-08 | Stop reason: SDUPTHER

## 2022-02-14 NOTE — TELEPHONE ENCOUNTER
----- Message from Desiree Box sent at 2/14/2022  9:16 AM CST -----  RX Refill Request    Who Called: PT     Refill or New Rx: Refill     RX Name and Strength:  clopidogreL (PLAVIX) 75 mg tablet  diltiaZEM (CARDIZEM CD) 120 MG Cp24    How is the patient currently taking it? (ex. 1XDay):  TAKE 1 TABLET BY MOUTH ONCE DAILY (NEEDS APPOINTMENT),  TAKE 1 CAPSULE BY MOUTH IN THE EVENING    Is this a 30 day or 90 day RX:    Preferred Pharmacy with phone number:  [facility] [place] - [address] [facility] (Ph: [phone])    Local or Mail Order:Local    Ordering Provider: Kaylee Mendes Call Back Number:[phone] (M)     Pt stated that she only have 1 clopidogreL (PLAVIX) 75 mg tablet left. Please call the pt regarding the refill.

## 2022-02-23 DIAGNOSIS — D84.9 IMMUNOSUPPRESSED STATUS: ICD-10-CM

## 2022-02-28 ENCOUNTER — EXTERNAL CHRONIC CARE MANAGEMENT (OUTPATIENT)
Dept: PRIMARY CARE CLINIC | Facility: CLINIC | Age: 77
End: 2022-02-28
Payer: MEDICARE

## 2022-02-28 PROCEDURE — 99490 CHRNC CARE MGMT STAFF 1ST 20: CPT | Mod: S$PBB,,, | Performed by: FAMILY MEDICINE

## 2022-02-28 PROCEDURE — 99490 CHRNC CARE MGMT STAFF 1ST 20: CPT | Mod: PBBFAC,PO | Performed by: FAMILY MEDICINE

## 2022-02-28 PROCEDURE — 99490 PR CHRONIC CARE MGMT, 1ST 20 MIN: ICD-10-PCS | Mod: S$PBB,,, | Performed by: FAMILY MEDICINE

## 2022-03-02 DIAGNOSIS — E11.69 TYPE 2 DIABETES MELLITUS WITH HYPERLIPIDEMIA: ICD-10-CM

## 2022-03-02 DIAGNOSIS — Z79.4 TYPE 2 DIABETES MELLITUS WITHOUT COMPLICATION, WITH LONG-TERM CURRENT USE OF INSULIN: ICD-10-CM

## 2022-03-02 DIAGNOSIS — E11.9 TYPE 2 DIABETES MELLITUS WITHOUT COMPLICATION, WITH LONG-TERM CURRENT USE OF INSULIN: ICD-10-CM

## 2022-03-02 DIAGNOSIS — E78.5 TYPE 2 DIABETES MELLITUS WITH HYPERLIPIDEMIA: ICD-10-CM

## 2022-03-02 RX ORDER — METFORMIN HYDROCHLORIDE 500 MG/1
TABLET ORAL
Qty: 180 TABLET | Refills: 0 | Status: SHIPPED | OUTPATIENT
Start: 2022-03-02 | End: 2022-03-08 | Stop reason: SDUPTHER

## 2022-03-02 NOTE — TELEPHONE ENCOUNTER
Care Due:                  Date            Visit Type   Department     Provider  --------------------------------------------------------------------------------                                EP -                              PRIMARY      [facility] FAMILY  Last Visit: 01-      CARE ([facility])   MEDICINE       LUÍS GOULD  Next Visit: None Scheduled  None         None Found                                                            Last  Test          Frequency    Reason                     Performed    Due Date  --------------------------------------------------------------------------------    Office Visit  12 months..  SAXagliptin, metFORMIN,    01- 01-                             traZODone................    Cr..........  12 months..  SAXagliptin, metFORMIN...  02- 02-    HBA1C.......  6 months...  SAXagliptin, metFORMIN...  07- 01-    Powered by [employer] by [employer]. Reference number: [id number].   3/02/2022 10:02:56 AM CST

## 2022-03-04 RX ORDER — SAXAGLIPTIN 5 MG/1
5 TABLET, FILM COATED ORAL DAILY
Qty: 30 TABLET | Refills: 0 | OUTPATIENT
Start: 2022-03-04 | End: 2023-03-04

## 2022-03-04 NOTE — TELEPHONE ENCOUNTER
----- Message from Marjorie Rosas sent at 3/4/2022 11:27 AM CST -----  Contact: pt  Type:  RX Refill Request    Who Called:  pt  Refill or New Rx: refill  RX Name and Strength: SAXagliptin (ONGLYZA) 5 mg Tab tablet, needs PA for insurance to cover cost  How is the patient currently taking it? (ex. 1XDay): Take 1 tablet (5 mg total) by mouth once daily  Is this a 30 day or 90 day RX: 30  Preferred Pharmacy with phone number:   [facility]  [place], [place] - [address] [address]  Phone: [phone] Fax: [phone]    [facility] Call Back Number:  [phone]  Additional Information: pt only have 1 pill left

## 2022-03-04 NOTE — TELEPHONE ENCOUNTER
Provider Staff:     Action is required for this patient.   Please see care gap opportunities below in Care Due Message:   .     Thanks!  [facility]     Appointments      Date Provider   Last Visit   1/11/2021 LUÍS Huggins MD   Next Visit   Visit date not found LUÍS Huggins MD     Note composed:6:09 AM 03/04/2022

## 2022-03-07 DIAGNOSIS — E78.5 TYPE 2 DIABETES MELLITUS WITH HYPERLIPIDEMIA: ICD-10-CM

## 2022-03-07 DIAGNOSIS — E11.69 TYPE 2 DIABETES MELLITUS WITH HYPERLIPIDEMIA: ICD-10-CM

## 2022-03-07 RX ORDER — SAXAGLIPTIN 5 MG/1
5 TABLET, FILM COATED ORAL DAILY
Qty: 30 TABLET | Refills: 0 | Status: SHIPPED | OUTPATIENT
Start: 2022-03-07 | End: 2022-03-08 | Stop reason: CLARIF

## 2022-03-07 NOTE — TELEPHONE ENCOUNTER
No new care gaps identified.  Powered by [employer] by [employer]. Reference number: [id number].   3/07/2022 11:14:01 AM CST

## 2022-03-08 ENCOUNTER — OFFICE VISIT (OUTPATIENT)
Dept: CARDIOLOGY | Facility: CLINIC | Age: 77
End: 2022-03-08
Payer: MEDICARE

## 2022-03-08 ENCOUNTER — TELEPHONE (OUTPATIENT)
Dept: FAMILY MEDICINE | Facility: CLINIC | Age: 77
End: 2022-03-08
Payer: MEDICARE

## 2022-03-08 VITALS
WEIGHT: 143.5 LBS | HEIGHT: 63 IN | SYSTOLIC BLOOD PRESSURE: 111 MMHG | BODY MASS INDEX: 25.43 KG/M2 | DIASTOLIC BLOOD PRESSURE: 71 MMHG | HEART RATE: 72 BPM

## 2022-03-08 DIAGNOSIS — R06.02 SOB (SHORTNESS OF BREATH): ICD-10-CM

## 2022-03-08 DIAGNOSIS — I51.89 DIASTOLIC DYSFUNCTION: Primary | Chronic | ICD-10-CM

## 2022-03-08 DIAGNOSIS — E11.59 HYPERTENSION ASSOCIATED WITH DIABETES: ICD-10-CM

## 2022-03-08 DIAGNOSIS — E11.9 TYPE 2 DIABETES MELLITUS WITHOUT COMPLICATION, WITHOUT LONG-TERM CURRENT USE OF INSULIN: Chronic | ICD-10-CM

## 2022-03-08 DIAGNOSIS — E11.9 TYPE 2 DIABETES MELLITUS WITHOUT COMPLICATION, WITHOUT LONG-TERM CURRENT USE OF INSULIN: Primary | Chronic | ICD-10-CM

## 2022-03-08 DIAGNOSIS — G47.33 OSA (OBSTRUCTIVE SLEEP APNEA): ICD-10-CM

## 2022-03-08 DIAGNOSIS — N18.30 CKD (CHRONIC KIDNEY DISEASE) STAGE 3, GFR 30-59 ML/MIN: ICD-10-CM

## 2022-03-08 DIAGNOSIS — I15.2 HYPERTENSION ASSOCIATED WITH DIABETES: ICD-10-CM

## 2022-03-08 DIAGNOSIS — Z86.73 HISTORY OF TIA (TRANSIENT ISCHEMIC ATTACK): ICD-10-CM

## 2022-03-08 DIAGNOSIS — G45.8 OTHER SPECIFIED TRANSIENT CEREBRAL ISCHEMIAS: Chronic | ICD-10-CM

## 2022-03-08 DIAGNOSIS — E78.2 MIXED HYPERLIPIDEMIA: ICD-10-CM

## 2022-03-08 PROCEDURE — 99214 PR OFFICE/OUTPT VISIT, EST, LEVL IV, 30-39 MIN: ICD-10-PCS | Mod: S$PBB,,, | Performed by: PHYSICIAN ASSISTANT

## 2022-03-08 PROCEDURE — 99999 PR PBB SHADOW E&M-EST. PATIENT-LVL II: ICD-10-PCS | Mod: PBBFAC,,, | Performed by: PHYSICIAN ASSISTANT

## 2022-03-08 PROCEDURE — 99214 OFFICE O/P EST MOD 30 MIN: CPT | Mod: S$PBB,,, | Performed by: PHYSICIAN ASSISTANT

## 2022-03-08 PROCEDURE — 99999 PR PBB SHADOW E&M-EST. PATIENT-LVL II: CPT | Mod: PBBFAC,,, | Performed by: PHYSICIAN ASSISTANT

## 2022-03-08 PROCEDURE — 99212 OFFICE O/P EST SF 10 MIN: CPT | Mod: PBBFAC,PO | Performed by: PHYSICIAN ASSISTANT

## 2022-03-08 RX ORDER — LINAGLIPTIN 5 MG/1
5 TABLET, FILM COATED ORAL DAILY
Qty: 90 TABLET | Refills: 3 | Status: SHIPPED | OUTPATIENT
Start: 2022-03-08 | End: 2022-03-14 | Stop reason: SDUPTHER

## 2022-03-08 RX ORDER — CLOPIDOGREL BISULFATE 75 MG/1
75 TABLET ORAL DAILY
Qty: 90 TABLET | Refills: 3 | Status: SHIPPED | OUTPATIENT
Start: 2022-03-08 | End: 2022-03-14 | Stop reason: SDUPTHER

## 2022-03-08 RX ORDER — METFORMIN HYDROCHLORIDE 500 MG/1
500 TABLET ORAL 2 TIMES DAILY WITH MEALS
Qty: 180 TABLET | Refills: 1 | Status: SHIPPED | OUTPATIENT
Start: 2022-03-08 | End: 2022-03-14 | Stop reason: SDUPTHER

## 2022-03-08 RX ORDER — DILTIAZEM HYDROCHLORIDE 120 MG/1
CAPSULE, COATED, EXTENDED RELEASE ORAL
Qty: 90 CAPSULE | Refills: 3 | Status: SHIPPED | OUTPATIENT
Start: 2022-03-08 | End: 2022-03-14 | Stop reason: SDUPTHER

## 2022-03-08 RX ORDER — VALSARTAN AND HYDROCHLOROTHIAZIDE 160; 12.5 MG/1; MG/1
1 TABLET, FILM COATED ORAL DAILY
Qty: 90 TABLET | Refills: 3 | Status: SHIPPED | OUTPATIENT
Start: 2022-03-08 | End: 2022-03-14 | Stop reason: SDUPTHER

## 2022-03-08 RX ORDER — ROSUVASTATIN CALCIUM 10 MG/1
10 TABLET, COATED ORAL NIGHTLY
Qty: 90 TABLET | Refills: 3 | Status: SHIPPED | OUTPATIENT
Start: 2022-03-08 | End: 2022-03-14 | Stop reason: SDUPTHER

## 2022-03-08 NOTE — TELEPHONE ENCOUNTER
----- Message from Nadiya Hussein sent at 3/8/2022  9:15 AM CST -----  Contact: Patient  Type:  Needs Medical Advice    Who Called:  Patient     Would the patient rather a call back or a response via [facility]?  Call    Best Call Back Number:  [phone]    Additional Information:  Patient states insurance wont cover medication SAXagliptin (ONGLYZA) 5 mg Tab tablet    Patient states they will only cover the below medications and she needs a refill on any one of the below medications     Tradjenta    Januvia       Patient is out and needs one of the below called in       [facility] [place] - [address] [place] [address]  [place] [address]  Phone: [phone] Fax: [phone]

## 2022-03-08 NOTE — TELEPHONE ENCOUNTER
Patient's insurance does not cover Onglyza.     She would like Tradjenta or Januvia called into [facility]'s pharmacy instead.

## 2022-03-08 NOTE — PROGRESS NOTES
Subjective:    Patient ID:  Pili Teixeira is a 76 y.o. female who presents for follow-up of Hyperlipidemia and Medication Refill (Clopidogrel 75mg, Diltiazem 120mg, Crestor 10mg, Diovan--12.5 mg; 90 day supply. [facility], [place] )        HPI  Ms. Teixeira is a very pleasant lady who follows with Dr. Page. She presents today for routine follow up. She is without cardiovascular complaints. No CP, BONILLA, or change in her exercise tolerance. No palpitations, dizziness, lightheadedness, or near syncope.     Her last stress test was in Jan 2020 and revealed:     There is a small sized, trivial intensity, reversible defect in the  inferoapical wall(s).    There is a mild intensity defect in the apical wall of the left ventricle, secondary to breast attenuation.    Gated perfusion images showed an ejection fraction of 78% at rest and % post stress. Normal is % - %.    The EKG portion of this study is negative for ischemia.    The patient reported no chest pain during the stress test.    Arrhythmias during stress: occasional PVCs.    The blood pressure response to stress was normal.    She was asymptomatic, so no intervention was recommended. She remains asymptomatic.     Review of Systems   Constitutional: Negative for chills, diaphoresis, fever, weight gain and weight loss.   HENT: Negative for sore throat.    Eyes: Negative for blurred vision, vision loss in left eye, vision loss in right eye and visual disturbance.   Cardiovascular: Negative for chest pain, claudication, dyspnea on exertion, leg swelling, near-syncope, orthopnea, palpitations, paroxysmal nocturnal dyspnea and syncope.   Respiratory: Negative for cough, hemoptysis, shortness of breath, sputum production and wheezing.    Endocrine: Negative for cold intolerance and heat intolerance.   Hematologic/Lymphatic: Negative for adenopathy. Does not bruise/bleed easily.   Skin: Negative for rash.   Musculoskeletal: Negative for falls, muscle  "weakness and myalgias.   Gastrointestinal: Negative for abdominal pain, change in bowel habit, constipation, diarrhea, melena and nausea.   Genitourinary: Negative for bladder incontinence.   Neurological: Negative for dizziness, focal weakness, headaches, light-headedness, numbness and weakness.   Psychiatric/Behavioral: Negative for altered mental status.         Vitals:    03/08/22 1315   BP: 111/71   BP Location: Right arm   Patient Position: Sitting   BP Method: Medium (Automatic)   Pulse: 72   Weight: 65.1 kg (143 lb 8.3 oz)   Height: 5' 3" (1.6 m)   Body mass index is 25.42 kg/m².    Objective:    Physical Exam  Constitutional:       General: She is not in acute distress.     Appearance: She is well-developed.   HENT:      Head: Normocephalic and atraumatic.   Eyes:      General: No scleral icterus.     Conjunctiva/sclera: Conjunctivae normal.      Pupils: Pupils are equal, round, and reactive to light.   Neck:      Vascular: No JVD.      Trachea: No tracheal deviation.   Cardiovascular:      Rate and Rhythm: Normal rate and regular rhythm.      Heart sounds: No murmur heard.    No friction rub. No gallop.   Pulmonary:      Effort: Pulmonary effort is normal. No respiratory distress.      Breath sounds: Normal breath sounds. No wheezing or rales.   Chest:      Chest wall: No tenderness.   Abdominal:      General: Bowel sounds are normal. There is no distension.      Palpations: Abdomen is soft.      Tenderness: There is no abdominal tenderness.   Musculoskeletal:         General: No tenderness.      Cervical back: Neck supple.   Skin:     General: Skin is warm and dry.      Findings: No erythema or rash.   Neurological:      Mental Status: She is alert and oriented to person, place, and time.   Psychiatric:         Behavior: Behavior normal.           Assessment:       Problem List Items Addressed This Visit        Cardiology Problems    Hyperlipidemia    Hypertension associated with diabetes    Relevant " Medications    diltiaZEM (CARDIZEM CD) 120 MG Cp24    rosuvastatin (CRESTOR) 10 MG tablet    valsartan-hydrochlorothiazide (DIOVAN-HCT) 160-12.5 mg per tablet    Other Relevant Orders    LIPID PANEL    CBC Auto Differential    COMPREHENSIVE METABOLIC PANEL    TSH    T4, FREE    HEMOGLOBIN A1C       Other    Diastolic dysfunction - Primary (Chronic)    Type 2 diabetes mellitus, without long-term current use of insulin (Chronic)    History of TIA (transient ischemic attack)    Relevant Medications    diltiaZEM (CARDIZEM CD) 120 MG Cp24    rosuvastatin (CRESTOR) 10 MG tablet    REJI (obstructive sleep apnea)    SOB (shortness of breath)    Relevant Medications    diltiaZEM (CARDIZEM CD) 120 MG Cp24    rosuvastatin (CRESTOR) 10 MG tablet      Other Visit Diagnoses     Other specified transient cerebral ischemias  (Chronic)       Relevant Medications    diltiaZEM (CARDIZEM CD) 120 MG Cp24    rosuvastatin (CRESTOR) 10 MG tablet    Other Relevant Orders    LIPID PANEL    CBC Auto Differential    COMPREHENSIVE METABOLIC PANEL    TSH    T4, FREE    HEMOGLOBIN A1C    CKD (chronic kidney disease) stage 3, GFR 30-59 ml/min        Relevant Medications    diltiaZEM (CARDIZEM CD) 120 MG Cp24    rosuvastatin (CRESTOR) 10 MG tablet    Other Relevant Orders    LIPID PANEL    CBC Auto Differential    COMPREHENSIVE METABOLIC PANEL    TSH    T4, FREE    HEMOGLOBIN A1C           Plan:       Continue current cardiac medications.   Risk factor modification including diet and exercise.   CBC, CMP, Lipids, TFTs, and A1C now.   F/U with Dr. Page in 1 year.

## 2022-03-09 ENCOUNTER — LAB VISIT (OUTPATIENT)
Dept: LAB | Facility: HOSPITAL | Age: 77
End: 2022-03-09
Attending: PHYSICIAN ASSISTANT
Payer: MEDICARE

## 2022-03-09 DIAGNOSIS — G45.8 OTHER SPECIFIED TRANSIENT CEREBRAL ISCHEMIAS: Chronic | ICD-10-CM

## 2022-03-09 DIAGNOSIS — N18.30 CKD (CHRONIC KIDNEY DISEASE) STAGE 3, GFR 30-59 ML/MIN: ICD-10-CM

## 2022-03-09 DIAGNOSIS — I15.2 HYPERTENSION ASSOCIATED WITH DIABETES: ICD-10-CM

## 2022-03-09 DIAGNOSIS — E11.59 HYPERTENSION ASSOCIATED WITH DIABETES: ICD-10-CM

## 2022-03-09 LAB
ALBUMIN SERPL BCP-MCNC: 3.8 G/DL (ref 3.5–5.2)
ALP SERPL-CCNC: 54 U/L (ref 55–135)
ALT SERPL W/O P-5'-P-CCNC: 12 U/L (ref 10–44)
ANION GAP SERPL CALC-SCNC: 11 MMOL/L (ref 8–16)
AST SERPL-CCNC: 18 U/L (ref 10–40)
BASOPHILS # BLD AUTO: 0.05 K/UL (ref 0–0.2)
BASOPHILS NFR BLD: 0.7 % (ref 0–1.9)
BILIRUB SERPL-MCNC: 0.4 MG/DL (ref 0.1–1)
BUN SERPL-MCNC: 25 MG/DL (ref 8–23)
CALCIUM SERPL-MCNC: 10.4 MG/DL (ref 8.7–10.5)
CHLORIDE SERPL-SCNC: 102 MMOL/L (ref 95–110)
CHOLEST SERPL-MCNC: 138 MG/DL (ref 120–199)
CHOLEST/HDLC SERPL: 2.6 {RATIO} (ref 2–5)
CO2 SERPL-SCNC: 26 MMOL/L (ref 23–29)
CREAT SERPL-MCNC: 0.9 MG/DL (ref 0.5–1.4)
DIFFERENTIAL METHOD: ABNORMAL
EOSINOPHIL # BLD AUTO: 0.2 K/UL (ref 0–0.5)
EOSINOPHIL NFR BLD: 2.1 % (ref 0–8)
ERYTHROCYTE [DISTWIDTH] IN BLOOD BY AUTOMATED COUNT: 13.1 % (ref 11.5–14.5)
EST. GFR  (AFRICAN AMERICAN): >60 ML/MIN/1.73 M^2
EST. GFR  (NON AFRICAN AMERICAN): >60 ML/MIN/1.73 M^2
ESTIMATED AVG GLUCOSE: 117 MG/DL (ref 68–131)
GLUCOSE SERPL-MCNC: 93 MG/DL (ref 70–110)
HBA1C MFR BLD: 5.7 % (ref 4–5.6)
HCT VFR BLD AUTO: 36 % (ref 37–48.5)
HDLC SERPL-MCNC: 53 MG/DL (ref 40–75)
HDLC SERPL: 38.4 % (ref 20–50)
HGB BLD-MCNC: 11.1 G/DL (ref 12–16)
IMM GRANULOCYTES # BLD AUTO: 0.02 K/UL (ref 0–0.04)
IMM GRANULOCYTES NFR BLD AUTO: 0.3 % (ref 0–0.5)
LDLC SERPL CALC-MCNC: 64.4 MG/DL (ref 63–159)
LYMPHOCYTES # BLD AUTO: 3.2 K/UL (ref 1–4.8)
LYMPHOCYTES NFR BLD: 45.7 % (ref 18–48)
MCH RBC QN AUTO: 27.6 PG (ref 27–31)
MCHC RBC AUTO-ENTMCNC: 30.8 G/DL (ref 32–36)
MCV RBC AUTO: 90 FL (ref 82–98)
MONOCYTES # BLD AUTO: 0.7 K/UL (ref 0.3–1)
MONOCYTES NFR BLD: 9.6 % (ref 4–15)
NEUTROPHILS # BLD AUTO: 2.9 K/UL (ref 1.8–7.7)
NEUTROPHILS NFR BLD: 41.6 % (ref 38–73)
NONHDLC SERPL-MCNC: 85 MG/DL
NRBC BLD-RTO: 0 /100 WBC
PLATELET # BLD AUTO: 160 K/UL (ref 150–450)
PMV BLD AUTO: 12.4 FL (ref 9.2–12.9)
POTASSIUM SERPL-SCNC: 3.9 MMOL/L (ref 3.5–5.1)
PROT SERPL-MCNC: 6.9 G/DL (ref 6–8.4)
RBC # BLD AUTO: 4.02 M/UL (ref 4–5.4)
SODIUM SERPL-SCNC: 139 MMOL/L (ref 136–145)
T4 FREE SERPL-MCNC: 1.02 NG/DL (ref 0.71–1.51)
TRIGL SERPL-MCNC: 103 MG/DL (ref 30–150)
TSH SERPL DL<=0.005 MIU/L-ACNC: 3.22 UIU/ML (ref 0.4–4)
WBC # BLD AUTO: 7.07 K/UL (ref 3.9–12.7)

## 2022-03-09 PROCEDURE — 84443 ASSAY THYROID STIM HORMONE: CPT | Performed by: PHYSICIAN ASSISTANT

## 2022-03-09 PROCEDURE — 80061 LIPID PANEL: CPT | Performed by: PHYSICIAN ASSISTANT

## 2022-03-09 PROCEDURE — 84439 ASSAY OF FREE THYROXINE: CPT | Performed by: PHYSICIAN ASSISTANT

## 2022-03-09 PROCEDURE — 85025 COMPLETE CBC W/AUTO DIFF WBC: CPT | Performed by: PHYSICIAN ASSISTANT

## 2022-03-09 PROCEDURE — 83036 HEMOGLOBIN GLYCOSYLATED A1C: CPT | Performed by: PHYSICIAN ASSISTANT

## 2022-03-09 PROCEDURE — 36415 COLL VENOUS BLD VENIPUNCTURE: CPT | Performed by: PHYSICIAN ASSISTANT

## 2022-03-09 PROCEDURE — 80053 COMPREHEN METABOLIC PANEL: CPT | Performed by: PHYSICIAN ASSISTANT

## 2022-03-14 ENCOUNTER — OFFICE VISIT (OUTPATIENT)
Dept: FAMILY MEDICINE | Facility: CLINIC | Age: 77
End: 2022-03-14
Payer: MEDICARE

## 2022-03-14 VITALS
HEART RATE: 89 BPM | OXYGEN SATURATION: 96 % | SYSTOLIC BLOOD PRESSURE: 104 MMHG | DIASTOLIC BLOOD PRESSURE: 62 MMHG | WEIGHT: 141.75 LBS | BODY MASS INDEX: 25.12 KG/M2 | HEIGHT: 63 IN

## 2022-03-14 DIAGNOSIS — E11.9 TYPE 2 DIABETES MELLITUS WITHOUT COMPLICATION, WITH LONG-TERM CURRENT USE OF INSULIN: ICD-10-CM

## 2022-03-14 DIAGNOSIS — J30.1 SEASONAL ALLERGIC RHINITIS DUE TO POLLEN: ICD-10-CM

## 2022-03-14 DIAGNOSIS — G45.8 OTHER SPECIFIED TRANSIENT CEREBRAL ISCHEMIAS: Chronic | ICD-10-CM

## 2022-03-14 DIAGNOSIS — R06.02 SOB (SHORTNESS OF BREATH): ICD-10-CM

## 2022-03-14 DIAGNOSIS — N18.30 CKD (CHRONIC KIDNEY DISEASE) STAGE 3, GFR 30-59 ML/MIN: ICD-10-CM

## 2022-03-14 DIAGNOSIS — F40.243 FEAR OF FLYING: ICD-10-CM

## 2022-03-14 DIAGNOSIS — E11.59 HYPERTENSION ASSOCIATED WITH DIABETES: ICD-10-CM

## 2022-03-14 DIAGNOSIS — M06.9 RHEUMATOID ARTHRITIS, INVOLVING UNSPECIFIED SITE, UNSPECIFIED WHETHER RHEUMATOID FACTOR PRESENT: Primary | ICD-10-CM

## 2022-03-14 DIAGNOSIS — G47.00 INSOMNIA, UNSPECIFIED TYPE: ICD-10-CM

## 2022-03-14 DIAGNOSIS — Z87.898 H/O MOTION SICKNESS: ICD-10-CM

## 2022-03-14 DIAGNOSIS — Z79.4 TYPE 2 DIABETES MELLITUS WITHOUT COMPLICATION, WITH LONG-TERM CURRENT USE OF INSULIN: ICD-10-CM

## 2022-03-14 DIAGNOSIS — G89.4 CHRONIC PAIN SYNDROME: ICD-10-CM

## 2022-03-14 DIAGNOSIS — E11.9 TYPE 2 DIABETES MELLITUS WITHOUT COMPLICATION, WITHOUT LONG-TERM CURRENT USE OF INSULIN: Chronic | ICD-10-CM

## 2022-03-14 DIAGNOSIS — I15.2 HYPERTENSION ASSOCIATED WITH DIABETES: ICD-10-CM

## 2022-03-14 PROCEDURE — 99214 OFFICE O/P EST MOD 30 MIN: CPT | Mod: PBBFAC,PO | Performed by: FAMILY MEDICINE

## 2022-03-14 PROCEDURE — 99999 PR PBB SHADOW E&M-EST. PATIENT-LVL IV: CPT | Mod: PBBFAC,,, | Performed by: FAMILY MEDICINE

## 2022-03-14 PROCEDURE — 99214 PR OFFICE/OUTPT VISIT, EST, LEVL IV, 30-39 MIN: ICD-10-PCS | Mod: S$PBB,,, | Performed by: FAMILY MEDICINE

## 2022-03-14 PROCEDURE — 99999 PR PBB SHADOW E&M-EST. PATIENT-LVL IV: ICD-10-PCS | Mod: PBBFAC,,, | Performed by: FAMILY MEDICINE

## 2022-03-14 PROCEDURE — 99214 OFFICE O/P EST MOD 30 MIN: CPT | Mod: S$PBB,,, | Performed by: FAMILY MEDICINE

## 2022-03-14 RX ORDER — VALSARTAN AND HYDROCHLOROTHIAZIDE 160; 12.5 MG/1; MG/1
1 TABLET, FILM COATED ORAL DAILY
Qty: 90 TABLET | Refills: 3 | Status: SHIPPED | OUTPATIENT
Start: 2022-03-14 | End: 2022-08-01

## 2022-03-14 RX ORDER — LORAZEPAM 0.5 MG/1
0.5 TABLET ORAL EVERY 8 HOURS PRN
Qty: 15 TABLET | Refills: 0 | Status: SHIPPED | OUTPATIENT
Start: 2022-03-14 | End: 2022-08-01 | Stop reason: ALTCHOICE

## 2022-03-14 RX ORDER — PANTOPRAZOLE SODIUM 40 MG/1
40 TABLET, DELAYED RELEASE ORAL DAILY
Qty: 90 TABLET | Refills: 3 | Status: SHIPPED | OUTPATIENT
Start: 2022-03-14 | End: 2023-01-25

## 2022-03-14 RX ORDER — DILTIAZEM HYDROCHLORIDE 120 MG/1
CAPSULE, COATED, EXTENDED RELEASE ORAL
Qty: 90 CAPSULE | Refills: 3 | Status: ON HOLD | OUTPATIENT
Start: 2022-03-14 | End: 2023-05-05 | Stop reason: HOSPADM

## 2022-03-14 RX ORDER — LINAGLIPTIN 5 MG/1
5 TABLET, FILM COATED ORAL DAILY
Qty: 90 TABLET | Refills: 3 | Status: SHIPPED | OUTPATIENT
Start: 2022-03-14 | End: 2023-02-17

## 2022-03-14 RX ORDER — IPRATROPIUM BROMIDE 42 UG/1
2 SPRAY, METERED NASAL 3 TIMES DAILY
Qty: 15 ML | Refills: 3 | Status: SHIPPED | OUTPATIENT
Start: 2022-03-14 | End: 2023-05-18 | Stop reason: SDUPTHER

## 2022-03-14 RX ORDER — GABAPENTIN 100 MG/1
100 CAPSULE ORAL 3 TIMES DAILY
Qty: 270 CAPSULE | Refills: 3 | Status: SHIPPED | OUTPATIENT
Start: 2022-03-14 | End: 2022-04-06

## 2022-03-14 RX ORDER — CLOPIDOGREL BISULFATE 75 MG/1
75 TABLET ORAL DAILY
Qty: 90 TABLET | Refills: 3 | Status: SHIPPED | OUTPATIENT
Start: 2022-03-14 | End: 2023-03-27

## 2022-03-14 RX ORDER — SCOLOPAMINE TRANSDERMAL SYSTEM 1 MG/1
1 PATCH, EXTENDED RELEASE TRANSDERMAL
Qty: 10 PATCH | Refills: 0 | Status: SHIPPED | OUTPATIENT
Start: 2022-03-14 | End: 2022-04-29 | Stop reason: ALTCHOICE

## 2022-03-14 RX ORDER — TRAZODONE HYDROCHLORIDE 50 MG/1
TABLET ORAL
Qty: 90 TABLET | Refills: 3 | Status: SHIPPED | OUTPATIENT
Start: 2022-03-14 | End: 2023-05-15

## 2022-03-14 RX ORDER — MELOXICAM 7.5 MG/1
7.5 TABLET ORAL DAILY
Qty: 90 TABLET | Refills: 1 | Status: ON HOLD | OUTPATIENT
Start: 2022-03-14 | End: 2023-05-05 | Stop reason: HOSPADM

## 2022-03-14 RX ORDER — METFORMIN HYDROCHLORIDE 500 MG/1
500 TABLET ORAL 2 TIMES DAILY WITH MEALS
Qty: 180 TABLET | Refills: 3 | Status: SHIPPED | OUTPATIENT
Start: 2022-03-14 | End: 2023-05-01 | Stop reason: SDUPTHER

## 2022-03-14 RX ORDER — ONDANSETRON 4 MG/1
4 TABLET, ORALLY DISINTEGRATING ORAL EVERY 8 HOURS PRN
Qty: 30 TABLET | Refills: 1 | Status: SHIPPED | OUTPATIENT
Start: 2022-03-14 | End: 2023-07-05 | Stop reason: SDUPTHER

## 2022-03-14 RX ORDER — ROSUVASTATIN CALCIUM 10 MG/1
10 TABLET, COATED ORAL NIGHTLY
Qty: 90 TABLET | Refills: 3 | Status: SHIPPED | OUTPATIENT
Start: 2022-03-14 | End: 2023-03-25

## 2022-03-14 NOTE — PROGRESS NOTES
"Subjective:       Patient ID: Pili Teixeira is a 76 y.o. female.    Chief Complaint: Routine Health Maintenance    Pt is known to me.  Pt is here for followup of chronic medical issues.  The pt is going to [place] next month with her kids.  She needs all of her meds refilled prior to leaving.  The pt is doing well with no acute complaints.  She has some degree of daily pain due to her RA.  She gets Percocet per Dr. Rubio.  She has not seen Rheum in a while.  She needs to get re-established in [place].    The pt gets good glucoses at home.    Review of Systems   Constitutional: Negative for activity change, appetite change, fatigue and unexpected weight change.   HENT: Positive for postnasal drip and rhinorrhea.    Eyes: Negative for visual disturbance.   Respiratory: Negative for cough, chest tightness and shortness of breath.    Cardiovascular: Negative for chest pain, palpitations and leg swelling.   Gastrointestinal: Negative for abdominal pain, constipation, diarrhea, nausea and vomiting.   Endocrine: Negative for cold intolerance, heat intolerance and polyuria.   Genitourinary: Negative for decreased urine volume and dysuria.   Musculoskeletal: Positive for arthralgias. Negative for back pain.   Skin: Negative for rash.   Neurological: Negative for numbness and headaches.       Objective:       Vitals:    03/14/22 1458   BP: 104/62   Pulse: 89   SpO2: 96%   Weight: 64.3 kg (141 lb 12.1 oz)   Height: 5' 3" (1.6 m)     Physical Exam  Vitals and nursing note reviewed.   Constitutional:       Appearance: Normal appearance.   HENT:      Nose: Rhinorrhea present.      Mouth/Throat:      Mouth: Mucous membranes are moist.      Pharynx: Oropharynx is clear.   Neck:      Vascular: No carotid bruit.   Cardiovascular:      Rate and Rhythm: Normal rate and regular rhythm.      Pulses: Normal pulses.      Heart sounds: Normal heart sounds.   Pulmonary:      Effort: Pulmonary effort is normal.      Breath sounds: Normal " breath sounds.   Abdominal:      General: Bowel sounds are normal.      Palpations: Abdomen is soft.      Tenderness: There is no abdominal tenderness.      Hernia: A hernia is present.   Musculoskeletal:         General: Normal range of motion.      Cervical back: Normal range of motion and neck supple. No tenderness.   Lymphadenopathy:      Cervical: No cervical adenopathy.   Skin:     General: Skin is warm and dry.   Neurological:      General: No focal deficit present.      Mental Status: She is alert and oriented to person, place, and time.   Psychiatric:         Mood and Affect: Mood normal.         Behavior: Behavior normal.         Assessment:       1. Rheumatoid arthritis, involving unspecified site, unspecified whether rheumatoid factor present    2. Insomnia, unspecified type    3. Chronic pain syndrome    4. Hypertension associated with diabetes    5. SOB (shortness of breath)    6. Other specified transient cerebral ischemias    7. CKD (chronic kidney disease) stage 3, GFR 30-59 ml/min    8. Type 2 diabetes mellitus without complication, without long-term current use of insulin    9. Type 2 diabetes mellitus without complication, with long-term current use of insulin    10. Seasonal allergic rhinitis due to pollen    11. H/O motion sickness    12. Fear of flying        Plan:       Pili was seen today for routine health maintenance.    Diagnoses and all orders for this visit:    Rheumatoid arthritis, involving unspecified site, unspecified whether rheumatoid factor present  -     Ambulatory referral/consult to Rheumatology; Future  -     meloxicam (MOBIC) 7.5 MG tablet; Take 1 tablet (7.5 mg total) by mouth once daily.    Insomnia, unspecified type  -     traZODone (DESYREL) 50 MG tablet; TAKE 1 TABLET BY MOUTH IN THE EVENING - (MAY TAKE AN ADDITIONAL TABLET AS NEEDED)    Chronic pain syndrome  -     gabapentin (NEURONTIN) 100 MG capsule; Take 1 capsule (100 mg total) by mouth 3 (three) times  daily.    Hypertension associated with diabetes  -     diltiaZEM (CARDIZEM CD) 120 MG Cp24; TAKE 1 CAPSULE BY MOUTH IN THE EVENING  -     valsartan-hydrochlorothiazide (DIOVAN-HCT) 160-12.5 mg per tablet; Take 1 tablet by mouth once daily.  -     rosuvastatin (CRESTOR) 10 MG tablet; Take 1 tablet (10 mg total) by mouth every evening.    SOB (shortness of breath)  -     diltiaZEM (CARDIZEM CD) 120 MG Cp24; TAKE 1 CAPSULE BY MOUTH IN THE EVENING  -     rosuvastatin (CRESTOR) 10 MG tablet; Take 1 tablet (10 mg total) by mouth every evening.    Other specified transient cerebral ischemias  -     diltiaZEM (CARDIZEM CD) 120 MG Cp24; TAKE 1 CAPSULE BY MOUTH IN THE EVENING  -     rosuvastatin (CRESTOR) 10 MG tablet; Take 1 tablet (10 mg total) by mouth every evening.  -     clopidogreL (PLAVIX) 75 mg tablet; Take 1 tablet (75 mg total) by mouth once daily.    CKD (chronic kidney disease) stage 3, GFR 30-59 ml/min  -     diltiaZEM (CARDIZEM CD) 120 MG Cp24; TAKE 1 CAPSULE BY MOUTH IN THE EVENING  -     rosuvastatin (CRESTOR) 10 MG tablet; Take 1 tablet (10 mg total) by mouth every evening.    Type 2 diabetes mellitus without complication, without long-term current use of insulin  -     linaGLIPtin (TRADJENTA) 5 mg Tab tablet; Take 1 tablet (5 mg total) by mouth once daily.    Type 2 diabetes mellitus without complication, with long-term current use of insulin  -     metFORMIN (GLUCOPHAGE) 500 MG tablet; Take 1 tablet (500 mg total) by mouth 2 (two) times daily with meals.    Seasonal allergic rhinitis due to pollen  -     ipratropium (ATROVENT) 42 mcg (0.06 %) nasal spray; 2 sprays by Nasal route 3 (three) times daily.    H/O motion sickness  -     scopolamine (TRANSDERM-SCOP) 1.3-1.5 mg (1 mg over 3 days); Place 1 patch onto the skin every 72 hours.  -     ondansetron (ZOFRAN-ODT) 4 MG TbDL; Take 1 tablet (4 mg total) by mouth every 8 (eight) hours as needed (nausea).    Fear of flying  -     LORazepam (ATIVAN) 0.5 MG  tablet; Take 1 tablet (0.5 mg total) by mouth every 8 (eight) hours as needed for Anxiety (fear of flying).    Other orders  -     pantoprazole (PROTONIX) 40 MG tablet; Take 1 tablet (40 mg total) by mouth once daily.      During this visit, I reviewed the pt's history, medications, allergies, and problem list.

## 2022-03-31 ENCOUNTER — EXTERNAL CHRONIC CARE MANAGEMENT (OUTPATIENT)
Dept: PRIMARY CARE CLINIC | Facility: CLINIC | Age: 77
End: 2022-03-31
Payer: MEDICARE

## 2022-03-31 PROCEDURE — 99490 CHRNC CARE MGMT STAFF 1ST 20: CPT | Mod: S$PBB,,, | Performed by: FAMILY MEDICINE

## 2022-03-31 PROCEDURE — 99490 CHRNC CARE MGMT STAFF 1ST 20: CPT | Mod: PBBFAC,PO | Performed by: FAMILY MEDICINE

## 2022-03-31 PROCEDURE — 99490 PR CHRONIC CARE MGMT, 1ST 20 MIN: ICD-10-PCS | Mod: S$PBB,,, | Performed by: FAMILY MEDICINE

## 2022-04-27 ENCOUNTER — TELEPHONE (OUTPATIENT)
Dept: FAMILY MEDICINE | Facility: CLINIC | Age: 77
End: 2022-04-27
Payer: MEDICARE

## 2022-04-27 DIAGNOSIS — U07.1 COVID: ICD-10-CM

## 2022-04-27 DIAGNOSIS — U07.1 COVID-19: Primary | ICD-10-CM

## 2022-04-27 NOTE — TELEPHONE ENCOUNTER
----- Message from Nadiya Hussein sent at 4/27/2022  2:33 PM CDT -----  Contact: Patient  Type:  Patient Returning Call    Who Called: Patient     Who Left Message for Patient: Leighann    Does the patient know what this is regarding?:     Would the patient rather a call back or a response via [facility]? Call    Best Call Back Number: [phone] (home)     Additional Information:

## 2022-04-27 NOTE — TELEPHONE ENCOUNTER
----- Message from Cecy Sharif sent at 4/27/2022  8:26 AM CDT -----  Contact: patient  Type: Needs Medical Advice  Who Called:  patient  Best Call Back Number: [phone] (home)   Additional Information: patient tested positive for covid on Saturday- she is very sick- wants to know if she can get the antibodies infusion. Please advise.

## 2022-04-27 NOTE — TELEPHONE ENCOUNTER
Infusion is ordered.  Please ensure I ordered for [place] location--not [facility].

## 2022-04-28 ENCOUNTER — TELEPHONE (OUTPATIENT)
Dept: FAMILY MEDICINE | Facility: CLINIC | Age: 77
End: 2022-04-28
Payer: MEDICARE

## 2022-04-28 NOTE — TELEPHONE ENCOUNTER
----- Message from Angelica Meza sent at 4/28/2022  8:13 AM CDT -----  Patient is calling back regarding the antibody infusion, she has covid.  She said she is getting worse and no one has contacted her to schedule.  Please call her with advice at [phone]

## 2022-04-29 ENCOUNTER — HOSPITAL ENCOUNTER (OUTPATIENT)
Facility: HOSPITAL | Age: 77
Discharge: HOME | End: 2022-04-30
Attending: EMERGENCY MEDICINE | Admitting: STUDENT IN AN ORGANIZED HEALTH CARE EDUCATION/TRAINING PROGRAM
Payer: MEDICARE

## 2022-04-29 DIAGNOSIS — R09.02 HYPOXEMIA: ICD-10-CM

## 2022-04-29 DIAGNOSIS — U07.1 COVID-19: Primary | ICD-10-CM

## 2022-04-29 DIAGNOSIS — U07.1 PNEUMONIA DUE TO COVID-19 VIRUS: Primary | ICD-10-CM

## 2022-04-29 DIAGNOSIS — J12.82 PNEUMONIA DUE TO COVID-19 VIRUS: Primary | ICD-10-CM

## 2022-04-29 DIAGNOSIS — R07.9 CHEST PAIN: ICD-10-CM

## 2022-04-29 LAB
ALBUMIN SERPL BCP-MCNC: 4.1 G/DL (ref 3.5–5.2)
ALP SERPL-CCNC: 43 U/L (ref 55–135)
ALT SERPL W/O P-5'-P-CCNC: 15 U/L (ref 10–44)
ANION GAP SERPL CALC-SCNC: 12 MMOL/L (ref 8–16)
AST SERPL-CCNC: 20 U/L (ref 10–40)
BACTERIA #/AREA URNS HPF: ABNORMAL /HPF
BASOPHILS # BLD AUTO: 0.04 K/UL (ref 0–0.2)
BASOPHILS NFR BLD: 0.5 % (ref 0–1.9)
BILIRUB SERPL-MCNC: 0.8 MG/DL (ref 0.1–1)
BILIRUB UR QL STRIP: NEGATIVE
BNP SERPL-MCNC: 13 PG/ML (ref 0–99)
BUN SERPL-MCNC: 24 MG/DL (ref 8–23)
CALCIUM SERPL-MCNC: 10.2 MG/DL (ref 8.7–10.5)
CHLORIDE SERPL-SCNC: 98 MMOL/L (ref 95–110)
CLARITY UR: CLEAR
CO2 SERPL-SCNC: 24 MMOL/L (ref 23–29)
COLOR UR: YELLOW
CREAT SERPL-MCNC: 1 MG/DL (ref 0.5–1.4)
CRP SERPL-MCNC: 0.07 MG/DL
DIFFERENTIAL METHOD: ABNORMAL
EOSINOPHIL # BLD AUTO: 0.1 K/UL (ref 0–0.5)
EOSINOPHIL NFR BLD: 0.9 % (ref 0–8)
ERYTHROCYTE [DISTWIDTH] IN BLOOD BY AUTOMATED COUNT: 12.5 % (ref 11.5–14.5)
EST. GFR  (AFRICAN AMERICAN): >60 ML/MIN/1.73 M^2
EST. GFR  (NON AFRICAN AMERICAN): 55.2 ML/MIN/1.73 M^2
GLUCOSE SERPL-MCNC: 102 MG/DL (ref 70–110)
GLUCOSE SERPL-MCNC: 239 MG/DL (ref 70–110)
GLUCOSE UR QL STRIP: NEGATIVE
HCT VFR BLD AUTO: 35.8 % (ref 37–48.5)
HGB BLD-MCNC: 11.6 G/DL (ref 12–16)
HGB UR QL STRIP: ABNORMAL
HYALINE CASTS #/AREA URNS LPF: 5 /LPF
IMM GRANULOCYTES # BLD AUTO: 0.04 K/UL (ref 0–0.04)
IMM GRANULOCYTES NFR BLD AUTO: 0.5 % (ref 0–0.5)
KETONES UR QL STRIP: NEGATIVE
LEUKOCYTE ESTERASE UR QL STRIP: ABNORMAL
LYMPHOCYTES # BLD AUTO: 2.9 K/UL (ref 1–4.8)
LYMPHOCYTES NFR BLD: 36.5 % (ref 18–48)
MCH RBC QN AUTO: 26.9 PG (ref 27–31)
MCHC RBC AUTO-ENTMCNC: 32.4 G/DL (ref 32–36)
MCV RBC AUTO: 83 FL (ref 82–98)
MICROSCOPIC COMMENT: ABNORMAL
MONOCYTES # BLD AUTO: 0.8 K/UL (ref 0.3–1)
MONOCYTES NFR BLD: 9.8 % (ref 4–15)
NEUTROPHILS # BLD AUTO: 4.1 K/UL (ref 1.8–7.7)
NEUTROPHILS NFR BLD: 51.8 % (ref 38–73)
NITRITE UR QL STRIP: POSITIVE
NRBC BLD-RTO: 0 /100 WBC
PH UR STRIP: 5 [PH] (ref 5–8)
PLATELET # BLD AUTO: 203 K/UL (ref 150–450)
PMV BLD AUTO: 12.3 FL (ref 9.2–12.9)
POTASSIUM SERPL-SCNC: 3.6 MMOL/L (ref 3.5–5.1)
PROT SERPL-MCNC: 7.5 G/DL (ref 6–8.4)
PROT UR QL STRIP: ABNORMAL
RBC # BLD AUTO: 4.32 M/UL (ref 4–5.4)
RBC #/AREA URNS HPF: 1 /HPF (ref 0–4)
SARS-COV-2 RDRP RESP QL NAA+PROBE: POSITIVE
SODIUM SERPL-SCNC: 134 MMOL/L (ref 136–145)
SP GR UR STRIP: >1.03 (ref 1–1.03)
SQUAMOUS #/AREA URNS HPF: 1 /HPF
TROPONIN I SERPL DL<=0.01 NG/ML-MCNC: <0.03 NG/ML
URN SPEC COLLECT METH UR: ABNORMAL
UROBILINOGEN UR STRIP-ACNC: NEGATIVE EU/DL
WBC # BLD AUTO: 7.94 K/UL (ref 3.9–12.7)
WBC #/AREA URNS HPF: >100 /HPF (ref 0–5)

## 2022-04-29 PROCEDURE — G0378 HOSPITAL OBSERVATION PER HR: HCPCS

## 2022-04-29 PROCEDURE — 87186 SC STD MICRODIL/AGAR DIL: CPT | Performed by: STUDENT IN AN ORGANIZED HEALTH CARE EDUCATION/TRAINING PROGRAM

## 2022-04-29 PROCEDURE — 80053 COMPREHEN METABOLIC PANEL: CPT | Performed by: NURSE PRACTITIONER

## 2022-04-29 PROCEDURE — 87077 CULTURE AEROBIC IDENTIFY: CPT | Performed by: STUDENT IN AN ORGANIZED HEALTH CARE EDUCATION/TRAINING PROGRAM

## 2022-04-29 PROCEDURE — 81001 URINALYSIS AUTO W/SCOPE: CPT | Performed by: STUDENT IN AN ORGANIZED HEALTH CARE EDUCATION/TRAINING PROGRAM

## 2022-04-29 PROCEDURE — 99285 EMERGENCY DEPT VISIT HI MDM: CPT | Mod: 25

## 2022-04-29 PROCEDURE — 25000003 PHARM REV CODE 250: Performed by: STUDENT IN AN ORGANIZED HEALTH CARE EDUCATION/TRAINING PROGRAM

## 2022-04-29 PROCEDURE — 84484 ASSAY OF TROPONIN QUANT: CPT | Performed by: NURSE PRACTITIONER

## 2022-04-29 PROCEDURE — 85025 COMPLETE CBC W/AUTO DIFF WBC: CPT | Performed by: NURSE PRACTITIONER

## 2022-04-29 PROCEDURE — 63600175 PHARM REV CODE 636 W HCPCS: Performed by: STUDENT IN AN ORGANIZED HEALTH CARE EDUCATION/TRAINING PROGRAM

## 2022-04-29 PROCEDURE — 87086 URINE CULTURE/COLONY COUNT: CPT | Performed by: STUDENT IN AN ORGANIZED HEALTH CARE EDUCATION/TRAINING PROGRAM

## 2022-04-29 PROCEDURE — 94799 UNLISTED PULMONARY SVC/PX: CPT

## 2022-04-29 PROCEDURE — 36415 COLL VENOUS BLD VENIPUNCTURE: CPT | Performed by: EMERGENCY MEDICINE

## 2022-04-29 PROCEDURE — 83880 ASSAY OF NATRIURETIC PEPTIDE: CPT | Performed by: NURSE PRACTITIONER

## 2022-04-29 PROCEDURE — 99900035 HC TECH TIME PER 15 MIN (STAT)

## 2022-04-29 PROCEDURE — 25000242 PHARM REV CODE 250 ALT 637 W/ HCPCS: Performed by: STUDENT IN AN ORGANIZED HEALTH CARE EDUCATION/TRAINING PROGRAM

## 2022-04-29 PROCEDURE — 94760 N-INVAS EAR/PLS OXIMETRY 1: CPT

## 2022-04-29 PROCEDURE — 96372 THER/PROPH/DIAG INJ SC/IM: CPT | Mod: 59 | Performed by: STUDENT IN AN ORGANIZED HEALTH CARE EDUCATION/TRAINING PROGRAM

## 2022-04-29 PROCEDURE — U0002 COVID-19 LAB TEST NON-CDC: HCPCS | Performed by: NURSE PRACTITIONER

## 2022-04-29 PROCEDURE — 93005 ELECTROCARDIOGRAM TRACING: CPT | Performed by: INTERNAL MEDICINE

## 2022-04-29 PROCEDURE — 93010 ELECTROCARDIOGRAM REPORT: CPT | Mod: ,,, | Performed by: INTERNAL MEDICINE

## 2022-04-29 PROCEDURE — 25000003 PHARM REV CODE 250: Performed by: NURSE PRACTITIONER

## 2022-04-29 PROCEDURE — 93010 EKG 12-LEAD: ICD-10-PCS | Mod: ,,, | Performed by: INTERNAL MEDICINE

## 2022-04-29 PROCEDURE — 86140 C-REACTIVE PROTEIN: CPT | Performed by: EMERGENCY MEDICINE

## 2022-04-29 PROCEDURE — 96361 HYDRATE IV INFUSION ADD-ON: CPT

## 2022-04-29 PROCEDURE — 63600175 PHARM REV CODE 636 W HCPCS: Performed by: NURSE PRACTITIONER

## 2022-04-29 PROCEDURE — 94640 AIRWAY INHALATION TREATMENT: CPT

## 2022-04-29 PROCEDURE — 96375 TX/PRO/DX INJ NEW DRUG ADDON: CPT | Mod: 59

## 2022-04-29 PROCEDURE — 25500020 PHARM REV CODE 255: Performed by: NURSE PRACTITIONER

## 2022-04-29 PROCEDURE — 99900031 HC PATIENT EDUCATION (STAT)

## 2022-04-29 RX ORDER — TRAZODONE HYDROCHLORIDE 50 MG/1
50 TABLET ORAL NIGHTLY PRN
Status: DISCONTINUED | OUTPATIENT
Start: 2022-04-29 | End: 2022-04-30 | Stop reason: HOSPADM

## 2022-04-29 RX ORDER — OXYCODONE AND ACETAMINOPHEN 7.5; 325 MG/1; MG/1
1 TABLET ORAL EVERY 4 HOURS PRN
Status: DISCONTINUED | OUTPATIENT
Start: 2022-04-29 | End: 2022-04-30 | Stop reason: HOSPADM

## 2022-04-29 RX ORDER — PANTOPRAZOLE SODIUM 40 MG/1
40 TABLET, DELAYED RELEASE ORAL
Status: DISCONTINUED | OUTPATIENT
Start: 2022-04-30 | End: 2022-04-30 | Stop reason: HOSPADM

## 2022-04-29 RX ORDER — INSULIN ASPART 100 [IU]/ML
0-5 INJECTION, SOLUTION INTRAVENOUS; SUBCUTANEOUS
Status: DISCONTINUED | OUTPATIENT
Start: 2022-04-29 | End: 2022-04-30 | Stop reason: HOSPADM

## 2022-04-29 RX ORDER — ASPIRIN 325 MG
325 TABLET ORAL
Status: COMPLETED | OUTPATIENT
Start: 2022-04-29 | End: 2022-04-29

## 2022-04-29 RX ORDER — MORPHINE SULFATE 2 MG/ML
1 INJECTION, SOLUTION INTRAMUSCULAR; INTRAVENOUS
Status: COMPLETED | OUTPATIENT
Start: 2022-04-29 | End: 2022-04-29

## 2022-04-29 RX ORDER — CLOPIDOGREL BISULFATE 75 MG/1
75 TABLET ORAL DAILY
Status: DISCONTINUED | OUTPATIENT
Start: 2022-04-30 | End: 2022-04-30 | Stop reason: HOSPADM

## 2022-04-29 RX ORDER — IBUPROFEN 200 MG
16 TABLET ORAL
Status: DISCONTINUED | OUTPATIENT
Start: 2022-04-29 | End: 2022-04-30 | Stop reason: HOSPADM

## 2022-04-29 RX ORDER — GABAPENTIN 100 MG/1
100 CAPSULE ORAL 3 TIMES DAILY
Status: DISCONTINUED | OUTPATIENT
Start: 2022-04-29 | End: 2022-04-30 | Stop reason: HOSPADM

## 2022-04-29 RX ORDER — IBUPROFEN 200 MG
24 TABLET ORAL
Status: DISCONTINUED | OUTPATIENT
Start: 2022-04-29 | End: 2022-04-30 | Stop reason: HOSPADM

## 2022-04-29 RX ORDER — AZITHROMYCIN 250 MG/1
250 TABLET, FILM COATED ORAL
COMMUNITY
Start: 2022-04-28 | End: 2022-04-29 | Stop reason: ALTCHOICE

## 2022-04-29 RX ORDER — CETIRIZINE HYDROCHLORIDE 10 MG/1
10 TABLET ORAL DAILY
Status: DISCONTINUED | OUTPATIENT
Start: 2022-04-30 | End: 2022-04-30 | Stop reason: HOSPADM

## 2022-04-29 RX ORDER — ONDANSETRON 2 MG/ML
4 INJECTION INTRAMUSCULAR; INTRAVENOUS
Status: COMPLETED | OUTPATIENT
Start: 2022-04-29 | End: 2022-04-29

## 2022-04-29 RX ORDER — ASPIRIN 81 MG/1
81 TABLET ORAL DAILY
Status: DISCONTINUED | OUTPATIENT
Start: 2022-04-30 | End: 2022-04-30 | Stop reason: HOSPADM

## 2022-04-29 RX ORDER — GLUCAGON 1 MG
1 KIT INJECTION
Status: DISCONTINUED | OUTPATIENT
Start: 2022-04-29 | End: 2022-04-30 | Stop reason: HOSPADM

## 2022-04-29 RX ORDER — DILTIAZEM HYDROCHLORIDE 120 MG/1
120 CAPSULE, COATED, EXTENDED RELEASE ORAL NIGHTLY
Status: DISCONTINUED | OUTPATIENT
Start: 2022-04-29 | End: 2022-04-30 | Stop reason: HOSPADM

## 2022-04-29 RX ORDER — BENZONATATE 100 MG/1
200 CAPSULE ORAL 3 TIMES DAILY PRN
Status: DISCONTINUED | OUTPATIENT
Start: 2022-04-29 | End: 2022-04-30 | Stop reason: HOSPADM

## 2022-04-29 RX ORDER — VALSARTAN 80 MG/1
160 TABLET ORAL 2 TIMES DAILY
Status: DISCONTINUED | OUTPATIENT
Start: 2022-04-29 | End: 2022-04-30 | Stop reason: HOSPADM

## 2022-04-29 RX ORDER — FOLIC ACID 1 MG/1
1000 TABLET ORAL DAILY
Status: DISCONTINUED | OUTPATIENT
Start: 2022-04-30 | End: 2022-04-30 | Stop reason: HOSPADM

## 2022-04-29 RX ORDER — METHYLPREDNISOLONE SOD SUCC 125 MG
125 VIAL (EA) INJECTION
Status: COMPLETED | OUTPATIENT
Start: 2022-04-29 | End: 2022-04-29

## 2022-04-29 RX ORDER — VALSARTAN AND HYDROCHLOROTHIAZIDE 160; 12.5 MG/1; MG/1
1 TABLET, FILM COATED ORAL DAILY
Status: DISCONTINUED | OUTPATIENT
Start: 2022-04-30 | End: 2022-04-29

## 2022-04-29 RX ORDER — HYDROCHLOROTHIAZIDE 12.5 MG/1
12.5 TABLET ORAL 2 TIMES DAILY
Status: DISCONTINUED | OUTPATIENT
Start: 2022-04-29 | End: 2022-04-30 | Stop reason: HOSPADM

## 2022-04-29 RX ORDER — DIPHENHYDRAMINE HYDROCHLORIDE 50 MG/ML
25 INJECTION INTRAMUSCULAR; INTRAVENOUS
Status: COMPLETED | OUTPATIENT
Start: 2022-04-29 | End: 2022-04-29

## 2022-04-29 RX ORDER — ALBUTEROL SULFATE 90 UG/1
2 AEROSOL, METERED RESPIRATORY (INHALATION) EVERY 4 HOURS PRN
Status: DISCONTINUED | OUTPATIENT
Start: 2022-04-29 | End: 2022-04-30 | Stop reason: HOSPADM

## 2022-04-29 RX ORDER — ATORVASTATIN CALCIUM 40 MG/1
40 TABLET, FILM COATED ORAL NIGHTLY
Status: DISCONTINUED | OUTPATIENT
Start: 2022-04-29 | End: 2022-04-30 | Stop reason: HOSPADM

## 2022-04-29 RX ADMIN — ASPIRIN 325 MG ORAL TABLET 325 MG: 325 PILL ORAL at 12:04

## 2022-04-29 RX ADMIN — DIPHENHYDRAMINE HYDROCHLORIDE 25 MG: 50 INJECTION, SOLUTION INTRAMUSCULAR; INTRAVENOUS at 01:04

## 2022-04-29 RX ADMIN — GABAPENTIN 100 MG: 100 CAPSULE ORAL at 09:04

## 2022-04-29 RX ADMIN — SODIUM CHLORIDE 1000 ML: 0.9 INJECTION, SOLUTION INTRAVENOUS at 01:04

## 2022-04-29 RX ADMIN — OXYCODONE HYDROCHLORIDE AND ACETAMINOPHEN 1 TABLET: 7.5; 325 TABLET ORAL at 10:04

## 2022-04-29 RX ADMIN — IOHEXOL 100 ML: 350 INJECTION, SOLUTION INTRAVENOUS at 01:04

## 2022-04-29 RX ADMIN — DILTIAZEM HYDROCHLORIDE 120 MG: 120 CAPSULE, COATED, EXTENDED RELEASE ORAL at 09:04

## 2022-04-29 RX ADMIN — METHYLPREDNISOLONE SODIUM SUCCINATE 125 MG: 125 INJECTION, POWDER, FOR SOLUTION INTRAMUSCULAR; INTRAVENOUS at 01:04

## 2022-04-29 RX ADMIN — HYDROCHLOROTHIAZIDE 12.5 MG: 12.5 TABLET ORAL at 09:04

## 2022-04-29 RX ADMIN — OXYCODONE HYDROCHLORIDE AND ACETAMINOPHEN 1 TABLET: 7.5; 325 TABLET ORAL at 05:04

## 2022-04-29 RX ADMIN — VALSARTAN 160 MG: 80 TABLET, FILM COATED ORAL at 09:04

## 2022-04-29 RX ADMIN — ONDANSETRON 4 MG: 2 INJECTION INTRAMUSCULAR; INTRAVENOUS at 12:04

## 2022-04-29 RX ADMIN — ATORVASTATIN CALCIUM 40 MG: 40 TABLET, FILM COATED ORAL at 09:04

## 2022-04-29 RX ADMIN — INSULIN ASPART 1 UNITS: 100 INJECTION, SOLUTION INTRAVENOUS; SUBCUTANEOUS at 10:04

## 2022-04-29 RX ADMIN — MORPHINE SULFATE 1 MG: 2 INJECTION, SOLUTION INTRAMUSCULAR; INTRAVENOUS at 12:04

## 2022-04-29 RX ADMIN — BUDESONIDE 180 MCG: 90 AEROSOL, POWDER RESPIRATORY (INHALATION) at 09:04

## 2022-04-29 NOTE — SUBJECTIVE & OBJECTIVE
Past Medical History:   Diagnosis Date    Allergy     Anemia 2012    with thrombocytopenia; iron deficiency    Arthritis     Cervical spinal stenosis     with neuropathy, chronic neck,back,leg pain    Colon polyp     COPD (chronic obstructive pulmonary disease)     Coronary artery disease     COVID-19     Diabetes mellitus     , sugars run 190's per patient    Diastolic dysfunction 7/13/2015    Diverticulitis     Diverticulosis     Hx diverticulitis,still has chronic diarrhea, abd pain    Dyspnea on exertion     sometimes with nausea, fatigue,dizziness, had cardiac cath June 2012, normal    Fibromyalgia     Fracture 2012    right thumb    GERD (gastroesophageal reflux disease)     Feb 2012, Hx H Pylori    Glaucoma     had LAser surgey, on no eye drops    HEARING LOSS     Hemangioma     fatty liver    History of earache     frequent    History of TIA (transient ischemic attack) 5/28/2013    Hyperlipidemia     Hypertension     Hypertension associated with diabetes 3/14/2017    Laryngeal polyp     Myocardial infarction 2006 last    also MI in distant past    REJI (obstructive sleep apnea)     does not use CPAP    Otitis media     Renal stone     Sjogren's syndrome     SOB (shortness of breath) 6/8/2012    2012 [facility] 1. Normal coronary arteries. 2. Normal single renal arteries bilaterally. 3. Diastolic dysfunction.     Stroke     TIA, now on Plavix    TIA (transient ischemic attack)     TMJ disorder     Type II or unspecified type diabetes mellitus without mention of complication, uncontrolled 5/8/2014    UTI (lower urinary tract infection)     frequent    Venous insufficiency     with Hx blood clot in leg       Past Surgical History:   Procedure Laterality Date    ABDOMINAL SURGERY  2010 x2    expoloratory lap for pelvic cyst,adhesions; partial colonectomy     adhesiolysis   10/11/2012    CARDIAC CATHETERIZATION      no current blockages.    CHOLECYSTECTOMY      COLON SURGERY      r/t diverticuli    COLONOSCOPY  08/2014     repeat in 5 years    COLONOSCOPY N/A 1/7/2020    Procedure: COLONOSCOPY;  Surgeon: Kb Nguyen MD;  Location: [facility] ENDO;  Service: Endoscopy;  Laterality: N/A;    ENDOSCOPIC ULTRASOUND OF UPPER GASTROINTESTINAL TRACT N/A 1/13/2021    Procedure: ULTRASOUND, UPPER GI TRACT, ENDOSCOPIC;  Surgeon: Sonido Castellano III, MD;  Location: [facility] ENDO;  Service: Endoscopy;  Laterality: N/A;    EPIDURAL BLOCK INJECTION  5/15/12    back,neck for pain    ESOPHAGOGASTRODUODENOSCOPY N/A 1/7/2020    Procedure: EGD (ESOPHAGOGASTRODUODENOSCOPY);  Surgeon: Kb Nguyen MD;  Location: [facility] ENDO;  Service: Endoscopy;  Laterality: N/A;    ESOPHAGOGASTRODUODENOSCOPY N/A 1/13/2021    Procedure: EGD (ESOPHAGOGASTRODUODENOSCOPY);  Surgeon: Sonido Castellano III, MD;  Location: [facility] ENDO;  Service: Endoscopy;  Laterality: N/A;    EXPLORATORY LAPAROTOMY W/ BOWEL RESECTION  10/11/2012    EYE SURGERY      Laser for glaucoma    EYE SURGERY  May 2012    cataracts    HYSTERECTOMY      LARYNX SURGERY      polypectomy    OOPHORECTOMY      TONSILLECTOMY      with adenoidectomy    UPPER GASTROINTESTINAL ENDOSCOPY  12/2015    VASCULAR SURGERY      laser to varicose vein leg       Review of patient's allergies indicates:   Allergen Reactions    Codeine Other (See Comments)     Chest pain    Clindamycin Other (See Comments)     Difficulty swallowing after taking, feels a something sits in epigastric area     Iodinated contrast media Hives and Rash       No current facility-administered medications on file prior to encounter.     Current Outpatient Medications on File Prior to Encounter   Medication Sig    albuterol-ipratropium (DUO-NEB) 2.5 mg-0.5 mg/3 mL nebulizer solution Take 3 mLs by nebulization every 4 to 6 hours as needed for Wheezing. Rescue    aspirin (ECOTRIN) 81 MG EC tablet Take 81 mg by mouth once daily.    clopidogreL (PLAVIX) 75 mg tablet Take 1 tablet (75 mg total) by mouth once daily.    cyanocobalamin (VITAMIN B-12)  1000 MCG tablet Take 100 mcg by mouth once daily.    diclofenac sodium 1 % Gel Apply 2 g topically once daily.    diltiaZEM (CARDIZEM CD) 120 MG Cp24 TAKE 1 CAPSULE BY MOUTH IN THE EVENING    folic acid (FOLVITE) 1 MG tablet Take 1 tablet (1,000 mcg total) by mouth once daily.    gabapentin (NEURONTIN) 100 MG capsule TAKE 1 CAPSULE BY MOUTH THREE TIMES DAILY    ipratropium (ATROVENT) 42 mcg (0.06 %) nasal spray 2 sprays by Nasal route 3 (three) times daily.    linaGLIPtin (TRADJENTA) 5 mg Tab tablet Take 1 tablet (5 mg total) by mouth once daily.    loratadine (CLARITIN) 10 mg tablet Take 10 mg by mouth once daily.    meloxicam (MOBIC) 7.5 MG tablet Take 1 tablet (7.5 mg total) by mouth once daily.    metFORMIN (GLUCOPHAGE) 500 MG tablet Take 1 tablet (500 mg total) by mouth 2 (two) times daily with meals.    multivit with min-folic acid 200 mcg Chew Take 1 tablet by mouth once daily.     olopatadine (PATANOL) 0.1 % ophthalmic solution Place 1 drop into both eyes daily as needed.    ondansetron (ZOFRAN-ODT) 4 MG TbDL Take 1 tablet (4 mg total) by mouth every 8 (eight) hours as needed (nausea).    oxyCODONE-acetaminophen (PERCOCET) 7.5-325 mg per tablet Take 1 tablet by mouth 4 (four) times daily as needed.    pantoprazole (PROTONIX) 40 MG tablet Take 1 tablet (40 mg total) by mouth once daily.    rosuvastatin (CRESTOR) 10 MG tablet Take 1 tablet (10 mg total) by mouth every evening.    traZODone (DESYREL) 50 MG tablet TAKE 1 TABLET BY MOUTH IN THE EVENING - (MAY TAKE AN ADDITIONAL TABLET AS NEEDED)    valsartan-hydrochlorothiazide (DIOVAN-HCT) 160-12.5 mg per tablet Take 1 tablet by mouth once daily.    blood sugar diagnostic (ACCU-CHEK JACINTO PLUS TEST STRP) Strp Use to test blood glucose one (1) times daily as directed with insurance preferred meter and supplies    blood-glucose meter kit To check BG 2 times daily, to use with insurance preferred meter. Medical necessity.    lancets (ACCU-CHEK SOFTCLIX LANCETS)  Misc Test TWICE DAILY;Twice a day    lancets Misc Use to test blood glucose one (1) times daily as directed with insurance preferred meter and supplies    LORazepam (ATIVAN) 0.5 MG tablet Take 1 tablet (0.5 mg total) by mouth every 8 (eight) hours as needed for Anxiety (fear of flying).    [DISCONTINUED] azithromycin (Z-WILSON) 250 MG tablet Take 250 mg by mouth.    [DISCONTINUED] fluticasone (FLONASE) 50 mcg/actuation nasal spray 2 sprays by Each Nare route once daily.    [DISCONTINUED] scopolamine (TRANSDERM-SCOP) 1.3-1.5 mg (1 mg over 3 days) Place 1 patch onto the skin every 72 hours.     Family History       Problem Relation (Age of Onset)    Cancer Brother, Mother    Diabetes Mellitus Mother    Diverticulitis Sister    Heart disease Father    Hypertension Father    Lupus Daughter    Pancreatic cancer Maternal Aunt (55), Cousin (58)    Stroke Father    Throat cancer Brother, Mother    Thyroid disease Daughter          Tobacco Use    Smoking status: Never Smoker    Smokeless tobacco: Never Used   Substance and Sexual Activity    Alcohol use: No    Drug use: Yes     Types: Oxycodone    Sexual activity: Not Currently     Birth control/protection: Surgical     Comment: hysterectomy     Review of Systems   Constitutional:  Positive for fatigue. Negative for fever.   HENT:  Positive for congestion.    Respiratory:  Positive for cough and shortness of breath. Negative for wheezing.    Cardiovascular:  Positive for chest pain. Negative for palpitations and leg swelling.   Gastrointestinal:  Negative for abdominal pain, constipation and diarrhea.   Genitourinary:  Negative for dysuria.   Musculoskeletal:  Negative for back pain.   Neurological:  Negative for dizziness, numbness and headaches.   Objective:     Vital Signs (Most Recent):  Temp: 97.9 °F (36.6 °C) (04/29/22 1050)  Pulse: 73 (04/29/22 1500)  Resp: 13 (04/29/22 1500)  BP: (!) 147/67 (04/29/22 1500)  SpO2: 99 % (04/29/22 1500)   Vital Signs (24h Range):  Temp:   [97.9 °F (36.6 °C)] 97.9 °F (36.6 °C)  Pulse:  [] 73  Resp:  [12-24] 13  SpO2:  [94 %-100 %] 99 %  BP: (121-147)/(66-87) 147/67     Weight: 61.2 kg (135 lb)  Body mass index is 23.91 kg/m².    Physical Exam  Vitals reviewed.   Constitutional:       General: She is not in acute distress.     Appearance: She is not ill-appearing.   HENT:      Head: Normocephalic.   Eyes:      Pupils: Pupils are equal, round, and reactive to light.   Cardiovascular:      Rate and Rhythm: Normal rate and regular rhythm.   Pulmonary:      Effort: Pulmonary effort is normal. No respiratory distress.      Breath sounds: No wheezing.   Abdominal:      General: Abdomen is flat. There is no distension.      Tenderness: There is no abdominal tenderness.   Musculoskeletal:         General: No swelling.      Cervical back: Neck supple.   Skin:     General: Skin is warm.      Capillary Refill: Capillary refill takes less than 2 seconds.      Coloration: Skin is not jaundiced.   Neurological:      General: No focal deficit present.      Mental Status: She is alert and oriented to person, place, and time.         CRANIAL NERVES     CN III, IV, VI   Pupils are equal, round, and reactive to light.     Significant Labs: All pertinent labs within the past 24 hours have been reviewed.  BMP:   Recent Labs   Lab 04/29/22  1140      *   K 3.6   CL 98   CO2 24   BUN 24*   CREATININE 1.0   CALCIUM 10.2     CBC:   Recent Labs   Lab 04/29/22  1140   WBC 7.94   HGB 11.6*   HCT 35.8*          Significant Imaging: I have reviewed all pertinent imaging results/findings within the past 24 hours.  Chest x-ray and CT chest images reviewed.

## 2022-04-29 NOTE — ASSESSMENT & PLAN NOTE
Observe patient in hospital    Patient has mild desaturation to 88% with ambulation.  Her oxygen saturation is stable at rest.    Airborne, contact, droplet isolation    Status post Solu Medrol in ED  Start Decadron 6 mg p.o. daily in a.m.  No indication for monoclonal antibody or redesivir in this setting (discussed case with Infectious Disease)  Inhaled albuterol p.r.n.  Inhaled budesonide scheduled  Antitussives as needed  Continuous pulse oximetry    Inflammatory markers ordered

## 2022-04-29 NOTE — ASSESSMENT & PLAN NOTE
Observe patient in hospital    Patient has mild desaturation to 88% with ambulation.  Her oxygen saturation is stable at rest.    Status post Solu Medrol in ED  Start Decadron 6 mg p.o. daily in a.m.  No indication for monoclonal antibody or redesivir in this setting (discussed case with Infectious Disease)  Inhaled albuterol p.r.n.  Inhaled budesonide scheduled  Antitussives as needed  Continuous pulse oximetry    Inflammatory markers ordered

## 2022-04-29 NOTE — HPI
76-year-old woman prior medical history of rheumatoid arthritis, chronic pain, hypertension, diabetes mellitus, CKD 3, diastolic dysfunction who presents with 5 days of progressively worsening weakness, dyspnea, and associated chest discomfort.  Her dyspnea is worse with activity.  She also endorses associated nonproductive cough.  The patient recently returned from a cruise in [place].  She was informed that she tested positive for COVID on Sunday.  The patient has other family members around the cruise who tested positive since then.  The patient denies fever, malaise, wheeze, history of COPD, recent pneumonia.  She denies history of blood clots.    In the ED, no wheeze on exam.  No stigmata of heart failure.  COVID PCR is positive.  CTA of the chest negative for PE, but did show some faint bilateral ground-glass opacities.

## 2022-04-29 NOTE — ED PROVIDER NOTES
Encounter Date: 4/29/2022       History     Chief Complaint   Patient presents with    Shortness of Breath     Sob, chest pain, weakness.   + Covid     Presents with chest pain and SOB  She reports retuning home a cruise to [place] Disembarked on Sunday  Was tested for COVID on Saturday while on the boat  and tested +  Onset of weakness Monday as well as SOB   Last night she had worsening of SOB and had chest pain  She reports she did not want to wake anyone then She has history of COVID pneumonia in 4/21         Review of patient's allergies indicates:   Allergen Reactions    Codeine Other (See Comments)     Chest pain    Clindamycin Other (See Comments)     Difficulty swallowing after taking, feels a something sits in epigastric area     Iodinated contrast media Hives and Rash     Past Medical History:   Diagnosis Date    Allergy     Anemia 2012    with thrombocytopenia; iron deficiency    Arthritis     Cervical spinal stenosis     with neuropathy, chronic neck,back,leg pain    Colon polyp     COPD (chronic obstructive pulmonary disease)     Coronary artery disease     COVID-19     Diabetes mellitus     , sugars run 190's per patient    Diastolic dysfunction 7/13/2015    Diverticulitis     Diverticulosis     Hx diverticulitis,still has chronic diarrhea, abd pain    Dyspnea on exertion     sometimes with nausea, fatigue,dizziness, had cardiac cath June 2012, normal    Fibromyalgia     Fracture 2012    right thumb    GERD (gastroesophageal reflux disease)     Feb 2012, Hx H Pylori    Glaucoma     had LAser surgey, on no eye drops    HEARING LOSS     Hemangioma     fatty liver    History of earache     frequent    History of TIA (transient ischemic attack) 5/28/2013    Hyperlipidemia     Hypertension     Hypertension associated with diabetes 3/14/2017    Laryngeal polyp     Myocardial infarction 2006 last    also MI in distant past    REJI (obstructive sleep apnea)     does not use CPAP     Otitis media     Renal stone     Sjogren's syndrome     SOB (shortness of breath) 6/8/2012    [address] [place] [address] 1. Normal coronary arteries. 2. Normal single renal arteries bilaterally. 3. Diastolic dysfunction.     Stroke     TIA, now on Plavix    TIA (transient ischemic attack)     TMJ disorder     Type II or unspecified type diabetes mellitus without mention of complication, uncontrolled 5/8/2014    UTI (lower urinary tract infection)     frequent    Venous insufficiency     with Hx blood clot in leg     Past Surgical History:   Procedure Laterality Date    ABDOMINAL SURGERY  2010 x2    expoloratory lap for pelvic cyst,adhesions; partial colonectomy     adhesiolysis   10/11/2012    CARDIAC CATHETERIZATION      no current blockages.    CHOLECYSTECTOMY      COLON SURGERY      r/t diverticuli    COLONOSCOPY  08/2014    repeat in 5 years    COLONOSCOPY N/A 1/7/2020    Procedure: COLONOSCOPY;  Surgeon: Kb Nguyen MD;  Location: [facility];  Service: Endoscopy;  Laterality: N/A;    ENDOSCOPIC ULTRASOUND OF UPPER GASTROINTESTINAL TRACT N/A 1/13/2021    Procedure: ULTRASOUND, UPPER GI TRACT, ENDOSCOPIC;  Surgeon: Sonido Castellano III, MD;  Location: [facility];  Service: Endoscopy;  Laterality: N/A;    EPIDURAL BLOCK INJECTION  5/15/12    back,neck for pain    ESOPHAGOGASTRODUODENOSCOPY N/A 1/7/2020    Procedure: EGD (ESOPHAGOGASTRODUODENOSCOPY);  Surgeon: Kb Nguyen MD;  Location: [facility];  Service: Endoscopy;  Laterality: N/A;    ESOPHAGOGASTRODUODENOSCOPY N/A 1/13/2021    Procedure: EGD (ESOPHAGOGASTRODUODENOSCOPY);  Surgeon: Sonido Castellano III, MD;  Location: [facility];  Service: Endoscopy;  Laterality: N/A;    EXPLORATORY LAPAROTOMY W/ BOWEL RESECTION  10/11/2012    EYE SURGERY      Laser for glaucoma    EYE SURGERY  May 2012    cataracts    HYSTERECTOMY      LARYNX SURGERY      polypectomy    OOPHORECTOMY      TONSILLECTOMY      with adenoidectomy    UPPER  GASTROINTESTINAL ENDOSCOPY  12/2015    VASCULAR SURGERY      laser to varicose vein leg     Family History   Problem Relation Age of Onset    Diverticulitis Sister     Throat cancer Brother     Cancer Brother     Throat cancer Mother     Cancer Mother     Diabetes Mellitus Mother     Stroke Father     Hypertension Father     Heart disease Father     Pancreatic cancer Maternal Aunt 55    Pancreatic cancer Cousin 58    Thyroid disease Daughter     Lupus Daughter     Breast cancer Neg Hx     Ovarian cancer Neg Hx     Colon cancer Neg Hx     Colon polyps Neg Hx     Celiac disease Neg Hx     Cirrhosis Neg Hx     Crohn's disease Neg Hx     Stomach cancer Neg Hx     Ulcerative colitis Neg Hx     Rectal cancer Neg Hx     Esophageal cancer Neg Hx     Inflammatory bowel disease Neg Hx     Rheum arthritis Neg Hx     Psoriasis Neg Hx     Osteoarthritis Neg Hx     Kidney disease Neg Hx     Hyperlipidemia Neg Hx     COPD Neg Hx     Depression Neg Hx     Chronic back pain Neg Hx     Asthma Neg Hx     Alcohol abuse Neg Hx      Social History     Tobacco Use    Smoking status: Never Smoker    Smokeless tobacco: Never Used   Substance Use Topics    Alcohol use: No    Drug use: Yes     Types: Oxycodone     Review of Systems   Constitutional: Negative for appetite change and fever.   HENT: Negative for congestion and trouble swallowing.    Respiratory: Positive for shortness of breath. Negative for cough and wheezing.    Cardiovascular: Positive for chest pain. Negative for palpitations and leg swelling.   Gastrointestinal: Negative for abdominal pain, diarrhea, nausea and vomiting.   Genitourinary: Negative for dysuria.   Musculoskeletal: Negative for back pain.   Skin: Negative for rash.   Neurological: Positive for weakness. Negative for dizziness and light-headedness.       Physical Exam     Initial Vitals [04/29/22 1050]   BP Pulse Resp Temp SpO2   121/87 104 (!) 24 97.9 °F (36.6 °C) 97 %       MAP       --         Physical Exam    Constitutional: She appears well-developed and well-nourished.   HENT:   Head: Normocephalic and atraumatic.   Mouth/Throat: Oropharynx is clear and moist.   Eyes: Conjunctivae are normal.   Neck: Neck supple.   Normal range of motion.  Cardiovascular: Normal rate, regular rhythm and normal heart sounds.   Pulmonary/Chest: Breath sounds normal. No respiratory distress. She has no wheezes. She has no rhonchi.   Pt appears SOB and anxious   Abdominal: Abdomen is soft. Bowel sounds are normal. She exhibits no distension. There is no abdominal tenderness.   Musculoskeletal:         General: Normal range of motion.      Cervical back: Normal range of motion and neck supple.     Neurological: She is alert and oriented to person, place, and time. No sensory deficit. GCS score is 15. GCS eye subscore is 4. GCS verbal subscore is 5. GCS motor subscore is 6.   Skin: Skin is warm and dry.   Psychiatric: She has a normal mood and affect. Thought content normal.   Pt appear anxious           ED Course   Procedures  Labs Reviewed   SARS-COV-2 RNA AMPLIFICATION, QUAL - Abnormal; Notable for the following components:       Result Value    SARS-CoV-2 RNA, Amplification, Qual Positive (*)     All other components within normal limits   CBC W/ AUTO DIFFERENTIAL - Abnormal; Notable for the following components:    Hemoglobin 11.6 (*)     Hematocrit 35.8 (*)     MCH 26.9 (*)     All other components within normal limits   COMPREHENSIVE METABOLIC PANEL - Abnormal; Notable for the following components:    Sodium 134 (*)     BUN 24 (*)     Alkaline Phosphatase 43 (*)     eGFR if non  55.2 (*)     All other components within normal limits   TROPONIN I   B-TYPE NATRIURETIC PEPTIDE   URINALYSIS, REFLEX TO URINE CULTURE     EKG Readings: (Independently Interpreted)   Initial Reading: No STEMI. Rhythm: Normal Sinus Rhythm.     ECG Results          EKG 12-lead (In process)  Result time  04/29/22 13:40:33    In process by Interface, Lab In [facility] (04/29/22 13:40:33)                 Narrative:    Test Reason : R07.9,    Vent. Rate : 084 BPM     Atrial Rate : 084 BPM     P-R Int : 150 ms          QRS Dur : 070 ms      QT Int : 352 ms       P-R-T Axes : 037 -13 025 degrees     QTc Int : 415 ms    Normal sinus rhythm  Normal ECG  When compared with ECG of 20-FEB-2021 13:51,  No significant change was found    Referred By: [email or web address]   SELF           Confirmed By:                             Imaging Results          CTA Chest Non-Coronary (PE Study) (Final result)  Result time 04/29/22 14:12:47    Final result by Carol Dixon MD (04/29/22 14:12:47)                 Narrative:    All CT scans at this facility used dose modulation, iterative reconstruction and/or weight-based dosing when appropriate to reduce radiation doses  as low as reasonably achievable.    HISTORY: Chest pain.    FINDINGS: Thin axial imaging through the chest was performed with 100 mL Omnipaque 350 IV contrast, with sagittal and coronal reformatted images and 3-D reconstructions performed on an independent workstation, with images stored in the patient's permanent electronic medical record.    This is a high-grade quality study for the evaluation of pulmonary embolism. The pulmonary arteries are normal in appearance without pulmonary emboli noted up to the subsegmental level, noting limitations of CT technique for identifying small isolated subsegmental emboli.    The thoracic aorta is normal in caliber without aneurysm or dissection. There is mild vascular calcification. The heart is normal in size.  There is no hilar, mediastinal or axillary adenopathy.    There are no pulmonary nodules, infiltrates or pleural effusions. The tracheal bronchi are normal.    The esophagus is normal.    Visualized portion of the upper abdomen is unremarkable. The gallbladder is absent. There are no acute osseous abnormalities.      IMPRESSION: No  CT evidence pulmonary emboli    No acute pulmonary process.    Electronically signed by:  Carol Dixon MD  4/29/2022 2:12 PM CDT Workstation: [id number]                             X-Ray Chest AP Portable (Final result)  Result time 04/29/22 11:59:40    Final result by Carol Dixon MD (04/29/22 11:59:40)                 Narrative:    XR CHEST 1 VIEW    CLINICAL HISTORY:  76 years Female Chest Pain    COMPARISON: 2/20/2021    FINDINGS: Cardiomediastinal silhouette is within normal limits. Lungs are normally expanded with no airspace consolidation. No pleural effusion or pneumothorax. No acute osseous abnormality.    IMPRESSION: No acute pulmonary process.    Electronically signed by:  Carol Dixon MD  4/29/2022 11:59 AM CDT Workstation: [id number]                               Medications   aspirin tablet 325 mg (325 mg Oral Given 4/29/22 1218)   morphine injection 1 mg (1 mg Intravenous Given 4/29/22 1219)   ondansetron injection 4 mg (4 mg Intravenous Given 4/29/22 1218)   methylPREDNISolone sodium succinate injection 125 mg (125 mg Intravenous Given 4/29/22 1301)   diphenhydrAMINE injection 25 mg (25 mg Intravenous Given 4/29/22 1301)   sodium chloride 0.9% bolus 1,000 mL (0 mLs Intravenous Stopped 4/29/22 1454)   iohexoL (OMNIPAQUE 350) injection 100 mL (100 mLs Intravenous Given 4/29/22 1358)     Medical Decision Making:   Initial Assessment:   Presents with Chest pain and SOB  Onset last PM worsening today pt is + COIVD  Tested + on a cruise ship  She test + on Saturday and disembarked from the ship Sunday  Has history of COVID pneumonia in 2/21  Reports she had not SOB while in [place]   Clinical Tests:   Lab Tests: Reviewed  The following lab test(s) were unremarkable: Troponin and BNP       <> Summary of Lab: NA+ 134  BUN 24  H&H 11/35   Radiological Study: Reviewed  Medical Tests: Reviewed  ED Management:  This pt appeared SOB while at rest.  Her Chest x-ray was neg as well as her CTA of the  chest.  EKG was NSR Troponin WNL  Pt was ambulated He O2 sat dropped to 88%  I have consulted the Hospitalist for admission Am awaiting call back   Spoke to Dr. Hare  He agrees to admit   Have discussed this pt with DR. Tran                       Clinical Impression:   Final diagnoses:  [R07.9] Chest pain  [R09.02] Hypoxemia (Primary)          ED Disposition Condition    Admit               Yesy Prater NP  04/29/22 0584

## 2022-04-29 NOTE — H&P
[facility] - Emergency Dept  Hospital Medicine  History & Physical    Patient Name: Pili Teixeira  MRN: [id number]  Patient Class: OP- Observation  Admission Date: 4/29/2022  Attending Physician: Alfa Tran DO   Primary Care Provider: ANKIT Huggins MD         Patient information was obtained from patient and ER records.     Subjective:     Principal Problem:<principal problem not specified>    Chief Complaint:   Chief Complaint   Patient presents with    Shortness of Breath     Sob, chest pain, weakness.   + Covid        HPI: 76-year-old woman prior medical history of rheumatoid arthritis, chronic pain, hypertension, diabetes mellitus, CKD 3, diastolic dysfunction who presents with 5 days of progressively worsening weakness, dyspnea, and associated chest discomfort.  Her dyspnea is worse with activity.  She also endorses associated nonproductive cough.  The patient recently returned from a cruise in [place].  She was informed that she tested positive for COVID on Sunday.  The patient has other family members around the cruise who tested positive since then.  The patient denies fever, malaise, wheeze, history of COPD, recent pneumonia.  She denies history of blood clots.    In the ED, no wheeze on exam.  No stigmata of heart failure.  COVID PCR is positive.  CTA of the chest negative for PE, but did show some faint bilateral ground-glass opacities.      Past Medical History:   Diagnosis Date    Allergy     Anemia 2012    with thrombocytopenia; iron deficiency    Arthritis     Cervical spinal stenosis     with neuropathy, chronic neck,back,leg pain    Colon polyp     COPD (chronic obstructive pulmonary disease)     Coronary artery disease     COVID-19     Diabetes mellitus     , sugars run 190's per patient    Diastolic dysfunction 7/13/2015    Diverticulitis     Diverticulosis     Hx diverticulitis,still has chronic diarrhea, abd pain    Dyspnea on exertion     sometimes  with nausea, fatigue,dizziness, had cardiac cath June 2012, normal    Fibromyalgia     Fracture 2012    right thumb    GERD (gastroesophageal reflux disease)     Feb 2012, Hx H Pylori    Glaucoma     had LAser surgey, on no eye drops    HEARING LOSS     Hemangioma     fatty liver    History of earache     frequent    History of TIA (transient ischemic attack) 5/28/2013    Hyperlipidemia     Hypertension     Hypertension associated with diabetes 3/14/2017    Laryngeal polyp     Myocardial infarction 2006 last    also MI in distant past    REJI (obstructive sleep apnea)     does not use CPAP    Otitis media     Renal stone     Sjogren's syndrome     SOB (shortness of breath) 6/8/2012    [address] [place] [address] 1. Normal coronary arteries. 2. Normal single renal arteries bilaterally. 3. Diastolic dysfunction.     Stroke     TIA, now on Plavix    TIA (transient ischemic attack)     TMJ disorder     Type II or unspecified type diabetes mellitus without mention of complication, uncontrolled 5/8/2014    UTI (lower urinary tract infection)     frequent    Venous insufficiency     with Hx blood clot in leg       Past Surgical History:   Procedure Laterality Date    ABDOMINAL SURGERY  2010 x2    expoloratory lap for pelvic cyst,adhesions; partial colonectomy     adhesiolysis   10/11/2012    CARDIAC CATHETERIZATION      no current blockages.    CHOLECYSTECTOMY      COLON SURGERY      r/t diverticuli    COLONOSCOPY  08/2014    repeat in 5 years    COLONOSCOPY N/A 1/7/2020    Procedure: COLONOSCOPY;  Surgeon: Kb Nguyen MD;  Location: [facility] ENDO;  Service: Endoscopy;  Laterality: N/A;    ENDOSCOPIC ULTRASOUND OF UPPER GASTROINTESTINAL TRACT N/A 1/13/2021    Procedure: ULTRASOUND, UPPER GI TRACT, ENDOSCOPIC;  Surgeon: Sonido Castellano III, MD;  Location: [facility] ENDO;  Service: Endoscopy;  Laterality: N/A;    EPIDURAL BLOCK INJECTION  5/15/12    back,neck for pain    ESOPHAGOGASTRODUODENOSCOPY N/A  1/7/2020    Procedure: EGD (ESOPHAGOGASTRODUODENOSCOPY);  Surgeon: Kb Nguyen MD;  Location: [facility] ENDO;  Service: Endoscopy;  Laterality: N/A;    ESOPHAGOGASTRODUODENOSCOPY N/A 1/13/2021    Procedure: EGD (ESOPHAGOGASTRODUODENOSCOPY);  Surgeon: Sonido Castellano III, MD;  Location: [facility] ENDO;  Service: Endoscopy;  Laterality: N/A;    EXPLORATORY LAPAROTOMY W/ BOWEL RESECTION  10/11/2012    EYE SURGERY      Laser for glaucoma    EYE SURGERY  May 2012    cataracts    HYSTERECTOMY      LARYNX SURGERY      polypectomy    OOPHORECTOMY      TONSILLECTOMY      with adenoidectomy    UPPER GASTROINTESTINAL ENDOSCOPY  12/2015    VASCULAR SURGERY      laser to varicose vein leg       Review of patient's allergies indicates:   Allergen Reactions    Codeine Other (See Comments)     Chest pain    Clindamycin Other (See Comments)     Difficulty swallowing after taking, feels a something sits in epigastric area     Iodinated contrast media Hives and Rash       No current facility-administered medications on file prior to encounter.     Current Outpatient Medications on File Prior to Encounter   Medication Sig    albuterol-ipratropium (DUO-NEB) 2.5 mg-0.5 mg/3 mL nebulizer solution Take 3 mLs by nebulization every 4 to 6 hours as needed for Wheezing. Rescue    aspirin (ECOTRIN) 81 MG EC tablet Take 81 mg by mouth once daily.    clopidogreL (PLAVIX) 75 mg tablet Take 1 tablet (75 mg total) by mouth once daily.    cyanocobalamin (VITAMIN B-12) 1000 MCG tablet Take 100 mcg by mouth once daily.    diclofenac sodium 1 % Gel Apply 2 g topically once daily.    diltiaZEM (CARDIZEM CD) 120 MG Cp24 TAKE 1 CAPSULE BY MOUTH IN THE EVENING    folic acid (FOLVITE) 1 MG tablet Take 1 tablet (1,000 mcg total) by mouth once daily.    gabapentin (NEURONTIN) 100 MG capsule TAKE 1 CAPSULE BY MOUTH THREE TIMES DAILY    ipratropium (ATROVENT) 42 mcg (0.06 %) nasal spray 2 sprays by Nasal route 3 (three) times daily.     linaGLIPtin (TRADJENTA) 5 mg Tab tablet Take 1 tablet (5 mg total) by mouth once daily.    loratadine (CLARITIN) 10 mg tablet Take 10 mg by mouth once daily.    meloxicam (MOBIC) 7.5 MG tablet Take 1 tablet (7.5 mg total) by mouth once daily.    metFORMIN (GLUCOPHAGE) 500 MG tablet Take 1 tablet (500 mg total) by mouth 2 (two) times daily with meals.    multivit with min-folic acid 200 mcg Chew Take 1 tablet by mouth once daily.     olopatadine (PATANOL) 0.1 % ophthalmic solution Place 1 drop into both eyes daily as needed.    ondansetron (ZOFRAN-ODT) 4 MG TbDL Take 1 tablet (4 mg total) by mouth every 8 (eight) hours as needed (nausea).    oxyCODONE-acetaminophen (PERCOCET) 7.5-325 mg per tablet Take 1 tablet by mouth 4 (four) times daily as needed.    pantoprazole (PROTONIX) 40 MG tablet Take 1 tablet (40 mg total) by mouth once daily.    rosuvastatin (CRESTOR) 10 MG tablet Take 1 tablet (10 mg total) by mouth every evening.    traZODone (DESYREL) 50 MG tablet TAKE 1 TABLET BY MOUTH IN THE EVENING - (MAY TAKE AN ADDITIONAL TABLET AS NEEDED)    valsartan-hydrochlorothiazide (DIOVAN-HCT) 160-12.5 mg per tablet Take 1 tablet by mouth once daily.    blood sugar diagnostic (ACCU-CHEK JACINTO PLUS TEST STRP) Strp Use to test blood glucose one (1) times daily as directed with insurance preferred meter and supplies    blood-glucose meter kit To check BG 2 times daily, to use with insurance preferred meter. Medical necessity.    lancets (ACCU-CHEK SOFTCLIX LANCETS) Misc Test TWICE DAILY;Twice a day    lancets Misc Use to test blood glucose one (1) times daily as directed with insurance preferred meter and supplies    LORazepam (ATIVAN) 0.5 MG tablet Take 1 tablet (0.5 mg total) by mouth every 8 (eight) hours as needed for Anxiety (fear of flying).    [DISCONTINUED] azithromycin (Z-WILSON) 250 MG tablet Take 250 mg by mouth.    [DISCONTINUED] fluticasone (FLONASE) 50 mcg/actuation nasal spray 2 sprays by Each  Nare route once daily.    [DISCONTINUED] scopolamine (TRANSDERM-SCOP) 1.3-1.5 mg (1 mg over 3 days) Place 1 patch onto the skin every 72 hours.     Family History       Problem Relation (Age of Onset)    Cancer Brother, Mother    Diabetes Mellitus Mother    Diverticulitis Sister    Heart disease Father    Hypertension Father    Lupus Daughter    Pancreatic cancer Maternal Aunt (55), Cousin (58)    Stroke Father    Throat cancer Brother, Mother    Thyroid disease Daughter          Tobacco Use    Smoking status: Never Smoker    Smokeless tobacco: Never Used   Substance and Sexual Activity    Alcohol use: No    Drug use: Yes     Types: Oxycodone    Sexual activity: Not Currently     Birth control/protection: Surgical     Comment: hysterectomy     Review of Systems   Constitutional:  Positive for fatigue. Negative for fever.   HENT:  Positive for congestion.    Respiratory:  Positive for cough and shortness of breath. Negative for wheezing.    Cardiovascular:  Positive for chest pain. Negative for palpitations and leg swelling.   Gastrointestinal:  Negative for abdominal pain, constipation and diarrhea.   Genitourinary:  Negative for dysuria.   Musculoskeletal:  Negative for back pain.   Neurological:  Negative for dizziness, numbness and headaches.   Objective:     Vital Signs (Most Recent):  Temp: 97.9 °F (36.6 °C) (04/29/22 1050)  Pulse: 73 (04/29/22 1500)  Resp: 13 (04/29/22 1500)  BP: (!) 147/67 (04/29/22 1500)  SpO2: 99 % (04/29/22 1500)   Vital Signs (24h Range):  Temp:  [97.9 °F (36.6 °C)] 97.9 °F (36.6 °C)  Pulse:  [] 73  Resp:  [12-24] 13  SpO2:  [94 %-100 %] 99 %  BP: (121-147)/(66-87) 147/67     Weight: 61.2 kg (135 lb)  Body mass index is 23.91 kg/m².    Physical Exam  Vitals reviewed.   Constitutional:       General: She is not in acute distress.     Appearance: She is not ill-appearing.   HENT:      Head: Normocephalic.   Eyes:      Pupils: Pupils are equal, round, and reactive to light.    Cardiovascular:      Rate and Rhythm: Normal rate and regular rhythm.   Pulmonary:      Effort: Pulmonary effort is normal. No respiratory distress.      Breath sounds: No wheezing.   Abdominal:      General: Abdomen is flat. There is no distension.      Tenderness: There is no abdominal tenderness.   Musculoskeletal:         General: No swelling.      Cervical back: Neck supple.   Skin:     General: Skin is warm.      Capillary Refill: Capillary refill takes less than 2 seconds.      Coloration: Skin is not jaundiced.   Neurological:      General: No focal deficit present.      Mental Status: She is alert and oriented to person, place, and time.         CRANIAL NERVES     CN III, IV, VI   Pupils are equal, round, and reactive to light.     Significant Labs: All pertinent labs within the past 24 hours have been reviewed.  BMP:   Recent Labs   Lab 04/29/22  1140      *   K 3.6   CL 98   CO2 24   BUN 24*   CREATININE 1.0   CALCIUM 10.2     CBC:   Recent Labs   Lab 04/29/22  1140   WBC 7.94   HGB 11.6*   HCT 35.8*          Significant Imaging: I have reviewed all pertinent imaging results/findings within the past 24 hours.  Chest x-ray and CT chest images reviewed.    Assessment/Plan:     Pneumonia due to COVID-19 virus  Observe patient in hospital    Patient has mild desaturation to 88% with ambulation.  Her oxygen saturation is stable at rest.    Airborne, contact, droplet isolation    Status post Solu Medrol in ED  Start Decadron 6 mg p.o. daily in a.m.  No indication for monoclonal antibody or redesivir in this setting (discussed case with Infectious Disease)  Inhaled albuterol p.r.n.  Inhaled budesonide scheduled  Antitussives as needed  Continuous pulse oximetry    Inflammatory markers ordered    Type 2 diabetes mellitus, without long-term current use of insulin  Hold home meds  Insulin sliding scale for now      Hyperlipidemia  Continue statin      Fibromyalgia  Continue opiates and  gabapentin      Diastolic dysfunction  No sign of acute heart failure exacerbation.  Continue home meds      SOB (shortness of breath)  Thought to be more due to COVID-19 pneumonia that diastolic dysfunction.      VTE Risk Mitigation (From admission, onward)    None             Deonte Boyle MD  Department of Hospital Medicine   [facility] - Emergency Dept

## 2022-04-30 ENCOUNTER — EXTERNAL CHRONIC CARE MANAGEMENT (OUTPATIENT)
Dept: PRIMARY CARE CLINIC | Facility: CLINIC | Age: 77
End: 2022-04-30
Payer: MEDICARE

## 2022-04-30 VITALS
DIASTOLIC BLOOD PRESSURE: 56 MMHG | BODY MASS INDEX: 23.92 KG/M2 | HEART RATE: 74 BPM | OXYGEN SATURATION: 98 % | TEMPERATURE: 98 F | RESPIRATION RATE: 20 BRPM | SYSTOLIC BLOOD PRESSURE: 112 MMHG | HEIGHT: 63 IN | WEIGHT: 135 LBS

## 2022-04-30 LAB
ALBUMIN SERPL BCP-MCNC: 3.8 G/DL (ref 3.5–5.2)
ALP SERPL-CCNC: 48 U/L (ref 55–135)
ALT SERPL W/O P-5'-P-CCNC: 15 U/L (ref 10–44)
ANION GAP SERPL CALC-SCNC: 12 MMOL/L (ref 8–16)
AST SERPL-CCNC: 17 U/L (ref 10–40)
BASOPHILS # BLD AUTO: 0.01 K/UL (ref 0–0.2)
BASOPHILS NFR BLD: 0.1 % (ref 0–1.9)
BILIRUB SERPL-MCNC: 0.7 MG/DL (ref 0.1–1)
BUN SERPL-MCNC: 35 MG/DL (ref 8–23)
CALCIUM SERPL-MCNC: 9.5 MG/DL (ref 8.7–10.5)
CHLORIDE SERPL-SCNC: 98 MMOL/L (ref 95–110)
CO2 SERPL-SCNC: 23 MMOL/L (ref 23–29)
CREAT SERPL-MCNC: 1.1 MG/DL (ref 0.5–1.4)
DIFFERENTIAL METHOD: ABNORMAL
EOSINOPHIL # BLD AUTO: 0 K/UL (ref 0–0.5)
EOSINOPHIL NFR BLD: 0 % (ref 0–8)
ERYTHROCYTE [DISTWIDTH] IN BLOOD BY AUTOMATED COUNT: 12.6 % (ref 11.5–14.5)
EST. GFR  (AFRICAN AMERICAN): 56.8 ML/MIN/1.73 M^2
EST. GFR  (NON AFRICAN AMERICAN): 49.2 ML/MIN/1.73 M^2
GLUCOSE SERPL-MCNC: 155 MG/DL (ref 70–110)
GLUCOSE SERPL-MCNC: 191 MG/DL (ref 70–110)
GLUCOSE SERPL-MCNC: 194 MG/DL (ref 70–110)
GLUCOSE SERPL-MCNC: 196 MG/DL (ref 70–110)
HCT VFR BLD AUTO: 36 % (ref 37–48.5)
HGB BLD-MCNC: 11.6 G/DL (ref 12–16)
IMM GRANULOCYTES # BLD AUTO: 0.07 K/UL (ref 0–0.04)
IMM GRANULOCYTES NFR BLD AUTO: 0.6 % (ref 0–0.5)
LYMPHOCYTES # BLD AUTO: 1.8 K/UL (ref 1–4.8)
LYMPHOCYTES NFR BLD: 16.9 % (ref 18–48)
MCH RBC QN AUTO: 26.7 PG (ref 27–31)
MCHC RBC AUTO-ENTMCNC: 32.2 G/DL (ref 32–36)
MCV RBC AUTO: 83 FL (ref 82–98)
MONOCYTES # BLD AUTO: 0.3 K/UL (ref 0.3–1)
MONOCYTES NFR BLD: 2.6 % (ref 4–15)
NEUTROPHILS # BLD AUTO: 8.6 K/UL (ref 1.8–7.7)
NEUTROPHILS NFR BLD: 79.8 % (ref 38–73)
NRBC BLD-RTO: 0 /100 WBC
PLATELET # BLD AUTO: 284 K/UL (ref 150–450)
PLATELET BLD QL SMEAR: ABNORMAL
PMV BLD AUTO: 13.1 FL (ref 9.2–12.9)
POTASSIUM SERPL-SCNC: 3.8 MMOL/L (ref 3.5–5.1)
PROT SERPL-MCNC: 7 G/DL (ref 6–8.4)
RBC # BLD AUTO: 4.35 M/UL (ref 4–5.4)
SODIUM SERPL-SCNC: 133 MMOL/L (ref 136–145)
WBC # BLD AUTO: 10.82 K/UL (ref 3.9–12.7)

## 2022-04-30 PROCEDURE — 96361 HYDRATE IV INFUSION ADD-ON: CPT

## 2022-04-30 PROCEDURE — 99439 PR CHRONIC CARE MGMT, EA ADDTL 20 MIN: ICD-10-PCS | Mod: S$PBB,,, | Performed by: FAMILY MEDICINE

## 2022-04-30 PROCEDURE — 99900035 HC TECH TIME PER 15 MIN (STAT)

## 2022-04-30 PROCEDURE — G0378 HOSPITAL OBSERVATION PER HR: HCPCS

## 2022-04-30 PROCEDURE — 80053 COMPREHEN METABOLIC PANEL: CPT | Performed by: STUDENT IN AN ORGANIZED HEALTH CARE EDUCATION/TRAINING PROGRAM

## 2022-04-30 PROCEDURE — 96372 THER/PROPH/DIAG INJ SC/IM: CPT | Performed by: STUDENT IN AN ORGANIZED HEALTH CARE EDUCATION/TRAINING PROGRAM

## 2022-04-30 PROCEDURE — 99439 CHRNC CARE MGMT STAF EA ADDL: CPT | Mod: S$PBB,,, | Performed by: FAMILY MEDICINE

## 2022-04-30 PROCEDURE — 63600175 PHARM REV CODE 636 W HCPCS: Performed by: STUDENT IN AN ORGANIZED HEALTH CARE EDUCATION/TRAINING PROGRAM

## 2022-04-30 PROCEDURE — 96365 THER/PROPH/DIAG IV INF INIT: CPT

## 2022-04-30 PROCEDURE — 25000003 PHARM REV CODE 250: Performed by: STUDENT IN AN ORGANIZED HEALTH CARE EDUCATION/TRAINING PROGRAM

## 2022-04-30 PROCEDURE — 99490 PR CHRONIC CARE MGMT, 1ST 20 MIN: ICD-10-PCS | Mod: S$PBB,,, | Performed by: FAMILY MEDICINE

## 2022-04-30 PROCEDURE — 94640 AIRWAY INHALATION TREATMENT: CPT

## 2022-04-30 PROCEDURE — 99439 CHRNC CARE MGMT STAF EA ADDL: CPT | Mod: PBBFAC,PO | Performed by: FAMILY MEDICINE

## 2022-04-30 PROCEDURE — 85025 COMPLETE CBC W/AUTO DIFF WBC: CPT | Performed by: STUDENT IN AN ORGANIZED HEALTH CARE EDUCATION/TRAINING PROGRAM

## 2022-04-30 PROCEDURE — 99490 CHRNC CARE MGMT STAFF 1ST 20: CPT | Mod: S$PBB,,, | Performed by: FAMILY MEDICINE

## 2022-04-30 PROCEDURE — A4216 STERILE WATER/SALINE, 10 ML: HCPCS | Performed by: STUDENT IN AN ORGANIZED HEALTH CARE EDUCATION/TRAINING PROGRAM

## 2022-04-30 PROCEDURE — 36415 COLL VENOUS BLD VENIPUNCTURE: CPT | Performed by: STUDENT IN AN ORGANIZED HEALTH CARE EDUCATION/TRAINING PROGRAM

## 2022-04-30 PROCEDURE — 25000242 PHARM REV CODE 250 ALT 637 W/ HCPCS: Performed by: STUDENT IN AN ORGANIZED HEALTH CARE EDUCATION/TRAINING PROGRAM

## 2022-04-30 PROCEDURE — 94761 N-INVAS EAR/PLS OXIMETRY MLT: CPT

## 2022-04-30 PROCEDURE — 99490 CHRNC CARE MGMT STAFF 1ST 20: CPT | Mod: PBBFAC,PO | Performed by: FAMILY MEDICINE

## 2022-04-30 RX ORDER — SODIUM CHLORIDE 0.9 % (FLUSH) 0.9 %
10 SYRINGE (ML) INJECTION EVERY 8 HOURS
Status: DISCONTINUED | OUTPATIENT
Start: 2022-04-30 | End: 2022-04-30 | Stop reason: HOSPADM

## 2022-04-30 RX ORDER — BENZONATATE 200 MG/1
200 CAPSULE ORAL 3 TIMES DAILY PRN
Qty: 30 CAPSULE | Refills: 0 | Status: SHIPPED | OUTPATIENT
Start: 2022-04-30 | End: 2022-05-10

## 2022-04-30 RX ORDER — ALBUTEROL SULFATE 90 UG/1
2 AEROSOL, METERED RESPIRATORY (INHALATION) EVERY 4 HOURS PRN
Qty: 18 G | Refills: 0 | Status: SHIPPED | OUTPATIENT
Start: 2022-04-30 | End: 2024-01-03

## 2022-04-30 RX ORDER — ONDANSETRON 2 MG/ML
4 INJECTION INTRAMUSCULAR; INTRAVENOUS EVERY 8 HOURS PRN
Status: DISCONTINUED | OUTPATIENT
Start: 2022-04-30 | End: 2022-04-30 | Stop reason: HOSPADM

## 2022-04-30 RX ORDER — ENOXAPARIN SODIUM 100 MG/ML
40 INJECTION SUBCUTANEOUS EVERY 24 HOURS
Status: DISCONTINUED | OUTPATIENT
Start: 2022-04-30 | End: 2022-04-30 | Stop reason: HOSPADM

## 2022-04-30 RX ORDER — ACETAMINOPHEN 325 MG/1
650 TABLET ORAL EVERY 8 HOURS PRN
Status: DISCONTINUED | OUTPATIENT
Start: 2022-04-30 | End: 2022-04-30 | Stop reason: HOSPADM

## 2022-04-30 RX ORDER — TALC
6 POWDER (GRAM) TOPICAL NIGHTLY PRN
Status: DISCONTINUED | OUTPATIENT
Start: 2022-04-30 | End: 2022-04-30 | Stop reason: HOSPADM

## 2022-04-30 RX ORDER — DEXAMETHASONE 6 MG/1
6 TABLET ORAL DAILY
Qty: 6 TABLET | Refills: 0 | Status: SHIPPED | OUTPATIENT
Start: 2022-05-01 | End: 2022-05-07

## 2022-04-30 RX ORDER — POLYETHYLENE GLYCOL 3350 17 G/17G
17 POWDER, FOR SOLUTION ORAL 2 TIMES DAILY PRN
Status: DISCONTINUED | OUTPATIENT
Start: 2022-04-30 | End: 2022-04-30 | Stop reason: HOSPADM

## 2022-04-30 RX ORDER — SODIUM CHLORIDE 9 MG/ML
INJECTION, SOLUTION INTRAVENOUS CONTINUOUS
Status: DISCONTINUED | OUTPATIENT
Start: 2022-04-30 | End: 2022-04-30

## 2022-04-30 RX ORDER — CEPHALEXIN 500 MG/1
500 CAPSULE ORAL 4 TIMES DAILY
Qty: 12 CAPSULE | Refills: 0 | Status: SHIPPED | OUTPATIENT
Start: 2022-04-30 | End: 2022-05-03

## 2022-04-30 RX ADMIN — CETIRIZINE HYDROCHLORIDE 10 MG: 10 TABLET, FILM COATED ORAL at 10:04

## 2022-04-30 RX ADMIN — ENOXAPARIN SODIUM 40 MG: 40 INJECTION SUBCUTANEOUS at 03:04

## 2022-04-30 RX ADMIN — GABAPENTIN 100 MG: 100 CAPSULE ORAL at 10:04

## 2022-04-30 RX ADMIN — HYDROCHLOROTHIAZIDE 12.5 MG: 12.5 TABLET ORAL at 10:04

## 2022-04-30 RX ADMIN — ACETAMINOPHEN 650 MG: 325 TABLET, FILM COATED ORAL at 03:04

## 2022-04-30 RX ADMIN — OXYCODONE HYDROCHLORIDE AND ACETAMINOPHEN 1 TABLET: 7.5; 325 TABLET ORAL at 02:04

## 2022-04-30 RX ADMIN — BUDESONIDE 180 MCG: 90 AEROSOL, POWDER RESPIRATORY (INHALATION) at 08:04

## 2022-04-30 RX ADMIN — FOLIC ACID 1000 MCG: 1 TABLET ORAL at 10:04

## 2022-04-30 RX ADMIN — ASPIRIN 81 MG: 81 TABLET, DELAYED RELEASE ORAL at 10:04

## 2022-04-30 RX ADMIN — OXYCODONE HYDROCHLORIDE AND ACETAMINOPHEN 1 TABLET: 7.5; 325 TABLET ORAL at 10:04

## 2022-04-30 RX ADMIN — INSULIN ASPART 2 UNITS: 100 INJECTION, SOLUTION INTRAVENOUS; SUBCUTANEOUS at 12:04

## 2022-04-30 RX ADMIN — SODIUM CHLORIDE, PRESERVATIVE FREE 10 ML: 5 INJECTION INTRAVENOUS at 01:04

## 2022-04-30 RX ADMIN — DEXAMETHASONE 6 MG: 2 TABLET ORAL at 10:04

## 2022-04-30 RX ADMIN — PANTOPRAZOLE SODIUM 40 MG: 40 TABLET, DELAYED RELEASE ORAL at 06:04

## 2022-04-30 RX ADMIN — POLYETHYLENE GLYCOL 3350 17 G: 17 POWDER, FOR SOLUTION ORAL at 10:04

## 2022-04-30 RX ADMIN — CLOPIDOGREL BISULFATE 75 MG: 75 TABLET, FILM COATED ORAL at 10:04

## 2022-04-30 RX ADMIN — BENZONATATE 200 MG: 100 CAPSULE ORAL at 10:04

## 2022-04-30 RX ADMIN — CEFTRIAXONE SODIUM 1 G: 1 INJECTION, POWDER, FOR SOLUTION INTRAMUSCULAR; INTRAVENOUS at 01:04

## 2022-04-30 RX ADMIN — SODIUM CHLORIDE: 0.9 INJECTION, SOLUTION INTRAVENOUS at 03:04

## 2022-04-30 RX ADMIN — SODIUM CHLORIDE, PRESERVATIVE FREE 10 ML: 5 INJECTION INTRAVENOUS at 06:04

## 2022-04-30 RX ADMIN — GABAPENTIN 100 MG: 100 CAPSULE ORAL at 02:04

## 2022-04-30 RX ADMIN — VALSARTAN 160 MG: 80 TABLET, FILM COATED ORAL at 10:04

## 2022-04-30 NOTE — PLAN OF CARE
[facility]  Initial Discharge Assessment       Primary Care Provider: ANKIT Huggins MD    Admission Diagnosis: Pneumonia due to COVID-19 virus [U07.1, J12.82]    Admission Date: 4/29/2022  Expected Discharge Date: 4/30/2022     Assessment completed: At bedside with patient  Advanced Directives: Kareen Bill  Marital Status:   Children: 4  Adult  Next of Kin: Children    Needs: None    Discharge Plan: Home with her friend,    YNES  will continue to follow.        Discharge Barriers Identified: None    Payor: MEDICARE / Plan: MEDICARE PART A & B / Product Type: Government /     Extended Emergency Contact Information  Primary Emergency Contact: Kareen Luong  Address: [address] [place] [address] [id number] [place] [address] of [facility]  Mobile Phone: [phone]  Relation: Daughter  Preferred language: English   needed? No    Discharge Plan A: Home  Discharge Plan B: Home      [id number] - [place], [place] - [address]  [address]  [place] [address]  Phone: [phone] Fax: [phone]    [id number] - [place], [place] - [address]  [address]  [place] [address]  Phone: [phone] Fax: [phone]      Initial Assessment (most recent)     Adult Discharge Assessment - 04/30/22 1430        Discharge Assessment    Assessment Type Discharge Planning Assessment     Confirmed/corrected address, phone number and insurance Yes     Confirmed Demographics Correct on Facesheet     Source of Information patient     When was your last doctors appointment? --   1 month ago    Does patient/caregiver understand observation status Yes     Communicated MONSTER with patient/caregiver Date not available/Unable to determine     Reason For Admission SOB COVID 19     Lives With friend(s)     Do you expect to return to your current living situation? Yes   Home    Do you have help at home or someone to help you  manage your care at home? Yes     Who are your caregiver(s) and their phone number(s)? Kareen Luong (dtr) [phone]     Prior to hospitilization cognitive status: Alert/Oriented     Current cognitive status: Alert/Oriented     Walking or Climbing Stairs Difficulty none     Dressing/Bathing Difficulty none     Home Accessibility wheelchair accessible     Equipment Currently Used at Home none     Readmission within 30 days? No     Patient currently being followed by outpatient case management? No     Do you currently have service(s) that help you manage your care at home? No     Do you take prescription medications? Yes     Do you have prescription coverage? Yes     Coverage Medicaid     Do you have any problems affording any of your prescribed medications? No     Is the patient taking medications as prescribed? yes     Who is going to help you get home at discharge? Her friend or her dtr     How do you get to doctors appointments? car, drives self     Are you on dialysis? No     Do you take coumadin? No     Discharge Plan A Home     Discharge Plan B Home     DME Needed Upon Discharge  none     Discharge Plan discussed with: Patient     Discharge Barriers Identified None

## 2022-04-30 NOTE — PLAN OF CARE
04/30/22 1400   Home Oxygen Qualification   $ Home O2 Qualification Tech time 15 minutes   Room Air SpO2 At Rest 98 %   Room Air SpO2 During Ambulation 96 %

## 2022-04-30 NOTE — PLAN OF CARE
04/30/22 1502   BENITEZ Message   Medicare Outpatient and Observation Notification regarding financial responsibility Given to patient/caregiver;Explained to patient/caregiver;Signed/date by patient/caregiver   Date BENITEZ was signed 04/30/22   Time BENITEZ was signed 1436

## 2022-04-30 NOTE — RESPIRATORY THERAPY
04/29/22 2143   Patient Assessment/Suction   Level of Consciousness (AVPU) alert   Respiratory Effort Normal;Unlabored   Expansion/Accessory Muscles/Retractions no use of accessory muscles   All Lung Fields Breath Sounds Anterior:;diminished   Rhythm/Pattern, Respiratory unlabored;pattern regular;depth regular   Cough Frequency no cough   PRE-TX-O2   O2 Device (Oxygen Therapy) room air   SpO2 99 %   Pulse Oximetry Type Intermittent   $ Pulse Oximetry - Single Charge Pulse Oximetry - Single   Pulse 69   Resp 16   Positioning   Body Position position changed independently   Head of Bed (HOB) Positioning HOB elevated   Inhaler   $ Inhaler Charges MDI (Metered Dose Inahler) Treatment;Mouth rinsed post treatment   Daily Review of Necessity (Inhaler) completed   Respiratory Treatment Status (Inhaler) given   Treatment Route (Inhaler) mouthpiece   Patient Position (Inhaler) HOB elevated   Post Treatment Assessment (Inhaler) breath sounds unchanged   Signs of Intolerance (Inhaler) none   Breath Sounds Post-Respiratory Treatment   Throughout All Fields Post-Treatment All Fields   Throughout All Fields Post-Treatment no change   Post-treatment Heart Rate (beats/min) 69   Post-treatment Resp Rate (breaths/min) 16   Education   $ Education Bronchodilator;15 min   Respiratory Evaluation   $ Care Plan Tech Time 15 min   $ Eval/Re-eval Charges Evaluation   Evaluation For New Orders

## 2022-04-30 NOTE — HOSPITAL COURSE
76-year-old woman who returned from a cruise and was diagnosed with COVID-19 who presented with dyspnea and chest discomfort was observed overnight.  Patient was started on IV steroids with improvement in her symptoms.  Her chest discomfort is improving.  The patient saturating greater than 95% on room air at rest and with ambulation.  The patient endorsed mild dyspnea with activity, but her oxygen saturation remained greater than 95% throughout the evaluation.  Patient will be discharged home today with oral Decadron for 6 more days of therapy.  She also be discharged home with albuterol inhaler.  Incidentally, patient is found to have asymptomatic bacteriuria for which she will receive 3 days of antibiotic therapy.  Patient will remain in isolation until May 5th.    Physical Exam  GENERAL:  No apparent distress. Alert and oriented times three  EYES:  PERRLA, EOMI. Conjunctivae intact  ENT:  Posterior pharynx clear  NECK:  Supple with no lymphadenopathy or thyromegaly  LUNGS:  No respiratory distress. Clear to auscultation bilaterally with good air movement  CARDIAC:  RRR without murmur, rub or gallop  ABDOMEN:  Positive bowel sounds. Nontender and nondistended. No organomegaly  EXTREMITIES:  Peripheral pulses are 2+. Hands and feet are warm. Good capillary refill in fingers (< 2 seconds). No clubbing, cyanosis or edema  SKIN:  Skin color, texture and turgor normal. No rashes, ulcerations or nodules  NEUROLOGIC:  CN II-XII intact. Light touch sensation intact. Muscle strength 5/5 in all extremities

## 2022-04-30 NOTE — DISCHARGE INSTRUCTIONS
Please resume all of your old home medications.    You were found to have COVID-19 pneumonia.  Although your oxygen saturation was low when your first arrived, you were able to walk around and did not require oxygen the following morning.    It is safe for you to go home today.  You do not need oxygen at home.    I have written for you to take 6 more days of steroid tablets (please take Decadron each day for 6 days).  I will send this prescription to her pharmacy.    I have also written for a short course of antibiotics for you to take.  Please continue to take Keflex 4 times a day as prescribed for 2 more days.    Please continue to take Tessalon as needed for cough    Please remain in isolation for total of 10 days from when you were first diagnosed with COVID.  You will need to wear a mask during transport home.

## 2022-04-30 NOTE — DISCHARGE SUMMARY
[facility] Medicine  Discharge Summary      Patient Name: Pili Teixeira  MRN: [id number]  Patient Class: OP- Observation  Admission Date: 4/29/2022  Hospital Length of Stay: 0 days  Discharge Date and Time:  04/30/2022 3:00 PM  Attending Physician: Deonte Boyle MD   Discharging Provider: Deonte Boyle MD  Primary Care Provider: ANKIT Huggins MD      HPI:   76-year-old woman prior medical history of rheumatoid arthritis, chronic pain, hypertension, diabetes mellitus, CKD 3, diastolic dysfunction who presents with 5 days of progressively worsening weakness, dyspnea, and associated chest discomfort.  Her dyspnea is worse with activity.  She also endorses associated nonproductive cough.  The patient recently returned from a cruise in [place].  She was informed that she tested positive for COVID on Sunday.  The patient has other family members around the cruise who tested positive since then.  The patient denies fever, malaise, wheeze, history of COPD, recent pneumonia.  She denies history of blood clots.    In the ED, no wheeze on exam.  No stigmata of heart failure.  COVID PCR is positive.  CTA of the chest negative for PE, but did show some faint bilateral ground-glass opacities.      * No surgery found *      Hospital Course:   76-year-old woman who returned from a cruise and was diagnosed with COVID-19 who presented with dyspnea and chest discomfort was observed overnight.  Patient was started on IV steroids with improvement in her symptoms.  Her chest discomfort is improving.  The patient saturating greater than 95% on room air at rest and with ambulation.  The patient endorsed mild dyspnea with activity, but her oxygen saturation remained greater than 95% throughout the evaluation.  Patient will be discharged home today with oral Decadron for 6 more days of therapy.  She also be discharged home with albuterol inhaler.  Incidentally, patient is found to have asymptomatic  bacteriuria for which she will receive 3 days of antibiotic therapy.  Patient will remain in isolation until May 5th.    Physical Exam  GENERAL:  No apparent distress. Alert and oriented times three  EYES:  PERRLA, EOMI. Conjunctivae intact  ENT:  Posterior pharynx clear  NECK:  Supple with no lymphadenopathy or thyromegaly  LUNGS:  No respiratory distress. Clear to auscultation bilaterally with good air movement  CARDIAC:  RRR without murmur, rub or gallop  ABDOMEN:  Positive bowel sounds. Nontender and nondistended. No organomegaly  EXTREMITIES:  Peripheral pulses are 2+. Hands and feet are warm. Good capillary refill in fingers (< 2 seconds). No clubbing, cyanosis or edema  SKIN:  Skin color, texture and turgor normal. No rashes, ulcerations or nodules  NEUROLOGIC:  CN II-XII intact. Light touch sensation intact. Muscle strength 5/5 in all extremities          Goals of Care Treatment Preferences:  Code Status: Full Code    Living Will: Yes              Consults:   Consults (From admission, onward)        Status Ordering Provider     Inpatient consult to Hospitalist  Once        Provider:  (Not yet assigned)    Acknowledged MARY KAY SAXENA          * Pneumonia due to COVID-19 virus  Observe patient in hospital    Patient has mild desaturation to 88% with ambulation.  Her oxygen saturation is stable at rest.    Airborne, contact, droplet isolation    Status post Solu Medrol in ED  Start Decadron 6 mg p.o. daily in a.m.  No indication for monoclonal antibody or redesivir in this setting (discussed case with Infectious Disease)  Inhaled albuterol p.r.n.  Inhaled budesonide scheduled  Antitussives as needed  Continuous pulse oximetry    Inflammatory markers ordered      Final Active Diagnoses:    Diagnosis Date Noted POA    PRINCIPAL PROBLEM:  Pneumonia due to COVID-19 virus [U07.1, J12.82] 02/20/2021 Yes    Type 2 diabetes mellitus, without long-term current use of insulin [E11.9] 01/12/2021 Yes     Chronic     Hyperlipidemia [E78.5] 06/03/2019 Yes    Fibromyalgia [M79.7] 11/18/2015 Yes    Diastolic dysfunction [I51.89] 07/13/2015 Yes     Chronic    SOB (shortness of breath) [R06.02] 06/08/2012 Yes      Problems Resolved During this Admission:       Discharged Condition: good    Disposition: Home or Self Care    Follow Up:   Follow-up Information     ANKIT Huggins MD. Schedule an appointment as soon as possible for a visit in 2 week(s).    Specialty: Family Medicine  Contact information:  [address] [place] [address]  [phone]                       Patient Instructions:      Diet Cardiac     Activity as tolerated       Significant Diagnostic Studies: Labs:   CMP   Recent Labs   Lab 04/29/22  1140 04/30/22  0335   * 133*   K 3.6 3.8   CL 98 98   CO2 24 23    191*   BUN 24* 35*   CREATININE 1.0 1.1   CALCIUM 10.2 9.5   PROT 7.5 7.0   ALBUMIN 4.1 3.8   BILITOT 0.8 0.7   ALKPHOS 43* 48*   AST 20 17   ALT 15 15   ANIONGAP 12 12   ESTGFRAFRICA >60.0 56.8*   EGFRNONAA 55.2* 49.2*    and CBC   Recent Labs   Lab 04/29/22  1140 04/30/22  0336   WBC 7.94 10.82   HGB 11.6* 11.6*   HCT 35.8* 36.0*    284       Pending Diagnostic Studies:     None         Medications:  Reconciled Home Medications:      Medication List      START taking these medications    albuterol 90 mcg/actuation inhaler  Commonly known as: PROVENTIL/VENTOLIN HFA  Inhale 2 puffs into the lungs every 4 (four) hours as needed for Wheezing or Shortness of Breath. Rescue     benzonatate 200 MG capsule  Commonly known as: TESSALON  Take 1 capsule (200 mg total) by mouth 3 (three) times daily as needed for Cough.     cephALEXin 500 MG capsule  Commonly known as: KEFLEX  Take 1 capsule (500 mg total) by mouth 4 (four) times daily. for 3 days     dexAMETHasone 6 MG tablet  Commonly known as: DECADRON  Take 1 tablet (6 mg total) by mouth once daily. for 6 days  Start taking on: May 1, 2022        CONTINUE taking these medications     albuterol-ipratropium 2.5 mg-0.5 mg/3 mL nebulizer solution  Commonly known as: DUO-NEB  Take 3 mLs by nebulization every 4 to 6 hours as needed for Wheezing. Rescue     aspirin 81 MG EC tablet  Commonly known as: ECOTRIN  Take 81 mg by mouth once daily.     blood sugar diagnostic Strp  Commonly known as: ACCU-CHEK JACINTO PLUS TEST STRP  Use to test blood glucose one (1) times daily as directed with insurance preferred meter and supplies     blood-glucose meter kit  To check BG 2 times daily, to use with insurance preferred meter. Medical necessity.     clopidogreL 75 mg tablet  Commonly known as: PLAVIX  Take 1 tablet (75 mg total) by mouth once daily.     cyanocobalamin 1000 MCG tablet  Commonly known as: VITAMIN B-12  Take 100 mcg by mouth once daily.     diclofenac sodium 1 % Gel  Commonly known as: VOLTAREN  Apply 2 g topically once daily.     diltiaZEM 120 MG Cp24  Commonly known as: CARDIZEM CD  TAKE 1 CAPSULE BY MOUTH IN THE EVENING     folic acid 1 MG tablet  Commonly known as: FOLVITE  Take 1 tablet (1,000 mcg total) by mouth once daily.     gabapentin 100 MG capsule  Commonly known as: NEURONTIN  TAKE 1 CAPSULE BY MOUTH THREE TIMES DAILY     ipratropium 42 mcg (0.06 %) nasal spray  Commonly known as: ATROVENT  2 sprays by Nasal route 3 (three) times daily.     * lancets Misc  Commonly known as: ACCU-CHEK SOFTCLIX LANCETS  Test TWICE DAILY;Twice a day     * lancets Misc  Use to test blood glucose one (1) times daily as directed with insurance preferred meter and supplies     loratadine 10 mg tablet  Commonly known as: CLARITIN  Take 10 mg by mouth once daily.     LORazepam 0.5 MG tablet  Commonly known as: ATIVAN  Take 1 tablet (0.5 mg total) by mouth every 8 (eight) hours as needed for Anxiety (fear of flying).     meloxicam 7.5 MG tablet  Commonly known as: MOBIC  Take 1 tablet (7.5 mg total) by mouth once daily.     metFORMIN 500 MG tablet  Commonly known as: GLUCOPHAGE  Take 1 tablet (500 mg total) by  mouth 2 (two) times daily with meals.     multivit with min-folic acid 200 mcg Chew  Take 1 tablet by mouth once daily.     olopatadine 0.1 % ophthalmic solution  Commonly known as: PATANOL  Place 1 drop into both eyes daily as needed.     ondansetron 4 MG Tbdl  Commonly known as: ZOFRAN-ODT  Take 1 tablet (4 mg total) by mouth every 8 (eight) hours as needed (nausea).     oxyCODONE-acetaminophen 7.5-325 mg per tablet  Commonly known as: PERCOCET  Take 1 tablet by mouth 4 (four) times daily as needed.     pantoprazole 40 MG tablet  Commonly known as: PROTONIX  Take 1 tablet (40 mg total) by mouth once daily.     rosuvastatin 10 MG tablet  Commonly known as: CRESTOR  Take 1 tablet (10 mg total) by mouth every evening.     TRADJENTA 5 mg Tab tablet  Generic drug: linaGLIPtin  Take 1 tablet (5 mg total) by mouth once daily.     traZODone 50 MG tablet  Commonly known as: DESYREL  TAKE 1 TABLET BY MOUTH IN THE EVENING - (MAY TAKE AN ADDITIONAL TABLET AS NEEDED)     valsartan-hydrochlorothiazide 160-12.5 mg per tablet  Commonly known as: DIOVAN-HCT  Take 1 tablet by mouth once daily.         * This list has 2 medication(s) that are the same as other medications prescribed for you. Read the directions carefully, and ask your doctor or other care provider to review them with you.                Indwelling Lines/Drains at time of discharge:   Lines/Drains/Airways     None                 Time spent on the discharge of patient: 25 minutes         Deonte Boyle MD  Department of Hospital Medicine  [facility]

## 2022-04-30 NOTE — PLAN OF CARE
04/30/22 1633   Final Note   Assessment Type Final Discharge Note   Anticipated Discharge Disposition Home   What phone number can be called within the next 1-3 days to see how you are doing after discharge? [phone]   Post-Acute Status   Discharge Delays (!) Personal Transportation       Discharge orders reviewed. No case management/discharge planning needs noted.

## 2022-04-30 NOTE — NURSING
Pt has been dc home, verbalized understanding of discharge instructions. Pt educated to wear mask around others. Family is driving her home.

## 2022-04-30 NOTE — PLAN OF CARE
04/30/22 0858   Patient Assessment/Suction   Level of Consciousness (AVPU) alert   Respiratory Effort Unlabored   All Lung Fields Breath Sounds clear;diminished   PRE-TX-O2   O2 Device (Oxygen Therapy) room air   SpO2 99 %   Pulse Oximetry Type Continuous   $ Pulse Oximetry - Multiple Charge Pulse Oximetry - Multiple   Pulse 86   Resp 20   Inhaler   $ Inhaler Charges MDI (Metered Dose Inahler) Treatment;Mouth rinsed post treatment   Respiratory Treatment Status (Inhaler) given   Treatment Route (Inhaler) mouthpiece   Patient Position (Inhaler) sitting on edge of bed   Signs of Intolerance (Inhaler) none   Respiratory Evaluation   $ Care Plan Tech Time 15 min

## 2022-05-01 LAB — BACTERIA UR CULT: ABNORMAL

## 2022-05-03 ENCOUNTER — PATIENT OUTREACH (OUTPATIENT)
Dept: INFECTIOUS DISEASES | Facility: HOSPITAL | Age: 77
End: 2022-05-03
Payer: MEDICARE

## 2022-05-09 ENCOUNTER — PATIENT MESSAGE (OUTPATIENT)
Dept: SMOKING CESSATION | Facility: CLINIC | Age: 77
End: 2022-05-09
Payer: MEDICARE

## 2022-05-12 ENCOUNTER — TELEPHONE (OUTPATIENT)
Dept: FAMILY MEDICINE | Facility: CLINIC | Age: 77
End: 2022-05-12
Payer: MEDICARE

## 2022-05-12 VITALS — SYSTOLIC BLOOD PRESSURE: 91 MMHG | DIASTOLIC BLOOD PRESSURE: 56 MMHG

## 2022-05-12 NOTE — TELEPHONE ENCOUNTER
Ask pt to rest and stay well hydrated.  Have her let us know Monday some BP readings off the diltiazem.    REINA Huggins MD

## 2022-05-12 NOTE — TELEPHONE ENCOUNTER
----- Message from Dru WAHL Cristhiansabrina sent at 5/12/2022  9:35 AM CDT -----  Contact: patient  Type: Needs Medical Advice  Who Called:  patient  Symptoms (please be specific):  low blood pressure  How long has patient had these symptoms:  few days  Pharmacy name and phone #:  n/a  Best Call Back Number: [phone]  Additional Information: patient called in and stated she has stopped taking her blood pressure medication ( diltiaZEM (CARDIZEM CD) 120 MG Cp24) because her pressure has been low.  Earlier today it was 98/64.      Patient would like a call back to discuss.

## 2022-05-12 NOTE — TELEPHONE ENCOUNTER
Spoke with patient. BP is low with  Dizziness.on it was 5/9/22- 103/62, 82/60 74/45. On  5/10/22- is was 84/50, 78/55, 91/56. This am Bp was  98/64. She  Stopped taking diltiazem x 3 days. No longer dizzy. She stated she was nauseated as well however that has stopped. Offered an appointment but she declined. Advised if her symptoms persist to go to the ER to be evaluated. Verbalized understanding.  Please advise.

## 2022-05-16 ENCOUNTER — TELEPHONE (OUTPATIENT)
Dept: FAMILY MEDICINE | Facility: CLINIC | Age: 77
End: 2022-05-16
Payer: MEDICARE

## 2022-05-16 VITALS — DIASTOLIC BLOOD PRESSURE: 55 MMHG | SYSTOLIC BLOOD PRESSURE: 99 MMHG

## 2022-05-16 NOTE — PROGRESS NOTES
BP Readings, per Patient Call:    5/13: 99/60 Pulse: 109    5/14: 105/69    5/15: 98/60 Pulse 92

## 2022-05-16 NOTE — TELEPHONE ENCOUNTER
----- Message from Iva Marte sent at 5/16/2022  3:08 PM CDT -----  Contact: pt  Type: Needs Medical Advice    Who Called: pt  Best Call Back Number: [phone]    Inquiry/Question: pt states she was told to call regarding BP readings:     5/13: 99/60 109 pulse  5/14: 105/69   5/15: 98/60 92 pulse  5/16 99/55 pulse 105    Please call to advise     Thank you~

## 2022-05-24 ENCOUNTER — CLINICAL SUPPORT (OUTPATIENT)
Dept: FAMILY MEDICINE | Facility: CLINIC | Age: 77
End: 2022-05-24
Payer: MEDICARE

## 2022-05-24 ENCOUNTER — TELEPHONE (OUTPATIENT)
Dept: FAMILY MEDICINE | Facility: CLINIC | Age: 77
End: 2022-05-24

## 2022-05-24 VITALS — DIASTOLIC BLOOD PRESSURE: 72 MMHG | SYSTOLIC BLOOD PRESSURE: 118 MMHG

## 2022-05-24 PROCEDURE — 99999 PR PBB SHADOW E&M-EST. PATIENT-LVL I: ICD-10-PCS | Mod: PBBFAC,,,

## 2022-05-24 PROCEDURE — 99999 PR PBB SHADOW E&M-EST. PATIENT-LVL I: CPT | Mod: PBBFAC,,,

## 2022-05-24 PROCEDURE — 99211 OFF/OP EST MAY X REQ PHY/QHP: CPT | Mod: PBBFAC,PO

## 2022-05-24 NOTE — PROGRESS NOTES
Patient came in today for a BP check. She reports that she has been having low readings at home and was instructed to come in today to have her home monitor checked for accuracy.   BP this afternoon was 122/82 taken with the patient's wrist cuff and 118/72 taken manually.  A message has been sent to Dr LUÍS Huggins MD at the clinic in [place] to advise of today's findings.

## 2022-05-24 NOTE — TELEPHONE ENCOUNTER
Patient came in today for a BP check. She reports that she has been having low readings at home and was instructed to come in today to have her home monitor checked for accuracy.   BP this afternoon was 122/82 taken with the patient's wrist cuff and 118/72 taken manually.

## 2022-05-31 ENCOUNTER — EXTERNAL CHRONIC CARE MANAGEMENT (OUTPATIENT)
Dept: PRIMARY CARE CLINIC | Facility: CLINIC | Age: 77
End: 2022-05-31
Payer: MEDICARE

## 2022-05-31 PROCEDURE — 99439 PR CHRONIC CARE MGMT, EA ADDTL 20 MIN: ICD-10-PCS | Mod: S$PBB,,, | Performed by: FAMILY MEDICINE

## 2022-05-31 PROCEDURE — 99490 CHRNC CARE MGMT STAFF 1ST 20: CPT | Mod: S$PBB,,, | Performed by: FAMILY MEDICINE

## 2022-05-31 PROCEDURE — 99490 PR CHRONIC CARE MGMT, 1ST 20 MIN: ICD-10-PCS | Mod: S$PBB,,, | Performed by: FAMILY MEDICINE

## 2022-05-31 PROCEDURE — 99439 CHRNC CARE MGMT STAF EA ADDL: CPT | Mod: S$PBB,,, | Performed by: FAMILY MEDICINE

## 2022-05-31 PROCEDURE — 99439 CHRNC CARE MGMT STAF EA ADDL: CPT | Mod: PBBFAC,PO | Performed by: FAMILY MEDICINE

## 2022-05-31 PROCEDURE — 99490 CHRNC CARE MGMT STAFF 1ST 20: CPT | Mod: PBBFAC,PO | Performed by: FAMILY MEDICINE

## 2022-06-30 ENCOUNTER — EXTERNAL CHRONIC CARE MANAGEMENT (OUTPATIENT)
Dept: PRIMARY CARE CLINIC | Facility: CLINIC | Age: 77
End: 2022-06-30
Payer: MEDICARE

## 2022-06-30 PROCEDURE — 99439 CHRNC CARE MGMT STAF EA ADDL: CPT | Mod: PBBFAC,PO | Performed by: FAMILY MEDICINE

## 2022-06-30 PROCEDURE — 99490 CHRNC CARE MGMT STAFF 1ST 20: CPT | Mod: PBBFAC,PO | Performed by: FAMILY MEDICINE

## 2022-06-30 PROCEDURE — 99439 CHRNC CARE MGMT STAF EA ADDL: CPT | Mod: S$PBB,,, | Performed by: FAMILY MEDICINE

## 2022-06-30 PROCEDURE — 99490 PR CHRONIC CARE MGMT, 1ST 20 MIN: ICD-10-PCS | Mod: S$PBB,,, | Performed by: FAMILY MEDICINE

## 2022-06-30 PROCEDURE — 99439 PR CHRONIC CARE MGMT, EA ADDTL 20 MIN: ICD-10-PCS | Mod: S$PBB,,, | Performed by: FAMILY MEDICINE

## 2022-06-30 PROCEDURE — 99490 CHRNC CARE MGMT STAFF 1ST 20: CPT | Mod: S$PBB,,, | Performed by: FAMILY MEDICINE

## 2022-07-08 ENCOUNTER — TELEPHONE (OUTPATIENT)
Dept: FAMILY MEDICINE | Facility: CLINIC | Age: 77
End: 2022-07-08
Payer: MEDICAID

## 2022-07-29 ENCOUNTER — TELEPHONE (OUTPATIENT)
Dept: ADMINISTRATIVE | Facility: CLINIC | Age: 77
End: 2022-07-29
Payer: MEDICARE

## 2022-07-31 ENCOUNTER — EXTERNAL CHRONIC CARE MANAGEMENT (OUTPATIENT)
Dept: PRIMARY CARE CLINIC | Facility: CLINIC | Age: 77
End: 2022-07-31
Payer: MEDICARE

## 2022-07-31 PROCEDURE — 99490 CHRNC CARE MGMT STAFF 1ST 20: CPT | Mod: S$PBB,,, | Performed by: FAMILY MEDICINE

## 2022-07-31 PROCEDURE — 99490 CHRNC CARE MGMT STAFF 1ST 20: CPT | Mod: PBBFAC,PO | Performed by: FAMILY MEDICINE

## 2022-07-31 PROCEDURE — 99490 PR CHRONIC CARE MGMT, 1ST 20 MIN: ICD-10-PCS | Mod: S$PBB,,, | Performed by: FAMILY MEDICINE

## 2022-07-31 PROCEDURE — 99439 PR CHRONIC CARE MGMT, EA ADDTL 20 MIN: ICD-10-PCS | Mod: S$PBB,,, | Performed by: FAMILY MEDICINE

## 2022-07-31 PROCEDURE — 99439 CHRNC CARE MGMT STAF EA ADDL: CPT | Mod: S$PBB,,, | Performed by: FAMILY MEDICINE

## 2022-07-31 PROCEDURE — 99439 CHRNC CARE MGMT STAF EA ADDL: CPT | Mod: PBBFAC,PO | Performed by: FAMILY MEDICINE

## 2022-08-01 ENCOUNTER — OFFICE VISIT (OUTPATIENT)
Dept: FAMILY MEDICINE | Facility: CLINIC | Age: 77
End: 2022-08-01
Payer: MEDICARE

## 2022-08-01 VITALS
WEIGHT: 136 LBS | DIASTOLIC BLOOD PRESSURE: 70 MMHG | HEIGHT: 63 IN | HEART RATE: 81 BPM | SYSTOLIC BLOOD PRESSURE: 120 MMHG | OXYGEN SATURATION: 97 % | TEMPERATURE: 98 F | BODY MASS INDEX: 24.1 KG/M2

## 2022-08-01 DIAGNOSIS — N30.10 IC (INTERSTITIAL CYSTITIS): ICD-10-CM

## 2022-08-01 DIAGNOSIS — F43.23 ADJUSTMENT DISORDER WITH MIXED ANXIETY AND DEPRESSED MOOD: ICD-10-CM

## 2022-08-01 DIAGNOSIS — E11.59 HYPERTENSION ASSOCIATED WITH DIABETES: ICD-10-CM

## 2022-08-01 DIAGNOSIS — M35.00 SJOGREN'S SYNDROME, WITH UNSPECIFIED ORGAN INVOLVEMENT: ICD-10-CM

## 2022-08-01 DIAGNOSIS — M05.741 RHEUMATOID ARTHRITIS INVOLVING BOTH HANDS WITH POSITIVE RHEUMATOID FACTOR: ICD-10-CM

## 2022-08-01 DIAGNOSIS — M79.7 FIBROMYALGIA: ICD-10-CM

## 2022-08-01 DIAGNOSIS — I50.42 CHRONIC COMBINED SYSTOLIC AND DIASTOLIC CONGESTIVE HEART FAILURE: ICD-10-CM

## 2022-08-01 DIAGNOSIS — E11.69 HYPERLIPIDEMIA ASSOCIATED WITH TYPE 2 DIABETES MELLITUS: ICD-10-CM

## 2022-08-01 DIAGNOSIS — E78.5 HYPERLIPIDEMIA ASSOCIATED WITH TYPE 2 DIABETES MELLITUS: ICD-10-CM

## 2022-08-01 DIAGNOSIS — I15.2 HYPERTENSION ASSOCIATED WITH DIABETES: ICD-10-CM

## 2022-08-01 DIAGNOSIS — Z12.31 ENCOUNTER FOR SCREENING MAMMOGRAM FOR MALIGNANT NEOPLASM OF BREAST: ICD-10-CM

## 2022-08-01 DIAGNOSIS — J43.8 OTHER EMPHYSEMA: ICD-10-CM

## 2022-08-01 DIAGNOSIS — M05.742 RHEUMATOID ARTHRITIS INVOLVING BOTH HANDS WITH POSITIVE RHEUMATOID FACTOR: ICD-10-CM

## 2022-08-01 DIAGNOSIS — D50.9 IRON DEFICIENCY ANEMIA, UNSPECIFIED IRON DEFICIENCY ANEMIA TYPE: ICD-10-CM

## 2022-08-01 DIAGNOSIS — I70.0 ABDOMINAL AORTIC ATHEROSCLEROSIS: ICD-10-CM

## 2022-08-01 DIAGNOSIS — G47.33 OSA (OBSTRUCTIVE SLEEP APNEA): ICD-10-CM

## 2022-08-01 DIAGNOSIS — Z78.0 ASYMPTOMATIC MENOPAUSAL STATE: ICD-10-CM

## 2022-08-01 DIAGNOSIS — F06.70 MILD NEUROCOGNITIVE DISORDER DUE TO ANOTHER MEDICAL CONDITION: ICD-10-CM

## 2022-08-01 DIAGNOSIS — E11.65 TYPE 2 DIABETES MELLITUS WITH HYPERGLYCEMIA, WITHOUT LONG-TERM CURRENT USE OF INSULIN: Chronic | ICD-10-CM

## 2022-08-01 DIAGNOSIS — D69.6 THROMBOCYTOPENIA: Chronic | ICD-10-CM

## 2022-08-01 DIAGNOSIS — G89.29 CHRONIC BACK PAIN, UNSPECIFIED BACK LOCATION, UNSPECIFIED BACK PAIN LATERALITY: Chronic | ICD-10-CM

## 2022-08-01 DIAGNOSIS — Z00.00 ENCOUNTER FOR PREVENTIVE HEALTH EXAMINATION: Primary | ICD-10-CM

## 2022-08-01 DIAGNOSIS — M54.9 CHRONIC BACK PAIN, UNSPECIFIED BACK LOCATION, UNSPECIFIED BACK PAIN LATERALITY: Chronic | ICD-10-CM

## 2022-08-01 DIAGNOSIS — K21.9 GASTROESOPHAGEAL REFLUX DISEASE WITHOUT ESOPHAGITIS: ICD-10-CM

## 2022-08-01 PROBLEM — J12.82 PNEUMONIA DUE TO COVID-19 VIRUS: Status: RESOLVED | Noted: 2021-02-20 | Resolved: 2022-08-01

## 2022-08-01 PROBLEM — U07.1 PNEUMONIA DUE TO COVID-19 VIRUS: Status: RESOLVED | Noted: 2021-02-20 | Resolved: 2022-08-01

## 2022-08-01 PROBLEM — S93.402A MODERATE ANKLE SPRAIN, LEFT, INITIAL ENCOUNTER: Status: RESOLVED | Noted: 2021-10-28 | Resolved: 2022-08-01

## 2022-08-01 PROBLEM — U07.1 COVID: Status: RESOLVED | Noted: 2022-04-27 | Resolved: 2022-08-01

## 2022-08-01 PROCEDURE — 99999 PR PBB SHADOW E&M-EST. PATIENT-LVL V: CPT | Mod: PBBFAC,,, | Performed by: PHYSICIAN ASSISTANT

## 2022-08-01 PROCEDURE — 99999 PR PBB SHADOW E&M-EST. PATIENT-LVL V: ICD-10-PCS | Mod: PBBFAC,,, | Performed by: PHYSICIAN ASSISTANT

## 2022-08-01 PROCEDURE — G0439 PR MEDICARE ANNUAL WELLNESS SUBSEQUENT VISIT: ICD-10-PCS | Mod: ,,, | Performed by: PHYSICIAN ASSISTANT

## 2022-08-01 PROCEDURE — 99215 OFFICE O/P EST HI 40 MIN: CPT | Mod: PBBFAC,PN | Performed by: PHYSICIAN ASSISTANT

## 2022-08-01 PROCEDURE — G0439 PPPS, SUBSEQ VISIT: HCPCS | Mod: ,,, | Performed by: PHYSICIAN ASSISTANT

## 2022-08-01 NOTE — PATIENT INSTRUCTIONS
Counseling and Referral of Other Preventative  (Italic type indicates deductible and co-insurance are waived)    Patient Name: Pili Teixeira  Today's Date: 8/1/2022    Health Maintenance         Date Due Completion Date    Shingles Vaccine (1 of 2) Never done ---    DEXA Scan 01/10/2022 1/10/2019    Foot Exam 01/11/2022 1/11/2021    Override on 6/3/2019: Done    COVID-19 Vaccine (4 - Booster for Moderna series) 04/28/2022 12/28/2021    Diabetes Urine Screening 07/26/2022 7/26/2021    Mammogram 08/09/2022 8/9/2021    Override on 2/5/2013: (N/S)    Influenza Vaccine (1) 09/01/2022 9/24/2021    Override on 2/1/2016: Declined    Hemoglobin A1c 09/09/2022 3/9/2022    Eye Exam 01/04/2023 1/4/2022    Override on 1/28/2016: Done (Dr. Garcia)    Override on 5/8/2014: (N/S)    Lipid Panel 03/09/2023 3/9/2022    TETANUS VACCINE 09/21/2029 9/21/2019          Orders Placed This Encounter   Procedures    Mammo Digital Screening Bilat w/ Ld    DXA Bone Density Spine And Hip    Hemoglobin A1C    CBC Auto Differential    Comprehensive Metabolic Panel    Microalbumin/Creatinine Ratio, Urine    Lipid Panel     The following information is provided to all patients.  This information is to help you find resources for any of the problems found today that may be affecting your health:                Living healthy guide: www.[facility].[place].gov      Understanding Diabetes: www.diabetes.org      Eating healthy: www.cdc.gov/healthyweight      CDC home safety checklist: www.cdc.gov/steadi/patient.html      Agency on Aging: www.goea.[place].gov      Alcoholics anonymous (AA): www.aa.org      Physical Activity: www.[place].nih.gov/pt1qdck      Tobacco use: www.quitwithusla.org

## 2022-08-01 NOTE — Clinical Note
AMW completed.  Patient has had slight cognitive decline evident on clock test.  She has visit with you next month.

## 2022-08-01 NOTE — PROGRESS NOTES
"Pili Teixeira presented for a  Medicare AWV and comprehensive Health Risk Assessment today. The following components were reviewed and updated:    · Medical history  · Family History  · Social history  · Allergies and Current Medications  · Health Risk Assessment  · Health Maintenance  · Care Team     ** See Completed Assessments for Annual Wellness Visit within the encounter summary.**       The following assessments were completed:  · Living Situation  · CAGE  · Depression Screening  · Timed Get Up and Go  · Whisper Test  · Cognitive Function Screening  · Nutrition Screening  · ADL Screening  · PAQ Screening    Vitals:    08/01/22 0839   BP: 120/70   Pulse: 81   Temp: 98.1 °F (36.7 °C)   SpO2: 97%   Weight: 61.7 kg (136 lb 0.4 oz)   Height: 5' 3" (1.6 m)     Body mass index is 24.1 kg/m².  Physical Exam  Constitutional:       Appearance: She is well-developed.   Cardiovascular:      Pulses:           Dorsalis pedis pulses are 2+ on the right side and 2+ on the left side.        Posterior tibial pulses are 2+ on the right side and 2+ on the left side.   Pulmonary:      Effort: Pulmonary effort is normal.   Musculoskeletal:      Right foot: Normal range of motion. No deformity.      Left foot: Normal range of motion. No deformity.   Feet:      Right foot:      Protective Sensation: 5 sites tested. 4 sites sensed.      Skin integrity: No ulcer, blister, skin breakdown, erythema, warmth, callus or dry skin.      Toenail Condition: Right toenails are normal.      Left foot:      Protective Sensation: 5 sites tested. 2 sites sensed.      Skin integrity: No ulcer, blister, skin breakdown, erythema, warmth, callus or dry skin.      Toenail Condition: Left toenails are normal.   Neurological:      Mental Status: She is alert and oriented to person, place, and time.   Psychiatric:         Behavior: Behavior normal.         Thought Content: Thought content normal.         Judgment: Judgment normal.               Diagnoses " and health risks identified today and associated recommendations/orders:    Pili was seen today for medicare aw follow up.    Diagnoses and all orders for this visit:    Encounter for preventive health examination    Abdominal aortic atherosclerosis  Comments:  Stable, continue current regimen    Other emphysema  Comments:  Stable, continue current regimen    Hypertension associated with diabetes  Comments:  Controlled, continue current regimen    Hyperlipidemia associated with type 2 diabetes mellitus  Comments:  Controlled, continue current regimen  Orders:  -     Lipid Panel; Future    Chronic combined systolic and diastolic congestive heart failure  Comments:  Stable, continue current regimen.  Followed by Cardiology    Sjogren's syndrome, with unspecified organ involvement  Comments:  Stable, continue current regimen    Rheumatoid arthritis involving both hands with positive rheumatoid factor  Comments:  Stable, continue current regimen    Thrombocytopenia  Comments:  Stable, continue current regimen    Type 2 diabetes mellitus with hyperglycemia, without long-term current use of insulin  Comments:  Controlled, continue current regimen  Orders:  -     Hemoglobin A1C; Future  -     CBC Auto Differential; Future  -     Comprehensive Metabolic Panel; Future  -     Microalbumin/Creatinine Ratio, Urine; Future    Mild neurocognitive 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  Comments:  Slightly worsening.  Clock test decline from last year.  Will alert PCP    Adjustment disorder with mixed anxiety and depressed mood  Comments:  Controlled, continue current regimen    IC (interstitial cystitis)  Comments:  Stable, continue current regimen    Iron deficiency anemia, unspecified iron deficiency anemia type  Comments:  Stable, continue current regimen    Gastroesophageal reflux disease without esophagitis  Comments:  Controlled, continue current regimen    Chronic back pain, unspecified back location, unspecified  back pain laterality  Comments:  Stable, continue current regimen    Fibromyalgia  Comments:  Stable, continue current regimen    REJI (obstructive sleep apnea)  Comments:  Controlled, continue current regimen    Encounter for screening mammogram for malignant neoplasm of breast  -     Mammo Digital Screening Bilat w/ Ld; Future    Asymptomatic menopausal state  -     DXA Bone Density Spine And Hip; Future        Provided Pili with a 5-10 year written screening schedule and personal prevention plan. Recommendations were developed using the USPSTF age appropriate recommendations. Education, counseling, and referrals were provided as needed. After Visit Summary printed and given to patient which includes a list of additional screenings\tests needed.    No follow-ups on file.    Ana Senior PA  I offered to discuss advanced care planning, including how to pick a person who would make decisions for you if you were unable to make them for yourself, called a health care power of , and what kind of decisions you might make such as use of life sustaining treatments such as ventilators and tube feeding when faced with a life limiting illness recorded on a living will that they will need to know. (How you want to be cared for as you near the end of your natural life)     X  Patient has advanced directives on file, which we reviewed, and they do not wish to make changes.

## 2022-08-04 ENCOUNTER — TELEPHONE (OUTPATIENT)
Dept: FAMILY MEDICINE | Facility: CLINIC | Age: 77
End: 2022-08-04
Payer: MEDICARE

## 2022-08-04 DIAGNOSIS — Z78.0 ASYMPTOMATIC MENOPAUSE: Primary | ICD-10-CM

## 2022-08-04 NOTE — TELEPHONE ENCOUNTER
Appointment scheduled for the date of 8-8-22. Patient agreed to appointment date, time, and location. Location confirmed at the time of call.

## 2022-08-04 NOTE — TELEPHONE ENCOUNTER
----- Message from Thi Paulino sent at 8/4/2022 11:16 AM CDT -----  Contact: Pt  Type: Needs Medical Advice    Who Called:  Pt  Best Call Back Number: [phone]  Additional Information: Pt missed her dexa appt & needs another order & appt.  Please call back. Thanks.

## 2022-08-04 NOTE — TELEPHONE ENCOUNTER
On call message; Mrs. Senior is out of office until Monday.  Patient missed 8-1-22 appointment for bone density. Orders are no longer available for scheduling. Patient requesting to have orders replaced, so that appointment can be scheduled. Please advise. Thank you.

## 2022-08-16 ENCOUNTER — HOSPITAL ENCOUNTER (OUTPATIENT)
Dept: RADIOLOGY | Facility: CLINIC | Age: 77
Discharge: HOME | End: 2022-08-16
Attending: FAMILY MEDICINE
Payer: MEDICARE

## 2022-08-16 DIAGNOSIS — Z78.0 ASYMPTOMATIC MENOPAUSE: ICD-10-CM

## 2022-08-16 PROCEDURE — 77080 DEXA BONE DENSITY SPINE HIP: ICD-10-PCS | Mod: 26,,, | Performed by: RADIOLOGY

## 2022-08-16 PROCEDURE — 77080 DXA BONE DENSITY AXIAL: CPT | Mod: 26,,, | Performed by: RADIOLOGY

## 2022-08-16 PROCEDURE — 77080 DXA BONE DENSITY AXIAL: CPT | Mod: TC,PO

## 2022-08-18 ENCOUNTER — HOSPITAL ENCOUNTER (OUTPATIENT)
Dept: RADIOLOGY | Facility: HOSPITAL | Age: 77
Discharge: HOME | End: 2022-08-18
Attending: PHYSICIAN ASSISTANT
Payer: MEDICARE

## 2022-08-18 DIAGNOSIS — Z12.31 ENCOUNTER FOR SCREENING MAMMOGRAM FOR MALIGNANT NEOPLASM OF BREAST: ICD-10-CM

## 2022-08-18 PROCEDURE — 77063 BREAST TOMOSYNTHESIS BI: CPT | Mod: TC

## 2022-08-18 PROCEDURE — 77067 SCR MAMMO BI INCL CAD: CPT | Mod: 26,,, | Performed by: RADIOLOGY

## 2022-08-18 PROCEDURE — 77063 MAMMO DIGITAL SCREENING BILAT WITH TOMO: ICD-10-PCS | Mod: 26,,, | Performed by: RADIOLOGY

## 2022-08-18 PROCEDURE — 77067 MAMMO DIGITAL SCREENING BILAT WITH TOMO: ICD-10-PCS | Mod: 26,,, | Performed by: RADIOLOGY

## 2022-08-18 PROCEDURE — 77063 BREAST TOMOSYNTHESIS BI: CPT | Mod: 26,,, | Performed by: RADIOLOGY

## 2022-08-19 ENCOUNTER — TELEPHONE (OUTPATIENT)
Dept: FAMILY MEDICINE | Facility: CLINIC | Age: 77
End: 2022-08-19
Payer: MEDICARE

## 2022-08-19 DIAGNOSIS — D64.9 ANEMIA, UNSPECIFIED TYPE: Primary | ICD-10-CM

## 2022-08-19 NOTE — TELEPHONE ENCOUNTER
----- Message from ASAEL Jiménez sent at 8/18/2022  2:47 PM CDT -----  Please let patient know that her labs showed anemia   Recommend follow up with PCP for further recommendations

## 2022-08-20 ENCOUNTER — TELEPHONE (OUTPATIENT)
Dept: FAMILY MEDICINE | Facility: CLINIC | Age: 77
End: 2022-08-20
Payer: MEDICARE

## 2022-08-20 DIAGNOSIS — D64.9 ANEMIA, UNSPECIFIED TYPE: Primary | ICD-10-CM

## 2022-08-20 NOTE — TELEPHONE ENCOUNTER
----- Message from LUÍS Huggins MD sent at 8/16/2022  5:16 PM CDT -----  Let pt know her bone density is normal.

## 2022-08-20 NOTE — TELEPHONE ENCOUNTER
----- Message from LUÍS Huggins MD sent at 8/18/2022  1:20 PM CDT -----  Has this pt seen hematology regarding her anemia?

## 2022-08-20 NOTE — TELEPHONE ENCOUNTER
Pt states she has not seen the hematologist and she has forgotten who she use to see. Pt is requesting a referral to a hematologist.

## 2022-08-22 ENCOUNTER — LAB VISIT (OUTPATIENT)
Dept: LAB | Facility: HOSPITAL | Age: 77
End: 2022-08-22
Attending: FAMILY MEDICINE
Payer: MEDICARE

## 2022-08-22 DIAGNOSIS — D64.9 ANEMIA, UNSPECIFIED TYPE: ICD-10-CM

## 2022-08-22 LAB — FERRITIN SERPL-MCNC: 37 NG/ML (ref 20–300)

## 2022-08-22 PROCEDURE — 84466 ASSAY OF TRANSFERRIN: CPT | Performed by: FAMILY MEDICINE

## 2022-08-22 PROCEDURE — 82728 ASSAY OF FERRITIN: CPT | Performed by: FAMILY MEDICINE

## 2022-08-22 PROCEDURE — 36415 COLL VENOUS BLD VENIPUNCTURE: CPT | Performed by: FAMILY MEDICINE

## 2022-08-23 LAB
IRON SERPL-MCNC: 43 UG/DL (ref 30–160)
SATURATED IRON: 13 % (ref 20–50)
TOTAL IRON BINDING CAPACITY: 342 UG/DL (ref 250–450)
TRANSFERRIN SERPL-MCNC: 231 MG/DL (ref 200–375)

## 2022-08-24 ENCOUNTER — TELEPHONE (OUTPATIENT)
Dept: HEMATOLOGY/ONCOLOGY | Facility: CLINIC | Age: 77
End: 2022-08-24
Payer: MEDICARE

## 2022-08-24 NOTE — NURSING
Oncology Navigation   Intake  Cancer Type: Benign hem (Anemia)  Internal / External Referral: Internal (Workqueue)  Referral Source: Dr. Priscilla Huggins  Date of Referral: 8/23/2022  Initial Nurse Navigator Contact: 8/24/2022  Referral to Initial Contact Timeline (days): 1  Date Worked: 8/24/2022  First Appointment Available: 8/25/2022  Appointment Date: 8/26/2022 (Dr. Huggins)  First Available Date vs. Scheduled Date (days): 1     Treatment                              Acuity      Follow Up  No follow-ups on file.

## 2022-08-24 NOTE — TELEPHONE ENCOUNTER
Called and spoke to pt to schedule from hem referral.   Pt is requesting a [facility] provider.    Referral msg sent to [facility] navigation.

## 2022-08-26 ENCOUNTER — OFFICE VISIT (OUTPATIENT)
Dept: HEMATOLOGY/ONCOLOGY | Facility: CLINIC | Age: 77
End: 2022-08-26
Payer: MEDICARE

## 2022-08-26 ENCOUNTER — LAB VISIT (OUTPATIENT)
Dept: LAB | Facility: HOSPITAL | Age: 77
End: 2022-08-26
Attending: INTERNAL MEDICINE
Payer: MEDICARE

## 2022-08-26 VITALS
TEMPERATURE: 97 F | BODY MASS INDEX: 24.13 KG/M2 | HEART RATE: 86 BPM | WEIGHT: 136.25 LBS | SYSTOLIC BLOOD PRESSURE: 118 MMHG | DIASTOLIC BLOOD PRESSURE: 57 MMHG | RESPIRATION RATE: 16 BRPM | OXYGEN SATURATION: 99 %

## 2022-08-26 DIAGNOSIS — E11.00 TYPE 2 DIABETES MELLITUS WITH HYPEROSMOLARITY WITHOUT COMA, WITHOUT LONG-TERM CURRENT USE OF INSULIN: Chronic | ICD-10-CM

## 2022-08-26 DIAGNOSIS — E43 SEVERE PROTEIN-CALORIE MALNUTRITION: ICD-10-CM

## 2022-08-26 DIAGNOSIS — D64.9 ANEMIA, UNSPECIFIED TYPE: ICD-10-CM

## 2022-08-26 DIAGNOSIS — M79.7 FIBROMYALGIA: ICD-10-CM

## 2022-08-26 DIAGNOSIS — D53.1 OTHER MEGALOBLASTIC ANEMIAS, NOT ELSEWHERE CLASSIFIED: ICD-10-CM

## 2022-08-26 DIAGNOSIS — D64.9 ANEMIA, UNSPECIFIED TYPE: Primary | ICD-10-CM

## 2022-08-26 DIAGNOSIS — N30.10 IC (INTERSTITIAL CYSTITIS): ICD-10-CM

## 2022-08-26 DIAGNOSIS — M35.00 SJOGREN'S SYNDROME, WITH UNSPECIFIED ORGAN INVOLVEMENT: ICD-10-CM

## 2022-08-26 DIAGNOSIS — E11.69 HYPERLIPIDEMIA ASSOCIATED WITH TYPE 2 DIABETES MELLITUS: ICD-10-CM

## 2022-08-26 DIAGNOSIS — D50.0 IRON DEFICIENCY ANEMIA DUE TO CHRONIC BLOOD LOSS: ICD-10-CM

## 2022-08-26 DIAGNOSIS — E78.5 HYPERLIPIDEMIA ASSOCIATED WITH TYPE 2 DIABETES MELLITUS: ICD-10-CM

## 2022-08-26 DIAGNOSIS — J41.0 SIMPLE CHRONIC BRONCHITIS: Primary | ICD-10-CM

## 2022-08-26 DIAGNOSIS — I50.42 CHRONIC COMBINED SYSTOLIC AND DIASTOLIC CONGESTIVE HEART FAILURE: ICD-10-CM

## 2022-08-26 DIAGNOSIS — D69.6 THROMBOCYTOPENIA: Chronic | ICD-10-CM

## 2022-08-26 LAB
BASOPHILS # BLD AUTO: 0.04 K/UL (ref 0–0.2)
BASOPHILS NFR BLD: 0.5 % (ref 0–1.9)
DAT IGG-SP REAG RBC-IMP: NORMAL
DIFFERENTIAL METHOD: ABNORMAL
EOSINOPHIL # BLD AUTO: 0.1 K/UL (ref 0–0.5)
EOSINOPHIL NFR BLD: 0.9 % (ref 0–8)
ERYTHROCYTE [DISTWIDTH] IN BLOOD BY AUTOMATED COUNT: 12.5 % (ref 11.5–14.5)
FOLATE SERPL-MCNC: 37.5 NG/ML (ref 4–24)
HAPTOGLOB SERPL-MCNC: 172 MG/DL (ref 30–250)
HCT VFR BLD AUTO: 35.9 % (ref 37–48.5)
HGB BLD-MCNC: 11.5 G/DL (ref 12–16)
IMM GRANULOCYTES # BLD AUTO: 0.02 K/UL (ref 0–0.04)
IMM GRANULOCYTES NFR BLD AUTO: 0.2 % (ref 0–0.5)
LYMPHOCYTES # BLD AUTO: 3.2 K/UL (ref 1–4.8)
LYMPHOCYTES NFR BLD: 39.9 % (ref 18–48)
MCH RBC QN AUTO: 27.7 PG (ref 27–31)
MCHC RBC AUTO-ENTMCNC: 32 G/DL (ref 32–36)
MCV RBC AUTO: 87 FL (ref 82–98)
MONOCYTES # BLD AUTO: 0.7 K/UL (ref 0.3–1)
MONOCYTES NFR BLD: 8.7 % (ref 4–15)
NEUTROPHILS # BLD AUTO: 4 K/UL (ref 1.8–7.7)
NEUTROPHILS NFR BLD: 49.8 % (ref 38–73)
NRBC BLD-RTO: 0 /100 WBC
PLATELET # BLD AUTO: 204 K/UL (ref 150–450)
PMV BLD AUTO: 12.1 FL (ref 9.2–12.9)
RBC # BLD AUTO: 4.15 M/UL (ref 4–5.4)
RETICS/RBC NFR AUTO: 1.5 % (ref 0.5–2.5)
VIT B12 SERPL-MCNC: 349 PG/ML (ref 210–950)
WBC # BLD AUTO: 8.07 K/UL (ref 3.9–12.7)

## 2022-08-26 PROCEDURE — 86334 IMMUNOFIX E-PHORESIS SERUM: CPT | Mod: 26,,, | Performed by: PATHOLOGY

## 2022-08-26 PROCEDURE — 82746 ASSAY OF FOLIC ACID SERUM: CPT | Performed by: INTERNAL MEDICINE

## 2022-08-26 PROCEDURE — 83010 ASSAY OF HAPTOGLOBIN QUANT: CPT | Performed by: INTERNAL MEDICINE

## 2022-08-26 PROCEDURE — 86880 COOMBS TEST DIRECT: CPT | Performed by: INTERNAL MEDICINE

## 2022-08-26 PROCEDURE — 86235 NUCLEAR ANTIGEN ANTIBODY: CPT | Performed by: INTERNAL MEDICINE

## 2022-08-26 PROCEDURE — 83520 IMMUNOASSAY QUANT NOS NONAB: CPT | Mod: 59 | Performed by: INTERNAL MEDICINE

## 2022-08-26 PROCEDURE — 86334 IMMUNOFIX E-PHORESIS SERUM: CPT | Performed by: INTERNAL MEDICINE

## 2022-08-26 PROCEDURE — 36415 COLL VENOUS BLD VENIPUNCTURE: CPT | Performed by: INTERNAL MEDICINE

## 2022-08-26 PROCEDURE — 85025 COMPLETE CBC W/AUTO DIFF WBC: CPT | Performed by: INTERNAL MEDICINE

## 2022-08-26 PROCEDURE — 86334 PATHOLOGIST INTERPRETATION IFE: ICD-10-PCS | Mod: 26,,, | Performed by: PATHOLOGY

## 2022-08-26 PROCEDURE — 99204 PR OFFICE/OUTPT VISIT, NEW, LEVL IV, 45-59 MIN: ICD-10-PCS | Mod: S$PBB,,, | Performed by: INTERNAL MEDICINE

## 2022-08-26 PROCEDURE — 86039 ANTINUCLEAR ANTIBODIES (ANA): CPT | Performed by: INTERNAL MEDICINE

## 2022-08-26 PROCEDURE — 84165 PROTEIN E-PHORESIS SERUM: CPT | Performed by: INTERNAL MEDICINE

## 2022-08-26 PROCEDURE — 84165 PATHOLOGIST INTERPRETATION SPE: ICD-10-PCS | Mod: 26,,, | Performed by: PATHOLOGY

## 2022-08-26 PROCEDURE — 99215 OFFICE O/P EST HI 40 MIN: CPT | Mod: PBBFAC,PO | Performed by: INTERNAL MEDICINE

## 2022-08-26 PROCEDURE — 99999 PR PBB SHADOW E&M-EST. PATIENT-LVL V: CPT | Mod: PBBFAC,,, | Performed by: INTERNAL MEDICINE

## 2022-08-26 PROCEDURE — 84165 PROTEIN E-PHORESIS SERUM: CPT | Mod: 26,,, | Performed by: PATHOLOGY

## 2022-08-26 PROCEDURE — 99999 PR PBB SHADOW E&M-EST. PATIENT-LVL V: ICD-10-PCS | Mod: PBBFAC,,, | Performed by: INTERNAL MEDICINE

## 2022-08-26 PROCEDURE — 99204 OFFICE O/P NEW MOD 45 MIN: CPT | Mod: S$PBB,,, | Performed by: INTERNAL MEDICINE

## 2022-08-26 PROCEDURE — 82607 VITAMIN B-12: CPT | Performed by: INTERNAL MEDICINE

## 2022-08-26 PROCEDURE — 85045 AUTOMATED RETICULOCYTE COUNT: CPT | Performed by: INTERNAL MEDICINE

## 2022-08-26 PROCEDURE — 86038 ANTINUCLEAR ANTIBODIES: CPT | Performed by: INTERNAL MEDICINE

## 2022-08-26 NOTE — PROGRESS NOTES
Subjective:       Patient ID: Pili Teixeira is a 77 y.o. female.    Chief Complaint:  Evaluation of anemia  HPI   Patient has seen me in 2016 and then was lost to follow-up.  At that time shows following for thrombocytopenia.    Review of Systems    Patient states she feels very tired sleeps a lot   Has lost weight  Had COVID multiple times T3-4 episodes that she can recall.  Since 2000 20 last large amount of weight post coronary and has not gained it back she feels nauseated.  Has had multiple GI scopes and evaluations.  Denies chest pain palpitations dizziness or passing out episodes  Denies headaches blurred vision   Denies abdominal pain   Occasional diarrhea reported   No feet swelling  States sometimes urine may be discolored but that could be from vitamins according to patient   Does not recall seeing blood in urine or stool      Past Medical History:   Diagnosis Date    Allergy     Anemia 2012    with thrombocytopenia; iron deficiency    Arthritis     Cervical spinal stenosis     with neuropathy, chronic neck,back,leg pain    Colon polyp     COPD (chronic obstructive pulmonary disease)     Coronary artery disease     COVID 4/27/2022    COVID-19     Diabetes mellitus     , sugars run 190's per patient    Diastolic dysfunction 7/13/2015    Diverticulitis     Diverticulosis     Hx diverticulitis,still has chronic diarrhea, abd pain    Dyspnea on exertion     sometimes with nausea, fatigue,dizziness, had cardiac cath June 2012, normal    Fibromyalgia     Fracture 2012    right thumb    GERD (gastroesophageal reflux disease)     Feb 2012, Hx H Pylori    Glaucoma     had LAser surgey, on no eye drops    HEARING LOSS     Hemangioma     fatty liver    History of earache     frequent    History of TIA (transient ischemic attack) 5/28/2013    Hyperlipidemia     Hypertension     Hypertension associated with diabetes 3/14/2017    Laryngeal polyp     Myocardial infarction 2006 last    also  MI in distant past    REJI (obstructive sleep apnea)     does not use CPAP    Otitis media     Pneumonia due to COVID-19 virus 2/20/2021    Renal stone     Sjogren's syndrome     SOB (shortness of breath) 6/8/2012    [address] [place] [address] 1. Normal coronary arteries. 2. Normal single renal arteries bilaterally. 3. Diastolic dysfunction.     Stroke     TIA, now on Plavix    TIA (transient ischemic attack)     TMJ disorder     Type II or unspecified type diabetes mellitus without mention of complication, uncontrolled 5/8/2014    UTI (lower urinary tract infection)     frequent    Venous insufficiency     with Hx blood clot in leg     Past Surgical History:   Procedure Laterality Date    ABDOMINAL SURGERY  2010 x2    expoloratory lap for pelvic cyst,adhesions; partial colonectomy     adhesiolysis   10/11/2012    CARDIAC CATHETERIZATION      no current blockages.    CHOLECYSTECTOMY      COLON SURGERY      r/t diverticuli    COLONOSCOPY  08/2014    repeat in 5 years    COLONOSCOPY N/A 1/7/2020    Procedure: COLONOSCOPY;  Surgeon: Kb Nguyen MD;  Location: [facility];  Service: Endoscopy;  Laterality: N/A;    ENDOSCOPIC ULTRASOUND OF UPPER GASTROINTESTINAL TRACT N/A 1/13/2021    Procedure: ULTRASOUND, UPPER GI TRACT, ENDOSCOPIC;  Surgeon: Sonido Castellano III, MD;  [facility];  Service: Endoscopy;  Laterality: N/A;    EPIDURAL BLOCK INJECTION  5/15/12    back,neck for pain    ESOPHAGOGASTRODUODENOSCOPY N/A 1/7/2020    Procedure: EGD (ESOPHAGOGASTRODUODENOSCOPY);  Surgeon: Kb Nguyen MD;  Location: [facility];  Service: Endoscopy;  Laterality: N/A;    ESOPHAGOGASTRODUODENOSCOPY N/A 1/13/2021    Procedure: EGD (ESOPHAGOGASTRODUODENOSCOPY);  Surgeon: Sonido Castellano III, MD;  Location[facility];  Service: Endoscopy;  Laterality: N/A;    EXPLORATORY LAPAROTOMY W/ BOWEL RESECTION  10/11/2012    EYE SURGERY      Laser for glaucoma    EYE SURGERY  May 2012    cataracts    HYSTERECTOMY       LARYNX SURGERY      polypectomy    OOPHORECTOMY      TONSILLECTOMY      with adenoidectomy    UPPER GASTROINTESTINAL ENDOSCOPY  12/2015    VASCULAR SURGERY      laser to varicose vein leg     Family History   Problem Relation Age of Onset    Throat cancer Mother     Cancer Mother     Diabetes Mellitus Mother     Stroke Father     Hypertension Father     Heart disease Father     Diverticulitis Sister     Throat cancer Brother     Cancer Brother     Thyroid disease Daughter     Lupus Daughter     Pancreatic cancer Maternal Aunt 55    Pancreatic cancer Cousin 58    Breast cancer Neg Hx     Ovarian cancer Neg Hx     Colon cancer Neg Hx     Colon polyps Neg Hx     Celiac disease Neg Hx     Cirrhosis Neg Hx     Crohn's disease Neg Hx     Stomach cancer Neg Hx     Ulcerative colitis Neg Hx     Rectal cancer Neg Hx     Esophageal cancer Neg Hx     Inflammatory bowel disease Neg Hx     Rheum arthritis Neg Hx     Psoriasis Neg Hx     Osteoarthritis Neg Hx     Kidney disease Neg Hx     Hyperlipidemia Neg Hx     COPD Neg Hx     Depression Neg Hx     Chronic back pain Neg Hx     Asthma Neg Hx     Alcohol abuse Neg Hx       Social History     Socioeconomic History    Marital status:    Tobacco Use    Smoking status: Never Smoker    Smokeless tobacco: Never Used   Substance and Sexual Activity    Alcohol use: No    Drug use: Yes     Types: Oxycodone    Sexual activity: Not Currently     Birth control/protection: Surgical     Comment: hysterectomy     Social Determinants of Health     Financial Resource Strain: Medium Risk    Difficulty of Paying Living Expenses: Somewhat hard   Food Insecurity: Food Insecurity Present    Worried About Running Out of Food in the Last Year: Sometimes true    Ran Out of Food in the Last Year: Never true   Transportation Needs: No Transportation Needs    Lack of Transportation (Medical): No    Lack of Transportation (Non-Medical): No    Physical Activity: Inactive    Days of Exercise per Week: 0 days    Minutes of Exercise per Session: 0 min   Stress: No Stress Concern Present    Feeling of Stress : Only a little   Social Connections: Moderately Isolated    Frequency of Communication with Friends and Family: More than three times a week    Frequency of Social Gatherings with Friends and Family: More than three times a week    Attends Roman [employer] Services: More than 4 times per year    Active Member of Clubs or Organizations: No    Attends Club or Organization Meetings: Never    Marital Status:    Housing Stability: Low Risk     Unable to Pay for Housing in the Last Year: No    Number of Places Lived in the Last Year: 1    Unstable Housing in the Last Year: No     Review of patient's allergies indicates:   Allergen Reactions    Codeine Other (See Comments)     Chest pain    Clindamycin Other (See Comments)     Difficulty swallowing after taking, feels a something sits in epigastric area     Iodinated contrast media Hives and Rash       Current Outpatient Medications:     ACCU-CHEK GUIDE TEST STRIPS Strp, USE TO TEST BLOOD GLUCOSE ONE TIME DAILY AS DIRECTED., Disp: 100 each, Rfl: 3    albuterol (PROVENTIL/VENTOLIN HFA) 90 mcg/actuation inhaler, Inhale 2 puffs into the lungs every 4 (four) hours as needed for Wheezing or Shortness of Breath. Rescue, Disp: 18 g, Rfl: 0    albuterol-ipratropium (DUO-NEB) 2.5 mg-0.5 mg/3 mL nebulizer solution, Take 3 mLs by nebulization every 4 to 6 hours as needed for Wheezing. Rescue, Disp: , Rfl:     aspirin (ECOTRIN) 81 MG EC tablet, Take 81 mg by mouth once daily., Disp: , Rfl:     blood-glucose meter kit, To check BG 2 times daily, to use with insurance preferred meter. Medical necessity., Disp: 1 each, Rfl: 0    clopidogreL (PLAVIX) 75 mg tablet, Take 1 tablet (75 mg total) by mouth once daily., Disp: 90 tablet, Rfl: 3    cyanocobalamin (VITAMIN B-12) 1000 MCG tablet, Take 100 mcg by  mouth once daily., Disp: , Rfl:     diclofenac sodium 1 % Gel, Apply 2 g topically once daily., Disp: , Rfl:     diltiaZEM (CARDIZEM CD) 120 MG Cp24, TAKE 1 CAPSULE BY MOUTH IN THE EVENING, Disp: 90 capsule, Rfl: 3    folic acid (FOLVITE) 1 MG tablet, Take 1 tablet (1,000 mcg total) by mouth once daily., Disp: 30 tablet, Rfl: 5    gabapentin (NEURONTIN) 100 MG capsule, TAKE 1 CAPSULE BY MOUTH THREE TIMES DAILY, Disp: 270 capsule, Rfl: 0    ipratropium (ATROVENT) 42 mcg (0.06 %) nasal spray, 2 sprays by Nasal route 3 (three) times daily., Disp: 15 mL, Rfl: 3    lancets (ACCU-CHEK SOFTCLIX LANCETS) Misc, Test TWICE DAILY;Twice a day, Disp: 100 each, Rfl: 3    lancets Misc, Use to test blood glucose one (1) times daily as directed with insurance preferred meter and supplies, Disp: 100 each, Rfl: 3    linaGLIPtin (TRADJENTA) 5 mg Tab tablet, Take 1 tablet (5 mg total) by mouth once daily., Disp: 90 tablet, Rfl: 3    loratadine (CLARITIN) 10 mg tablet, Take 10 mg by mouth once daily., Disp: , Rfl:     meloxicam (MOBIC) 7.5 MG tablet, Take 1 tablet (7.5 mg total) by mouth once daily., Disp: 90 tablet, Rfl: 1    metFORMIN (GLUCOPHAGE) 500 MG tablet, Take 1 tablet (500 mg total) by mouth 2 (two) times daily with meals., Disp: 180 tablet, Rfl: 3    multivit with min-folic acid 200 mcg Chew, Take 1 tablet by mouth once daily. , Disp: , Rfl:     olopatadine (PATANOL) 0.1 % ophthalmic solution, Place 1 drop into both eyes daily as needed., Disp: , Rfl:     ondansetron (ZOFRAN-ODT) 4 MG TbDL, Take 1 tablet (4 mg total) by mouth every 8 (eight) hours as needed (nausea)., Disp: 30 tablet, Rfl: 1    oxyCODONE-acetaminophen (PERCOCET) 7.5-325 mg per tablet, Take 1 tablet by mouth 4 (four) times daily as needed., Disp: , Rfl:     pantoprazole (PROTONIX) 40 MG tablet, Take 1 tablet (40 mg total) by mouth once daily., Disp: 90 tablet, Rfl: 3    rosuvastatin (CRESTOR) 10 MG tablet, Take 1 tablet (10 mg total) by mouth  every evening., Disp: 90 tablet, Rfl: 3    traZODone (DESYREL) 50 MG tablet, TAKE 1 TABLET BY MOUTH IN THE EVENING - (MAY TAKE AN ADDITIONAL TABLET AS NEEDED), Disp: 90 tablet, Rfl: 3    Physical Exam    Wt Readings from Last 3 Encounters:   08/26/22 61.8 kg (136 lb 3.9 oz)   08/01/22 61.7 kg (136 lb 0.4 oz)   04/29/22 61.2 kg (135 lb)     Temp Readings from Last 3 Encounters:   08/26/22 97.1 °F (36.2 °C) (Temporal)   08/01/22 98.1 °F (36.7 °C)   04/30/22 98 °F (36.7 °C) (Oral)     BP Readings from Last 3 Encounters:   08/26/22 (!) 118/57   08/01/22 120/70   05/24/22 118/72     Pulse Readings from Last 3 Encounters:   08/26/22 86   08/01/22 81   04/30/22 74   .VITAL SIGNS:  as above   GENERAL: appears well-built, well-nourished.  No anxiety, no agitation, and in no distress.  Patient is awake, alert, oriented and cooperative.  HEENT:  Showed no congestion. Trachea is central no obvious icterus or pallor noted no hoarseness. no obvious JVD   NECK:  Supple.  No JVD. No obvious cervical submental or supraclavicular adenopathy.  RS:the visualized portion of  Chest expands well. chest appears symmetric, no audible wheezes.  No dyspnea recognized  ABDOMEN:  abdomen appears undistended.  EXTREMITIES:  Without edema.  NEUROLOGICAL:  The patient is appropriate, higher functions are normal.  No  obvious neurological deficits.  normal judgement normal thought content  No confusion, no speech impediment. Cranial nerves are intact and show no deficit. No gross motor deficits noted   SKIN MUSCULOSKELETAL: no joint or skeletal deformity, no clubbing of nails.  No visible rash ecchymosis or petechiae  Lab Results   Component Value Date    WBC 8.35 08/18/2022    HGB 10.6 (L) 08/18/2022    HCT 34.0 (L) 08/18/2022    MCV 89 08/18/2022     08/18/2022       BMP  Lab Results   Component Value Date     08/18/2022    K 3.7 08/18/2022     08/18/2022    CO2 28 08/18/2022    BUN 25 (H) 08/18/2022    CREATININE 1.0  08/18/2022    CALCIUM 10.3 08/18/2022    ANIONGAP 10 08/18/2022    ESTGFRAFRICA 56.8 (A) 04/30/2022    EGFRNONAA 49.2 (A) 04/30/2022     Lab Results   Component Value Date    IRON 43 08/22/2022    TIBC 342 08/22/2022    FERRITIN 37 08/22/2022         Patient Active Problem List   Diagnosis    GERD (gastroesophageal reflux disease)    REJI (obstructive sleep apnea)    DJD (degenerative joint disease), lumbosacral    Pelvic cyst - left    Night sweats    Thrombocytopenia    Diverticulitis of colon (without mention of hemorrhage)(562.11)    Peritoneal adhesions (postoperative) (postinfection)    History of TIA (transient ischemic attack)    COPD (chronic obstructive pulmonary disease)    Insomnia    Change in bowel habits    IC (interstitial cystitis)    Diastolic dysfunction    NICOLAS (iron deficiency anemia)    Sjogren's syndrome    Fibromyalgia    Rheumatoid arthritis involving both hands with positive rheumatoid factor    Elevated lipase    Hernia of anterior abdominal wall    Ventral incisional hernia    Right inguinal hernia    Hypertension associated with diabetes    Noncompliance    Hyperlipidemia associated with type 2 diabetes mellitus    Chronic combined systolic and diastolic congestive heart failure    Type 2 diabetes mellitus, without long-term current use of insulin    Chronic pain, neck, back, leg on home narcotics    Pulmonary nodule    Hypokalemia    Mild neurocognitive 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    Adjustment disorder with mixed anxiety and depressed mood    Gait abnormality    Abdominal aortic atherosclerosis        Assessment and Plan     Anemia that has been fluctuant for the past several years   Thrombocytopenia on and off for several years   History of interstitial cystitis patient reports occasional hematuria but is unclear  History of hypertension associated with diabetes history of Sjogren syndrome and obstructive sleep apnea, dyslipidemia  I am  concerned that the ferritin has dropped significantly from being high and elevated for the past 6 years   Will obtain stool test, urinalysis for hematuria   Obtain SPEP, IFG, B12, folate, retic count, Mari test, haptoglobin and ST FR and see patient back with results  May need bone marrow biopsy  May need scans for continued weight loss  Patient is off of hypertensive medications her A1c has only been 5.0 because of significant weight loss

## 2022-08-29 LAB
ALBUMIN SERPL ELPH-MCNC: 4.16 G/DL (ref 3.35–5.55)
ALPHA1 GLOB SERPL ELPH-MCNC: 0.29 G/DL (ref 0.17–0.41)
ALPHA2 GLOB SERPL ELPH-MCNC: 1.01 G/DL (ref 0.43–0.99)
ANA PATTERN 1: NORMAL
ANA SER QL IF: POSITIVE
ANA TITR SER IF: NORMAL {TITER}
B-GLOBULIN SERPL ELPH-MCNC: 0.72 G/DL (ref 0.5–1.1)
GAMMA GLOB SERPL ELPH-MCNC: 0.82 G/DL (ref 0.67–1.58)
INTERPRETATION SERPL IFE-IMP: NORMAL
KAPPA LC SER QL IA: 2.14 MG/DL (ref 0.33–1.94)
KAPPA LC/LAMBDA SER IA: 1.3 (ref 0.26–1.65)
LAMBDA LC SER QL IA: 1.65 MG/DL (ref 0.57–2.63)
PROT SERPL-MCNC: 7 G/DL (ref 6–8.4)
STFR SERPL-MCNC: 2.8 MG/L (ref 1.8–4.6)

## 2022-08-30 ENCOUNTER — LAB VISIT (OUTPATIENT)
Dept: LAB | Facility: HOSPITAL | Age: 77
End: 2022-08-30
Attending: INTERNAL MEDICINE
Payer: MEDICARE

## 2022-08-30 DIAGNOSIS — D64.9 ANEMIA, UNSPECIFIED TYPE: ICD-10-CM

## 2022-08-30 LAB
PATHOLOGIST INTERPRETATION IFE: NORMAL
PATHOLOGIST INTERPRETATION SPE: NORMAL

## 2022-08-30 PROCEDURE — 82272 OCCULT BLD FECES 1-3 TESTS: CPT | Performed by: INTERNAL MEDICINE

## 2022-08-31 ENCOUNTER — EXTERNAL CHRONIC CARE MANAGEMENT (OUTPATIENT)
Dept: PRIMARY CARE CLINIC | Facility: CLINIC | Age: 77
End: 2022-08-31
Payer: MEDICARE

## 2022-08-31 LAB
ANTI SM ANTIBODY: 0.06 RATIO (ref 0–0.99)
ANTI SM/RNP ANTIBODY: 0.08 RATIO (ref 0–0.99)
ANTI-SM INTERPRETATION: NEGATIVE
ANTI-SM/RNP INTERPRETATION: NEGATIVE
ANTI-SSA ANTIBODY: 0.05 RATIO (ref 0–0.99)
ANTI-SSA INTERPRETATION: NEGATIVE
ANTI-SSB ANTIBODY: 0.05 RATIO (ref 0–0.99)
ANTI-SSB INTERPRETATION: NEGATIVE
DSDNA AB SER-ACNC: NORMAL [IU]/ML

## 2022-08-31 PROCEDURE — 99490 CHRNC CARE MGMT STAFF 1ST 20: CPT | Mod: S$PBB,,, | Performed by: FAMILY MEDICINE

## 2022-08-31 PROCEDURE — 99490 PR CHRONIC CARE MGMT, 1ST 20 MIN: ICD-10-PCS | Mod: S$PBB,,, | Performed by: FAMILY MEDICINE

## 2022-08-31 PROCEDURE — 99490 CHRNC CARE MGMT STAFF 1ST 20: CPT | Mod: PBBFAC,PO | Performed by: FAMILY MEDICINE

## 2022-09-07 ENCOUNTER — TELEPHONE (OUTPATIENT)
Dept: HEMATOLOGY/ONCOLOGY | Facility: CLINIC | Age: 77
End: 2022-09-07
Payer: MEDICARE

## 2022-09-07 NOTE — TELEPHONE ENCOUNTER
Returned call as requested. No answer. LVM.      ----- Message from Tavo Whitehead sent at 9/7/2022  1:57 PM CDT -----  Type: Needs Medical Advice  Who Called: Pt   Symptoms (please be specific):   How long has patient had these symptoms:    Pharmacy name and phone #:    Best Call Back Number: [phone]  Additional Information: Pt requesting a call back for scheduling an appt.

## 2022-09-19 ENCOUNTER — OFFICE VISIT (OUTPATIENT)
Dept: FAMILY MEDICINE | Facility: CLINIC | Age: 77
End: 2022-09-19
Payer: MEDICARE

## 2022-09-19 VITALS
OXYGEN SATURATION: 97 % | SYSTOLIC BLOOD PRESSURE: 110 MMHG | HEIGHT: 63 IN | DIASTOLIC BLOOD PRESSURE: 62 MMHG | HEART RATE: 77 BPM | WEIGHT: 135.13 LBS | BODY MASS INDEX: 23.94 KG/M2

## 2022-09-19 DIAGNOSIS — E11.69 HYPERLIPIDEMIA ASSOCIATED WITH TYPE 2 DIABETES MELLITUS: ICD-10-CM

## 2022-09-19 DIAGNOSIS — F06.70 MILD NEUROCOGNITIVE DISORDER DUE TO ANOTHER MEDICAL CONDITION: ICD-10-CM

## 2022-09-19 DIAGNOSIS — M05.742 RHEUMATOID ARTHRITIS INVOLVING BOTH HANDS WITH POSITIVE RHEUMATOID FACTOR: ICD-10-CM

## 2022-09-19 DIAGNOSIS — I15.2 HYPERTENSION ASSOCIATED WITH DIABETES: Primary | ICD-10-CM

## 2022-09-19 DIAGNOSIS — M54.9 CHRONIC BACK PAIN, UNSPECIFIED BACK LOCATION, UNSPECIFIED BACK PAIN LATERALITY: Chronic | ICD-10-CM

## 2022-09-19 DIAGNOSIS — E78.5 HYPERLIPIDEMIA ASSOCIATED WITH TYPE 2 DIABETES MELLITUS: ICD-10-CM

## 2022-09-19 DIAGNOSIS — M05.741 RHEUMATOID ARTHRITIS INVOLVING BOTH HANDS WITH POSITIVE RHEUMATOID FACTOR: ICD-10-CM

## 2022-09-19 DIAGNOSIS — G89.29 CHRONIC BACK PAIN, UNSPECIFIED BACK LOCATION, UNSPECIFIED BACK PAIN LATERALITY: Chronic | ICD-10-CM

## 2022-09-19 DIAGNOSIS — D64.9 ANEMIA, UNSPECIFIED TYPE: ICD-10-CM

## 2022-09-19 DIAGNOSIS — J41.0 SIMPLE CHRONIC BRONCHITIS: ICD-10-CM

## 2022-09-19 DIAGNOSIS — E11.59 HYPERTENSION ASSOCIATED WITH DIABETES: Primary | ICD-10-CM

## 2022-09-19 PROCEDURE — G0008 ADMIN INFLUENZA VIRUS VAC: HCPCS | Mod: PBBFAC,PO

## 2022-09-19 PROCEDURE — 99214 OFFICE O/P EST MOD 30 MIN: CPT | Mod: S$PBB,,, | Performed by: FAMILY MEDICINE

## 2022-09-19 PROCEDURE — 99214 PR OFFICE/OUTPT VISIT, EST, LEVL IV, 30-39 MIN: ICD-10-PCS | Mod: S$PBB,,, | Performed by: FAMILY MEDICINE

## 2022-09-19 PROCEDURE — 99215 OFFICE O/P EST HI 40 MIN: CPT | Mod: PBBFAC,PO | Performed by: FAMILY MEDICINE

## 2022-09-19 PROCEDURE — 99999 PR PBB SHADOW E&M-EST. PATIENT-LVL V: ICD-10-PCS | Mod: PBBFAC,,, | Performed by: FAMILY MEDICINE

## 2022-09-19 PROCEDURE — 99999 PR PBB SHADOW E&M-EST. PATIENT-LVL V: CPT | Mod: PBBFAC,,, | Performed by: FAMILY MEDICINE

## 2022-09-19 RX ORDER — NALOXONE HYDROCHLORIDE 4 MG/.1ML
SPRAY NASAL
COMMUNITY
Start: 2022-09-01

## 2022-09-19 NOTE — PROGRESS NOTES
"  Subjective:       Patient ID: Pili Teixeira is a 77 y.o. female.    Chief Complaint: Follow-up    Pt is known to me.  Pt is here for followup of chronic medical issues.  The pt is seeing Dr. Do Huggins for anemia.  The pt missed her 8/31 appt to discuss lab findings.  The pt has no energy.  She feels tired--she gets uninterrupted sleep due to pain.  Her shoulders hurt.  She sees pain mgt next month.  She continues on chronic opioid therapy for DDD of the spine.  The pt is compliant with regulations per  website.   The pt gets good glucoses at home.  The pt got COVID on her [place] trip--she had to get off the boat and come home.  She was in the hospital a day and a half.  She has continued fatigue.      Review of Systems   Constitutional:  Positive for fatigue. Negative for activity change, appetite change and unexpected weight change.   Eyes:  Negative for visual disturbance.   Respiratory:  Negative for cough, chest tightness and shortness of breath.    Cardiovascular:  Negative for chest pain, palpitations and leg swelling.   Gastrointestinal:  Negative for abdominal pain, constipation, diarrhea, nausea and vomiting.   Endocrine: Negative for cold intolerance, heat intolerance and polyuria.   Genitourinary:  Negative for decreased urine volume and dysuria.   Musculoskeletal:  Positive for arthralgias. Negative for back pain.   Skin:  Negative for rash.   Neurological:  Negative for numbness and headaches.     Objective:      Vitals:    09/19/22 1301   BP: 110/62   Pulse: 77   SpO2: 97%   Weight: 61.3 kg (135 lb 2.3 oz)   Height: 5' 3" (1.6 m)      Physical Exam  Vitals and nursing note reviewed.   Constitutional:       Appearance: Normal appearance. She is well-developed and normal weight. She is not ill-appearing.   HENT:      Head: Normocephalic.   Eyes:      Conjunctiva/sclera: Conjunctivae normal.      Pupils: Pupils are equal, round, and reactive to light.   Neck:      Thyroid: No thyromegaly. "   Cardiovascular:      Rate and Rhythm: Normal rate and regular rhythm.      Pulses: Normal pulses.      Heart sounds: Normal heart sounds.   Pulmonary:      Effort: Pulmonary effort is normal.      Breath sounds: Normal breath sounds.   Abdominal:      General: Bowel sounds are normal.      Palpations: Abdomen is soft.      Tenderness: There is no abdominal tenderness.   Musculoskeletal:         General: No tenderness or deformity. Normal range of motion.      Cervical back: Normal range of motion and neck supple.      Right lower leg: No edema.      Left lower leg: No edema.   Lymphadenopathy:      Cervical: No cervical adenopathy.   Skin:     General: Skin is warm and dry.   Neurological:      Mental Status: She is alert and oriented to person, place, and time.      Cranial Nerves: No cranial nerve deficit.      Motor: No abnormal muscle tone.      Coordination: Coordination normal.      Deep Tendon Reflexes: Reflexes normal.   Psychiatric:         Behavior: Behavior normal.       Assessment:       1. Hypertension associated with diabetes    2. Hyperlipidemia associated with type 2 diabetes mellitus    3. Simple chronic bronchitis    4. Mild neurocognitive 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    5. Rheumatoid arthritis involving both hands with positive rheumatoid factor    6. Anemia, unspecified type    7. Chronic back pain, unspecified back location, unspecified back pain laterality        Plan:       Pili was seen today for follow-up.    Diagnoses and all orders for this visit:    Hypertension associated with diabetes    Hyperlipidemia associated with type 2 diabetes mellitus    Simple chronic bronchitis    Mild neurocognitive 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    Rheumatoid arthritis involving both hands with positive rheumatoid factor  -     Ambulatory referral/consult to Rheumatology; Future    Anemia, unspecified type    Chronic back pain, unspecified back location, unspecified back pain  laterality    During this visit, I reviewed the pt's history, medications, allergies, and problem list.

## 2022-09-26 NOTE — TELEPHONE ENCOUNTER
----- Message from Bello Best sent at 3/7/2022 10:24 AM CST -----  Regarding: Refill  Contact: patient  Patient need dara authorization on ONGLYZA, please call back at [facility] any questions please call back at [phone] (home) patient been out of meds for two days    [facility] - [place], [place] - [address]  [address]  [place] [address]  Phone: [phone] Fax: [phone]    Case number [id number]            Tasked to forms

## 2022-09-27 ENCOUNTER — TELEPHONE (OUTPATIENT)
Dept: HEMATOLOGY/ONCOLOGY | Facility: CLINIC | Age: 77
End: 2022-09-27
Payer: MEDICARE

## 2022-09-27 NOTE — TELEPHONE ENCOUNTER
----- Message from Jacey Lui sent at 9/27/2022 12:57 PM CDT -----  Regarding: appointment  Contact: patient  Type:  Sooner Appointment Request    Caller is requesting a sooner appointment.  Caller declined first available appointment listed below.  Caller will not accept being placed on the waitlist and is requesting a message be sent to doctor.    Name of Caller:  patient  When is the first available appointment?    Symptoms:  f/u results  Best Call Back Number:  [phone] (home)   Additional Information:  Please call patient to schedule. Thanks!

## 2022-09-30 ENCOUNTER — EXTERNAL CHRONIC CARE MANAGEMENT (OUTPATIENT)
Dept: PRIMARY CARE CLINIC | Facility: CLINIC | Age: 77
End: 2022-09-30
Payer: MEDICARE

## 2022-09-30 PROCEDURE — 99490 CHRNC CARE MGMT STAFF 1ST 20: CPT | Mod: S$PBB,,, | Performed by: FAMILY MEDICINE

## 2022-09-30 PROCEDURE — 99490 PR CHRONIC CARE MGMT, 1ST 20 MIN: ICD-10-PCS | Mod: S$PBB,,, | Performed by: FAMILY MEDICINE

## 2022-09-30 PROCEDURE — 99490 CHRNC CARE MGMT STAFF 1ST 20: CPT | Mod: PBBFAC,PO | Performed by: FAMILY MEDICINE

## 2022-10-17 ENCOUNTER — OFFICE VISIT (OUTPATIENT)
Dept: HEMATOLOGY/ONCOLOGY | Facility: CLINIC | Age: 77
End: 2022-10-17
Payer: MEDICARE

## 2022-10-17 DIAGNOSIS — D50.0 IRON DEFICIENCY ANEMIA DUE TO CHRONIC BLOOD LOSS: ICD-10-CM

## 2022-10-17 DIAGNOSIS — J41.0 SIMPLE CHRONIC BRONCHITIS: Primary | ICD-10-CM

## 2022-10-17 DIAGNOSIS — M05.742 RHEUMATOID ARTHRITIS INVOLVING BOTH HANDS WITH POSITIVE RHEUMATOID FACTOR: ICD-10-CM

## 2022-10-17 DIAGNOSIS — D69.6 THROMBOCYTOPENIA: Chronic | ICD-10-CM

## 2022-10-17 DIAGNOSIS — M05.741 RHEUMATOID ARTHRITIS INVOLVING BOTH HANDS WITH POSITIVE RHEUMATOID FACTOR: ICD-10-CM

## 2022-10-17 PROCEDURE — 99214 OFFICE O/P EST MOD 30 MIN: CPT | Mod: 95,,, | Performed by: INTERNAL MEDICINE

## 2022-10-17 PROCEDURE — 99214 PR OFFICE/OUTPT VISIT, EST, LEVL IV, 30-39 MIN: ICD-10-PCS | Mod: 95,,, | Performed by: INTERNAL MEDICINE

## 2022-10-17 NOTE — Clinical Note
Refert o rhumatologu asap slidell, cov or albertina  See me vv in 4-6 weeeks with cbc, cmp, milton mcginnis

## 2022-10-18 NOTE — PROGRESS NOTES
Subjective:    The patient location is: home  Visit type: Virtual visit with synchronous audio and video  Face-to-face or time spent with patient on the encounter: 20 min  Total time spent on and for  this encounter which includes non face-to-face time preparing to see patient, review of tests, obtaining and or reviewing separately obtained records documenting clinical information in the electronic or other health records, independently interpreting results which is not separately reported ,and communicating results to the patient/family/caregiver and in care coordination and treatment planning/communicating with pharmacy for prescriptions/addressing social needs/arranging follow-up and or referrals :25 min    Each patient I provide medical services by telemedicine is:  (1) informed of the relationship between the physician and patient and the respective role of any other health care provider with respect to management of the patient; and (2) notified that he or she may decline to receive medical services by telemedicine and may withdraw from such care at any time.  This is a video visit therefore some elements of the physical exam such as vital signs, heart sounds are breath sounds are not included and may be included if found in recent clinic notes of other providers assessing same patient. Any symptoms or signs that were visualized were stated by the patient may be included in this note.    Patient ID: Pili Teixeira is a 77 y.o. female.    Chief Complaint:  Evaluation of anemia  HPI   Patient has seen me in 2016 and then was lost to follow-up.  At that time shows following for thrombocytopenia.    Review of Systems    Patient states she feels very tired sleeps a lot   Has lost weight  Had COVID multiple times T3-4 episodes that she can recall.  Since 2000 20 last large amount of weight post coronary and has not gained it back she feels nauseated.  Has had multiple GI scopes and evaluations.  Denies chest pain  palpitations dizziness or passing out episodes  Denies headaches blurred vision   Denies abdominal pain   Occasional diarrhea reported   No feet swelling  States sometimes urine may be discolored but that could be from vitamins according to patient   Does not recall seeing blood in urine or stool      Past Medical History:   Diagnosis Date    Allergy     Anemia 2012    with thrombocytopenia; iron deficiency    Arthritis     Cervical spinal stenosis     with neuropathy, chronic neck,back,leg pain    Colon polyp     COPD (chronic obstructive pulmonary disease)     Coronary artery disease     COVID 4/27/2022    COVID-19     Diabetes mellitus     , sugars run 190's per patient    Diastolic dysfunction 7/13/2015    Diverticulitis     Diverticulosis     Hx diverticulitis,still has chronic diarrhea, abd pain    Dyspnea on exertion     sometimes with nausea, fatigue,dizziness, had cardiac cath June 2012, normal    Fibromyalgia     Fracture 2012    right thumb    GERD (gastroesophageal reflux disease)     Feb 2012, Hx H Pylori    Glaucoma     had LAser surgey, on no eye drops    HEARING LOSS     Hemangioma     fatty liver    History of earache     frequent    History of TIA (transient ischemic attack) 5/28/2013    Hyperlipidemia     Hypertension     Hypertension associated with diabetes 3/14/2017    Laryngeal polyp     Myocardial infarction 2006 last    also MI in distant past    REJI (obstructive sleep apnea)     does not use CPAP    Otitis media     Pneumonia due to COVID-19 virus 2/20/2021    Renal stone     Sjogren's syndrome     SOB (shortness of breath) 6/8/2012    2012 [facility] 1. Normal coronary arteries. 2. Normal single renal arteries bilaterally. 3. Diastolic dysfunction.     Stroke     TIA, now on Plavix    TIA (transient ischemic attack)     TMJ disorder     Type II or unspecified type diabetes mellitus without mention of complication, uncontrolled 5/8/2014    UTI (lower urinary tract infection)     frequent    Venous  insufficiency     with Hx blood clot in leg     Past Surgical History:   Procedure Laterality Date    ABDOMINAL SURGERY  2010 x2    expoloratory lap for pelvic cyst,adhesions; partial colonectomy     adhesiolysis   10/11/2012    CARDIAC CATHETERIZATION      no current blockages.    CHOLECYSTECTOMY      COLON SURGERY      r/t diverticuli    COLONOSCOPY  08/2014    repeat in 5 years    COLONOSCOPY N/A 1/7/2020    Procedure: COLONOSCOPY;  Surgeon: Kb Nguyen MD;  Location: [facility] ENDO;  Service: Endoscopy;  Laterality: N/A;    ENDOSCOPIC ULTRASOUND OF UPPER GASTROINTESTINAL TRACT N/A 1/13/2021    Procedure: ULTRASOUND, UPPER GI TRACT, ENDOSCOPIC;  Surgeon: Sonido Castellano III, MD;  Location: [facility] ENDO;  Service: Endoscopy;  Laterality: N/A;    EPIDURAL BLOCK INJECTION  5/15/12    back,neck for pain    ESOPHAGOGASTRODUODENOSCOPY N/A 1/7/2020    Procedure: EGD (ESOPHAGOGASTRODUODENOSCOPY);  Surgeon: Kb Nguyen MD;  Location: [facility] ENDO;  Service: Endoscopy;  Laterality: N/A;    ESOPHAGOGASTRODUODENOSCOPY N/A 1/13/2021    Procedure: EGD (ESOPHAGOGASTRODUODENOSCOPY);  Surgeon: Sonido Castellano III, MD;  Location: [facility] ENDO;  Service: Endoscopy;  Laterality: N/A;    EXPLORATORY LAPAROTOMY W/ BOWEL RESECTION  10/11/2012    EYE SURGERY      Laser for glaucoma    EYE SURGERY  May 2012    cataracts    HYSTERECTOMY      LARYNX SURGERY      polypectomy    OOPHORECTOMY      TONSILLECTOMY      with adenoidectomy    UPPER GASTROINTESTINAL ENDOSCOPY  12/2015    VASCULAR SURGERY      laser to varicose vein leg     Family History   Problem Relation Age of Onset    Throat cancer Mother     Cancer Mother     Diabetes Mellitus Mother     Stroke Father     Hypertension Father     Heart disease Father     Diverticulitis Sister     Throat cancer Brother     Cancer Brother     Thyroid disease Daughter     Lupus Daughter     Pancreatic cancer Maternal Aunt 55    Pancreatic cancer Cousin 58    Breast cancer Neg Hx      Ovarian cancer Neg Hx     Colon cancer Neg Hx     Colon polyps Neg Hx     Celiac disease Neg Hx     Cirrhosis Neg Hx     Crohn's disease Neg Hx     Stomach cancer Neg Hx     Ulcerative colitis Neg Hx     Rectal cancer Neg Hx     Esophageal cancer Neg Hx     Inflammatory bowel disease Neg Hx     Rheum arthritis Neg Hx     Psoriasis Neg Hx     Osteoarthritis Neg Hx     Kidney disease Neg Hx     Hyperlipidemia Neg Hx     COPD Neg Hx     Depression Neg Hx     Chronic back pain Neg Hx     Asthma Neg Hx     Alcohol abuse Neg Hx       Social History     Socioeconomic History    Marital status:    Tobacco Use    Smoking status: Never    Smokeless tobacco: Never   Substance and Sexual Activity    Alcohol use: No    Drug use: Yes     Types: Oxycodone    Sexual activity: Not Currently     Birth control/protection: Surgical     Comment: hysterectomy     Social Determinants of Health     Financial Resource Strain: Medium Risk    Difficulty of Paying Living Expenses: Somewhat hard   Food Insecurity: Food Insecurity Present    Worried About Running Out of Food in the Last Year: Sometimes true    Ran Out of Food in the Last Year: Never true   Transportation Needs: No Transportation Needs    Lack of Transportation (Medical): No    Lack of Transportation (Non-Medical): No   Physical Activity: Inactive    Days of Exercise per Week: 0 days    Minutes of Exercise per Session: 0 min   Stress: No Stress Concern Present    Feeling of Stress : Only a little   Social Connections: Moderately Isolated    Frequency of Communication with Friends and Family: More than three times a week    Frequency of Social Gatherings with Friends and Family: More than three times a week    Attends Worship Services: More than 4 times per year    Active Member of Clubs or Organizations: No    Attends Club or Organization Meetings: Never    Marital Status:    Housing Stability: Low Risk     Unable to Pay for Housing in the Last Year: No    Number  of Places Lived in the Last Year: 1    Unstable Housing in the Last Year: No     Review of patient's allergies indicates:   Allergen Reactions    Codeine Other (See Comments)     Chest pain    Clindamycin Other (See Comments)     Difficulty swallowing after taking, feels a something sits in epigastric area     Iodinated contrast media Hives and Rash       Current Outpatient Medications:     ACCU-CHEK GUIDE TEST STRIPS Strp, USE TO TEST BLOOD GLUCOSE ONE TIME DAILY AS DIRECTED., Disp: 100 each, Rfl: 3    albuterol (PROVENTIL/VENTOLIN HFA) 90 mcg/actuation inhaler, Inhale 2 puffs into the lungs every 4 (four) hours as needed for Wheezing or Shortness of Breath. Rescue, Disp: 18 g, Rfl: 0    albuterol-ipratropium (DUO-NEB) 2.5 mg-0.5 mg/3 mL nebulizer solution, Take 3 mLs by nebulization every 4 to 6 hours as needed for Wheezing. Rescue, Disp: , Rfl:     aspirin (ECOTRIN) 81 MG EC tablet, Take 81 mg by mouth once daily., Disp: , Rfl:     blood-glucose meter kit, To check BG 2 times daily, to use with insurance preferred meter. Medical necessity., Disp: 1 each, Rfl: 0    clopidogreL (PLAVIX) 75 mg tablet, Take 1 tablet (75 mg total) by mouth once daily., Disp: 90 tablet, Rfl: 3    cyanocobalamin (VITAMIN B-12) 1000 MCG tablet, Take 100 mcg by mouth once daily., Disp: , Rfl:     diclofenac sodium 1 % Gel, Apply 2 g topically once daily., Disp: , Rfl:     diltiaZEM (CARDIZEM CD) 120 MG Cp24, TAKE 1 CAPSULE BY MOUTH IN THE EVENING, Disp: 90 capsule, Rfl: 3    folic acid (FOLVITE) 1 MG tablet, Take 1 tablet (1,000 mcg total) by mouth once daily., Disp: 30 tablet, Rfl: 5    gabapentin (NEURONTIN) 100 MG capsule, TAKE 1 CAPSULE BY MOUTH THREE TIMES DAILY, Disp: 270 capsule, Rfl: 0    ipratropium (ATROVENT) 42 mcg (0.06 %) nasal spray, 2 sprays by Nasal route 3 (three) times daily., Disp: 15 mL, Rfl: 3    lancets (ACCU-CHEK SOFTCLIX LANCETS) Misc, Test TWICE DAILY;Twice a day, Disp: 100 each, Rfl: 3    lancets Misc, Use to  test blood glucose one (1) times daily as directed with insurance preferred meter and supplies, Disp: 100 each, Rfl: 3    linaGLIPtin (TRADJENTA) 5 mg Tab tablet, Take 1 tablet (5 mg total) by mouth once daily., Disp: 90 tablet, Rfl: 3    loratadine (CLARITIN) 10 mg tablet, Take 10 mg by mouth once daily., Disp: , Rfl:     meloxicam (MOBIC) 7.5 MG tablet, Take 1 tablet (7.5 mg total) by mouth once daily., Disp: 90 tablet, Rfl: 1    metFORMIN (GLUCOPHAGE) 500 MG tablet, Take 1 tablet (500 mg total) by mouth 2 (two) times daily with meals., Disp: 180 tablet, Rfl: 3    multivit with min-folic acid 200 mcg Chew, Take 1 tablet by mouth once daily. , Disp: , Rfl:     NARCAN 4 mg/actuation Spry, as directed, Disp: , Rfl:     olopatadine (PATANOL) 0.1 % ophthalmic solution, Place 1 drop into both eyes daily as needed., Disp: , Rfl:     ondansetron (ZOFRAN-ODT) 4 MG TbDL, Take 1 tablet (4 mg total) by mouth every 8 (eight) hours as needed (nausea)., Disp: 30 tablet, Rfl: 1    oxyCODONE-acetaminophen (PERCOCET) 7.5-325 mg per tablet, Take 1 tablet by mouth 4 (four) times daily as needed., Disp: , Rfl:     pantoprazole (PROTONIX) 40 MG tablet, Take 1 tablet (40 mg total) by mouth once daily., Disp: 90 tablet, Rfl: 3    rosuvastatin (CRESTOR) 10 MG tablet, Take 1 tablet (10 mg total) by mouth every evening., Disp: 90 tablet, Rfl: 3    traZODone (DESYREL) 50 MG tablet, TAKE 1 TABLET BY MOUTH IN THE EVENING - (MAY TAKE AN ADDITIONAL TABLET AS NEEDED), Disp: 90 tablet, Rfl: 3    Physical Exam    Wt Readings from Last 3 Encounters:   09/19/22 61.3 kg (135 lb 2.3 oz)   08/26/22 61.8 kg (136 lb 3.9 oz)   08/01/22 61.7 kg (136 lb 0.4 oz)     Temp Readings from Last 3 Encounters:   08/26/22 97.1 °F (36.2 °C) (Temporal)   08/01/22 98.1 °F (36.7 °C)   04/30/22 98 °F (36.7 °C) (Oral)     BP Readings from Last 3 Encounters:   09/19/22 110/62   08/26/22 (!) 118/57   08/01/22 120/70     Pulse Readings from Last 3 Encounters:   09/19/22 77    08/26/22 86   08/01/22 81   .VITAL SIGNS:  as above   GENERAL: appears well-built, well-nourished.  No anxiety, no agitation, and in no distress.  Patient is awake, alert, oriented and cooperative.  HEENT:  Showed no congestion. Trachea is central no obvious icterus or pallor noted no hoarseness. no obvious JVD   NECK:  Supple.  No JVD. No obvious cervical submental or supraclavicular adenopathy.  RS:the visualized portion of  Chest expands well. chest appears symmetric, no audible wheezes.  No dyspnea recognized  ABDOMEN:  abdomen appears undistended.  EXTREMITIES:  Without edema.  NEUROLOGICAL:  The patient is appropriate, higher functions are normal.  No  obvious neurological deficits.  normal judgement normal thought content  No confusion, no speech impediment. Cranial nerves are intact and show no deficit. No gross motor deficits noted   SKIN MUSCULOSKELETAL: no joint or skeletal deformity, no clubbing of nails.  No visible rash ecchymosis or petechiae  Lab Results   Component Value Date    WBC 8.07 08/26/2022    HGB 11.5 (L) 08/26/2022    HCT 35.9 (L) 08/26/2022    MCV 87 08/26/2022     08/26/2022       BMP  Lab Results   Component Value Date     08/18/2022    K 3.7 08/18/2022     08/18/2022    CO2 28 08/18/2022    BUN 25 (H) 08/18/2022    CREATININE 1.0 08/18/2022    CALCIUM 10.3 08/18/2022    ANIONGAP 10 08/18/2022    ESTGFRAFRICA 56.8 (A) 04/30/2022    EGFRNONAA 49.2 (A) 04/30/2022     Lab Results   Component Value Date    IRON 43 08/22/2022    TIBC 342 08/22/2022    FERRITIN 37 08/22/2022         Patient Active Problem List   Diagnosis    GERD (gastroesophageal reflux disease)    REJI (obstructive sleep apnea)    DJD (degenerative joint disease), lumbosacral    Pelvic cyst - left    Night sweats    Thrombocytopenia    Diverticulitis of colon (without mention of hemorrhage)(562.11)    Peritoneal adhesions (postoperative) (postinfection)    History of TIA (transient ischemic attack)     COPD (chronic obstructive pulmonary disease)    Insomnia    Change in bowel habits    IC (interstitial cystitis)    Diastolic dysfunction    NICOLAS (iron deficiency anemia)    Sjogren's syndrome    Fibromyalgia    Rheumatoid arthritis involving both hands with positive rheumatoid factor    Elevated lipase    Hernia of anterior abdominal wall    Ventral incisional hernia    Right inguinal hernia    Hypertension associated with diabetes    Noncompliance    Hyperlipidemia associated with type 2 diabetes mellitus    Chronic combined systolic and diastolic congestive heart failure    Type 2 diabetes mellitus, without long-term current use of insulin    Chronic pain, neck, back, leg on home narcotics    Pulmonary nodule    Hypokalemia    Mild neurocognitive 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    Adjustment disorder with mixed anxiety and depressed mood    Gait abnormality    Abdominal aortic atherosclerosis        Assessment and Plan     Anemia that has been fluctuant for the past several years   Thrombocytopenia on and off for several years   History of interstitial cystitis patient reports occasional hematuria but is unclear, ua is neg 10/22  History of hypertension associated with diabetes history of Sjogren syndrome and obstructive sleep apnea, dyslipidemia her symptoms may be associated to vasculitis or connective tissue disorder Will refer to Rheumatology ASAP   ferritin has dropped significantly from being high and elevated for the past 6 years   stool test, pending urinalysis for hematuria -negative  SPEP, free kappa light chains elevated  B12, 349 no evidence of hemolysis retic count is normal iron studies are low will recheck iron levels CBC CMP iron studies see patient back virtually in about 2 months    JOAO positive  May need bone marrow biopsy but anemia much improved this visit  May need scans for continued weight loss  Patient is off of hypertensive medications her A1c has only been 5.0 because of  significant weight loss

## 2022-10-21 ENCOUNTER — PATIENT MESSAGE (OUTPATIENT)
Dept: HEMATOLOGY/ONCOLOGY | Facility: CLINIC | Age: 77
End: 2022-10-21
Payer: MEDICARE

## 2022-10-21 DIAGNOSIS — D50.0 IRON DEFICIENCY ANEMIA DUE TO CHRONIC BLOOD LOSS: Primary | ICD-10-CM

## 2022-10-21 DIAGNOSIS — J41.0 SIMPLE CHRONIC BRONCHITIS: ICD-10-CM

## 2022-10-31 ENCOUNTER — EXTERNAL CHRONIC CARE MANAGEMENT (OUTPATIENT)
Dept: PRIMARY CARE CLINIC | Facility: CLINIC | Age: 77
End: 2022-10-31
Payer: MEDICARE

## 2022-10-31 PROCEDURE — 99439 PR CHRONIC CARE MGMT, EA ADDTL 20 MIN: ICD-10-PCS | Mod: S$PBB,,, | Performed by: FAMILY MEDICINE

## 2022-10-31 PROCEDURE — 99439 CHRNC CARE MGMT STAF EA ADDL: CPT | Mod: S$PBB,,, | Performed by: FAMILY MEDICINE

## 2022-10-31 PROCEDURE — 99490 CHRNC CARE MGMT STAFF 1ST 20: CPT | Mod: S$PBB,,, | Performed by: FAMILY MEDICINE

## 2022-10-31 PROCEDURE — 99490 PR CHRONIC CARE MGMT, 1ST 20 MIN: ICD-10-PCS | Mod: S$PBB,,, | Performed by: FAMILY MEDICINE

## 2022-10-31 PROCEDURE — 99490 CHRNC CARE MGMT STAFF 1ST 20: CPT | Mod: PBBFAC,PO | Performed by: FAMILY MEDICINE

## 2022-10-31 PROCEDURE — 99439 CHRNC CARE MGMT STAF EA ADDL: CPT | Mod: PBBFAC,PO | Performed by: FAMILY MEDICINE

## 2022-11-23 ENCOUNTER — LAB VISIT (OUTPATIENT)
Dept: LAB | Facility: HOSPITAL | Age: 77
End: 2022-11-23
Attending: INTERNAL MEDICINE
Payer: MEDICARE

## 2022-11-23 DIAGNOSIS — D50.0 IRON DEFICIENCY ANEMIA DUE TO CHRONIC BLOOD LOSS: ICD-10-CM

## 2022-11-23 DIAGNOSIS — D69.6 THROMBOCYTOPENIA: Chronic | ICD-10-CM

## 2022-11-23 LAB
ALBUMIN SERPL BCP-MCNC: 4 G/DL (ref 3.5–5.2)
ALP SERPL-CCNC: 41 U/L (ref 55–135)
ALT SERPL W/O P-5'-P-CCNC: 6 U/L (ref 10–44)
ANION GAP SERPL CALC-SCNC: 11 MMOL/L (ref 8–16)
AST SERPL-CCNC: 14 U/L (ref 10–40)
BASOPHILS # BLD AUTO: 0.04 K/UL (ref 0–0.2)
BASOPHILS NFR BLD: 0.4 % (ref 0–1.9)
BILIRUB SERPL-MCNC: 0.3 MG/DL (ref 0.1–1)
BUN SERPL-MCNC: 21 MG/DL (ref 8–23)
CALCIUM SERPL-MCNC: 10.1 MG/DL (ref 8.7–10.5)
CHLORIDE SERPL-SCNC: 103 MMOL/L (ref 95–110)
CO2 SERPL-SCNC: 27 MMOL/L (ref 23–29)
CREAT SERPL-MCNC: 0.9 MG/DL (ref 0.5–1.4)
DIFFERENTIAL METHOD: ABNORMAL
EOSINOPHIL # BLD AUTO: 0.1 K/UL (ref 0–0.5)
EOSINOPHIL NFR BLD: 0.8 % (ref 0–8)
ERYTHROCYTE [DISTWIDTH] IN BLOOD BY AUTOMATED COUNT: 12.8 % (ref 11.5–14.5)
EST. GFR  (NO RACE VARIABLE): >60 ML/MIN/1.73 M^2
FERRITIN SERPL-MCNC: 22 NG/ML (ref 20–300)
GLUCOSE SERPL-MCNC: 106 MG/DL (ref 70–110)
HCT VFR BLD AUTO: 29.9 % (ref 37–48.5)
HGB BLD-MCNC: 9.3 G/DL (ref 12–16)
IMM GRANULOCYTES # BLD AUTO: 0.02 K/UL (ref 0–0.04)
IMM GRANULOCYTES NFR BLD AUTO: 0.2 % (ref 0–0.5)
LYMPHOCYTES # BLD AUTO: 2.8 K/UL (ref 1–4.8)
LYMPHOCYTES NFR BLD: 30.3 % (ref 18–48)
MCH RBC QN AUTO: 27.8 PG (ref 27–31)
MCHC RBC AUTO-ENTMCNC: 31.1 G/DL (ref 32–36)
MCV RBC AUTO: 89 FL (ref 82–98)
MONOCYTES # BLD AUTO: 0.7 K/UL (ref 0.3–1)
MONOCYTES NFR BLD: 7.2 % (ref 4–15)
NEUTROPHILS # BLD AUTO: 5.7 K/UL (ref 1.8–7.7)
NEUTROPHILS NFR BLD: 61.1 % (ref 38–73)
NRBC BLD-RTO: 0 /100 WBC
PLATELET # BLD AUTO: 301 K/UL (ref 150–450)
PMV BLD AUTO: 10.6 FL (ref 9.2–12.9)
POTASSIUM SERPL-SCNC: 4 MMOL/L (ref 3.5–5.1)
PROT SERPL-MCNC: 7.1 G/DL (ref 6–8.4)
RBC # BLD AUTO: 3.35 M/UL (ref 4–5.4)
SODIUM SERPL-SCNC: 141 MMOL/L (ref 136–145)
WBC # BLD AUTO: 9.25 K/UL (ref 3.9–12.7)

## 2022-11-23 PROCEDURE — 82728 ASSAY OF FERRITIN: CPT | Performed by: INTERNAL MEDICINE

## 2022-11-23 PROCEDURE — 36415 COLL VENOUS BLD VENIPUNCTURE: CPT | Performed by: INTERNAL MEDICINE

## 2022-11-23 PROCEDURE — 84466 ASSAY OF TRANSFERRIN: CPT | Performed by: INTERNAL MEDICINE

## 2022-11-23 PROCEDURE — 80053 COMPREHEN METABOLIC PANEL: CPT | Performed by: INTERNAL MEDICINE

## 2022-11-23 PROCEDURE — 85025 COMPLETE CBC W/AUTO DIFF WBC: CPT | Performed by: INTERNAL MEDICINE

## 2022-11-24 LAB
IRON SERPL-MCNC: 27 UG/DL (ref 30–160)
SATURATED IRON: 8 % (ref 20–50)
TOTAL IRON BINDING CAPACITY: 333 UG/DL (ref 250–450)
TRANSFERRIN SERPL-MCNC: 225 MG/DL (ref 200–375)

## 2022-11-28 ENCOUNTER — OFFICE VISIT (OUTPATIENT)
Dept: HEMATOLOGY/ONCOLOGY | Facility: CLINIC | Age: 77
End: 2022-11-28
Payer: MEDICARE

## 2022-11-28 ENCOUNTER — TELEPHONE (OUTPATIENT)
Dept: HEMATOLOGY/ONCOLOGY | Facility: CLINIC | Age: 77
End: 2022-11-28

## 2022-11-28 DIAGNOSIS — M54.40 CHRONIC LOW BACK PAIN WITH SCIATICA, SCIATICA LATERALITY UNSPECIFIED, UNSPECIFIED BACK PAIN LATERALITY: Chronic | ICD-10-CM

## 2022-11-28 DIAGNOSIS — E11.00 TYPE 2 DIABETES MELLITUS WITH HYPEROSMOLARITY WITHOUT COMA, WITHOUT LONG-TERM CURRENT USE OF INSULIN: Chronic | ICD-10-CM

## 2022-11-28 DIAGNOSIS — G89.29 CHRONIC LOW BACK PAIN WITH SCIATICA, SCIATICA LATERALITY UNSPECIFIED, UNSPECIFIED BACK PAIN LATERALITY: Chronic | ICD-10-CM

## 2022-11-28 DIAGNOSIS — D50.0 IRON DEFICIENCY ANEMIA DUE TO CHRONIC BLOOD LOSS: Primary | ICD-10-CM

## 2022-11-28 DIAGNOSIS — R74.8 ELEVATED LIPASE: ICD-10-CM

## 2022-11-28 PROCEDURE — 99214 OFFICE O/P EST MOD 30 MIN: CPT | Mod: 95,,, | Performed by: INTERNAL MEDICINE

## 2022-11-28 PROCEDURE — 99214 PR OFFICE/OUTPT VISIT, EST, LEVL IV, 30-39 MIN: ICD-10-PCS | Mod: 95,,, | Performed by: INTERNAL MEDICINE

## 2022-11-28 RX ORDER — SODIUM CHLORIDE 0.9 % (FLUSH) 0.9 %
10 SYRINGE (ML) INJECTION
Status: CANCELLED | OUTPATIENT
Start: 2022-11-28

## 2022-11-28 RX ORDER — DIPHENHYDRAMINE HYDROCHLORIDE 50 MG/ML
50 INJECTION INTRAMUSCULAR; INTRAVENOUS ONCE AS NEEDED
Status: CANCELLED | OUTPATIENT
Start: 2022-11-28

## 2022-11-28 RX ORDER — SODIUM CHLORIDE 9 MG/ML
INJECTION, SOLUTION INTRAVENOUS CONTINUOUS
Status: CANCELLED | OUTPATIENT
Start: 2022-11-28

## 2022-11-28 RX ORDER — METHYLPREDNISOLONE SOD SUCC 125 MG
125 VIAL (EA) INJECTION ONCE AS NEEDED
Status: CANCELLED | OUTPATIENT
Start: 2022-11-28

## 2022-11-28 RX ORDER — EPINEPHRINE 0.3 MG/.3ML
0.3 INJECTION SUBCUTANEOUS ONCE AS NEEDED
Status: CANCELLED | OUTPATIENT
Start: 2022-11-28

## 2022-11-28 RX ORDER — HEPARIN 100 UNIT/ML
5 SYRINGE INTRAVENOUS
Status: CANCELLED | OUTPATIENT
Start: 2022-11-28

## 2022-11-28 NOTE — TELEPHONE ENCOUNTER
Staff msg sent to [employer] to seek auth for iv iron.      ----- Message from Do Huggins MD sent at 11/28/2022  2:29 PM CST -----  Two doses infusional iron see me 1 month after with CBC CMP  Supplement B12 sublingually   Keep rheumatology appointment   Patient to call GI for gastritis symptoms

## 2022-11-28 NOTE — Clinical Note
Two doses infusional iron see me 1 month after with CBC CMP Supplement B12 sublingually  Keep rheumatology appointment  Patient to call GI for gastritis symptoms

## 2022-11-28 NOTE — PROGRESS NOTES
Subjective:    The patient location is: home  Visit type: Virtual visit with synchronous audio and video  Face-to-face or time spent with patient on the encounter: 20 min  Total time spent on and for  this encounter which includes non face-to-face time preparing to see patient, review of tests, obtaining and or reviewing separately obtained records documenting clinical information in the electronic or other health records, independently interpreting results which is not separately reported ,and communicating results to the patient/family/caregiver and in care coordination and treatment planning/communicating with pharmacy for prescriptions/addressing social needs/arranging follow-up and or referrals :25 min    Each patient I provide medical services by telemedicine is:  (1) informed of the relationship between the physician and patient and the respective role of any other health care provider with respect to management of the patient; and (2) notified that he or she may decline to receive medical services by telemedicine and may withdraw from such care at any time.  This is a video visit therefore some elements of the physical exam such as vital signs, heart sounds are breath sounds are not included and may be included if found in recent clinic notes of other providers assessing same patient. Any symptoms or signs that were visualized were stated by the patient may be included in this note.    Patient ID: Pili Teixeira is a 77 y.o. female.    Chief Complaint:  Evaluation of anemia  HPI   Patient has seen me in 2016 and then was lost to follow-up.  At that time shows following for thrombocytopenia.    Review of Systems    Patient states she feels very tired sleeps a lot   Has lost weight  Had COVID multiple times T3-4 episodes that she can recall.  Since 2000 20 last large amount of weight post coronary and has not gained it back she feels nauseated.  Has had multiple GI scopes and evaluations.   +chest pain after  eating  palpitations dizziness or passing out episodes  Denies headaches blurred vision   Denies abdominal pain   Occasional diarrhea reported   No feet swelling  States sometimes urine may be discolored but that could be from vitamins according to patient   Does not recall seeing blood in urine or stool      Past Medical History:   Diagnosis Date    Allergy     Anemia 2012    with thrombocytopenia; iron deficiency    Arthritis     Cervical spinal stenosis     with neuropathy, chronic neck,back,leg pain    Colon polyp     COPD (chronic obstructive pulmonary disease)     Coronary artery disease     COVID 4/27/2022    COVID-19     Diabetes mellitus     , sugars run 190's per patient    Diastolic dysfunction 7/13/2015    Diverticulitis     Diverticulosis     Hx diverticulitis,still has chronic diarrhea, abd pain    Dyspnea on exertion     sometimes with nausea, fatigue,dizziness, had cardiac cath June 2012, normal    Fibromyalgia     Fracture 2012    right thumb    GERD (gastroesophageal reflux disease)     Feb 2012, Hx H Pylori    Glaucoma     had LAser surgey, on no eye drops    HEARING LOSS     Hemangioma     fatty liver    History of earache     frequent    History of TIA (transient ischemic attack) 5/28/2013    Hyperlipidemia     Hypertension     Hypertension associated with diabetes 3/14/2017    Laryngeal polyp     Myocardial infarction 2006 last    also MI in distant past    REJI (obstructive sleep apnea)     does not use CPAP    Otitis media     Pneumonia due to COVID-19 virus 2/20/2021    Renal stone     Sjogren's syndrome     SOB (shortness of breath) 6/8/2012    2012 [facility] 1. Normal coronary arteries. 2. Normal single renal arteries bilaterally. 3. Diastolic dysfunction.     Stroke     TIA, now on Plavix    TIA (transient ischemic attack)     TMJ disorder     Type II or unspecified type diabetes mellitus without mention of complication, uncontrolled 5/8/2014    UTI (lower urinary tract infection)     frequent     Venous insufficiency     with Hx blood clot in leg     Past Surgical History:   Procedure Laterality Date    ABDOMINAL SURGERY  2010 x2    expoloratory lap for pelvic cyst,adhesions; partial colonectomy     adhesiolysis   10/11/2012    CARDIAC CATHETERIZATION      no current blockages.    CHOLECYSTECTOMY      COLON SURGERY      r/t diverticuli    COLONOSCOPY  08/2014    repeat in 5 years    COLONOSCOPY N/A 1/7/2020    Procedure: COLONOSCOPY;  Surgeon: Kb Nguyen MD;  Location: [facility] ENDO;  Service: Endoscopy;  Laterality: N/A;    ENDOSCOPIC ULTRASOUND OF UPPER GASTROINTESTINAL TRACT N/A 1/13/2021    Procedure: ULTRASOUND, UPPER GI TRACT, ENDOSCOPIC;  Surgeon: Sonido Castellano III, MD;  Location: [facility] ENDO;  Service: Endoscopy;  Laterality: N/A;    EPIDURAL BLOCK INJECTION  5/15/12    back,neck for pain    ESOPHAGOGASTRODUODENOSCOPY N/A 1/7/2020    Procedure: EGD (ESOPHAGOGASTRODUODENOSCOPY);  Surgeon: Kb Nguyen MD;  Location: [facility] ENDO;  Service: Endoscopy;  Laterality: N/A;    ESOPHAGOGASTRODUODENOSCOPY N/A 1/13/2021    Procedure: EGD (ESOPHAGOGASTRODUODENOSCOPY);  Surgeon: Sonido Castellano III, MD;  Location: [facility] ENDO;  Service: Endoscopy;  Laterality: N/A;    EXPLORATORY LAPAROTOMY W/ BOWEL RESECTION  10/11/2012    EYE SURGERY      Laser for glaucoma    EYE SURGERY  May 2012    cataracts    HYSTERECTOMY      LARYNX SURGERY      polypectomy    OOPHORECTOMY      TONSILLECTOMY      with adenoidectomy    UPPER GASTROINTESTINAL ENDOSCOPY  12/2015    VASCULAR SURGERY      laser to varicose vein leg     Family History   Problem Relation Age of Onset    Throat cancer Mother     Cancer Mother     Diabetes Mellitus Mother     Stroke Father     Hypertension Father     Heart disease Father     Diverticulitis Sister     Throat cancer Brother     Cancer Brother     Thyroid disease Daughter     Lupus Daughter     Pancreatic cancer Maternal Aunt 55    Pancreatic cancer Cousin 58    Breast cancer Neg Hx      Ovarian cancer Neg Hx     Colon cancer Neg Hx     Colon polyps Neg Hx     Celiac disease Neg Hx     Cirrhosis Neg Hx     Crohn's disease Neg Hx     Stomach cancer Neg Hx     Ulcerative colitis Neg Hx     Rectal cancer Neg Hx     Esophageal cancer Neg Hx     Inflammatory bowel disease Neg Hx     Rheum arthritis Neg Hx     Psoriasis Neg Hx     Osteoarthritis Neg Hx     Kidney disease Neg Hx     Hyperlipidemia Neg Hx     COPD Neg Hx     Depression Neg Hx     Chronic back pain Neg Hx     Asthma Neg Hx     Alcohol abuse Neg Hx       Social History     Socioeconomic History    Marital status:    Tobacco Use    Smoking status: Never    Smokeless tobacco: Never   Substance and Sexual Activity    Alcohol use: No    Drug use: Yes     Types: Oxycodone    Sexual activity: Not Currently     Birth control/protection: Surgical     Comment: hysterectomy     Social Determinants of Health     Financial Resource Strain: Medium Risk    Difficulty of Paying Living Expenses: Somewhat hard   Food Insecurity: Food Insecurity Present    Worried About Running Out of Food in the Last Year: Sometimes true    Ran Out of Food in the Last Year: Never true   Transportation Needs: No Transportation Needs    Lack of Transportation (Medical): No    Lack of Transportation (Non-Medical): No   Physical Activity: Inactive    Days of Exercise per Week: 0 days    Minutes of Exercise per Session: 0 min   Stress: No Stress Concern Present    Feeling of Stress : Only a little   Social Connections: Moderately Isolated    Frequency of Communication with Friends and Family: More than three times a week    Frequency of Social Gatherings with Friends and Family: More than three times a week    Attends Jain Services: More than 4 times per year    Active Member of Clubs or Organizations: No    Attends Club or Organization Meetings: Never    Marital Status:    Housing Stability: Low Risk     Unable to Pay for Housing in the Last Year: No     Number of Places Lived in the Last Year: 1    Unstable Housing in the Last Year: No     Review of patient's allergies indicates:   Allergen Reactions    Codeine Other (See Comments)     Chest pain    Clindamycin Other (See Comments)     Difficulty swallowing after taking, feels a something sits in epigastric area     Iodinated contrast media Hives and Rash       Current Outpatient Medications:     ACCU-CHEK GUIDE TEST STRIPS Strp, USE TO TEST BLOOD GLUCOSE ONE TIME DAILY AS DIRECTED., Disp: 100 each, Rfl: 3    albuterol (PROVENTIL/VENTOLIN HFA) 90 mcg/actuation inhaler, Inhale 2 puffs into the lungs every 4 (four) hours as needed for Wheezing or Shortness of Breath. Rescue, Disp: 18 g, Rfl: 0    albuterol-ipratropium (DUO-NEB) 2.5 mg-0.5 mg/3 mL nebulizer solution, Take 3 mLs by nebulization every 4 to 6 hours as needed for Wheezing. Rescue, Disp: , Rfl:     aspirin (ECOTRIN) 81 MG EC tablet, Take 81 mg by mouth once daily., Disp: , Rfl:     blood-glucose meter kit, To check BG 2 times daily, to use with insurance preferred meter. Medical necessity., Disp: 1 each, Rfl: 0    clopidogreL (PLAVIX) 75 mg tablet, Take 1 tablet (75 mg total) by mouth once daily., Disp: 90 tablet, Rfl: 3    cyanocobalamin (VITAMIN B-12) 1000 MCG tablet, Take 100 mcg by mouth once daily., Disp: , Rfl:     diclofenac sodium 1 % Gel, Apply 2 g topically once daily., Disp: , Rfl:     diltiaZEM (CARDIZEM CD) 120 MG Cp24, TAKE 1 CAPSULE BY MOUTH IN THE EVENING, Disp: 90 capsule, Rfl: 3    folic acid (FOLVITE) 1 MG tablet, Take 1 tablet (1,000 mcg total) by mouth once daily., Disp: 30 tablet, Rfl: 5    gabapentin (NEURONTIN) 100 MG capsule, TAKE 1 CAPSULE BY MOUTH THREE TIMES DAILY, Disp: 270 capsule, Rfl: 0    ipratropium (ATROVENT) 42 mcg (0.06 %) nasal spray, 2 sprays by Nasal route 3 (three) times daily., Disp: 15 mL, Rfl: 3    lancets (ACCU-CHEK SOFTCLIX LANCETS) Misc, Test TWICE DAILY;Twice a day, Disp: 100 each, Rfl: 3    lancets Misc, Use  to test blood glucose one (1) times daily as directed with insurance preferred meter and supplies, Disp: 100 each, Rfl: 3    linaGLIPtin (TRADJENTA) 5 mg Tab tablet, Take 1 tablet (5 mg total) by mouth once daily., Disp: 90 tablet, Rfl: 3    loratadine (CLARITIN) 10 mg tablet, Take 10 mg by mouth once daily., Disp: , Rfl:     meloxicam (MOBIC) 7.5 MG tablet, Take 1 tablet (7.5 mg total) by mouth once daily., Disp: 90 tablet, Rfl: 1    metFORMIN (GLUCOPHAGE) 500 MG tablet, Take 1 tablet (500 mg total) by mouth 2 (two) times daily with meals., Disp: 180 tablet, Rfl: 3    multivit with min-folic acid 200 mcg Chew, Take 1 tablet by mouth once daily. , Disp: , Rfl:     NARCAN 4 mg/actuation Spry, as directed, Disp: , Rfl:     olopatadine (PATANOL) 0.1 % ophthalmic solution, Place 1 drop into both eyes daily as needed., Disp: , Rfl:     ondansetron (ZOFRAN-ODT) 4 MG TbDL, Take 1 tablet (4 mg total) by mouth every 8 (eight) hours as needed (nausea)., Disp: 30 tablet, Rfl: 1    oxyCODONE-acetaminophen (PERCOCET) 7.5-325 mg per tablet, Take 1 tablet by mouth 4 (four) times daily as needed., Disp: , Rfl:     pantoprazole (PROTONIX) 40 MG tablet, Take 1 tablet (40 mg total) by mouth once daily., Disp: 90 tablet, Rfl: 3    rosuvastatin (CRESTOR) 10 MG tablet, Take 1 tablet (10 mg total) by mouth every evening., Disp: 90 tablet, Rfl: 3    traZODone (DESYREL) 50 MG tablet, TAKE 1 TABLET BY MOUTH IN THE EVENING - (MAY TAKE AN ADDITIONAL TABLET AS NEEDED), Disp: 90 tablet, Rfl: 3    Physical Exam    Wt Readings from Last 3 Encounters:   09/19/22 61.3 kg (135 lb 2.3 oz)   08/26/22 61.8 kg (136 lb 3.9 oz)   08/01/22 61.7 kg (136 lb 0.4 oz)     Temp Readings from Last 3 Encounters:   08/26/22 97.1 °F (36.2 °C) (Temporal)   08/01/22 98.1 °F (36.7 °C)   04/30/22 98 °F (36.7 °C) (Oral)     BP Readings from Last 3 Encounters:   09/19/22 110/62   08/26/22 (!) 118/57   08/01/22 120/70     Pulse Readings from Last 3 Encounters:   09/19/22 77    08/26/22 86   08/01/22 81   .VITAL SIGNS:  as above   GENERAL: appears well-built, well-nourished.  No anxiety, no agitation, and in no distress.  Patient is awake, alert, oriented and cooperative.  HEENT:  Showed no congestion. Trachea is central no obvious icterus or pallor noted no hoarseness. no obvious JVD   NECK:  Supple.  No JVD. No obvious cervical submental or supraclavicular adenopathy.  RS:the visualized portion of  Chest expands well. chest appears symmetric, no audible wheezes.  No dyspnea recognized  ABDOMEN:  abdomen appears undistended.  EXTREMITIES:  Without edema.  NEUROLOGICAL:  The patient is appropriate, higher functions are normal.  No  obvious neurological deficits.  normal judgement normal thought content  No confusion, no speech impediment. Cranial nerves are intact and show no deficit. No gross motor deficits noted   SKIN MUSCULOSKELETAL: no joint or skeletal deformity, no clubbing of nails.  No visible rash ecchymosis or petechiae  Lab Results   Component Value Date    WBC 9.25 11/23/2022    HGB 9.3 (L) 11/23/2022    HCT 29.9 (L) 11/23/2022    MCV 89 11/23/2022     11/23/2022       BMP  Lab Results   Component Value Date     11/23/2022    K 4.0 11/23/2022     11/23/2022    CO2 27 11/23/2022    BUN 21 11/23/2022    CREATININE 0.9 11/23/2022    CALCIUM 10.1 11/23/2022    ANIONGAP 11 11/23/2022    ESTGFRAFRICA 56.8 (A) 04/30/2022    EGFRNONAA 49.2 (A) 04/30/2022     Lab Results   Component Value Date    IRON 27 (L) 11/23/2022    TIBC 333 11/23/2022    FERRITIN 22 11/23/2022         Patient Active Problem List   Diagnosis    GERD (gastroesophageal reflux disease)    REJI (obstructive sleep apnea)    DJD (degenerative joint disease), lumbosacral    Pelvic cyst - left    Night sweats    Thrombocytopenia    Diverticulitis of colon (without mention of hemorrhage)(562.11)    Peritoneal adhesions (postoperative) (postinfection)    History of TIA (transient ischemic attack)     COPD (chronic obstructive pulmonary disease)    Insomnia    Change in bowel habits    IC (interstitial cystitis)    Diastolic dysfunction    NICOLAS (iron deficiency anemia)    Sjogren's syndrome    Fibromyalgia    Rheumatoid arthritis involving both hands with positive rheumatoid factor    Elevated lipase    Hernia of anterior abdominal wall    Ventral incisional hernia    Right inguinal hernia    Hypertension associated with diabetes    Noncompliance    Hyperlipidemia associated with type 2 diabetes mellitus    Chronic combined systolic and diastolic congestive heart failure    Type 2 diabetes mellitus, without long-term current use of insulin    Chronic pain, neck, back, leg on home narcotics    Pulmonary nodule    Hypokalemia    Mild neurocognitive 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    Adjustment disorder with mixed anxiety and depressed mood    Gait abnormality    Abdominal aortic atherosclerosis        Assessment and Plan     Anemia that has been fluctuant for the past several years    NICOLAS this visit  Thrombocytopenia on and off for several years   History of interstitial cystitis patient reports occasional hematuria but is unclear, ua is neg 10/22  History of hypertension associated with diabetes history of Sjogren syndrome and obstructive sleep apnea, dyslipidemia her symptoms may be associated to vasculitis or connective tissue disorder has Rheumatology  appt   ferritin has dropped significantly from being high and elevated for the past 6 years    She is anemic will replace 2 units injectafer  stool test, pending urinalysis for hematuria -negative  SPEP, free kappa light chains elevated  B12, 349 no evidence of hemolysis retic count is normal iron studies are low will recheck iron levels CBC CMP iron studies see patient back virtually in about 2 months  Start SL B12 asap   Ptto tell her GI about the post prandial pain  See me 1 month after iron infusion  JOAO positive  May need bone marrow biopsy   if  anemia doenst improve May need scans for continued weight loss  Patient is off of hypertensive medications her A1c has only been 5.0 because of significant weight loss

## 2022-11-30 ENCOUNTER — OFFICE VISIT (OUTPATIENT)
Dept: RHEUMATOLOGY | Facility: CLINIC | Age: 77
End: 2022-11-30
Payer: MEDICARE

## 2022-11-30 ENCOUNTER — EXTERNAL CHRONIC CARE MANAGEMENT (OUTPATIENT)
Dept: PRIMARY CARE CLINIC | Facility: CLINIC | Age: 77
End: 2022-11-30
Payer: MEDICARE

## 2022-11-30 ENCOUNTER — HOSPITAL ENCOUNTER (OUTPATIENT)
Dept: RADIOLOGY | Facility: HOSPITAL | Age: 77
Discharge: HOME | End: 2022-11-30
Attending: STUDENT IN AN ORGANIZED HEALTH CARE EDUCATION/TRAINING PROGRAM
Payer: MEDICARE

## 2022-11-30 VITALS
BODY MASS INDEX: 22.97 KG/M2 | HEIGHT: 63 IN | WEIGHT: 129.63 LBS | HEART RATE: 81 BPM | DIASTOLIC BLOOD PRESSURE: 66 MMHG | SYSTOLIC BLOOD PRESSURE: 120 MMHG

## 2022-11-30 DIAGNOSIS — R53.83 FATIGUE, UNSPECIFIED TYPE: ICD-10-CM

## 2022-11-30 DIAGNOSIS — M19.90 OSTEOARTHRITIS, UNSPECIFIED OSTEOARTHRITIS TYPE, UNSPECIFIED SITE: Primary | ICD-10-CM

## 2022-11-30 DIAGNOSIS — R76.8 RHEUMATOID FACTOR POSITIVE: ICD-10-CM

## 2022-11-30 DIAGNOSIS — M79.7 FIBROMYALGIA: ICD-10-CM

## 2022-11-30 DIAGNOSIS — M19.90 OSTEOARTHRITIS, UNSPECIFIED OSTEOARTHRITIS TYPE, UNSPECIFIED SITE: ICD-10-CM

## 2022-11-30 PROCEDURE — 99999 PR PBB SHADOW E&M-EST. PATIENT-LVL V: ICD-10-PCS | Mod: PBBFAC,,, | Performed by: STUDENT IN AN ORGANIZED HEALTH CARE EDUCATION/TRAINING PROGRAM

## 2022-11-30 PROCEDURE — 99489 CPLX CHRNC CARE EA ADDL 30: CPT | Mod: PBBFAC,27,PO | Performed by: FAMILY MEDICINE

## 2022-11-30 PROCEDURE — 99999 PR PBB SHADOW E&M-EST. PATIENT-LVL V: CPT | Mod: PBBFAC,,, | Performed by: STUDENT IN AN ORGANIZED HEALTH CARE EDUCATION/TRAINING PROGRAM

## 2022-11-30 PROCEDURE — 99489 CPLX CHRNC CARE EA ADDL 30: CPT | Mod: S$PBB,,, | Performed by: FAMILY MEDICINE

## 2022-11-30 PROCEDURE — 99204 PR OFFICE/OUTPT VISIT, NEW, LEVL IV, 45-59 MIN: ICD-10-PCS | Mod: S$PBB,,, | Performed by: STUDENT IN AN ORGANIZED HEALTH CARE EDUCATION/TRAINING PROGRAM

## 2022-11-30 PROCEDURE — 99489 PR COMPLX CHRON CARE MGMT, EA ADDTL 30 MIN, PER MONTH: ICD-10-PCS | Mod: S$PBB,,, | Performed by: FAMILY MEDICINE

## 2022-11-30 PROCEDURE — 99487 PR COMPLX CHRON CARE MGMT, 1ST HR, PER MONTH: ICD-10-PCS | Mod: S$PBB,,, | Performed by: FAMILY MEDICINE

## 2022-11-30 PROCEDURE — 99215 OFFICE O/P EST HI 40 MIN: CPT | Mod: PBBFAC | Performed by: STUDENT IN AN ORGANIZED HEALTH CARE EDUCATION/TRAINING PROGRAM

## 2022-11-30 PROCEDURE — 77077 JOINT SURVEY SINGLE VIEW: CPT | Mod: TC

## 2022-11-30 PROCEDURE — 99487 CPLX CHRNC CARE 1ST 60 MIN: CPT | Mod: PBBFAC,25,PO | Performed by: FAMILY MEDICINE

## 2022-11-30 PROCEDURE — 77077 XR ARTHRITIS SURVEY: ICD-10-PCS | Mod: 26,,, | Performed by: RADIOLOGY

## 2022-11-30 PROCEDURE — 99204 OFFICE O/P NEW MOD 45 MIN: CPT | Mod: S$PBB,,, | Performed by: STUDENT IN AN ORGANIZED HEALTH CARE EDUCATION/TRAINING PROGRAM

## 2022-11-30 PROCEDURE — 99487 CPLX CHRNC CARE 1ST 60 MIN: CPT | Mod: S$PBB,,, | Performed by: FAMILY MEDICINE

## 2022-11-30 PROCEDURE — 77077 JOINT SURVEY SINGLE VIEW: CPT | Mod: 26,,, | Performed by: RADIOLOGY

## 2022-11-30 RX ORDER — PREGABALIN 75 MG/1
75 CAPSULE ORAL 2 TIMES DAILY
Qty: 60 CAPSULE | Refills: 3 | Status: SHIPPED | OUTPATIENT
Start: 2022-11-30 | End: 2023-02-06

## 2022-11-30 RX ORDER — VALSARTAN AND HYDROCHLOROTHIAZIDE 160; 12.5 MG/1; MG/1
TABLET, FILM COATED ORAL
COMMUNITY
Start: 2022-11-07 | End: 2023-03-27

## 2022-11-30 NOTE — PROGRESS NOTES
RHEUMATOLOGY CLINIC INITIAL VISIT    Reason for consult:- possible rheumatoid arthritis    Chief complaints, HPI, ROS, EXAM, Assessment & Plans:-    Pili Teixeira is a 77 y.o. pleasant female who presents evaluated for possible rheumatoid arthritis.  States she has diffuse pain including in her arms, hands, wrists, fingers, feet, knees, neck.  She has followed with Rheumatology previously in [place] and has had possible diagnosis of Sjogren's and fibromyalgia.  For her pain she has been taking gabapentin, meloxicam and occasional Percocet.  States she has trouble sleeping and complains of significant fatigue.The patient denies fevers, patchy alopecia, oral/nasal ulcers, sicca symptoms, rashes, photosensitivity, pleuritic chest pain, shortness of breath, cough, abdominal pain, diarrhea, bloody stool, weakness, numbness/tingling, and Raynaud's.No evidence of synovitis, dactylitis or enthesitis.  She has bony enlargement of joints of hand and CMC squaring.  She has numerous fibromyalgia tender points.       Reviewed all available old and outside pertinent medical records.    All lab results personally reviewed and interpreted by me.    1. Osteoarthritis, unspecified osteoarthritis type, unspecified site    2. Fibromyalgia    3. Rheumatoid factor positive    4. Fatigue, unspecified type        Problem List Items Addressed This Visit          Orthopedic    Fibromyalgia    Relevant Medications    pregabalin (LYRICA) 75 MG capsule    Other Relevant Orders    Sedimentation rate    C-Reactive Protein    XR Arthritis Survey (Completed)    Vitamin D     Other Visit Diagnoses       Osteoarthritis, unspecified osteoarthritis type, unspecified site    -  Primary    Relevant Medications    pregabalin (LYRICA) 75 MG capsule    Other Relevant Orders    Sedimentation rate    C-Reactive Protein    XR Arthritis Survey (Completed)    Vitamin D    Rheumatoid factor positive        Relevant Medications    pregabalin (LYRICA) 75 MG  capsule    Other Relevant Orders    Sedimentation rate    C-Reactive Protein    XR Arthritis Survey (Completed)    Vitamin D    Fatigue, unspecified type        Relevant Medications    pregabalin (LYRICA) 75 MG capsule    Other Relevant Orders    Vitamin D            Patient presenting to be evaluated for possible rheumatoid arthritis  Previously diagnosed with Sjogren's and fibromyalgia with rheumatology in [place]  No evidence of inflammatory arthritis at this time  Her pain seems consistent with fibromyalgia  Discussed importance of low intensity aerobic exercise (as in Yoga, Julian-Chi, walking or aquatherapy), restorative sleep, healthy diet and stress management  Provided handout with strategies to improve these lifestyle factors  Will discontinue gabapentin and start on Lyrica    # No follow-ups on file.    Chronic comorbid conditions affecting medical decision making today:    Past Medical History:   Diagnosis Date    Allergy     Anemia 2012    with thrombocytopenia; iron deficiency    Arthritis     Cervical spinal stenosis     with neuropathy, chronic neck,back,leg pain    Colon polyp     COPD (chronic obstructive pulmonary disease)     Coronary artery disease     COVID 4/27/2022    COVID-19     Diabetes mellitus     , sugars run 190's per patient    Diastolic dysfunction 7/13/2015    Diverticulitis     Diverticulosis     Hx diverticulitis,still has chronic diarrhea, abd pain    Dyspnea on exertion     sometimes with nausea, fatigue,dizziness, had cardiac cath June 2012, normal    Fibromyalgia     Fracture 2012    right thumb    GERD (gastroesophageal reflux disease)     Feb 2012, Hx H Pylori    Glaucoma     had LAser surgey, on no eye drops    HEARING LOSS     Hemangioma     fatty liver    History of earache     frequent    History of TIA (transient ischemic attack) 5/28/2013    Hyperlipidemia     Hypertension     Hypertension associated with diabetes 3/14/2017    Laryngeal polyp     Myocardial infarction  2006 last    also MI in distant past    REJI (obstructive sleep apnea)     does not use CPAP    Otitis media     Pneumonia due to COVID-19 virus 2/20/2021    Renal stone     Sjogren's syndrome     SOB (shortness of breath) 6/8/2012    [address] [place] [address] 1. Normal coronary arteries. 2. Normal single renal arteries bilaterally. 3. Diastolic dysfunction.     Stroke     TIA, now on Plavix    TIA (transient ischemic attack)     TMJ disorder     Type II or unspecified type diabetes mellitus without mention of complication, uncontrolled 5/8/2014    UTI (lower urinary tract infection)     frequent    Venous insufficiency     with Hx blood clot in leg       Past Surgical History:   Procedure Laterality Date    ABDOMINAL SURGERY  2010 x2    expoloratory lap for pelvic cyst,adhesions; partial colonectomy     adhesiolysis   10/11/2012    CARDIAC CATHETERIZATION      no current blockages.    CHOLECYSTECTOMY      COLON SURGERY      r/t diverticuli    COLONOSCOPY  08/2014    repeat in 5 years    COLONOSCOPY N/A 1/7/2020    Procedure: COLONOSCOPY;  Surgeon: Kb Nguyen MD;  Location: [facility];  Service: Endoscopy;  Laterality: N/A;    ENDOSCOPIC ULTRASOUND OF UPPER GASTROINTESTINAL TRACT N/A 1/13/2021    Procedure: ULTRASOUND, UPPER GI TRACT, ENDOSCOPIC;  Surgeon: Sonido Castellano III, MD;  Location: [facility];  Service: Endoscopy;  Laterality: N/A;    EPIDURAL BLOCK INJECTION  5/15/12    back,neck for pain    ESOPHAGOGASTRODUODENOSCOPY N/A 1/7/2020    Procedure: EGD (ESOPHAGOGASTRODUODENOSCOPY);  Surgeon: Kb Nguyen MD;  Location: [facility];  Service: Endoscopy;  Laterality: N/A;    ESOPHAGOGASTRODUODENOSCOPY N/A 1/13/2021    Procedure: EGD (ESOPHAGOGASTRODUODENOSCOPY);  Surgeon: Sonido Castellano III, MD;  Location: [facility];  Service: Endoscopy;  Laterality: N/A;    EXPLORATORY LAPAROTOMY W/ BOWEL RESECTION  10/11/2012    EYE SURGERY      Laser for glaucoma    EYE SURGERY  May 2012    cataracts    HYSTERECTOMY       LARYNX SURGERY      polypectomy    OOPHORECTOMY      TONSILLECTOMY      with adenoidectomy    UPPER GASTROINTESTINAL ENDOSCOPY  12/2015    VASCULAR SURGERY      laser to varicose vein leg        Social History     Tobacco Use    Smoking status: Never    Smokeless tobacco: Never   Substance Use Topics    Alcohol use: No    Drug use: Yes     Types: Oxycodone       Family History   Problem Relation Age of Onset    Throat cancer Mother     Cancer Mother     Diabetes Mellitus Mother     Stroke Father     Hypertension Father     Heart disease Father     Diverticulitis Sister     Throat cancer Brother     Cancer Brother     Thyroid disease Daughter     Lupus Daughter     Pancreatic cancer Maternal Aunt 55    Pancreatic cancer Cousin 58    Breast cancer Neg Hx     Ovarian cancer Neg Hx     Colon cancer Neg Hx     Colon polyps Neg Hx     Celiac disease Neg Hx     Cirrhosis Neg Hx     Crohn's disease Neg Hx     Stomach cancer Neg Hx     Ulcerative colitis Neg Hx     Rectal cancer Neg Hx     Esophageal cancer Neg Hx     Inflammatory bowel disease Neg Hx     Rheum arthritis Neg Hx     Psoriasis Neg Hx     Osteoarthritis Neg Hx     Kidney disease Neg Hx     Hyperlipidemia Neg Hx     COPD Neg Hx     Depression Neg Hx     Chronic back pain Neg Hx     Asthma Neg Hx     Alcohol abuse Neg Hx        Review of patient's allergies indicates:   Allergen Reactions    Codeine Other (See Comments)     Chest pain    Clindamycin Other (See Comments)     Difficulty swallowing after taking, feels a something sits in epigastric area     Iodinated contrast media Hives and Rash       Medication List with Changes/Refills   New Medications    PREGABALIN (LYRICA) 75 MG CAPSULE    Take 1 capsule (75 mg total) by mouth 2 (two) times daily.   Current Medications    ACCU-CHEK GUIDE TEST STRIPS STRP    USE TO TEST BLOOD GLUCOSE ONE TIME DAILY AS DIRECTED.    ALBUTEROL (PROVENTIL/VENTOLIN HFA) 90 MCG/ACTUATION INHALER    Inhale 2 puffs into the lungs every  4 (four) hours as needed for Wheezing or Shortness of Breath. Rescue    ALBUTEROL-IPRATROPIUM (DUO-NEB) 2.5 MG-0.5 MG/3 ML NEBULIZER SOLUTION    Take 3 mLs by nebulization every 4 to 6 hours as needed for Wheezing. Rescue    ASPIRIN (ECOTRIN) 81 MG EC TABLET    Take 81 mg by mouth once daily.    BLOOD-GLUCOSE METER KIT    To check BG 2 times daily, to use with insurance preferred meter. Medical necessity.    CARBOXYMETHYLCELLULOSE SODIUM (LUBRICANT EYE DROPS OPHT)    Apply to eye.    CLOPIDOGREL (PLAVIX) 75 MG TABLET    Take 1 tablet (75 mg total) by mouth once daily.    CYANOCOBALAMIN (VITAMIN B-12) 1000 MCG TABLET    Take 100 mcg by mouth once daily.    DICLOFENAC SODIUM 1 % GEL    Apply 2 g topically once daily.    DILTIAZEM (CARDIZEM CD) 120 MG CP24    TAKE 1 CAPSULE BY MOUTH IN THE EVENING    DOCUSATE SODIUM (STOOL SOFTENER ORAL)    Take by mouth.    FOLIC ACID (FOLVITE) 1 MG TABLET    Take 1 tablet (1,000 mcg total) by mouth once daily.    IPRATROPIUM (ATROVENT) 42 MCG (0.06 %) NASAL SPRAY    2 sprays by Nasal route 3 (three) times daily.    LACTOBAC NO.41/BIFIDOBACT NO.7 (PROBIOTIC-10 ORAL)    Take by mouth.    LANCETS (ACCU-CHEK SOFTCLIX LANCETS) MISC    Test TWICE DAILY;Twice a day    LANCETS MISC    Use to test blood glucose one (1) times daily as directed with insurance preferred meter and supplies    LINAGLIPTIN (TRADJENTA) 5 MG TAB TABLET    Take 1 tablet (5 mg total) by mouth once daily.    LORATADINE (CLARITIN) 10 MG TABLET    Take 10 mg by mouth once daily.    MELOXICAM (MOBIC) 7.5 MG TABLET    Take 1 tablet (7.5 mg total) by mouth once daily.    METFORMIN (GLUCOPHAGE) 500 MG TABLET    Take 1 tablet (500 mg total) by mouth 2 (two) times daily with meals.    MULTIVIT WITH MIN-FOLIC ACID 200 MCG CHEW    Take 1 tablet by mouth once daily.     NARCAN 4 MG/ACTUATION SPRY    as directed    OLOPATADINE (PATANOL) 0.1 % OPHTHALMIC SOLUTION    Place 1 drop into both eyes daily as needed.    ONDANSETRON  (ZOFRAN-ODT) 4 MG TBDL    Take 1 tablet (4 mg total) by mouth every 8 (eight) hours as needed (nausea).    OXYCODONE-ACETAMINOPHEN (PERCOCET) 7.5-325 MG PER TABLET    Take 1 tablet by mouth 4 (four) times daily as needed.    PANTOPRAZOLE (PROTONIX) 40 MG TABLET    Take 1 tablet (40 mg total) by mouth once daily.    ROSUVASTATIN (CRESTOR) 10 MG TABLET    Take 1 tablet (10 mg total) by mouth every evening.    SODIUM CHLORIDE (SALINE NASAL NASL)    by Nasal route.    TRAZODONE (DESYREL) 50 MG TABLET    TAKE 1 TABLET BY MOUTH IN THE EVENING - (MAY TAKE AN ADDITIONAL TABLET AS NEEDED)    VALSARTAN-HYDROCHLOROTHIAZIDE (DIOVAN-HCT) 160-12.5 MG PER TABLET       Discontinued Medications    GABAPENTIN (NEURONTIN) 100 MG CAPSULE    TAKE 1 CAPSULE BY MOUTH THREE TIMES DAILY         Disclaimer: This note was prepared using voice recognition system and is likely to have sound alike errors and is not proofread.  Please message me with any questions.    45 minutes of total time spent on the encounter, which includes face to face time and non-face to face time preparing to see the patient (eg, review of tests), Obtaining and/or reviewing separately obtained history, Documenting clinical information in the electronic or other health record, Independently interpreting results (not separately reported) and communicating results to the patient/family/caregiver, or Care coordination (not separately reported).     Thank you for allowing me to participate in the care of Pili Teixeira.    Ruy Jacobson MD

## 2022-12-02 ENCOUNTER — PATIENT MESSAGE (OUTPATIENT)
Dept: HEMATOLOGY/ONCOLOGY | Facility: CLINIC | Age: 77
End: 2022-12-02
Payer: MEDICARE

## 2022-12-06 ENCOUNTER — INFUSION (OUTPATIENT)
Dept: INFUSION THERAPY | Facility: HOSPITAL | Age: 77
End: 2022-12-06
Attending: INTERNAL MEDICINE
Payer: MEDICARE

## 2022-12-06 ENCOUNTER — TELEPHONE (OUTPATIENT)
Dept: HEMATOLOGY/ONCOLOGY | Facility: CLINIC | Age: 77
End: 2022-12-06
Payer: MEDICARE

## 2022-12-06 ENCOUNTER — PATIENT MESSAGE (OUTPATIENT)
Dept: HEMATOLOGY/ONCOLOGY | Facility: CLINIC | Age: 77
End: 2022-12-06
Payer: MEDICARE

## 2022-12-06 ENCOUNTER — TELEPHONE (OUTPATIENT)
Dept: GASTROENTEROLOGY | Facility: CLINIC | Age: 77
End: 2022-12-06
Payer: MEDICARE

## 2022-12-06 VITALS
HEART RATE: 85 BPM | SYSTOLIC BLOOD PRESSURE: 109 MMHG | DIASTOLIC BLOOD PRESSURE: 70 MMHG | HEIGHT: 63 IN | BODY MASS INDEX: 23.33 KG/M2 | TEMPERATURE: 97 F | OXYGEN SATURATION: 96 % | RESPIRATION RATE: 17 BRPM | WEIGHT: 131.69 LBS

## 2022-12-06 DIAGNOSIS — D50.0 IRON DEFICIENCY ANEMIA DUE TO CHRONIC BLOOD LOSS: Primary | ICD-10-CM

## 2022-12-06 PROCEDURE — 63600175 PHARM REV CODE 636 W HCPCS: Mod: JG | Performed by: INTERNAL MEDICINE

## 2022-12-06 PROCEDURE — 25000003 PHARM REV CODE 250: Performed by: INTERNAL MEDICINE

## 2022-12-06 PROCEDURE — 96365 THER/PROPH/DIAG IV INF INIT: CPT

## 2022-12-06 RX ORDER — EPINEPHRINE 0.3 MG/.3ML
0.3 INJECTION SUBCUTANEOUS ONCE AS NEEDED
Status: CANCELLED | OUTPATIENT
Start: 2022-12-13

## 2022-12-06 RX ORDER — SODIUM CHLORIDE 9 MG/ML
INJECTION, SOLUTION INTRAVENOUS CONTINUOUS
Status: CANCELLED | OUTPATIENT
Start: 2022-12-13

## 2022-12-06 RX ORDER — HEPARIN 100 UNIT/ML
5 SYRINGE INTRAVENOUS
Status: CANCELLED | OUTPATIENT
Start: 2022-12-13

## 2022-12-06 RX ORDER — SODIUM CHLORIDE 0.9 % (FLUSH) 0.9 %
10 SYRINGE (ML) INJECTION
Status: DISCONTINUED | OUTPATIENT
Start: 2022-12-06 | End: 2022-12-06 | Stop reason: HOSPADM

## 2022-12-06 RX ORDER — DIPHENHYDRAMINE HYDROCHLORIDE 50 MG/ML
50 INJECTION INTRAMUSCULAR; INTRAVENOUS ONCE AS NEEDED
Status: CANCELLED | OUTPATIENT
Start: 2022-12-13

## 2022-12-06 RX ORDER — METHYLPREDNISOLONE SOD SUCC 125 MG
125 VIAL (EA) INJECTION ONCE AS NEEDED
Status: CANCELLED | OUTPATIENT
Start: 2022-12-13

## 2022-12-06 RX ORDER — SODIUM CHLORIDE 0.9 % (FLUSH) 0.9 %
10 SYRINGE (ML) INJECTION
Status: CANCELLED | OUTPATIENT
Start: 2022-12-13

## 2022-12-06 RX ADMIN — FERRIC CARBOXYMALTOSE INJECTION 750 MG: 50 INJECTION, SOLUTION INTRAVENOUS at 01:12

## 2022-12-06 NOTE — PLAN OF CARE
Problem: Anemia  Goal: Anemia Symptom Improvement  Outcome: Met     Problem: Fatigue  Goal: Improved Activity Tolerance  Outcome: Met     Problem: Breathing Pattern Ineffective  Goal: Effective Breathing Pattern  Outcome: Met

## 2022-12-06 NOTE — TELEPHONE ENCOUNTER
----- Message from Faby Bryan sent at 12/6/2022 10:03 AM CST -----  Contact: pt  Type:  Sooner Appointment Request    Caller is requesting a sooner appointment.  Caller declined first available appointment listed below.  Caller will not accept being placed on the waitlist and is requesting a message be sent to doctor.    Name of Caller:  Patient   When is the first available appointment?  1/17  Symptoms:  Possible Gi Bleed   Best Call Back Number:  [phone] (home)     Additional Information:  Please call the patient back she is asking to be seen soon as possible .   She is being referred by her Hematologist.      
Returned call to pt. Scheduled pt for appointment per pt request. Pt verbalized understanding   
Alert-The patient is alert, awake and responds to voice. The patient is oriented to time, place, and person. The triage nurse is able to obtain subjective information.

## 2022-12-19 ENCOUNTER — INFUSION (OUTPATIENT)
Dept: INFUSION THERAPY | Facility: HOSPITAL | Age: 77
End: 2022-12-19
Attending: INTERNAL MEDICINE
Payer: MEDICARE

## 2022-12-19 VITALS
DIASTOLIC BLOOD PRESSURE: 61 MMHG | TEMPERATURE: 97 F | WEIGHT: 132.81 LBS | SYSTOLIC BLOOD PRESSURE: 114 MMHG | HEART RATE: 81 BPM | BODY MASS INDEX: 23.53 KG/M2 | RESPIRATION RATE: 18 BRPM | OXYGEN SATURATION: 97 % | HEIGHT: 63 IN

## 2022-12-19 DIAGNOSIS — D50.0 IRON DEFICIENCY ANEMIA DUE TO CHRONIC BLOOD LOSS: Primary | ICD-10-CM

## 2022-12-19 PROCEDURE — 96365 THER/PROPH/DIAG IV INF INIT: CPT

## 2022-12-19 PROCEDURE — 25000003 PHARM REV CODE 250: Performed by: INTERNAL MEDICINE

## 2022-12-19 PROCEDURE — 63600175 PHARM REV CODE 636 W HCPCS: Mod: JG | Performed by: INTERNAL MEDICINE

## 2022-12-19 RX ORDER — SODIUM CHLORIDE 9 MG/ML
INJECTION, SOLUTION INTRAVENOUS CONTINUOUS
Status: DISCONTINUED | OUTPATIENT
Start: 2022-12-19 | End: 2022-12-19 | Stop reason: HOSPADM

## 2022-12-19 RX ORDER — SODIUM CHLORIDE 9 MG/ML
INJECTION, SOLUTION INTRAVENOUS CONTINUOUS
Status: CANCELLED | OUTPATIENT
Start: 2022-12-26

## 2022-12-19 RX ORDER — DIPHENHYDRAMINE HYDROCHLORIDE 50 MG/ML
50 INJECTION INTRAMUSCULAR; INTRAVENOUS ONCE AS NEEDED
Status: CANCELLED | OUTPATIENT
Start: 2022-12-26

## 2022-12-19 RX ORDER — SODIUM CHLORIDE 0.9 % (FLUSH) 0.9 %
10 SYRINGE (ML) INJECTION
Status: CANCELLED | OUTPATIENT
Start: 2022-12-26

## 2022-12-19 RX ORDER — METHYLPREDNISOLONE SOD SUCC 125 MG
125 VIAL (EA) INJECTION ONCE AS NEEDED
Status: CANCELLED | OUTPATIENT
Start: 2022-12-26

## 2022-12-19 RX ORDER — HEPARIN 100 UNIT/ML
5 SYRINGE INTRAVENOUS
Status: CANCELLED | OUTPATIENT
Start: 2022-12-26

## 2022-12-19 RX ORDER — SODIUM CHLORIDE 0.9 % (FLUSH) 0.9 %
10 SYRINGE (ML) INJECTION
Status: DISCONTINUED | OUTPATIENT
Start: 2022-12-19 | End: 2022-12-19 | Stop reason: HOSPADM

## 2022-12-19 RX ORDER — EPINEPHRINE 0.3 MG/.3ML
0.3 INJECTION SUBCUTANEOUS ONCE AS NEEDED
Status: CANCELLED | OUTPATIENT
Start: 2022-12-26

## 2022-12-19 RX ADMIN — SODIUM CHLORIDE: 0.9 INJECTION, SOLUTION INTRAVENOUS at 10:12

## 2022-12-19 RX ADMIN — FERRIC CARBOXYMALTOSE INJECTION 750 MG: 50 INJECTION, SOLUTION INTRAVENOUS at 10:12

## 2022-12-19 NOTE — PLAN OF CARE
Problem: Anemia  Goal: Anemia Symptom Improvement  Outcome: Ongoing, Progressing  Intervention: Monitor and Manage Anemia  Flowsheets (Taken 12/19/2022 1132)  Oral Nutrition Promotion:   physical activity promoted   social interaction promoted   rest periods promoted   medicated   nutritional therapy counseling provided  Safety Promotion/Fall Prevention:   instructed to call staff for mobility   in recliner, wheels locked   Fall Risk reviewed with patient/family  Fatigue Management:   fatigue-related activity identified   frequent rest breaks encouraged   paced activity encouraged

## 2022-12-28 ENCOUNTER — TELEPHONE (OUTPATIENT)
Dept: GASTROENTEROLOGY | Facility: CLINIC | Age: 77
End: 2022-12-28

## 2022-12-28 ENCOUNTER — OFFICE VISIT (OUTPATIENT)
Dept: GASTROENTEROLOGY | Facility: CLINIC | Age: 77
End: 2022-12-28
Payer: MEDICARE

## 2022-12-28 VITALS — WEIGHT: 133.38 LBS | BODY MASS INDEX: 23.63 KG/M2 | HEIGHT: 63 IN

## 2022-12-28 DIAGNOSIS — R11.2 NAUSEA AND VOMITING, UNSPECIFIED VOMITING TYPE: ICD-10-CM

## 2022-12-28 DIAGNOSIS — Z86.010 HISTORY OF COLON POLYPS: ICD-10-CM

## 2022-12-28 DIAGNOSIS — D50.9 IRON DEFICIENCY ANEMIA, UNSPECIFIED IRON DEFICIENCY ANEMIA TYPE: Primary | ICD-10-CM

## 2022-12-28 DIAGNOSIS — Z79.01 CURRENT USE OF ANTICOAGULANT THERAPY: ICD-10-CM

## 2022-12-28 DIAGNOSIS — R63.4 UNINTENTIONAL WEIGHT LOSS: ICD-10-CM

## 2022-12-28 DIAGNOSIS — Z90.49 S/P CHOLECYSTECTOMY: ICD-10-CM

## 2022-12-28 DIAGNOSIS — R10.13 EPIGASTRIC PAIN: ICD-10-CM

## 2022-12-28 DIAGNOSIS — K31.A0 GASTRIC INTESTINAL METAPLASIA: ICD-10-CM

## 2022-12-28 DIAGNOSIS — K59.09 CHRONIC CONSTIPATION: ICD-10-CM

## 2022-12-28 DIAGNOSIS — K21.9 GASTROESOPHAGEAL REFLUX DISEASE WITHOUT ESOPHAGITIS: ICD-10-CM

## 2022-12-28 PROCEDURE — 99999 PR PBB SHADOW E&M-EST. PATIENT-LVL V: ICD-10-PCS | Mod: PBBFAC,,, | Performed by: NURSE PRACTITIONER

## 2022-12-28 PROCEDURE — 99214 OFFICE O/P EST MOD 30 MIN: CPT | Mod: S$PBB,,, | Performed by: NURSE PRACTITIONER

## 2022-12-28 PROCEDURE — 99215 OFFICE O/P EST HI 40 MIN: CPT | Mod: PBBFAC,PO | Performed by: NURSE PRACTITIONER

## 2022-12-28 PROCEDURE — 99999 PR PBB SHADOW E&M-EST. PATIENT-LVL V: CPT | Mod: PBBFAC,,, | Performed by: NURSE PRACTITIONER

## 2022-12-28 PROCEDURE — 99214 PR OFFICE/OUTPT VISIT, EST, LEVL IV, 30-39 MIN: ICD-10-PCS | Mod: S$PBB,,, | Performed by: NURSE PRACTITIONER

## 2022-12-28 RX ORDER — FAMOTIDINE 40 MG/1
40 TABLET, FILM COATED ORAL NIGHTLY
Qty: 30 TABLET | Refills: 2 | Status: SHIPPED | OUTPATIENT
Start: 2022-12-28 | End: 2023-03-27

## 2022-12-28 NOTE — TELEPHONE ENCOUNTER
Spoke with pt. Rescheduled procedure as she already has an appointment on date originally scheduled. Pt verbalized understanding to new date.

## 2022-12-28 NOTE — TELEPHONE ENCOUNTER
----- Message from Errol Villegas sent at 12/28/2022  2:24 PM CST -----  Type: Needs Medical Advice  Who Called:  Patient    Best Call Back Number: [phone]  Additional Information: Patient states that she would like a callback regarding rescheduling her colonoscopy.

## 2022-12-28 NOTE — PROGRESS NOTES
Subjective:       Patient ID: Pili Teixeira is a 77 y.o. female, Body mass index is 23.63 kg/m².    Chief Complaint: Anemia      Patient is new to me. Established patient of Dr. Nguyen & Dr. Frost.   She would like to continue care with Dr. Frost.     Anemia  Presents for initial (initial to me; followed by hematology) visit. Symptoms include abdominal pain (chronic problem; epigastric region; eating aggravates pain), anorexia, malaise/fatigue and weight loss (reported weight loss of 60 pounds over the past 5-6 years; eating less due to symptoms; denies change in physical activity; weight stable over the past year). There has been no bruising/bleeding easily, confusion, fever, leg swelling, light-headedness, pallor, palpitations, paresthesias or pica. Signs of blood loss that are not present include hematemesis, hematochezia, melena and vaginal bleeding. Past treatments include parenteral iron supplements (Currently: Iron infusions per hematology). Past medical history includes heart failure. There is no history of alcohol abuse, cancer, chronic liver disease, chronic renal disease, dementia, HIV/AIDS, hypothyroidism, inflammatory bowel disease, malnutrition, recent illness, recent surgery or recent trauma. Procedure history includes colonoscopy (done 1/7/20; polyp removed; internal hemorrhoids; repeat in 5 years) and EGD (done 1/7/20; small hiatal hernia; gastritis). There is no past history of FOBT.   Review of Systems   Constitutional:  Positive for appetite change (reports decreased appetite), malaise/fatigue, unexpected weight change and weight loss (reported weight loss of 60 pounds over the past 5-6 years; eating less due to symptoms; denies change in physical activity; weight stable over the past year). Negative for fever.   HENT:  Negative for trouble swallowing.    Respiratory:  Positive for shortness of breath (on exertion). Negative for cough.    Cardiovascular:  Negative for chest pain and  palpitations.   Gastrointestinal:  Positive for abdominal pain (chronic problem; epigastric region; eating aggravates pain), anorexia, constipation (chronic problem; bowel movements are 4-5 times per week; describes stool as type 2, 5 or 6 on bristol scale; taking Colace 2-3 capsules per day helps) and nausea (Zofran helps). Negative for abdominal distention, anal bleeding, blood in stool, diarrhea, hematemesis, hematochezia, melena, rectal pain and vomiting.        Reports early satiety and dyspepsia- taking Protonix 40 mg once daily somewhat helps   Genitourinary:  Negative for difficulty urinating, dysuria, hematuria and vaginal bleeding.   Musculoskeletal:  Positive for arthralgias. Negative for gait problem.   Skin:  Negative for pallor and rash.   Neurological:  Negative for speech difficulty, light-headedness and paresthesias.   Hematological:  Does not bruise/bleed easily.   Psychiatric/Behavioral:  Negative for confusion.      Past Medical History:   Diagnosis Date    Allergy     Anemia 2012    with thrombocytopenia; iron deficiency    Arthritis     Cervical spinal stenosis     with neuropathy, chronic neck,back,leg pain    Colon polyp     COPD (chronic obstructive pulmonary disease)     Coronary artery disease     COVID 4/27/2022    COVID-19     Diabetes mellitus     , sugars run 190's per patient    Diastolic dysfunction 7/13/2015    Diverticulitis     Diverticulosis     Hx diverticulitis,still has chronic diarrhea, abd pain    Dyspnea on exertion     sometimes with nausea, fatigue,dizziness, had cardiac cath June 2012, normal    Fibromyalgia     Fracture 2012    right thumb    GERD (gastroesophageal reflux disease)     Feb 2012, Hx H Pylori    Glaucoma     had LAser surgey, on no eye drops    HEARING LOSS     Hemangioma     fatty liver    History of earache     frequent    History of TIA (transient ischemic attack) 5/28/2013    Hyperlipidemia     Hypertension     Hypertension associated with diabetes  3/14/2017    Laryngeal polyp     Myocardial infarction 2006 last    also MI in distant past    REJI (obstructive sleep apnea)     does not use CPAP    Otitis media     Pneumonia due to COVID-19 virus 2/20/2021    Renal stone     Sjogren's syndrome     SOB (shortness of breath) 6/8/2012    [address] [place] [address] 1. Normal coronary arteries. 2. Normal single renal arteries bilaterally. 3. Diastolic dysfunction.     Stroke     TIA, now on Plavix    TIA (transient ischemic attack)     TMJ disorder     Type II or unspecified type diabetes mellitus without mention of complication, uncontrolled 5/8/2014    UTI (lower urinary tract infection)     frequent    Venous insufficiency     with Hx blood clot in leg      Past Surgical History:   Procedure Laterality Date    ABDOMINAL SURGERY  2010 x2    expoloratory lap for pelvic cyst,adhesions; partial colonectomy     adhesiolysis   10/11/2012    CARDIAC CATHETERIZATION      no current blockages.    CHOLECYSTECTOMY      COLON SURGERY      r/t diverticuli    COLONOSCOPY  08/2014    repeat in 5 years    COLONOSCOPY N/A 1/7/2020    Procedure: COLONOSCOPY;  Surgeon: Kb Nguyen MD;  Location: [facility];  Service: Endoscopy;  Laterality: N/A;    ENDOSCOPIC ULTRASOUND OF UPPER GASTROINTESTINAL TRACT N/A 1/13/2021    Procedure: ULTRASOUND, UPPER GI TRACT, ENDOSCOPIC;  Surgeon: Sonido Castellano III, MD;  Location: [facility];  Service: Endoscopy;  Laterality: N/A;    EPIDURAL BLOCK INJECTION  5/15/12    back,neck for pain    ESOPHAGOGASTRODUODENOSCOPY N/A 1/7/2020    Procedure: EGD (ESOPHAGOGASTRODUODENOSCOPY);  Surgeon: Kb Nguyen MD;  Location: [facility] ENDO;  Service: Endoscopy;  Laterality: N/A;    ESOPHAGOGASTRODUODENOSCOPY N/A 1/13/2021    Procedure: EGD (ESOPHAGOGASTRODUODENOSCOPY);  Surgeon: Sonido Castellano III, MD;  Location: [facility] ENDO;  Service: Endoscopy;  Laterality: N/A;    EXPLORATORY LAPAROTOMY W/ BOWEL RESECTION  10/11/2012    EYE SURGERY      Laser for glaucoma    EYE  SURGERY  May 2012    cataracts    HYSTERECTOMY      LARYNX SURGERY      polypectomy    OOPHORECTOMY      TONSILLECTOMY      with adenoidectomy    UPPER GASTROINTESTINAL ENDOSCOPY  12/2015    VASCULAR SURGERY      laser to varicose vein leg      Family History   Problem Relation Age of Onset    Throat cancer Mother     Cancer Mother     Diabetes Mellitus Mother     Stroke Father     Hypertension Father     Heart disease Father     Diverticulitis Sister     Throat cancer Brother     Cancer Brother     Thyroid disease Daughter     Lupus Daughter     Pancreatic cancer Maternal Aunt 55    Pancreatic cancer Cousin 58    Breast cancer Neg Hx     Ovarian cancer Neg Hx     Colon cancer Neg Hx     Colon polyps Neg Hx     Celiac disease Neg Hx     Cirrhosis Neg Hx     Crohn's disease Neg Hx     Stomach cancer Neg Hx     Ulcerative colitis Neg Hx     Rectal cancer Neg Hx     Esophageal cancer Neg Hx     Inflammatory bowel disease Neg Hx     Rheum arthritis Neg Hx     Psoriasis Neg Hx     Osteoarthritis Neg Hx     Kidney disease Neg Hx     Hyperlipidemia Neg Hx     COPD Neg Hx     Depression Neg Hx     Chronic back pain Neg Hx     Asthma Neg Hx     Alcohol abuse Neg Hx       Wt Readings from Last 10 Encounters:   12/28/22 60.5 kg (133 lb 6.1 oz)   12/19/22 60.2 kg (132 lb 12.8 oz)   12/06/22 59.7 kg (131 lb 11.2 oz)   11/30/22 58.8 kg (129 lb 10.1 oz)   09/19/22 61.3 kg (135 lb 2.3 oz)   08/26/22 61.8 kg (136 lb 3.9 oz)   08/01/22 61.7 kg (136 lb 0.4 oz)   04/29/22 61.2 kg (135 lb)   03/14/22 64.3 kg (141 lb 12.1 oz)   03/08/22 65.1 kg (143 lb 8.3 oz)     Lab Results   Component Value Date    WBC 9.25 11/23/2022    HGB 9.3 (L) 11/23/2022    HCT 29.9 (L) 11/23/2022    MCV 89 11/23/2022     11/23/2022     CMP  Sodium   Date Value Ref Range Status   11/23/2022 141 136 - 145 mmol/L Final     Potassium   Date Value Ref Range Status   11/23/2022 4.0 3.5 - 5.1 mmol/L Final     Chloride   Date Value Ref Range Status   11/23/2022  103 95 - 110 mmol/L Final     CO2   Date Value Ref Range Status   11/23/2022 27 23 - 29 mmol/L Final     Glucose   Date Value Ref Range Status   11/23/2022 106 70 - 110 mg/dL Final     BUN   Date Value Ref Range Status   11/23/2022 21 8 - 23 mg/dL Final     Creatinine   Date Value Ref Range Status   11/23/2022 0.9 0.5 - 1.4 mg/dL Final   09/01/2012 0.9 0.2 - 1.4 mg/dl Final     Calcium   Date Value Ref Range Status   11/23/2022 10.1 8.7 - 10.5 mg/dL Final   09/01/2012 9.9 8.6 - 10.2 mg/dl Final     Total Protein   Date Value Ref Range Status   11/23/2022 7.1 6.0 - 8.4 g/dL Final     Albumin   Date Value Ref Range Status   11/23/2022 4.0 3.5 - 5.2 g/dL Final     Total Bilirubin   Date Value Ref Range Status   11/23/2022 0.3 0.1 - 1.0 mg/dL Final     Comment:     For infants and newborns, interpretation of results should be based  on gestational age, weight and in agreement with clinical  observations.    Premature Infant recommended reference ranges:  Up to 24 hours.............<8.0 mg/dL  Up to 48 hours............<12.0 mg/dL  3-5 days..................<15.0 mg/dL  6-29 days.................<15.0 mg/dL       Alkaline Phosphatase   Date Value Ref Range Status   11/23/2022 41 (L) 55 - 135 U/L Final   09/01/2012 80 23 - 119 UNIT/L Final     AST   Date Value Ref Range Status   11/23/2022 14 10 - 40 U/L Final   09/01/2012 17 10 - 30 UNIT/L Final     ALT   Date Value Ref Range Status   11/23/2022 6 (L) 10 - 44 U/L Final     Anion Gap   Date Value Ref Range Status   11/23/2022 11 8 - 16 mmol/L Final   09/01/2012 13 5 - 15 meq/L Final     eGFR if    Date Value Ref Range Status   04/30/2022 56.8 (A) >60 mL/min/1.73 m^2 Final     eGFR if non    Date Value Ref Range Status   04/30/2022 49.2 (A) >60 mL/min/1.73 m^2 Final     Comment:     Calculation used to obtain the estimated glomerular filtration  rate (eGFR) is the CKD-EPI equation.        Lab Results   Component Value Date    AMYLASE 81  05/11/2016     Lab Results   Component Value Date    LIPASE 56 01/11/2021     Lab Results   Component Value Date    UIBC 355 06/18/2012    IRON 27 (L) 11/23/2022    TRANSFERRIN 225 11/23/2022    TIBC 333 11/23/2022    FESATURATED 8 (L) 11/23/2022              Reviewed prior medical records including radiology report of CT of abdomen and pelvis 1/11/21 & endoscopy history (see surgical history).     Objective:      Physical Exam  Constitutional:       General: She is not in acute distress.     Appearance: She is well-developed.   HENT:      Head: Normocephalic.      Right Ear: Hearing normal.      Left Ear: Hearing normal.      Nose: Nose normal.      Mouth/Throat:      Mouth: No oral lesions.      Pharynx: Uvula midline.   Eyes:      General: Lids are normal.      Conjunctiva/sclera: Conjunctivae normal.      Pupils: Pupils are equal, round, and reactive to light.   Neck:      Trachea: Trachea normal.   Cardiovascular:      Rate and Rhythm: Regular rhythm.      Heart sounds: Normal heart sounds. No murmur heard.  Pulmonary:      Effort: Pulmonary effort is normal. No respiratory distress.      Breath sounds: Normal breath sounds. No stridor. No wheezing.   Abdominal:      General: Bowel sounds are increased. There is no distension.      Palpations: Abdomen is soft. There is no mass.      Tenderness: There is abdominal tenderness in the right upper quadrant, epigastric area and left upper quadrant. There is no guarding or rebound.   Musculoskeletal:         General: Normal range of motion.      Cervical back: Normal range of motion.   Skin:     General: Skin is warm and dry.      Findings: No rash.      Comments: Non jaundiced   Neurological:      Mental Status: She is alert and oriented to person, place, and time.   Psychiatric:         Speech: Speech normal.         Behavior: Behavior normal. Behavior is cooperative.         Assessment:       1. Iron deficiency anemia, unspecified iron deficiency anemia type    2.  Epigastric pain    3. Gastroesophageal reflux disease without esophagitis    4. Nausea and vomiting, unspecified vomiting type    5. Unintentional weight loss    6. Gastric intestinal metaplasia    7. Chronic constipation    8. History of colon polyps    9. Current use of anticoagulant therapy    10. S/P cholecystectomy           Plan:   All diagnoses and orders for this visit:    Iron deficiency anemia, unspecified iron deficiency anemia type   - Discussed with patient the different ways that anemia occurs: blood loss (such as from the gi tract), the body is not making enough, or the body is breaking down the rbcs too quickly; recommend EGD and Colonoscopy to further evaluate gi tract for possible blood loss and pending results of endoscopies, possible UGI with Small Bowel Follow Through/video capsule study  - Follow-up with hematology for continued evaluation and management     Epigastric pain, Gastroesophageal reflux disease without esophagitis & Nausea and vomiting, unspecified vomiting type  - CT Abdomen Pelvis  Without Contrast; Future; Expected date: 12/28/2022  - Schedule EGD         - Start: famotidine (PEPCID) 40 MG tablet; Take 1 tablet (40 mg total) by mouth nightly.  Dispense: 30 tablet; Refill: 2        - Continue Protonix 40 mg once daily        - Take PPI in the morning 30 minutes before breakfast        - Recommend to avoid large meals, avoid eating within 3 hours of bedtime, elevate head of bed if nocturnal symptoms are present, smoking cessation (if current smoker), & weight loss (if overweight).         - Recommend minimize/avoid high-fat foods, chocolate, caffeine, citrus, alcohol, & tomato products.        - Advised to avoid/limit use of NSAID's, since they can cause GI upset, bleeding, and/or ulcers. If needed, take with food.      Unintentional weight loss   - CT Abdomen Pelvis  Without Contrast; Future; Expected date: 12/28/2022   - Schedule EGD    - Schedule Colonoscopy    - Encouraged PO  intake and daily calorie counts to ensure adequate nutrition is taken in, recommend at least 2,000 calories a day  - Recommend nutritional drinks, such as Boost, Ensure or Glucerna, to supplement nutrition needs    Gastric intestinal metaplasia   - Schedule EGD     Chronic constipation   - Recommend daily exercise as tolerated, adequate water intake, and high fiber diet.   - Recommend OTC fiber supplement (Benefiber)  - Continue OTC stool softener   - Recommend OTC MiraLax once daily (17g PO) as directed     History of colon polyps   - Schedule Colonoscopy     Current use of anticoagulant therapy   - Informed patient that the anticoagulant(s) will likely need to be held for endoscopy, nurse will confirm with cardiologist/PCP.     S/P cholecystectomy    If no improvement in symptoms or symptoms worsen, call/follow-up at clinic or go to ER

## 2022-12-29 ENCOUNTER — TELEPHONE (OUTPATIENT)
Dept: GASTROENTEROLOGY | Facility: CLINIC | Age: 77
End: 2022-12-29
Payer: MEDICARE

## 2022-12-29 ENCOUNTER — HOSPITAL ENCOUNTER (OUTPATIENT)
Dept: RADIOLOGY | Facility: HOSPITAL | Age: 77
Discharge: HOME | End: 2022-12-29
Attending: NURSE PRACTITIONER
Payer: MEDICARE

## 2022-12-29 DIAGNOSIS — R10.13 EPIGASTRIC PAIN: ICD-10-CM

## 2022-12-29 DIAGNOSIS — R11.2 NAUSEA AND VOMITING, UNSPECIFIED VOMITING TYPE: ICD-10-CM

## 2022-12-29 DIAGNOSIS — K43.9 VENTRAL HERNIA WITHOUT OBSTRUCTION OR GANGRENE: Primary | ICD-10-CM

## 2022-12-29 PROCEDURE — 25500020 PHARM REV CODE 255: Performed by: NURSE PRACTITIONER

## 2022-12-29 PROCEDURE — 74176 CT ABD & PELVIS W/O CONTRAST: CPT | Mod: 26,,, | Performed by: RADIOLOGY

## 2022-12-29 PROCEDURE — A9698 NON-RAD CONTRAST MATERIALNOC: HCPCS | Performed by: NURSE PRACTITIONER

## 2022-12-29 PROCEDURE — 74176 CT ABD & PELVIS W/O CONTRAST: CPT | Mod: TC

## 2022-12-29 PROCEDURE — 74176 CT ABDOMEN PELVIS WITHOUT CONTRAST: ICD-10-PCS | Mod: 26,,, | Performed by: RADIOLOGY

## 2022-12-29 RX ADMIN — BARIUM SULFATE 900 ML: 20 SUSPENSION ORAL at 11:12

## 2022-12-29 NOTE — TELEPHONE ENCOUNTER
Returned call to pt and discussed CT results per Celina Duque NP. Scheduled pt for appointment with gen surg. Pt verbalized understanding to all

## 2022-12-29 NOTE — TELEPHONE ENCOUNTER
----- Message from Noemy Cohen sent at 12/29/2022  4:32 PM CST -----  Contact: pt  Type:  Patient Returning Call    Who Called:  pt   Who Left Message for Patient:  ion  Does the patient know what this is regarding?:  yes  Best Call Back Number:  [phone]    Additional Information:  pt is trying to return a call. Please advise

## 2022-12-29 NOTE — TELEPHONE ENCOUNTER
Attempted to contact pt with results. No answer, [employer] with call back # provided. Results also sent to pts mychart.

## 2022-12-29 NOTE — TELEPHONE ENCOUNTER
Let patient know her CT of abdomen and pelvis showed contrast in esophagus (suggests GERD or decreased esophageal emptying), small hiatal hernia, mild biliary duct dilatation (due to cholecystectomy), diverticulosis without evidence of diverticulitis, wall thickening of duodenum, multiple ventral hernias, and degenerative change of osseous structures. Recommend she continue with scheduled EGD for further evaluation of esophagus and duodenum and referral to general surgery for further evaluation of ventral hernias.

## 2022-12-31 ENCOUNTER — EXTERNAL CHRONIC CARE MANAGEMENT (OUTPATIENT)
Dept: PRIMARY CARE CLINIC | Facility: CLINIC | Age: 77
End: 2022-12-31
Payer: MEDICARE

## 2022-12-31 PROCEDURE — 99487 CPLX CHRNC CARE 1ST 60 MIN: CPT | Mod: S$PBB,,, | Performed by: FAMILY MEDICINE

## 2022-12-31 PROCEDURE — 99489 CPLX CHRNC CARE EA ADDL 30: CPT | Mod: S$PBB,,, | Performed by: FAMILY MEDICINE

## 2022-12-31 PROCEDURE — 99489 CPLX CHRNC CARE EA ADDL 30: CPT | Mod: PBBFAC,PO | Performed by: FAMILY MEDICINE

## 2022-12-31 PROCEDURE — 99487 CPLX CHRNC CARE 1ST 60 MIN: CPT | Mod: PBBFAC,PO,27 | Performed by: FAMILY MEDICINE

## 2022-12-31 PROCEDURE — 99489 PR COMPLX CHRON CARE MGMT, EA ADDTL 30 MIN, PER MONTH: ICD-10-PCS | Mod: S$PBB,,, | Performed by: FAMILY MEDICINE

## 2022-12-31 PROCEDURE — 99487 PR COMPLX CHRON CARE MGMT, 1ST HR, PER MONTH: ICD-10-PCS | Mod: S$PBB,,, | Performed by: FAMILY MEDICINE

## 2023-01-10 DIAGNOSIS — E78.5 TYPE 2 DIABETES MELLITUS WITH HYPERLIPIDEMIA: ICD-10-CM

## 2023-01-10 DIAGNOSIS — E11.69 TYPE 2 DIABETES MELLITUS WITH HYPERLIPIDEMIA: ICD-10-CM

## 2023-01-10 DIAGNOSIS — E08.00 DIABETES MELLITUS DUE TO UNDERLYING CONDITION WITH HYPEROSMOLARITY WITHOUT COMA, WITHOUT LONG-TERM CURRENT USE OF INSULIN: ICD-10-CM

## 2023-01-10 NOTE — TELEPHONE ENCOUNTER
----- Message from Jackeline Farias sent at 1/10/2023 11:04 AM CST -----  Contact: pt at [phone]  Type:  RX Refill Request    Who Called:  pt  Refill or New Rx:  refill  RX Name and Strength: ACCU-CHEK GUIDE TEST STRIPS Strp   How is the patient currently taking it? (ex. 1XDay):  everyday  Is this a 30 day or 90 day RX:  30  Preferred Pharmacy with phone number:    [id number] - [place], [place] - [address] [address]  Phone: [phone] Fax: [phone]     Local or Mail Order:  local  Ordering Provider:  Joseluis Mendes Call Back Number:  [phone]   Additional Information:  she needs a refill for her test strips. Please call and advise.

## 2023-01-10 NOTE — TELEPHONE ENCOUNTER
Refill Decision Note   Pili Teixeira  is requesting a refill authorization.  Brief Assessment and Rationale for Refill:  Approve    -Medication-Related Problems Identified: Requires labs  Medication Therapy Plan:  lab;a1c    Medication Reconciliation Completed: No   Comments:     Provider Staff:     Action is required for this patient.   Please see care gap opportunities below in Care Due Message.     Thanks!  [facility]     Appointments      Date Provider   Last Visit   9/19/2022 LUÍS Huggins MD   Next Visit   Visit date not found LUÍS Huggins MD     Note composed:11:27 AM 01/10/2023           Note composed:11:27 AM 01/10/2023

## 2023-01-10 NOTE — TELEPHONE ENCOUNTER
Care Due:                  Date            Visit Type   Department     Provider  --------------------------------------------------------------------------------                                EP -                              PRIMARY      [facility] FAMILY  Last Visit: 09-      CARE ([facility])   MEDICINE       LUÍS GOULD  Next Visit: None Scheduled  None         None Found                                                            Last  Test          Frequency    Reason                     Performed    Due Date  --------------------------------------------------------------------------------    HBA1C.......  6 months...  linaGLIPtin, metFORMIN...  08- 02-    [facility] Embedded Care Gaps. Reference number: [id number]. 1/10/2023   10:45:49 AM CST

## 2023-01-12 ENCOUNTER — OFFICE VISIT (OUTPATIENT)
Dept: SURGERY | Facility: CLINIC | Age: 78
End: 2023-01-12
Payer: MEDICARE

## 2023-01-12 VITALS
WEIGHT: 146.19 LBS | BODY MASS INDEX: 25.9 KG/M2 | DIASTOLIC BLOOD PRESSURE: 59 MMHG | HEART RATE: 83 BPM | HEIGHT: 63 IN | RESPIRATION RATE: 16 BRPM | SYSTOLIC BLOOD PRESSURE: 129 MMHG | TEMPERATURE: 98 F

## 2023-01-12 DIAGNOSIS — K43.9 VENTRAL HERNIA WITHOUT OBSTRUCTION OR GANGRENE: ICD-10-CM

## 2023-01-12 PROCEDURE — 3078F PR MOST RECENT DIASTOLIC BLOOD PRESSURE < 80 MM HG: ICD-10-PCS | Mod: CPTII,S$GLB,, | Performed by: STUDENT IN AN ORGANIZED HEALTH CARE EDUCATION/TRAINING PROGRAM

## 2023-01-12 PROCEDURE — 1125F PR PAIN SEVERITY QUANTIFIED, PAIN PRESENT: ICD-10-PCS | Mod: CPTII,S$GLB,, | Performed by: STUDENT IN AN ORGANIZED HEALTH CARE EDUCATION/TRAINING PROGRAM

## 2023-01-12 PROCEDURE — 99999 PR PBB SHADOW E&M-EST. PATIENT-LVL V: ICD-10-PCS | Mod: PBBFAC,,, | Performed by: STUDENT IN AN ORGANIZED HEALTH CARE EDUCATION/TRAINING PROGRAM

## 2023-01-12 PROCEDURE — 99999 PR PBB SHADOW E&M-EST. PATIENT-LVL V: CPT | Mod: PBBFAC,,, | Performed by: STUDENT IN AN ORGANIZED HEALTH CARE EDUCATION/TRAINING PROGRAM

## 2023-01-12 PROCEDURE — 1157F PR ADVANCE CARE PLAN OR EQUIV PRESENT IN MEDICAL RECORD: ICD-10-PCS | Mod: CPTII,S$GLB,, | Performed by: STUDENT IN AN ORGANIZED HEALTH CARE EDUCATION/TRAINING PROGRAM

## 2023-01-12 PROCEDURE — 3288F PR FALLS RISK ASSESSMENT DOCUMENTED: ICD-10-PCS | Mod: CPTII,S$GLB,, | Performed by: STUDENT IN AN ORGANIZED HEALTH CARE EDUCATION/TRAINING PROGRAM

## 2023-01-12 PROCEDURE — 3078F DIAST BP <80 MM HG: CPT | Mod: CPTII,S$GLB,, | Performed by: STUDENT IN AN ORGANIZED HEALTH CARE EDUCATION/TRAINING PROGRAM

## 2023-01-12 PROCEDURE — 99214 PR OFFICE/OUTPT VISIT, EST, LEVL IV, 30-39 MIN: ICD-10-PCS | Mod: S$GLB,,, | Performed by: STUDENT IN AN ORGANIZED HEALTH CARE EDUCATION/TRAINING PROGRAM

## 2023-01-12 PROCEDURE — 99214 OFFICE O/P EST MOD 30 MIN: CPT | Mod: S$GLB,,, | Performed by: STUDENT IN AN ORGANIZED HEALTH CARE EDUCATION/TRAINING PROGRAM

## 2023-01-12 PROCEDURE — 1159F MED LIST DOCD IN RCRD: CPT | Mod: CPTII,S$GLB,, | Performed by: STUDENT IN AN ORGANIZED HEALTH CARE EDUCATION/TRAINING PROGRAM

## 2023-01-12 PROCEDURE — 3074F PR MOST RECENT SYSTOLIC BLOOD PRESSURE < 130 MM HG: ICD-10-PCS | Mod: CPTII,S$GLB,, | Performed by: STUDENT IN AN ORGANIZED HEALTH CARE EDUCATION/TRAINING PROGRAM

## 2023-01-12 PROCEDURE — 3074F SYST BP LT 130 MM HG: CPT | Mod: CPTII,S$GLB,, | Performed by: STUDENT IN AN ORGANIZED HEALTH CARE EDUCATION/TRAINING PROGRAM

## 2023-01-12 PROCEDURE — 1100F PTFALLS ASSESS-DOCD GE2>/YR: CPT | Mod: CPTII,S$GLB,, | Performed by: STUDENT IN AN ORGANIZED HEALTH CARE EDUCATION/TRAINING PROGRAM

## 2023-01-12 PROCEDURE — 1157F ADVNC CARE PLAN IN RCRD: CPT | Mod: CPTII,S$GLB,, | Performed by: STUDENT IN AN ORGANIZED HEALTH CARE EDUCATION/TRAINING PROGRAM

## 2023-01-12 PROCEDURE — 1159F PR MEDICATION LIST DOCUMENTED IN MEDICAL RECORD: ICD-10-PCS | Mod: CPTII,S$GLB,, | Performed by: STUDENT IN AN ORGANIZED HEALTH CARE EDUCATION/TRAINING PROGRAM

## 2023-01-12 PROCEDURE — 1100F PR PT FALLS ASSESS DOC 2+ FALLS/FALL W/INJURY/YR: ICD-10-PCS | Mod: CPTII,S$GLB,, | Performed by: STUDENT IN AN ORGANIZED HEALTH CARE EDUCATION/TRAINING PROGRAM

## 2023-01-12 PROCEDURE — 1125F AMNT PAIN NOTED PAIN PRSNT: CPT | Mod: CPTII,S$GLB,, | Performed by: STUDENT IN AN ORGANIZED HEALTH CARE EDUCATION/TRAINING PROGRAM

## 2023-01-12 PROCEDURE — 3288F FALL RISK ASSESSMENT DOCD: CPT | Mod: CPTII,S$GLB,, | Performed by: STUDENT IN AN ORGANIZED HEALTH CARE EDUCATION/TRAINING PROGRAM

## 2023-01-12 NOTE — PROGRESS NOTES
History & Physical    SUBJECTIVE:     History of Present Illness:  Patient is a 77 y.o. female presents with multiple large abdominal wall bulges.  Patient notes that the hernia defects have been present for at least the past 7 years.  The lower hernia defect is gradually enlarging.  They are causing some abdominal pain.  Denies symptoms consistent with obstruction or strangulation.  She is currently being worked up for anemia with planned endoscopy in the near future.    Chief Complaint   Patient presents with    Consult       Review of patient's allergies indicates:   Allergen Reactions    Codeine Other (See Comments)     Chest pain    Clindamycin Other (See Comments)     Difficulty swallowing after taking, feels a something sits in epigastric area     Iodinated contrast media Hives and Rash       Current Outpatient Medications   Medication Sig Dispense Refill    albuterol (PROVENTIL/VENTOLIN HFA) 90 mcg/actuation inhaler Inhale 2 puffs into the lungs every 4 (four) hours as needed for Wheezing or Shortness of Breath. Rescue 18 g 0    albuterol-ipratropium (DUO-NEB) 2.5 mg-0.5 mg/3 mL nebulizer solution Take 3 mLs by nebulization every 4 to 6 hours as needed for Wheezing. Rescue      aspirin (ECOTRIN) 81 MG EC tablet Take 81 mg by mouth once daily.      blood sugar diagnostic (ACCU-CHEK GUIDE TEST STRIPS) Strp USE TO TEST BLOOD GLUCOSE ONE TIME DAILY AS DIRECTED. 100 each 3    carboxymethylcellulose sodium (LUBRICANT EYE DROPS OPHT) Apply to eye.      clopidogreL (PLAVIX) 75 mg tablet Take 1 tablet (75 mg total) by mouth once daily. 90 tablet 3    cyanocobalamin (VITAMIN B-12) 1000 MCG tablet Take 100 mcg by mouth once daily.      diclofenac sodium 1 % Gel Apply 2 g topically once daily.      diltiaZEM (CARDIZEM CD) 120 MG Cp24 TAKE 1 CAPSULE BY MOUTH IN THE EVENING 90 capsule 3    docusate sodium (STOOL SOFTENER ORAL) Take by mouth.      famotidine (PEPCID) 40 MG tablet Take 1 tablet (40 mg total) by mouth  nightly. 30 tablet 2    folic acid (FOLVITE) 1 MG tablet Take 1 tablet (1,000 mcg total) by mouth once daily. 30 tablet 5    ipratropium (ATROVENT) 42 mcg (0.06 %) nasal spray 2 sprays by Nasal route 3 (three) times daily. 15 mL 3    Lactobac no.41/Bifidobact no.7 (PROBIOTIC-10 ORAL) Take by mouth.      lancets (ACCU-CHEK SOFTCLIX LANCETS) Misc Test TWICE DAILY;Twice a day 100 each 3    lancets Misc Use to test blood glucose one (1) times daily as directed with insurance preferred meter and supplies 100 each 3    linaGLIPtin (TRADJENTA) 5 mg Tab tablet Take 1 tablet (5 mg total) by mouth once daily. 90 tablet 3    loratadine (CLARITIN) 10 mg tablet Take 10 mg by mouth once daily.      meloxicam (MOBIC) 7.5 MG tablet Take 1 tablet (7.5 mg total) by mouth once daily. 90 tablet 1    metFORMIN (GLUCOPHAGE) 500 MG tablet Take 1 tablet (500 mg total) by mouth 2 (two) times daily with meals. 180 tablet 3    multivit with min-folic acid 200 mcg Chew Take 1 tablet by mouth once daily.       NARCAN 4 mg/actuation Spry as directed      olopatadine (PATANOL) 0.1 % ophthalmic solution Place 1 drop into both eyes daily as needed.      ondansetron (ZOFRAN-ODT) 4 MG TbDL Take 1 tablet (4 mg total) by mouth every 8 (eight) hours as needed (nausea). 30 tablet 1    oxyCODONE-acetaminophen (PERCOCET) 7.5-325 mg per tablet Take 1 tablet by mouth 4 (four) times daily as needed.      pantoprazole (PROTONIX) 40 MG tablet Take 1 tablet (40 mg total) by mouth once daily. 90 tablet 3    pregabalin (LYRICA) 75 MG capsule Take 1 capsule (75 mg total) by mouth 2 (two) times daily. 60 capsule 3    rosuvastatin (CRESTOR) 10 MG tablet Take 1 tablet (10 mg total) by mouth every evening. 90 tablet 3    sodium chloride (SALINE NASAL NASL) by Nasal route.      traZODone (DESYREL) 50 MG tablet TAKE 1 TABLET BY MOUTH IN THE EVENING - (MAY TAKE AN ADDITIONAL TABLET AS NEEDED) 90 tablet 3    valsartan-hydrochlorothiazide (DIOVAN-HCT) 160-12.5 mg per tablet        blood-glucose meter kit To check BG 2 times daily, to use with insurance preferred meter. Medical necessity. 1 each 0     No current facility-administered medications for this visit.       Past Medical History:   Diagnosis Date    Allergy     Anemia 2012    with thrombocytopenia; iron deficiency    Arthritis     Cervical spinal stenosis     with neuropathy, chronic neck,back,leg pain    Colon polyp     COPD (chronic obstructive pulmonary disease)     Coronary artery disease     COVID 4/27/2022    COVID-19     Diabetes mellitus     , sugars run 190's per patient    Diastolic dysfunction 7/13/2015    Diverticulitis     Diverticulosis     Hx diverticulitis,still has chronic diarrhea, abd pain    Dyspnea on exertion     sometimes with nausea, fatigue,dizziness, had cardiac cath June 2012, normal    Fibromyalgia     Fracture 2012    right thumb    GERD (gastroesophageal reflux disease)     Feb 2012, Hx H Pylori    Glaucoma     had LAser surgey, on no eye drops    HEARING LOSS     Hemangioma     fatty liver    History of earache     frequent    History of TIA (transient ischemic attack) 5/28/2013    Hyperlipidemia     Hypertension     Hypertension associated with diabetes 3/14/2017    Laryngeal polyp     Myocardial infarction 2006 last    also MI in distant past    REJI (obstructive sleep apnea)     does not use CPAP    Otitis media     Pneumonia due to COVID-19 virus 2/20/2021    Renal stone     Sjogren's syndrome     SOB (shortness of breath) 6/8/2012    2012 [facility] 1. Normal coronary arteries. 2. Normal single renal arteries bilaterally. 3. Diastolic dysfunction.     Stroke     TIA, now on Plavix    TIA (transient ischemic attack)     TMJ disorder     Type II or unspecified type diabetes mellitus without mention of complication, uncontrolled 5/8/2014    UTI (lower urinary tract infection)     frequent    Venous insufficiency     with Hx blood clot in leg     Past Surgical History:   Procedure Laterality Date     ABDOMINAL SURGERY  2010 x2    expoloratory lap for pelvic cyst,adhesions; partial colonectomy     adhesiolysis   10/11/2012    CARDIAC CATHETERIZATION      no current blockages.    CHOLECYSTECTOMY      COLON SURGERY      r/t diverticuli    COLONOSCOPY  08/2014    repeat in 5 years    COLONOSCOPY N/A 1/7/2020    Procedure: COLONOSCOPY;  Surgeon: Kb Nguyen MD;  Location: [facility] ENDO;  Service: Endoscopy;  Laterality: N/A;    ENDOSCOPIC ULTRASOUND OF UPPER GASTROINTESTINAL TRACT N/A 1/13/2021    Procedure: ULTRASOUND, UPPER GI TRACT, ENDOSCOPIC;  Surgeon: Sonido Castellano III, MD;  Location: [facility] ENDO;  Service: Endoscopy;  Laterality: N/A;    EPIDURAL BLOCK INJECTION  5/15/12    back,neck for pain    ESOPHAGOGASTRODUODENOSCOPY N/A 1/7/2020    Procedure: EGD (ESOPHAGOGASTRODUODENOSCOPY);  Surgeon: Kb Nguyen MD;  Location: [facility] ENDO;  Service: Endoscopy;  Laterality: N/A;    ESOPHAGOGASTRODUODENOSCOPY N/A 1/13/2021    Procedure: EGD (ESOPHAGOGASTRODUODENOSCOPY);  Surgeon: Sonido Castellano III, MD;  Location: [facility];  Service: Endoscopy;  Laterality: N/A;    EXPLORATORY LAPAROTOMY W/ BOWEL RESECTION  10/11/2012    EYE SURGERY      Laser for glaucoma    EYE SURGERY  May 2012    cataracts    HYSTERECTOMY      LARYNX SURGERY      polypectomy    OOPHORECTOMY      TONSILLECTOMY      with adenoidectomy    UPPER GASTROINTESTINAL ENDOSCOPY  12/2015    VASCULAR SURGERY      laser to varicose vein leg     Family History   Problem Relation Age of Onset    Throat cancer Mother     Cancer Mother     Diabetes Mellitus Mother     Stroke Father     Hypertension Father     Heart disease Father     Diverticulitis Sister     Throat cancer Brother     Cancer Brother     Thyroid disease Daughter     Lupus Daughter     Pancreatic cancer Maternal Aunt 55    Pancreatic cancer Cousin 58    Breast cancer Neg Hx     Ovarian cancer Neg Hx     Colon cancer Neg Hx     Colon polyps Neg Hx     Celiac disease Neg Hx      "Cirrhosis Neg Hx     Crohn's disease Neg Hx     Stomach cancer Neg Hx     Ulcerative colitis Neg Hx     Rectal cancer Neg Hx     Esophageal cancer Neg Hx     Inflammatory bowel disease Neg Hx     Rheum arthritis Neg Hx     Psoriasis Neg Hx     Osteoarthritis Neg Hx     Kidney disease Neg Hx     Hyperlipidemia Neg Hx     COPD Neg Hx     Depression Neg Hx     Chronic back pain Neg Hx     Asthma Neg Hx     Alcohol abuse Neg Hx      Social History     Tobacco Use    Smoking status: Never    Smokeless tobacco: Never   Substance Use Topics    Alcohol use: No    Drug use: Yes     Types: Oxycodone        Review of Systems:  Review of Systems   Constitutional:  Negative for fever.   HENT: Negative.     Eyes: Negative.    Respiratory: Negative.     Cardiovascular: Negative.    Gastrointestinal:  Positive for abdominal pain.   Endocrine: Negative.    Genitourinary: Negative.    Musculoskeletal: Negative.    Skin: Negative.    Allergic/Immunologic: Negative.    Neurological: Negative.    Hematological: Negative.    Psychiatric/Behavioral: Negative.       OBJECTIVE:     Vital Signs (Most Recent)  Temp: 98.1 °F (36.7 °C) (01/12/23 1344)  Pulse: 83 (01/12/23 1344)  Resp: 16 (01/12/23 1344)  BP: (!) 129/59 (01/12/23 1344)  5' 3" (1.6 m)  66.3 kg (146 lb 2.6 oz)     Physical Exam:  Physical Exam  Constitutional:       General: She is not in acute distress.     Appearance: Normal appearance. She is not ill-appearing, toxic-appearing or diaphoretic.   HENT:      Head: Normocephalic.      Nose: Nose normal.   Eyes:      Conjunctiva/sclera: Conjunctivae normal.   Cardiovascular:      Rate and Rhythm: Normal rate and regular rhythm.   Pulmonary:      Effort: Pulmonary effort is normal.   Abdominal:      Palpations: Abdomen is soft.      Comments: Two large hernias 1 in the upper midline and 1 in the lower midline near the pelvis.  Most are almost completely reducible   Musculoskeletal:         General: Normal range of motion.      " Cervical back: Normal range of motion.   Skin:     General: Skin is warm.   Neurological:      General: No focal deficit present.      Mental Status: She is alert.   Psychiatric:         Mood and Affect: Mood normal.       Laboratory  Recent CBC was reviewed.  Patient is anemic    Diagnostic Results:  CT scan was reviewed.  Her upper hernia contains transverse colon.  The lower hernia extends all the way down to the pubic symphysis and contains a significant amount of small bowel.    ASSESSMENT/PLAN:     77-year-old female being worked up with anemia who has 2 large ventral/incisional hernias containing colon and small bowel    PLAN:  Discuss that patient should complete her workup for anemia including any relevant endoscopic screening procedures which have already been scheduled.  In the interim, she is going to discuss her medical status with her PCP and cardiologist.  We briefly discussed the possibility of robotic versus open hernia surgery with mesh.  Although the defects are far apart, I think she would be better served by attempted robotic repair with the potential for conversion to an open procedure with mesh.  Patient will follow-up with me after endoscopy to discuss surgical timing.  Concerning signs and symptoms that would warrant more urgent surgical evaluation were discussed.

## 2023-01-25 ENCOUNTER — LAB VISIT (OUTPATIENT)
Dept: LAB | Facility: HOSPITAL | Age: 78
End: 2023-01-25
Attending: INTERNAL MEDICINE
Payer: MEDICARE

## 2023-01-25 DIAGNOSIS — D50.0 IRON DEFICIENCY ANEMIA DUE TO CHRONIC BLOOD LOSS: ICD-10-CM

## 2023-01-25 LAB
ALBUMIN SERPL BCP-MCNC: 4 G/DL (ref 3.5–5.2)
ALP SERPL-CCNC: 57 U/L (ref 55–135)
ALT SERPL W/O P-5'-P-CCNC: 7 U/L (ref 10–44)
ANION GAP SERPL CALC-SCNC: 10 MMOL/L (ref 8–16)
AST SERPL-CCNC: 14 U/L (ref 10–40)
BASOPHILS # BLD AUTO: 0.05 K/UL (ref 0–0.2)
BASOPHILS NFR BLD: 0.6 % (ref 0–1.9)
BILIRUB SERPL-MCNC: 0.3 MG/DL (ref 0.1–1)
BUN SERPL-MCNC: 27 MG/DL (ref 8–23)
CALCIUM SERPL-MCNC: 10.1 MG/DL (ref 8.7–10.5)
CHLORIDE SERPL-SCNC: 103 MMOL/L (ref 95–110)
CO2 SERPL-SCNC: 27 MMOL/L (ref 23–29)
CREAT SERPL-MCNC: 1 MG/DL (ref 0.5–1.4)
DIFFERENTIAL METHOD: ABNORMAL
EOSINOPHIL # BLD AUTO: 0.1 K/UL (ref 0–0.5)
EOSINOPHIL NFR BLD: 0.9 % (ref 0–8)
ERYTHROCYTE [DISTWIDTH] IN BLOOD BY AUTOMATED COUNT: 13.5 % (ref 11.5–14.5)
EST. GFR  (NO RACE VARIABLE): 58 ML/MIN/1.73 M^2
FERRITIN SERPL-MCNC: 461 NG/ML (ref 20–300)
GLUCOSE SERPL-MCNC: 98 MG/DL (ref 70–110)
HCT VFR BLD AUTO: 37.4 % (ref 37–48.5)
HGB BLD-MCNC: 11.6 G/DL (ref 12–16)
IMM GRANULOCYTES # BLD AUTO: 0.03 K/UL (ref 0–0.04)
IMM GRANULOCYTES NFR BLD AUTO: 0.3 % (ref 0–0.5)
IRON SERPL-MCNC: 69 UG/DL (ref 30–160)
LYMPHOCYTES # BLD AUTO: 2.5 K/UL (ref 1–4.8)
LYMPHOCYTES NFR BLD: 27.6 % (ref 18–48)
MCH RBC QN AUTO: 28.4 PG (ref 27–31)
MCHC RBC AUTO-ENTMCNC: 31 G/DL (ref 32–36)
MCV RBC AUTO: 91 FL (ref 82–98)
MONOCYTES # BLD AUTO: 0.6 K/UL (ref 0.3–1)
MONOCYTES NFR BLD: 6.8 % (ref 4–15)
NEUTROPHILS # BLD AUTO: 5.7 K/UL (ref 1.8–7.7)
NEUTROPHILS NFR BLD: 63.8 % (ref 38–73)
NRBC BLD-RTO: 0 /100 WBC
PLATELET # BLD AUTO: 218 K/UL (ref 150–450)
PMV BLD AUTO: 11.6 FL (ref 9.2–12.9)
POTASSIUM SERPL-SCNC: 4.1 MMOL/L (ref 3.5–5.1)
PROT SERPL-MCNC: 7.3 G/DL (ref 6–8.4)
RBC # BLD AUTO: 4.09 M/UL (ref 4–5.4)
SATURATED IRON: 26 % (ref 20–50)
SODIUM SERPL-SCNC: 140 MMOL/L (ref 136–145)
TOTAL IRON BINDING CAPACITY: 269 UG/DL (ref 250–450)
TRANSFERRIN SERPL-MCNC: 182 MG/DL (ref 200–375)
WBC # BLD AUTO: 8.87 K/UL (ref 3.9–12.7)

## 2023-01-25 PROCEDURE — 82728 ASSAY OF FERRITIN: CPT | Performed by: INTERNAL MEDICINE

## 2023-01-25 PROCEDURE — 36415 COLL VENOUS BLD VENIPUNCTURE: CPT | Performed by: INTERNAL MEDICINE

## 2023-01-25 PROCEDURE — 80053 COMPREHEN METABOLIC PANEL: CPT | Performed by: INTERNAL MEDICINE

## 2023-01-25 PROCEDURE — 84466 ASSAY OF TRANSFERRIN: CPT | Performed by: INTERNAL MEDICINE

## 2023-01-25 PROCEDURE — 85025 COMPLETE CBC W/AUTO DIFF WBC: CPT | Performed by: INTERNAL MEDICINE

## 2023-01-27 ENCOUNTER — OFFICE VISIT (OUTPATIENT)
Dept: HEMATOLOGY/ONCOLOGY | Facility: CLINIC | Age: 78
End: 2023-01-27
Payer: MEDICARE

## 2023-01-27 DIAGNOSIS — E11.69 HYPERLIPIDEMIA ASSOCIATED WITH TYPE 2 DIABETES MELLITUS: ICD-10-CM

## 2023-01-27 DIAGNOSIS — J41.0 SIMPLE CHRONIC BRONCHITIS: Primary | ICD-10-CM

## 2023-01-27 DIAGNOSIS — E53.8 B12 DEFICIENCY: ICD-10-CM

## 2023-01-27 DIAGNOSIS — M35.00 SJOGREN'S SYNDROME, WITH UNSPECIFIED ORGAN INVOLVEMENT: ICD-10-CM

## 2023-01-27 DIAGNOSIS — D69.6 THROMBOCYTOPENIA: Chronic | ICD-10-CM

## 2023-01-27 DIAGNOSIS — D50.0 IRON DEFICIENCY ANEMIA DUE TO CHRONIC BLOOD LOSS: ICD-10-CM

## 2023-01-27 DIAGNOSIS — E78.5 HYPERLIPIDEMIA ASSOCIATED WITH TYPE 2 DIABETES MELLITUS: ICD-10-CM

## 2023-01-27 PROCEDURE — 99214 OFFICE O/P EST MOD 30 MIN: CPT | Mod: 95,,, | Performed by: INTERNAL MEDICINE

## 2023-01-27 PROCEDURE — 1157F PR ADVANCE CARE PLAN OR EQUIV PRESENT IN MEDICAL RECORD: ICD-10-PCS | Mod: CPTII,95,, | Performed by: INTERNAL MEDICINE

## 2023-01-27 PROCEDURE — 99214 PR OFFICE/OUTPT VISIT, EST, LEVL IV, 30-39 MIN: ICD-10-PCS | Mod: 95,,, | Performed by: INTERNAL MEDICINE

## 2023-01-27 PROCEDURE — 1157F ADVNC CARE PLAN IN RCRD: CPT | Mod: CPTII,95,, | Performed by: INTERNAL MEDICINE

## 2023-01-28 NOTE — PROGRESS NOTES
Subjective:    The patient location is: home  Visit type: Virtual visit with synchronous audio and video  Face-to-face or time spent with patient on the encounter: 20 min  Total time spent on and for  this encounter which includes non face-to-face time preparing to see patient, review of tests, obtaining and or reviewing separately obtained records documenting clinical information in the electronic or other health records, independently interpreting results which is not separately reported ,and communicating results to the patient/family/caregiver and in care coordination and treatment planning/communicating with pharmacy for prescriptions/addressing social needs/arranging follow-up and or referrals :25 min    Each patient I provide medical services by telemedicine is:  (1) informed of the relationship between the physician and patient and the respective role of any other health care provider with respect to management of the patient; and (2) notified that he or she may decline to receive medical services by telemedicine and may withdraw from such care at any time.  This is a video visit therefore some elements of the physical exam such as vital signs, heart sounds are breath sounds are not included and may be included if found in recent clinic notes of other providers assessing same patient. Any symptoms or signs that were visualized were stated by the patient may be included in this note.    Patient ID: Pili Teixeira is a 77 y.o. female.    Chief Complaint:  Evaluation of anemia  HPI   Patient has seen me in 2016 and then was lost to follow-up.  At that time shows following for thrombocytopenia.    Review of Systems    Patient states she feels very tired sleeps a lot   Has lost weight  Had COVID multiple times T3-4 episodes that she can recall.  Since 2000 20 last large amount of weight post coronary and has not gained it back she feels nauseated.  Has had multiple GI scopes and evaluations.   +chest pain after  eating  palpitations dizziness or passing out episodes  Denies headaches blurred vision   Denies abdominal pain   Occasional diarrhea reported   No feet swelling  States sometimes urine may be discolored but that could be from vitamins according to patient   Does not recall seeing blood in urine or stool      Past Medical History:   Diagnosis Date    Allergy     Anemia 2012    with thrombocytopenia; iron deficiency    Arthritis     Cervical spinal stenosis     with neuropathy, chronic neck,back,leg pain    Colon polyp     COPD (chronic obstructive pulmonary disease)     Coronary artery disease     COVID 4/27/2022    COVID-19     Diabetes mellitus     , sugars run 190's per patient    Diastolic dysfunction 7/13/2015    Diverticulitis     Diverticulosis     Hx diverticulitis,still has chronic diarrhea, abd pain    Dyspnea on exertion     sometimes with nausea, fatigue,dizziness, had cardiac cath June 2012, normal    Fibromyalgia     Fracture 2012    right thumb    GERD (gastroesophageal reflux disease)     Feb 2012, Hx H Pylori    Glaucoma     had LAser surgey, on no eye drops    HEARING LOSS     Hemangioma     fatty liver    History of earache     frequent    History of TIA (transient ischemic attack) 5/28/2013    Hyperlipidemia     Hypertension     Hypertension associated with diabetes 3/14/2017    Laryngeal polyp     Myocardial infarction 2006 last    also MI in distant past    REJI (obstructive sleep apnea)     does not use CPAP    Otitis media     Pneumonia due to COVID-19 virus 2/20/2021    Renal stone     Sjogren's syndrome     SOB (shortness of breath) 6/8/2012    2012 [facility] 1. Normal coronary arteries. 2. Normal single renal arteries bilaterally. 3. Diastolic dysfunction.     Stroke     TIA, now on Plavix    TIA (transient ischemic attack)     TMJ disorder     Type II or unspecified type diabetes mellitus without mention of complication, uncontrolled 5/8/2014    UTI (lower urinary tract infection)     frequent     Venous insufficiency     with Hx blood clot in leg     Past Surgical History:   Procedure Laterality Date    ABDOMINAL SURGERY  2010 x2    expoloratory lap for pelvic cyst,adhesions; partial colonectomy     adhesiolysis   10/11/2012    CARDIAC CATHETERIZATION      no current blockages.    CHOLECYSTECTOMY      COLON SURGERY      r/t diverticuli    COLONOSCOPY  08/2014    repeat in 5 years    COLONOSCOPY N/A 1/7/2020    Procedure: COLONOSCOPY;  Surgeon: Kb Nguyen MD;  Location: [facility] ENDO;  Service: Endoscopy;  Laterality: N/A;    ENDOSCOPIC ULTRASOUND OF UPPER GASTROINTESTINAL TRACT N/A 1/13/2021    Procedure: ULTRASOUND, UPPER GI TRACT, ENDOSCOPIC;  Surgeon: Sonido Castellano III, MD;  Location: [facility] ENDO;  Service: Endoscopy;  Laterality: N/A;    EPIDURAL BLOCK INJECTION  5/15/12    back,neck for pain    ESOPHAGOGASTRODUODENOSCOPY N/A 1/7/2020    Procedure: EGD (ESOPHAGOGASTRODUODENOSCOPY);  Surgeon: Kb Nguyen MD;  Location: [facility] ENDO;  Service: Endoscopy;  Laterality: N/A;    ESOPHAGOGASTRODUODENOSCOPY N/A 1/13/2021    Procedure: EGD (ESOPHAGOGASTRODUODENOSCOPY);  Surgeon: Sonido Castellano III, MD;  Location: [facility] ENDO;  Service: Endoscopy;  Laterality: N/A;    EXPLORATORY LAPAROTOMY W/ BOWEL RESECTION  10/11/2012    EYE SURGERY      Laser for glaucoma    EYE SURGERY  May 2012    cataracts    HYSTERECTOMY      LARYNX SURGERY      polypectomy    OOPHORECTOMY      TONSILLECTOMY      with adenoidectomy    UPPER GASTROINTESTINAL ENDOSCOPY  12/2015    VASCULAR SURGERY      laser to varicose vein leg     Family History   Problem Relation Age of Onset    Throat cancer Mother     Cancer Mother     Diabetes Mellitus Mother     Stroke Father     Hypertension Father     Heart disease Father     Diverticulitis Sister     Throat cancer Brother     Cancer Brother     Thyroid disease Daughter     Lupus Daughter     Pancreatic cancer Maternal Aunt 55    Pancreatic cancer Cousin 58    Breast cancer Neg Hx      Ovarian cancer Neg Hx     Colon cancer Neg Hx     Colon polyps Neg Hx     Celiac disease Neg Hx     Cirrhosis Neg Hx     Crohn's disease Neg Hx     Stomach cancer Neg Hx     Ulcerative colitis Neg Hx     Rectal cancer Neg Hx     Esophageal cancer Neg Hx     Inflammatory bowel disease Neg Hx     Rheum arthritis Neg Hx     Psoriasis Neg Hx     Osteoarthritis Neg Hx     Kidney disease Neg Hx     Hyperlipidemia Neg Hx     COPD Neg Hx     Depression Neg Hx     Chronic back pain Neg Hx     Asthma Neg Hx     Alcohol abuse Neg Hx       Social History     Socioeconomic History    Marital status:    Tobacco Use    Smoking status: Never    Smokeless tobacco: Never   Substance and Sexual Activity    Alcohol use: No    Drug use: Yes     Types: Oxycodone    Sexual activity: Not Currently     Birth control/protection: Surgical     Comment: hysterectomy     Social Determinants of Health     Financial Resource Strain: Medium Risk    Difficulty of Paying Living Expenses: Somewhat hard   Food Insecurity: Food Insecurity Present    Worried About Running Out of Food in the Last Year: Sometimes true    Ran Out of Food in the Last Year: Never true   Transportation Needs: No Transportation Needs    Lack of Transportation (Medical): No    Lack of Transportation (Non-Medical): No   Physical Activity: Inactive    Days of Exercise per Week: 0 days    Minutes of Exercise per Session: 0 min   Stress: No Stress Concern Present    Feeling of Stress : Only a little   Social Connections: Moderately Isolated    Frequency of Communication with Friends and Family: More than three times a week    Frequency of Social Gatherings with Friends and Family: More than three times a week    Attends Judaism Services: More than 4 times per year    Active Member of Clubs or Organizations: No    Attends Club or Organization Meetings: Never    Marital Status:    Housing Stability: Low Risk     Unable to Pay for Housing in the Last Year: No     Number of Places Lived in the Last Year: 1    Unstable Housing in the Last Year: No     Review of patient's allergies indicates:   Allergen Reactions    Codeine Other (See Comments)     Chest pain    Clindamycin Other (See Comments)     Difficulty swallowing after taking, feels a something sits in epigastric area     Iodinated contrast media Hives and Rash       Current Outpatient Medications:     albuterol (PROVENTIL/VENTOLIN HFA) 90 mcg/actuation inhaler, Inhale 2 puffs into the lungs every 4 (four) hours as needed for Wheezing or Shortness of Breath. Rescue, Disp: 18 g, Rfl: 0    albuterol-ipratropium (DUO-NEB) 2.5 mg-0.5 mg/3 mL nebulizer solution, Take 3 mLs by nebulization every 4 to 6 hours as needed for Wheezing. Rescue, Disp: , Rfl:     aspirin (ECOTRIN) 81 MG EC tablet, Take 81 mg by mouth once daily., Disp: , Rfl:     blood sugar diagnostic (ACCU-CHEK GUIDE TEST STRIPS) Strp, USE TO TEST BLOOD GLUCOSE ONE TIME DAILY AS DIRECTED., Disp: 100 each, Rfl: 3    blood-glucose meter kit, To check BG 2 times daily, to use with insurance preferred meter. Medical necessity., Disp: 1 each, Rfl: 0    carboxymethylcellulose sodium (LUBRICANT EYE DROPS OPHT), Apply to eye., Disp: , Rfl:     clopidogreL (PLAVIX) 75 mg tablet, Take 1 tablet (75 mg total) by mouth once daily., Disp: 90 tablet, Rfl: 3    cyanocobalamin (VITAMIN B-12) 1000 MCG tablet, Take 100 mcg by mouth once daily., Disp: , Rfl:     diclofenac sodium 1 % Gel, Apply 2 g topically once daily., Disp: , Rfl:     diltiaZEM (CARDIZEM CD) 120 MG Cp24, TAKE 1 CAPSULE BY MOUTH IN THE EVENING, Disp: 90 capsule, Rfl: 3    docusate sodium (STOOL SOFTENER ORAL), Take by mouth., Disp: , Rfl:     famotidine (PEPCID) 40 MG tablet, Take 1 tablet (40 mg total) by mouth nightly., Disp: 30 tablet, Rfl: 2    folic acid (FOLVITE) 1 MG tablet, Take 1 tablet (1,000 mcg total) by mouth once daily., Disp: 30 tablet, Rfl: 5    ipratropium (ATROVENT) 42 mcg (0.06 %) nasal spray, 2  sprays by Nasal route 3 (three) times daily., Disp: 15 mL, Rfl: 3    Lactobac no.41/Bifidobact no.7 (PROBIOTIC-10 ORAL), Take by mouth., Disp: , Rfl:     lancets (ACCU-CHEK SOFTCLIX LANCETS) Misc, Test TWICE DAILY;Twice a day, Disp: 100 each, Rfl: 3    lancets Misc, Use to test blood glucose one (1) times daily as directed with insurance preferred meter and supplies, Disp: 100 each, Rfl: 3    linaGLIPtin (TRADJENTA) 5 mg Tab tablet, Take 1 tablet (5 mg total) by mouth once daily., Disp: 90 tablet, Rfl: 3    loratadine (CLARITIN) 10 mg tablet, Take 10 mg by mouth once daily., Disp: , Rfl:     meloxicam (MOBIC) 7.5 MG tablet, Take 1 tablet (7.5 mg total) by mouth once daily., Disp: 90 tablet, Rfl: 1    metFORMIN (GLUCOPHAGE) 500 MG tablet, Take 1 tablet (500 mg total) by mouth 2 (two) times daily with meals., Disp: 180 tablet, Rfl: 3    multivit with min-folic acid 200 mcg Chew, Take 1 tablet by mouth once daily. , Disp: , Rfl:     NARCAN 4 mg/actuation Spry, as directed, Disp: , Rfl:     olopatadine (PATANOL) 0.1 % ophthalmic solution, Place 1 drop into both eyes daily as needed., Disp: , Rfl:     ondansetron (ZOFRAN-ODT) 4 MG TbDL, Take 1 tablet (4 mg total) by mouth every 8 (eight) hours as needed (nausea)., Disp: 30 tablet, Rfl: 1    oxyCODONE-acetaminophen (PERCOCET) 7.5-325 mg per tablet, Take 1 tablet by mouth 4 (four) times daily as needed., Disp: , Rfl:     pantoprazole (PROTONIX) 40 MG tablet, Take 1 tablet by mouth once daily, Disp: 90 tablet, Rfl: 2    pregabalin (LYRICA) 75 MG capsule, Take 1 capsule (75 mg total) by mouth 2 (two) times daily., Disp: 60 capsule, Rfl: 3    rosuvastatin (CRESTOR) 10 MG tablet, Take 1 tablet (10 mg total) by mouth every evening., Disp: 90 tablet, Rfl: 3    sodium chloride (SALINE NASAL NASL), by Nasal route., Disp: , Rfl:     traZODone (DESYREL) 50 MG tablet, TAKE 1 TABLET BY MOUTH IN THE EVENING - (MAY TAKE AN ADDITIONAL TABLET AS NEEDED), Disp: 90 tablet, Rfl: 3     valsartan-hydrochlorothiazide (DIOVAN-HCT) 160-12.5 mg per tablet, , Disp: , Rfl:     Physical Exam    Wt Readings from Last 3 Encounters:   01/12/23 66.3 kg (146 lb 2.6 oz)   12/28/22 60.5 kg (133 lb 6.1 oz)   12/19/22 60.2 kg (132 lb 12.8 oz)     Temp Readings from Last 3 Encounters:   01/12/23 98.1 °F (36.7 °C) (Oral)   12/19/22 97.1 °F (36.2 °C)   12/06/22 97.4 °F (36.3 °C)     BP Readings from Last 3 Encounters:   01/12/23 (!) 129/59   12/19/22 114/61   12/06/22 109/70     Pulse Readings from Last 3 Encounters:   01/12/23 83   12/19/22 81   12/06/22 85   .VITAL SIGNS:  as above   GENERAL: appears well-built, well-nourished.  No anxiety, no agitation, and in no distress.  Patient is awake, alert, oriented and cooperative.  HEENT:  Showed no congestion. Trachea is central no obvious icterus or pallor noted no hoarseness. no obvious JVD   NECK:  Supple.  No JVD. No obvious cervical submental or supraclavicular adenopathy.  RS:the visualized portion of  Chest expands well. chest appears symmetric, no audible wheezes.  No dyspnea recognized  ABDOMEN:  abdomen appears undistended.  EXTREMITIES:  Without edema.  NEUROLOGICAL:  The patient is appropriate, higher functions are normal.  No  obvious neurological deficits.  normal judgement normal thought content  No confusion, no speech impediment. Cranial nerves are intact and show no deficit. No gross motor deficits noted   SKIN MUSCULOSKELETAL: no joint or skeletal deformity, no clubbing of nails.  No visible rash ecchymosis or petechiae  Lab Results   Component Value Date    WBC 8.87 01/25/2023    HGB 11.6 (L) 01/25/2023    HCT 37.4 01/25/2023    MCV 91 01/25/2023     01/25/2023       BMP  Lab Results   Component Value Date     01/25/2023    K 4.1 01/25/2023     01/25/2023    CO2 27 01/25/2023    BUN 27 (H) 01/25/2023    CREATININE 1.0 01/25/2023    CALCIUM 10.1 01/25/2023    ANIONGAP 10 01/25/2023    ESTGFRAFRICA 56.8 (A) 04/30/2022    EGFRNONAA  49.2 (A) 04/30/2022     Lab Results   Component Value Date    IRON 69 01/25/2023    TIBC 269 01/25/2023    FERRITIN 461 (H) 01/25/2023         Patient Active Problem List   Diagnosis    GERD (gastroesophageal reflux disease)    REJI (obstructive sleep apnea)    DJD (degenerative joint disease), lumbosacral    Pelvic cyst - left    Night sweats    Thrombocytopenia    Diverticulitis of colon (without mention of hemorrhage)(562.11)    Peritoneal adhesions (postoperative) (postinfection)    History of TIA (transient ischemic attack)    COPD (chronic obstructive pulmonary disease)    Insomnia    Change in bowel habits    IC (interstitial cystitis)    Diastolic dysfunction    NICOLAS (iron deficiency anemia)    Sjogren's syndrome    Fibromyalgia    Rheumatoid arthritis involving both hands with positive rheumatoid factor    Elevated lipase    Hernia of anterior abdominal wall    Ventral incisional hernia    Right inguinal hernia    Hypertension associated with diabetes    Noncompliance    Hyperlipidemia associated with type 2 diabetes mellitus    Chronic combined systolic and diastolic congestive heart failure    Type 2 diabetes mellitus, without long-term current use of insulin    Chronic pain, neck, back, leg on home narcotics    Pulmonary nodule    Hypokalemia    Mild neurocognitive 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    Adjustment disorder with mixed anxiety and depressed mood    Gait abnormality    Abdominal aortic atherosclerosis        Assessment and Plan     Anemia that has been fluctuant for the past several years    NICOLAS this visit  Thrombocytopenia on and off for several years   History of interstitial cystitis patient reports occasional hematuria but is unclear, ua is neg 10/22  History of hypertension associated with diabetes history of Sjogren syndrome and obstructive sleep apnea, dyslipidemia her symptoms may be associated to vasculitis or connective tissue disorder has Rheumatology  appt   NICOLAS good  response toIV iron  urinalysis for hematuria -negative  SPEP, free kappa light chains elevated repeat  B12, 349 no evidence of hemolysis retic count is normal iron studies are low will recheck iron levels CBC CMP iron studies see patient back virtually in about 2 months   On SL b12   Ptto tell her GI about the post prandial pain  See me 1 month after iron infusion  JOAO positive  May need bone marrow biopsy   if anemia doenst improve May need scans for continued weight loss  Patient is off of hypertensive medications her A1c has only been 5.0 because of significant weight loss

## 2023-01-30 ENCOUNTER — PATIENT MESSAGE (OUTPATIENT)
Dept: HEMATOLOGY/ONCOLOGY | Facility: CLINIC | Age: 78
End: 2023-01-30
Payer: MEDICARE

## 2023-01-31 ENCOUNTER — EXTERNAL CHRONIC CARE MANAGEMENT (OUTPATIENT)
Dept: PRIMARY CARE CLINIC | Facility: CLINIC | Age: 78
End: 2023-01-31
Payer: MEDICARE

## 2023-01-31 PROCEDURE — 99490 CHRNC CARE MGMT STAFF 1ST 20: CPT | Mod: S$PBB,,, | Performed by: FAMILY MEDICINE

## 2023-01-31 PROCEDURE — 99490 CHRNC CARE MGMT STAFF 1ST 20: CPT | Mod: PBBFAC,25,PO | Performed by: FAMILY MEDICINE

## 2023-01-31 PROCEDURE — 99439 PR CHRONIC CARE MGMT, EA ADDTL 20 MIN: ICD-10-PCS | Mod: S$PBB,,, | Performed by: FAMILY MEDICINE

## 2023-01-31 PROCEDURE — 99439 CHRNC CARE MGMT STAF EA ADDL: CPT | Mod: PBBFAC,27,PO | Performed by: FAMILY MEDICINE

## 2023-01-31 PROCEDURE — 99490 PR CHRONIC CARE MGMT, 1ST 20 MIN: ICD-10-PCS | Mod: S$PBB,,, | Performed by: FAMILY MEDICINE

## 2023-01-31 PROCEDURE — 99439 CHRNC CARE MGMT STAF EA ADDL: CPT | Mod: S$PBB,,, | Performed by: FAMILY MEDICINE

## 2023-02-06 ENCOUNTER — LAB VISIT (OUTPATIENT)
Dept: LAB | Facility: HOSPITAL | Age: 78
End: 2023-02-06
Attending: STUDENT IN AN ORGANIZED HEALTH CARE EDUCATION/TRAINING PROGRAM
Payer: MEDICARE

## 2023-02-06 ENCOUNTER — OFFICE VISIT (OUTPATIENT)
Dept: RHEUMATOLOGY | Facility: CLINIC | Age: 78
End: 2023-02-06
Payer: MEDICARE

## 2023-02-06 VITALS
HEIGHT: 63 IN | HEART RATE: 64 BPM | SYSTOLIC BLOOD PRESSURE: 134 MMHG | BODY MASS INDEX: 23.75 KG/M2 | WEIGHT: 134.06 LBS | DIASTOLIC BLOOD PRESSURE: 76 MMHG

## 2023-02-06 DIAGNOSIS — R76.8 POSITIVE ANA (ANTINUCLEAR ANTIBODY): Primary | ICD-10-CM

## 2023-02-06 DIAGNOSIS — R76.8 RHEUMATOID FACTOR POSITIVE: ICD-10-CM

## 2023-02-06 DIAGNOSIS — R76.8 POSITIVE ANA (ANTINUCLEAR ANTIBODY): ICD-10-CM

## 2023-02-06 DIAGNOSIS — M79.7 FIBROMYALGIA: ICD-10-CM

## 2023-02-06 DIAGNOSIS — M19.90 OSTEOARTHRITIS, UNSPECIFIED OSTEOARTHRITIS TYPE, UNSPECIFIED SITE: ICD-10-CM

## 2023-02-06 DIAGNOSIS — R53.83 FATIGUE, UNSPECIFIED TYPE: ICD-10-CM

## 2023-02-06 LAB
ALBUMIN SERPL BCP-MCNC: 4 G/DL (ref 3.5–5.2)
ALP SERPL-CCNC: 56 U/L (ref 55–135)
ALT SERPL W/O P-5'-P-CCNC: 7 U/L (ref 10–44)
ANION GAP SERPL CALC-SCNC: 16 MMOL/L (ref 8–16)
AST SERPL-CCNC: 12 U/L (ref 10–40)
BASOPHILS # BLD AUTO: 0.04 K/UL (ref 0–0.2)
BASOPHILS NFR BLD: 0.5 % (ref 0–1.9)
BILIRUB SERPL-MCNC: 0.3 MG/DL (ref 0.1–1)
BUN SERPL-MCNC: 32 MG/DL (ref 8–23)
C3 SERPL-MCNC: 118 MG/DL (ref 50–180)
C4 SERPL-MCNC: 32 MG/DL (ref 11–44)
CALCIUM SERPL-MCNC: 10.3 MG/DL (ref 8.7–10.5)
CHLORIDE SERPL-SCNC: 103 MMOL/L (ref 95–110)
CO2 SERPL-SCNC: 22 MMOL/L (ref 23–29)
CREAT SERPL-MCNC: 1 MG/DL (ref 0.5–1.4)
DIFFERENTIAL METHOD: ABNORMAL
EOSINOPHIL # BLD AUTO: 0.1 K/UL (ref 0–0.5)
EOSINOPHIL NFR BLD: 0.8 % (ref 0–8)
ERYTHROCYTE [DISTWIDTH] IN BLOOD BY AUTOMATED COUNT: 13.6 % (ref 11.5–14.5)
EST. GFR  (NO RACE VARIABLE): 58.4 ML/MIN/1.73 M^2
GLUCOSE SERPL-MCNC: 93 MG/DL (ref 70–110)
HCT VFR BLD AUTO: 34.9 % (ref 37–48.5)
HGB BLD-MCNC: 10.6 G/DL (ref 12–16)
IMM GRANULOCYTES # BLD AUTO: 0.02 K/UL (ref 0–0.04)
IMM GRANULOCYTES NFR BLD AUTO: 0.2 % (ref 0–0.5)
LYMPHOCYTES # BLD AUTO: 3.6 K/UL (ref 1–4.8)
LYMPHOCYTES NFR BLD: 41.1 % (ref 18–48)
MCH RBC QN AUTO: 27.5 PG (ref 27–31)
MCHC RBC AUTO-ENTMCNC: 30.4 G/DL (ref 32–36)
MCV RBC AUTO: 90 FL (ref 82–98)
MONOCYTES # BLD AUTO: 0.6 K/UL (ref 0.3–1)
MONOCYTES NFR BLD: 7.3 % (ref 4–15)
NEUTROPHILS # BLD AUTO: 4.4 K/UL (ref 1.8–7.7)
NEUTROPHILS NFR BLD: 50.1 % (ref 38–73)
NRBC BLD-RTO: 0 /100 WBC
PLATELET # BLD AUTO: 205 K/UL (ref 150–450)
PMV BLD AUTO: 13.4 FL (ref 9.2–12.9)
POTASSIUM SERPL-SCNC: 3.4 MMOL/L (ref 3.5–5.1)
PROT SERPL-MCNC: 7.1 G/DL (ref 6–8.4)
RBC # BLD AUTO: 3.86 M/UL (ref 4–5.4)
SODIUM SERPL-SCNC: 141 MMOL/L (ref 136–145)
WBC # BLD AUTO: 8.8 K/UL (ref 3.9–12.7)

## 2023-02-06 PROCEDURE — 1159F PR MEDICATION LIST DOCUMENTED IN MEDICAL RECORD: ICD-10-PCS | Mod: CPTII,GC,S$GLB, | Performed by: STUDENT IN AN ORGANIZED HEALTH CARE EDUCATION/TRAINING PROGRAM

## 2023-02-06 PROCEDURE — 86235 NUCLEAR ANTIGEN ANTIBODY: CPT | Performed by: STUDENT IN AN ORGANIZED HEALTH CARE EDUCATION/TRAINING PROGRAM

## 2023-02-06 PROCEDURE — 83516 IMMUNOASSAY NONANTIBODY: CPT | Performed by: STUDENT IN AN ORGANIZED HEALTH CARE EDUCATION/TRAINING PROGRAM

## 2023-02-06 PROCEDURE — 86225 DNA ANTIBODY NATIVE: CPT | Performed by: STUDENT IN AN ORGANIZED HEALTH CARE EDUCATION/TRAINING PROGRAM

## 2023-02-06 PROCEDURE — 86160 COMPLEMENT ANTIGEN: CPT | Mod: 59 | Performed by: STUDENT IN AN ORGANIZED HEALTH CARE EDUCATION/TRAINING PROGRAM

## 2023-02-06 PROCEDURE — 99214 PR OFFICE/OUTPT VISIT, EST, LEVL IV, 30-39 MIN: ICD-10-PCS | Mod: GC,S$GLB,, | Performed by: STUDENT IN AN ORGANIZED HEALTH CARE EDUCATION/TRAINING PROGRAM

## 2023-02-06 PROCEDURE — 86160 COMPLEMENT ANTIGEN: CPT | Performed by: STUDENT IN AN ORGANIZED HEALTH CARE EDUCATION/TRAINING PROGRAM

## 2023-02-06 PROCEDURE — 86235 NUCLEAR ANTIGEN ANTIBODY: CPT | Mod: 59 | Performed by: STUDENT IN AN ORGANIZED HEALTH CARE EDUCATION/TRAINING PROGRAM

## 2023-02-06 PROCEDURE — 85613 RUSSELL VIPER VENOM DILUTED: CPT | Performed by: STUDENT IN AN ORGANIZED HEALTH CARE EDUCATION/TRAINING PROGRAM

## 2023-02-06 PROCEDURE — 85025 COMPLETE CBC W/AUTO DIFF WBC: CPT | Performed by: STUDENT IN AN ORGANIZED HEALTH CARE EDUCATION/TRAINING PROGRAM

## 2023-02-06 PROCEDURE — 86146 BETA-2 GLYCOPROTEIN ANTIBODY: CPT | Mod: 59 | Performed by: STUDENT IN AN ORGANIZED HEALTH CARE EDUCATION/TRAINING PROGRAM

## 2023-02-06 PROCEDURE — 1160F RVW MEDS BY RX/DR IN RCRD: CPT | Mod: CPTII,GC,S$GLB, | Performed by: STUDENT IN AN ORGANIZED HEALTH CARE EDUCATION/TRAINING PROGRAM

## 2023-02-06 PROCEDURE — 3075F PR MOST RECENT SYSTOLIC BLOOD PRESS GE 130-139MM HG: ICD-10-PCS | Mod: CPTII,GC,S$GLB, | Performed by: STUDENT IN AN ORGANIZED HEALTH CARE EDUCATION/TRAINING PROGRAM

## 2023-02-06 PROCEDURE — 99999 PR PBB SHADOW E&M-EST. PATIENT-LVL V: ICD-10-PCS | Mod: PBBFAC,GC,, | Performed by: STUDENT IN AN ORGANIZED HEALTH CARE EDUCATION/TRAINING PROGRAM

## 2023-02-06 PROCEDURE — 3078F PR MOST RECENT DIASTOLIC BLOOD PRESSURE < 80 MM HG: ICD-10-PCS | Mod: CPTII,GC,S$GLB, | Performed by: STUDENT IN AN ORGANIZED HEALTH CARE EDUCATION/TRAINING PROGRAM

## 2023-02-06 PROCEDURE — 99214 OFFICE O/P EST MOD 30 MIN: CPT | Mod: GC,S$GLB,, | Performed by: STUDENT IN AN ORGANIZED HEALTH CARE EDUCATION/TRAINING PROGRAM

## 2023-02-06 PROCEDURE — 83516 IMMUNOASSAY NONANTIBODY: CPT | Mod: 59 | Performed by: STUDENT IN AN ORGANIZED HEALTH CARE EDUCATION/TRAINING PROGRAM

## 2023-02-06 PROCEDURE — 99999 PR PBB SHADOW E&M-EST. PATIENT-LVL V: CPT | Mod: PBBFAC,GC,, | Performed by: STUDENT IN AN ORGANIZED HEALTH CARE EDUCATION/TRAINING PROGRAM

## 2023-02-06 PROCEDURE — 36415 COLL VENOUS BLD VENIPUNCTURE: CPT | Performed by: STUDENT IN AN ORGANIZED HEALTH CARE EDUCATION/TRAINING PROGRAM

## 2023-02-06 PROCEDURE — 1160F PR REVIEW ALL MEDS BY PRESCRIBER/CLIN PHARMACIST DOCUMENTED: ICD-10-PCS | Mod: CPTII,GC,S$GLB, | Performed by: STUDENT IN AN ORGANIZED HEALTH CARE EDUCATION/TRAINING PROGRAM

## 2023-02-06 PROCEDURE — 86036 ANCA SCREEN EACH ANTIBODY: CPT | Mod: 59 | Performed by: STUDENT IN AN ORGANIZED HEALTH CARE EDUCATION/TRAINING PROGRAM

## 2023-02-06 PROCEDURE — 3078F DIAST BP <80 MM HG: CPT | Mod: CPTII,GC,S$GLB, | Performed by: STUDENT IN AN ORGANIZED HEALTH CARE EDUCATION/TRAINING PROGRAM

## 2023-02-06 PROCEDURE — 3288F FALL RISK ASSESSMENT DOCD: CPT | Mod: CPTII,GC,S$GLB, | Performed by: STUDENT IN AN ORGANIZED HEALTH CARE EDUCATION/TRAINING PROGRAM

## 2023-02-06 PROCEDURE — 1157F PR ADVANCE CARE PLAN OR EQUIV PRESENT IN MEDICAL RECORD: ICD-10-PCS | Mod: CPTII,GC,S$GLB, | Performed by: STUDENT IN AN ORGANIZED HEALTH CARE EDUCATION/TRAINING PROGRAM

## 2023-02-06 PROCEDURE — 1125F AMNT PAIN NOTED PAIN PRSNT: CPT | Mod: CPTII,GC,S$GLB, | Performed by: STUDENT IN AN ORGANIZED HEALTH CARE EDUCATION/TRAINING PROGRAM

## 2023-02-06 PROCEDURE — 80053 COMPREHEN METABOLIC PANEL: CPT | Performed by: STUDENT IN AN ORGANIZED HEALTH CARE EDUCATION/TRAINING PROGRAM

## 2023-02-06 PROCEDURE — 3075F SYST BP GE 130 - 139MM HG: CPT | Mod: CPTII,GC,S$GLB, | Performed by: STUDENT IN AN ORGANIZED HEALTH CARE EDUCATION/TRAINING PROGRAM

## 2023-02-06 PROCEDURE — 85730 THROMBOPLASTIN TIME PARTIAL: CPT | Performed by: STUDENT IN AN ORGANIZED HEALTH CARE EDUCATION/TRAINING PROGRAM

## 2023-02-06 PROCEDURE — 1101F PR PT FALLS ASSESS DOC 0-1 FALLS W/OUT INJ PAST YR: ICD-10-PCS | Mod: CPTII,GC,S$GLB, | Performed by: STUDENT IN AN ORGANIZED HEALTH CARE EDUCATION/TRAINING PROGRAM

## 2023-02-06 PROCEDURE — 1157F ADVNC CARE PLAN IN RCRD: CPT | Mod: CPTII,GC,S$GLB, | Performed by: STUDENT IN AN ORGANIZED HEALTH CARE EDUCATION/TRAINING PROGRAM

## 2023-02-06 PROCEDURE — 1125F PR PAIN SEVERITY QUANTIFIED, PAIN PRESENT: ICD-10-PCS | Mod: CPTII,GC,S$GLB, | Performed by: STUDENT IN AN ORGANIZED HEALTH CARE EDUCATION/TRAINING PROGRAM

## 2023-02-06 PROCEDURE — 3288F PR FALLS RISK ASSESSMENT DOCUMENTED: ICD-10-PCS | Mod: CPTII,GC,S$GLB, | Performed by: STUDENT IN AN ORGANIZED HEALTH CARE EDUCATION/TRAINING PROGRAM

## 2023-02-06 PROCEDURE — 86147 CARDIOLIPIN ANTIBODY EA IG: CPT | Performed by: STUDENT IN AN ORGANIZED HEALTH CARE EDUCATION/TRAINING PROGRAM

## 2023-02-06 PROCEDURE — 1159F MED LIST DOCD IN RCRD: CPT | Mod: CPTII,GC,S$GLB, | Performed by: STUDENT IN AN ORGANIZED HEALTH CARE EDUCATION/TRAINING PROGRAM

## 2023-02-06 PROCEDURE — 1101F PT FALLS ASSESS-DOCD LE1/YR: CPT | Mod: CPTII,GC,S$GLB, | Performed by: STUDENT IN AN ORGANIZED HEALTH CARE EDUCATION/TRAINING PROGRAM

## 2023-02-06 PROCEDURE — 83520 IMMUNOASSAY QUANT NOS NONAB: CPT | Performed by: STUDENT IN AN ORGANIZED HEALTH CARE EDUCATION/TRAINING PROGRAM

## 2023-02-06 RX ORDER — FLUTICASONE PROPIONATE 50 MCG
SPRAY, SUSPENSION (ML) NASAL
COMMUNITY
Start: 2022-11-18 | End: 2023-05-18 | Stop reason: SDUPTHER

## 2023-02-06 RX ORDER — PREGABALIN 150 MG/1
150 CAPSULE ORAL 2 TIMES DAILY
Qty: 60 CAPSULE | Refills: 2 | Status: SHIPPED | OUTPATIENT
Start: 2023-02-06

## 2023-02-06 NOTE — PROGRESS NOTES
Subjective:       Patient ID: Pili Teixeira is a 77 y.o. female.                                                                                    Chief Complaint: fibromyalgia  HPI    Pili Teixeira is a 77 y.o. female with Diabetes type 2, HTN, REJI, chronic thrombocytopenia, CAD, TIA in 2013, remote history of DVT, and PVD presents for evaluation of diffuse pain.     She endorses diffuse pain for the past few years. She feels like it has worsened since she had COVID in 4/2022. She reports intermittent swelling in her knees. She endorses decreased appetite. She also has dry eyes and dry mouth. She takes daily eye drops and dry mouth. She gets SOB and chest pain with mild activity. She is scheduled to see cardiology in 3/2023. She had had a rash in the past 2 week. She had 2 miscarriage in the 1st trimester.   She has chronic neck and back pain for which she follows with Dr. Alexander in Pain management and takes percocet 4 times daily. She had a hernia and is seeing general surgery and is waiting on EDG and colonoscopy.     She was seen by Dr. Monique in [place] in the past where she was diagnosed with Sjogren's and OA.  She was most recently seen by Dr. Jacobson in [place] where she was diagnosed with Sjogren's and fibromyalgia.  Gabapentin was discontinued at that time due to lack of effect and she was started on Lyrica 75 mg BID.  She does not feel that this has helped.    She sees Dr. Huggins in Hematology for chronic thrombocytopenia and anemia.  She was placed on IV and was noted to have a good response in her anemia. Per note, there were plans for possible bone marrow biopsy if anemia does not improve or additional scans for continuing loss.  Rheumatology evaluation was requested for reported history of hematuria and elevated ferritin.  She follows with GI for epigastric pain with eating, weight loss, iron deficiency anemia.  There were plans to schedule EGD and colonoscopy in  "future.      Serologies:  Positive: JOAO 1:320 (negative panel), RF 14, ESR 25, ferritin 461  Negative/Normal: CCP, CRP    Rheum ROS:  Positive: diffuse whole body pain, dry eyes, dry mouth, diffuse alopecia, raynaud's, decreased appetite,chest pain and SOB with mild exertion, abdominal bloating  Negative: joint swelling, history of blood clots, rash, pleurisy, fever, dysphagia, vision problems                                                                                                                                                                                                                                Objective:   /76   Pulse 64   Ht 5' 3" (1.6 m)   Wt 60.8 kg (134 lb 0.6 oz)   BMI 23.74 kg/m²      Physical Exam   Constitutional: No distress.   HENT:   Head: Normocephalic and atraumatic.   Eyes: Conjunctivae are normal.   Cardiovascular: Normal rate, regular rhythm and normal heart sounds. Exam reveals no friction rub.   No murmur heard.  Pulmonary/Chest: Effort normal and breath sounds normal. She has no wheezes. She has no rales.   Abdominal: Soft. Bowel sounds are normal. There is no abdominal tenderness. There is no rebound.   Musculoskeletal:      Comments: Multiple tender points on exam in upper and lower extremities.  No synovitis, dactylitis, or enthesitis.   Neurological: She is alert.   Skin: Skin is warm and dry. She is not diaphoretic.      Fibromyalgia:  Widespread pain index  Note the areas which the patient has had pain over the last week:                   Shoulder-girdle, left  +1               Shoulder-girdle, right  +1                         Upper arm left    +1                       Upper arm right   +1                         Lower arm left   +1                       Lower arm right   +1    Hip (buttock, trochanter) left   +1  Hip (buttock, trochanter) right   +1                           Upper leg, left  +1                         Upper leg, right  +1                       "     Lower leg, left  +1                         Lower leg, right   +1                                     Jaw, left  +1                                   Jaw, right  +1                                        Chest   +1                                  Abdomen  +1                               Upper back   +1                              Lower back   +1                                        Neck    +1  Score will be from 0-19: 19                                         Symptom severity score  Fatigue  3  Waking Unrefreshed  3  Cognitive Symptoms  3   0 = no problem, 1=slight or mild problem 2= moderate; considerable problems often present and/or at a moderate level, 3 = severe, pervasive, continuous, life disturbing problem  For each of the 3 symptoms, indicate the level of severity over the past week using the Scale.  The symptom severity score is the sum of the severity of the 3 symptoms (fatigue, waking unrefreshed, and cognitive symptoms) plus the number of the following symptoms occurring during the previous 6 months:   Headaches  Pain or cramps in the lower abdomen  1  Depression  The final score is between 0 and 12    Total: WPI 19, SSS 10                                      Criteria  Patient has fibromyalgia if the following 3 conditions are met:  1.  Widespread pain index greater than or equal to 7 and symptom severity score greater than or equal to 5 or widespread pain index between 3- 6, and symptom severity score greater than or equal to 9.    2.  Symptoms have been present in a similar level for at least 3 months  3.  The patient does not have a disorder that would otherwise sufficiently explain the pain       Assessment:       1. Positive JOAO (antinuclear antibody)    2. Rheumatoid arthritis involving both hands with positive rheumatoid factor    3. Osteoarthritis, unspecified osteoarthritis type, unspecified site    4. Fibromyalgia    5. Rheumatoid factor positive    6. Fatigue, unspecified type         76yo F with Diabetes type 2, HTN, REJI, chronic thrombocytopenia, CAD, TIA in 2013, remote history of DVT, and PVD presents for evaluation of diffuse pain. +dry eyes/mouth, +raynaud's (?), +chronic diffuse alopecia. No inflammatory arthritis on exam.  Serologies with positive JOAO and borderline rheumatoid factor.  Findings consistent with fibromyalgia (WPI 19, SSS 10).  Will increase Lyrica to 150 mg BID.  Suspicion was raised by Hematology elevated ferritin and history of hematuria could represent an autoimmune versus vasculitic process.  However, on chart review it appears that she is only ever had scant hematuria which has since resolved and unlikely to a vascular process.  Low suspicion for vasculitis however will get serologies today.  Will get other lupus serologies today as well.  Will add scleroderma serologies given reported history of Raynaud's.    Plan:       Problem List Items Addressed This Visit          Immunology/Multi System    Rheumatoid arthritis involving both hands with positive rheumatoid factor       Orthopedic    Fibromyalgia    Relevant Medications    pregabalin (LYRICA) 150 MG capsule     Other Visit Diagnoses       Positive JOAO (antinuclear antibody)    -  Primary    Relevant Orders    C3 Complement    C4 Complement    Beta-2 Glycoprotein Abs (IgA, IgG, IgM)    Anti-DNA Ab, Double-Stranded    Urinalysis    Protein/Creatinine Ratio, Urine    DRVVT    CBC Auto Differential    Comprehensive Metabolic Panel    Cardiolipin antibody    Anti-scleroderma antibody    RNA polymerase III Ab, IgG    PM-Scl Antibody by Immunodiffusion    Anti Sm/RNP Antibody    Th/To Antibody    MyoMarker Panel 3    ANTI-NEUTROPHILIC CYTOPLASMIC ANTIBODY    Myeloperoxidase Antibody (MPO)    Proteinase 3 Autoantibodies    Osteoarthritis, unspecified osteoarthritis type, unspecified site        Relevant Medications    pregabalin (LYRICA) 150 MG capsule    Rheumatoid factor positive        Fatigue, unspecified type         Relevant Medications    pregabalin (LYRICA) 150 MG capsule          - increase lyrica from 75mg BID to 150 mg BID for fibromyalgia symptoms; discussed lifestyle changes with exercise, meditation, and anti-inflammatory diet  - will obtain C3, C4, APS labs, dsDNA, CMP, CBC, scleroderma antibodies, ANCA, MPO, PR3    Follow up in 3 months    Patient seen and evaluated with Dr. Vega

## 2023-02-07 LAB
DSDNA AB SER-ACNC: NORMAL [IU]/ML
ENA SCL70 IGG SER IA-ACNC: 0.3 U
PROTEINASE3 IGG SER-ACNC: <0.2 U

## 2023-02-07 NOTE — PROGRESS NOTES
I have personally reviewed the history, confirmed exam findings, and discussed assessment and plan with fellow.  Patient with fibromyalgia. Discussed importance of proper nutrition(Mediterranean diet) sleep hygiene, stress management, aerobic exercise, Julian Chi, yoga. And fibromyalgia packet provided. Will also further assess +JOAO with studies as ordered.

## 2023-02-08 LAB
ANTI SM/RNP ANTIBODY: 0.1 RATIO (ref 0–0.99)
ANTI-SM/RNP INTERPRETATION: NEGATIVE

## 2023-02-09 LAB
ANCA AB TITR SER IF: NORMAL TITER
B2 GLYCOPROT1 IGA SER QL: <9 SAU
B2 GLYCOPROT1 IGG SER QL: <9 SGU
B2 GLYCOPROT1 IGM SER QL: 9 SMU
CARDIOLIPIN IGG SER IA-ACNC: <9.4 GPL (ref 0–14.99)
CARDIOLIPIN IGM SER IA-ACNC: 14.7 MPL (ref 0–12.49)
ENA SCL70 AB SER-ACNC: 37 UNITS
MYELOPEROXIDASE AB SER-ACNC: 4 UNITS
P-ANCA TITR SER IF: NORMAL TITER

## 2023-02-10 ENCOUNTER — TELEPHONE (OUTPATIENT)
Dept: RHEUMATOLOGY | Facility: CLINIC | Age: 78
End: 2023-02-10
Payer: MEDICARE

## 2023-02-10 DIAGNOSIS — R76.8 SCL-70 ANTIBODY POSITIVE: Primary | ICD-10-CM

## 2023-02-10 DIAGNOSIS — M35.89 OTHER SPECIFIED SYSTEMIC INVOLVEMENT OF CONNECTIVE TISSUE: ICD-10-CM

## 2023-02-10 NOTE — TELEPHONE ENCOUNTER
Called patient to let know about positive blood test and need to order CT chest, PFTs, and echo. Discussed that office will reach out to schedule these. She voiced understanding. Follow up already schedule in April 2023.    LBR

## 2023-02-10 NOTE — TELEPHONE ENCOUNTER
----- Message from Slava Vega MD sent at 2/9/2023 12:59 PM CST -----  Yamini, she will need HRCT chest  PFTs and TTE given the + Scl-70 and nailfold capillaries at upcoming visit. Please have these scheduled and with f/u after. RJQ  ----- Message -----  From: Chance, [facility]  Sent: 2/6/2023   1:13 PM CST  To: Slava Vega MD

## 2023-02-13 ENCOUNTER — TELEPHONE (OUTPATIENT)
Dept: RHEUMATOLOGY | Facility: CLINIC | Age: 78
End: 2023-02-13
Payer: MEDICARE

## 2023-02-13 LAB
APTT IMM NP PPP: ABNORMAL SEC (ref 32–48)
APTT P HEP NEUT PPP: ABNORMAL SEC (ref 32–48)
CONFIRM APTT STACLOT: ABNORMAL
DRVVT SCREEN TO CONFIRM RATIO: ABNORMAL RATIO
LA 3 SCREEN W REFLEX-IMP: ABNORMAL
LA NT DPL PPP QL: ABNORMAL
MIXING DRVVT: ABNORMAL SEC (ref 33–44)
PROTHROMBIN TIME: 12.5 SEC (ref 12–15.5)
REPTILASE TIME: ABNORMAL SEC
SCREEN APTT: 42 SEC (ref 32–48)
SCREEN DRVVT: 30 SEC (ref 33–44)
THROMBIN TIME: ABNORMAL SEC (ref 14.7–19.5)

## 2023-02-20 LAB — RNAP III AB SER-ACNC: 21 UNITS

## 2023-02-22 LAB
ANTI-PM/SCL AB: <20 UNITS
ANTI-SS-A 52 KD AB, IGG: 22 UNITS
EJ AB SER QL: NEGATIVE
ENA JO1 AB SER IA-ACNC: <20 UNITS
ENA SM+RNP AB SER IA-ACNC: <20 UNITS
FIBRILLARIN (U3 RNP): NEGATIVE
KU AB SER QL: NEGATIVE
MDA-5 (P140): <20 UNITS
MI2 AB SER QL: NEGATIVE
NXP-2 (P140): <20 UNITS
OJ AB SER QL: NEGATIVE
PL12 AB SER QL: NEGATIVE
PL7 AB SER QL: NEGATIVE
SRP AB SERPL QL: NEGATIVE
TH/TO: NEGATIVE
TIF1 GAMMA (P155/140): <20 UNITS
U2 SNRNP: NEGATIVE

## 2023-02-28 ENCOUNTER — EXTERNAL CHRONIC CARE MANAGEMENT (OUTPATIENT)
Dept: PRIMARY CARE CLINIC | Facility: CLINIC | Age: 78
End: 2023-02-28
Payer: MEDICARE

## 2023-02-28 PROCEDURE — 99490 PR CHRONIC CARE MGMT, 1ST 20 MIN: ICD-10-PCS | Mod: S$GLB,,, | Performed by: FAMILY MEDICINE

## 2023-02-28 PROCEDURE — 99490 CHRNC CARE MGMT STAFF 1ST 20: CPT | Mod: S$GLB,,, | Performed by: FAMILY MEDICINE

## 2023-03-08 ENCOUNTER — HOSPITAL ENCOUNTER (OUTPATIENT)
Dept: CARDIOLOGY | Facility: HOSPITAL | Age: 78
Discharge: HOME | End: 2023-03-08
Attending: STUDENT IN AN ORGANIZED HEALTH CARE EDUCATION/TRAINING PROGRAM
Payer: MEDICARE

## 2023-03-08 ENCOUNTER — HOSPITAL ENCOUNTER (OUTPATIENT)
Dept: RADIOLOGY | Facility: HOSPITAL | Age: 78
Discharge: HOME | End: 2023-03-08
Attending: STUDENT IN AN ORGANIZED HEALTH CARE EDUCATION/TRAINING PROGRAM
Payer: MEDICARE

## 2023-03-08 ENCOUNTER — HOSPITAL ENCOUNTER (OUTPATIENT)
Dept: PULMONOLOGY | Facility: CLINIC | Age: 78
Discharge: HOME | End: 2023-03-08
Payer: MEDICARE

## 2023-03-08 VITALS
DIASTOLIC BLOOD PRESSURE: 75 MMHG | HEIGHT: 63 IN | HEART RATE: 76 BPM | BODY MASS INDEX: 23.74 KG/M2 | SYSTOLIC BLOOD PRESSURE: 110 MMHG | WEIGHT: 134 LBS

## 2023-03-08 DIAGNOSIS — R76.8 SCL-70 ANTIBODY POSITIVE: ICD-10-CM

## 2023-03-08 DIAGNOSIS — M35.89 OTHER SPECIFIED SYSTEMIC INVOLVEMENT OF CONNECTIVE TISSUE: ICD-10-CM

## 2023-03-08 LAB
ASCENDING AORTA: 2.95 CM
AV INDEX (PROSTH): 0.7
AV MEAN GRADIENT: 2 MMHG
AV PEAK GRADIENT: 4 MMHG
AV VALVE AREA: 2.77 CM2
AV VELOCITY RATIO: 0.68
BSA FOR ECHO PROCEDURE: 1.64 M2
CV ECHO LV RWT: 0.41 CM
DOP CALC AO PEAK VEL: 0.96 M/S
DOP CALC AO VTI: 17.39 CM
DOP CALC LVOT AREA: 3.9 CM2
DOP CALC LVOT DIAMETER: 2.24 CM
DOP CALC LVOT PEAK VEL: 0.65 M/S
DOP CALC LVOT STROKE VOLUME: 48.25 CM3
DOP CALCLVOT PEAK VEL VTI: 12.25 CM
E WAVE DECELERATION TIME: 323.32 MSEC
E/A RATIO: 0.5
E/E' RATIO: 8 M/S
ECHO LV POSTERIOR WALL: 0.8 CM (ref 0.6–1.1)
EJECTION FRACTION: 60 %
FRACTIONAL SHORTENING: 24 % (ref 28–44)
INTERVENTRICULAR SEPTUM: 0.68 CM (ref 0.6–1.1)
LA MAJOR: 4.14 CM
LA MINOR: 4.05 CM
LA WIDTH: 3.4 CM
LEFT ATRIUM SIZE: 2.44 CM
LEFT ATRIUM VOLUME INDEX MOD: 17.5 ML/M2
LEFT ATRIUM VOLUME INDEX: 17.7 ML/M2
LEFT ATRIUM VOLUME MOD: 28.55 CM3
LEFT ATRIUM VOLUME: 28.87 CM3
LEFT INTERNAL DIMENSION IN SYSTOLE: 2.96 CM (ref 2.1–4)
LEFT VENTRICLE DIASTOLIC VOLUME INDEX: 40.75 ML/M2
LEFT VENTRICLE DIASTOLIC VOLUME: 66.42 ML
LEFT VENTRICLE MASS INDEX: 50 G/M2
LEFT VENTRICLE SYSTOLIC VOLUME INDEX: 20.8 ML/M2
LEFT VENTRICLE SYSTOLIC VOLUME: 33.85 ML
LEFT VENTRICULAR INTERNAL DIMENSION IN DIASTOLE: 3.91 CM (ref 3.5–6)
LEFT VENTRICULAR MASS: 81.15 G
LV LATERAL E/E' RATIO: 6.67 M/S
LV SEPTAL E/E' RATIO: 10 M/S
MV PEAK A VEL: 0.8 M/S
MV PEAK E VEL: 0.4 M/S
MV STENOSIS PRESSURE HALF TIME: 93.76 MS
MV VALVE AREA P 1/2 METHOD: 2.35 CM2
RA MAJOR: 3.97 CM
RA PRESSURE: 3 MMHG
RA WIDTH: 3.82 CM
RIGHT VENTRICULAR END-DIASTOLIC DIMENSION: 3.63 CM
RV TISSUE DOPPLER FREE WALL SYSTOLIC VELOCITY 1 (APICAL 4 CHAMBER VIEW): 7.78 CM/S
SINUS: 3.12 CM
STJ: 2.49 CM
TDI LATERAL: 0.06 M/S
TDI SEPTAL: 0.04 M/S
TDI: 0.05 M/S
TRICUSPID ANNULAR PLANE SYSTOLIC EXCURSION: 1.9 CM

## 2023-03-08 PROCEDURE — 93306 ECHO (CUPID ONLY): ICD-10-PCS | Mod: 26,,, | Performed by: INTERNAL MEDICINE

## 2023-03-08 PROCEDURE — 71250 CT THORAX DX C-: CPT | Mod: TC

## 2023-03-08 PROCEDURE — 94727 GAS DIL/WSHOT DETER LNG VOL: CPT | Mod: S$GLB,,, | Performed by: INTERNAL MEDICINE

## 2023-03-08 PROCEDURE — 94010 BREATHING CAPACITY TEST: CPT | Mod: S$GLB,,, | Performed by: INTERNAL MEDICINE

## 2023-03-08 PROCEDURE — 94729 PR C02/MEMBANE DIFFUSE CAPACITY: ICD-10-PCS | Mod: S$GLB,,, | Performed by: INTERNAL MEDICINE

## 2023-03-08 PROCEDURE — 71250 CT CHEST WITHOUT CONTRAST: ICD-10-PCS | Mod: 26,,, | Performed by: RADIOLOGY

## 2023-03-08 PROCEDURE — 94010 BREATHING CAPACITY TEST: ICD-10-PCS | Mod: S$GLB,,, | Performed by: INTERNAL MEDICINE

## 2023-03-08 PROCEDURE — 71250 CT THORAX DX C-: CPT | Mod: 26,,, | Performed by: RADIOLOGY

## 2023-03-08 PROCEDURE — 94727 PR PULM FUNCTION TEST BY GAS: ICD-10-PCS | Mod: S$GLB,,, | Performed by: INTERNAL MEDICINE

## 2023-03-08 PROCEDURE — 93306 TTE W/DOPPLER COMPLETE: CPT | Mod: 26,,, | Performed by: INTERNAL MEDICINE

## 2023-03-08 PROCEDURE — 94729 DIFFUSING CAPACITY: CPT | Mod: S$GLB,,, | Performed by: INTERNAL MEDICINE

## 2023-03-08 PROCEDURE — 93306 TTE W/DOPPLER COMPLETE: CPT

## 2023-03-10 ENCOUNTER — TELEPHONE (OUTPATIENT)
Dept: GASTROENTEROLOGY | Facility: CLINIC | Age: 78
End: 2023-03-10
Payer: MEDICARE

## 2023-03-10 NOTE — TELEPHONE ENCOUNTER
----- Message from Allegra Lagunas sent at 3/10/2023  8:16 AM CST -----  Contact: Self  Type:  Patient Requesting A Return Call    Who Called:  Patient  Who Left Message for Patient:  N/A- needs to speak to nurse  Does the patient know what this is regarding?:  juan scheduled Monday  Best Call Back Number:  [phone]  Additional Information:  Please call pt back to advise. Thank You.

## 2023-03-10 NOTE — TELEPHONE ENCOUNTER
Returned call to the patient, patient states that she needs her prep sent to [facility] in [place] per patient's request.

## 2023-03-17 ENCOUNTER — LAB VISIT (OUTPATIENT)
Dept: LAB | Facility: HOSPITAL | Age: 78
End: 2023-03-17
Attending: INTERNAL MEDICINE
Payer: MEDICARE

## 2023-03-17 DIAGNOSIS — E78.5 HYPERLIPIDEMIA ASSOCIATED WITH TYPE 2 DIABETES MELLITUS: ICD-10-CM

## 2023-03-17 DIAGNOSIS — D50.0 IRON DEFICIENCY ANEMIA DUE TO CHRONIC BLOOD LOSS: ICD-10-CM

## 2023-03-17 DIAGNOSIS — D69.6 THROMBOCYTOPENIA: Chronic | ICD-10-CM

## 2023-03-17 DIAGNOSIS — E11.69 HYPERLIPIDEMIA ASSOCIATED WITH TYPE 2 DIABETES MELLITUS: ICD-10-CM

## 2023-03-17 DIAGNOSIS — E53.8 B12 DEFICIENCY: ICD-10-CM

## 2023-03-17 DIAGNOSIS — M35.00 SJOGREN'S SYNDROME, WITH UNSPECIFIED ORGAN INVOLVEMENT: ICD-10-CM

## 2023-03-17 LAB
ALBUMIN SERPL BCP-MCNC: 3.8 G/DL (ref 3.5–5.2)
ALP SERPL-CCNC: 56 U/L (ref 55–135)
ALT SERPL W/O P-5'-P-CCNC: 11 U/L (ref 10–44)
ANION GAP SERPL CALC-SCNC: 11 MMOL/L (ref 8–16)
AST SERPL-CCNC: 16 U/L (ref 10–40)
BASOPHILS # BLD AUTO: 0.06 K/UL (ref 0–0.2)
BASOPHILS NFR BLD: 0.9 % (ref 0–1.9)
BILIRUB SERPL-MCNC: 0.3 MG/DL (ref 0.1–1)
BUN SERPL-MCNC: 18 MG/DL (ref 8–23)
CALCIUM SERPL-MCNC: 9.7 MG/DL (ref 8.7–10.5)
CHLORIDE SERPL-SCNC: 102 MMOL/L (ref 95–110)
CO2 SERPL-SCNC: 27 MMOL/L (ref 23–29)
CREAT SERPL-MCNC: 0.9 MG/DL (ref 0.5–1.4)
DIFFERENTIAL METHOD: ABNORMAL
EOSINOPHIL # BLD AUTO: 0.1 K/UL (ref 0–0.5)
EOSINOPHIL NFR BLD: 1.5 % (ref 0–8)
ERYTHROCYTE [DISTWIDTH] IN BLOOD BY AUTOMATED COUNT: 13.2 % (ref 11.5–14.5)
EST. GFR  (NO RACE VARIABLE): >60 ML/MIN/1.73 M^2
FERRITIN SERPL-MCNC: 265 NG/ML (ref 20–300)
GLUCOSE SERPL-MCNC: 87 MG/DL (ref 70–110)
HCT VFR BLD AUTO: 35.1 % (ref 37–48.5)
HGB BLD-MCNC: 10.9 G/DL (ref 12–16)
IMM GRANULOCYTES # BLD AUTO: 0.02 K/UL (ref 0–0.04)
IMM GRANULOCYTES NFR BLD AUTO: 0.3 % (ref 0–0.5)
IRON SERPL-MCNC: 49 UG/DL (ref 30–160)
LYMPHOCYTES # BLD AUTO: 2.9 K/UL (ref 1–4.8)
LYMPHOCYTES NFR BLD: 44.1 % (ref 18–48)
MCH RBC QN AUTO: 28.2 PG (ref 27–31)
MCHC RBC AUTO-ENTMCNC: 31.1 G/DL (ref 32–36)
MCV RBC AUTO: 91 FL (ref 82–98)
MONOCYTES # BLD AUTO: 0.6 K/UL (ref 0.3–1)
MONOCYTES NFR BLD: 8.8 % (ref 4–15)
NEUTROPHILS # BLD AUTO: 2.9 K/UL (ref 1.8–7.7)
NEUTROPHILS NFR BLD: 44.4 % (ref 38–73)
NRBC BLD-RTO: 0 /100 WBC
PLATELET # BLD AUTO: 193 K/UL (ref 150–450)
PLATELET BLD QL SMEAR: ABNORMAL
PMV BLD AUTO: ABNORMAL FL (ref 9.2–12.9)
POTASSIUM SERPL-SCNC: 3.8 MMOL/L (ref 3.5–5.1)
PROT SERPL-MCNC: 6.6 G/DL (ref 6–8.4)
RBC # BLD AUTO: 3.87 M/UL (ref 4–5.4)
SATURATED IRON: 19 % (ref 20–50)
SODIUM SERPL-SCNC: 140 MMOL/L (ref 136–145)
TOTAL IRON BINDING CAPACITY: 258 UG/DL (ref 250–450)
TRANSFERRIN SERPL-MCNC: 174 MG/DL (ref 200–375)
VIT B12 SERPL-MCNC: >2000 PG/ML (ref 210–950)
WBC # BLD AUTO: 6.57 K/UL (ref 3.9–12.7)

## 2023-03-17 PROCEDURE — 84165 PATHOLOGIST INTERPRETATION SPE: ICD-10-PCS | Mod: 26,,, | Performed by: PATHOLOGY

## 2023-03-17 PROCEDURE — 85025 COMPLETE CBC W/AUTO DIFF WBC: CPT | Performed by: INTERNAL MEDICINE

## 2023-03-17 PROCEDURE — 86334 IMMUNOFIX E-PHORESIS SERUM: CPT | Performed by: INTERNAL MEDICINE

## 2023-03-17 PROCEDURE — 84165 PROTEIN E-PHORESIS SERUM: CPT | Mod: 26,,, | Performed by: PATHOLOGY

## 2023-03-17 PROCEDURE — 86334 PATHOLOGIST INTERPRETATION IFE: ICD-10-PCS | Mod: 26,,, | Performed by: PATHOLOGY

## 2023-03-17 PROCEDURE — 84165 PROTEIN E-PHORESIS SERUM: CPT | Performed by: INTERNAL MEDICINE

## 2023-03-17 PROCEDURE — 84466 ASSAY OF TRANSFERRIN: CPT | Performed by: INTERNAL MEDICINE

## 2023-03-17 PROCEDURE — 86334 IMMUNOFIX E-PHORESIS SERUM: CPT | Mod: 26,,, | Performed by: PATHOLOGY

## 2023-03-17 PROCEDURE — 83521 IG LIGHT CHAINS FREE EACH: CPT | Performed by: INTERNAL MEDICINE

## 2023-03-17 PROCEDURE — 82728 ASSAY OF FERRITIN: CPT | Performed by: INTERNAL MEDICINE

## 2023-03-17 PROCEDURE — 36415 COLL VENOUS BLD VENIPUNCTURE: CPT | Performed by: INTERNAL MEDICINE

## 2023-03-17 PROCEDURE — 80053 COMPREHEN METABOLIC PANEL: CPT | Performed by: INTERNAL MEDICINE

## 2023-03-17 PROCEDURE — 82607 VITAMIN B-12: CPT | Performed by: INTERNAL MEDICINE

## 2023-03-20 LAB
ALBUMIN SERPL ELPH-MCNC: 3.84 G/DL (ref 3.35–5.55)
ALPHA1 GLOB SERPL ELPH-MCNC: 0.26 G/DL (ref 0.17–0.41)
ALPHA2 GLOB SERPL ELPH-MCNC: 0.86 G/DL (ref 0.43–0.99)
B-GLOBULIN SERPL ELPH-MCNC: 0.57 G/DL (ref 0.5–1.1)
GAMMA GLOB SERPL ELPH-MCNC: 0.76 G/DL (ref 0.67–1.58)
INTERPRETATION SERPL IFE-IMP: NORMAL
KAPPA LC SER QL IA: 2.64 MG/DL (ref 0.33–1.94)
KAPPA LC/LAMBDA SER IA: 1.5 (ref 0.26–1.65)
LAMBDA LC SER QL IA: 1.76 MG/DL (ref 0.57–2.63)
PROT SERPL-MCNC: 6.3 G/DL (ref 6–8.4)

## 2023-03-21 LAB
PATHOLOGIST INTERPRETATION IFE: NORMAL
PATHOLOGIST INTERPRETATION SPE: NORMAL

## 2023-03-23 ENCOUNTER — PATIENT MESSAGE (OUTPATIENT)
Dept: HEMATOLOGY/ONCOLOGY | Facility: CLINIC | Age: 78
End: 2023-03-23
Payer: MEDICARE

## 2023-03-24 ENCOUNTER — OFFICE VISIT (OUTPATIENT)
Dept: HEMATOLOGY/ONCOLOGY | Facility: CLINIC | Age: 78
End: 2023-03-24
Payer: MEDICARE

## 2023-03-24 DIAGNOSIS — D50.0 IRON DEFICIENCY ANEMIA DUE TO CHRONIC BLOOD LOSS: ICD-10-CM

## 2023-03-24 DIAGNOSIS — D69.6 THROMBOCYTOPENIA: Chronic | ICD-10-CM

## 2023-03-24 DIAGNOSIS — D64.9 ANEMIA, UNSPECIFIED TYPE: Primary | ICD-10-CM

## 2023-03-24 DIAGNOSIS — Z86.73 HISTORY OF TIA (TRANSIENT ISCHEMIC ATTACK): ICD-10-CM

## 2023-03-24 DIAGNOSIS — J41.0 SIMPLE CHRONIC BRONCHITIS: ICD-10-CM

## 2023-03-24 PROCEDURE — 99214 OFFICE O/P EST MOD 30 MIN: CPT | Mod: 95,,, | Performed by: INTERNAL MEDICINE

## 2023-03-24 PROCEDURE — 99214 PR OFFICE/OUTPT VISIT, EST, LEVL IV, 30-39 MIN: ICD-10-PCS | Mod: 95,,, | Performed by: INTERNAL MEDICINE

## 2023-03-24 PROCEDURE — 1123F PR ADV CARE PLAN DISCUSSED, PLAN OR SURROGATE DOCUMENTED: ICD-10-PCS | Mod: CPTII,95,, | Performed by: INTERNAL MEDICINE

## 2023-03-24 PROCEDURE — 1123F ACP DISCUSS/DSCN MKR DOCD: CPT | Mod: CPTII,95,, | Performed by: INTERNAL MEDICINE

## 2023-03-24 NOTE — PROGRESS NOTES
Subjective:    The patient location is: home  Visit type: Virtual visit with synchronous audio and video  Face-to-face or time spent with patient on the encounter: 20 min  Total time spent on and for  this encounter which includes non face-to-face time preparing to see patient, review of tests, obtaining and or reviewing separately obtained records documenting clinical information in the electronic or other health records, independently interpreting results which is not separately reported ,and communicating results to the patient/family/caregiver and in care coordination and treatment planning/communicating with pharmacy for prescriptions/addressing social needs/arranging follow-up and or referrals :25 min    Each patient I provide medical services by telemedicine is:  (1) informed of the relationship between the physician and patient and the respective role of any other health care provider with respect to management of the patient; and (2) notified that he or she may decline to receive medical services by telemedicine and may withdraw from such care at any time.  This is a video visit therefore some elements of the physical exam such as vital signs, heart sounds are breath sounds are not included and may be included if found in recent clinic notes of other providers assessing same patient. Any symptoms or signs that were visualized were stated by the patient may be included in this note.    Patient ID: Pili Teixeira is a 77 y.o. female.    Chief Complaint:  Evaluation of anemia  HPI   Patient has seen me in 2016 and then was lost to follow-up.  At that time shows following for thrombocytopenia.    Review of Systems    Patient states she feels very tired sleeps a lot   Has lost weight  Had COVID multiple times T3-4 episodes that she can recall.  Since 2000 20 last large amount of weight post coronary and has not gained it back she feels nauseated.  Has had multiple GI scopes and evaluations.   +chest pain after  eating  palpitations dizziness or passing out episodes  Denies headaches blurred vision   Denies abdominal pain   Occasional diarrhea reported   No feet swelling  States sometimes urine may be discolored but that could be from vitamins according to patient   Does not recall seeing blood in urine or stool      Past Medical History:   Diagnosis Date    Allergy     Anemia 2012    with thrombocytopenia; iron deficiency    Arthritis     Cervical spinal stenosis     with neuropathy, chronic neck,back,leg pain    Colon polyp     COPD (chronic obstructive pulmonary disease)     Coronary artery disease     COVID 4/27/2022    COVID-19     Diabetes mellitus     , sugars run 190's per patient    Diastolic dysfunction 7/13/2015    Diverticulitis     Diverticulosis     Hx diverticulitis,still has chronic diarrhea, abd pain    Dyspnea on exertion     sometimes with nausea, fatigue,dizziness, had cardiac cath June 2012, normal    Fibromyalgia     Fracture 2012    right thumb    GERD (gastroesophageal reflux disease)     Feb 2012, Hx H Pylori    Glaucoma     had LAser surgey, on no eye drops    HEARING LOSS     Hemangioma     fatty liver    History of earache     frequent    History of TIA (transient ischemic attack) 5/28/2013    Hyperlipidemia     Hypertension     Hypertension associated with diabetes 3/14/2017    Laryngeal polyp     Myocardial infarction 2006 last    also MI in distant past    REJI (obstructive sleep apnea)     does not use CPAP    Otitis media     Pneumonia due to COVID-19 virus 2/20/2021    Renal stone     Sjogren's syndrome     SOB (shortness of breath) 6/8/2012    2012 [facility] 1. Normal coronary arteries. 2. Normal single renal arteries bilaterally. 3. Diastolic dysfunction.     Stroke     TIA, now on Plavix    TIA (transient ischemic attack)     TMJ disorder     Type II or unspecified type diabetes mellitus without mention of complication, uncontrolled 5/8/2014    UTI (lower urinary tract infection)     frequent     Venous insufficiency     with Hx blood clot in leg     Past Surgical History:   Procedure Laterality Date    ABDOMINAL SURGERY  2010 x2    expoloratory lap for pelvic cyst,adhesions; partial colonectomy     adhesiolysis   10/11/2012    CARDIAC CATHETERIZATION      no current blockages.    CHOLECYSTECTOMY      COLON SURGERY      r/t diverticuli    COLONOSCOPY  08/2014    repeat in 5 years    COLONOSCOPY N/A 1/7/2020    Procedure: COLONOSCOPY;  Surgeon: Kb Nguyen MD;  Location: [facility];  Service: Endoscopy;  Laterality: N/A;    COLONOSCOPY N/A 3/13/2023    Procedure: COLONOSCOPY;  Surgeon: Jarrod Frost MD;  Location: [facility];  Service: Endoscopy;  Laterality: N/A;    ENDOSCOPIC ULTRASOUND OF UPPER GASTROINTESTINAL TRACT N/A 1/13/2021    Procedure: ULTRASOUND, UPPER GI TRACT, ENDOSCOPIC;  Surgeon: Sonido Castellano III, MD;  Location: [facility];  Service: Endoscopy;  Laterality: N/A;    EPIDURAL BLOCK INJECTION  5/15/12    back,neck for pain    ESOPHAGOGASTRODUODENOSCOPY N/A 1/7/2020    Procedure: EGD (ESOPHAGOGASTRODUODENOSCOPY);  Surgeon: Kb Nguyen MD;  Location: [facility];  Service: Endoscopy;  Laterality: N/A;    ESOPHAGOGASTRODUODENOSCOPY N/A 1/13/2021    Procedure: EGD (ESOPHAGOGASTRODUODENOSCOPY);  Surgeon: Sonido Castellano III, MD;  Location: [facility];  Service: Endoscopy;  Laterality: N/A;    ESOPHAGOGASTRODUODENOSCOPY N/A 3/13/2023    Procedure: EGD (ESOPHAGOGASTRODUODENOSCOPY);  Surgeon: Jarrod Frost MD;  Location: [facility];  Service: Endoscopy;  Laterality: N/A;    EXPLORATORY LAPAROTOMY W/ BOWEL RESECTION  10/11/2012    EYE SURGERY      Laser for glaucoma    EYE SURGERY  May 2012    cataracts    HYSTERECTOMY      LARYNX SURGERY      polypectomy    OOPHORECTOMY      TONSILLECTOMY      with adenoidectomy    UPPER GASTROINTESTINAL ENDOSCOPY  12/2015    VASCULAR SURGERY      laser to varicose vein leg     Family History   Problem Relation Age of Onset    Throat  cancer Mother     Cancer Mother     Diabetes Mellitus Mother     Stroke Father     Hypertension Father     Heart disease Father     Diverticulitis Sister     Throat cancer Brother     Cancer Brother     Thyroid disease Daughter     Lupus Daughter     Pancreatic cancer Maternal Aunt 55    Pancreatic cancer Cousin 58    Breast cancer Neg Hx     Ovarian cancer Neg Hx     Colon cancer Neg Hx     Colon polyps Neg Hx     Celiac disease Neg Hx     Cirrhosis Neg Hx     Crohn's disease Neg Hx     Stomach cancer Neg Hx     Ulcerative colitis Neg Hx     Rectal cancer Neg Hx     Esophageal cancer Neg Hx     Inflammatory bowel disease Neg Hx     Rheum arthritis Neg Hx     Psoriasis Neg Hx     Osteoarthritis Neg Hx     Kidney disease Neg Hx     Hyperlipidemia Neg Hx     COPD Neg Hx     Depression Neg Hx     Chronic back pain Neg Hx     Asthma Neg Hx     Alcohol abuse Neg Hx       Social History     Socioeconomic History    Marital status:    Tobacco Use    Smoking status: Never    Smokeless tobacco: Never   Substance and Sexual Activity    Alcohol use: No    Drug use: Yes     Types: Oxycodone    Sexual activity: Not Currently     Birth control/protection: Surgical     Comment: hysterectomy     Social Determinants of Health     Financial Resource Strain: Medium Risk    Difficulty of Paying Living Expenses: Somewhat hard   Food Insecurity: Food Insecurity Present    Worried About Running Out of Food in the Last Year: Sometimes true    Ran Out of Food in the Last Year: Never true   Transportation Needs: No Transportation Needs    Lack of Transportation (Medical): No    Lack of Transportation (Non-Medical): No   Physical Activity: Inactive    Days of Exercise per Week: 0 days    Minutes of Exercise per Session: 0 min   Stress: No Stress Concern Present    Feeling of Stress : Only a little   Social Connections: Moderately Isolated    Frequency of Communication with Friends and Family: More than three times a week    Frequency  of Social Gatherings with Friends and Family: More than three times a week    Attends Church Services: More than 4 times per year    Active Member of Clubs or Organizations: No    Attends Club or Organization Meetings: Never    Marital Status:    Housing Stability: Low Risk     Unable to Pay for Housing in the Last Year: No    Number of Places Lived in the Last Year: 1    Unstable Housing in the Last Year: No     Review of patient's allergies indicates:   Allergen Reactions    Codeine Other (See Comments)     Chest pain    Clindamycin Other (See Comments)     Difficulty swallowing after taking, feels a something sits in epigastric area     Iodinated contrast media Hives and Rash       Current Outpatient Medications:     albuterol (PROVENTIL/VENTOLIN HFA) 90 mcg/actuation inhaler, Inhale 2 puffs into the lungs every 4 (four) hours as needed for Wheezing or Shortness of Breath. Rescue (Patient not taking: Reported on 3/10/2023), Disp: 18 g, Rfl: 0    albuterol-ipratropium (DUO-NEB) 2.5 mg-0.5 mg/3 mL nebulizer solution, Take 3 mLs by nebulization every 4 to 6 hours as needed for Wheezing. Rescue, Disp: , Rfl:     aspirin (ECOTRIN) 81 MG EC tablet, Take 81 mg by mouth once daily., Disp: , Rfl:     blood sugar diagnostic (ACCU-CHEK GUIDE TEST STRIPS) Strp, USE TO TEST BLOOD GLUCOSE ONE TIME DAILY AS DIRECTED., Disp: 100 each, Rfl: 3    blood-glucose meter kit, To check BG 2 times daily, to use with insurance preferred meter. Medical necessity., Disp: 1 each, Rfl: 0    carboxymethylcellulose sodium (LUBRICANT EYE DROPS OPHT), Apply to eye., Disp: , Rfl:     clopidogreL (PLAVIX) 75 mg tablet, Take 1 tablet (75 mg total) by mouth once daily., Disp: 90 tablet, Rfl: 3    cyanocobalamin (VITAMIN B-12) 1000 MCG tablet, Take 100 mcg by mouth once daily., Disp: , Rfl:     diclofenac sodium 1 % Gel, Apply 2 g topically once daily., Disp: , Rfl:     diltiaZEM (CARDIZEM CD) 120 MG Cp24, TAKE 1 CAPSULE BY MOUTH IN THE  EVENING (Patient not taking: Reported on 3/10/2023), Disp: 90 capsule, Rfl: 3    docusate sodium (STOOL SOFTENER ORAL), Take by mouth., Disp: , Rfl:     famotidine (PEPCID) 40 MG tablet, Take 1 tablet (40 mg total) by mouth nightly., Disp: 30 tablet, Rfl: 2    fluticasone propionate (FLONASE) 50 mcg/actuation nasal spray, USE 2 SPRAYS IN EACH NOSTRIL ONE TIME PER DAY, Disp: , Rfl:     folic acid (FOLVITE) 1 MG tablet, Take 1 tablet (1,000 mcg total) by mouth once daily., Disp: 30 tablet, Rfl: 5    ipratropium (ATROVENT) 42 mcg (0.06 %) nasal spray, 2 sprays by Nasal route 3 (three) times daily., Disp: 15 mL, Rfl: 3    Lactobac no.41/Bifidobact no.7 (PROBIOTIC-10 ORAL), Take by mouth., Disp: , Rfl:     lancets (ACCU-CHEK SOFTCLIX LANCETS) Misc, Test TWICE DAILY;Twice a day, Disp: 100 each, Rfl: 3    lancets Misc, Use to test blood glucose one (1) times daily as directed with insurance preferred meter and supplies, Disp: 100 each, Rfl: 3    loratadine (CLARITIN) 10 mg tablet, Take 10 mg by mouth once daily., Disp: , Rfl:     meloxicam (MOBIC) 7.5 MG tablet, Take 1 tablet (7.5 mg total) by mouth once daily., Disp: 90 tablet, Rfl: 1    metFORMIN (GLUCOPHAGE) 500 MG tablet, Take 1 tablet (500 mg total) by mouth 2 (two) times daily with meals., Disp: 180 tablet, Rfl: 3    multivit with min-folic acid 200 mcg Chew, Take 1 tablet by mouth once daily. , Disp: , Rfl:     NARCAN 4 mg/actuation Spry, as directed, Disp: , Rfl:     olopatadine (PATANOL) 0.1 % ophthalmic solution, Place 1 drop into both eyes daily as needed., Disp: , Rfl:     ondansetron (ZOFRAN-ODT) 4 MG TbDL, Take 1 tablet (4 mg total) by mouth every 8 (eight) hours as needed (nausea)., Disp: 30 tablet, Rfl: 1    oxyCODONE-acetaminophen (PERCOCET) 7.5-325 mg per tablet, Take 1 tablet by mouth 4 (four) times daily as needed., Disp: , Rfl:     pantoprazole (PROTONIX) 40 MG tablet, Take 1 tablet by mouth once daily, Disp: 90 tablet, Rfl: 2    pregabalin (LYRICA)  150 MG capsule, Take 1 capsule (150 mg total) by mouth 2 (two) times daily., Disp: 60 capsule, Rfl: 2    rosuvastatin (CRESTOR) 10 MG tablet, Take 1 tablet (10 mg total) by mouth every evening., Disp: 90 tablet, Rfl: 3    sodium chloride (SALINE NASAL NASL), by Nasal route., Disp: , Rfl:     TRADJENTA 5 mg Tab tablet, Take 1 tablet by mouth once daily, Disp: 90 tablet, Rfl: 0    traZODone (DESYREL) 50 MG tablet, TAKE 1 TABLET BY MOUTH IN THE EVENING - (MAY TAKE AN ADDITIONAL TABLET AS NEEDED), Disp: 90 tablet, Rfl: 3    valsartan-hydrochlorothiazide (DIOVAN-HCT) 160-12.5 mg per tablet, , Disp: , Rfl:     Physical Exam    Wt Readings from Last 3 Encounters:   03/13/23 58.6 kg (129 lb 3 oz)   03/08/23 60.8 kg (134 lb)   03/06/23 59.4 kg (130 lb 14.4 oz)     Temp Readings from Last 3 Encounters:   03/13/23 97.2 °F (36.2 °C) (Temporal)   01/12/23 98.1 °F (36.7 °C) (Oral)   12/19/22 97.1 °F (36.2 °C)     BP Readings from Last 3 Encounters:   03/13/23 129/60   03/08/23 110/75   03/06/23 106/70     Pulse Readings from Last 3 Encounters:   03/13/23 73   03/08/23 76   03/06/23 89   .VITAL SIGNS:  as above   GENERAL: appears well-built, well-nourished.  No anxiety, no agitation, and in no distress.  Patient is awake, alert, oriented and cooperative.  HEENT:  Showed no congestion. Trachea is central no obvious icterus or pallor noted no hoarseness. no obvious JVD   NECK:  Supple.  No JVD. No obvious cervical submental or supraclavicular adenopathy.  RS:the visualized portion of  Chest expands well. chest appears symmetric, no audible wheezes.  No dyspnea recognized  ABDOMEN:  abdomen appears undistended.  EXTREMITIES:  Without edema.  NEUROLOGICAL:  The patient is appropriate, higher functions are normal.  No  obvious neurological deficits.  normal judgement normal thought content  No confusion, no speech impediment. Cranial nerves are intact and show no deficit. No gross motor deficits noted   SKIN MUSCULOSKELETAL: no joint  or skeletal deformity, no clubbing of nails.  No visible rash ecchymosis or petechiae  Lab Results   Component Value Date    WBC 6.57 03/17/2023    HGB 10.9 (L) 03/17/2023    HCT 35.1 (L) 03/17/2023    MCV 91 03/17/2023     03/17/2023       BMP  Lab Results   Component Value Date     03/17/2023    K 3.8 03/17/2023     03/17/2023    CO2 27 03/17/2023    BUN 18 03/17/2023    CREATININE 0.9 03/17/2023    CALCIUM 9.7 03/17/2023    ANIONGAP 11 03/17/2023    ESTGFRAFRICA 56.8 (A) 04/30/2022    EGFRNONAA 49.2 (A) 04/30/2022     Lab Results   Component Value Date    IRON 49 03/17/2023    TIBC 258 03/17/2023    FERRITIN 265 03/17/2023         Patient Active Problem List   Diagnosis    GERD (gastroesophageal reflux disease)    REJI (obstructive sleep apnea)    DJD (degenerative joint disease), lumbosacral    Pelvic cyst - left    Night sweats    Thrombocytopenia    Diverticulitis of colon (without mention of hemorrhage)(562.11)    Peritoneal adhesions (postoperative) (postinfection)    History of TIA (transient ischemic attack)    COPD (chronic obstructive pulmonary disease)    Insomnia    Change in bowel habits    IC (interstitial cystitis)    Diastolic dysfunction    NICOLAS (iron deficiency anemia)    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    Sjogren's syndrome    Fibromyalgia    Rheumatoid arthritis involving both hands with positive rheumatoid factor    Elevated lipase    Hernia of anterior abdominal wall    Ventral incisional hernia    Right inguinal hernia    Hypertension associated with diabetes    Noncompliance    Hyperlipidemia associated with type 2 diabetes mellitus    Chronic combined systolic and diastolic congestive heart failure    Type 2 diabetes mellitus, without long-term current use of insulin    Chronic pain, neck, back, leg on home narcotics    Pulmonary nodule    Hypokalemia    Mild neurocognitive 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    Adjustment disorder with mixed anxiety and depressed mood     Gait abnormality    Abdominal aortic atherosclerosis        Assessment and Plan     Anemia that has been fluctuant for the past several years    NICOLAS this visit  Thrombocytopenia on and off for several years   History of interstitial cystitis patient reports occasional hematuria but is unclear, ua is neg 10/22  History of hypertension associated with diabetes history of Sjogren syndrome and obstructive sleep apnea, dyslipidemia her symptoms may be associated to vasculitis or connective tissue disorder has Rheumatology  appt   NICOLAS good response toIV iron  urinalysis for hematuria -negative  SPEP, free kappa light chains elevated repeat  B12, 349 no evidence of hemolysis retic count is normal iron studies are low will recheck iron levels CBC CMP iron studies see patient back virtually in about 2 months   On SL b12   Ptto tell her GI about the post prandial pain  See me 1 month after iron infusion  JOAO positive  May need bone marrow biopsy   if anemia doenst improve May need scans for continued weight loss  Patient is off of hypertensive medications her A1c has only been 5.0 because of significant weight loss    Advance Care Planning     Date: 03/24/2023    Power of   I initiated the process of advance care planning today and explained the importance of this process to the patient.  I introduced the concept of advance directives to the patient, as well. Then the patient received detailed information about the importance of designating a Health Care Power of  (HCPOA). She was also instructed to communicate with this person about their wishes for future healthcare, should she become sick and lose decision-making capacity. The patient has previously appointed a HCPOA. Juan Daniel daughter and will bring these papers for our records

## 2023-03-27 DIAGNOSIS — K21.9 GASTROESOPHAGEAL REFLUX DISEASE WITHOUT ESOPHAGITIS: ICD-10-CM

## 2023-03-27 RX ORDER — FAMOTIDINE 40 MG/1
40 TABLET, FILM COATED ORAL NIGHTLY
Qty: 30 TABLET | Refills: 2 | Status: SHIPPED | OUTPATIENT
Start: 2023-03-27 | End: 2023-07-05 | Stop reason: SDUPTHER

## 2023-03-28 ENCOUNTER — PATIENT MESSAGE (OUTPATIENT)
Dept: HEMATOLOGY/ONCOLOGY | Facility: CLINIC | Age: 78
End: 2023-03-28
Payer: MEDICARE

## 2023-03-31 ENCOUNTER — EXTERNAL CHRONIC CARE MANAGEMENT (OUTPATIENT)
Dept: PRIMARY CARE CLINIC | Facility: CLINIC | Age: 78
End: 2023-03-31
Payer: MEDICARE

## 2023-03-31 PROCEDURE — 99490 CHRNC CARE MGMT STAFF 1ST 20: CPT | Mod: S$GLB,,, | Performed by: FAMILY MEDICINE

## 2023-03-31 PROCEDURE — 99490 PR CHRONIC CARE MGMT, 1ST 20 MIN: ICD-10-PCS | Mod: S$GLB,,, | Performed by: FAMILY MEDICINE

## 2023-04-24 ENCOUNTER — LAB VISIT (OUTPATIENT)
Dept: LAB | Facility: HOSPITAL | Age: 78
End: 2023-04-24
Attending: STUDENT IN AN ORGANIZED HEALTH CARE EDUCATION/TRAINING PROGRAM
Payer: MEDICARE

## 2023-04-24 ENCOUNTER — OFFICE VISIT (OUTPATIENT)
Dept: RHEUMATOLOGY | Facility: CLINIC | Age: 78
End: 2023-04-24
Payer: MEDICARE

## 2023-04-24 VITALS
DIASTOLIC BLOOD PRESSURE: 70 MMHG | WEIGHT: 134.69 LBS | HEART RATE: 71 BPM | SYSTOLIC BLOOD PRESSURE: 120 MMHG | BODY MASS INDEX: 23.86 KG/M2 | HEIGHT: 63 IN

## 2023-04-24 DIAGNOSIS — R76.8 SCL-70 ANTIBODY POSITIVE: ICD-10-CM

## 2023-04-24 DIAGNOSIS — R76.8 POSITIVE ANA (ANTINUCLEAR ANTIBODY): ICD-10-CM

## 2023-04-24 DIAGNOSIS — M79.7 FIBROMYALGIA: Primary | ICD-10-CM

## 2023-04-24 DIAGNOSIS — R76.8 RHEUMATOID FACTOR POSITIVE: ICD-10-CM

## 2023-04-24 DIAGNOSIS — M79.7 FIBROMYALGIA: ICD-10-CM

## 2023-04-24 DIAGNOSIS — E04.2 MULTIPLE THYROID NODULES: ICD-10-CM

## 2023-04-24 LAB
ALBUMIN SERPL BCP-MCNC: 4.1 G/DL (ref 3.5–5.2)
ALP SERPL-CCNC: 60 U/L (ref 55–135)
ALT SERPL W/O P-5'-P-CCNC: 9 U/L (ref 10–44)
ANION GAP SERPL CALC-SCNC: 10 MMOL/L (ref 8–16)
AST SERPL-CCNC: 16 U/L (ref 10–40)
BASOPHILS # BLD AUTO: 0.05 K/UL (ref 0–0.2)
BASOPHILS NFR BLD: 0.7 % (ref 0–1.9)
BILIRUB SERPL-MCNC: 0.3 MG/DL (ref 0.1–1)
BUN SERPL-MCNC: 25 MG/DL (ref 8–23)
CALCIUM SERPL-MCNC: 10 MG/DL (ref 8.7–10.5)
CHLORIDE SERPL-SCNC: 103 MMOL/L (ref 95–110)
CO2 SERPL-SCNC: 27 MMOL/L (ref 23–29)
CREAT SERPL-MCNC: 1 MG/DL (ref 0.5–1.4)
CRP SERPL-MCNC: 0.4 MG/L (ref 0–8.2)
DIFFERENTIAL METHOD: ABNORMAL
EOSINOPHIL # BLD AUTO: 0.1 K/UL (ref 0–0.5)
EOSINOPHIL NFR BLD: 1.1 % (ref 0–8)
ERYTHROCYTE [DISTWIDTH] IN BLOOD BY AUTOMATED COUNT: 13.6 % (ref 11.5–14.5)
ERYTHROCYTE [SEDIMENTATION RATE] IN BLOOD BY PHOTOMETRIC METHOD: 13 MM/HR (ref 0–36)
EST. GFR  (NO RACE VARIABLE): 58.4 ML/MIN/1.73 M^2
GLUCOSE SERPL-MCNC: 94 MG/DL (ref 70–110)
HCT VFR BLD AUTO: 31.5 % (ref 37–48.5)
HGB BLD-MCNC: 9.6 G/DL (ref 12–16)
IMM GRANULOCYTES # BLD AUTO: 0.04 K/UL (ref 0–0.04)
IMM GRANULOCYTES NFR BLD AUTO: 0.5 % (ref 0–0.5)
LYMPHOCYTES # BLD AUTO: 2.5 K/UL (ref 1–4.8)
LYMPHOCYTES NFR BLD: 32.8 % (ref 18–48)
MCH RBC QN AUTO: 27.7 PG (ref 27–31)
MCHC RBC AUTO-ENTMCNC: 30.5 G/DL (ref 32–36)
MCV RBC AUTO: 91 FL (ref 82–98)
MONOCYTES # BLD AUTO: 0.6 K/UL (ref 0.3–1)
MONOCYTES NFR BLD: 7.4 % (ref 4–15)
NEUTROPHILS # BLD AUTO: 4.3 K/UL (ref 1.8–7.7)
NEUTROPHILS NFR BLD: 57.5 % (ref 38–73)
NRBC BLD-RTO: 0 /100 WBC
PLATELET # BLD AUTO: 173 K/UL (ref 150–450)
PMV BLD AUTO: 12.4 FL (ref 9.2–12.9)
POTASSIUM SERPL-SCNC: 4.3 MMOL/L (ref 3.5–5.1)
PROT SERPL-MCNC: 7 G/DL (ref 6–8.4)
RBC # BLD AUTO: 3.46 M/UL (ref 4–5.4)
SODIUM SERPL-SCNC: 140 MMOL/L (ref 136–145)
WBC # BLD AUTO: 7.52 K/UL (ref 3.9–12.7)

## 2023-04-24 PROCEDURE — 99999 PR PBB SHADOW E&M-EST. PATIENT-LVL V: CPT | Mod: PBBFAC,GC,, | Performed by: STUDENT IN AN ORGANIZED HEALTH CARE EDUCATION/TRAINING PROGRAM

## 2023-04-24 PROCEDURE — 1159F PR MEDICATION LIST DOCUMENTED IN MEDICAL RECORD: ICD-10-PCS | Mod: CPTII,GC,S$GLB, | Performed by: STUDENT IN AN ORGANIZED HEALTH CARE EDUCATION/TRAINING PROGRAM

## 2023-04-24 PROCEDURE — 3074F SYST BP LT 130 MM HG: CPT | Mod: CPTII,GC,S$GLB, | Performed by: STUDENT IN AN ORGANIZED HEALTH CARE EDUCATION/TRAINING PROGRAM

## 2023-04-24 PROCEDURE — 99214 PR OFFICE/OUTPT VISIT, EST, LEVL IV, 30-39 MIN: ICD-10-PCS | Mod: GC,S$GLB,, | Performed by: STUDENT IN AN ORGANIZED HEALTH CARE EDUCATION/TRAINING PROGRAM

## 2023-04-24 PROCEDURE — 99999 PR PBB SHADOW E&M-EST. PATIENT-LVL V: ICD-10-PCS | Mod: PBBFAC,GC,, | Performed by: STUDENT IN AN ORGANIZED HEALTH CARE EDUCATION/TRAINING PROGRAM

## 2023-04-24 PROCEDURE — 85025 COMPLETE CBC W/AUTO DIFF WBC: CPT | Performed by: STUDENT IN AN ORGANIZED HEALTH CARE EDUCATION/TRAINING PROGRAM

## 2023-04-24 PROCEDURE — 1159F MED LIST DOCD IN RCRD: CPT | Mod: CPTII,GC,S$GLB, | Performed by: STUDENT IN AN ORGANIZED HEALTH CARE EDUCATION/TRAINING PROGRAM

## 2023-04-24 PROCEDURE — 1157F PR ADVANCE CARE PLAN OR EQUIV PRESENT IN MEDICAL RECORD: ICD-10-PCS | Mod: CPTII,GC,S$GLB, | Performed by: STUDENT IN AN ORGANIZED HEALTH CARE EDUCATION/TRAINING PROGRAM

## 2023-04-24 PROCEDURE — 1125F PR PAIN SEVERITY QUANTIFIED, PAIN PRESENT: ICD-10-PCS | Mod: CPTII,GC,S$GLB, | Performed by: STUDENT IN AN ORGANIZED HEALTH CARE EDUCATION/TRAINING PROGRAM

## 2023-04-24 PROCEDURE — 36415 COLL VENOUS BLD VENIPUNCTURE: CPT | Performed by: STUDENT IN AN ORGANIZED HEALTH CARE EDUCATION/TRAINING PROGRAM

## 2023-04-24 PROCEDURE — 3078F DIAST BP <80 MM HG: CPT | Mod: CPTII,GC,S$GLB, | Performed by: STUDENT IN AN ORGANIZED HEALTH CARE EDUCATION/TRAINING PROGRAM

## 2023-04-24 PROCEDURE — 86140 C-REACTIVE PROTEIN: CPT | Performed by: STUDENT IN AN ORGANIZED HEALTH CARE EDUCATION/TRAINING PROGRAM

## 2023-04-24 PROCEDURE — 85652 RBC SED RATE AUTOMATED: CPT | Performed by: STUDENT IN AN ORGANIZED HEALTH CARE EDUCATION/TRAINING PROGRAM

## 2023-04-24 PROCEDURE — 1125F AMNT PAIN NOTED PAIN PRSNT: CPT | Mod: CPTII,GC,S$GLB, | Performed by: STUDENT IN AN ORGANIZED HEALTH CARE EDUCATION/TRAINING PROGRAM

## 2023-04-24 PROCEDURE — 99214 OFFICE O/P EST MOD 30 MIN: CPT | Mod: GC,S$GLB,, | Performed by: STUDENT IN AN ORGANIZED HEALTH CARE EDUCATION/TRAINING PROGRAM

## 2023-04-24 PROCEDURE — 80053 COMPREHEN METABOLIC PANEL: CPT | Performed by: STUDENT IN AN ORGANIZED HEALTH CARE EDUCATION/TRAINING PROGRAM

## 2023-04-24 PROCEDURE — 1160F PR REVIEW ALL MEDS BY PRESCRIBER/CLIN PHARMACIST DOCUMENTED: ICD-10-PCS | Mod: CPTII,GC,S$GLB, | Performed by: STUDENT IN AN ORGANIZED HEALTH CARE EDUCATION/TRAINING PROGRAM

## 2023-04-24 PROCEDURE — 3078F PR MOST RECENT DIASTOLIC BLOOD PRESSURE < 80 MM HG: ICD-10-PCS | Mod: CPTII,GC,S$GLB, | Performed by: STUDENT IN AN ORGANIZED HEALTH CARE EDUCATION/TRAINING PROGRAM

## 2023-04-24 PROCEDURE — 1157F ADVNC CARE PLAN IN RCRD: CPT | Mod: CPTII,GC,S$GLB, | Performed by: STUDENT IN AN ORGANIZED HEALTH CARE EDUCATION/TRAINING PROGRAM

## 2023-04-24 PROCEDURE — 1160F RVW MEDS BY RX/DR IN RCRD: CPT | Mod: CPTII,GC,S$GLB, | Performed by: STUDENT IN AN ORGANIZED HEALTH CARE EDUCATION/TRAINING PROGRAM

## 2023-04-24 PROCEDURE — 86235 NUCLEAR ANTIGEN ANTIBODY: CPT | Performed by: STUDENT IN AN ORGANIZED HEALTH CARE EDUCATION/TRAINING PROGRAM

## 2023-04-24 PROCEDURE — 3074F PR MOST RECENT SYSTOLIC BLOOD PRESSURE < 130 MM HG: ICD-10-PCS | Mod: CPTII,GC,S$GLB, | Performed by: STUDENT IN AN ORGANIZED HEALTH CARE EDUCATION/TRAINING PROGRAM

## 2023-04-24 ASSESSMENT — ROUTINE ASSESSMENT OF PATIENT INDEX DATA (RAPID3)
PSYCHOLOGICAL DISTRESS SCORE: 2.2
MDHAQ FUNCTION SCORE: 0.4
PATIENT GLOBAL ASSESSMENT SCORE: 6
AM STIFFNESS SCORE: 1, YES
FATIGUE SCORE: 3.5
PAIN SCORE: 8
TOTAL RAPID3 SCORE: 5.11

## 2023-04-24 NOTE — PROGRESS NOTES
I have personally reviewed the history, confirmed exam findings, and discussed assessment and plan with fellow.         Nailfold capillaries nl except rare dropout several digits, non-diagnostic  No sclerodermatous skin changes mRSS=0        Latest Reference Range & Units 04/24/23 09:48   Sed Rate 0 - 36 mm/Hr 13      Latest Reference Range & Units 04/24/23 09:48   CRP 0.0 - 8.2 mg/L 0.4           EXAMINATION:  CT CHEST WITHOUT CONTRAST     CLINICAL HISTORY:  Interstitial lung disease;to rule out ILD; Other specified abnormal immunological findings in serum     TECHNIQUE:  Low dose axial images, sagittal and coronal reformations were obtained from the thoracic inlet to the lung bases. Contrast was not administered.  Inspiratory and expiratory images were obtained.     COMPARISON:  Multiple prior studies most recent is 04/29/2022     FINDINGS:  Heterogeneous thyroid gland with right thyroid nodule similar to prior.  Otherwise, soft tissue structures at the base of the neck show no significant abnormalities.  No abnormal axillary or mediastinal lymph node enlargement.     Aorta is normal in caliber with moderate atherosclerotic calcification.  Heart is not enlarged.  No significant pericardial fluid.     Trachea and central airways are patent.  Lungs are expanded and show no concerning nodules, masses, effusion or pneumothorax.  No evidence to suggest diffuse interstitial lung disease or significant emphysematous changes.  No significant air trapping on expiratory images.  There is a punctate right middle lobe granuloma.     Limited review of the upper abdomen on noncontrast images shows no acute abnormalities.  Extensive abdominal aortic atherosclerosis.     Osseous structures show degenerative changes.     Impression:     No CT findings to specifically indicate diffuse interstitial lung disease or other significant abnormalities in the chest.     Aortic atherosclerosis.     Heterogeneous thyroid gland with thyroid  nodule similar to prior.  Further evaluation could be obtained with dedicated thyroid ultrasound.        Electronically signed by: Balwinder Perez MD      EXAMINATION:  CT ABDOMEN PELVIS WITHOUT CONTRAST     CLINICAL HISTORY:  Nausea/vomiting;Epigastric pain;Weight loss; Epigastric pain     TECHNIQUE:  Low dose axial images, sagittal and coronal reformations were obtained from the lung bases to the pubic symphysis.  900 mL Readi-Cat PO     COMPARISON:  01/11/2021     FINDINGS:  PO contrast in the distal esophagus which could be due to decreased esophageal emptying and/or/gastroesophageal reflux.  Probable small hiatal hernia.     Liver and spleen unremarkable appearance.  Prior cholecystectomy.  Mild biliary duct dilatation most likely on a post cholecystectomy basis and not significantly changed.     Spleen not enlarged     Adrenal glands unremarkable appearance     Pancreas unremarkable appearance     Abdominal aorta no aneurysm     Normal appearance of the appendix.  Postoperative changes with stable line near rectosigmoid junction.  No appreciable bowel wall thickening or inflammatory change.  Mild diverticulosis without CT findings of acute diverticulitis.  No free intraperitoneal air or fluid     There is circumferential wall thickening of the duodenum extending from approximately the level of the pylorus through the transverse portion of the duodenum.     Kidneys unremarkable appearance without hydronephrosis opaque renal stone.  Urinary bladder mildly distended at time of the exam and as visualized unremarkable appearance     Reproductive organs; prior hysterectomy.  Adnexal region unremarkable appearance     Multiple ventral hernias.  Supraumbilical ventral hernia broad neck measuring 6 cm transversely, 3.8 cm cranial caudally, containing fat and segment of colon.  Large infra umbilical ventral hernia with neck measuring 7.7 cm transversely, 7.2 cm AP containing fat and multiple loops of small bowel.  Also  surgical staples contained within the hernia and within the abdomen.  No bowel obstructive or inflammatory change.     Osseous structures show degenerative changes without obvious aggressive appearing osseous lesion is been cindy vessels in the proximal left thigh medially may represent varicosities or collateral vessels related to vascular occlusive disease.     Impression:     PO contrast in the distal esophagus which could be due to decreased esophageal emptying and/or/gastroesophageal reflux.  Also probable small hiatal hernia.     Circumferential wall thickening of the duodenum suggesting duodenitis.     Prior cholecystectomy.  Mild biliary duct dilatation not significantly changed compared to the prior exam most likely on a post cholecystectomy basis     Multiple ventral hernias containing fat and bowel without bowel obstructive or inflammatory change     Additional findings as detailed above        Electronically signed by: Michelle Stewart MD  Date:                                            12/29/2022  Time:                                           12:56    Impression:            - Normal esophagus.                          - Normal stomach.                          - Normal examined duodenum. Biopsied.   Recommendation:        - Await pathology results.                          - Continue present medications.                          - Discharge patient to home (ambulatory).   Jarrod Frost MD   3/13/2023 10:44:52 AM         A few small-mouthed diverticula were found in the sigmoid colon.        The exam was otherwise without abnormality to cecum.        There was evidence of a prior end-to-end colo-colonic anastomosis in        the recto-sigmoid colon. This was patent and was characterized by        healthy appearing mucosa. The anastomosis was traversed.   Impression:            - Diverticulosis in the sigmoid colon.                          - The examination was otherwise normal except                           evidence of prior surgery.                          - No specimens collected.   Recommendation:        - Repeat colonoscopy is not recommended for                          surveillance.                          - Discharge patient to home (ambulatory).   Jarrod Frost MD     PFTs reduced DLco but normal spirometry and LVs    TTE 3/8/23:   The left ventricle is normal in size with normal systolic function. The estimated ejection fraction is 60%.  Normal right ventricular size with normal right ventricular systolic function.  Grade I left ventricular diastolic dysfunction.  Normal central venous pressure (3 mmHg).    Weakly positive Scl-70  no clinical or lab-imaging support for scleroderma dx.  JOAO+ 1:160 homo  RF+   Raynaud's familial  Fibromyalgia WPI 19 SSS 10 2/6/23 primary cause of symptoms  Gen OA  Chronic anemia of inflammation may need BM asp/Bx  Hernia, seeing surgery    Repeat Scl-70 [facility], previous done [place]  Mediterranean diet  Pregabalin 150mg twice daily   Aerobic exercise, TaiChi, yoga  Thyroid US  Ref to Endocrinology for thyroid f/u  RTC 3 months

## 2023-04-24 NOTE — PROGRESS NOTES
Subjective:       Patient ID: Pili Teixeira is a 77 y.o. female.                                                                                    Chief Complaint: fibromyalgia  HPI    Pili Teixeira is a 77 y.o. female with Diabetes type 2, HTN, REJI, chronic thrombocytopenia, S/p sigmoid colectomy for diverticulitis and removal of pelvic mass( hydrosalpinx),  CAD, TIA in 2013, remote history of DVT, and PVD presents for evaluation of diffuse pain.     She endorses diffuse pain for the past few years. She feels like it has worsened since she had COVID in 4/2022. She reports intermittent swelling in her knees. She endorses decreased appetite. She also has dry eyes and dry mouth. She takes daily eye drops and dry mouth. She gets SOB and chest pain with mild activity. She is scheduled to see cardiology in 3/2023. She had had a rash in the past 2 week. She had 2 miscarriage in the 1st trimester.   She has chronic neck and back pain for which she follows with Dr. Alexander in Pain management and takes percocet 4 times daily. She had a hernia and is seeing general surgery and is waiting on EDG and colonoscopy.     She was seen by Dr. Monique in [place] in the past where she was diagnosed with Sjogren's and OA.  She was most recently seen by Dr. Jacobson in [place] where she was diagnosed with Sjogren's and fibromyalgia.  Gabapentin was discontinued at that time due to lack of effect and she was started on Lyrica 75 mg BID.  She does not feel that this has helped.    She sees Dr. Huggins in Hematology for chronic thrombocytopenia and anemia.  She was placed on IV and was noted to have a good response in her anemia. Per note, there were plans for possible bone marrow biopsy if anemia does not improve or additional scans for continuing loss.  Rheumatology evaluation was requested for reported history of hematuria and elevated ferritin.  She follows with GI for epigastric pain with eating, weight loss, iron deficiency  "anemia.  There were plans to schedule EGD and colonoscopy in future.      Serologies:  Positive: JOAO 1:320 (negative panel), RF 14, ESR 25, ferritin 461  Negative/Normal: CCP, CRP    Rheum ROS:  Positive: diffuse whole body pain, dry eyes, dry mouth, diffuse alopecia, raynaud's, decreased appetite,chest pain and SOB with mild exertion, abdominal bloating  Negative: joint swelling, history of blood clots, rash, pleurisy, fever, dysphagia, vision problems                                                                                                                                                       Interval History:  4/24/23: She endorses dry mouth and dry eyes. She uses OTC restorasis drops daily. She has muscle contractions. She denies oral ulcers, joint swelling. No patchy alopecia, but diffuse hair loss. Endorses occasional SOB and chest tightness. She has a severe problem with sleeping and never wakes up refreshed. Continues to endorse brain fog. She is currently taking lyrica 150mg BID and reports only mild improvement with this. She does have constipation.                                                                           Objective:   /70   Pulse 71   Ht 5' 3" (1.6 m)   Wt 61.1 kg (134 lb 11.2 oz)   BMI 23.86 kg/m²      Physical Exam   Constitutional: No distress.   HENT:   Head: Normocephalic and atraumatic.   Eyes: Conjunctivae are normal.   Cardiovascular: Normal rate, regular rhythm and normal heart sounds. Exam reveals no friction rub.   No murmur heard.  Pulmonary/Chest: Effort normal and breath sounds normal. She has no wheezes. She has no rales.   Abdominal: Soft. Bowel sounds are normal. There is no abdominal tenderness. There is no rebound.   Musculoskeletal:      Comments: Multiple tender points on exam in upper and lower extremities.  No synovitis, dactylitis, or enthesitis.   Neurological: She is alert.   Skin: Skin is warm and dry. She is not diaphoretic.  "     Fibromyalgia:  Widespread pain index  Note the areas which the patient has had pain over the last week:                   Shoulder-girdle, left  +1               Shoulder-girdle, right  +1                         Upper arm left    +1                       Upper arm right   +1                         Lower arm left   +1                       Lower arm right   +1    Hip (buttock, trochanter) left   +1  Hip (buttock, trochanter) right   +1                           Upper leg, left  +1                         Upper leg, right  +1                           Lower leg, left  +1                         Lower leg, right   +1                                     Jaw, left  +1                                   Jaw, right  +1                                        Chest   +1                                  Abdomen  +1                               Upper back   +1                              Lower back   +1                                        Neck    +1  Score will be from 0-19: 19                                         Symptom severity score  Fatigue  3  Waking Unrefreshed  3  Cognitive Symptoms  3   0 = no problem, 1=slight or mild problem 2= moderate; considerable problems often present and/or at a moderate level, 3 = severe, pervasive, continuous, life disturbing problem  For each of the 3 symptoms, indicate the level of severity over the past week using the Scale.  The symptom severity score is the sum of the severity of the 3 symptoms (fatigue, waking unrefreshed, and cognitive symptoms) plus the number of the following symptoms occurring during the previous 6 months:   Headaches  Pain or cramps in the lower abdomen  1  Depression  The final score is between 0 and 12    Total: WPI 19, SSS 10                                      Criteria  Patient has fibromyalgia if the following 3 conditions are met:  1.  Widespread pain index greater than or equal to 7 and symptom severity score greater than or equal to 5 or  widespread pain index between 3- 6, and symptom severity score greater than or equal to 9.    2.  Symptoms have been present in a similar level for at least 3 months  3.  The patient does not have a disorder that would otherwise sufficiently explain the pain    Chart Review:  JOAO 1:320 (negative MINA)  Scl 70: 37  SSA: 22  RF: 14  C3/C4: normal         Assessment:       1. Fibromyalgia    2. Weakly positive Scl-70 antibody     3. Positive JOAO (antinuclear antibody)    4. Rheumatoid factor positive    5. Multiple thyroid nodules          78yo F with Diabetes type 2, HTN, REJI, chronic thrombocytopenia, CAD, TIA in 2013, remote history of DVT, and PVD presents for evaluation of diffuse pain. +dry eyes/mouth, +raynaud's (?), +chronic diffuse alopecia.  Serologies showing +JOAO, mild titer RF (14) and weakly positive scl-70. No inflammatory arthritis on exam, multiple tender points, no signs of sclerodactlyly. Findings consistent with fibromyalgia (WPI 19, SSS 10).  No clinical signs or concerns for scleroderma. No suspicion for vasculitis, normal inflammatory markers.  Recent CT chest noncontrast showing no evidence of bile de but demonstrating multiple stable thyroid nodules.  PFTs showing normal spirometry with mildly reduced DLCO.  Suspect Scl 70 antibody is false positive given its only weakly positive, will repeat it at a different lab.  Will continue Lyrica 150 mg BID.   We will also place thyroid ultrasound to evaluate the multiple stable nodules that were seen on her recent CT chest non-contrast and place Endocrine referral.      Plan:       Problem List Items Addressed This Visit          Immunology/Multi System    Weakly positive Scl-70 antibody        Orthopedic    Fibromyalgia - Primary    Relevant Orders    C-Reactive Protein (Completed)    Comprehensive Metabolic Panel (Completed)    Sedimentation rate (Completed)    CBC Auto Differential (Completed)     Other Visit Diagnoses       Positive JOAO (antinuclear  antibody)        Rheumatoid factor positive        Multiple thyroid nodules        Relevant Orders    US Thyroid    Ambulatory referral/consult to Endocrinology            - continue lyrica 150 mg BID for fibromyalgia symptoms  - discussed lifestyle changes with exercise, meditation, and anti-inflammatory diet  - thyroid ultrasound 2 evaluate stable thyroid nodule seen on CT chest non-contrast and Endocrine referral placed    Follow up in 3 months    Patient seen and evaluated with Dr. Vega

## 2023-04-26 ENCOUNTER — TELEPHONE (OUTPATIENT)
Dept: ENDOCRINOLOGY | Facility: CLINIC | Age: 78
End: 2023-04-26
Payer: MEDICARE

## 2023-04-26 LAB — ENA SCL70 IGG SER IA-ACNC: <0.2 U

## 2023-04-26 NOTE — TELEPHONE ENCOUNTER
----- Message from Charisma Reynoso sent at 4/26/2023  1:25 PM CDT -----  Regarding: referral  appoinment  Name of Who is Calling:BENJY FERRIS [[id number]          What is the request in detail: referral appointment request          Can the clinic reply by [facility]: no           [facility] to [facility] Back [facility]: [phone] (home)

## 2023-04-30 ENCOUNTER — EXTERNAL CHRONIC CARE MANAGEMENT (OUTPATIENT)
Dept: PRIMARY CARE CLINIC | Facility: CLINIC | Age: 78
End: 2023-04-30
Payer: MEDICARE

## 2023-04-30 PROCEDURE — 99490 CHRNC CARE MGMT STAFF 1ST 20: CPT | Mod: S$GLB,,, | Performed by: FAMILY MEDICINE

## 2023-04-30 PROCEDURE — 99490 PR CHRONIC CARE MGMT, 1ST 20 MIN: ICD-10-PCS | Mod: S$GLB,,, | Performed by: FAMILY MEDICINE

## 2023-05-01 DIAGNOSIS — E11.9 TYPE 2 DIABETES MELLITUS WITHOUT COMPLICATION, WITH LONG-TERM CURRENT USE OF INSULIN: ICD-10-CM

## 2023-05-01 DIAGNOSIS — Z79.4 TYPE 2 DIABETES MELLITUS WITHOUT COMPLICATION, WITH LONG-TERM CURRENT USE OF INSULIN: ICD-10-CM

## 2023-05-01 DIAGNOSIS — E11.9 TYPE 2 DIABETES MELLITUS WITHOUT COMPLICATION, WITHOUT LONG-TERM CURRENT USE OF INSULIN: Chronic | ICD-10-CM

## 2023-05-01 RX ORDER — LINAGLIPTIN 5 MG/1
TABLET, FILM COATED ORAL
Qty: 90 TABLET | Refills: 0 | Status: SHIPPED | OUTPATIENT
Start: 2023-05-01 | End: 2023-07-30 | Stop reason: SDUPTHER

## 2023-05-01 RX ORDER — METFORMIN HYDROCHLORIDE 500 MG/1
TABLET ORAL
Qty: 180 TABLET | Refills: 0 | Status: SHIPPED | OUTPATIENT
Start: 2023-05-01 | End: 2023-07-30 | Stop reason: SDUPTHER

## 2023-05-01 NOTE — TELEPHONE ENCOUNTER
Refill Routing Note   Medication(s) are not appropriate for processing by [facility] for the following reason(s):      Required labs outdated    [facility] action(s):  Defer            Appointments  past 12m or future 3m with PCP    Date Provider   Last Visit   9/19/2022 LUÍS Huggins MD   Next Visit   Visit date not found LUÍS Huggins MD   ED visits in past 90 days: 0        Note composed:5:04 PM 05/01/2023

## 2023-05-01 NOTE — TELEPHONE ENCOUNTER
No care due was identified.  [facility] Embedded Care Due Messages. Reference number: [id number].   5/01/2023 11:07:45 AM CDT

## 2023-05-03 ENCOUNTER — HOSPITAL ENCOUNTER (OUTPATIENT)
Facility: HOSPITAL | Age: 78
Discharge: HOME | End: 2023-05-05
Attending: EMERGENCY MEDICINE | Admitting: STUDENT IN AN ORGANIZED HEALTH CARE EDUCATION/TRAINING PROGRAM
Payer: MEDICARE

## 2023-05-03 DIAGNOSIS — D50.9 IRON DEFICIENCY ANEMIA: ICD-10-CM

## 2023-05-03 DIAGNOSIS — D50.9 IRON DEFICIENCY ANEMIA, UNSPECIFIED IRON DEFICIENCY ANEMIA TYPE: ICD-10-CM

## 2023-05-03 DIAGNOSIS — R07.9 CHEST PAIN: ICD-10-CM

## 2023-05-03 DIAGNOSIS — D64.9 ANEMIA: ICD-10-CM

## 2023-05-03 DIAGNOSIS — R19.5 HEME POSITIVE STOOL: ICD-10-CM

## 2023-05-03 DIAGNOSIS — K55.21 AVM (ARTERIOVENOUS MALFORMATION) OF SMALL BOWEL, ACQUIRED WITH HEMORRHAGE: ICD-10-CM

## 2023-05-03 DIAGNOSIS — I99.8 ANGIECTASIS: Primary | ICD-10-CM

## 2023-05-03 PROBLEM — I25.10 CAD (CORONARY ARTERY DISEASE): Status: ACTIVE | Noted: 2023-05-03

## 2023-05-03 PROBLEM — E87.6 HYPOKALEMIA: Status: RESOLVED | Noted: 2021-01-13 | Resolved: 2023-05-03

## 2023-05-03 PROBLEM — H40.9 GLAUCOMA: Status: ACTIVE | Noted: 2023-05-03

## 2023-05-03 PROBLEM — N17.9 AKI (ACUTE KIDNEY INJURY): Status: ACTIVE | Noted: 2023-05-03

## 2023-05-03 PROBLEM — N39.0 UTI (URINARY TRACT INFECTION): Status: ACTIVE | Noted: 2023-05-03

## 2023-05-03 LAB
ABO + RH BLD: NORMAL
ALBUMIN SERPL BCP-MCNC: 4 G/DL (ref 3.5–5.2)
ALP SERPL-CCNC: 48 U/L (ref 55–135)
ALT SERPL W/O P-5'-P-CCNC: 11 U/L (ref 10–44)
ANION GAP SERPL CALC-SCNC: 11 MMOL/L (ref 8–16)
APTT PPP: 24.6 SEC (ref 21–32)
AST SERPL-CCNC: 19 U/L (ref 10–40)
BACTERIA #/AREA URNS HPF: ABNORMAL /HPF
BASOPHILS # BLD AUTO: 0.04 K/UL (ref 0–0.2)
BASOPHILS NFR BLD: 0.5 % (ref 0–1.9)
BILIRUB SERPL-MCNC: 0.3 MG/DL (ref 0.1–1)
BILIRUB UR QL STRIP: ABNORMAL
BLD GP AB SCN CELLS X3 SERPL QL: NORMAL
BNP SERPL-MCNC: 45 PG/ML (ref 0–99)
BUN SERPL-MCNC: 34 MG/DL (ref 8–23)
CALCIUM SERPL-MCNC: 10 MG/DL (ref 8.7–10.5)
CHLORIDE SERPL-SCNC: 106 MMOL/L (ref 95–110)
CLARITY UR: CLEAR
CO2 SERPL-SCNC: 25 MMOL/L (ref 23–29)
COLOR UR: YELLOW
CREAT SERPL-MCNC: 1.3 MG/DL (ref 0.5–1.4)
DIFFERENTIAL METHOD: ABNORMAL
EOSINOPHIL # BLD AUTO: 0 K/UL (ref 0–0.5)
EOSINOPHIL NFR BLD: 0.5 % (ref 0–8)
ERYTHROCYTE [DISTWIDTH] IN BLOOD BY AUTOMATED COUNT: 15 % (ref 11.5–14.5)
EST. GFR  (NO RACE VARIABLE): 43 ML/MIN/1.73 M^2
FERRITIN SERPL-MCNC: 213 NG/ML (ref 20–300)
GLUCOSE SERPL-MCNC: 91 MG/DL (ref 70–110)
GLUCOSE UR QL STRIP: NEGATIVE
HCT VFR BLD AUTO: 23.5 % (ref 37–48.5)
HGB BLD-MCNC: 7.5 G/DL (ref 12–16)
HGB UR QL STRIP: NEGATIVE
IMM GRANULOCYTES # BLD AUTO: 0.03 K/UL (ref 0–0.04)
IMM GRANULOCYTES NFR BLD AUTO: 0.4 % (ref 0–0.5)
INR PPP: 1 (ref 0.8–1.2)
IRON SERPL-MCNC: 27 UG/DL (ref 30–160)
KETONES UR QL STRIP: NEGATIVE
LEUKOCYTE ESTERASE UR QL STRIP: ABNORMAL
LIPASE SERPL-CCNC: 89 U/L (ref 4–60)
LYMPHOCYTES # BLD AUTO: 2.2 K/UL (ref 1–4.8)
LYMPHOCYTES NFR BLD: 29.8 % (ref 18–48)
MCH RBC QN AUTO: 29.1 PG (ref 27–31)
MCHC RBC AUTO-ENTMCNC: 31.9 G/DL (ref 32–36)
MCV RBC AUTO: 91 FL (ref 82–98)
MICROSCOPIC COMMENT: ABNORMAL
MONOCYTES # BLD AUTO: 0.6 K/UL (ref 0.3–1)
MONOCYTES NFR BLD: 8.2 % (ref 4–15)
NEUTROPHILS # BLD AUTO: 4.5 K/UL (ref 1.8–7.7)
NEUTROPHILS NFR BLD: 60.6 % (ref 38–73)
NITRITE UR QL STRIP: NEGATIVE
NRBC BLD-RTO: 0 /100 WBC
PH UR STRIP: 6 [PH] (ref 5–8)
PLATELET # BLD AUTO: 251 K/UL (ref 150–450)
PMV BLD AUTO: 13.5 FL (ref 9.2–12.9)
POCT GLUCOSE: 75 MG/DL (ref 70–110)
POTASSIUM SERPL-SCNC: 4 MMOL/L (ref 3.5–5.1)
PROT SERPL-MCNC: 6.8 G/DL (ref 6–8.4)
PROT UR QL STRIP: NEGATIVE
PROTHROMBIN TIME: 10.9 SEC (ref 9–12.5)
RBC # BLD AUTO: 2.58 M/UL (ref 4–5.4)
RBC #/AREA URNS HPF: 4 /HPF (ref 0–4)
SATURATED IRON: 9 % (ref 20–50)
SODIUM SERPL-SCNC: 142 MMOL/L (ref 136–145)
SP GR UR STRIP: 1.02 (ref 1–1.03)
SPECIMEN OUTDATE: NORMAL
SQUAMOUS #/AREA URNS HPF: 4 /HPF
TOTAL IRON BINDING CAPACITY: 296 UG/DL (ref 250–450)
TRANSFERRIN SERPL-MCNC: 200 MG/DL (ref 200–375)
TROPONIN I SERPL DL<=0.01 NG/ML-MCNC: <0.006 NG/ML (ref 0–0.03)
TROPONIN I SERPL DL<=0.01 NG/ML-MCNC: <0.006 NG/ML (ref 0–0.03)
TSH SERPL DL<=0.005 MIU/L-ACNC: 0.84 UIU/ML (ref 0.4–4)
URN SPEC COLLECT METH UR: ABNORMAL
UROBILINOGEN UR STRIP-ACNC: NEGATIVE EU/DL
WBC # BLD AUTO: 7.48 K/UL (ref 3.9–12.7)
WBC #/AREA URNS HPF: 11 /HPF (ref 0–5)

## 2023-05-03 PROCEDURE — 87086 URINE CULTURE/COLONY COUNT: CPT | Performed by: EMERGENCY MEDICINE

## 2023-05-03 PROCEDURE — G0378 HOSPITAL OBSERVATION PER HR: HCPCS

## 2023-05-03 PROCEDURE — 86900 BLOOD TYPING SEROLOGIC ABO: CPT | Performed by: EMERGENCY MEDICINE

## 2023-05-03 PROCEDURE — 84484 ASSAY OF TROPONIN QUANT: CPT | Performed by: EMERGENCY MEDICINE

## 2023-05-03 PROCEDURE — 82728 ASSAY OF FERRITIN: CPT | Performed by: STUDENT IN AN ORGANIZED HEALTH CARE EDUCATION/TRAINING PROGRAM

## 2023-05-03 PROCEDURE — 84443 ASSAY THYROID STIM HORMONE: CPT | Performed by: STUDENT IN AN ORGANIZED HEALTH CARE EDUCATION/TRAINING PROGRAM

## 2023-05-03 PROCEDURE — 83880 ASSAY OF NATRIURETIC PEPTIDE: CPT | Performed by: EMERGENCY MEDICINE

## 2023-05-03 PROCEDURE — 93005 ELECTROCARDIOGRAM TRACING: CPT

## 2023-05-03 PROCEDURE — 84466 ASSAY OF TRANSFERRIN: CPT | Performed by: STUDENT IN AN ORGANIZED HEALTH CARE EDUCATION/TRAINING PROGRAM

## 2023-05-03 PROCEDURE — 25000003 PHARM REV CODE 250: Performed by: EMERGENCY MEDICINE

## 2023-05-03 PROCEDURE — 86920 COMPATIBILITY TEST SPIN: CPT | Performed by: STUDENT IN AN ORGANIZED HEALTH CARE EDUCATION/TRAINING PROGRAM

## 2023-05-03 PROCEDURE — 99285 EMERGENCY DEPT VISIT HI MDM: CPT | Mod: 25

## 2023-05-03 PROCEDURE — 36415 COLL VENOUS BLD VENIPUNCTURE: CPT | Performed by: STUDENT IN AN ORGANIZED HEALTH CARE EDUCATION/TRAINING PROGRAM

## 2023-05-03 PROCEDURE — 80053 COMPREHEN METABOLIC PANEL: CPT | Performed by: EMERGENCY MEDICINE

## 2023-05-03 PROCEDURE — 81000 URINALYSIS NONAUTO W/SCOPE: CPT | Performed by: EMERGENCY MEDICINE

## 2023-05-03 PROCEDURE — 85025 COMPLETE CBC W/AUTO DIFF WBC: CPT | Performed by: EMERGENCY MEDICINE

## 2023-05-03 PROCEDURE — 36415 COLL VENOUS BLD VENIPUNCTURE: CPT | Performed by: EMERGENCY MEDICINE

## 2023-05-03 PROCEDURE — 25000003 PHARM REV CODE 250: Performed by: STUDENT IN AN ORGANIZED HEALTH CARE EDUCATION/TRAINING PROGRAM

## 2023-05-03 PROCEDURE — 96367 TX/PROPH/DG ADDL SEQ IV INF: CPT

## 2023-05-03 PROCEDURE — 63600175 PHARM REV CODE 636 W HCPCS: Performed by: STUDENT IN AN ORGANIZED HEALTH CARE EDUCATION/TRAINING PROGRAM

## 2023-05-03 PROCEDURE — 96361 HYDRATE IV INFUSION ADD-ON: CPT

## 2023-05-03 PROCEDURE — 85730 THROMBOPLASTIN TIME PARTIAL: CPT | Performed by: STUDENT IN AN ORGANIZED HEALTH CARE EDUCATION/TRAINING PROGRAM

## 2023-05-03 PROCEDURE — 99900035 HC TECH TIME PER 15 MIN (STAT)

## 2023-05-03 PROCEDURE — 93010 ELECTROCARDIOGRAM REPORT: CPT | Mod: ,,, | Performed by: INTERNAL MEDICINE

## 2023-05-03 PROCEDURE — 85610 PROTHROMBIN TIME: CPT | Performed by: STUDENT IN AN ORGANIZED HEALTH CARE EDUCATION/TRAINING PROGRAM

## 2023-05-03 PROCEDURE — 94761 N-INVAS EAR/PLS OXIMETRY MLT: CPT

## 2023-05-03 PROCEDURE — 83690 ASSAY OF LIPASE: CPT | Performed by: EMERGENCY MEDICINE

## 2023-05-03 PROCEDURE — 93010 EKG 12-LEAD: ICD-10-PCS | Mod: ,,, | Performed by: INTERNAL MEDICINE

## 2023-05-03 RX ORDER — PREGABALIN 75 MG/1
150 CAPSULE ORAL 2 TIMES DAILY
Status: DISCONTINUED | OUTPATIENT
Start: 2023-05-03 | End: 2023-05-05 | Stop reason: HOSPADM

## 2023-05-03 RX ORDER — PANTOPRAZOLE SODIUM 40 MG/1
40 TABLET, DELAYED RELEASE ORAL DAILY
Status: DISCONTINUED | OUTPATIENT
Start: 2023-05-04 | End: 2023-05-04

## 2023-05-03 RX ORDER — ACETAMINOPHEN 325 MG/1
650 TABLET ORAL EVERY 8 HOURS PRN
Status: DISCONTINUED | OUTPATIENT
Start: 2023-05-03 | End: 2023-05-05 | Stop reason: HOSPADM

## 2023-05-03 RX ORDER — POLYETHYLENE GLYCOL 3350, SODIUM SULFATE ANHYDROUS, SODIUM BICARBONATE, SODIUM CHLORIDE, POTASSIUM CHLORIDE 236; 22.74; 6.74; 5.86; 2.97 G/4L; G/4L; G/4L; G/4L; G/4L
POWDER, FOR SOLUTION ORAL
COMMUNITY
Start: 2023-03-10 | End: 2024-01-03 | Stop reason: ALTCHOICE

## 2023-05-03 RX ORDER — TRAZODONE HYDROCHLORIDE 50 MG/1
50 TABLET ORAL NIGHTLY PRN
Status: DISCONTINUED | OUTPATIENT
Start: 2023-05-03 | End: 2023-05-05 | Stop reason: HOSPADM

## 2023-05-03 RX ORDER — ONDANSETRON 2 MG/ML
4 INJECTION INTRAMUSCULAR; INTRAVENOUS EVERY 8 HOURS PRN
Status: DISCONTINUED | OUTPATIENT
Start: 2023-05-03 | End: 2023-05-05 | Stop reason: HOSPADM

## 2023-05-03 RX ORDER — IBUPROFEN 200 MG
24 TABLET ORAL
Status: DISCONTINUED | OUTPATIENT
Start: 2023-05-03 | End: 2023-05-05 | Stop reason: HOSPADM

## 2023-05-03 RX ORDER — OLOPATADINE HYDROCHLORIDE 1 MG/ML
1 SOLUTION/ DROPS OPHTHALMIC DAILY PRN
Status: DISCONTINUED | OUTPATIENT
Start: 2023-05-03 | End: 2023-05-05 | Stop reason: HOSPADM

## 2023-05-03 RX ORDER — IPRATROPIUM BROMIDE AND ALBUTEROL SULFATE 2.5; .5 MG/3ML; MG/3ML
3 SOLUTION RESPIRATORY (INHALATION) EVERY 6 HOURS PRN
Status: DISCONTINUED | OUTPATIENT
Start: 2023-05-03 | End: 2023-05-05 | Stop reason: HOSPADM

## 2023-05-03 RX ORDER — ATORVASTATIN CALCIUM 40 MG/1
40 TABLET, FILM COATED ORAL DAILY
Status: DISCONTINUED | OUTPATIENT
Start: 2023-05-04 | End: 2023-05-05 | Stop reason: HOSPADM

## 2023-05-03 RX ORDER — FOLIC ACID 1 MG/1
1000 TABLET ORAL DAILY
Status: DISCONTINUED | OUTPATIENT
Start: 2023-05-04 | End: 2023-05-05 | Stop reason: HOSPADM

## 2023-05-03 RX ORDER — LIDOCAINE HYDROCHLORIDE 20 MG/ML
10 SOLUTION OROPHARYNGEAL
Status: COMPLETED | OUTPATIENT
Start: 2023-05-03 | End: 2023-05-03

## 2023-05-03 RX ORDER — GLUCAGON 1 MG
1 KIT INJECTION
Status: DISCONTINUED | OUTPATIENT
Start: 2023-05-03 | End: 2023-05-05 | Stop reason: HOSPADM

## 2023-05-03 RX ORDER — OXYCODONE AND ACETAMINOPHEN 7.5; 325 MG/1; MG/1
1 TABLET ORAL 4 TIMES DAILY PRN
Status: DISCONTINUED | OUTPATIENT
Start: 2023-05-03 | End: 2023-05-05 | Stop reason: HOSPADM

## 2023-05-03 RX ORDER — IBUPROFEN 200 MG
16 TABLET ORAL
Status: DISCONTINUED | OUTPATIENT
Start: 2023-05-03 | End: 2023-05-05 | Stop reason: HOSPADM

## 2023-05-03 RX ORDER — SODIUM CHLORIDE, SODIUM LACTATE, POTASSIUM CHLORIDE, CALCIUM CHLORIDE 600; 310; 30; 20 MG/100ML; MG/100ML; MG/100ML; MG/100ML
INJECTION, SOLUTION INTRAVENOUS CONTINUOUS
Status: DISCONTINUED | OUTPATIENT
Start: 2023-05-03 | End: 2023-05-05 | Stop reason: HOSPADM

## 2023-05-03 RX ORDER — FAMOTIDINE 20 MG/1
40 TABLET, FILM COATED ORAL NIGHTLY PRN
Status: DISCONTINUED | OUTPATIENT
Start: 2023-05-03 | End: 2023-05-05 | Stop reason: HOSPADM

## 2023-05-03 RX ORDER — OXYCODONE AND ACETAMINOPHEN 5; 325 MG/1; MG/1
1 TABLET ORAL
Status: COMPLETED | OUTPATIENT
Start: 2023-05-03 | End: 2023-05-03

## 2023-05-03 RX ORDER — DOXEPIN HYDROCHLORIDE 10 MG/1
CAPSULE ORAL
COMMUNITY
Start: 2023-03-27 | End: 2024-01-03

## 2023-05-03 RX ORDER — PNV NO.95/FERROUS FUM/FOLIC AC 28MG-0.8MG
100 TABLET ORAL DAILY
Status: DISCONTINUED | OUTPATIENT
Start: 2023-05-04 | End: 2023-05-05 | Stop reason: HOSPADM

## 2023-05-03 RX ORDER — INSULIN ASPART 100 [IU]/ML
1-10 INJECTION, SOLUTION INTRAVENOUS; SUBCUTANEOUS
Status: DISCONTINUED | OUTPATIENT
Start: 2023-05-03 | End: 2023-05-05 | Stop reason: HOSPADM

## 2023-05-03 RX ORDER — ALBUTEROL SULFATE 90 UG/1
2 AEROSOL, METERED RESPIRATORY (INHALATION) EVERY 4 HOURS PRN
Status: DISCONTINUED | OUTPATIENT
Start: 2023-05-03 | End: 2023-05-05 | Stop reason: HOSPADM

## 2023-05-03 RX ORDER — NALOXONE HCL 0.4 MG/ML
0.02 VIAL (ML) INJECTION
Status: DISCONTINUED | OUTPATIENT
Start: 2023-05-03 | End: 2023-05-05 | Stop reason: HOSPADM

## 2023-05-03 RX ORDER — MAG HYDROX/ALUMINUM HYD/SIMETH 200-200-20
30 SUSPENSION, ORAL (FINAL DOSE FORM) ORAL
Status: COMPLETED | OUTPATIENT
Start: 2023-05-03 | End: 2023-05-03

## 2023-05-03 RX ORDER — SODIUM CHLORIDE 0.9 % (FLUSH) 0.9 %
10 SYRINGE (ML) INJECTION
Status: DISCONTINUED | OUTPATIENT
Start: 2023-05-03 | End: 2023-05-05 | Stop reason: HOSPADM

## 2023-05-03 RX ADMIN — ALUMINUM HYDROXIDE, MAGNESIUM HYDROXIDE, AND SIMETHICONE 30 ML: 200; 200; 20 SUSPENSION ORAL at 05:05

## 2023-05-03 RX ADMIN — PREGABALIN 150 MG: 75 CAPSULE ORAL at 08:05

## 2023-05-03 RX ADMIN — OXYCODONE AND ACETAMINOPHEN 1 TABLET: 7.5; 325 TABLET ORAL at 09:05

## 2023-05-03 RX ADMIN — OXYCODONE HYDROCHLORIDE AND ACETAMINOPHEN 1 TABLET: 5; 325 TABLET ORAL at 05:05

## 2023-05-03 RX ADMIN — SODIUM CHLORIDE, POTASSIUM CHLORIDE, SODIUM LACTATE AND CALCIUM CHLORIDE: 600; 310; 30; 20 INJECTION, SOLUTION INTRAVENOUS at 07:05

## 2023-05-03 RX ADMIN — LIDOCAINE HYDROCHLORIDE 10 ML: 20 SOLUTION ORAL at 05:05

## 2023-05-03 RX ADMIN — CEFTRIAXONE 1 G: 1 INJECTION, POWDER, FOR SOLUTION INTRAMUSCULAR; INTRAVENOUS at 07:05

## 2023-05-03 NOTE — ED PROVIDER NOTES
Encounter Date: 5/3/2023       History     Chief Complaint   Patient presents with    Chest Pain     Pt c/o pain in epigastric region around to the back       Patient is a 77-year-old female with multiple medical problems including coronary artery disease COPD diabetes iron deficiency anemia atherosclerotic aorta diastolic dysfunction diverticulitis fatty liver fibromyalgia hypertension hyperlipidemia TIA on Plavix ex lap for adhesions cholecystectomy who presents the emergency room for evaluation of right upper quadrant abdominal pain and right flank pain that radiates back and forth and started earlier today.  She denies any significant shortness of breath chest pain fevers dysuria hematuria.  Patient states she feels dizzy when she started this morning.  She denies any black or bloody stools or diarrhea.  She does take Plavix.    Review of patient's allergies indicates:   Allergen Reactions    Codeine Other (See Comments)     Chest pain    Clindamycin Other (See Comments)     Difficulty swallowing after taking, feels a something sits in epigastric area     Iodinated contrast media Hives and Rash     Past Medical History:   Diagnosis Date    Allergy     Anemia 2012    with thrombocytopenia; iron deficiency    Arthritis     Cervical spinal stenosis     with neuropathy, chronic neck,back,leg pain    Colon polyp     COPD (chronic obstructive pulmonary disease)     Coronary artery disease     COVID 4/27/2022    COVID-19     Diabetes mellitus     , sugars run 190's per patient    Diastolic dysfunction 7/13/2015    Diverticulitis     Diverticulosis     Hx diverticulitis,still has chronic diarrhea, abd pain    Dyspnea on exertion     sometimes with nausea, fatigue,dizziness, had cardiac cath June 2012, normal    Fibromyalgia     Fracture 2012    right thumb    GERD (gastroesophageal reflux disease)     Feb 2012, Hx H Pylori    Glaucoma     had LAser surgey, on no eye drops    HEARING LOSS     Hemangioma     fatty liver     History of earache     frequent    History of TIA (transient ischemic attack) 5/28/2013    Hyperlipidemia     Hypertension     Hypertension associated with diabetes 3/14/2017    Laryngeal polyp     Myocardial infarction 2006 last    also MI in distant past    REJI (obstructive sleep apnea)     does not use CPAP    Otitis media     Pneumonia due to COVID-19 virus 2/20/2021    Renal stone     Sjogren's syndrome     SOB (shortness of breath) 6/8/2012    [address] [place] [address] 1. Normal coronary arteries. 2. Normal single renal arteries bilaterally. 3. Diastolic dysfunction.     Stroke     TIA, now on Plavix    TIA (transient ischemic attack)     TMJ disorder     Type II or unspecified type diabetes mellitus without mention of complication, uncontrolled 5/8/2014    UTI (lower urinary tract infection)     frequent    Venous insufficiency     with Hx blood clot in leg     Past Surgical History:   Procedure Laterality Date    ABDOMINAL SURGERY  2010 x2    expoloratory lap for pelvic cyst,adhesions; partial colonectomy     adhesiolysis   10/11/2012    CARDIAC CATHETERIZATION      no current blockages.    CHOLECYSTECTOMY      COLON SURGERY      r/t diverticuli    COLONOSCOPY  08/2014    repeat in 5 years    COLONOSCOPY N/A 1/7/2020    Procedure: COLONOSCOPY;  Surgeon: Kb Nguyen MD;  Location: [facility];  Service: Endoscopy;  Laterality: N/A;    COLONOSCOPY N/A 3/13/2023    Procedure: COLONOSCOPY;  Surgeon: Jarrod Frost MD;  Location: [facility];  Service: Endoscopy;  Laterality: N/A;    ENDOSCOPIC ULTRASOUND OF UPPER GASTROINTESTINAL TRACT N/A 1/13/2021    Procedure: ULTRASOUND, UPPER GI TRACT, ENDOSCOPIC;  Surgeon: Sonido Castellano III, MD;  Location: [facility];  Service: Endoscopy;  Laterality: N/A;    EPIDURAL BLOCK INJECTION  5/15/12    back,neck for pain    ESOPHAGOGASTRODUODENOSCOPY N/A 1/7/2020    Procedure: EGD (ESOPHAGOGASTRODUODENOSCOPY);  Surgeon: Kb Nguyen MD;  Location: [facility];  Service:  Endoscopy;  Laterality: N/A;    ESOPHAGOGASTRODUODENOSCOPY N/A 1/13/2021    Procedure: EGD (ESOPHAGOGASTRODUODENOSCOPY);  Surgeon: Sonido Castellano III, MD;  Location: [facility] ENDO;  Service: Endoscopy;  Laterality: N/A;    ESOPHAGOGASTRODUODENOSCOPY N/A 3/13/2023    Procedure: EGD (ESOPHAGOGASTRODUODENOSCOPY);  Surgeon: Jarrod Frost MD;  Location: [facility];  Service: Endoscopy;  Laterality: N/A;    EXPLORATORY LAPAROTOMY W/ BOWEL RESECTION  10/11/2012    EYE SURGERY      Laser for glaucoma    EYE SURGERY  May 2012    cataracts    HYSTERECTOMY      LARYNX SURGERY      polypectomy    OOPHORECTOMY      TONSILLECTOMY      with adenoidectomy    UPPER GASTROINTESTINAL ENDOSCOPY  12/2015    VASCULAR SURGERY      laser to varicose vein leg     Family History   Problem Relation Age of Onset    Throat cancer Mother     Cancer Mother     Diabetes Mellitus Mother     Stroke Father     Hypertension Father     Heart disease Father     Diverticulitis Sister     Throat cancer Brother     Cancer Brother     Thyroid disease Daughter     Lupus Daughter     Pancreatic cancer Maternal Aunt 55    Pancreatic cancer Cousin 58    Breast cancer Neg Hx     Ovarian cancer Neg Hx     Colon cancer Neg Hx     Colon polyps Neg Hx     Celiac disease Neg Hx     Cirrhosis Neg Hx     Crohn's disease Neg Hx     Stomach cancer Neg Hx     Ulcerative colitis Neg Hx     Rectal cancer Neg Hx     Esophageal cancer Neg Hx     Inflammatory bowel disease Neg Hx     Rheum arthritis Neg Hx     Psoriasis Neg Hx     Osteoarthritis Neg Hx     Kidney disease Neg Hx     Hyperlipidemia Neg Hx     COPD Neg Hx     Depression Neg Hx     Chronic back pain Neg Hx     Asthma Neg Hx     Alcohol abuse Neg Hx      Social History     Tobacco Use    Smoking status: Never    Smokeless tobacco: Never   Substance Use Topics    Alcohol use: No    Drug use: Yes     Types: Oxycodone     Review of Systems   Constitutional:  Negative for fever.   HENT:  Negative for ear pain,  rhinorrhea and sore throat.    Eyes:  Negative for pain and visual disturbance.   Respiratory:  Negative for cough and shortness of breath.    Cardiovascular:  Negative for chest pain.   Gastrointestinal:  Positive for abdominal pain. Negative for diarrhea, nausea and vomiting.   Genitourinary:  Positive for flank pain. Negative for difficulty urinating and dysuria.   Musculoskeletal:  Negative for arthralgias.   Skin:  Negative for rash.   Neurological:  Positive for light-headedness. Negative for weakness, numbness and headaches.   All other systems reviewed and are negative.    Physical Exam     Initial Vitals [05/03/23 1411]   BP Pulse Resp Temp SpO2   (!) 107/51 77 18 98.7 °F (37.1 °C) 100 %      MAP       --         Physical Exam    Nursing note and vitals reviewed.  Constitutional: She appears well-developed and well-nourished.  Non-toxic appearance. No distress.   HENT:   Head: Normocephalic and atraumatic.   Mouth/Throat: Oropharynx is clear and moist.   Eyes: Conjunctivae and EOM are normal. Pupils are equal, round, and reactive to light.   Neck: Neck supple.   Normal range of motion.  Cardiovascular:  Normal rate, regular rhythm, normal heart sounds and intact distal pulses.     Exam reveals no gallop and no friction rub.       No murmur heard.  Pulmonary/Chest: Breath sounds normal. No respiratory distress. She has no decreased breath sounds. She has no wheezes. She has no rhonchi. She has no rales.   Abdominal: Abdomen is soft. Bowel sounds are normal. She exhibits no distension. There is abdominal tenderness (right upper quadrant, no rebound or guarding). There is no rebound.   Musculoskeletal:         General: Tenderness (tenderness just right of the thoracic spine.) present. No edema. Normal range of motion.      Cervical back: Normal range of motion and neck supple.     Neurological: She is alert and oriented to person, place, and time. She has normal strength. No sensory deficit. GCS score is 15.  GCS eye subscore is 4. GCS verbal subscore is 5. GCS motor subscore is 6.   Skin: Skin is warm and dry. No rash noted. There is pallor.   Psychiatric: She has a normal mood and affect.       ED Course   Procedures  Labs Reviewed   CBC W/ AUTO DIFFERENTIAL - Abnormal; Notable for the following components:       Result Value    RBC 2.58 (*)     Hemoglobin 7.5 (*)     Hematocrit 23.5 (*)     MCHC 31.9 (*)     RDW 15.0 (*)     MPV 13.5 (*)     All other components within normal limits   COMPREHENSIVE METABOLIC PANEL - Abnormal; Notable for the following components:    BUN 34 (*)     Alkaline Phosphatase 48 (*)     eGFR 43 (*)     All other components within normal limits   URINALYSIS, REFLEX TO URINE CULTURE - Abnormal; Notable for the following components:    Bilirubin (UA) 1+ (*)     Leukocytes, UA Trace (*)     All other components within normal limits    Narrative:     Specimen Source->Urine   LIPASE - Abnormal; Notable for the following components:    Lipase 89 (*)     All other components within normal limits   URINALYSIS MICROSCOPIC - Abnormal; Notable for the following components:    WBC, UA 11 (*)     Bacteria Few (*)     All other components within normal limits    Narrative:     Specimen Source->Urine   CULTURE, URINE   TROPONIN I   TROPONIN I   B-TYPE NATRIURETIC PEPTIDE   PROTIME-INR   APTT   TYPE & SCREEN        ECG Results              EKG 12-lead (Final result)  Result time 05/03/23 14:52:31      Final result by Interface, Lab In [facility] (05/03/23 14:52:31)                   Narrative:    Test Reason : R07.9,    Vent. Rate : 090 BPM     Atrial Rate : 090 BPM     P-R Int : 132 ms          QRS Dur : 062 ms      QT Int : 366 ms       P-R-T Axes : 080 072 076 degrees     QTc Int : 447 ms    Normal sinus rhythm  Septal infarct ,age undetermined  Abnormal ECG  When compared with ECG of 29-APR-2022 11:34,  Septal infarct is now Present  Confirmed by John Casas MD ([id number]) on 5/3/2023 2:52:24 PM    Referred  By: [email or web address]   SELF           Confirmed By:John Casas MD                                  Imaging Results              CT Abdomen Pelvis  Without Contrast (Final result)  Result time 05/03/23 15:06:32      Final result by Sanford Vidales Jr., MD (05/03/23 15:06:32)                   Impression:      Two ventral hernias 1 in the mid abdomen above the umbilicus to the left of midline containing a loop of transverse bowel without obstruction.  The 2nd ventral hernia is in the pelvic wall contains several loops of small bowel without evidence of obstruction.  Prior partial colon resection with anastomosis noted in the rectum.  Mild diverticulosis coli.  Prior hysterectomy.  Prior cholecystectomy.  Degenerative disc disease at L1-2, L4-5 and L5-S1.      Electronically signed by: Sanford Vidales MD  Date:    05/03/2023  Time:    15:06               Narrative:    EXAMINATION:  CT ABDOMEN PELVIS WITHOUT CONTRAST    CLINICAL HISTORY:  Abdominal pain, acute, nonlocalized;    TECHNIQUE:  Low dose axial images, sagittal and coronal reformations were obtained from the lung bases to the pubic symphysis.    COMPARISON:  CT abdomen of December 29, 2022.    FINDINGS:  There is a ventral hernia to the left of midline in the mid abdomen above the umbilicus containing a loop of transverse colon without of evidence of obstruction.  There is a 2nd midline hernia of the pelvic wall containing several loops of small bowel without evidence of obstruction.    The liver is of normal size contour and CT density without focal mass.  The gallbladder is absent.  The common bile duct is not dilated.  The pancreas is of normal contour and CT density without edema or mass.  The spleen is small and of normal CT density.    The adrenal glands are not enlarged.  The kidneys are of normal size contour and CT density for a noncontrast study.  Mass stone or hydronephrosis is not seen.  The ureters follow a normal course down to the bladder  without dilation or stone distally.  The abdominal aorta and inferior vena cava are of normal caliber with atherosclerotic calcification of the aorta noted.    The stomach is of normal configuration.  Small bowel dilatation or air-fluid levels are not seen.  The colon appears of normal configuration without dilation or mass.  A anastomosis is noted in the rectum without recurrent mass.  Few diverticuli are seen in the sigmoid colon without CT evidence of diverticulitis.  A normal appendix is noted.  Free fluid or free air is not seen.    The bladder is empty.  A uterus is not seen.  The patient is noted have degenerative disc disease in the lumbar spine at L1-2, L4-5 and L5-S1.                                       Medications   albuterol inhaler 2 puff (has no administration in time range)   albuterol-ipratropium 2.5 mg-0.5 mg/3 mL nebulizer solution 3 mL (has no administration in time range)   cyanocobalamin tablet 100 mcg (has no administration in time range)   famotidine tablet 40 mg (has no administration in time range)   folic acid tablet 1,000 mcg (has no administration in time range)   tiotropium bromide 2.5 mcg/actuation inhaler 2 puff (2 puffs Inhalation Given 5/4/23 0659)   multivitamin tablet (has no administration in time range)   olopatadine 0.1 % ophthalmic solution 1 drop (has no administration in time range)   oxyCODONE-acetaminophen 7.5-325 mg per tablet 1 tablet (1 tablet Oral Given 5/3/23 2140)   pantoprazole EC tablet 40 mg (has no administration in time range)   pregabalin capsule 150 mg (150 mg Oral Given 5/3/23 2003)   atorvastatin tablet 40 mg (has no administration in time range)   traZODone tablet 50 mg (has no administration in time range)   sodium chloride 0.9% flush 10 mL (has no administration in time range)   acetaminophen tablet 650 mg (has no administration in time range)   naloxone 0.4 mg/mL injection 0.02 mg (has no administration in time range)   glucose chewable tablet 16 g (has  no administration in time range)   glucose chewable tablet 24 g (has no administration in time range)   glucagon (human recombinant) injection 1 mg (has no administration in time range)   lactated ringers infusion ( Intravenous New Bag 5/3/23 1947)   ondansetron injection 4 mg (has no administration in time range)   insulin aspart U-100 pen 1-10 Units (has no administration in time range)   cefTRIAXone (ROCEPHIN) 1 g in dextrose 5 % in water (D5W) 5 % 50 mL IVPB (MB+) (0 g Intravenous Stopped 5/3/23 2020)   dextrose 10% bolus 125 mL 125 mL (has no administration in time range)   dextrose 10% bolus 250 mL 250 mL (has no administration in time range)   aluminum-magnesium hydroxide-simethicone 200-200-20 mg/5 mL suspension 30 mL (30 mLs Oral Given 5/3/23 1710)     And   LIDOcaine HCl 2% oral solution 10 mL (10 mLs Oral Given 5/3/23 1710)   oxyCODONE-acetaminophen 5-325 mg per tablet 1 tablet (1 tablet Oral Given 5/3/23 1710)     Medical Decision Making:   Patient has recurrent anemia.  This may be iron deficiency anemia however it is normocytic.  Will await iron studies.  She does have a history of iron-deficiency anemia.  She is heme-positive with no gross melena.  In regards to her right flank pain that radiates towards her right upper quadrant I believe this may be a radiculopathy.  CT scan did not show anything acute.  She has a history of a cholecystectomy.  Lipase is elevated nonspecific .  It is not greater than 3 times the upper limit of normal.  She has 11 white blood cells and few bacteria with trace leukocytes.  I doubt this represents a urinary tract infection and likely more of a dirty specimen.  Pain is intermittent colicky.  Pain is definitely positional.  He reproduces with palpation to the right side of the thoracic spine.  She has degenerative disease noted.  CT shows 2 ventral hernias but no signs of obstruction.  She will be admitted to the hospital for further workup board appears to be a GI bleed.   Anticipate gastroenterology consult nest spoke to the hospitalist in regards to the plan.  Updated the patient in the family.           ED Course as of 05/04/23 0721   Wed May 03, 2023   1415 EKG independently interpreted by me as rate 90 normal sinus rhythm normal axis Q-wave anterior lead no ST segment elevation or depression. [JS]   1442 Patient has abdominal pain and flank pain.  She has reproducible tenderness just to the right of her thoracic spine.  She appears to be more anemic than normal.  She has stable vital signs.  Given the abdominal pain that radiates to the flank ordered a CTA chest abdomen pelvis in the setting of anemia however she is allergic to contrast.  She does have known atherosclerosis of the aorta.  She does not appear to be significantly hypotensive or tachycardic.  I doubt she has aortic aneurysm rupture.  I doubt this is dissection.  Given her allergies I have ordered CT abdomen pelvis without contrast.  She could have a radiculopathy but other more emergent issues need to be ruled out especially given her age and risk factors. [JS]      ED Course User Index  [JS] Kishan Diaz MD                 Clinical Impression:   Final diagnoses:  [R07.9] Chest pain  [D64.9] Anemia  [R19.5] Heme positive stool (Primary)        ED Disposition Condition    Observation                 Kishan Diaz MD  05/04/23 0721

## 2023-05-04 PROBLEM — R19.5 HEME POSITIVE STOOL: Status: ACTIVE | Noted: 2023-05-04

## 2023-05-04 PROBLEM — N17.9 AKI (ACUTE KIDNEY INJURY): Status: RESOLVED | Noted: 2023-05-03 | Resolved: 2023-05-04

## 2023-05-04 LAB
ALBUMIN SERPL BCP-MCNC: 3.3 G/DL (ref 3.5–5.2)
ALP SERPL-CCNC: 39 U/L (ref 55–135)
ALT SERPL W/O P-5'-P-CCNC: 9 U/L (ref 10–44)
ANION GAP SERPL CALC-SCNC: 8 MMOL/L (ref 8–16)
AST SERPL-CCNC: 15 U/L (ref 10–40)
BASOPHILS # BLD AUTO: 0.04 K/UL (ref 0–0.2)
BASOPHILS NFR BLD: 0.7 % (ref 0–1.9)
BILIRUB SERPL-MCNC: 0.2 MG/DL (ref 0.1–1)
BLD PROD TYP BPU: NORMAL
BLOOD UNIT EXPIRATION DATE: NORMAL
BLOOD UNIT TYPE CODE: 9500
BLOOD UNIT TYPE: NORMAL
BUN SERPL-MCNC: 29 MG/DL (ref 8–23)
CALCIUM SERPL-MCNC: 9.5 MG/DL (ref 8.7–10.5)
CHLORIDE SERPL-SCNC: 107 MMOL/L (ref 95–110)
CO2 SERPL-SCNC: 27 MMOL/L (ref 23–29)
CODING SYSTEM: NORMAL
CREAT SERPL-MCNC: 0.9 MG/DL (ref 0.5–1.4)
CROSSMATCH INTERPRETATION: NORMAL
DIFFERENTIAL METHOD: ABNORMAL
DISPENSE STATUS: NORMAL
EOSINOPHIL # BLD AUTO: 0.1 K/UL (ref 0–0.5)
EOSINOPHIL NFR BLD: 1.4 % (ref 0–8)
ERYTHROCYTE [DISTWIDTH] IN BLOOD BY AUTOMATED COUNT: 15 % (ref 11.5–14.5)
EST. GFR  (NO RACE VARIABLE): >60 ML/MIN/1.73 M^2
GLUCOSE SERPL-MCNC: 129 MG/DL (ref 70–110)
HCT VFR BLD AUTO: 21.7 % (ref 37–48.5)
HGB BLD-MCNC: 6.9 G/DL (ref 12–16)
IMM GRANULOCYTES # BLD AUTO: 0.02 K/UL (ref 0–0.04)
IMM GRANULOCYTES NFR BLD AUTO: 0.3 % (ref 0–0.5)
LYMPHOCYTES # BLD AUTO: 3.2 K/UL (ref 1–4.8)
LYMPHOCYTES NFR BLD: 54.5 % (ref 18–48)
MAGNESIUM SERPL-MCNC: 1.3 MG/DL (ref 1.6–2.6)
MCH RBC QN AUTO: 29.5 PG (ref 27–31)
MCHC RBC AUTO-ENTMCNC: 31.8 G/DL (ref 32–36)
MCV RBC AUTO: 93 FL (ref 82–98)
MONOCYTES # BLD AUTO: 0.6 K/UL (ref 0.3–1)
MONOCYTES NFR BLD: 9.9 % (ref 4–15)
NEUTROPHILS # BLD AUTO: 2 K/UL (ref 1.8–7.7)
NEUTROPHILS NFR BLD: 33.2 % (ref 38–73)
NRBC BLD-RTO: 0 /100 WBC
NUM UNITS TRANS PACKED RBC: NORMAL
PHOSPHATE SERPL-MCNC: 3.4 MG/DL (ref 2.7–4.5)
PLATELET # BLD AUTO: 247 K/UL (ref 150–450)
PMV BLD AUTO: 13.5 FL (ref 9.2–12.9)
POCT GLUCOSE: 101 MG/DL (ref 70–110)
POCT GLUCOSE: 106 MG/DL (ref 70–110)
POCT GLUCOSE: 129 MG/DL (ref 70–110)
POCT GLUCOSE: 99 MG/DL (ref 70–110)
POTASSIUM SERPL-SCNC: 4 MMOL/L (ref 3.5–5.1)
PROT SERPL-MCNC: 5.8 G/DL (ref 6–8.4)
RBC # BLD AUTO: 2.34 M/UL (ref 4–5.4)
SODIUM SERPL-SCNC: 142 MMOL/L (ref 136–145)
WBC # BLD AUTO: 5.87 K/UL (ref 3.9–12.7)

## 2023-05-04 PROCEDURE — C9113 INJ PANTOPRAZOLE SODIUM, VIA: HCPCS | Performed by: STUDENT IN AN ORGANIZED HEALTH CARE EDUCATION/TRAINING PROGRAM

## 2023-05-04 PROCEDURE — 99222 PR INITIAL HOSPITAL CARE,LEVL II: ICD-10-PCS | Mod: ,,, | Performed by: INTERNAL MEDICINE

## 2023-05-04 PROCEDURE — 80053 COMPREHEN METABOLIC PANEL: CPT | Performed by: STUDENT IN AN ORGANIZED HEALTH CARE EDUCATION/TRAINING PROGRAM

## 2023-05-04 PROCEDURE — 63600175 PHARM REV CODE 636 W HCPCS: Performed by: STUDENT IN AN ORGANIZED HEALTH CARE EDUCATION/TRAINING PROGRAM

## 2023-05-04 PROCEDURE — 36415 COLL VENOUS BLD VENIPUNCTURE: CPT | Performed by: STUDENT IN AN ORGANIZED HEALTH CARE EDUCATION/TRAINING PROGRAM

## 2023-05-04 PROCEDURE — 94761 N-INVAS EAR/PLS OXIMETRY MLT: CPT

## 2023-05-04 PROCEDURE — 25000242 PHARM REV CODE 250 ALT 637 W/ HCPCS: Performed by: STUDENT IN AN ORGANIZED HEALTH CARE EDUCATION/TRAINING PROGRAM

## 2023-05-04 PROCEDURE — 96376 TX/PRO/DX INJ SAME DRUG ADON: CPT

## 2023-05-04 PROCEDURE — 96365 THER/PROPH/DIAG IV INF INIT: CPT

## 2023-05-04 PROCEDURE — 85025 COMPLETE CBC W/AUTO DIFF WBC: CPT | Performed by: STUDENT IN AN ORGANIZED HEALTH CARE EDUCATION/TRAINING PROGRAM

## 2023-05-04 PROCEDURE — P9016 RBC LEUKOCYTES REDUCED: HCPCS | Performed by: STUDENT IN AN ORGANIZED HEALTH CARE EDUCATION/TRAINING PROGRAM

## 2023-05-04 PROCEDURE — 99223 PR INITIAL HOSPITAL CARE,LEVL III: ICD-10-PCS | Mod: ,,, | Performed by: INTERNAL MEDICINE

## 2023-05-04 PROCEDURE — 83735 ASSAY OF MAGNESIUM: CPT | Performed by: STUDENT IN AN ORGANIZED HEALTH CARE EDUCATION/TRAINING PROGRAM

## 2023-05-04 PROCEDURE — G0378 HOSPITAL OBSERVATION PER HR: HCPCS

## 2023-05-04 PROCEDURE — 36430 TRANSFUSION BLD/BLD COMPNT: CPT

## 2023-05-04 PROCEDURE — 25000003 PHARM REV CODE 250: Performed by: STUDENT IN AN ORGANIZED HEALTH CARE EDUCATION/TRAINING PROGRAM

## 2023-05-04 PROCEDURE — 96375 TX/PRO/DX INJ NEW DRUG ADDON: CPT

## 2023-05-04 PROCEDURE — 99222 1ST HOSP IP/OBS MODERATE 55: CPT | Mod: ,,, | Performed by: INTERNAL MEDICINE

## 2023-05-04 PROCEDURE — 25000003 PHARM REV CODE 250: Performed by: NURSE PRACTITIONER

## 2023-05-04 PROCEDURE — 94640 AIRWAY INHALATION TREATMENT: CPT

## 2023-05-04 PROCEDURE — 96361 HYDRATE IV INFUSION ADD-ON: CPT

## 2023-05-04 PROCEDURE — 99223 1ST HOSP IP/OBS HIGH 75: CPT | Mod: ,,, | Performed by: INTERNAL MEDICINE

## 2023-05-04 PROCEDURE — 96366 THER/PROPH/DIAG IV INF ADDON: CPT

## 2023-05-04 PROCEDURE — 84100 ASSAY OF PHOSPHORUS: CPT | Performed by: STUDENT IN AN ORGANIZED HEALTH CARE EDUCATION/TRAINING PROGRAM

## 2023-05-04 PROCEDURE — 99900035 HC TECH TIME PER 15 MIN (STAT)

## 2023-05-04 RX ORDER — MAGNESIUM SULFATE HEPTAHYDRATE 40 MG/ML
4 INJECTION, SOLUTION INTRAVENOUS ONCE
Status: COMPLETED | OUTPATIENT
Start: 2023-05-04 | End: 2023-05-04

## 2023-05-04 RX ORDER — BISACODYL 5 MG
5 TABLET, DELAYED RELEASE (ENTERIC COATED) ORAL DAILY PRN
Status: DISCONTINUED | OUTPATIENT
Start: 2023-05-04 | End: 2023-05-05 | Stop reason: HOSPADM

## 2023-05-04 RX ORDER — PANTOPRAZOLE SODIUM 40 MG/10ML
40 INJECTION, POWDER, LYOPHILIZED, FOR SOLUTION INTRAVENOUS 2 TIMES DAILY
Status: DISCONTINUED | OUTPATIENT
Start: 2023-05-04 | End: 2023-05-05 | Stop reason: HOSPADM

## 2023-05-04 RX ORDER — HYDROCODONE BITARTRATE AND ACETAMINOPHEN 500; 5 MG/1; MG/1
TABLET ORAL
Status: DISCONTINUED | OUTPATIENT
Start: 2023-05-04 | End: 2023-05-05 | Stop reason: HOSPADM

## 2023-05-04 RX ADMIN — VITAM B12 100 MCG: 100 TAB at 08:05

## 2023-05-04 RX ADMIN — OXYCODONE AND ACETAMINOPHEN 1 TABLET: 7.5; 325 TABLET ORAL at 11:05

## 2023-05-04 RX ADMIN — THERA TABS 1 TABLET: TAB at 08:05

## 2023-05-04 RX ADMIN — PREGABALIN 150 MG: 75 CAPSULE ORAL at 08:05

## 2023-05-04 RX ADMIN — ONDANSETRON 4 MG: 2 INJECTION INTRAMUSCULAR; INTRAVENOUS at 11:05

## 2023-05-04 RX ADMIN — OXYCODONE AND ACETAMINOPHEN 1 TABLET: 7.5; 325 TABLET ORAL at 06:05

## 2023-05-04 RX ADMIN — TIOTROPIUM BROMIDE INHALATION SPRAY 2 PUFF: 3.12 SPRAY, METERED RESPIRATORY (INHALATION) at 06:05

## 2023-05-04 RX ADMIN — BISACODYL 5 MG: 5 TABLET, COATED ORAL at 10:05

## 2023-05-04 RX ADMIN — PANTOPRAZOLE SODIUM 40 MG: 40 INJECTION, POWDER, LYOPHILIZED, FOR SOLUTION INTRAVENOUS at 10:05

## 2023-05-04 RX ADMIN — PANTOPRAZOLE SODIUM 40 MG: 40 TABLET, DELAYED RELEASE ORAL at 08:05

## 2023-05-04 RX ADMIN — MAGNESIUM SULFATE 4 G: 2 INJECTION INTRAVENOUS at 10:05

## 2023-05-04 RX ADMIN — CEFTRIAXONE 1 G: 1 INJECTION, POWDER, FOR SOLUTION INTRAMUSCULAR; INTRAVENOUS at 08:05

## 2023-05-04 RX ADMIN — PANTOPRAZOLE SODIUM 40 MG: 40 INJECTION, POWDER, LYOPHILIZED, FOR SOLUTION INTRAVENOUS at 08:05

## 2023-05-04 RX ADMIN — ATORVASTATIN CALCIUM 40 MG: 40 TABLET, FILM COATED ORAL at 08:05

## 2023-05-04 RX ADMIN — SODIUM CHLORIDE, POTASSIUM CHLORIDE, SODIUM LACTATE AND CALCIUM CHLORIDE: 600; 310; 30; 20 INJECTION, SOLUTION INTRAVENOUS at 10:05

## 2023-05-04 RX ADMIN — FOLIC ACID 1000 MCG: 1 TABLET ORAL at 08:05

## 2023-05-04 NOTE — NURSING
Nurses Note -- 4 Eyes      5/4/2023   12:13 AM      Skin assessed during: Admit      [x] No Altered Skin Integrity Present    [x]Prevention Measures Documented      [] Yes- Altered Skin Integrity Present or Discovered   [] LDA Added if Not in Epic (Describe Wound)   [] New Altered Skin Integrity was Present on Admit and Documented in LDA   [] Wound Image Taken    Wound Care Consulted? No    Attending Nurse:  Kareen Lopez RN     Second RN/Staff Member:  Radha Horton RN

## 2023-05-04 NOTE — HPI
Pili Teixeira is a 77 year old female with a past medical history of anemia, COPD, CAD, TIA, HTN, HLD, DM, fibromyalgia, GERD, REJI, and diverticulosis who presents with RUQ/R flank pain, fatigue, shortness of breath, and light-headedness. She states she has experienced this right sided pain, yet it has not been this severe. She denies any dysuria, frequency, or urgency. She has been seen in the past by Hematology for the anemia. She had a colonoscopy and EGD done 3/13/2023 with no acute findings. She is on DAPT with ASA and Plavix but she denies any gross bleeding. CT of the abdomen and pelvis without contrast performed in the ED was unremarkable. Her Hgb was discovered to be 7.5, lower than her baseline of ~10. She was given a GI cocktail in the ED. Hospital Medicine was consulted for admission.

## 2023-05-04 NOTE — ASSESSMENT & PLAN NOTE
Symptomatic. No evidence of gross bleeding. Prior EGD and colonoscopy unremarkable.  -Hematology consulted  -Follow up iron studies  -Transfuse for Hgb < 7  -Trend Hgb with  -Ensure patient has GI follow up; may need capsule endoscopy

## 2023-05-04 NOTE — ASSESSMENT & PLAN NOTE
Patient's FSGs are controlled on current medication regimen.  Last A1c reviewed-   Lab Results   Component Value Date    HGBA1C 5.4 08/18/2022     Most recent fingerstick glucose reviewed -   Recent Labs   Lab 05/03/23 2003 05/04/23  0725   POCTGLUCOSE 75 106     Current correctional scale  Medium  Maintain anti-hyperglycemic dose as follows-   Antihyperglycemics (From admission, onward)    Start     Stop Route Frequency Ordered    05/03/23 1817  insulin aspart U-100 pen 1-10 Units         -- SubQ Before meals & nightly PRN 05/03/23 1722        Hold Oral hypoglycemics while patient is in the hospital.

## 2023-05-04 NOTE — PLAN OF CARE
BENITEZ explained, pt verbalized understanding. Form signed.      05/04/23 0952   BENITEZ Message   Medicare Outpatient and Observation Notification regarding financial responsibility Given to patient/caregiver;Explained to patient/caregiver;Signed/date by patient/caregiver   Date BENITEZ was signed 05/04/23   Time BENITEZ was signed 0932

## 2023-05-04 NOTE — ASSESSMENT & PLAN NOTE
-Start LR 75 cc/hr  -Renally dose medications  -Avoid nephrotoxic agents  -Monitor UOP and electrolytes  -Trend creatinine

## 2023-05-04 NOTE — H&P
[facility] Medicine  History & Physical    Patient Name: Pili Teixeira  MRN: [id number]  Patient Class: OP- Observation  Admission Date: 5/3/2023  Attending Physician: Vince Mccrray MD  Primary Care Provider: ANKIT Huggins MD         Patient information was obtained from patient, past medical records and ER records.     Subjective:     Principal Problem:Anemia    Chief Complaint:   Chief Complaint   Patient presents with    Chest Pain     Pt c/o pain in epigastric region around to the back          HPI: Pili Teixeira is a 77 year old female with a past medical history of anemia, COPD, CAD, TIA, HTN, HLD, DM, fibromyalgia, GERD, REJI, and diverticulosis who presents with RUQ/R flank pain, fatigue, shortness of breath, and light-headedness. She states she has experienced this right sided pain, yet it has not been this severe. She denies any dysuria, frequency, or urgency. She has been seen in the past by Hematology for the anemia. She had a colonoscopy and EGD done 3/13/2023 with no acute findings. She is on DAPT with ASA and Plavix but she denies any gross bleeding. CT of the abdomen and pelvis without contrast performed in the ED was unremarkable. Her Hgb was discovered to be 7.5, lower than her baseline of ~10. She was given a GI cocktail in the ED. Hospital Medicine was consulted for admission.      Past Medical History:   Diagnosis Date    Allergy     Anemia 2012    with thrombocytopenia; iron deficiency    Arthritis     Cervical spinal stenosis     with neuropathy, chronic neck,back,leg pain    Colon polyp     COPD (chronic obstructive pulmonary disease)     Coronary artery disease     COVID 4/27/2022    COVID-19     Diabetes mellitus     , sugars run 190's per patient    Diastolic dysfunction 7/13/2015    Diverticulitis     Diverticulosis     Hx diverticulitis,still has chronic diarrhea, abd pain    Dyspnea on exertion     sometimes with nausea, fatigue,dizziness,  had cardiac cath June 2012, normal    Fibromyalgia     Fracture 2012    right thumb    GERD (gastroesophageal reflux disease)     Feb 2012, Hx H Pylori    Glaucoma     had LAser surgey, on no eye drops    HEARING LOSS     Hemangioma     fatty liver    History of earache     frequent    History of TIA (transient ischemic attack) 5/28/2013    Hyperlipidemia     Hypertension     Hypertension associated with diabetes 3/14/2017    Laryngeal polyp     Myocardial infarction 2006 last    also MI in distant past    REJI (obstructive sleep apnea)     does not use CPAP    Otitis media     Pneumonia due to COVID-19 virus 2/20/2021    Renal stone     Sjogren's syndrome     SOB (shortness of breath) 6/8/2012    2012 [facility] 1. Normal coronary arteries. 2. Normal single renal arteries bilaterally. 3. Diastolic dysfunction.     Stroke     TIA, now on Plavix    TIA (transient ischemic attack)     TMJ disorder     Type II or unspecified type diabetes mellitus without mention of complication, uncontrolled 5/8/2014    UTI (lower urinary tract infection)     frequent    Venous insufficiency     with Hx blood clot in leg       Past Surgical History:   Procedure Laterality Date    ABDOMINAL SURGERY  2010 x2    expoloratory lap for pelvic cyst,adhesions; partial colonectomy     adhesiolysis   10/11/2012    CARDIAC CATHETERIZATION      no current blockages.    CHOLECYSTECTOMY      COLON SURGERY      r/t diverticuli    COLONOSCOPY  08/2014    repeat in 5 years    COLONOSCOPY N/A 1/7/2020    Procedure: COLONOSCOPY;  Surgeon: Kb Nguyen MD;  Location: [facility] ENDO;  Service: Endoscopy;  Laterality: N/A;    COLONOSCOPY N/A 3/13/2023    Procedure: COLONOSCOPY;  Surgeon: aJrrod Frost MD;  Location: [facility];  Service: Endoscopy;  Laterality: N/A;    ENDOSCOPIC ULTRASOUND OF UPPER GASTROINTESTINAL TRACT N/A 1/13/2021    Procedure: ULTRASOUND, UPPER GI TRACT, ENDOSCOPIC;  Surgeon: Sonido Castellano III,  MD;  Location: [facility] ENDO;  Service: Endoscopy;  Laterality: N/A;    EPIDURAL BLOCK INJECTION  5/15/12    back,neck for pain    ESOPHAGOGASTRODUODENOSCOPY N/A 1/7/2020    Procedure: EGD (ESOPHAGOGASTRODUODENOSCOPY);  Surgeon: Kb Nguyen MD;  Location: [facility] ENDO;  Service: Endoscopy;  Laterality: N/A;    ESOPHAGOGASTRODUODENOSCOPY N/A 1/13/2021    Procedure: EGD (ESOPHAGOGASTRODUODENOSCOPY);  Surgeon: Sonido Castellano III, MD;  Location: [facility] ENDO;  Service: Endoscopy;  Laterality: N/A;    ESOPHAGOGASTRODUODENOSCOPY N/A 3/13/2023    Procedure: EGD (ESOPHAGOGASTRODUODENOSCOPY);  Surgeon: Jarrod Frost MD;  Location: [facility];  Service: Endoscopy;  Laterality: N/A;    EXPLORATORY LAPAROTOMY W/ BOWEL RESECTION  10/11/2012    EYE SURGERY      Laser for glaucoma    EYE SURGERY  May 2012    cataracts    HYSTERECTOMY      LARYNX SURGERY      polypectomy    OOPHORECTOMY      TONSILLECTOMY      with adenoidectomy    UPPER GASTROINTESTINAL ENDOSCOPY  12/2015    VASCULAR SURGERY      laser to varicose vein leg       Review of patient's allergies indicates:   Allergen Reactions    Codeine Other (See Comments)     Chest pain    Clindamycin Other (See Comments)     Difficulty swallowing after taking, feels a something sits in epigastric area     Iodinated contrast media Hives and Rash       No current facility-administered medications on file prior to encounter.     Current Outpatient Medications on File Prior to Encounter   Medication Sig    albuterol (PROVENTIL/VENTOLIN HFA) 90 mcg/actuation inhaler Inhale 2 puffs into the lungs every 4 (four) hours as needed for Wheezing or Shortness of Breath. Rescue (Patient not taking: Reported on 3/10/2023)    albuterol-ipratropium (DUO-NEB) 2.5 mg-0.5 mg/3 mL nebulizer solution Take 3 mLs by nebulization every 4 to 6 hours as needed for Wheezing. Rescue    aspirin (ECOTRIN) 81 MG EC tablet Take 81 mg by mouth once daily.    blood sugar diagnostic  (ACCU-CHEK GUIDE TEST STRIPS) Strp USE TO TEST BLOOD GLUCOSE ONE TIME DAILY AS DIRECTED.    blood-glucose meter kit To check BG 2 times daily, to use with insurance preferred meter. Medical necessity.    carboxymethylcellulose sodium (LUBRICANT EYE DROPS OPHT) Apply to eye.    clopidogreL (PLAVIX) 75 mg tablet Take 1 tablet by mouth once daily    cyanocobalamin (VITAMIN B-12) 1000 MCG tablet Take 100 mcg by mouth once daily.    diclofenac sodium 1 % Gel Apply 2 g topically once daily.    diltiaZEM (CARDIZEM CD) 120 MG Cp24 TAKE 1 CAPSULE BY MOUTH IN THE EVENING (Patient not taking: Reported on 3/10/2023)    docusate sodium (STOOL SOFTENER ORAL) Take by mouth.    doxepin (SINEQUAN) 10 MG capsule TAKE 1 CAPSULE BY MOUTH EVERY NIGHT AS NEEDED    famotidine (PEPCID) 40 MG tablet Take 1 tablet (40 mg total) by mouth every evening.    fluticasone propionate (FLONASE) 50 mcg/actuation nasal spray USE 2 SPRAYS IN EACH NOSTRIL ONE TIME PER DAY    folic acid (FOLVITE) 1 MG tablet Take 1 tablet (1,000 mcg total) by mouth once daily.    ipratropium (ATROVENT) 42 mcg (0.06 %) nasal spray 2 sprays by Nasal route 3 (three) times daily.    Lactobac no.41/Bifidobact no.7 (PROBIOTIC-10 ORAL) Take by mouth.    lancets (ACCU-CHEK SOFTCLIX LANCETS) Misc Test TWICE DAILY;Twice a day    lancets Misc Use to test blood glucose one (1) times daily as directed with insurance preferred meter and supplies    loratadine (CLARITIN) 10 mg tablet Take 10 mg by mouth once daily.    meloxicam (MOBIC) 7.5 MG tablet Take 1 tablet (7.5 mg total) by mouth once daily.    metFORMIN (GLUCOPHAGE) 500 MG tablet TAKE 1 TABLET BY MOUTH TWICE DAILY WITH MEALS    multivit with min-folic acid 200 mcg Chew Take 1 tablet by mouth once daily.     NARCAN 4 mg/actuation Spry as directed    olopatadine (PATANOL) 0.1 % ophthalmic solution Place 1 drop into both eyes daily as needed.    ondansetron (ZOFRAN-ODT) 4 MG TbDL Take 1 tablet (4 mg total) by  mouth every 8 (eight) hours as needed (nausea).    oxyCODONE-acetaminophen (PERCOCET) 7.5-325 mg per tablet Take 1 tablet by mouth 4 (four) times daily as needed.    pantoprazole (PROTONIX) 40 MG tablet Take 1 tablet by mouth once daily    polyethylene glycol (GOLYTELY) 236-22.74-6.74 -5.86 gram suspension AS DIRECTED    pregabalin (LYRICA) 150 MG capsule Take 1 capsule (150 mg total) by mouth 2 (two) times daily.    rosuvastatin (CRESTOR) 10 MG tablet TAKE 1 TABLET BY MOUTH ONCE DAILY IN THE EVENING    sodium chloride (SALINE NASAL NASL) by Nasal route.    TRADJENTA 5 mg Tab tablet Take 1 tablet by mouth once daily    traZODone (DESYREL) 50 MG tablet TAKE 1 TABLET BY MOUTH IN THE EVENING - (MAY TAKE AN ADDITIONAL TABLET AS NEEDED)    valsartan-hydrochlorothiazide (DIOVAN-HCT) 160-12.5 mg per tablet Take 1 tablet by mouth once daily     Family History       Problem Relation (Age of Onset)    Cancer Mother, Brother    Diabetes Mellitus Mother    Diverticulitis Sister    Heart disease Father    Hypertension Father    Lupus Daughter    Pancreatic cancer Maternal Aunt (55), Cousin (58)    Stroke Father    Throat cancer Mother, Brother    Thyroid disease Daughter          Tobacco Use    Smoking status: Never    Smokeless tobacco: Never   Substance and Sexual Activity    Alcohol use: No    Drug use: Yes     Types: Oxycodone    Sexual activity: Not Currently     Birth control/protection: Surgical     Comment: hysterectomy     Review of Systems   Constitutional:  Positive for activity change and fatigue. Negative for chills and fever.   HENT: Negative.     Eyes: Negative.    Respiratory:  Positive for shortness of breath.    Cardiovascular:  Positive for chest pain. Negative for palpitations.   Gastrointestinal:  Positive for abdominal pain. Negative for abdominal distention, diarrhea, nausea and vomiting.   Endocrine: Negative.    Genitourinary:  Positive for flank pain. Negative for dysuria and urgency.    Skin:  Positive for pallor.   Allergic/Immunologic: Positive for immunocompromised state.   Neurological:  Positive for weakness.   Psychiatric/Behavioral: Negative.     Objective:     Vital Signs (Most Recent):  Temp: 98.1 °F (36.7 °C) (05/03/23 1923)  Pulse: 67 (05/03/23 1923)  Resp: 18 (05/03/23 1923)  BP: (!) 104/55 (05/03/23 1923)  SpO2: 98 % (05/03/23 1923)   Vital Signs (24h Range):  Temp:  [98.1 °F (36.7 °C)-98.7 °F (37.1 °C)] 98.1 °F (36.7 °C)  Pulse:  [67-77] 67  Resp:  [16-18] 18  SpO2:  [98 %-100 %] 98 %  BP: ()/(47-58) 104/55     Weight: 59.4 kg (131 lb)  Body mass index is 23.21 kg/m².    Physical Exam  Vitals and nursing note reviewed.   Constitutional:       Appearance: She is ill-appearing.   HENT:      Head: Normocephalic and atraumatic.      Right Ear: External ear normal.      Left Ear: External ear normal.      Nose: Nose normal.      Mouth/Throat:      Mouth: Mucous membranes are moist.      Pharynx: Oropharynx is clear.   Eyes:      Extraocular Movements: Extraocular movements intact.      Conjunctiva/sclera: Conjunctivae normal.   Cardiovascular:      Rate and Rhythm: Normal rate and regular rhythm.      Pulses: Normal pulses.      Heart sounds: Normal heart sounds.   Pulmonary:      Effort: Pulmonary effort is normal.      Breath sounds: Normal breath sounds.   Abdominal:      General: Bowel sounds are normal. There is no distension.      Palpations: Abdomen is soft.      Tenderness: There is no abdominal tenderness. There is no right CVA tenderness.   Musculoskeletal:         General: Normal range of motion.      Cervical back: Normal range of motion and neck supple.      Right lower leg: No edema.      Left lower leg: No edema.   Skin:     General: Skin is warm and dry.   Neurological:      Mental Status: She is alert. Mental status is at baseline.   Psychiatric:         Behavior: Behavior normal.           Significant Labs: All pertinent labs within the past 24 hours have been  reviewed.    Significant Imaging: I have reviewed all pertinent imaging results/findings within the past 24 hours.    Assessment/Plan:     * Anemia  Symptomatic. No evidence of gross bleeding. Prior EGD and colonoscopy unremarkable.  -Hematology consulted  -Follow up iron studies  -Transfuse for Hgb < 7  -Trend Hgb with  -Ensure patient has GI follow up; may need capsule endoscopy      UTI (urinary tract infection)  Urinalysis shows leukocytes. Kidneys on CT appear normal.   -Follow up urine culture  -Start Rocephin      HEATHER (acute kidney injury)  -Start LR 75 cc/hr  -Renally dose medications  -Avoid nephrotoxic agents  -Monitor UOP and electrolytes  -Trend creatinine      Glaucoma  -Continue home eye drops      CAD (coronary artery disease)  -Statin  -Hold DAPT      Type 2 diabetes mellitus, without long-term current use of insulin  Patient's FSGs are controlled on current medication regimen.  Last A1c reviewed-   Lab Results   Component Value Date    HGBA1C 5.4 08/18/2022     Most recent fingerstick glucose reviewed - No results for input(s): POCTGLUCOSE in the last 24 hours.  Current correctional scale  Medium  Maintain anti-hyperglycemic dose as follows-   Antihyperglycemics (From admission, onward)    Start     Stop Route Frequency Ordered    05/03/23 1817  insulin aspart U-100 pen 1-10 Units         -- SubQ Before meals & nightly PRN 05/03/23 1722        Hold Oral hypoglycemics while patient is in the hospital.    Hyperlipidemia associated with type 2 diabetes mellitus  -Continue home statin    Hypertension associated with diabetes  -Continue to monitor  -Hold ARB and HCTZ    Fibromyalgia  -Continue home Lyrica      Diastolic dysfunction  Grade one diastolic dysfunction. Compensated.  -Telemetry  -Caution with IV fluids      COPD (chronic obstructive pulmonary disease)  -Spiriva  -Duonebs PRN      History of TIA (transient ischemic attack)  -Continue home statin  -Hold ASA and statin given symptomatic  anemia      REJI (obstructive sleep apnea)  Patient does not use CPAP.      GERD (gastroesophageal reflux disease)  -PPI        VTE Risk Mitigation (From admission, onward)         Ordered     Reason for No Pharmacological VTE Prophylaxis  Once        Question:  Reasons:  Answer:  Risk of Bleeding    05/03/23 1722     IP VTE HIGH RISK PATIENT  Once         05/03/23 1722     Place sequential compression device  Until discontinued         05/03/23 1722                   On 05/03/2023, patient should be placed in hospital observation services under my care.        Vince Mccrary MD  Department of Hospital Medicine  [facility]

## 2023-05-04 NOTE — ASSESSMENT & PLAN NOTE
Patient's FSGs are controlled on current medication regimen.  Last A1c reviewed-   Lab Results   Component Value Date    HGBA1C 5.4 08/18/2022     Most recent fingerstick glucose reviewed - No results for input(s): POCTGLUCOSE in the last 24 hours.  Current correctional scale  Medium  Maintain anti-hyperglycemic dose as follows-   Antihyperglycemics (From admission, onward)    Start     Stop Route Frequency Ordered    05/03/23 1817  insulin aspart U-100 pen 1-10 Units         -- SubQ Before meals & nightly PRN 05/03/23 1722        Hold Oral hypoglycemics while patient is in the hospital.

## 2023-05-04 NOTE — CONSULTS
HPI    77 years old female past medical history of anemia COPD CAD TIA hypertension diabetes fibromyalgia acid reflux diverticulosis presented with right upper quadrant flank pain fatigue shortness breath.  Denies any dysuria frequency urgency.    She follows Dr Joseluis Lei out patient last seen on 3/24/23  she has anemia fluctuating over the past years as well as thrombocytopenia on and off for the past several years.  Previously patient responded to IV iron.        Past Medical History:   Diagnosis Date    Allergy     Anemia 2012    with thrombocytopenia; iron deficiency    Arthritis     Cervical spinal stenosis     with neuropathy, chronic neck,back,leg pain    Colon polyp     COPD (chronic obstructive pulmonary disease)     Coronary artery disease     COVID 4/27/2022    COVID-19     Diabetes mellitus     , sugars run 190's per patient    Diastolic dysfunction 7/13/2015    Diverticulitis     Diverticulosis     Hx diverticulitis,still has chronic diarrhea, abd pain    Dyspnea on exertion     sometimes with nausea, fatigue,dizziness, had cardiac cath June 2012, normal    Fibromyalgia     Fracture 2012    right thumb    GERD (gastroesophageal reflux disease)     Feb 2012, Hx H Pylori    Glaucoma     had LAser surgey, on no eye drops    HEARING LOSS     Hemangioma     fatty liver    History of earache     frequent    History of TIA (transient ischemic attack) 5/28/2013    Hyperlipidemia     Hypertension     Hypertension associated with diabetes 3/14/2017    Laryngeal polyp     Myocardial infarction 2006 last    also MI in distant past    REJI (obstructive sleep apnea)     does not use CPAP    Otitis media     Pneumonia due to COVID-19 virus 2/20/2021    Renal stone     Sjogren's syndrome     SOB (shortness of breath) 6/8/2012    2012 [facility] 1. Normal coronary arteries. 2. Normal single renal arteries bilaterally. 3. Diastolic dysfunction.     Stroke     TIA, now on Plavix    TIA (transient ischemic attack)     TMJ  disorder     Type II or unspecified type diabetes mellitus without mention of complication, uncontrolled 5/8/2014    UTI (lower urinary tract infection)     frequent    Venous insufficiency     with Hx blood clot in leg     Past Surgical History:   Procedure Laterality Date    ABDOMINAL SURGERY  2010 x2    expoloratory lap for pelvic cyst,adhesions; partial colonectomy     adhesiolysis   10/11/2012    CARDIAC CATHETERIZATION      no current blockages.    CHOLECYSTECTOMY      COLON SURGERY      r/t diverticuli    COLONOSCOPY  08/2014    repeat in 5 years    COLONOSCOPY N/A 1/7/2020    Procedure: COLONOSCOPY;  Surgeon: Kb Nguyen MD;  Location: [facility];  Service: Endoscopy;  Laterality: N/A;    COLONOSCOPY N/A 3/13/2023    Procedure: COLONOSCOPY;  Surgeon: Jarrod Frost MD;  Location: [facility];  Service: Endoscopy;  Laterality: N/A;    ENDOSCOPIC ULTRASOUND OF UPPER GASTROINTESTINAL TRACT N/A 1/13/2021    Procedure: ULTRASOUND, UPPER GI TRACT, ENDOSCOPIC;  Surgeon: Sonido Castellano III, MD;  Location: [facility];  Service: Endoscopy;  Laterality: N/A;    EPIDURAL BLOCK INJECTION  5/15/12    back,neck for pain    ESOPHAGOGASTRODUODENOSCOPY N/A 1/7/2020    Procedure: EGD (ESOPHAGOGASTRODUODENOSCOPY);  Surgeon: Kb Nguyen MD;  Location: [facility];  Service: Endoscopy;  Laterality: N/A;    ESOPHAGOGASTRODUODENOSCOPY N/A 1/13/2021    Procedure: EGD (ESOPHAGOGASTRODUODENOSCOPY);  Surgeon: Sonido Castellano III, MD;  Location: [facility];  Service: Endoscopy;  Laterality: N/A;    ESOPHAGOGASTRODUODENOSCOPY N/A 3/13/2023    Procedure: EGD (ESOPHAGOGASTRODUODENOSCOPY);  Surgeon: Jarrod Frost MD;  Location: [facility];  Service: Endoscopy;  Laterality: N/A;    EXPLORATORY LAPAROTOMY W/ BOWEL RESECTION  10/11/2012    EYE SURGERY      Laser for glaucoma    EYE SURGERY  May 2012    cataracts    HYSTERECTOMY      LARYNX SURGERY      polypectomy    OOPHORECTOMY      TONSILLECTOMY      with  adenoidectomy    UPPER GASTROINTESTINAL ENDOSCOPY  12/2015    VASCULAR SURGERY      laser to varicose vein leg     Social History     Socioeconomic History    Marital status: Significant Other   Tobacco Use    Smoking status: Never    Smokeless tobacco: Never   Substance and Sexual Activity    Alcohol use: No    Drug use: Yes     Types: Oxycodone    Sexual activity: Not Currently     Birth control/protection: Surgical     Comment: hysterectomy     Social Determinants of Health     Financial Resource Strain: Medium Risk    Difficulty of Paying Living Expenses: Somewhat hard   Food Insecurity: Food Insecurity Present    Worried About Running Out of Food in the Last Year: Sometimes true    Ran Out of Food in the Last Year: Never true   Transportation Needs: No Transportation Needs    Lack of Transportation (Medical): No    Lack of Transportation (Non-Medical): No   Physical Activity: Inactive    Days of Exercise per Week: 0 days    Minutes of Exercise per Session: 0 min   Stress: No Stress Concern Present    Feeling of Stress : Only a little   Social Connections: Moderately Isolated    Frequency of Communication with Friends and Family: More than three times a week    Frequency of Social Gatherings with Friends and Family: More than three times a week    Attends Oriental orthodox Services: More than 4 times per year    Active Member of Clubs or Organizations: No    Attends Club or Organization Meetings: Never    Marital Status:    Housing Stability: Low Risk     Unable to Pay for Housing in the Last Year: No    Number of Places Lived in the Last Year: 1    Unstable Housing in the Last Year: No     Review of patient's allergies indicates:   Allergen Reactions    Codeine Other (See Comments)     Chest pain    Clindamycin Other (See Comments)     Difficulty swallowing after taking, feels a something sits in epigastric area     Iodinated contrast media Hives and Rash       Physical exam  Vitals:    05/04/23 0753   BP:  107/66   Pulse: 70   Resp: 16   Temp: 98.4 °F (36.9 °C)     Physical Exam  Vitals and nursing note reviewed.   Constitutional:       Appearance: She is ill-appearing.   HENT:      Head: Normocephalic and atraumatic.      Right Ear: External ear normal.      Left Ear: External ear normal.      Nose: Nose normal.      Mouth/Throat:      Mouth: Mucous membranes are moist.      Pharynx: Oropharynx is clear.   Eyes:      Extraocular Movements: Extraocular movements intact.      Conjunctiva/sclera: Conjunctivae normal.   Cardiovascular:      Rate and Rhythm: Normal rate and regular rhythm.      Pulses: Normal pulses.      Heart sounds: Normal heart sounds.   Pulmonary:      Effort: Pulmonary effort is normal.      Breath sounds: Normal breath sounds.   Abdominal:      General: Bowel sounds are normal. There is no distension.      Palpations: Abdomen is soft.      Tenderness: There is no abdominal tenderness. There is no right CVA tenderness.   Musculoskeletal:         General: Normal range of motion.      Cervical back: Normal range of motion and neck supple.      Right lower leg: No edema.      Left lower leg: No edema.   Skin:     General: Skin is warm and dry.   Neurological:      Mental Status: She is alert. Mental status is at baseline.   Psychiatric:         Behavior: Behavior normal.     Lab Results   Component Value Date    WBC 5.87 05/04/2023    HGB 6.9 (L) 05/04/2023    HCT 21.7 (L) 05/04/2023    MCV 93 05/04/2023     05/04/2023       CMP  Sodium   Date Value Ref Range Status   05/04/2023 142 136 - 145 mmol/L Final     Potassium   Date Value Ref Range Status   05/04/2023 4.0 3.5 - 5.1 mmol/L Final     Chloride   Date Value Ref Range Status   05/04/2023 107 95 - 110 mmol/L Final     CO2   Date Value Ref Range Status   05/04/2023 27 23 - 29 mmol/L Final     Glucose   Date Value Ref Range Status   05/04/2023 129 (H) 70 - 110 mg/dL Final     BUN   Date Value Ref Range Status   05/04/2023 29 (H) 8 - 23 mg/dL  Final     Creatinine   Date Value Ref Range Status   05/04/2023 0.9 0.5 - 1.4 mg/dL Final   09/01/2012 0.9 0.2 - 1.4 mg/dl Final     Calcium   Date Value Ref Range Status   05/04/2023 9.5 8.7 - 10.5 mg/dL Final   09/01/2012 9.9 8.6 - 10.2 mg/dl Final     Total Protein   Date Value Ref Range Status   05/04/2023 5.8 (L) 6.0 - 8.4 g/dL Final     Albumin   Date Value Ref Range Status   05/04/2023 3.3 (L) 3.5 - 5.2 g/dL Final     Total Bilirubin   Date Value Ref Range Status   05/04/2023 0.2 0.1 - 1.0 mg/dL Final     Comment:     For infants and newborns, interpretation of results should be based  on gestational age, weight and in agreement with clinical  observations.    Premature Infant recommended reference ranges:  Up to 24 hours.............<8.0 mg/dL  Up to 48 hours............<12.0 mg/dL  3-5 days..................<15.0 mg/dL  6-29 days.................<15.0 mg/dL       Alkaline Phosphatase   Date Value Ref Range Status   05/04/2023 39 (L) 55 - 135 U/L Final   09/01/2012 80 23 - 119 UNIT/L Final     AST   Date Value Ref Range Status   05/04/2023 15 10 - 40 U/L Final   09/01/2012 17 10 - 30 UNIT/L Final     ALT   Date Value Ref Range Status   05/04/2023 9 (L) 10 - 44 U/L Final     Anion Gap   Date Value Ref Range Status   05/04/2023 8 8 - 16 mmol/L Final   09/01/2012 13 5 - 15 meq/L Final     eGFR   Date Value Ref Range Status   05/04/2023 >60 >60 mL/min/1.73 m^2 Final     Assessment plan    Anemia acute drop of hemoglobin.    > does not appear to have active intravascular hemolysis.  But we will check  LDH, haptoglobin any ways.  > peripheral blood smear  > transfuse 1 unit packed red blood cells.  Transfuse if hemoglobin less than 7 grams/deciliter.  > prior upper EGD colonoscopy shows diverticulosis in the sigmoid colon.  Exam otherwise normal.  Patient may need  capsule endoscopy   > follow up on iron study shows low iron saturation and low serum iron concentration.  > hematology will continue to follow

## 2023-05-04 NOTE — CONSULTS
[facility]     CC: Anemia    HPI 77 y.o. female with a past medical history of anemia, COPD, CAD, TIA, HTN, HLD, DM, fibromyalgia, GERD, REJI, and diverticulosis who presents with RUQ/R flank pain, fatigue, shortness of breath, and light-headedness. She states she has experienced this right sided pain, yet it has not been this severe. She denies any dysuria, frequency, or urgency. She has been seen in the past by Hematology for the anemia. She had a colonoscopy and EGD done 3/13/2023 with no acute findings. She is on DAPT with ASA and Plavix but she denies any gross bleeding. CT of the abdomen and pelvis without contrast performed in the ED was unremarkable. Her Hgb was discovered to be 7.5, lower than her baseline of ~10. She was given a GI cocktail in the ED. Hospital Medicine was consulted for admission.        Overview/Hospital Course:  Pili Teixeira is a 77 year old female with a past medical history of anemia, COPD, CAD, TIA, HTN, HLD, DM, fibromyalgia, GERD, REJI, and diverticulosis who presented with RUQ/R flank pain, UTI, HEATHER, and worsening anemia. Her Hgb has continued to fall during course, requiring a one unit transfusion of PRBCs. Iron studies are consistent with an iron deficiency anemia. Hematology and GI have been consulted. She is on an IV PPI and home DAPT has been held. Her kidney function has improved with IV fluids. She is on Rocephin and a urine culture is pending.    FURTHER HISTORY:  Above obtained from independent review of records from admitting provider as well as from direct discussion with nursing who states patient has worsening anemia.  In addition, on my interview, I note the following:  Patient has anemia, remote onset, moderate to severe, iron deficient, with no overt bleeding at present.  She had evaluation for the same last month and had EGD/colonoscopy with Dr. Frost which were reviewed and were unrevealing for cause.  No other GI complaints currently.      Past  Medical History:   Diagnosis Date    Allergy     Anemia 2012    with thrombocytopenia; iron deficiency    Arthritis     Cervical spinal stenosis     with neuropathy, chronic neck,back,leg pain    Colon polyp     COPD (chronic obstructive pulmonary disease)     Coronary artery disease     COVID 4/27/2022    COVID-19     Diabetes mellitus     , sugars run 190's per patient    Diastolic dysfunction 7/13/2015    Diverticulitis     Diverticulosis     Hx diverticulitis,still has chronic diarrhea, abd pain    Dyspnea on exertion     sometimes with nausea, fatigue,dizziness, had cardiac cath June 2012, normal    Fibromyalgia     Fracture 2012    right thumb    GERD (gastroesophageal reflux disease)     Feb 2012, Hx H Pylori    Glaucoma     had LAser surgey, on no eye drops    HEARING LOSS     Hemangioma     fatty liver    History of earache     frequent    History of TIA (transient ischemic attack) 5/28/2013    Hyperlipidemia     Hypertension     Hypertension associated with diabetes 3/14/2017    Laryngeal polyp     Myocardial infarction 2006 last    also MI in distant past    REJI (obstructive sleep apnea)     does not use CPAP    Otitis media     Pneumonia due to COVID-19 virus 2/20/2021    Renal stone     Sjogren's syndrome     SOB (shortness of breath) 6/8/2012    [address] [place] [address] 1. Normal coronary arteries. 2. Normal single renal arteries bilaterally. 3. Diastolic dysfunction.     Stroke     TIA, now on Plavix    TIA (transient ischemic attack)     TMJ disorder     Type II or unspecified type diabetes mellitus without mention of complication, uncontrolled 5/8/2014    UTI (lower urinary tract infection)     frequent    Venous insufficiency     with Hx blood clot in leg       Past Surgical History:   Procedure Laterality Date    ABDOMINAL SURGERY  2010 x2    expoloratory lap for pelvic cyst,adhesions; partial colonectomy     adhesiolysis   10/11/2012    CARDIAC CATHETERIZATION      no current blockages.    CHOLECYSTECTOMY       COLON SURGERY      r/t diverticuli    COLONOSCOPY  08/2014    repeat in 5 years    COLONOSCOPY N/A 1/7/2020    Procedure: COLONOSCOPY;  Surgeon: Kb Nguyen MD;  Location: [facility];  Service: Endoscopy;  Laterality: N/A;    COLONOSCOPY N/A 3/13/2023    Procedure: COLONOSCOPY;  Surgeon: Jarrod Frost MD;  Location: [facility];  Service: Endoscopy;  Laterality: N/A;    ENDOSCOPIC ULTRASOUND OF UPPER GASTROINTESTINAL TRACT N/A 1/13/2021    Procedure: ULTRASOUND, UPPER GI TRACT, ENDOSCOPIC;  Surgeon: Sonido Castellano III, MD;  Location: [facility];  Service: Endoscopy;  Laterality: N/A;    EPIDURAL BLOCK INJECTION  5/15/12    back,neck for pain    ESOPHAGOGASTRODUODENOSCOPY N/A 1/7/2020    Procedure: EGD (ESOPHAGOGASTRODUODENOSCOPY);  Surgeon: Kb Nguyen MD;  Location: [facility];  Service: Endoscopy;  Laterality: N/A;    ESOPHAGOGASTRODUODENOSCOPY N/A 1/13/2021    Procedure: EGD (ESOPHAGOGASTRODUODENOSCOPY);  Surgeon: Sonido Castellano III, MD;  Location: [facility];  Service: Endoscopy;  Laterality: N/A;    ESOPHAGOGASTRODUODENOSCOPY N/A 3/13/2023    Procedure: EGD (ESOPHAGOGASTRODUODENOSCOPY);  Surgeon: Jarrod Frost MD;  Location: [facility];  Service: Endoscopy;  Laterality: N/A;    EXPLORATORY LAPAROTOMY W/ BOWEL RESECTION  10/11/2012    EYE SURGERY      Laser for glaucoma    EYE SURGERY  May 2012    cataracts    HYSTERECTOMY      LARYNX SURGERY      polypectomy    OOPHORECTOMY      TONSILLECTOMY      with adenoidectomy    UPPER GASTROINTESTINAL ENDOSCOPY  12/2015    VASCULAR SURGERY      laser to varicose vein leg       Social History     Tobacco Use    Smoking status: Never    Smokeless tobacco: Never   Substance Use Topics    Alcohol use: No    Drug use: Yes     Types: Oxycodone       Family History   Problem Relation Age of Onset    Throat cancer Mother     Cancer Mother     Diabetes Mellitus Mother     Stroke Father     Hypertension Father     Heart disease Father      "Diverticulitis Sister     Throat cancer Brother     Cancer Brother     Thyroid disease Daughter     Lupus Daughter     Pancreatic cancer Maternal Aunt 55    Pancreatic cancer Cousin 58    Breast cancer Neg Hx     Ovarian cancer Neg Hx     Colon cancer Neg Hx     Colon polyps Neg Hx     Celiac disease Neg Hx     Cirrhosis Neg Hx     Crohn's disease Neg Hx     Stomach cancer Neg Hx     Ulcerative colitis Neg Hx     Rectal cancer Neg Hx     Esophageal cancer Neg Hx     Inflammatory bowel disease Neg Hx     Rheum arthritis Neg Hx     Psoriasis Neg Hx     Osteoarthritis Neg Hx     Kidney disease Neg Hx     Hyperlipidemia Neg Hx     COPD Neg Hx     Depression Neg Hx     Chronic back pain Neg Hx     Asthma Neg Hx     Alcohol abuse Neg Hx        Review of Systems  General ROS: negative for - chills, fever or weight loss  Psychological ROS: negative for - hallucination, depression or suicidal ideation  Ophthalmic ROS: negative for - blurry vision, photophobia or eye pain  ENT ROS: negative for - epistaxis, sore throat or rhinorrhea  Respiratory ROS: no cough, shortness of breath, or wheezing  Cardiovascular ROS: no chest pain or dyspnea on exertion  Gastrointestinal ROS: as above  Genito-Urinary ROS: no dysuria, trouble voiding, or hematuria  Musculoskeletal ROS: negative for - arthralgia, myalgia, weakness  Neurological ROS: no syncope or seizures; no ataxia  Dermatological ROS: negative for pruritis, rash and jaundice    Physical Examination  BP (!) 115/55   Pulse 67   Temp 97.2 °F (36.2 °C)   Resp 18   Ht 5' 3" (1.6 m)   Wt 60.4 kg (133 lb 2.5 oz)   SpO2 100%   BMI 23.59 kg/m²   General appearance: alert, cooperative, no distress  HENT: Normocephalic, atraumatic, neck symmetrical, no nasal discharge   Eyes: conjunctivae/corneas clear, PERRL, EOM's intact, sclera anicteric  Lungs: clear to auscultation bilaterally, no dullness to percussion bilaterally, symmetric expansion, breathing unlabored  Heart: regular rate " and rhythm without rub; no displacement of the PMI   Abdomen: soft, NT  Extremities: extremities symmetric; no clubbing, cyanosis, or edema  Integument: Skin color, texture, turgor normal; no rashes; hair distrubution normal, no jaundice  Neurologic: Alert and oriented X 3, no focal sensory or motor neurologic deficits  Psychiatric: no pressured speech; normal affect; no evidence of impaired cognition, no anxiety/depression     Labs:  Lab Results   Component Value Date    WBC 5.87 05/04/2023    HGB 6.9 (L) 05/04/2023    HCT 21.7 (L) 05/04/2023    MCV 93 05/04/2023     05/04/2023         CMP  Sodium   Date Value Ref Range Status   05/04/2023 142 136 - 145 mmol/L Final     Potassium   Date Value Ref Range Status   05/04/2023 4.0 3.5 - 5.1 mmol/L Final     Chloride   Date Value Ref Range Status   05/04/2023 107 95 - 110 mmol/L Final     CO2   Date Value Ref Range Status   05/04/2023 27 23 - 29 mmol/L Final     Glucose   Date Value Ref Range Status   05/04/2023 129 (H) 70 - 110 mg/dL Final     BUN   Date Value Ref Range Status   05/04/2023 29 (H) 8 - 23 mg/dL Final     Creatinine   Date Value Ref Range Status   05/04/2023 0.9 0.5 - 1.4 mg/dL Final   09/01/2012 0.9 0.2 - 1.4 mg/dl Final     Calcium   Date Value Ref Range Status   05/04/2023 9.5 8.7 - 10.5 mg/dL Final   09/01/2012 9.9 8.6 - 10.2 mg/dl Final     Total Protein   Date Value Ref Range Status   05/04/2023 5.8 (L) 6.0 - 8.4 g/dL Final     Albumin   Date Value Ref Range Status   05/04/2023 3.3 (L) 3.5 - 5.2 g/dL Final     Total Bilirubin   Date Value Ref Range Status   05/04/2023 0.2 0.1 - 1.0 mg/dL Final     Comment:     For infants and newborns, interpretation of results should be based  on gestational age, weight and in agreement with clinical  observations.    Premature Infant recommended reference ranges:  Up to 24 hours.............<8.0 mg/dL  Up to 48 hours............<12.0 mg/dL  3-5 days..................<15.0 mg/dL  6-29  days.................<15.0 mg/dL       Alkaline Phosphatase   Date Value Ref Range Status   05/04/2023 39 (L) 55 - 135 U/L Final   09/01/2012 80 23 - 119 UNIT/L Final     AST   Date Value Ref Range Status   05/04/2023 15 10 - 40 U/L Final   09/01/2012 17 10 - 30 UNIT/L Final     ALT   Date Value Ref Range Status   05/04/2023 9 (L) 10 - 44 U/L Final     Anion Gap   Date Value Ref Range Status   05/04/2023 8 8 - 16 mmol/L Final   09/01/2012 13 5 - 15 meq/L Final     eGFR   Date Value Ref Range Status   05/04/2023 >60 >60 mL/min/1.73 m^2 Final       Lab Results   Component Value Date    UIBC 355 06/18/2012    IRON 27 (L) 05/03/2023    TRANSFERRIN 200 05/03/2023    TIBC 296 05/03/2023    FESATURATED 9 (L) 05/03/2023          Imaging:  CT scan was independently visualized and reviewed by me and showed prior colon resection and abdominal hernia without obstruction.    I have personally reviewed these images    Case discussed as above.    Assessment:  NICOLAS  History of colon surgery    Plan:  Diet this PM  NPO after midnight  EGD with push enteroscopy tomorrow.  If unrevealing, patient will need outpatient pillcam for NICOLAS  Further recommendations to follow after above.  Communication will be sent to the referring provider regarding my assessment and plan on this patient via EPIC.      Noel Rivers MD  [facility]  [facility], [address]  [place] [address]  Office: [phone]  Fax: [phone]

## 2023-05-04 NOTE — CARE UPDATE
05/03/23 1907   Patient Assessment/Suction   Level of Consciousness (AVPU) alert   Respiratory Effort Unlabored   Expansion/Accessory Muscles/Retractions no retractions;no use of accessory muscles   All Lung Fields Breath Sounds clear;diminished   Rhythm/Pattern, Respiratory unlabored   PRE-TX-O2   Device (Oxygen Therapy) room air   SpO2 97 %   Pulse Oximetry Type Intermittent   $ Pulse Oximetry - Multiple Charge Pulse Oximetry - Multiple   Pulse 71   Resp 18   Positioning HOB elevated 45 degrees   Aerosol Therapy   $ Aerosol Therapy Charges PRN treatment not required   Respiratory Treatment Status (SVN) PRN treatment not required

## 2023-05-04 NOTE — SUBJECTIVE & OBJECTIVE
Past Medical History:   Diagnosis Date    Allergy     Anemia 2012    with thrombocytopenia; iron deficiency    Arthritis     Cervical spinal stenosis     with neuropathy, chronic neck,back,leg pain    Colon polyp     COPD (chronic obstructive pulmonary disease)     Coronary artery disease     COVID 4/27/2022    COVID-19     Diabetes mellitus     , sugars run 190's per patient    Diastolic dysfunction 7/13/2015    Diverticulitis     Diverticulosis     Hx diverticulitis,still has chronic diarrhea, abd pain    Dyspnea on exertion     sometimes with nausea, fatigue,dizziness, had cardiac cath June 2012, normal    Fibromyalgia     Fracture 2012    right thumb    GERD (gastroesophageal reflux disease)     Feb 2012, Hx H Pylori    Glaucoma     had LAser surgey, on no eye drops    HEARING LOSS     Hemangioma     fatty liver    History of earache     frequent    History of TIA (transient ischemic attack) 5/28/2013    Hyperlipidemia     Hypertension     Hypertension associated with diabetes 3/14/2017    Laryngeal polyp     Myocardial infarction 2006 last    also MI in distant past    REJI (obstructive sleep apnea)     does not use CPAP    Otitis media     Pneumonia due to COVID-19 virus 2/20/2021    Renal stone     Sjogren's syndrome     SOB (shortness of breath) 6/8/2012    [address] [place] [address] 1. Normal coronary arteries. 2. Normal single renal arteries bilaterally. 3. Diastolic dysfunction.     Stroke     TIA, now on Plavix    TIA (transient ischemic attack)     TMJ disorder     Type II or unspecified type diabetes mellitus without mention of complication, uncontrolled 5/8/2014    UTI (lower urinary tract infection)     frequent    Venous insufficiency     with Hx blood clot in leg       Past Surgical History:   Procedure Laterality Date    ABDOMINAL SURGERY  2010 x2    expoloratory lap for pelvic cyst,adhesions; partial colonectomy     adhesiolysis   10/11/2012    CARDIAC CATHETERIZATION      no current blockages.    CHOLECYSTECTOMY       COLON SURGERY      r/t diverticuli    COLONOSCOPY  08/2014    repeat in 5 years    COLONOSCOPY N/A 1/7/2020    Procedure: COLONOSCOPY;  Surgeon: Kb Nguyen MD;  Location: [facility] ENDO;  Service: Endoscopy;  Laterality: N/A;    COLONOSCOPY N/A 3/13/2023    Procedure: COLONOSCOPY;  Surgeon: Jarrod Frost MD;  Location: [facility];  Service: Endoscopy;  Laterality: N/A;    ENDOSCOPIC ULTRASOUND OF UPPER GASTROINTESTINAL TRACT N/A 1/13/2021    Procedure: ULTRASOUND, UPPER GI TRACT, ENDOSCOPIC;  Surgeon: Sonido Castellano III, MD;  Location: [facility];  Service: Endoscopy;  Laterality: N/A;    EPIDURAL BLOCK INJECTION  5/15/12    back,neck for pain    ESOPHAGOGASTRODUODENOSCOPY N/A 1/7/2020    Procedure: EGD (ESOPHAGOGASTRODUODENOSCOPY);  Surgeon: Kb Nguyen MD;  Location: [facility];  Service: Endoscopy;  Laterality: N/A;    ESOPHAGOGASTRODUODENOSCOPY N/A 1/13/2021    Procedure: EGD (ESOPHAGOGASTRODUODENOSCOPY);  Surgeon: Sonido Castellano III, MD;  Location: [facility];  Service: Endoscopy;  Laterality: N/A;    ESOPHAGOGASTRODUODENOSCOPY N/A 3/13/2023    Procedure: EGD (ESOPHAGOGASTRODUODENOSCOPY);  Surgeon: Jarrod Frost MD;  Location: [facility];  Service: Endoscopy;  Laterality: N/A;    EXPLORATORY LAPAROTOMY W/ BOWEL RESECTION  10/11/2012    EYE SURGERY      Laser for glaucoma    EYE SURGERY  May 2012    cataracts    HYSTERECTOMY      LARYNX SURGERY      polypectomy    OOPHORECTOMY      TONSILLECTOMY      with adenoidectomy    UPPER GASTROINTESTINAL ENDOSCOPY  12/2015    VASCULAR SURGERY      laser to varicose vein leg       Review of patient's allergies indicates:   Allergen Reactions    Codeine Other (See Comments)     Chest pain    Clindamycin Other (See Comments)     Difficulty swallowing after taking, feels a something sits in epigastric area     Iodinated contrast media Hives and Rash       No current facility-administered medications on file prior to encounter.     Current  Outpatient Medications on File Prior to Encounter   Medication Sig    albuterol (PROVENTIL/VENTOLIN HFA) 90 mcg/actuation inhaler Inhale 2 puffs into the lungs every 4 (four) hours as needed for Wheezing or Shortness of Breath. Rescue (Patient not taking: Reported on 3/10/2023)    albuterol-ipratropium (DUO-NEB) 2.5 mg-0.5 mg/3 mL nebulizer solution Take 3 mLs by nebulization every 4 to 6 hours as needed for Wheezing. Rescue    aspirin (ECOTRIN) 81 MG EC tablet Take 81 mg by mouth once daily.    blood sugar diagnostic (ACCU-CHEK GUIDE TEST STRIPS) Strp USE TO TEST BLOOD GLUCOSE ONE TIME DAILY AS DIRECTED.    blood-glucose meter kit To check BG 2 times daily, to use with insurance preferred meter. Medical necessity.    carboxymethylcellulose sodium (LUBRICANT EYE DROPS OPHT) Apply to eye.    clopidogreL (PLAVIX) 75 mg tablet Take 1 tablet by mouth once daily    cyanocobalamin (VITAMIN B-12) 1000 MCG tablet Take 100 mcg by mouth once daily.    diclofenac sodium 1 % Gel Apply 2 g topically once daily.    diltiaZEM (CARDIZEM CD) 120 MG Cp24 TAKE 1 CAPSULE BY MOUTH IN THE EVENING (Patient not taking: Reported on 3/10/2023)    docusate sodium (STOOL SOFTENER ORAL) Take by mouth.    doxepin (SINEQUAN) 10 MG capsule TAKE 1 CAPSULE BY MOUTH EVERY NIGHT AS NEEDED    famotidine (PEPCID) 40 MG tablet Take 1 tablet (40 mg total) by mouth every evening.    fluticasone propionate (FLONASE) 50 mcg/actuation nasal spray USE 2 SPRAYS IN EACH NOSTRIL ONE TIME PER DAY    folic acid (FOLVITE) 1 MG tablet Take 1 tablet (1,000 mcg total) by mouth once daily.    ipratropium (ATROVENT) 42 mcg (0.06 %) nasal spray 2 sprays by Nasal route 3 (three) times daily.    Lactobac no.41/Bifidobact no.7 (PROBIOTIC-10 ORAL) Take by mouth.    lancets (ACCU-CHEK SOFTCLIX LANCETS) Misc Test TWICE DAILY;Twice a day    lancets Misc Use to test blood glucose one (1) times daily as directed with insurance preferred meter and supplies    loratadine (CLARITIN)  10 mg tablet Take 10 mg by mouth once daily.    meloxicam (MOBIC) 7.5 MG tablet Take 1 tablet (7.5 mg total) by mouth once daily.    metFORMIN (GLUCOPHAGE) 500 MG tablet TAKE 1 TABLET BY MOUTH TWICE DAILY WITH MEALS    multivit with min-folic acid 200 mcg Chew Take 1 tablet by mouth once daily.     NARCAN 4 mg/actuation Spry as directed    olopatadine (PATANOL) 0.1 % ophthalmic solution Place 1 drop into both eyes daily as needed.    ondansetron (ZOFRAN-ODT) 4 MG TbDL Take 1 tablet (4 mg total) by mouth every 8 (eight) hours as needed (nausea).    oxyCODONE-acetaminophen (PERCOCET) 7.5-325 mg per tablet Take 1 tablet by mouth 4 (four) times daily as needed.    pantoprazole (PROTONIX) 40 MG tablet Take 1 tablet by mouth once daily    polyethylene glycol (GOLYTELY) 236-22.74-6.74 -5.86 gram suspension AS DIRECTED    pregabalin (LYRICA) 150 MG capsule Take 1 capsule (150 mg total) by mouth 2 (two) times daily.    rosuvastatin (CRESTOR) 10 MG tablet TAKE 1 TABLET BY MOUTH ONCE DAILY IN THE EVENING    sodium chloride (SALINE NASAL NASL) by Nasal route.    TRADJENTA 5 mg Tab tablet Take 1 tablet by mouth once daily    traZODone (DESYREL) 50 MG tablet TAKE 1 TABLET BY MOUTH IN THE EVENING - (MAY TAKE AN ADDITIONAL TABLET AS NEEDED)    valsartan-hydrochlorothiazide (DIOVAN-HCT) 160-12.5 mg per tablet Take 1 tablet by mouth once daily     Family History       Problem Relation (Age of Onset)    Cancer Mother, Brother    Diabetes Mellitus Mother    Diverticulitis Sister    Heart disease Father    Hypertension Father    Lupus Daughter    Pancreatic cancer Maternal Aunt (55), Cousin (58)    Stroke Father    Throat cancer Mother, Brother    Thyroid disease Daughter          Tobacco Use    Smoking status: Never    Smokeless tobacco: Never   Substance and Sexual Activity    Alcohol use: No    Drug use: Yes     Types: Oxycodone    Sexual activity: Not Currently     Birth control/protection: Surgical     Comment: hysterectomy      Review of Systems   Constitutional:  Positive for activity change and fatigue. Negative for chills and fever.   HENT: Negative.     Eyes: Negative.    Respiratory:  Positive for shortness of breath.    Cardiovascular:  Positive for chest pain. Negative for palpitations.   Gastrointestinal:  Positive for abdominal pain. Negative for abdominal distention, diarrhea, nausea and vomiting.   Endocrine: Negative.    Genitourinary:  Positive for flank pain. Negative for dysuria and urgency.   Skin:  Positive for pallor.   Allergic/Immunologic: Positive for immunocompromised state.   Neurological:  Positive for weakness.   Psychiatric/Behavioral: Negative.     Objective:     Vital Signs (Most Recent):  Temp: 98.1 °F (36.7 °C) (05/03/23 1923)  Pulse: 67 (05/03/23 1923)  Resp: 18 (05/03/23 1923)  BP: (!) 104/55 (05/03/23 1923)  SpO2: 98 % (05/03/23 1923)   Vital Signs (24h Range):  Temp:  [98.1 °F (36.7 °C)-98.7 °F (37.1 °C)] 98.1 °F (36.7 °C)  Pulse:  [67-77] 67  Resp:  [16-18] 18  SpO2:  [98 %-100 %] 98 %  BP: ()/(47-58) 104/55     Weight: 59.4 kg (131 lb)  Body mass index is 23.21 kg/m².    Physical Exam  Vitals and nursing note reviewed.   Constitutional:       Appearance: She is ill-appearing.   HENT:      Head: Normocephalic and atraumatic.      Right Ear: External ear normal.      Left Ear: External ear normal.      Nose: Nose normal.      Mouth/Throat:      Mouth: Mucous membranes are moist.      Pharynx: Oropharynx is clear.   Eyes:      Extraocular Movements: Extraocular movements intact.      Conjunctiva/sclera: Conjunctivae normal.   Cardiovascular:      Rate and Rhythm: Normal rate and regular rhythm.      Pulses: Normal pulses.      Heart sounds: Normal heart sounds.   Pulmonary:      Effort: Pulmonary effort is normal.      Breath sounds: Normal breath sounds.   Abdominal:      General: Bowel sounds are normal. There is no distension.      Palpations: Abdomen is soft.      Tenderness: There is no  abdominal tenderness. There is no right CVA tenderness.   Musculoskeletal:         General: Normal range of motion.      Cervical back: Normal range of motion and neck supple.      Right lower leg: No edema.      Left lower leg: No edema.   Skin:     General: Skin is warm and dry.   Neurological:      Mental Status: She is alert. Mental status is at baseline.   Psychiatric:         Behavior: Behavior normal.           Significant Labs: All pertinent labs within the past 24 hours have been reviewed.    Significant Imaging: I have reviewed all pertinent imaging results/findings within the past 24 hours.

## 2023-05-04 NOTE — HOSPITAL COURSE
Pili Teixeira is a 77 year old female with a past medical history of anemia, COPD, CAD, TIA, HTN, HLD, DM, fibromyalgia, GERD, REJI, and diverticulosis who presented with RUQ/R flank pain, UTI, HEATHER, and worsening anemia. Her Hgb has continued to fall during course, requiring a one unit transfusion of PRBCs on 5/4. Iron studies are consistent with an iron deficiency anemia. Hematology and GI have been consulted. She was on an IV PPI and home DAPT was held. EGD with push enteroscopy 5/5 showed two angiectasias which were cauterized. Her kidney function has improved with IV fluids. Rocephin was held for UTI as urine culture was negative. She was discharged 5/5 and will follow up with her PCP, Hematology, and GI in the outpatient setting where she will undergo pill endoscopy.

## 2023-05-04 NOTE — PROGRESS NOTES
"[facility] Hospital Medicine  Progress Note    Patient Name: Pili Teixeira  MRN: [id number]  Patient Class: OP- Observation   Admission Date: 5/3/2023  Length of Stay: 0 days  Attending Physician: Vince Mccrary MD  Primary Care Provider: ANKIT Huggins MD        Subjective:     Principal Problem:Iron deficiency anemia        HPI:  Pili Teixeira is a 77 year old female with a past medical history of anemia, COPD, CAD, TIA, HTN, HLD, DM, fibromyalgia, GERD, REJI, and diverticulosis who presents with RUQ/R flank pain, fatigue, shortness of breath, and light-headedness. She states she has experienced this right sided pain, yet it has not been this severe. She denies any dysuria, frequency, or urgency. She has been seen in the past by Hematology for the anemia. She had a colonoscopy and EGD done 3/13/2023 with no acute findings. She is on DAPT with ASA and Plavix but she denies any gross bleeding. CT of the abdomen and pelvis without contrast performed in the ED was unremarkable. Her Hgb was discovered to be 7.5, lower than her baseline of ~10. She was given a GI cocktail in the ED. Hospital Medicine was consulted for admission.      Overview/Hospital Course:  Pili Teixeira is a 77 year old female with a past medical history of anemia, COPD, CAD, TIA, HTN, HLD, DM, fibromyalgia, GERD, REJI, and diverticulosis who presented with RUQ/R flank pain, UTI, HEATHER, and worsening anemia. Her Hgb has continued to fall during course, requiring a one unit transfusion of PRBCs. Iron studies are consistent with an iron deficiency anemia. Hematology and GI have been consulted. She is on an IV PPI and home DAPT has been held. Her kidney function has improved with IV fluids. She is on Rocephin and a urine culture is pending.      Interval History: see "Hospital Course"    Review of Systems   Constitutional:  Positive for activity change and fatigue. Negative for chills and fever.   HENT: Negative.     Eyes: Negative. "    Respiratory:  Positive for shortness of breath.    Cardiovascular:  Positive for chest pain. Negative for palpitations.   Gastrointestinal:  Positive for abdominal pain. Negative for abdominal distention, diarrhea, nausea and vomiting.   Endocrine: Negative.    Genitourinary:  Positive for flank pain. Negative for dysuria and urgency.   Skin:  Positive for pallor.   Allergic/Immunologic: Positive for immunocompromised state.   Neurological:  Positive for weakness.   Psychiatric/Behavioral: Negative.     Objective:     Vital Signs (Most Recent):  Temp: 98.4 °F (36.9 °C) (05/04/23 0753)  Pulse: 70 (05/04/23 0753)  Resp: 16 (05/04/23 0753)  BP: 107/66 (05/04/23 0753)  SpO2: 100 % (05/04/23 0753) Vital Signs (24h Range):  Temp:  [97.3 °F (36.3 °C)-98.7 °F (37.1 °C)] 98.4 °F (36.9 °C)  Pulse:  [67-77] 70  Resp:  [16-18] 16  SpO2:  [97 %-100 %] 100 %  BP: ()/(47-66) 107/66     Weight: 60.4 kg (133 lb 2.5 oz)  Body mass index is 23.59 kg/m².    Intake/Output Summary (Last 24 hours) at 5/4/2023 1005  Last data filed at 5/4/2023 0449  Gross per 24 hour   Intake 877.5 ml   Output 800 ml   Net 77.5 ml         Physical Exam  Vitals and nursing note reviewed.   Constitutional:       Appearance: She is ill-appearing.   HENT:      Head: Normocephalic and atraumatic.      Right Ear: External ear normal.      Left Ear: External ear normal.      Nose: Nose normal.      Mouth/Throat:      Mouth: Mucous membranes are moist.      Pharynx: Oropharynx is clear.   Eyes:      Extraocular Movements: Extraocular movements intact.      Conjunctiva/sclera: Conjunctivae normal.   Cardiovascular:      Rate and Rhythm: Normal rate and regular rhythm.      Pulses: Normal pulses.      Heart sounds: Normal heart sounds.   Pulmonary:      Effort: Pulmonary effort is normal.      Breath sounds: Normal breath sounds.   Abdominal:      General: Bowel sounds are normal. There is no distension.      Palpations: Abdomen is soft.      Tenderness:  There is no abdominal tenderness. There is no right CVA tenderness.   Musculoskeletal:         General: Normal range of motion.      Cervical back: Normal range of motion and neck supple.      Right lower leg: No edema.      Left lower leg: No edema.   Skin:     General: Skin is warm and dry.      Coloration: Skin is pale.   Neurological:      Mental Status: She is alert. Mental status is at baseline.   Psychiatric:         Behavior: Behavior normal.           Significant Labs: All pertinent labs within the past 24 hours have been reviewed.    Significant Imaging: I have reviewed all pertinent imaging results/findings within the past 24 hours.      Assessment/Plan:      * Iron deficiency anemia  Symptomatic. No evidence of gross bleeding. Prior EGD and colonoscopy unremarkable. History of diverticulosis. Concern for GIB.  -Hematology consulted  -Transfuse for Hgb < 7; one unit ordered 5/4  -Trend Hgb with CBC  -GI consulted  -IV PPI BID  -IV fluids  -Hold DAPT      UTI (urinary tract infection)  Urinalysis shows leukocytes. Kidneys on CT appear normal.   -Follow up urine culture  -Start Rocephin      Glaucoma  -Continue home eye drops      CAD (coronary artery disease)  -Statin  -Hold DAPT      Type 2 diabetes mellitus, without long-term current use of insulin  Patient's FSGs are controlled on current medication regimen.  Last A1c reviewed-   Lab Results   Component Value Date    HGBA1C 5.4 08/18/2022     Most recent fingerstick glucose reviewed -   Recent Labs   Lab 05/03/23 2003 05/04/23  0725   POCTGLUCOSE 75 106     Current correctional scale  Medium  Maintain anti-hyperglycemic dose as follows-   Antihyperglycemics (From admission, onward)    Start     Stop Route Frequency Ordered    05/03/23 1817  insulin aspart U-100 pen 1-10 Units         -- SubQ Before meals & nightly PRN 05/03/23 1722        Hold Oral hypoglycemics while patient is in the hospital.    Hyperlipidemia associated with type 2 diabetes  mellitus  -Continue home statin    Hypertension associated with diabetes  -Continue to monitor  -Hold ARB and HCTZ    Fibromyalgia  -Continue home Lyrica      Diastolic dysfunction  Grade one diastolic dysfunction. Compensated.  -Telemetry  -Caution with IV fluids   -Hold ARB      COPD (chronic obstructive pulmonary disease)  -Spiriva  -Duonebs PRN      History of TIA (transient ischemic attack)  -Continue home statin  -Hold ASA and statin given symptomatic anemia      REJI (obstructive sleep apnea)  Patient does not use CPAP.      GERD (gastroesophageal reflux disease)  -PPI IV BID        VTE Risk Mitigation (From admission, onward)         Ordered     Reason for No Pharmacological VTE Prophylaxis  Once        Question:  Reasons:  Answer:  Risk of Bleeding    05/03/23 1722     IP VTE HIGH RISK PATIENT  Once         05/03/23 1722     Place sequential compression device  Until discontinued         05/03/23 1722                Discharge Planning   MONSTER:      Code Status: Full Code   Is the patient medically ready for discharge?:     Reason for patient still in hospital (select all that apply): Patient trending condition, Laboratory test, Treatment and Consult recommendations  Discharge Plan A: Home                  Vince Mccrary MD  Department of Hospital Medicine   [facility]

## 2023-05-04 NOTE — ASSESSMENT & PLAN NOTE
Urinalysis shows leukocytes. Kidneys on CT appear normal.   -Follow up urine culture  -Start Rocephin

## 2023-05-04 NOTE — PLAN OF CARE
[facility]  Initial Discharge Assessment       Primary Care Provider: ANKIT Huggins MD    Admission Diagnosis: Anemia [D64.9]  Chest pain [R07.9]    Admission Date: 5/3/2023  Expected Discharge Date:     Discharge Barriers Identified: None    Payor: [employer] MANAGED MEDICARE / Plan: [employer] SNP HMO PPO SPECIAL NEEDS / Product Type: Medicare Advantage /     Extended Emergency Contact Information  Primary Emergency Contact: Kareen Luong  Address: [address] [facility] [place] [id number] [place] [address]  Mobile Phone: [phone]  Relation: Daughter  Preferred language: English   needed? No  Secondary Emergency Contact: LATOYA FLOREZ  Mobile Phone: [phone]  Relation: Significant other   needed? No    Discharge Plan A: Home  Discharge Plan B: Home with family      [id number] - [place], [place] - [address]  [address]  [place] [address]  Phone: [phone] Fax: [phone]    [id number] - [place], [place] - [address]  [address]  [place] [address]  Phone: [phone] Fax: [phone]    DC assessment completed with pt, information on facesheet verified. Lives alone. Friend or daughter will drive her home. PCP is Dr. Priscilla Huggins. [facility]acy [facility]. Takes plavix. Denies hh, hd, dme. Independent, drives self. Insurance verified as [employer] mg[employer] medicare (primary) and medicaid (secondary). POA is Kareen whitley. Denies recent admission. Plan to DC home when clear.     Initial Assessment (most recent)       Adult Discharge Assessment - 05/04/23 0923          Discharge Assessment    Assessment Type Discharge Planning Assessment     Confirmed/corrected address, phone number and insurance Yes     Confirmed Demographics Correct on Facesheet     Source of Information patient     Does patient/caregiver understand observation status Yes     Communicated MONSTER with patient/caregiver  Yes     People in Home alone     Facility Arrived From: home     Do you expect to return to your current living situation? Yes     Do you have help at home or someone to help you manage your care at home? No     Prior to hospitilization cognitive status: Alert/Oriented     Current cognitive status: Alert/Oriented     Equipment Currently Used at Home none     Readmission within 30 days? No     Patient currently being followed by outpatient case management? No     Do you currently have service(s) that help you manage your care at home? No     Do you take prescription medications? Yes     Do you have prescription coverage? Yes     Do you have any problems affording any of your prescribed medications? No     Is the patient taking medications as prescribed? yes     Who is going to help you get home at discharge? friend or daughter     How do you get to doctors appointments? car, drives self     Are you on dialysis? No     Do you take coumadin? No     Discharge Plan A Home     Discharge Plan B Home with family     DME Needed Upon Discharge  none     Discharge Plan discussed with: Patient     Discharge Barriers Identified None

## 2023-05-04 NOTE — ASSESSMENT & PLAN NOTE
Symptomatic. No evidence of gross bleeding. Prior EGD and colonoscopy unremarkable. History of diverticulosis. Concern for GIB.  -Hematology consulted  -Transfuse for Hgb < 7; one unit ordered 5/4  -Trend Hgb with CBC  -GI consulted  -IV PPI BID  -IV fluids  -Hold DAPT

## 2023-05-04 NOTE — H&P (VIEW-ONLY)
[facility]     CC: Anemia    HPI 77 y.o. female with a past medical history of anemia, COPD, CAD, TIA, HTN, HLD, DM, fibromyalgia, GERD, REJI, and diverticulosis who presents with RUQ/R flank pain, fatigue, shortness of breath, and light-headedness. She states she has experienced this right sided pain, yet it has not been this severe. She denies any dysuria, frequency, or urgency. She has been seen in the past by Hematology for the anemia. She had a colonoscopy and EGD done 3/13/2023 with no acute findings. She is on DAPT with ASA and Plavix but she denies any gross bleeding. CT of the abdomen and pelvis without contrast performed in the ED was unremarkable. Her Hgb was discovered to be 7.5, lower than her baseline of ~10. She was given a GI cocktail in the ED. Hospital Medicine was consulted for admission.        Overview/Hospital Course:  Pili Teixeira is a 77 year old female with a past medical history of anemia, COPD, CAD, TIA, HTN, HLD, DM, fibromyalgia, GERD, REJI, and diverticulosis who presented with RUQ/R flank pain, UTI, HEATHER, and worsening anemia. Her Hgb has continued to fall during course, requiring a one unit transfusion of PRBCs. Iron studies are consistent with an iron deficiency anemia. Hematology and GI have been consulted. She is on an IV PPI and home DAPT has been held. Her kidney function has improved with IV fluids. She is on Rocephin and a urine culture is pending.    FURTHER HISTORY:  Above obtained from independent review of records from admitting provider as well as from direct discussion with nursing who states patient has worsening anemia.  In addition, on my interview, I note the following:  Patient has anemia, remote onset, moderate to severe, iron deficient, with no overt bleeding at present.  She had evaluation for the same last month and had EGD/colonoscopy with Dr. Frost which were reviewed and were unrevealing for cause.  No other GI complaints currently.      Past  Medical History:   Diagnosis Date    Allergy     Anemia 2012    with thrombocytopenia; iron deficiency    Arthritis     Cervical spinal stenosis     with neuropathy, chronic neck,back,leg pain    Colon polyp     COPD (chronic obstructive pulmonary disease)     Coronary artery disease     COVID 4/27/2022    COVID-19     Diabetes mellitus     , sugars run 190's per patient    Diastolic dysfunction 7/13/2015    Diverticulitis     Diverticulosis     Hx diverticulitis,still has chronic diarrhea, abd pain    Dyspnea on exertion     sometimes with nausea, fatigue,dizziness, had cardiac cath June 2012, normal    Fibromyalgia     Fracture 2012    right thumb    GERD (gastroesophageal reflux disease)     Feb 2012, Hx H Pylori    Glaucoma     had LAser surgey, on no eye drops    HEARING LOSS     Hemangioma     fatty liver    History of earache     frequent    History of TIA (transient ischemic attack) 5/28/2013    Hyperlipidemia     Hypertension     Hypertension associated with diabetes 3/14/2017    Laryngeal polyp     Myocardial infarction 2006 last    also MI in distant past    REJI (obstructive sleep apnea)     does not use CPAP    Otitis media     Pneumonia due to COVID-19 virus 2/20/2021    Renal stone     Sjogren's syndrome     SOB (shortness of breath) 6/8/2012    [address] [place] [address] 1. Normal coronary arteries. 2. Normal single renal arteries bilaterally. 3. Diastolic dysfunction.     Stroke     TIA, now on Plavix    TIA (transient ischemic attack)     TMJ disorder     Type II or unspecified type diabetes mellitus without mention of complication, uncontrolled 5/8/2014    UTI (lower urinary tract infection)     frequent    Venous insufficiency     with Hx blood clot in leg       Past Surgical History:   Procedure Laterality Date    ABDOMINAL SURGERY  2010 x2    expoloratory lap for pelvic cyst,adhesions; partial colonectomy     adhesiolysis   10/11/2012    CARDIAC CATHETERIZATION      no current blockages.    CHOLECYSTECTOMY       COLON SURGERY      r/t diverticuli    COLONOSCOPY  08/2014    repeat in 5 years    COLONOSCOPY N/A 1/7/2020    Procedure: COLONOSCOPY;  Surgeon: Kb Nguyen MD;  Location: [facility] ENDO;  Service: Endoscopy;  Laterality: N/A;    COLONOSCOPY N/A 3/13/2023    Procedure: COLONOSCOPY;  Surgeon: Jarrod Frost MD;  Location: [facility];  Service: Endoscopy;  Laterality: N/A;    ENDOSCOPIC ULTRASOUND OF UPPER GASTROINTESTINAL TRACT N/A 1/13/2021    Procedure: ULTRASOUND, UPPER GI TRACT, ENDOSCOPIC;  Surgeon: Sonido Castellano III, MD;  Location: [facility];  Service: Endoscopy;  Laterality: N/A;    EPIDURAL BLOCK INJECTION  5/15/12    back,neck for pain    ESOPHAGOGASTRODUODENOSCOPY N/A 1/7/2020    Procedure: EGD (ESOPHAGOGASTRODUODENOSCOPY);  Surgeon: Kb Nguyen MD;  Location: [facility];  Service: Endoscopy;  Laterality: N/A;    ESOPHAGOGASTRODUODENOSCOPY N/A 1/13/2021    Procedure: EGD (ESOPHAGOGASTRODUODENOSCOPY);  Surgeon: Sonido Castellano III, MD;  Location: [facility];  Service: Endoscopy;  Laterality: N/A;    ESOPHAGOGASTRODUODENOSCOPY N/A 3/13/2023    Procedure: EGD (ESOPHAGOGASTRODUODENOSCOPY);  Surgeon: Jarrod Frost MD;  Location: [facility];  Service: Endoscopy;  Laterality: N/A;    EXPLORATORY LAPAROTOMY W/ BOWEL RESECTION  10/11/2012    EYE SURGERY      Laser for glaucoma    EYE SURGERY  May 2012    cataracts    HYSTERECTOMY      LARYNX SURGERY      polypectomy    OOPHORECTOMY      TONSILLECTOMY      with adenoidectomy    UPPER GASTROINTESTINAL ENDOSCOPY  12/2015    VASCULAR SURGERY      laser to varicose vein leg       Social History     Tobacco Use    Smoking status: Never    Smokeless tobacco: Never   Substance Use Topics    Alcohol use: No    Drug use: Yes     Types: Oxycodone       Family History   Problem Relation Age of Onset    Throat cancer Mother     Cancer Mother     Diabetes Mellitus Mother     Stroke Father     Hypertension Father     Heart disease Father      "Diverticulitis Sister     Throat cancer Brother     Cancer Brother     Thyroid disease Daughter     Lupus Daughter     Pancreatic cancer Maternal Aunt 55    Pancreatic cancer Cousin 58    Breast cancer Neg Hx     Ovarian cancer Neg Hx     Colon cancer Neg Hx     Colon polyps Neg Hx     Celiac disease Neg Hx     Cirrhosis Neg Hx     Crohn's disease Neg Hx     Stomach cancer Neg Hx     Ulcerative colitis Neg Hx     Rectal cancer Neg Hx     Esophageal cancer Neg Hx     Inflammatory bowel disease Neg Hx     Rheum arthritis Neg Hx     Psoriasis Neg Hx     Osteoarthritis Neg Hx     Kidney disease Neg Hx     Hyperlipidemia Neg Hx     COPD Neg Hx     Depression Neg Hx     Chronic back pain Neg Hx     Asthma Neg Hx     Alcohol abuse Neg Hx        Review of Systems  General ROS: negative for - chills, fever or weight loss  Psychological ROS: negative for - hallucination, depression or suicidal ideation  Ophthalmic ROS: negative for - blurry vision, photophobia or eye pain  ENT ROS: negative for - epistaxis, sore throat or rhinorrhea  Respiratory ROS: no cough, shortness of breath, or wheezing  Cardiovascular ROS: no chest pain or dyspnea on exertion  Gastrointestinal ROS: as above  Genito-Urinary ROS: no dysuria, trouble voiding, or hematuria  Musculoskeletal ROS: negative for - arthralgia, myalgia, weakness  Neurological ROS: no syncope or seizures; no ataxia  Dermatological ROS: negative for pruritis, rash and jaundice    Physical Examination  BP (!) 115/55   Pulse 67   Temp 97.2 °F (36.2 °C)   Resp 18   Ht 5' 3" (1.6 m)   Wt 60.4 kg (133 lb 2.5 oz)   SpO2 100%   BMI 23.59 kg/m²   General appearance: alert, cooperative, no distress  HENT: Normocephalic, atraumatic, neck symmetrical, no nasal discharge   Eyes: conjunctivae/corneas clear, PERRL, EOM's intact, sclera anicteric  Lungs: clear to auscultation bilaterally, no dullness to percussion bilaterally, symmetric expansion, breathing unlabored  Heart: regular rate " and rhythm without rub; no displacement of the PMI   Abdomen: soft, NT  Extremities: extremities symmetric; no clubbing, cyanosis, or edema  Integument: Skin color, texture, turgor normal; no rashes; hair distrubution normal, no jaundice  Neurologic: Alert and oriented X 3, no focal sensory or motor neurologic deficits  Psychiatric: no pressured speech; normal affect; no evidence of impaired cognition, no anxiety/depression     Labs:  Lab Results   Component Value Date    WBC 5.87 05/04/2023    HGB 6.9 (L) 05/04/2023    HCT 21.7 (L) 05/04/2023    MCV 93 05/04/2023     05/04/2023         CMP  Sodium   Date Value Ref Range Status   05/04/2023 142 136 - 145 mmol/L Final     Potassium   Date Value Ref Range Status   05/04/2023 4.0 3.5 - 5.1 mmol/L Final     Chloride   Date Value Ref Range Status   05/04/2023 107 95 - 110 mmol/L Final     CO2   Date Value Ref Range Status   05/04/2023 27 23 - 29 mmol/L Final     Glucose   Date Value Ref Range Status   05/04/2023 129 (H) 70 - 110 mg/dL Final     BUN   Date Value Ref Range Status   05/04/2023 29 (H) 8 - 23 mg/dL Final     Creatinine   Date Value Ref Range Status   05/04/2023 0.9 0.5 - 1.4 mg/dL Final   09/01/2012 0.9 0.2 - 1.4 mg/dl Final     Calcium   Date Value Ref Range Status   05/04/2023 9.5 8.7 - 10.5 mg/dL Final   09/01/2012 9.9 8.6 - 10.2 mg/dl Final     Total Protein   Date Value Ref Range Status   05/04/2023 5.8 (L) 6.0 - 8.4 g/dL Final     Albumin   Date Value Ref Range Status   05/04/2023 3.3 (L) 3.5 - 5.2 g/dL Final     Total Bilirubin   Date Value Ref Range Status   05/04/2023 0.2 0.1 - 1.0 mg/dL Final     Comment:     For infants and newborns, interpretation of results should be based  on gestational age, weight and in agreement with clinical  observations.    Premature Infant recommended reference ranges:  Up to 24 hours.............<8.0 mg/dL  Up to 48 hours............<12.0 mg/dL  3-5 days..................<15.0 mg/dL  6-29  days.................<15.0 mg/dL       Alkaline Phosphatase   Date Value Ref Range Status   05/04/2023 39 (L) 55 - 135 U/L Final   09/01/2012 80 23 - 119 UNIT/L Final     AST   Date Value Ref Range Status   05/04/2023 15 10 - 40 U/L Final   09/01/2012 17 10 - 30 UNIT/L Final     ALT   Date Value Ref Range Status   05/04/2023 9 (L) 10 - 44 U/L Final     Anion Gap   Date Value Ref Range Status   05/04/2023 8 8 - 16 mmol/L Final   09/01/2012 13 5 - 15 meq/L Final     eGFR   Date Value Ref Range Status   05/04/2023 >60 >60 mL/min/1.73 m^2 Final       Lab Results   Component Value Date    UIBC 355 06/18/2012    IRON 27 (L) 05/03/2023    TRANSFERRIN 200 05/03/2023    TIBC 296 05/03/2023    FESATURATED 9 (L) 05/03/2023          Imaging:  CT scan was independently visualized and reviewed by me and showed prior colon resection and abdominal hernia without obstruction.    I have personally reviewed these images    Case discussed as above.    Assessment:  NICOLAS  History of colon surgery    Plan:  Diet this PM  NPO after midnight  EGD with push enteroscopy tomorrow.  If unrevealing, patient will need outpatient pillcam for NICOLAS  Further recommendations to follow after above.  Communication will be sent to the referring provider regarding my assessment and plan on this patient via EPIC.      Noel Rivers MD  [facility]  [facility] [facility], [address]  [place] [address]  Office: [phone]  Fax: [phone]

## 2023-05-04 NOTE — RESPIRATORY THERAPY
Patient receiving spirivia x 2 puffs now and qday.  Hr 67 and 02 saturation 99% on room air.  PRN tx not required at this time.

## 2023-05-04 NOTE — SUBJECTIVE & OBJECTIVE
"Interval History: see "Hospital Course"    Review of Systems   Constitutional:  Positive for activity change and fatigue. Negative for chills and fever.   HENT: Negative.     Eyes: Negative.    Respiratory:  Positive for shortness of breath.    Cardiovascular:  Positive for chest pain. Negative for palpitations.   Gastrointestinal:  Positive for abdominal pain. Negative for abdominal distention, diarrhea, nausea and vomiting.   Endocrine: Negative.    Genitourinary:  Positive for flank pain. Negative for dysuria and urgency.   Skin:  Positive for pallor.   Allergic/Immunologic: Positive for immunocompromised state.   Neurological:  Positive for weakness.   Psychiatric/Behavioral: Negative.     Objective:     Vital Signs (Most Recent):  Temp: 98.4 °F (36.9 °C) (05/04/23 0753)  Pulse: 70 (05/04/23 0753)  Resp: 16 (05/04/23 0753)  BP: 107/66 (05/04/23 0753)  SpO2: 100 % (05/04/23 0753) Vital Signs (24h Range):  Temp:  [97.3 °F (36.3 °C)-98.7 °F (37.1 °C)] 98.4 °F (36.9 °C)  Pulse:  [67-77] 70  Resp:  [16-18] 16  SpO2:  [97 %-100 %] 100 %  BP: ()/(47-66) 107/66     Weight: 60.4 kg (133 lb 2.5 oz)  Body mass index is 23.59 kg/m².    Intake/Output Summary (Last 24 hours) at 5/4/2023 1005  Last data filed at 5/4/2023 0449  Gross per 24 hour   Intake 877.5 ml   Output 800 ml   Net 77.5 ml         Physical Exam  Vitals and nursing note reviewed.   Constitutional:       Appearance: She is ill-appearing.   HENT:      Head: Normocephalic and atraumatic.      Right Ear: External ear normal.      Left Ear: External ear normal.      Nose: Nose normal.      Mouth/Throat:      Mouth: Mucous membranes are moist.      Pharynx: Oropharynx is clear.   Eyes:      Extraocular Movements: Extraocular movements intact.      Conjunctiva/sclera: Conjunctivae normal.   Cardiovascular:      Rate and Rhythm: Normal rate and regular rhythm.      Pulses: Normal pulses.      Heart sounds: Normal heart sounds.   Pulmonary:      Effort: " Pulmonary effort is normal.      Breath sounds: Normal breath sounds.   Abdominal:      General: Bowel sounds are normal. There is no distension.      Palpations: Abdomen is soft.      Tenderness: There is no abdominal tenderness. There is no right CVA tenderness.   Musculoskeletal:         General: Normal range of motion.      Cervical back: Normal range of motion and neck supple.      Right lower leg: No edema.      Left lower leg: No edema.   Skin:     General: Skin is warm and dry.      Coloration: Skin is pale.   Neurological:      Mental Status: She is alert. Mental status is at baseline.   Psychiatric:         Behavior: Behavior normal.           Significant Labs: All pertinent labs within the past 24 hours have been reviewed.    Significant Imaging: I have reviewed all pertinent imaging results/findings within the past 24 hours.

## 2023-05-05 ENCOUNTER — TELEPHONE (OUTPATIENT)
Dept: FAMILY MEDICINE | Facility: CLINIC | Age: 78
End: 2023-05-05
Payer: MEDICARE

## 2023-05-05 ENCOUNTER — ANESTHESIA EVENT (OUTPATIENT)
Dept: ENDOSCOPY | Facility: HOSPITAL | Age: 78
End: 2023-05-05
Payer: MEDICARE

## 2023-05-05 ENCOUNTER — ANESTHESIA (OUTPATIENT)
Dept: ENDOSCOPY | Facility: HOSPITAL | Age: 78
End: 2023-05-05
Payer: MEDICARE

## 2023-05-05 ENCOUNTER — TELEPHONE (OUTPATIENT)
Dept: GASTROENTEROLOGY | Facility: CLINIC | Age: 78
End: 2023-05-05
Payer: MEDICARE

## 2023-05-05 ENCOUNTER — TELEPHONE (OUTPATIENT)
Dept: HEMATOLOGY/ONCOLOGY | Facility: CLINIC | Age: 78
End: 2023-05-05
Payer: MEDICARE

## 2023-05-05 VITALS
RESPIRATION RATE: 18 BRPM | HEIGHT: 63 IN | BODY MASS INDEX: 23.6 KG/M2 | TEMPERATURE: 98 F | OXYGEN SATURATION: 100 % | WEIGHT: 133.19 LBS | HEART RATE: 66 BPM | SYSTOLIC BLOOD PRESSURE: 119 MMHG | DIASTOLIC BLOOD PRESSURE: 64 MMHG

## 2023-05-05 PROBLEM — N39.0 UTI (URINARY TRACT INFECTION): Status: RESOLVED | Noted: 2023-05-03 | Resolved: 2023-05-05

## 2023-05-05 PROBLEM — I99.8 ANGIECTASIS: Status: ACTIVE | Noted: 2023-05-05

## 2023-05-05 PROBLEM — I99.8 ANGIECTASIS: Status: RESOLVED | Noted: 2023-05-05 | Resolved: 2023-05-05

## 2023-05-05 LAB
ALBUMIN SERPL BCP-MCNC: 3.4 G/DL (ref 3.5–5.2)
ALP SERPL-CCNC: 42 U/L (ref 55–135)
ALT SERPL W/O P-5'-P-CCNC: 11 U/L (ref 10–44)
ANION GAP SERPL CALC-SCNC: 9 MMOL/L (ref 8–16)
AST SERPL-CCNC: 19 U/L (ref 10–40)
BACTERIA UR CULT: NORMAL
BACTERIA UR CULT: NORMAL
BASOPHILS # BLD AUTO: 0.04 K/UL (ref 0–0.2)
BASOPHILS NFR BLD: 0.5 % (ref 0–1.9)
BILIRUB SERPL-MCNC: 0.5 MG/DL (ref 0.1–1)
BUN SERPL-MCNC: 16 MG/DL (ref 8–23)
CALCIUM SERPL-MCNC: 9.5 MG/DL (ref 8.7–10.5)
CHLORIDE SERPL-SCNC: 106 MMOL/L (ref 95–110)
CO2 SERPL-SCNC: 26 MMOL/L (ref 23–29)
CREAT SERPL-MCNC: 0.8 MG/DL (ref 0.5–1.4)
DIFFERENTIAL METHOD: ABNORMAL
EOSINOPHIL # BLD AUTO: 0.1 K/UL (ref 0–0.5)
EOSINOPHIL NFR BLD: 1.4 % (ref 0–8)
ERYTHROCYTE [DISTWIDTH] IN BLOOD BY AUTOMATED COUNT: 16.2 % (ref 11.5–14.5)
EST. GFR  (NO RACE VARIABLE): >60 ML/MIN/1.73 M^2
GLUCOSE SERPL-MCNC: 90 MG/DL (ref 70–110)
HAPTOGLOB SERPL-MCNC: 159 MG/DL (ref 30–250)
HCT VFR BLD AUTO: 26.5 % (ref 37–48.5)
HGB BLD-MCNC: 8.4 G/DL (ref 12–16)
IMM GRANULOCYTES # BLD AUTO: 0.02 K/UL (ref 0–0.04)
IMM GRANULOCYTES NFR BLD AUTO: 0.3 % (ref 0–0.5)
LDH SERPL L TO P-CCNC: 157 U/L (ref 110–260)
LYMPHOCYTES # BLD AUTO: 3.1 K/UL (ref 1–4.8)
LYMPHOCYTES NFR BLD: 40.4 % (ref 18–48)
MAGNESIUM SERPL-MCNC: 1.8 MG/DL (ref 1.6–2.6)
MCH RBC QN AUTO: 28.7 PG (ref 27–31)
MCHC RBC AUTO-ENTMCNC: 31.7 G/DL (ref 32–36)
MCV RBC AUTO: 90 FL (ref 82–98)
MONOCYTES # BLD AUTO: 0.8 K/UL (ref 0.3–1)
MONOCYTES NFR BLD: 10.1 % (ref 4–15)
NEUTROPHILS # BLD AUTO: 3.6 K/UL (ref 1.8–7.7)
NEUTROPHILS NFR BLD: 47.3 % (ref 38–73)
NRBC BLD-RTO: 0 /100 WBC
PHOSPHATE SERPL-MCNC: 3.3 MG/DL (ref 2.7–4.5)
PLATELET # BLD AUTO: 191 K/UL (ref 150–450)
PMV BLD AUTO: 12.2 FL (ref 9.2–12.9)
POCT GLUCOSE: 84 MG/DL (ref 70–110)
POCT GLUCOSE: 85 MG/DL (ref 70–110)
POTASSIUM SERPL-SCNC: 4.1 MMOL/L (ref 3.5–5.1)
PROT SERPL-MCNC: 6 G/DL (ref 6–8.4)
RBC # BLD AUTO: 2.93 M/UL (ref 4–5.4)
SODIUM SERPL-SCNC: 141 MMOL/L (ref 136–145)
WBC # BLD AUTO: 7.64 K/UL (ref 3.9–12.7)

## 2023-05-05 PROCEDURE — 27201012 HC FORCEPS, HOT/COLD, DISP: Performed by: INTERNAL MEDICINE

## 2023-05-05 PROCEDURE — 99900035 HC TECH TIME PER 15 MIN (STAT)

## 2023-05-05 PROCEDURE — 94640 AIRWAY INHALATION TREATMENT: CPT

## 2023-05-05 PROCEDURE — 25000003 PHARM REV CODE 250: Performed by: NURSE ANESTHETIST, CERTIFIED REGISTERED

## 2023-05-05 PROCEDURE — 88305 TISSUE EXAM BY PATHOLOGIST: CPT | Performed by: STUDENT IN AN ORGANIZED HEALTH CARE EDUCATION/TRAINING PROGRAM

## 2023-05-05 PROCEDURE — 88305 TISSUE EXAM BY PATHOLOGIST: ICD-10-PCS | Mod: 26,,, | Performed by: STUDENT IN AN ORGANIZED HEALTH CARE EDUCATION/TRAINING PROGRAM

## 2023-05-05 PROCEDURE — 88305 TISSUE EXAM BY PATHOLOGIST: CPT | Mod: 26,,, | Performed by: STUDENT IN AN ORGANIZED HEALTH CARE EDUCATION/TRAINING PROGRAM

## 2023-05-05 PROCEDURE — 25000003 PHARM REV CODE 250: Performed by: STUDENT IN AN ORGANIZED HEALTH CARE EDUCATION/TRAINING PROGRAM

## 2023-05-05 PROCEDURE — 36415 COLL VENOUS BLD VENIPUNCTURE: CPT | Performed by: INTERNAL MEDICINE

## 2023-05-05 PROCEDURE — D9220A PRA ANESTHESIA: ICD-10-PCS | Mod: CRNA,,, | Performed by: NURSE ANESTHETIST, CERTIFIED REGISTERED

## 2023-05-05 PROCEDURE — 43239 EGD BIOPSY SINGLE/MULTIPLE: CPT | Mod: ,,, | Performed by: INTERNAL MEDICINE

## 2023-05-05 PROCEDURE — 63600175 PHARM REV CODE 636 W HCPCS: Performed by: NURSE ANESTHETIST, CERTIFIED REGISTERED

## 2023-05-05 PROCEDURE — 84100 ASSAY OF PHOSPHORUS: CPT | Performed by: STUDENT IN AN ORGANIZED HEALTH CARE EDUCATION/TRAINING PROGRAM

## 2023-05-05 PROCEDURE — 43255 PR EGD, FLEX, W/CTRL BLEED, ANY METHOD: ICD-10-PCS | Mod: 22,59,, | Performed by: INTERNAL MEDICINE

## 2023-05-05 PROCEDURE — 85025 COMPLETE CBC W/AUTO DIFF WBC: CPT | Performed by: NURSE PRACTITIONER

## 2023-05-05 PROCEDURE — 83615 LACTATE (LD) (LDH) ENZYME: CPT | Performed by: INTERNAL MEDICINE

## 2023-05-05 PROCEDURE — 37000008 HC ANESTHESIA 1ST 15 MINUTES: Performed by: INTERNAL MEDICINE

## 2023-05-05 PROCEDURE — D9220A PRA ANESTHESIA: Mod: ANES,,, | Performed by: ANESTHESIOLOGY

## 2023-05-05 PROCEDURE — 83735 ASSAY OF MAGNESIUM: CPT | Performed by: STUDENT IN AN ORGANIZED HEALTH CARE EDUCATION/TRAINING PROGRAM

## 2023-05-05 PROCEDURE — 36415 COLL VENOUS BLD VENIPUNCTURE: CPT | Performed by: STUDENT IN AN ORGANIZED HEALTH CARE EDUCATION/TRAINING PROGRAM

## 2023-05-05 PROCEDURE — 88342 IMHCHEM/IMCYTCHM 1ST ANTB: CPT | Performed by: STUDENT IN AN ORGANIZED HEALTH CARE EDUCATION/TRAINING PROGRAM

## 2023-05-05 PROCEDURE — 94761 N-INVAS EAR/PLS OXIMETRY MLT: CPT

## 2023-05-05 PROCEDURE — 99233 PR SUBSEQUENT HOSPITAL CARE,LEVL III: ICD-10-PCS | Mod: ,,, | Performed by: INTERNAL MEDICINE

## 2023-05-05 PROCEDURE — 63600175 PHARM REV CODE 636 W HCPCS: Performed by: STUDENT IN AN ORGANIZED HEALTH CARE EDUCATION/TRAINING PROGRAM

## 2023-05-05 PROCEDURE — 80053 COMPREHEN METABOLIC PANEL: CPT | Performed by: STUDENT IN AN ORGANIZED HEALTH CARE EDUCATION/TRAINING PROGRAM

## 2023-05-05 PROCEDURE — 43239 EGD BIOPSY SINGLE/MULTIPLE: CPT | Performed by: INTERNAL MEDICINE

## 2023-05-05 PROCEDURE — D9220A PRA ANESTHESIA: Mod: CRNA,,, | Performed by: NURSE ANESTHETIST, CERTIFIED REGISTERED

## 2023-05-05 PROCEDURE — D9220A PRA ANESTHESIA: ICD-10-PCS | Mod: ANES,,, | Performed by: ANESTHESIOLOGY

## 2023-05-05 PROCEDURE — 83010 ASSAY OF HAPTOGLOBIN QUANT: CPT | Performed by: INTERNAL MEDICINE

## 2023-05-05 PROCEDURE — 37000009 HC ANESTHESIA EA ADD 15 MINS: Performed by: INTERNAL MEDICINE

## 2023-05-05 PROCEDURE — 43255 EGD CONTROL BLEEDING ANY: CPT | Mod: 22,59,, | Performed by: INTERNAL MEDICINE

## 2023-05-05 PROCEDURE — C9113 INJ PANTOPRAZOLE SODIUM, VIA: HCPCS | Performed by: STUDENT IN AN ORGANIZED HEALTH CARE EDUCATION/TRAINING PROGRAM

## 2023-05-05 PROCEDURE — 27202087 HC PROBE, APC: Performed by: INTERNAL MEDICINE

## 2023-05-05 PROCEDURE — G0378 HOSPITAL OBSERVATION PER HR: HCPCS

## 2023-05-05 PROCEDURE — 43239 PR EGD, FLEX, W/BIOPSY, SGL/MULTI: ICD-10-PCS | Mod: ,,, | Performed by: INTERNAL MEDICINE

## 2023-05-05 PROCEDURE — 88342 IMHCHEM/IMCYTCHM 1ST ANTB: CPT | Mod: 26,,, | Performed by: STUDENT IN AN ORGANIZED HEALTH CARE EDUCATION/TRAINING PROGRAM

## 2023-05-05 PROCEDURE — 96376 TX/PRO/DX INJ SAME DRUG ADON: CPT | Mod: 59

## 2023-05-05 PROCEDURE — 43255 EGD CONTROL BLEEDING ANY: CPT | Mod: 59 | Performed by: INTERNAL MEDICINE

## 2023-05-05 PROCEDURE — 88342 CHG IMMUNOCYTOCHEMISTRY: ICD-10-PCS | Mod: 26,,, | Performed by: STUDENT IN AN ORGANIZED HEALTH CARE EDUCATION/TRAINING PROGRAM

## 2023-05-05 PROCEDURE — 99233 SBSQ HOSP IP/OBS HIGH 50: CPT | Mod: ,,, | Performed by: INTERNAL MEDICINE

## 2023-05-05 RX ORDER — PROPOFOL 10 MG/ML
INJECTION, EMULSION INTRAVENOUS
Status: COMPLETED
Start: 2023-05-05 | End: 2023-05-05

## 2023-05-05 RX ORDER — SODIUM CHLORIDE 9 MG/ML
INJECTION, SOLUTION INTRAVENOUS CONTINUOUS
Status: DISCONTINUED | OUTPATIENT
Start: 2023-05-05 | End: 2023-05-05

## 2023-05-05 RX ORDER — PROPOFOL 10 MG/ML
VIAL (ML) INTRAVENOUS
Status: DISCONTINUED | OUTPATIENT
Start: 2023-05-05 | End: 2023-05-05

## 2023-05-05 RX ORDER — LIDOCAINE HYDROCHLORIDE 20 MG/ML
INJECTION INTRAVENOUS
Status: DISCONTINUED | OUTPATIENT
Start: 2023-05-05 | End: 2023-05-05

## 2023-05-05 RX ORDER — LIDOCAINE HYDROCHLORIDE 20 MG/ML
INJECTION, SOLUTION EPIDURAL; INFILTRATION; INTRACAUDAL; PERINEURAL
Status: DISCONTINUED
Start: 2023-05-05 | End: 2023-05-05 | Stop reason: HOSPADM

## 2023-05-05 RX ADMIN — OXYCODONE AND ACETAMINOPHEN 1 TABLET: 7.5; 325 TABLET ORAL at 07:05

## 2023-05-05 RX ADMIN — LIDOCAINE HYDROCHLORIDE 50 MG: 20 INJECTION, SOLUTION INTRAVENOUS at 09:05

## 2023-05-05 RX ADMIN — TIOTROPIUM BROMIDE INHALATION SPRAY 2 PUFF: 3.12 SPRAY, METERED RESPIRATORY (INHALATION) at 07:05

## 2023-05-05 RX ADMIN — THERA TABS 1 TABLET: TAB at 12:05

## 2023-05-05 RX ADMIN — VITAM B12 100 MCG: 100 TAB at 12:05

## 2023-05-05 RX ADMIN — PREGABALIN 150 MG: 75 CAPSULE ORAL at 12:05

## 2023-05-05 RX ADMIN — PROPOFOL 50 MG: 10 INJECTION, EMULSION INTRAVENOUS at 09:05

## 2023-05-05 RX ADMIN — PANTOPRAZOLE SODIUM 40 MG: 40 INJECTION, POWDER, LYOPHILIZED, FOR SOLUTION INTRAVENOUS at 12:05

## 2023-05-05 RX ADMIN — FOLIC ACID 1000 MCG: 1 TABLET ORAL at 12:05

## 2023-05-05 RX ADMIN — PROPOFOL 50 MG: 10 INJECTION, EMULSION INTRAVENOUS at 10:05

## 2023-05-05 RX ADMIN — OXYCODONE AND ACETAMINOPHEN 1 TABLET: 7.5; 325 TABLET ORAL at 12:05

## 2023-05-05 RX ADMIN — SODIUM CHLORIDE: 0.9 INJECTION, SOLUTION INTRAVENOUS at 09:05

## 2023-05-05 RX ADMIN — ATORVASTATIN CALCIUM 40 MG: 40 TABLET, FILM COATED ORAL at 12:05

## 2023-05-05 NOTE — PLAN OF CARE
1050 report called to pt's nurse umu Vale aaox3. Nadn. Vss.   1055 transferring back to room 207 via stretcher

## 2023-05-05 NOTE — TELEPHONE ENCOUNTER
----- Message from Noel Caputo MD sent at 5/5/2023 10:17 AM CDT -----  Outpatient pillcam for NICOLAS

## 2023-05-05 NOTE — ANESTHESIA POSTPROCEDURE EVALUATION
Anesthesia Post Evaluation    Patient: Pili Teixeira    Procedure(s) Performed: Procedure(s) (LRB):  EGD (ESOPHAGOGASTRODUODENOSCOPY) (N/A)    Final Anesthesia Type: general      Patient location during evaluation: PACU  Patient participation: Yes- Able to Participate  Level of consciousness: awake and alert and oriented  Post-procedure vital signs: reviewed and stable  Pain management: adequate  Airway patency: patent    PONV status at discharge: No PONV  Anesthetic complications: no      Cardiovascular status: blood pressure returned to baseline and stable  Respiratory status: unassisted and spontaneous ventilation  Hydration status: euvolemic  Follow-up not needed.          Vitals Value Taken Time   /64 05/05/23 1050   Temp 36.6 °C (97.9 °F) 05/05/23 1050   Pulse 66 05/05/23 1050   Resp 18 05/05/23 1050   SpO2 100 % 05/05/23 1050         Event Time   Out of Recovery 05/05/2023 10:55:00         Pain/Enoc Score: Pain Rating Prior to Med Admin: 4 (5/5/2023  8:00 AM)  Pain Rating Post Med Admin: 2 (5/5/2023  8:02 AM)  Enoc Score: 10 (5/5/2023 10:40 AM)

## 2023-05-05 NOTE — NURSING
Notified Mignon Carmona NP of patient and situation, isbar provided, explained to provider via secure chat that this patient was given one unit of blood which finished tonight; however, inquiring about rechecking patient's HNH.  0141-Provider placed orders

## 2023-05-05 NOTE — NURSING
Patient given discharge instructions and paperwork. No concerns voiced. No distress noted. Pt left via wheelchair with daughter at side.

## 2023-05-05 NOTE — PLAN OF CARE
Problem: Adult Inpatient Plan of Care  Goal: Optimal Comfort and Wellbeing  Outcome: Met     Problem: Adult Inpatient Plan of Care  Goal: Readiness for Transition of Care  Outcome: Met     Problem: Oral Intake Inadequate (Acute Kidney Injury/Impairment)  Goal: Optimal Nutrition Intake  Outcome: Met

## 2023-05-05 NOTE — PROGRESS NOTES
HPI    77 years old female past medical history of anemia COPD CAD TIA hypertension diabetes fibromyalgia acid reflux diverticulosis presented with right upper quadrant flank pain fatigue shortness breath.  Denies any dysuria frequency urgency.    She follows Dr Joseluis Lei out patient last seen on 3/24/23  she has anemia fluctuating over the past years as well as thrombocytopenia on and off for the past several years.  Previously patient responded to IV iron.        Past Medical History:   Diagnosis Date    Allergy     Anemia 2012    with thrombocytopenia; iron deficiency    Arthritis     Cervical spinal stenosis     with neuropathy, chronic neck,back,leg pain    Colon polyp     COPD (chronic obstructive pulmonary disease)     Coronary artery disease     COVID 4/27/2022    COVID-19     Diabetes mellitus     , sugars run 190's per patient    Diastolic dysfunction 7/13/2015    Diverticulitis     Diverticulosis     Hx diverticulitis,still has chronic diarrhea, abd pain    Dyspnea on exertion     sometimes with nausea, fatigue,dizziness, had cardiac cath June 2012, normal    Fibromyalgia     Fracture 2012    right thumb    GERD (gastroesophageal reflux disease)     Feb 2012, Hx H Pylori    Glaucoma     had LAser surgey, on no eye drops    HEARING LOSS     Hemangioma     fatty liver    History of earache     frequent    History of TIA (transient ischemic attack) 5/28/2013    Hyperlipidemia     Hypertension     Hypertension associated with diabetes 3/14/2017    Laryngeal polyp     Myocardial infarction 2006 last    also MI in distant past    REJI (obstructive sleep apnea)     does not use CPAP    Otitis media     Pneumonia due to COVID-19 virus 2/20/2021    Renal stone     Sjogren's syndrome     SOB (shortness of breath) 6/8/2012    2012 [facility] 1. Normal coronary arteries. 2. Normal single renal arteries bilaterally. 3. Diastolic dysfunction.     Stroke     TIA, now on Plavix    TIA (transient ischemic attack)     TMJ  disorder     Type II or unspecified type diabetes mellitus without mention of complication, uncontrolled 5/8/2014    UTI (lower urinary tract infection)     frequent    Venous insufficiency     with Hx blood clot in leg     Past Surgical History:   Procedure Laterality Date    ABDOMINAL SURGERY  2010 x2    expoloratory lap for pelvic cyst,adhesions; partial colonectomy     adhesiolysis   10/11/2012    CARDIAC CATHETERIZATION      no current blockages.    CHOLECYSTECTOMY      COLON SURGERY      r/t diverticuli    COLONOSCOPY  08/2014    repeat in 5 years    COLONOSCOPY N/A 1/7/2020    Procedure: COLONOSCOPY;  Surgeon: Kb Nguyen MD;  Location: [facility];  Service: Endoscopy;  Laterality: N/A;    COLONOSCOPY N/A 3/13/2023    Procedure: COLONOSCOPY;  Surgeon: Jarrod Frost MD;  Location: [facility];  Service: Endoscopy;  Laterality: N/A;    ENDOSCOPIC ULTRASOUND OF UPPER GASTROINTESTINAL TRACT N/A 1/13/2021    Procedure: ULTRASOUND, UPPER GI TRACT, ENDOSCOPIC;  Surgeon: Sonido Castellano III, MD;  Location: [facility];  Service: Endoscopy;  Laterality: N/A;    EPIDURAL BLOCK INJECTION  5/15/12    back,neck for pain    ESOPHAGOGASTRODUODENOSCOPY N/A 1/7/2020    Procedure: EGD (ESOPHAGOGASTRODUODENOSCOPY);  Surgeon: Kb Nguyen MD;  Location: [facility];  Service: Endoscopy;  Laterality: N/A;    ESOPHAGOGASTRODUODENOSCOPY N/A 1/13/2021    Procedure: EGD (ESOPHAGOGASTRODUODENOSCOPY);  Surgeon: Sonido Castellano III, MD;  Location: [facility];  Service: Endoscopy;  Laterality: N/A;    ESOPHAGOGASTRODUODENOSCOPY N/A 3/13/2023    Procedure: EGD (ESOPHAGOGASTRODUODENOSCOPY);  Surgeon: Jarrod Frost MD;  Location: [facility];  Service: Endoscopy;  Laterality: N/A;    EXPLORATORY LAPAROTOMY W/ BOWEL RESECTION  10/11/2012    EYE SURGERY      Laser for glaucoma    EYE SURGERY  May 2012    cataracts    HYSTERECTOMY      LARYNX SURGERY      polypectomy    OOPHORECTOMY      TONSILLECTOMY      with  adenoidectomy    UPPER GASTROINTESTINAL ENDOSCOPY  12/2015    VASCULAR SURGERY      laser to varicose vein leg     Social History     Socioeconomic History    Marital status: Significant Other   Tobacco Use    Smoking status: Never    Smokeless tobacco: Never   Substance and Sexual Activity    Alcohol use: No    Drug use: Yes     Types: Oxycodone    Sexual activity: Not Currently     Birth control/protection: Surgical     Comment: hysterectomy     Social Determinants of Health     Financial Resource Strain: Medium Risk    Difficulty of Paying Living Expenses: Somewhat hard   Food Insecurity: Food Insecurity Present    Worried About Running Out of Food in the Last Year: Sometimes true    Ran Out of Food in the Last Year: Never true   Transportation Needs: No Transportation Needs    Lack of Transportation (Medical): No    Lack of Transportation (Non-Medical): No   Physical Activity: Inactive    Days of Exercise per Week: 0 days    Minutes of Exercise per Session: 0 min   Stress: No Stress Concern Present    Feeling of Stress : Only a little   Social Connections: Moderately Isolated    Frequency of Communication with Friends and Family: More than three times a week    Frequency of Social Gatherings with Friends and Family: More than three times a week    Attends Christian Services: More than 4 times per year    Active Member of Clubs or Organizations: No    Attends Club or Organization Meetings: Never    Marital Status:    Housing Stability: Low Risk     Unable to Pay for Housing in the Last Year: No    Number of Places Lived in the Last Year: 1    Unstable Housing in the Last Year: No     Review of patient's allergies indicates:   Allergen Reactions    Codeine Other (See Comments)     Chest pain    Clindamycin Other (See Comments)     Difficulty swallowing after taking, feels a something sits in epigastric area     Iodinated contrast media Hives and Rash       Physical exam  Vitals:    05/05/23 1050   BP:  119/64   Pulse: 66   Resp: 18   Temp: 97.9 °F (36.6 °C)     Physical Exam  Vitals and nursing note reviewed.   Constitutional:       Appearance:    HENT:      Head: Normocephalic and atraumatic.      Right Ear: External ear normal.      Left Ear: External ear normal.      Nose: Nose normal.      Mouth/Throat:      Mouth: Mucous membranes are moist.      Pharynx: Oropharynx is clear.   Eyes:      Extraocular Movements: Extraocular movements intact.      Conjunctiva/sclera: Conjunctivae normal.   Cardiovascular:      Rate and Rhythm: Normal rate and regular rhythm.      Pulses: Normal pulses.      Heart sounds: Normal heart sounds.   Pulmonary:      Effort: Pulmonary effort is normal.      Breath sounds: Normal breath sounds.   Abdominal:      General: Bowel sounds are normal. There is no distension.      Palpations: Abdomen is soft.      Tenderness: There is no abdominal tenderness. There is no right CVA tenderness.   Musculoskeletal:         General: Normal range of motion.      Cervical back: Normal range of motion and neck supple.      Right lower leg: No edema.      Left lower leg: No edema.   Skin:     General: Skin is warm and dry.   Neurological:      Mental Status: She is alert. Mental status is at baseline.   Psychiatric:         Behavior: Behavior normal.     Lab Results   Component Value Date    WBC 7.64 05/05/2023    HGB 8.4 (L) 05/05/2023    HCT 26.5 (L) 05/05/2023    MCV 90 05/05/2023     05/05/2023       CMP  Sodium   Date Value Ref Range Status   05/05/2023 141 136 - 145 mmol/L Final     Potassium   Date Value Ref Range Status   05/05/2023 4.1 3.5 - 5.1 mmol/L Final     Chloride   Date Value Ref Range Status   05/05/2023 106 95 - 110 mmol/L Final     CO2   Date Value Ref Range Status   05/05/2023 26 23 - 29 mmol/L Final     Glucose   Date Value Ref Range Status   05/05/2023 90 70 - 110 mg/dL Final     BUN   Date Value Ref Range Status   05/05/2023 16 8 - 23 mg/dL Final     Creatinine   Date  Value Ref Range Status   05/05/2023 0.8 0.5 - 1.4 mg/dL Final   09/01/2012 0.9 0.2 - 1.4 mg/dl Final     Calcium   Date Value Ref Range Status   05/05/2023 9.5 8.7 - 10.5 mg/dL Final   09/01/2012 9.9 8.6 - 10.2 mg/dl Final     Total Protein   Date Value Ref Range Status   05/05/2023 6.0 6.0 - 8.4 g/dL Final     Albumin   Date Value Ref Range Status   05/05/2023 3.4 (L) 3.5 - 5.2 g/dL Final     Total Bilirubin   Date Value Ref Range Status   05/05/2023 0.5 0.1 - 1.0 mg/dL Final     Comment:     For infants and newborns, interpretation of results should be based  on gestational age, weight and in agreement with clinical  observations.    Premature Infant recommended reference ranges:  Up to 24 hours.............<8.0 mg/dL  Up to 48 hours............<12.0 mg/dL  3-5 days..................<15.0 mg/dL  6-29 days.................<15.0 mg/dL       Alkaline Phosphatase   Date Value Ref Range Status   05/05/2023 42 (L) 55 - 135 U/L Final   09/01/2012 80 23 - 119 UNIT/L Final     AST   Date Value Ref Range Status   05/05/2023 19 10 - 40 U/L Final   09/01/2012 17 10 - 30 UNIT/L Final     ALT   Date Value Ref Range Status   05/05/2023 11 10 - 44 U/L Final     Anion Gap   Date Value Ref Range Status   05/05/2023 9 8 - 16 mmol/L Final   09/01/2012 13 5 - 15 meq/L Final     eGFR   Date Value Ref Range Status   05/05/2023 >60 >60 mL/min/1.73 m^2 Final     Assessment plan    Anemia acute drop of hemoglobin.    Suspect GI bleed.  Status post transfusion on this hospitalization.  EGD and push enteroscopy done.  Two small oozing angiectasias noted and was treated with cautery.     Iron deficiency secondary to lost    > does not appear to have active intravascular hemolysis.      > Transfuse if hemoglobin less than 7 grams/deciliter.    > out patient follow up with Dr Huggins

## 2023-05-05 NOTE — ASSESSMENT & PLAN NOTE
Patient's FSGs are controlled on current medication regimen.  Last A1c reviewed-   Lab Results   Component Value Date    HGBA1C 5.4 08/18/2022     Most recent fingerstick glucose reviewed -   Recent Labs   Lab 05/04/23  1112 05/04/23  1649 05/04/23  2245 05/05/23  0802   POCTGLUCOSE 101 99 129* 85     Current correctional scale  Medium  Maintain anti-hyperglycemic dose as follows-   Antihyperglycemics (From admission, onward)    Start     Stop Route Frequency Ordered    05/03/23 1817  insulin aspart U-100 pen 1-10 Units         -- SubQ Before meals & nightly PRN 05/03/23 1722        Hold Oral hypoglycemics while patient is in the hospital.

## 2023-05-05 NOTE — DISCHARGE SUMMARY
[facility] Hospital Medicine  Discharge Summary      Patient Name: Pili Teixeira  MRN: [id number]  SUSAN: [id number]  Patient Class: OP- Observation  Admission Date: 5/3/2023  Hospital Length of Stay: 0 days  Discharge Date and Time: No discharge date for patient encounter.  Attending Physician: Vince Mccrary MD   Discharging Provider: Vince Mccrary MD  Primary Care Provider: ANKIT Huggins MD    Primary Care Team: Networked reference to record PCT     HPI:   Pili Teixeira is a 77 year old female with a past medical history of anemia, COPD, CAD, TIA, HTN, HLD, DM, fibromyalgia, GERD, REJI, and diverticulosis who presents with RUQ/R flank pain, fatigue, shortness of breath, and light-headedness. She states she has experienced this right sided pain, yet it has not been this severe. She denies any dysuria, frequency, or urgency. She has been seen in the past by Hematology for the anemia. She had a colonoscopy and EGD done 3/13/2023 with no acute findings. She is on DAPT with ASA and Plavix but she denies any gross bleeding. CT of the abdomen and pelvis without contrast performed in the ED was unremarkable. Her Hgb was discovered to be 7.5, lower than her baseline of ~10. She was given a GI cocktail in the ED. Hospital Medicine was consulted for admission.      Procedure(s) (LRB):  EGD (ESOPHAGOGASTRODUODENOSCOPY) (N/A)      Hospital Course:   Pili Teixeira is a 77 year old female with a past medical history of anemia, COPD, CAD, TIA, HTN, HLD, DM, fibromyalgia, GERD, REJI, and diverticulosis who presented with RUQ/R flank pain, UTI, HEATHER, and worsening anemia. Her Hgb has continued to fall during course, requiring a one unit transfusion of PRBCs on 5/4. Iron studies are consistent with an iron deficiency anemia. Hematology and GI have been consulted. She was on an IV PPI and home DAPT was held. EGD with push enteroscopy 5/5 showed two angiectasias which were cauterized. Her kidney function has  improved with IV fluids. Rocephin was held for UTI as urine culture was negative. She was discharged 5/5 and will follow up with her PCP, Hematology, and GI in the outpatient setting where she will undergo pill endoscopy.       Goals of Care Treatment Preferences:  Code Status: Full Code    Living Will: Yes              Consults:   Consults (From admission, onward)        Status Ordering Provider     Inpatient consult to Gastroenterology  Once        Provider:  Noel Caputo MD    Completed KVNG COFFMAN     Inpatient consult to Hematology/Oncology  Once        Provider:  Melissa Soria NP    Completed KVNG COFFMAN          Neuro  History of TIA (transient ischemic attack)  -Continue home statin  -Hold ASA and statin given symptomatic anemia      Pulmonary  COPD (chronic obstructive pulmonary disease)  -Spiriva  -Duonebs PRN      Cardiac/Vascular  CAD (coronary artery disease)  -Statin  -Hold DAPT      Hyperlipidemia associated with type 2 diabetes mellitus  -Continue home statin    Hypertension associated with diabetes  -Continue to monitor  -Hold ARB and HCTZ    Diastolic dysfunction  Grade one diastolic dysfunction. Compensated.  -Telemetry  -Caution with IV fluids   -Hold ARB      Renal/  UTI (urinary tract infection)-resolved as of 5/5/2023  Urinalysis shows leukocytes. Kidneys on CT appear normal.   -Follow up urine culture  -Start Rocephin      Oncology  Iron deficiency anemia  Symptomatic. No evidence of gross bleeding. Prior EGD and colonoscopy unremarkable. History of diverticulosis. Concern for GIB.   -Hematology consulted  -Transfuse for Hgb < 7; one unit ordered 5/4  -Trend Hgb with CBC  -GI consulted; EGD with push enteroscopy 5/5 showed two angiectasias which were cauterized; follow up in clinic for pill endoscopy  -IV PPI BID  -IV fluids  -Hold DAPT; continue Plavix on discharge      Endocrine  Type 2 diabetes mellitus, without long-term current use of insulin  Patient's FSGs are  controlled on current medication regimen.  Last A1c reviewed-   Lab Results   Component Value Date    HGBA1C 5.4 08/18/2022     Most recent fingerstick glucose reviewed -   Recent Labs   Lab 05/04/23  1112 05/04/23  1649 05/04/23  2245 05/05/23  0802   POCTGLUCOSE 101 99 129* 85     Current correctional scale  Medium  Maintain anti-hyperglycemic dose as follows-   Antihyperglycemics (From admission, onward)    Start     Stop Route Frequency Ordered    05/03/23 1817  insulin aspart U-100 pen 1-10 Units         -- SubQ Before meals & nightly PRN 05/03/23 1722        Hold Oral hypoglycemics while patient is in the hospital.    GI  GERD (gastroesophageal reflux disease)  -PPI IV BID      Orthopedic  Fibromyalgia  -Continue home Lyrica      Other  Glaucoma  -Continue home eye drops      REJI (obstructive sleep apnea)  Patient does not use CPAP.        Final Active Diagnoses:    Diagnosis Date Noted POA    Iron deficiency anemia [D50.9] 07/16/2012 Yes    CAD (coronary artery disease) [I25.10] 05/03/2023 Yes    Glaucoma [H40.9] 05/03/2023 Yes    Type 2 diabetes mellitus, without long-term current use of insulin [E11.9] 01/12/2021 Yes     Chronic    Hyperlipidemia associated with type 2 diabetes mellitus [E11.69, E78.5] 06/03/2019 Yes    Hypertension associated with diabetes [E11.59, I15.2] 03/14/2017 Yes    Fibromyalgia [M79.7] 11/18/2015 Yes    Diastolic dysfunction [I51.89] 07/13/2015 Yes     Chronic    COPD (chronic obstructive pulmonary disease) [J44.9] 01/07/2014 Yes    History of TIA (transient ischemic attack) [Z86.73] 05/28/2013 Not Applicable    REJI (obstructive sleep apnea) [G47.33] 03/12/2012 Yes    GERD (gastroesophageal reflux disease) [K21.9] 02/15/2012 Yes      Problems Resolved During this Admission:    Diagnosis Date Noted Date Resolved POA    PRINCIPAL PROBLEM:  Angiectasis [I99.8] 05/05/2023 05/05/2023 No    HEATHER (acute kidney injury) [N17.9] 05/03/2023 05/04/2023 Yes    UTI (urinary tract  infection) [N39.0] 05/03/2023 05/05/2023 Yes       Discharged Condition: stable    Disposition: Home or Self Care    Follow Up:   Follow-up Information     ANKIT Huggins MD Follow up in 2 week(s).    Specialty: Family Medicine  Contact information:  [address]  [place] [address]  [phone]             Do Huggins MD Follow up in 2 week(s).    Specialties: Hematology and Oncology, Hematology, Oncology  Contact information:  [address]  [address]  [place] [address] [phone]             Noel Caputo MD Follow up in 2 week(s).    Specialty: Gastroenterology  Contact information:  [address]  [address]  [place] [address] [phone]                       Patient Instructions:      Ambulatory referral/consult to Outpatient Case Management   Referral Priority: Routine Referral Type: Consultation   Referral Reason: Specialty Services Required   Number of Visits Requested: 1     Diet diabetic     Notify your health care provider if you experience any of the following:  persistent dizziness, light-headedness, or visual disturbances     Notify your health care provider if you experience any of the following:  increased confusion or weakness     Notify your health care provider if you experience any of the following:  severe persistent headache     Notify your health care provider if you experience any of the following:  difficulty breathing or increased cough     Notify your health care provider if you experience any of the following:  severe uncontrolled pain     Notify your health care provider if you experience any of the following:  temperature >100.4     Notify your health care provider if you experience any of the following:  persistent nausea and vomiting or diarrhea     Activity as tolerated       Significant Diagnostic Studies: Labs: All labs within the past 24 hours have been reviewed    Pending Diagnostic Studies:     Procedure Component Value Units Date/Time    Haptoglobin  [[id number]] Collected: 05/05/23 1220    Order Status: Sent Lab Status: In process Updated: 05/05/23 1220    Specimen: Blood     Specimen to Pathology, Surgery Gastrointestinal tract [[id number]] Collected: 05/05/23 1012    Order Status: Sent Lab Status: In process Updated: 05/05/23 1149    Specimen: Tissue          Medications:  Reconciled Home Medications:      Medication List      START taking these medications    albuterol 90 mcg/actuation inhaler  Commonly known as: PROVENTIL/VENTOLIN HFA  Inhale 2 puffs into the lungs every 4 (four) hours as needed for Wheezing or Shortness of Breath. Rescue        CHANGE how you take these medications    lancets Misc  Commonly known as: ACCU-CHEK SOFTCLIX LANCETS  Test TWICE DAILY;Twice a day  What changed: Another medication with the same name was removed. Continue taking this medication, and follow the directions you see here.        CONTINUE taking these medications    albuterol-ipratropium 2.5 mg-0.5 mg/3 mL nebulizer solution  Commonly known as: DUO-NEB  Take 3 mLs by nebulization every 4 to 6 hours as needed for Wheezing. Rescue     blood sugar diagnostic Strp  Commonly known as: ACCU-CHEK GUIDE TEST STRIPS  USE TO TEST BLOOD GLUCOSE ONE TIME DAILY AS DIRECTED.     blood-glucose meter kit  To check BG 2 times daily, to use with insurance preferred meter. Medical necessity.     clopidogreL 75 mg tablet  Commonly known as: PLAVIX  Take 1 tablet by mouth once daily     cyanocobalamin 1000 MCG tablet  Commonly known as: VITAMIN B-12  Take 100 mcg by mouth once daily.     diclofenac sodium 1 % Gel  Commonly known as: VOLTAREN  Apply 2 g topically once daily.     doxepin 10 MG capsule  Commonly known as: SINEQUAN  TAKE 1 CAPSULE BY MOUTH EVERY NIGHT AS NEEDED     famotidine 40 MG tablet  Commonly known as: PEPCID  Take 1 tablet (40 mg total) by mouth every evening.     fluticasone propionate 50 mcg/actuation nasal spray  Commonly known as: FLONASE  USE 2 SPRAYS IN EACH NOSTRIL  ONE TIME PER DAY     folic acid 1 MG tablet  Commonly known as: FOLVITE  Take 1 tablet (1,000 mcg total) by mouth once daily.     ipratropium 42 mcg (0.06 %) nasal spray  Commonly known as: ATROVENT  2 sprays by Nasal route 3 (three) times daily.     loratadine 10 mg tablet  Commonly known as: CLARITIN  Take 10 mg by mouth once daily.     LUBRICANT EYE DROPS OPHT  Apply to eye.     metFORMIN 500 MG tablet  Commonly known as: GLUCOPHAGE  TAKE 1 TABLET BY MOUTH TWICE DAILY WITH MEALS     multivit with min-folic acid 200 mcg Chew  Take 1 tablet by mouth once daily.     NARCAN 4 mg/actuation Spry  Generic drug: naloxone  as directed     olopatadine 0.1 % ophthalmic solution  Commonly known as: PATANOL  Place 1 drop into both eyes daily as needed.     ondansetron 4 MG Tbdl  Commonly known as: ZOFRAN-ODT  Take 1 tablet (4 mg total) by mouth every 8 (eight) hours as needed (nausea).     oxyCODONE-acetaminophen 7.5-325 mg per tablet  Commonly known as: PERCOCET  Take 1 tablet by mouth 4 (four) times daily as needed.     pantoprazole 40 MG tablet  Commonly known as: PROTONIX  Take 1 tablet by mouth once daily     polyethylene glycol 236-22.74-6.74 -5.86 gram suspension  Commonly known as: GoLYTELY  AS DIRECTED     pregabalin 150 MG capsule  Commonly known as: LYRICA  Take 1 capsule (150 mg total) by mouth 2 (two) times daily.     PROBIOTIC-10 ORAL  Take by mouth.     rosuvastatin 10 MG tablet  Commonly known as: CRESTOR  TAKE 1 TABLET BY MOUTH ONCE DAILY IN THE EVENING     SALINE NASAL NASL  by Nasal route.     STOOL SOFTENER ORAL  Take by mouth.     TRADJENTA 5 mg Tab tablet  Generic drug: linaGLIPtin  Take 1 tablet by mouth once daily     traZODone 50 MG tablet  Commonly known as: DESYREL  TAKE 1 TABLET BY MOUTH IN THE EVENING - (MAY TAKE AN ADDITIONAL TABLET AS NEEDED)        STOP taking these medications    aspirin 81 MG EC tablet  Commonly known as: ECOTRIN     diltiaZEM 120 MG Cp24  Commonly known as: CARDIZEM CD      meloxicam 7.5 MG tablet  Commonly known as: MOBIC     valsartan-hydrochlorothiazide 160-12.5 mg per tablet  Commonly known as: DIOVAN-HCT            Indwelling Lines/Drains at time of discharge:   Lines/Drains/Airways     None                 Time spent on the discharge of patient: 36 minutes         Vince Mccrary MD  Department of Hospital Medicine  [facility]

## 2023-05-05 NOTE — ANESTHESIA PREPROCEDURE EVALUATION
05/05/2023  Pili Teixeira is a 77 y.o., female.      Pre-op Assessment    I have reviewed the Patient Summary Reports.     I have reviewed the Nursing Notes. I have reviewed the NPO Status.   I have reviewed the Medications.     Review of Systems  Anesthesia Hx:  No problems with previous Anesthesia    Social:  Non-Smoker    EENT/Dental:   TMJ   Otitis Media   Cardiovascular:   Hypertension, well controlled Past MI CAD asymptomatic  hyperlipidemia BONILLA    Pulmonary:   Pneumonia COPD, mild Shortness of breath Sleep Apnea    Renal/:   Chronic Renal Disease renal calculi    Hepatic/GI:   GERD    Musculoskeletal:   Arthritis   Spine Disorders: lumbar Degenerative disease    Neurological:   TIA, CVA, no residual symptoms Neuromuscular Disease,    Endocrine:   Diabetes, well controlled, type 2    Psych:   Psychiatric History          Physical Exam  General: Well nourished, Cooperative, Alert and Oriented    Airway:  Mallampati: II   Mouth Opening: Normal  TM Distance: Normal  Neck ROM: Normal ROM        Anesthesia Plan  Type of Anesthesia, risks & benefits discussed:    Anesthesia Type: Gen ETT, Gen Supraglottic Airway, Gen Natural Airway, MAC  Intra-op Monitoring Plan: Standard ASA Monitors  Post Op Pain Control Plan: multimodal analgesia  Induction:  IV  Airway Plan: Direct, Video and Fiberoptic, Post-Induction  Informed Consent: Informed consent signed with the Patient and all parties understand the risks and agree with anesthesia plan.  All questions answered.   ASA Score: 3  Anesthesia Plan Notes: S/P 1 UPRBC's and feeling better.    Ready For Surgery From Anesthesia Perspective.     .

## 2023-05-05 NOTE — TRANSFER OF CARE
"Anesthesia Transfer of Care Note    Patient: Pili Teixeira    Procedure(s) Performed: Procedure(s) (LRB):  EGD (ESOPHAGOGASTRODUODENOSCOPY) (N/A)    Patient location: GI    Anesthesia Type: general    Transport from OR: Transported from OR on room air with adequate spontaneous ventilation    Post pain: adequate analgesia    Post assessment: no apparent anesthetic complications    Post vital signs: stable    Level of consciousness: sedated    Nausea/Vomiting: no nausea/vomiting    Complications: none    Transfer of care protocol was followed      Last vitals:   Visit Vitals  BP (!) 122/59 (BP Location: Left arm, Patient Position: Lying)   Pulse 69   Temp 36.6 °C (97.9 °F) (Skin)   Resp 16   Ht 5' 3" (1.6 m)   Wt 60.4 kg (133 lb 2.5 oz)   SpO2 100%   BMI 23.59 kg/m²     "

## 2023-05-05 NOTE — CARE UPDATE
05/05/23 0738   Patient Assessment/Suction   Level of Consciousness (AVPU) alert   Respiratory Effort Unlabored   Expansion/Accessory Muscles/Retractions no use of accessory muscles;no retractions;expansion symmetric   All Lung Fields Breath Sounds Anterior:;Lateral:;clear   Rhythm/Pattern, Respiratory unlabored;pattern regular;depth regular;no shortness of breath reported   Cough Frequency no cough   PRE-TX-O2   Device (Oxygen Therapy) room air   SpO2 99 %   Pulse Oximetry Type Intermittent   $ Pulse Oximetry - Multiple Charge Pulse Oximetry - Multiple   Pulse 65   Resp 16   Aerosol Therapy   $ Aerosol Therapy Charges PRN treatment not required   Respiratory Treatment Status (SVN) PRN treatment not required   Inhaler   $ Inhaler Charges MDI (Metered Dose Inahler) Treatment;Mouth rinsed post treatment   Respiratory Treatment Status (Inhaler) mouth rinsed post treatment;given   Treatment Route (Inhaler) breath hold;mouthpiece   Post Treatment Assessment (Inhaler) breath sounds unchanged   Signs of Intolerance (Inhaler) none   Breath Sounds Post-Respiratory Treatment   Throughout All Fields Post-Treatment All Fields   Throughout All Fields Post-Treatment no change   Post-treatment Heart Rate (beats/min) 65   Post-treatment Resp Rate (breaths/min) 16

## 2023-05-05 NOTE — TELEPHONE ENCOUNTER
Hospital follow up scheduled with pt's daughter.    ----- Message from Desiree Hubbard RN sent at 5/5/2023  2:19 PM CDT -----  Can you please contact this pt to schedule a hospital follow up?

## 2023-05-05 NOTE — TELEPHONE ENCOUNTER
----- Message from Desiree Hubbard RN sent at 5/5/2023  2:17 PM CDT -----  Can you please schedule a hospital follow up for this pt? Thanks!

## 2023-05-05 NOTE — NURSING
Notified provider Geovanna Yanes, NP via secure chat of patient and situation, ISBAR provided, explained that patient is asking for a stool softener stating that she feels like she needs to have a bowel movement, but cannot go.  Provider voiced understanding and placed orders.

## 2023-05-05 NOTE — PLAN OF CARE
Problem: Adult Inpatient Plan of Care  Goal: Plan of Care Review  5/5/2023 0143 by Joanne Dutton RN  Outcome: Ongoing, Progressing  5/5/2023 0142 by Joanne Dutton RN  Outcome: Ongoing, Progressing  Goal: Patient-Specific Goal (Individualized)  5/5/2023 0143 by Joanne Dutton RN  Outcome: Ongoing, Progressing  5/5/2023 0142 by Joanne Dutton RN  Outcome: Ongoing, Progressing  Goal: Absence of Hospital-Acquired Illness or Injury  5/5/2023 0143 by Joanne Dutton RN  Outcome: Ongoing, Progressing  5/5/2023 0142 by Joanne Dutton RN  Outcome: Ongoing, Progressing  Goal: Optimal Comfort and Wellbeing  5/5/2023 0143 by Joanne Dutton RN  Outcome: Ongoing, Progressing  5/5/2023 0142 by Joanne Dutton RN  Outcome: Ongoing, Progressing  Goal: Readiness for Transition of Care  Outcome: Ongoing, Progressing     Problem: Diabetes Comorbidity  Goal: Blood Glucose Level Within Targeted Range  5/5/2023 0143 by Joanne Dutton RN  Outcome: Ongoing, Progressing  5/5/2023 0142 by Joanne Dutton RN  Outcome: Ongoing, Progressing     Problem: Fluid and Electrolyte Imbalance (Acute Kidney Injury/Impairment)  Goal: Fluid and Electrolyte Balance  5/5/2023 0143 by Joanne Dutton RN  Outcome: Ongoing, Progressing  5/5/2023 0142 by Joanne Dutton RN  Outcome: Ongoing, Progressing     Problem: Oral Intake Inadequate (Acute Kidney Injury/Impairment)  Goal: Optimal Nutrition Intake  Outcome: Ongoing, Progressing     Problem: Renal Function Impairment (Acute Kidney Injury/Impairment)  Goal: Effective Renal Function  5/5/2023 0143 by Joanne Dutton RN  Outcome: Ongoing, Progressing  5/5/2023 0142 by Joanne Dutton RN  Outcome: Ongoing, Progressing     Problem: Anemia  Goal: Anemia Symptom Improvement  Outcome: Ongoing, Progressing

## 2023-05-05 NOTE — ASSESSMENT & PLAN NOTE
Symptomatic. No evidence of gross bleeding. Prior EGD and colonoscopy unremarkable. History of diverticulosis. Concern for GIB.   -Hematology consulted  -Transfuse for Hgb < 7; one unit ordered 5/4  -Trend Hgb with CBC  -GI consulted; EGD with push enteroscopy 5/5 showed two angiectasias which were cauterized; follow up in clinic for pill endoscopy  -IV PPI BID  -IV fluids  -Hold DAPT; continue Plavix on discharge

## 2023-05-05 NOTE — SUBJECTIVE & OBJECTIVE
"Interval History: see "Hospital Course"    Review of Systems   Constitutional:  Positive for activity change and fatigue. Negative for chills and fever.   HENT: Negative.     Eyes: Negative.    Respiratory:  Positive for shortness of breath.    Cardiovascular:  Positive for chest pain. Negative for palpitations.   Gastrointestinal:  Positive for abdominal pain. Negative for abdominal distention, diarrhea, nausea and vomiting.   Endocrine: Negative.    Genitourinary:  Positive for flank pain. Negative for dysuria and urgency.   Skin:  Positive for pallor.   Allergic/Immunologic: Positive for immunocompromised state.   Neurological:  Positive for weakness.   Psychiatric/Behavioral: Negative.     Objective:     Vital Signs (Most Recent):  Temp: 97.9 °F (36.6 °C) (05/05/23 0932)  Pulse: 69 (05/05/23 0932)  Resp: 16 (05/05/23 0932)  BP: (!) 122/59 (05/05/23 0932)  SpO2: 100 % (05/05/23 0932)   Vital Signs (24h Range):  Temp:  [97.2 °F (36.2 °C)-98.7 °F (37.1 °C)] 97.9 °F (36.6 °C)  Pulse:  [65-78] 69  Resp:  [16-18] 16  SpO2:  [99 %-100 %] 100 %  BP: ()/(52-62) 122/59     Weight: 60.4 kg (133 lb 2.5 oz)  Body mass index is 23.59 kg/m².    Intake/Output Summary (Last 24 hours) at 5/5/2023 0941  Last data filed at 5/5/2023 0600  Gross per 24 hour   Intake 1726.93 ml   Output 1700 ml   Net 26.93 ml         Physical Exam  Vitals and nursing note reviewed.   Constitutional:       Appearance: She is ill-appearing.   HENT:      Head: Normocephalic and atraumatic.      Right Ear: External ear normal.      Left Ear: External ear normal.      Nose: Nose normal.      Mouth/Throat:      Mouth: Mucous membranes are moist.      Pharynx: Oropharynx is clear.   Eyes:      Extraocular Movements: Extraocular movements intact.      Conjunctiva/sclera: Conjunctivae normal.   Cardiovascular:      Rate and Rhythm: Normal rate and regular rhythm.      Pulses: Normal pulses.      Heart sounds: Normal heart sounds.   Pulmonary:      " Effort: Pulmonary effort is normal.      Breath sounds: Normal breath sounds.   Abdominal:      General: Bowel sounds are normal. There is no distension.      Palpations: Abdomen is soft.      Tenderness: There is no abdominal tenderness. There is no right CVA tenderness.   Musculoskeletal:         General: Normal range of motion.      Cervical back: Normal range of motion and neck supple.      Right lower leg: No edema.      Left lower leg: No edema.   Skin:     General: Skin is warm and dry.      Coloration: Skin is pale.   Neurological:      Mental Status: She is alert. Mental status is at baseline.   Psychiatric:         Behavior: Behavior normal.           Significant Labs: All pertinent labs within the past 24 hours have been reviewed.    Significant Imaging: I have reviewed all pertinent imaging results/findings within the past 24 hours.

## 2023-05-05 NOTE — PLAN OF CARE
Pt clear for DC from CM standpoint. Discharging home.     05/05/23 1423   Final Note   Assessment Type Final Discharge Note   Anticipated Discharge Disposition Home

## 2023-05-05 NOTE — PROVATION PATIENT INSTRUCTIONS
Discharge Summary/Instructions after an Endoscopic Procedure  Patient Name: Pili Teixeira  Patient MRN: [id number]  Patient YOB: 1945  Friday, May 5, 2023  Noel Rivers MD  Dear patient,  As a result of recent federal legislation (The Federal Cures Act), you may   receive lab or pathology results from your procedure in your [facility]   account before your physician is able to contact you. Your physician or   their representative will relay the results to you with their   recommendations at their soonest availability.  Thank you,  RESTRICTIONS:  During your procedure today, you received medications for sedation.  These   medications may affect your judgment, balance and coordination.  Therefore,   for 24 hours, you have the following restrictions:   - DO NOT drive a car, operate machinery, make legal/financial decisions,   sign important papers or drink alcohol.    ACTIVITY:  Today: no heavy lifting, straining or running due to procedural   sedation/anesthesia.  The following day: return to full activity including work.  DIET:  Eat and drink normally unless instructed otherwise.     TREATMENT FOR COMMON SIDE EFFECTS:  - Mild abdominal pain, nausea, belching, bloating or excessive gas:  rest,   eat lightly and use a heating pad.  - Sore Throat: treat with throat lozenges and/or gargle with warm salt   water.  - Because air was used during the procedure, expelling large amounts of air   from your rectum or belching is normal.  - If a bowel prep was taken, you may not have a bowel movement for 1-3 days.    This is normal.  SYMPTOMS TO WATCH FOR AND REPORT TO YOUR PHYSICIAN:  1. Abdominal pain or bloating, other than gas cramps.  2. Chest pain.  3. Back pain.  4. Signs of infection such as: chills or fever occurring within 24 hours   after the procedure.  5. Rectal bleeding, which would show as bright red, maroon, or black stools.   (A tablespoon of blood from the rectum is not serious, especially if    hemorrhoids are present.)  6. Vomiting.  7. Weakness or dizziness.  GO DIRECTLY TO THE NEAREST EMERGENCY ROOM IF YOU HAVE ANY OF THE FOLLOWING:      Difficulty breathing              Chills and/or fever over 101 F   Persistent vomiting and/or vomiting blood   Severe abdominal pain   Severe chest pain   Black, tarry stools   Bleeding- more than one tablespoon   Any other symptom or condition that you feel may need urgent attention  Your doctor recommends these additional instructions:  If any biopsies were taken, your doctors clinic will contact you in 1 to 2   weeks with any results.  - [facility] patient to [facility] for [facility] care.   - Resume previous diet.   - Continue present medications.   - No aspirin, ibuprofen, naproxen, or other non-steroidal anti-inflammatory   drugs.   - Resume Plavix (clopidogrel) at prior dose today.   - Repeat upper endoscopy PRN for retreatment.   - To visualize the small bowel, perform video capsule endoscopy at   appointment to be scheduled.  For questions, problems or results please call your physician - Noel Rivers MD at Work:  [phone].  [facility] RO[facility] PHONE NUMBER: [phone]  IF A COMPLICATION OR EMERGENCY SITUATION ARISES AND YOU ARE UNABLE TO REACH   YOUR PHYSICIAN - GO DIRECTLY TO THE EMERGENCY ROOM.  Noel Rivers MD  5/5/2023 10:14:10 AM  This report has been verified and signed electronically.  Dear patient,  As a result of recent federal legislation (The Federal Cures Act), you may   receive lab or pathology results from your procedure in your [facility]   account before your physician is able to contact you. Your physician or   their representative will relay the results to you with their   recommendations at their soonest availability.  Thank you,  PROVATION

## 2023-05-05 NOTE — PROGRESS NOTES
EGD with push enteroscopy done.  Two small oozing angioectasias noted and treated with cautery.  Resume diet and OK to discharge home.  Will plan for outpatient pillcam.  OK to resume plavix for now.  Will sign off.   Verified Results  (1) TISSUE EXAM [id number] 01:19PM Trevor Perez Order Number: [id number]     Test Name Result Flag Reference   LAB AP CASE REPORT (Report)     Surgical Pathology Report             Case: [id number]                   Authorizing Provider: Cristy Bateman MD     Collected:      10/04/2017 1319        Pathologist:      Maeve Richmond MD      Received:      10/05/2017 1203        Specimen:  Skin, Other, Left hand   LAB AP FINAL DIAGNOSIS (Report)     A  Skin, Left hand, excision:  - Scar and residual basal cell carcinoma (1 6 cm),   superficial/multicentric and nodular    types extending to deep reticular dermis   - No perineural or lymph-vascular invasion identified   - Resection margins (peripheral and deep) are negative for malignancy  - Atypical junctional melanocytic hyperplasia, margins uninvolved  See   Note  - Adjacent hypertrophic and acantholytic actinic keratosis  Interpretation performed at [facility], [address] [place] [address]  Electronically signed by Maeve Richmond MD on 10/12/2017 at 8:50 PM   LAB AP NOTE (Report)     Adjacent to the basal cell carcinoma, there are single severely atypical   junctional melanocytes  No nesting, confluence or pagetoid spread is   appreciated  Margins are uninvolved  As a precursor lesion to melanoma   in-situ, lentigo maligna type is considered, if an atypical pigmented   lesion should remain or recur at this site, excision is recommended  LAB AP SURGICAL ADDITIONAL INFORMATION (Report)     All controls performed with the immunohistochemical stains reported above   reacted appropriately  These tests were developed and their performance   characteristics determined by [facility] or   [facility]  They may not be cleared or approved by the [place]  Food and Drug Administration  The FDA has determined that such clearance   or approval is not necessary   These tests are used for clinical purposes  They should not be regarded as investigational or for research  This   laboratory has been approved by CLIA 88, designated as a high-complexity   laboratory and is qualified to perform these tests  LAB AP GROSS DESCRIPTION (Report)     A  The specimen is received in formalin, labeled with the patient's name   and hospital number, and is designated left hand excision  The specimen   consists of one gray-tan hairbearing unoriented oval-shaped, portion of   skin which measures 2 6 x 2 2 cm with attached underlying soft tissue to a   depth of 0 3 cm  The surface exhibits a gray-tan ill-defined macule which   measures 0 9 x 0 7 cm and comes within 0 6 cm of nearest peripheral   margin  The margin of resection is inked green, and the surface tips are   inked red  The specimen is sectioned revealing tan dense to focally   hemorrhagic cut surfaces  Entirely submitted  Four cassettes  1: Shaved ends of resection, en face  2-4: Central portion sequentially submitted, with focus in cassettes 3, 4    Note: The estimated total formalin fixation time based upon information   provided by the submitting clinician and the standard processing schedule   is under 72 hours   MAS   LAB AP CLINICAL INFORMATION       Order Number: [id number]  [facility] check margins

## 2023-05-09 ENCOUNTER — OFFICE VISIT (OUTPATIENT)
Dept: HEMATOLOGY/ONCOLOGY | Facility: CLINIC | Age: 78
End: 2023-05-09
Payer: MEDICARE

## 2023-05-09 ENCOUNTER — TELEPHONE (OUTPATIENT)
Dept: HEMATOLOGY/ONCOLOGY | Facility: CLINIC | Age: 78
End: 2023-05-09

## 2023-05-09 DIAGNOSIS — D50.9 IRON DEFICIENCY ANEMIA, UNSPECIFIED IRON DEFICIENCY ANEMIA TYPE: Primary | ICD-10-CM

## 2023-05-09 DIAGNOSIS — I25.10 CORONARY ARTERY DISEASE INVOLVING NATIVE CORONARY ARTERY OF NATIVE HEART WITHOUT ANGINA PECTORIS: Primary | ICD-10-CM

## 2023-05-09 PROCEDURE — 1157F PR ADVANCE CARE PLAN OR EQUIV PRESENT IN MEDICAL RECORD: ICD-10-PCS | Mod: CPTII,95,, | Performed by: INTERNAL MEDICINE

## 2023-05-09 PROCEDURE — 99024 PR POST-OP FOLLOW-UP VISIT: ICD-10-PCS | Mod: 95,,, | Performed by: INTERNAL MEDICINE

## 2023-05-09 PROCEDURE — 99024 POSTOP FOLLOW-UP VISIT: CPT | Mod: 95,,, | Performed by: INTERNAL MEDICINE

## 2023-05-09 PROCEDURE — 1157F ADVNC CARE PLAN IN RCRD: CPT | Mod: CPTII,95,, | Performed by: INTERNAL MEDICINE

## 2023-05-09 NOTE — TELEPHONE ENCOUNTER
Spoke to pt and daughter and notifies they had issues with virtual appt today rescheduled appt 05/11/23.

## 2023-05-11 ENCOUNTER — OFFICE VISIT (OUTPATIENT)
Dept: HEMATOLOGY/ONCOLOGY | Facility: CLINIC | Age: 78
End: 2023-05-11
Payer: MEDICARE

## 2023-05-11 ENCOUNTER — TELEPHONE (OUTPATIENT)
Dept: HEMATOLOGY/ONCOLOGY | Facility: CLINIC | Age: 78
End: 2023-05-11

## 2023-05-11 ENCOUNTER — LAB VISIT (OUTPATIENT)
Dept: LAB | Facility: HOSPITAL | Age: 78
End: 2023-05-11
Attending: INTERNAL MEDICINE
Payer: MEDICARE

## 2023-05-11 DIAGNOSIS — D64.9 ANEMIA, UNSPECIFIED TYPE: Primary | ICD-10-CM

## 2023-05-11 DIAGNOSIS — E11.69 HYPERLIPIDEMIA ASSOCIATED WITH TYPE 2 DIABETES MELLITUS: ICD-10-CM

## 2023-05-11 DIAGNOSIS — E78.5 HYPERLIPIDEMIA ASSOCIATED WITH TYPE 2 DIABETES MELLITUS: ICD-10-CM

## 2023-05-11 DIAGNOSIS — D64.9 ANEMIA, UNSPECIFIED TYPE: ICD-10-CM

## 2023-05-11 DIAGNOSIS — D50.0 IRON DEFICIENCY ANEMIA DUE TO CHRONIC BLOOD LOSS: ICD-10-CM

## 2023-05-11 DIAGNOSIS — I25.10 CORONARY ARTERY DISEASE INVOLVING NATIVE CORONARY ARTERY OF NATIVE HEART WITHOUT ANGINA PECTORIS: ICD-10-CM

## 2023-05-11 DIAGNOSIS — J41.0 SIMPLE CHRONIC BRONCHITIS: ICD-10-CM

## 2023-05-11 LAB
ALBUMIN SERPL BCP-MCNC: 3.9 G/DL (ref 3.5–5.2)
ALBUMIN SERPL BCP-MCNC: 3.9 G/DL (ref 3.5–5.2)
ALP SERPL-CCNC: 53 U/L (ref 55–135)
ALP SERPL-CCNC: 53 U/L (ref 55–135)
ALT SERPL W/O P-5'-P-CCNC: 7 U/L (ref 10–44)
ALT SERPL W/O P-5'-P-CCNC: 7 U/L (ref 10–44)
ANION GAP SERPL CALC-SCNC: 12 MMOL/L (ref 8–16)
ANION GAP SERPL CALC-SCNC: 12 MMOL/L (ref 8–16)
AST SERPL-CCNC: 11 U/L (ref 10–40)
AST SERPL-CCNC: 11 U/L (ref 10–40)
BASOPHILS # BLD AUTO: 0.03 K/UL (ref 0–0.2)
BASOPHILS # BLD AUTO: 0.03 K/UL (ref 0–0.2)
BASOPHILS NFR BLD: 0.5 % (ref 0–1.9)
BASOPHILS NFR BLD: 0.5 % (ref 0–1.9)
BILIRUB SERPL-MCNC: 0.3 MG/DL (ref 0.1–1)
BILIRUB SERPL-MCNC: 0.3 MG/DL (ref 0.1–1)
BUN SERPL-MCNC: 23 MG/DL (ref 8–23)
BUN SERPL-MCNC: 23 MG/DL (ref 8–23)
CALCIUM SERPL-MCNC: 10 MG/DL (ref 8.7–10.5)
CALCIUM SERPL-MCNC: 10 MG/DL (ref 8.7–10.5)
CHLORIDE SERPL-SCNC: 106 MMOL/L (ref 95–110)
CHLORIDE SERPL-SCNC: 106 MMOL/L (ref 95–110)
CO2 SERPL-SCNC: 25 MMOL/L (ref 23–29)
CO2 SERPL-SCNC: 25 MMOL/L (ref 23–29)
CREAT SERPL-MCNC: 0.9 MG/DL (ref 0.5–1.4)
CREAT SERPL-MCNC: 0.9 MG/DL (ref 0.5–1.4)
DIFFERENTIAL METHOD: ABNORMAL
DIFFERENTIAL METHOD: ABNORMAL
EOSINOPHIL # BLD AUTO: 0.1 K/UL (ref 0–0.5)
EOSINOPHIL # BLD AUTO: 0.1 K/UL (ref 0–0.5)
EOSINOPHIL NFR BLD: 1.1 % (ref 0–8)
EOSINOPHIL NFR BLD: 1.1 % (ref 0–8)
ERYTHROCYTE [DISTWIDTH] IN BLOOD BY AUTOMATED COUNT: 14.7 % (ref 11.5–14.5)
ERYTHROCYTE [DISTWIDTH] IN BLOOD BY AUTOMATED COUNT: 14.7 % (ref 11.5–14.5)
EST. GFR  (NO RACE VARIABLE): >60 ML/MIN/1.73 M^2
EST. GFR  (NO RACE VARIABLE): >60 ML/MIN/1.73 M^2
FERRITIN SERPL-MCNC: 129 NG/ML (ref 20–300)
FINAL PATHOLOGIC DIAGNOSIS: NORMAL
GLUCOSE SERPL-MCNC: 98 MG/DL (ref 70–110)
GLUCOSE SERPL-MCNC: 98 MG/DL (ref 70–110)
GROSS: NORMAL
HCT VFR BLD AUTO: 29.4 % (ref 37–48.5)
HCT VFR BLD AUTO: 29.4 % (ref 37–48.5)
HGB BLD-MCNC: 9.1 G/DL (ref 12–16)
HGB BLD-MCNC: 9.1 G/DL (ref 12–16)
IMM GRANULOCYTES # BLD AUTO: 0.02 K/UL (ref 0–0.04)
IMM GRANULOCYTES # BLD AUTO: 0.02 K/UL (ref 0–0.04)
IMM GRANULOCYTES NFR BLD AUTO: 0.3 % (ref 0–0.5)
IMM GRANULOCYTES NFR BLD AUTO: 0.3 % (ref 0–0.5)
IRON SERPL-MCNC: 30 UG/DL (ref 30–160)
LYMPHOCYTES # BLD AUTO: 2.8 K/UL (ref 1–4.8)
LYMPHOCYTES # BLD AUTO: 2.8 K/UL (ref 1–4.8)
LYMPHOCYTES NFR BLD: 42.3 % (ref 18–48)
LYMPHOCYTES NFR BLD: 42.3 % (ref 18–48)
Lab: NORMAL
MCH RBC QN AUTO: 28.7 PG (ref 27–31)
MCH RBC QN AUTO: 28.7 PG (ref 27–31)
MCHC RBC AUTO-ENTMCNC: 31 G/DL (ref 32–36)
MCHC RBC AUTO-ENTMCNC: 31 G/DL (ref 32–36)
MCV RBC AUTO: 93 FL (ref 82–98)
MCV RBC AUTO: 93 FL (ref 82–98)
MONOCYTES # BLD AUTO: 0.5 K/UL (ref 0.3–1)
MONOCYTES # BLD AUTO: 0.5 K/UL (ref 0.3–1)
MONOCYTES NFR BLD: 8.3 % (ref 4–15)
MONOCYTES NFR BLD: 8.3 % (ref 4–15)
NEUTROPHILS # BLD AUTO: 3.1 K/UL (ref 1.8–7.7)
NEUTROPHILS # BLD AUTO: 3.1 K/UL (ref 1.8–7.7)
NEUTROPHILS NFR BLD: 47.5 % (ref 38–73)
NEUTROPHILS NFR BLD: 47.5 % (ref 38–73)
NRBC BLD-RTO: 0 /100 WBC
NRBC BLD-RTO: 0 /100 WBC
PLATELET # BLD AUTO: 254 K/UL (ref 150–450)
PLATELET # BLD AUTO: 254 K/UL (ref 150–450)
PMV BLD AUTO: 11.7 FL (ref 9.2–12.9)
PMV BLD AUTO: 11.7 FL (ref 9.2–12.9)
POTASSIUM SERPL-SCNC: 3.6 MMOL/L (ref 3.5–5.1)
POTASSIUM SERPL-SCNC: 3.6 MMOL/L (ref 3.5–5.1)
PROT SERPL-MCNC: 6.9 G/DL (ref 6–8.4)
PROT SERPL-MCNC: 6.9 G/DL (ref 6–8.4)
RBC # BLD AUTO: 3.17 M/UL (ref 4–5.4)
RBC # BLD AUTO: 3.17 M/UL (ref 4–5.4)
SATURATED IRON: 10 % (ref 20–50)
SODIUM SERPL-SCNC: 143 MMOL/L (ref 136–145)
SODIUM SERPL-SCNC: 143 MMOL/L (ref 136–145)
TOTAL IRON BINDING CAPACITY: 293 UG/DL (ref 250–450)
TRANSFERRIN SERPL-MCNC: 198 MG/DL (ref 200–375)
WBC # BLD AUTO: 6.5 K/UL (ref 3.9–12.7)
WBC # BLD AUTO: 6.5 K/UL (ref 3.9–12.7)

## 2023-05-11 PROCEDURE — 82728 ASSAY OF FERRITIN: CPT | Performed by: INTERNAL MEDICINE

## 2023-05-11 PROCEDURE — 99214 PR OFFICE/OUTPT VISIT, EST, LEVL IV, 30-39 MIN: ICD-10-PCS | Mod: 95,,, | Performed by: INTERNAL MEDICINE

## 2023-05-11 PROCEDURE — 99214 OFFICE O/P EST MOD 30 MIN: CPT | Mod: 95,,, | Performed by: INTERNAL MEDICINE

## 2023-05-11 PROCEDURE — 85025 COMPLETE CBC W/AUTO DIFF WBC: CPT | Performed by: INTERNAL MEDICINE

## 2023-05-11 PROCEDURE — 84466 ASSAY OF TRANSFERRIN: CPT | Performed by: INTERNAL MEDICINE

## 2023-05-11 PROCEDURE — 1157F ADVNC CARE PLAN IN RCRD: CPT | Mod: CPTII,95,, | Performed by: INTERNAL MEDICINE

## 2023-05-11 PROCEDURE — 80053 COMPREHEN METABOLIC PANEL: CPT | Performed by: INTERNAL MEDICINE

## 2023-05-11 PROCEDURE — 36415 COLL VENOUS BLD VENIPUNCTURE: CPT | Performed by: INTERNAL MEDICINE

## 2023-05-11 PROCEDURE — 1157F PR ADVANCE CARE PLAN OR EQUIV PRESENT IN MEDICAL RECORD: ICD-10-PCS | Mod: CPTII,95,, | Performed by: INTERNAL MEDICINE

## 2023-05-11 RX ORDER — EPINEPHRINE 0.3 MG/.3ML
0.3 INJECTION SUBCUTANEOUS ONCE AS NEEDED
Status: CANCELLED | OUTPATIENT
Start: 2023-05-11

## 2023-05-11 RX ORDER — DIPHENHYDRAMINE HYDROCHLORIDE 50 MG/ML
50 INJECTION INTRAMUSCULAR; INTRAVENOUS ONCE AS NEEDED
Status: CANCELLED | OUTPATIENT
Start: 2023-05-11

## 2023-05-11 RX ORDER — METHYLPREDNISOLONE SOD SUCC 125 MG
125 VIAL (EA) INJECTION ONCE AS NEEDED
Status: CANCELLED | OUTPATIENT
Start: 2023-05-11

## 2023-05-11 RX ORDER — SODIUM CHLORIDE 0.9 % (FLUSH) 0.9 %
10 SYRINGE (ML) INJECTION
Status: CANCELLED | OUTPATIENT
Start: 2023-05-11

## 2023-05-11 RX ORDER — SODIUM CHLORIDE 9 MG/ML
INJECTION, SOLUTION INTRAVENOUS CONTINUOUS
Status: CANCELLED | OUTPATIENT
Start: 2023-05-11

## 2023-05-11 RX ORDER — HEPARIN 100 UNIT/ML
5 SYRINGE INTRAVENOUS
Status: CANCELLED | OUTPATIENT
Start: 2023-05-11

## 2023-05-11 NOTE — Clinical Note
Cbc stat today and phrev. Needs injectafer 2 doses asap orders in , rechck cbc with vv in 2 weeks ( never mind if iorn is not finished I stillwant to see her with cbc in 2 weeks)

## 2023-05-11 NOTE — PROGRESS NOTES
Subjective:    The patient location is: home  Visit type: Virtual visit with synchronous audio and video  Face-to-face or time spent with patient on the encounter: 20 min  Total time spent on and for  this encounter which includes non face-to-face time preparing to see patient, review of tests, obtaining and or reviewing separately obtained records documenting clinical information in the electronic or other health records, independently interpreting results which is not separately reported ,and communicating results to the patient/family/caregiver and in care coordination and treatment planning/communicating with pharmacy for prescriptions/addressing social needs/arranging follow-up and or referrals :25 min    Each patient I provide medical services by telemedicine is:  (1) informed of the relationship between the physician and patient and the respective role of any other health care provider with respect to management of the patient; and (2) notified that he or she may decline to receive medical services by telemedicine and may withdraw from such care at any time.  This is a video visit therefore some elements of the physical exam such as vital signs, heart sounds are breath sounds are not included and may be included if found in recent clinic notes of other providers assessing same patient. Any symptoms or signs that were visualized were stated by the patient may be included in this note.    Patient ID: Pili Teixeira is a 77 y.o. female.    Chief Complaint:  Evaluation of anemia  HPI   Patient has seen me in 2016 and then was lost to follow-up.  At that time shows following for thrombocytopenia.  Hosiptal stay 5/4/23 with angioectasia cauterised, severe anemia and transfused  Review of Systems    Patient states she feels very tired sleeps a lot   Has lost weight  Had COVID multiple times T3-4 episodes that she can recall.  Since 2000 20 last large amount of weight post coronary and has not gained it back she  feels nauseated.  Has had multiple GI scopes and evaluations.   +chest pain after eating  palpitations dizziness or passing out episodes  Denies headaches blurred vision   Denies abdominal pain   Occasional diarrhea reported   No feet swelling  States sometimes urine may be discolored but that could be from vitamins according to patient   Does not recall seeing blood in urine or stool      Past Medical History:   Diagnosis Date    Allergy     Anemia 2012    with thrombocytopenia; iron deficiency    Arthritis     Cervical spinal stenosis     with neuropathy, chronic neck,back,leg pain    Colon polyp     COPD (chronic obstructive pulmonary disease)     Coronary artery disease     COVID 4/27/2022    COVID-19     Diabetes mellitus     , sugars run 190's per patient    Diastolic dysfunction 7/13/2015    Diverticulitis     Diverticulosis     Hx diverticulitis,still has chronic diarrhea, abd pain    Dyspnea on exertion     sometimes with nausea, fatigue,dizziness, had cardiac cath June 2012, normal    Fibromyalgia     Fracture 2012    right thumb    GERD (gastroesophageal reflux disease)     Feb 2012, Hx H Pylori    Glaucoma     had LAser surgey, on no eye drops    HEARING LOSS     Hemangioma     fatty liver    History of earache     frequent    History of TIA (transient ischemic attack) 5/28/2013    Hyperlipidemia     Hypertension     Hypertension associated with diabetes 3/14/2017    Laryngeal polyp     Myocardial infarction 2006 last    also MI in distant past    REJI (obstructive sleep apnea)     does not use CPAP    Otitis media     Pneumonia due to COVID-19 virus 2/20/2021    Renal stone     Sjogren's syndrome     SOB (shortness of breath) 6/8/2012    2012 [facility] 1. Normal coronary arteries. 2. Normal single renal arteries bilaterally. 3. Diastolic dysfunction.     Stroke     TIA, now on Plavix    TIA (transient ischemic attack)     TMJ disorder     Type II or unspecified type diabetes mellitus without mention of  complication, uncontrolled 5/8/2014    UTI (lower urinary tract infection)     frequent    Venous insufficiency     with Hx blood clot in leg     Past Surgical History:   Procedure Laterality Date    ABDOMINAL SURGERY  2010 x2    expoloratory lap for pelvic cyst,adhesions; partial colonectomy     adhesiolysis   10/11/2012    CARDIAC CATHETERIZATION      no current blockages.    CHOLECYSTECTOMY      COLON SURGERY      r/t diverticuli    COLONOSCOPY  08/2014    repeat in 5 years    COLONOSCOPY N/A 1/7/2020    Procedure: COLONOSCOPY;  Surgeon: Kb Nguyen MD;  Location: [facility] ENDO;  Service: Endoscopy;  Laterality: N/A;    COLONOSCOPY N/A 3/13/2023    Procedure: COLONOSCOPY;  Surgeon: Jarrod Frost MD;  Location: [facility];  Service: Endoscopy;  Laterality: N/A;    ENDOSCOPIC ULTRASOUND OF UPPER GASTROINTESTINAL TRACT N/A 1/13/2021    Procedure: ULTRASOUND, UPPER GI TRACT, ENDOSCOPIC;  Surgeon: Sonido Castellano III, MD;  Location: [facility];  Service: Endoscopy;  Laterality: N/A;    EPIDURAL BLOCK INJECTION  5/15/12    back,neck for pain    ESOPHAGOGASTRODUODENOSCOPY N/A 1/7/2020    Procedure: EGD (ESOPHAGOGASTRODUODENOSCOPY);  Surgeon: Kb Nguyen MD;  Location: [facility];  Service: Endoscopy;  Laterality: N/A;    ESOPHAGOGASTRODUODENOSCOPY N/A 1/13/2021    Procedure: EGD (ESOPHAGOGASTRODUODENOSCOPY);  Surgeon: Sonido Castellano III, MD;  Location: [facility];  Service: Endoscopy;  Laterality: N/A;    ESOPHAGOGASTRODUODENOSCOPY N/A 3/13/2023    Procedure: EGD (ESOPHAGOGASTRODUODENOSCOPY);  Surgeon: Jarrod Frost MD;  Location: [facility];  Service: Endoscopy;  Laterality: N/A;    ESOPHAGOGASTRODUODENOSCOPY N/A 5/5/2023    Procedure: EGD (ESOPHAGOGASTRODUODENOSCOPY);  Surgeon: Noel Caputo MD;  Location: [facility];  Service: Endoscopy;  Laterality: N/A;    EXPLORATORY LAPAROTOMY W/ BOWEL RESECTION  10/11/2012    EYE SURGERY      Laser for glaucoma    EYE SURGERY  May 2012    cataracts     HYSTERECTOMY      LARYNX SURGERY      polypectomy    OOPHORECTOMY      TONSILLECTOMY      with adenoidectomy    UPPER GASTROINTESTINAL ENDOSCOPY  12/2015    VASCULAR SURGERY      laser to varicose vein leg     Family History   Problem Relation Age of Onset    Throat cancer Mother     Cancer Mother     Diabetes Mellitus Mother     Stroke Father     Hypertension Father     Heart disease Father     Diverticulitis Sister     Throat cancer Brother     Cancer Brother     Thyroid disease Daughter     Lupus Daughter     Pancreatic cancer Maternal Aunt 55    Pancreatic cancer Cousin 58    Breast cancer Neg Hx     Ovarian cancer Neg Hx     Colon cancer Neg Hx     Colon polyps Neg Hx     Celiac disease Neg Hx     Cirrhosis Neg Hx     Crohn's disease Neg Hx     Stomach cancer Neg Hx     Ulcerative colitis Neg Hx     Rectal cancer Neg Hx     Esophageal cancer Neg Hx     Inflammatory bowel disease Neg Hx     Rheum arthritis Neg Hx     Psoriasis Neg Hx     Osteoarthritis Neg Hx     Kidney disease Neg Hx     Hyperlipidemia Neg Hx     COPD Neg Hx     Depression Neg Hx     Chronic back pain Neg Hx     Asthma Neg Hx     Alcohol abuse Neg Hx       Social History     Socioeconomic History    Marital status: Significant Other   Tobacco Use    Smoking status: Never    Smokeless tobacco: Never   Substance and Sexual Activity    Alcohol use: No    Drug use: Yes     Types: Oxycodone    Sexual activity: Not Currently     Birth control/protection: Surgical     Comment: hysterectomy     Social Determinants of Health     Financial Resource Strain: Medium Risk    Difficulty of Paying Living Expenses: Somewhat hard   Food Insecurity: Food Insecurity Present    Worried About Running Out of Food in the Last Year: Sometimes true    Ran Out of Food in the Last Year: Never true   Transportation Needs: No Transportation Needs    Lack of Transportation (Medical): No    Lack of Transportation (Non-Medical): No   Physical Activity: Inactive    Days of  Exercise per Week: 0 days    Minutes of Exercise per Session: 0 min   Stress: No Stress Concern Present    Feeling of Stress : Only a little   Social Connections: Moderately Isolated    Frequency of Communication with Friends and Family: More than three times a week    Frequency of Social Gatherings with Friends and Family: More than three times a week    Attends Christianity Services: More than 4 times per year    Active Member of Clubs or Organizations: No    Attends Club or Organization Meetings: Never    Marital Status:    Housing Stability: Low Risk     Unable to Pay for Housing in the Last Year: No    Number of Places Lived in the Last Year: 1    Unstable Housing in the Last Year: No     Review of patient's allergies indicates:   Allergen Reactions    Codeine Other (See Comments)     Chest pain    Clindamycin Other (See Comments)     Difficulty swallowing after taking, feels a something sits in epigastric area     Iodinated contrast media Hives and Rash       Current Outpatient Medications:     albuterol (PROVENTIL/VENTOLIN HFA) 90 mcg/actuation inhaler, Inhale 2 puffs into the lungs every 4 (four) hours as needed for Wheezing or Shortness of Breath. Rescue, Disp: 18 g, Rfl: 0    albuterol-ipratropium (DUO-NEB) 2.5 mg-0.5 mg/3 mL nebulizer solution, Take 3 mLs by nebulization every 4 to 6 hours as needed for Wheezing. Rescue, Disp: , Rfl:     blood sugar diagnostic (ACCU-CHEK GUIDE TEST STRIPS) Strp, USE TO TEST BLOOD GLUCOSE ONE TIME DAILY AS DIRECTED., Disp: 100 each, Rfl: 3    blood-glucose meter kit, To check BG 2 times daily, to use with insurance preferred meter. Medical necessity., Disp: 1 each, Rfl: 0    carboxymethylcellulose sodium (LUBRICANT EYE DROPS OPHT), Apply to eye., Disp: , Rfl:     clopidogreL (PLAVIX) 75 mg tablet, Take 1 tablet by mouth once daily, Disp: 90 tablet, Rfl: 4    cyanocobalamin (VITAMIN B-12) 1000 MCG tablet, Take 100 mcg by mouth once daily., Disp: , Rfl:     diclofenac  sodium 1 % Gel, Apply 2 g topically once daily., Disp: , Rfl:     docusate sodium (STOOL SOFTENER ORAL), Take by mouth., Disp: , Rfl:     doxepin (SINEQUAN) 10 MG capsule, TAKE 1 CAPSULE BY MOUTH EVERY NIGHT AS NEEDED, Disp: , Rfl:     famotidine (PEPCID) 40 MG tablet, Take 1 tablet (40 mg total) by mouth every evening., Disp: 30 tablet, Rfl: 2    fluticasone propionate (FLONASE) 50 mcg/actuation nasal spray, USE 2 SPRAYS IN EACH NOSTRIL ONE TIME PER DAY, Disp: , Rfl:     folic acid (FOLVITE) 1 MG tablet, Take 1 tablet (1,000 mcg total) by mouth once daily., Disp: 30 tablet, Rfl: 5    ipratropium (ATROVENT) 42 mcg (0.06 %) nasal spray, 2 sprays by Nasal route 3 (three) times daily., Disp: 15 mL, Rfl: 3    Lactobac no.41/Bifidobact no.7 (PROBIOTIC-10 ORAL), Take by mouth., Disp: , Rfl:     lancets (ACCU-CHEK SOFTCLIX LANCETS) Misc, Test TWICE DAILY;Twice a day, Disp: 100 each, Rfl: 3    loratadine (CLARITIN) 10 mg tablet, Take 10 mg by mouth once daily., Disp: , Rfl:     metFORMIN (GLUCOPHAGE) 500 MG tablet, TAKE 1 TABLET BY MOUTH TWICE DAILY WITH MEALS, Disp: 180 tablet, Rfl: 0    multivit with min-folic acid 200 mcg Chew, Take 1 tablet by mouth once daily. , Disp: , Rfl:     NARCAN 4 mg/actuation Spry, as directed, Disp: , Rfl:     olopatadine (PATANOL) 0.1 % ophthalmic solution, Place 1 drop into both eyes daily as needed., Disp: , Rfl:     ondansetron (ZOFRAN-ODT) 4 MG TbDL, Take 1 tablet (4 mg total) by mouth every 8 (eight) hours as needed (nausea)., Disp: 30 tablet, Rfl: 1    oxyCODONE-acetaminophen (PERCOCET) 7.5-325 mg per tablet, Take 1 tablet by mouth 4 (four) times daily as needed., Disp: , Rfl:     pantoprazole (PROTONIX) 40 MG tablet, Take 1 tablet by mouth once daily, Disp: 90 tablet, Rfl: 2    polyethylene glycol (GOLYTELY) 236-22.74-6.74 -5.86 gram suspension, AS DIRECTED, Disp: , Rfl:     pregabalin (LYRICA) 150 MG capsule, Take 1 capsule (150 mg total) by mouth 2 (two) times daily., Disp: 60  capsule, Rfl: 2    rosuvastatin (CRESTOR) 10 MG tablet, TAKE 1 TABLET BY MOUTH ONCE DAILY IN THE EVENING, Disp: 90 tablet, Rfl: 1    sodium chloride (SALINE NASAL NASL), by Nasal route., Disp: , Rfl:     TRADJENTA 5 mg Tab tablet, Take 1 tablet by mouth once daily, Disp: 90 tablet, Rfl: 0    traZODone (DESYREL) 50 MG tablet, TAKE 1 TABLET BY MOUTH IN THE EVENING - (MAY TAKE AN ADDITIONAL TABLET AS NEEDED), Disp: 90 tablet, Rfl: 3    Physical Exam    Wt Readings from Last 3 Encounters:   05/03/23 60.4 kg (133 lb 2.5 oz)   04/24/23 61.1 kg (134 lb 11.2 oz)   03/13/23 58.6 kg (129 lb 3 oz)     Temp Readings from Last 3 Encounters:   05/05/23 97.9 °F (36.6 °C) (Skin)   03/13/23 97.2 °F (36.2 °C) (Temporal)   01/12/23 98.1 °F (36.7 °C) (Oral)     BP Readings from Last 3 Encounters:   05/05/23 119/64   04/24/23 120/70   03/13/23 129/60     Pulse Readings from Last 3 Encounters:   05/05/23 66   04/24/23 71   03/13/23 73   .VITAL SIGNS:  as above   GENERAL: appears well-built, well-nourished.  No anxiety, no agitation, and in no distress.  Patient is awake, alert, oriented and cooperative.  HEENT:  Showed no congestion. Trachea is central no obvious icterus or pallor noted no hoarseness. no obvious JVD   NECK:  Supple.  No JVD. No obvious cervical submental or supraclavicular adenopathy.  RS:the visualized portion of  Chest expands well. chest appears symmetric, no audible wheezes.  No dyspnea recognized  ABDOMEN:  abdomen appears undistended.  EXTREMITIES:  Without edema.  NEUROLOGICAL:  The patient is appropriate, higher functions are normal.  No  obvious neurological deficits.  normal judgement normal thought content  No confusion, no speech impediment. Cranial nerves are intact and show no deficit. No gross motor deficits noted   SKIN MUSCULOSKELETAL: no joint or skeletal deformity, no clubbing of nails.  No visible rash ecchymosis or petechiae  Lab Results   Component Value Date    WBC 7.64 05/05/2023    HGB 8.4 (L)  05/05/2023    HCT 26.5 (L) 05/05/2023    MCV 90 05/05/2023     05/05/2023       BMP  Lab Results   Component Value Date     05/05/2023    K 4.1 05/05/2023     05/05/2023    CO2 26 05/05/2023    BUN 16 05/05/2023    CREATININE 0.8 05/05/2023    CALCIUM 9.5 05/05/2023    ANIONGAP 9 05/05/2023    ESTGFRAFRICA 56.8 (A) 04/30/2022    EGFRNONAA 49.2 (A) 04/30/2022     Lab Results   Component Value Date    IRON 27 (L) 05/03/2023    TIBC 296 05/03/2023    FERRITIN 213 05/03/2023         Patient Active Problem List   Diagnosis    GERD (gastroesophageal reflux disease)    REJI (obstructive sleep apnea)    DJD (degenerative joint disease), lumbosacral    Pelvic cyst - left    Night sweats    Iron deficiency anemia    Peritoneal adhesions (postoperative) (postinfection)    History of TIA (transient ischemic attack)    COPD (chronic obstructive pulmonary disease)    Insomnia    Change in bowel habits    Diastolic dysfunction    NICOLAS (iron deficiency anemia)    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    Fibromyalgia    Elevated lipase    Hernia of anterior abdominal wall    Ventral incisional hernia    Right inguinal hernia    Hypertension associated with diabetes    Noncompliance    Hyperlipidemia associated with type 2 diabetes mellitus    Type 2 diabetes mellitus, without long-term current use of insulin    Chronic pain, neck, back, leg on home narcotics    Pulmonary nodule    Mild neurocognitive 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    Adjustment disorder with mixed anxiety and depressed mood    Gait abnormality    Abdominal aortic atherosclerosis    CAD (coronary artery disease)    Glaucoma        Assessment and Plan     Anemia that has been fluctuant for the past several years    NICOLAS this visit hospital stay 5/4/23 with bleeding from angioectasias that were cauterized, has pill study later 5/23   Recheck cbc today post hospital dc  Thrombocytopenia on and off for several years - wnl now  History of interstitial  cystitis patient reports occasional hematuria but is unclear, ua is neg 10/22  History of hypertension associated with diabetes history of Sjogren syndrome and obstructive sleep apnea, dyslipidemia her symptoms may be associated to vasculitis or connective tissue disorder has Rheumatology  appt  urinalysis for hematuria -negative  SPEP, free kappa light chains elevated repeat  B12, 349   no evidence of hemolysis retic count is normal iron studies are low will recheck iron    B12 def:On SL b12   Ptto tell her GI about the post prandial pain going for pill study  See me  2 weeks for cbc checvk  JOAO positive  May need bone marrow biopsy   if anemia doenst improve May need scans for continued weight loss  Patient is off of hypertensive medications her A1c has only been 5.0 because of significant weight loss    Advance Care Planning     5/11/23: vevrified acp docs

## 2023-05-11 NOTE — TELEPHONE ENCOUNTER
Spoke with pt to notify that Dr Huggins has ordered iron and it is authorized. Provided pt with infusion scheduling phone number.

## 2023-05-15 DIAGNOSIS — G47.00 INSOMNIA, UNSPECIFIED TYPE: ICD-10-CM

## 2023-05-15 RX ORDER — TRAZODONE HYDROCHLORIDE 50 MG/1
TABLET ORAL
Qty: 90 TABLET | Refills: 0 | Status: SHIPPED | OUTPATIENT
Start: 2023-05-15 | End: 2024-01-18 | Stop reason: SDUPTHER

## 2023-05-15 NOTE — TELEPHONE ENCOUNTER
No care due was identified.  [facility] Embedded Care Due Messages. Reference number: [id number].   5/15/2023 9:28:31 AM CDT

## 2023-05-18 ENCOUNTER — OUTPATIENT CASE MANAGEMENT (OUTPATIENT)
Dept: ADMINISTRATIVE | Facility: OTHER | Age: 78
End: 2023-05-18
Payer: MEDICARE

## 2023-05-18 DIAGNOSIS — J30.1 SEASONAL ALLERGIC RHINITIS DUE TO POLLEN: ICD-10-CM

## 2023-05-18 DIAGNOSIS — R76.8 RHEUMATOID FACTOR POSITIVE: ICD-10-CM

## 2023-05-18 DIAGNOSIS — M19.90 INFLAMMATORY ARTHRITIS: ICD-10-CM

## 2023-05-18 NOTE — PROGRESS NOTES
Outpatient Care Management  Initial Patient Assessment    Patient: Pili Teixeira  MRN: [id number]  Date of Service: 05/18/2023  Completed by: Jenifer Garcia RN  Referral Date: 05/04/2023  Program: High Risk  Status: Ongoing  Effective Dates: 5/18/2023 - present  Responsible Staff: No information to display        Reason for Visit   Patient presents with    OPCM Enrollment Call    Nursing Assessment       Brief Summary:  Pili Teixeira was referred by Dr. Mccrary for heme positive stool. Patient qualifies for program based on high risk score 77.6%.   Active problem list, medical, surgical and social history reviewed.  Areas of need identified by patient include managing anemia treatment.   Med rec done. Will mail med list. Sent message to PCP about pt needing refills on inhalers.   Takes zyrtec daily  Womens MVI  Zinc   Vitamin D  F/u discussed with patient on or around 6/5/23.     Disability Status  Hearing Difficulty or Deaf: yes  Visual Difficulty or Blind: no  Difficulty Concentrating, Remembering or Making Decisions: no  Communication Difficulty: no  Eating/Swallowing Difficulty: no  Walking or Climbing Stairs Difficulty: no  Dressing/Bathing Difficulty: no  Difficulty Managing Errands Independently: yes  Equipment Currently Used at Home: glucometer; walker, standard  Change in Functional Status Since Onset of Current Illness/Injury: yes

## 2023-05-18 NOTE — LETTER
May 18, 2023    Pili Teixeira  [address] [place] [address]             [facility] [address] [place] [address] Dear Mrs. Teixeira:    Welcome to [facility]s Complex Care Management Program.  It was a pleasure talking with you today.  My name is Jenifer Garcia RN. I look forward to working with you as your .  My goal is to help you function at the healthiest and highest level possible.  You can contact me directly at [phone].    As an [facility] patient with [employer], some of the services we may be able to provide include:      Development of an individualized care plan with a Registered Nurse    Connection with a    Connection with available resources and services     Coordinate communication among your care team members    Provide coaching and education    Help you understand your doctors treatment plan   Help you obtain information about your insurance coverage.     All services provided by [facility]s Complex Care Managers and other care team members are coordinated with and communicated to your primary care team.    As part of your enrollment, you will be receiving education materials and more information about these services in your [facility] account, by phone or through the mail.  If you do not wish to participate or receive information, please contact our office at [phone].      Sincerely,    Jenifer Garcia RN   [facility]   Out-patient RN Complex Care Manager

## 2023-05-19 NOTE — TELEPHONE ENCOUNTER
----- Message from Jenifer Garcia RN sent at 5/18/2023  3:40 PM CDT -----  Good afternoon,     I spoke with this patient today. She does have an appointment scheduled with you on Monday for a follow up.   The patient stated that she needs a refill on Fluticasone nasal spray and Atrovent.   The patient also stated she is not taking the Folic acid that was still listed on her medications but is taking a Women's multivitamin. She is wanting to know if that is sufficient.       Thank you for your assistance,   Jenifer Garcia RN  Outpatient Complex Care Management  [phone]

## 2023-05-19 NOTE — TELEPHONE ENCOUNTER
Care Due:                  Date            Visit Type   Department     Provider  --------------------------------------------------------------------------------                                EP -                              PRIMARY      [facility] FAMILY  Last Visit: 09-      CARE ([facility])   MEDICINE       [facility] FAMILY  Next Visit: 05-      FOLLOW UP    MEDICINE       [facility]                                                            Last  Test          Frequency    Reason                     Performed    Due Date  --------------------------------------------------------------------------------    Lipid Panel.  12 months..  rosuvastatin.............  08- 08-    [facility] Embedded Care Due Messages. Reference number: [id number].   5/18/2023 7:02:19 PM CDT

## 2023-05-22 RX ORDER — IPRATROPIUM BROMIDE 42 UG/1
2 SPRAY, METERED NASAL 3 TIMES DAILY
Qty: 15 ML | Refills: 3 | Status: SHIPPED | OUTPATIENT
Start: 2023-05-22

## 2023-05-22 RX ORDER — FOLIC ACID 1 MG/1
1000 TABLET ORAL DAILY
Qty: 30 TABLET | Refills: 5 | Status: SHIPPED | OUTPATIENT
Start: 2023-05-22 | End: 2023-11-09

## 2023-05-22 RX ORDER — FLUTICASONE PROPIONATE 50 MCG
SPRAY, SUSPENSION (ML) NASAL
Qty: 18.2 ML | Refills: 5 | Status: SHIPPED | OUTPATIENT
Start: 2023-05-22

## 2023-05-23 ENCOUNTER — HOSPITAL ENCOUNTER (OUTPATIENT)
Facility: HOSPITAL | Age: 78
Discharge: HOME | End: 2023-05-23
Attending: INTERNAL MEDICINE | Admitting: INTERNAL MEDICINE
Payer: MEDICARE

## 2023-05-23 DIAGNOSIS — D50.9 IRON DEFICIENCY ANEMIA: ICD-10-CM

## 2023-05-23 PROCEDURE — 25000003 PHARM REV CODE 250: Performed by: INTERNAL MEDICINE

## 2023-05-23 PROCEDURE — 91110 GI TRC IMG INTRAL ESOPH-ILE: CPT | Mod: TC | Performed by: INTERNAL MEDICINE

## 2023-05-23 RX ORDER — DEXTROMETHORPHAN/PSEUDOEPHED 2.5-7.5/.8
40 DROPS ORAL ONCE
Status: COMPLETED | OUTPATIENT
Start: 2023-05-23 | End: 2023-05-23

## 2023-05-23 RX ADMIN — Medication 40 MG: at 07:05

## 2023-05-23 NOTE — DISCHARGE INSTRUCTIONS
Capsule Discharge Instructions     You have just swallowed a capsule endoscope.  This contains information about what to expect over the next 8 hours.  Please call our office if you have severe or persistent abdominal or chest pain, fever, difficulty swallowing or if you have any questions.  Our phone number is [phone].    Time Capsule ingested:_______________    You may drink clear liquids (water, apple juice) 4 hours after swallowing the capsule.  You may eat a light meal 6 hours after swallowing the capsule.  Medications may be resumed at 6 hours after swallowing the capsule.  Do not exercise, avoid heavy lifting.  You may walk, sit and lay down.  You can drive a car.  You may return to work, if you work allows avoiding unsuitable environments and /or physical movements.  Avoid going near MRI machines and radio transmitters.  You may use a computer, cell phone, radio or stereo.  Do not stand directly next to another person undergoing capsule endoscopy.  Try not to touch the recorder or the sensor array leads.  Do not remove the leads before 8 hours.  Avoid getting the data recorder or sensor array leads wet.  You may loosen the belt to allow yourself to go to the bathroom.  Do not take the belt off until the 8 hours have passed.  Observe the LED light on the data recorder at least every 15 minutes.  If the light stops blinking, document the time and call our office.  Return the unit to the hospital at the completion of 8 hour time frame.    May have clear liquids at ______________    May have light snack and medications at ______________    May remove the unit at ______________    Return the unit to Registration Desk at the completion of the study.    Post Capsule Instructions     This information is what to expect over the next 2 days.  Please call us or your doctor if you have severe or persistent abdominal or chest pain, fever, difficulty swallowing, or if you have any questions.  Our phone number is  [phone].    Pain:  Pain is uncommon following capsule endoscopy.  Should you feel sharp or persistent pain, please call your doctor's office.  Nausea:  Nausea is also very uncommon and should it occur, please notify your doctor's office  Diet:  You may eat and drink with no restrictions 8 hours after ingesting capsule.  Activities:  Following the exam you may resume normal activities, including exercise.  Medications:  You may resume your medications 6 hours after ingesting the capsule.  Do NOT make up doses you have missed, just resume your normal dosage.  Further Testing:  Until the capsule passes, further testing which includes any type of MRI should be avoided.  If you have any type of MRI examination scheduled in the next 3 days, it should be postponed.  The Capsule:  The capsule passes naturally in a bowel movement typically in about 24 hours.  Most likely, you will be unaware of its passage.  It does not need to be retrieved and can safely be flushed down the toilet.  Occasionally the capsule light will still be flashing when it passes.  Should you be concerned that the capsule didn't pass, in the absence of symptoms; an abdominal x-ray can be obtained after 7 days to confirm its passage.  The  will make a beeping noise when finished.  It will shut off automatically.

## 2023-05-24 ENCOUNTER — LAB VISIT (OUTPATIENT)
Dept: LAB | Facility: HOSPITAL | Age: 78
End: 2023-05-24
Attending: INTERNAL MEDICINE
Payer: MEDICARE

## 2023-05-24 VITALS
HEART RATE: 69 BPM | RESPIRATION RATE: 15 BRPM | DIASTOLIC BLOOD PRESSURE: 55 MMHG | TEMPERATURE: 98 F | SYSTOLIC BLOOD PRESSURE: 115 MMHG | OXYGEN SATURATION: 99 %

## 2023-05-24 DIAGNOSIS — D64.9 ANEMIA, UNSPECIFIED TYPE: ICD-10-CM

## 2023-05-24 LAB
BASOPHILS # BLD AUTO: 0.05 K/UL (ref 0–0.2)
BASOPHILS NFR BLD: 0.8 % (ref 0–1.9)
DIFFERENTIAL METHOD: ABNORMAL
EOSINOPHIL # BLD AUTO: 0.1 K/UL (ref 0–0.5)
EOSINOPHIL NFR BLD: 1.3 % (ref 0–8)
ERYTHROCYTE [DISTWIDTH] IN BLOOD BY AUTOMATED COUNT: 13.5 % (ref 11.5–14.5)
HCT VFR BLD AUTO: 32.2 % (ref 37–48.5)
HGB BLD-MCNC: 10.1 G/DL (ref 12–16)
IMM GRANULOCYTES # BLD AUTO: 0.02 K/UL (ref 0–0.04)
IMM GRANULOCYTES NFR BLD AUTO: 0.3 % (ref 0–0.5)
LYMPHOCYTES # BLD AUTO: 2.6 K/UL (ref 1–4.8)
LYMPHOCYTES NFR BLD: 42.1 % (ref 18–48)
MCH RBC QN AUTO: 28.4 PG (ref 27–31)
MCHC RBC AUTO-ENTMCNC: 31.4 G/DL (ref 32–36)
MCV RBC AUTO: 90 FL (ref 82–98)
MONOCYTES # BLD AUTO: 0.7 K/UL (ref 0.3–1)
MONOCYTES NFR BLD: 11.7 % (ref 4–15)
NEUTROPHILS # BLD AUTO: 2.7 K/UL (ref 1.8–7.7)
NEUTROPHILS NFR BLD: 43.8 % (ref 38–73)
NRBC BLD-RTO: 0 /100 WBC
PLATELET # BLD AUTO: 228 K/UL (ref 150–450)
PMV BLD AUTO: 11.5 FL (ref 9.2–12.9)
RBC # BLD AUTO: 3.56 M/UL (ref 4–5.4)
WBC # BLD AUTO: 6.25 K/UL (ref 3.9–12.7)

## 2023-05-24 PROCEDURE — 85025 COMPLETE CBC W/AUTO DIFF WBC: CPT | Performed by: INTERNAL MEDICINE

## 2023-05-24 PROCEDURE — 36415 COLL VENOUS BLD VENIPUNCTURE: CPT | Performed by: INTERNAL MEDICINE

## 2023-05-29 ENCOUNTER — INFUSION (OUTPATIENT)
Dept: INFUSION THERAPY | Facility: HOSPITAL | Age: 78
End: 2023-05-29
Attending: INTERNAL MEDICINE
Payer: MEDICARE

## 2023-05-29 VITALS
DIASTOLIC BLOOD PRESSURE: 56 MMHG | OXYGEN SATURATION: 100 % | BODY MASS INDEX: 23.45 KG/M2 | WEIGHT: 132.38 LBS | RESPIRATION RATE: 18 BRPM | SYSTOLIC BLOOD PRESSURE: 106 MMHG | HEART RATE: 64 BPM | TEMPERATURE: 97 F

## 2023-05-29 DIAGNOSIS — D50.0 IRON DEFICIENCY ANEMIA DUE TO CHRONIC BLOOD LOSS: Primary | ICD-10-CM

## 2023-05-29 PROCEDURE — 63600175 PHARM REV CODE 636 W HCPCS: Mod: JZ,JG | Performed by: INTERNAL MEDICINE

## 2023-05-29 PROCEDURE — 25000003 PHARM REV CODE 250: Performed by: INTERNAL MEDICINE

## 2023-05-29 PROCEDURE — 96365 THER/PROPH/DIAG IV INF INIT: CPT

## 2023-05-29 PROCEDURE — A4216 STERILE WATER/SALINE, 10 ML: HCPCS | Performed by: INTERNAL MEDICINE

## 2023-05-29 RX ORDER — SODIUM CHLORIDE 9 MG/ML
INJECTION, SOLUTION INTRAVENOUS CONTINUOUS
Status: CANCELLED | OUTPATIENT
Start: 2023-06-05

## 2023-05-29 RX ORDER — SODIUM CHLORIDE 9 MG/ML
INJECTION, SOLUTION INTRAVENOUS CONTINUOUS
Status: DISCONTINUED | OUTPATIENT
Start: 2023-05-29 | End: 2023-05-29 | Stop reason: HOSPADM

## 2023-05-29 RX ORDER — HEPARIN 100 UNIT/ML
5 SYRINGE INTRAVENOUS
Status: CANCELLED | OUTPATIENT
Start: 2023-06-05

## 2023-05-29 RX ORDER — EPINEPHRINE 0.3 MG/.3ML
0.3 INJECTION SUBCUTANEOUS ONCE AS NEEDED
Status: CANCELLED | OUTPATIENT
Start: 2023-06-05

## 2023-05-29 RX ORDER — METHYLPREDNISOLONE SOD SUCC 125 MG
125 VIAL (EA) INJECTION ONCE AS NEEDED
Status: CANCELLED | OUTPATIENT
Start: 2023-06-05

## 2023-05-29 RX ORDER — SODIUM CHLORIDE 0.9 % (FLUSH) 0.9 %
10 SYRINGE (ML) INJECTION
Status: DISCONTINUED | OUTPATIENT
Start: 2023-05-29 | End: 2023-05-29 | Stop reason: HOSPADM

## 2023-05-29 RX ORDER — SODIUM CHLORIDE 0.9 % (FLUSH) 0.9 %
10 SYRINGE (ML) INJECTION
Status: CANCELLED | OUTPATIENT
Start: 2023-06-05

## 2023-05-29 RX ORDER — DIPHENHYDRAMINE HYDROCHLORIDE 50 MG/ML
50 INJECTION INTRAMUSCULAR; INTRAVENOUS ONCE AS NEEDED
Status: CANCELLED | OUTPATIENT
Start: 2023-06-05

## 2023-05-29 RX ADMIN — SODIUM CHLORIDE, PRESERVATIVE FREE 10 ML: 5 INJECTION INTRAVENOUS at 03:05

## 2023-05-29 RX ADMIN — SODIUM CHLORIDE: 0.9 INJECTION, SOLUTION INTRAVENOUS at 02:05

## 2023-05-29 RX ADMIN — FERRIC CARBOXYMALTOSE INJECTION 750 MG: 50 INJECTION, SOLUTION INTRAVENOUS at 02:05

## 2023-05-29 NOTE — PLAN OF CARE
Problem: Anemia  Goal: Anemia Symptom Improvement  Outcome: Ongoing, Progressing  Intervention: Monitor and Manage Anemia  Flowsheets (Taken 5/29/2023 1540)  Oral Nutrition Promotion: rest periods promoted  Fatigue Management:   activity schedule adjusted   frequent rest breaks encouraged   paced activity encouraged   fatigue-related activity identified

## 2023-06-05 ENCOUNTER — INFUSION (OUTPATIENT)
Dept: INFUSION THERAPY | Facility: HOSPITAL | Age: 78
End: 2023-06-05
Attending: INTERNAL MEDICINE
Payer: MEDICARE

## 2023-06-05 VITALS
DIASTOLIC BLOOD PRESSURE: 73 MMHG | OXYGEN SATURATION: 97 % | SYSTOLIC BLOOD PRESSURE: 122 MMHG | RESPIRATION RATE: 17 BRPM | BODY MASS INDEX: 22.44 KG/M2 | TEMPERATURE: 97 F | HEIGHT: 63 IN | WEIGHT: 126.63 LBS | HEART RATE: 66 BPM

## 2023-06-05 DIAGNOSIS — D50.0 IRON DEFICIENCY ANEMIA DUE TO CHRONIC BLOOD LOSS: Primary | ICD-10-CM

## 2023-06-05 PROCEDURE — 96365 THER/PROPH/DIAG IV INF INIT: CPT

## 2023-06-05 PROCEDURE — 63600175 PHARM REV CODE 636 W HCPCS: Mod: JZ,JG | Performed by: INTERNAL MEDICINE

## 2023-06-05 PROCEDURE — 25000003 PHARM REV CODE 250: Performed by: INTERNAL MEDICINE

## 2023-06-05 RX ORDER — SODIUM CHLORIDE 9 MG/ML
INJECTION, SOLUTION INTRAVENOUS CONTINUOUS
Status: DISCONTINUED | OUTPATIENT
Start: 2023-06-05 | End: 2023-06-05 | Stop reason: HOSPADM

## 2023-06-05 RX ORDER — SODIUM CHLORIDE 9 MG/ML
INJECTION, SOLUTION INTRAVENOUS CONTINUOUS
Status: CANCELLED | OUTPATIENT
Start: 2023-06-12

## 2023-06-05 RX ORDER — DIPHENHYDRAMINE HYDROCHLORIDE 50 MG/ML
50 INJECTION INTRAMUSCULAR; INTRAVENOUS ONCE AS NEEDED
OUTPATIENT
Start: 2023-06-12

## 2023-06-05 RX ORDER — SODIUM CHLORIDE 0.9 % (FLUSH) 0.9 %
10 SYRINGE (ML) INJECTION
OUTPATIENT
Start: 2023-06-12

## 2023-06-05 RX ORDER — EPINEPHRINE 0.3 MG/.3ML
0.3 INJECTION SUBCUTANEOUS ONCE AS NEEDED
OUTPATIENT
Start: 2023-06-12

## 2023-06-05 RX ORDER — HEPARIN 100 UNIT/ML
5 SYRINGE INTRAVENOUS
OUTPATIENT
Start: 2023-06-12

## 2023-06-05 RX ORDER — METHYLPREDNISOLONE SOD SUCC 125 MG
125 VIAL (EA) INJECTION ONCE AS NEEDED
OUTPATIENT
Start: 2023-06-12

## 2023-06-05 RX ADMIN — SODIUM CHLORIDE: 0.9 INJECTION, SOLUTION INTRAVENOUS at 02:06

## 2023-06-05 RX ADMIN — FERRIC CARBOXYMALTOSE INJECTION 750 MG: 50 INJECTION, SOLUTION INTRAVENOUS at 02:06

## 2023-06-05 NOTE — PLAN OF CARE
Problem: Anemia  Goal: Anemia Symptom Improvement  Outcome: Met     Problem: Fall Injury Risk  Goal: Absence of Fall and Fall-Related Injury  Outcome: Met     Problem: Fatigue  Goal: Improved Activity Tolerance  Outcome: Met

## 2023-06-06 ENCOUNTER — OUTPATIENT CASE MANAGEMENT (OUTPATIENT)
Dept: ADMINISTRATIVE | Facility: OTHER | Age: 78
End: 2023-06-06
Payer: MEDICARE

## 2023-06-06 NOTE — PROGRESS NOTES
Outpatient Care Management  Plan of Care Follow Up Visit    Patient: Pili Teixeira  MRN: [id number]  Date of Service: 06/06/2023  Completed by: Jenifer Garcia RN  Referral Date: 05/04/2023    Reason for Visit   Patient presents with    OPCM RN Follow Up Call       Brief Summary: Phone contact made with Ms. Teixeira today. We discussed her care plan. Will re-mail info. F/u discussed with patient on or around 6/20/23

## 2023-06-23 ENCOUNTER — PATIENT OUTREACH (OUTPATIENT)
Dept: ADMINISTRATIVE | Facility: HOSPITAL | Age: 78
End: 2023-06-23
Payer: MEDICARE

## 2023-06-23 ENCOUNTER — LAB VISIT (OUTPATIENT)
Dept: LAB | Facility: HOSPITAL | Age: 78
End: 2023-06-23
Attending: INTERNAL MEDICINE
Payer: MEDICARE

## 2023-06-23 DIAGNOSIS — D64.9 ANEMIA, UNSPECIFIED TYPE: ICD-10-CM

## 2023-06-23 LAB
ALBUMIN SERPL BCP-MCNC: 4.3 G/DL (ref 3.5–5.2)
ALP SERPL-CCNC: 71 U/L (ref 55–135)
ALT SERPL W/O P-5'-P-CCNC: 11 U/L (ref 10–44)
ANION GAP SERPL CALC-SCNC: 11 MMOL/L (ref 8–16)
AST SERPL-CCNC: 15 U/L (ref 10–40)
BASOPHILS # BLD AUTO: 0.06 K/UL (ref 0–0.2)
BASOPHILS NFR BLD: 0.7 % (ref 0–1.9)
BILIRUB SERPL-MCNC: 0.2 MG/DL (ref 0.1–1)
BUN SERPL-MCNC: 21 MG/DL (ref 8–23)
CALCIUM SERPL-MCNC: 9.9 MG/DL (ref 8.7–10.5)
CHLORIDE SERPL-SCNC: 102 MMOL/L (ref 95–110)
CO2 SERPL-SCNC: 26 MMOL/L (ref 23–29)
CREAT SERPL-MCNC: 0.9 MG/DL (ref 0.5–1.4)
DIFFERENTIAL METHOD: ABNORMAL
EOSINOPHIL # BLD AUTO: 0.2 K/UL (ref 0–0.5)
EOSINOPHIL NFR BLD: 2 % (ref 0–8)
ERYTHROCYTE [DISTWIDTH] IN BLOOD BY AUTOMATED COUNT: 13.9 % (ref 11.5–14.5)
EST. GFR  (NO RACE VARIABLE): >60 ML/MIN/1.73 M^2
GLUCOSE SERPL-MCNC: 80 MG/DL (ref 70–110)
HCT VFR BLD AUTO: 38 % (ref 37–48.5)
HGB BLD-MCNC: 12 G/DL (ref 12–16)
IMM GRANULOCYTES # BLD AUTO: 0.02 K/UL (ref 0–0.04)
IMM GRANULOCYTES NFR BLD AUTO: 0.2 % (ref 0–0.5)
LYMPHOCYTES # BLD AUTO: 3.3 K/UL (ref 1–4.8)
LYMPHOCYTES NFR BLD: 37.6 % (ref 18–48)
MCH RBC QN AUTO: 28.2 PG (ref 27–31)
MCHC RBC AUTO-ENTMCNC: 31.6 G/DL (ref 32–36)
MCV RBC AUTO: 89 FL (ref 82–98)
MONOCYTES # BLD AUTO: 0.8 K/UL (ref 0.3–1)
MONOCYTES NFR BLD: 9.5 % (ref 4–15)
NEUTROPHILS # BLD AUTO: 4.3 K/UL (ref 1.8–7.7)
NEUTROPHILS NFR BLD: 50 % (ref 38–73)
NRBC BLD-RTO: 0 /100 WBC
PLATELET # BLD AUTO: 193 K/UL (ref 150–450)
PMV BLD AUTO: 13.1 FL (ref 9.2–12.9)
POTASSIUM SERPL-SCNC: 4 MMOL/L (ref 3.5–5.1)
PROT SERPL-MCNC: 7.2 G/DL (ref 6–8.4)
RBC # BLD AUTO: 4.26 M/UL (ref 4–5.4)
SODIUM SERPL-SCNC: 139 MMOL/L (ref 136–145)
WBC # BLD AUTO: 8.67 K/UL (ref 3.9–12.7)

## 2023-06-23 PROCEDURE — 86334 IMMUNOFIX E-PHORESIS SERUM: CPT | Mod: 26,,, | Performed by: PATHOLOGY

## 2023-06-23 PROCEDURE — 84165 PROTEIN E-PHORESIS SERUM: CPT | Mod: 26,,, | Performed by: PATHOLOGY

## 2023-06-23 PROCEDURE — 86334 IMMUNOFIX E-PHORESIS SERUM: CPT | Performed by: INTERNAL MEDICINE

## 2023-06-23 PROCEDURE — 84165 PATHOLOGIST INTERPRETATION SPE: ICD-10-PCS | Mod: 26,,, | Performed by: PATHOLOGY

## 2023-06-23 PROCEDURE — 86334 PATHOLOGIST INTERPRETATION IFE: ICD-10-PCS | Mod: 26,,, | Performed by: PATHOLOGY

## 2023-06-23 PROCEDURE — 83521 IG LIGHT CHAINS FREE EACH: CPT | Mod: 59 | Performed by: INTERNAL MEDICINE

## 2023-06-23 PROCEDURE — 36415 COLL VENOUS BLD VENIPUNCTURE: CPT | Performed by: INTERNAL MEDICINE

## 2023-06-23 PROCEDURE — 84165 PROTEIN E-PHORESIS SERUM: CPT | Performed by: INTERNAL MEDICINE

## 2023-06-23 PROCEDURE — 85025 COMPLETE CBC W/AUTO DIFF WBC: CPT | Performed by: INTERNAL MEDICINE

## 2023-06-23 PROCEDURE — 80053 COMPREHEN METABOLIC PANEL: CPT | Performed by: INTERNAL MEDICINE

## 2023-06-23 NOTE — PROGRESS NOTES
Population Health Chart Review & Patient Outreach Details:     Reason for Outreach Encounter:     []  Non-Compliant Report   [x]  Payor Report ([employer], PHN, BCBS, MSSP, MCIP, UHC, etc.)   []  Pre-Visit Chart Review     Updates Requested / Reviewed:     []  Care Everywhere    []     []  External Sources ([facility], [facility], DIS, etc.)   []  Care Team Updated    Patient Outreach Method:    [x]  Telephone Outreach Completed   [x] Successful   [] Left Voicemail   [] Unable to Contact (wrong number, no voicemail)  []  [employer] Portal Outreach Sent  []  Letter Outreach Mailed  []  Fax Sent for External Records  []  External Records Upload    Health Maintenance Topics Addressed and Outreach Outcomes / Actions Taken:        []      Breast Cancer Screening []  Mammo Scheduled      []  External Records Requested     []  Added Reminder to Complete to Upcoming Primary Care Appt Notes     []  Patient Declined     []  Patient Will Call Back to Schedule     []  Patient Will Schedule with External Provider / Order Routed if Applicable             []       Cervical Cancer Screening []  Pap Scheduled      []  External Records Requested     []  Added Reminder to Complete to Upcoming Primary Care Appt Notes     []  Patient Declined     []  Patient Will Call Back to Schedule     []  Patient Will Schedule with External Provider               []          Colorectal Cancer Screening []  Colonoscopy Case Request or Referral Placed     []  External Records Requested     []  Added Reminder to Complete to Upcoming Primary Care Appt Notes     []  Patient Declined     []  Patient Will Call Back to Schedule     []  Patient Will Schedule with External Provider     []  Fit Kit Mailed (add the SmartPhrase under additional notes)     []  Reminded Patient to Complete Home Test             []      Diabetic Eye Exam []  Eye Camera Scheduled or Optometry Referral Placed     []  External Records Requested     []  Added Reminder to Complete to  Upcoming Primary Care Appt Notes     []  Patient Declined     []  Patient Will Call Back to Schedule     []  Patient Will Schedule with External Provider             []      Blood Pressure Control []  Primary Care Follow Up Visit Scheduled     []  Remote Blood Pressure Reading Captured     []  Added Reminder to Complete to Upcoming Primary Care Appt Notes     []  Patient Declined     []  Patient Will Call Back / Patient Will Send Portal Message with Reading     []  Patient Will Call Back to Schedule Provider Visit             []       HbA1c & Other Labs []  Lab Appt Scheduled for Due Labs     []  Primary Care Follow Up Visit Scheduled      []  Reminded Patient to Complete Home Test     []  Added Reminder to Complete to Upcoming Primary Care Appt Notes     []  Patient Declined     []  Patient Will Call Back to Schedule     []  Patient Will Schedule with External Provider / Order Routed if Applicable           [x]    Schedule Primary Care Appt []  Primary Care Appt Scheduled     []  Patient Declined     [x]  Patient Will Call Back to Schedule     []  Pt Established with External Provider & Updated Care Team             []      Medication Adherence []  Primary Care Appointment Scheduled     []  Added Reminder to Upcoming Primary Care Appt Notes     []  Patient Reminded to  Prescription     []  Patient Declined, Provider Notified if Needed     []  Sent Provider Message to Review and/or Add Exclusion to Problem List             []      Osteoporosis Screening []  DXA Appointment Scheduled     []  External Records Requested     []  Added Reminder to Complete to Upcoming Primary Care Appt Notes     []  Patient Declined     []  Patient Will Call Back to Schedule     []  Patient Will Schedule with External Provider / Order Routed if Applicable     Additional Care Coordinator Notes:         Further Action Needed If Patient Returns Outreach:

## 2023-06-26 LAB
ALBUMIN SERPL ELPH-MCNC: 4.2 G/DL (ref 3.35–5.55)
ALPHA1 GLOB SERPL ELPH-MCNC: 0.27 G/DL (ref 0.17–0.41)
ALPHA2 GLOB SERPL ELPH-MCNC: 0.9 G/DL (ref 0.43–0.99)
B-GLOBULIN SERPL ELPH-MCNC: 0.63 G/DL (ref 0.5–1.1)
GAMMA GLOB SERPL ELPH-MCNC: 0.82 G/DL (ref 0.67–1.58)
INTERPRETATION SERPL IFE-IMP: NORMAL
KAPPA LC SER QL IA: 2.63 MG/DL (ref 0.33–1.94)
KAPPA LC/LAMBDA SER IA: 1.52 (ref 0.26–1.65)
LAMBDA LC SER QL IA: 1.73 MG/DL (ref 0.57–2.63)
PROT SERPL-MCNC: 6.8 G/DL (ref 6–8.4)

## 2023-06-27 LAB
PATHOLOGIST INTERPRETATION IFE: NORMAL
PATHOLOGIST INTERPRETATION SPE: NORMAL

## 2023-06-30 ENCOUNTER — TELEPHONE (OUTPATIENT)
Dept: HEMATOLOGY/ONCOLOGY | Facility: CLINIC | Age: 78
End: 2023-06-30
Payer: MEDICARE

## 2023-06-30 NOTE — TELEPHONE ENCOUNTER
----- Message from Larissa Duque sent at 6/30/2023 10:00 AM CDT -----  Contact: pt  Pt has a virtual appt at 140 pm and does not know how to do it. Her daughter usually does it for her but had to leave. She would like to know if she can do a phone call appt. Otherwise she will need loraine.   [phone]  Please advise thanks     Family/Self

## 2023-06-30 NOTE — TELEPHONE ENCOUNTER
Spoke to pt and rescheduled visit post daughter coming back to [facility], appt 07/21/23.

## 2023-07-05 ENCOUNTER — OUTPATIENT CASE MANAGEMENT (OUTPATIENT)
Dept: ADMINISTRATIVE | Facility: OTHER | Age: 78
End: 2023-07-05
Payer: MEDICARE

## 2023-07-05 DIAGNOSIS — K21.9 GASTROESOPHAGEAL REFLUX DISEASE WITHOUT ESOPHAGITIS: ICD-10-CM

## 2023-07-05 DIAGNOSIS — Z87.898 H/O MOTION SICKNESS: ICD-10-CM

## 2023-07-05 RX ORDER — FAMOTIDINE 40 MG/1
40 TABLET, FILM COATED ORAL NIGHTLY
Qty: 30 TABLET | Refills: 2 | Status: SHIPPED | OUTPATIENT
Start: 2023-07-05 | End: 2023-10-03

## 2023-07-05 RX ORDER — PANTOPRAZOLE SODIUM 40 MG/1
40 TABLET, DELAYED RELEASE ORAL DAILY
Qty: 90 TABLET | Refills: 2 | Status: SHIPPED | OUTPATIENT
Start: 2023-07-05

## 2023-07-05 RX ORDER — ONDANSETRON 4 MG/1
4 TABLET, ORALLY DISINTEGRATING ORAL EVERY 8 HOURS PRN
Qty: 30 TABLET | Refills: 1 | Status: SHIPPED | OUTPATIENT
Start: 2023-07-05

## 2023-07-05 NOTE — TELEPHONE ENCOUNTER
----- Message from Jenifer Garcia RN sent at 7/5/2023 12:45 PM CDT -----  Good afternoon,     I spoke with this patient today.  The patient is needing a refill on her Zofran, Pepcid, and Protonix. The medications can be sent to pharmacy listed on the patient's chart. Ms. Teixeira also stated that she has been getting nauseous every time she eats even with bland foods.   Please call and advise patient if needed.       Thank you for your assistance,   Jenifer Garcia RN  Outpatient Complex Care Management  [phone]

## 2023-07-05 NOTE — PROGRESS NOTES
Outpatient Care Management  Plan of Care Follow Up Visit    Patient: Pili Teixeira  MRN: [id number]  Date of Service: 07/05/2023  Completed by: Jenifer Garcia RN  Referral Date: 05/04/2023    Reason for Visit   Patient presents with    OPCM RN Follow Up Call       Brief Summary: Phone contact made with Ms. Teixeira today. We discussed her care plan. Sent message to PCP requesting refill. F/u discussed with pt on or around 7/19/23.

## 2023-07-05 NOTE — TELEPHONE ENCOUNTER
Care Due:                  Date            Visit Type   Department     Provider  --------------------------------------------------------------------------------                                EP -                              PRIMARY      [facility] FAM[facility]  Last Visit: 09-      CARE ([facility])   MEDICINE       LUÍS GOULD  Next Visit: None Scheduled  None         None Found                                                            Last  Test          Frequency    Reason                     Performed    Due Date  --------------------------------------------------------------------------------    Office Visit  12 months..  TRADJENTA, metFORMIN,      09- 09-                             rosuvastatin, traZODone..    HBA1C.......  6 months...  TRADJENTA, metFORMIN.....  08- 02-    [facility] Embedded Care Due Messages. Reference number: [id number].   7/05/2023 3:19:16 PM CDT

## 2023-07-13 ENCOUNTER — TELEPHONE (OUTPATIENT)
Dept: FAMILY MEDICINE | Facility: CLINIC | Age: 78
End: 2023-07-13
Payer: MEDICARE

## 2023-07-13 NOTE — TELEPHONE ENCOUNTER
----- Message from Mariella Oneil sent at 7/13/2023  9:44 AM CDT -----  Contact: self  Type: Needs Medical Advice  Who Called:  pt  Symptoms (please be specific):  infected eye and [place]    How long has patient had these symptoms:  n/a  Pharmacy name and phone #:  [phone]   Best Call Back Number: [phone]   Additional Information: please call

## 2023-07-14 ENCOUNTER — TELEPHONE (OUTPATIENT)
Dept: FAMILY MEDICINE | Facility: CLINIC | Age: 78
End: 2023-07-14

## 2023-07-14 ENCOUNTER — OFFICE VISIT (OUTPATIENT)
Dept: FAMILY MEDICINE | Facility: CLINIC | Age: 78
End: 2023-07-14
Payer: MEDICARE

## 2023-07-14 VITALS
HEART RATE: 70 BPM | HEIGHT: 63 IN | OXYGEN SATURATION: 95 % | DIASTOLIC BLOOD PRESSURE: 72 MMHG | BODY MASS INDEX: 22.5 KG/M2 | SYSTOLIC BLOOD PRESSURE: 112 MMHG | TEMPERATURE: 97 F | WEIGHT: 127 LBS

## 2023-07-14 DIAGNOSIS — L23.9 ALLERGIC DERMATITIS: Primary | ICD-10-CM

## 2023-07-14 PROCEDURE — 3074F PR MOST RECENT SYSTOLIC BLOOD PRESSURE < 130 MM HG: ICD-10-PCS | Mod: CPTII,S$GLB,, | Performed by: NURSE PRACTITIONER

## 2023-07-14 PROCEDURE — 1160F PR REVIEW ALL MEDS BY PRESCRIBER/CLIN PHARMACIST DOCUMENTED: ICD-10-PCS | Mod: CPTII,S$GLB,, | Performed by: NURSE PRACTITIONER

## 2023-07-14 PROCEDURE — 3074F SYST BP LT 130 MM HG: CPT | Mod: CPTII,S$GLB,, | Performed by: NURSE PRACTITIONER

## 2023-07-14 PROCEDURE — 3078F DIAST BP <80 MM HG: CPT | Mod: CPTII,S$GLB,, | Performed by: NURSE PRACTITIONER

## 2023-07-14 PROCEDURE — 1159F PR MEDICATION LIST DOCUMENTED IN MEDICAL RECORD: ICD-10-PCS | Mod: CPTII,S$GLB,, | Performed by: NURSE PRACTITIONER

## 2023-07-14 PROCEDURE — 1157F PR ADVANCE CARE PLAN OR EQUIV PRESENT IN MEDICAL RECORD: ICD-10-PCS | Mod: CPTII,S$GLB,, | Performed by: NURSE PRACTITIONER

## 2023-07-14 PROCEDURE — 99999 PR PBB SHADOW E&M-EST. PATIENT-LVL V: CPT | Mod: PBBFAC,,, | Performed by: NURSE PRACTITIONER

## 2023-07-14 PROCEDURE — 1101F PT FALLS ASSESS-DOCD LE1/YR: CPT | Mod: CPTII,S$GLB,, | Performed by: NURSE PRACTITIONER

## 2023-07-14 PROCEDURE — 96372 THER/PROPH/DIAG INJ SC/IM: CPT | Mod: S$GLB,,, | Performed by: NURSE PRACTITIONER

## 2023-07-14 PROCEDURE — 1159F MED LIST DOCD IN RCRD: CPT | Mod: CPTII,S$GLB,, | Performed by: NURSE PRACTITIONER

## 2023-07-14 PROCEDURE — 99213 OFFICE O/P EST LOW 20 MIN: CPT | Mod: 25,S$GLB,, | Performed by: NURSE PRACTITIONER

## 2023-07-14 PROCEDURE — 99213 PR OFFICE/OUTPT VISIT, EST, LEVL III, 20-29 MIN: ICD-10-PCS | Mod: 25,S$GLB,, | Performed by: NURSE PRACTITIONER

## 2023-07-14 PROCEDURE — 1126F PR PAIN SEVERITY QUANTIFIED, NO PAIN PRESENT: ICD-10-PCS | Mod: CPTII,S$GLB,, | Performed by: NURSE PRACTITIONER

## 2023-07-14 PROCEDURE — 99999 PR PBB SHADOW E&M-EST. PATIENT-LVL V: ICD-10-PCS | Mod: PBBFAC,,, | Performed by: NURSE PRACTITIONER

## 2023-07-14 PROCEDURE — 3288F PR FALLS RISK ASSESSMENT DOCUMENTED: ICD-10-PCS | Mod: CPTII,S$GLB,, | Performed by: NURSE PRACTITIONER

## 2023-07-14 PROCEDURE — 3288F FALL RISK ASSESSMENT DOCD: CPT | Mod: CPTII,S$GLB,, | Performed by: NURSE PRACTITIONER

## 2023-07-14 PROCEDURE — 96372 PR INJECTION,THERAP/PROPH/DIAG2ST, IM OR SUBCUT: ICD-10-PCS | Mod: S$GLB,,, | Performed by: NURSE PRACTITIONER

## 2023-07-14 PROCEDURE — 3078F PR MOST RECENT DIASTOLIC BLOOD PRESSURE < 80 MM HG: ICD-10-PCS | Mod: CPTII,S$GLB,, | Performed by: NURSE PRACTITIONER

## 2023-07-14 PROCEDURE — 1126F AMNT PAIN NOTED NONE PRSNT: CPT | Mod: CPTII,S$GLB,, | Performed by: NURSE PRACTITIONER

## 2023-07-14 PROCEDURE — 1157F ADVNC CARE PLAN IN RCRD: CPT | Mod: CPTII,S$GLB,, | Performed by: NURSE PRACTITIONER

## 2023-07-14 PROCEDURE — 1101F PR PT FALLS ASSESS DOC 0-1 FALLS W/OUT INJ PAST YR: ICD-10-PCS | Mod: CPTII,S$GLB,, | Performed by: NURSE PRACTITIONER

## 2023-07-14 PROCEDURE — 1160F RVW MEDS BY RX/DR IN RCRD: CPT | Mod: CPTII,S$GLB,, | Performed by: NURSE PRACTITIONER

## 2023-07-14 RX ORDER — CETIRIZINE HYDROCHLORIDE 5 MG/1
5 TABLET ORAL DAILY
Qty: 5 TABLET | Refills: 0 | Status: SHIPPED | OUTPATIENT
Start: 2023-07-14 | End: 2024-01-03

## 2023-07-14 RX ORDER — DEXAMETHASONE SODIUM PHOSPHATE 4 MG/ML
4 INJECTION, SOLUTION INTRA-ARTICULAR; INTRALESIONAL; INTRAMUSCULAR; INTRAVENOUS; SOFT TISSUE
Status: COMPLETED | OUTPATIENT
Start: 2023-07-14 | End: 2023-07-14

## 2023-07-14 RX ORDER — TRIAMCINOLONE ACETONIDE 1 MG/G
OINTMENT TOPICAL 2 TIMES DAILY
Qty: 30 G | Refills: 0 | Status: SHIPPED | OUTPATIENT
Start: 2023-07-14 | End: 2024-01-18

## 2023-07-14 RX ADMIN — DEXAMETHASONE SODIUM PHOSPHATE 4 MG: 4 INJECTION, SOLUTION INTRA-ARTICULAR; INTRALESIONAL; INTRAMUSCULAR; INTRAVENOUS; SOFT TISSUE at 08:07

## 2023-07-14 NOTE — PROGRESS NOTES
Subjective:       Patient ID: Pili Teixeira is a 78 y.o. female.    Chief Complaint: Recurrent Skin Infections    Rash  This is a new problem. Episode onset: 3  days. The problem has been waxing and waning since onset. The affected locations include the scalp, torso, right eye, left eye and left ear. The rash is characterized by itchiness. She was exposed to nothing. Pertinent negatives include no shortness of breath.       Eyebrow tattoo 2 weeks -then itching started   Past Medical History:   Diagnosis Date    Allergy     Anemia 2012    with thrombocytopenia; iron deficiency    Arthritis     Cervical spinal stenosis     with neuropathy, chronic neck,back,leg pain    Colon polyp     COPD (chronic obstructive pulmonary disease)     Coronary artery disease     COVID 4/27/2022    COVID-19     Diabetes mellitus     , sugars run 190's per patient    Diastolic dysfunction 7/13/2015    Diverticulitis     Diverticulosis     Hx diverticulitis,still has chronic diarrhea, abd pain    Dyspnea on exertion     sometimes with nausea, fatigue,dizziness, had cardiac cath June 2012, normal    Fibromyalgia     Fracture 2012    right thumb    GERD (gastroesophageal reflux disease)     Feb 2012, Hx H Pylori    Glaucoma     had LAser surgey, on no eye drops    HEARING LOSS     Hemangioma     fatty liver    History of earache     frequent    History of TIA (transient ischemic attack) 5/28/2013    Hyperlipidemia     Hypertension     Hypertension associated with diabetes 3/14/2017    Laryngeal polyp     Myocardial infarction 2006 last    also MI in distant past    REJI (obstructive sleep apnea)     does not use CPAP    Otitis media     Pneumonia due to COVID-19 virus 2/20/2021    Renal stone     Sjogren's syndrome     SOB (shortness of breath) 6/8/2012    2012 [facility] 1. Normal coronary arteries. 2. Normal single renal arteries bilaterally. 3. Diastolic dysfunction.     Stroke     TIA, now on Plavix    TIA (transient ischemic attack)     TMJ  disorder     Type II or unspecified type diabetes mellitus without mention of complication, uncontrolled 5/8/2014    UTI (lower urinary tract infection)     frequent    Venous insufficiency     with Hx blood clot in leg       Review of patient's allergies indicates:   Allergen Reactions    Codeine Other (See Comments)     Chest pain    Clindamycin Other (See Comments)     Difficulty swallowing after taking, feels a something sits in epigastric area     Iodinated contrast media Hives and Rash         Current Outpatient Medications:     albuterol (PROVENTIL/VENTOLIN HFA) 90 mcg/actuation inhaler, Inhale 2 puffs into the lungs every 4 (four) hours as needed for Wheezing or Shortness of Breath. Rescue, Disp: 18 g, Rfl: 0    albuterol-ipratropium (DUO-NEB) 2.5 mg-0.5 mg/3 mL nebulizer solution, Take 3 mLs by nebulization every 4 to 6 hours as needed for Wheezing. Rescue, Disp: , Rfl:     blood sugar diagnostic (ACCU-CHEK GUIDE TEST STRIPS) Strp, USE TO TEST BLOOD GLUCOSE ONE TIME DAILY AS DIRECTED., Disp: 100 each, Rfl: 3    carboxymethylcellulose sodium (LUBRICANT EYE DROPS OPHT), Apply to eye., Disp: , Rfl:     clopidogreL (PLAVIX) 75 mg tablet, Take 1 tablet by mouth once daily, Disp: 90 tablet, Rfl: 4    cyanocobalamin (VITAMIN B-12) 1000 MCG tablet, Take 100 mcg by mouth once daily., Disp: , Rfl:     diclofenac sodium 1 % Gel, Apply 2 g topically once daily., Disp: , Rfl:     docusate sodium (STOOL SOFTENER ORAL), Take by mouth., Disp: , Rfl:     doxepin (SINEQUAN) 10 MG capsule, TAKE 1 CAPSULE BY MOUTH EVERY NIGHT AS NEEDED, Disp: , Rfl:     famotidine (PEPCID) 40 MG tablet, Take 1 tablet (40 mg total) by mouth every evening., Disp: 30 tablet, Rfl: 2    fluticasone propionate (FLONASE) 50 mcg/actuation nasal spray, USE 2 SPRAYS IN EACH NOSTRIL ONE TIME PER DAY, Disp: 18.2 mL, Rfl: 5    folic acid (FOLVITE) 1 MG tablet, Take 1 tablet (1,000 mcg total) by mouth once daily., Disp: 30 tablet, Rfl: 5    ipratropium  (ATROVENT) 42 mcg (0.06 %) nasal spray, 2 sprays by Each Nostril route 3 (three) times daily., Disp: 15 mL, Rfl: 3    Lactobac no.41/Bifidobact no.7 (PROBIOTIC-10 ORAL), Take by mouth., Disp: , Rfl:     lancets (ACCU-CHEK SOFTCLIX LANCETS) Misc, Test TWICE DAILY;Twice a day, Disp: 100 each, Rfl: 3    metFORMIN (GLUCOPHAGE) 500 MG tablet, TAKE 1 TABLET BY MOUTH TWICE DAILY WITH MEALS, Disp: 180 tablet, Rfl: 0    multivit with min-folic acid 200 mcg Chew, Take 1 tablet by mouth once daily. , Disp: , Rfl:     NARCAN 4 mg/actuation Spry, as directed, Disp: , Rfl:     olopatadine (PATANOL) 0.1 % ophthalmic solution, Place 1 drop into both eyes daily as needed., Disp: , Rfl:     ondansetron (ZOFRAN-ODT) 4 MG TbDL, Take 1 tablet (4 mg total) by mouth every 8 (eight) hours as needed (nausea)., Disp: 30 tablet, Rfl: 1    oxyCODONE-acetaminophen (PERCOCET) 7.5-325 mg per tablet, Take 1 tablet by mouth 4 (four) times daily as needed., Disp: , Rfl:     pantoprazole (PROTONIX) 40 MG tablet, Take 1 tablet (40 mg total) by mouth once daily., Disp: 90 tablet, Rfl: 2    polyethylene glycol (GOLYTELY) 236-22.74-6.74 -5.86 gram suspension, AS DIRECTED, Disp: , Rfl:     pregabalin (LYRICA) 150 MG capsule, Take 1 capsule (150 mg total) by mouth 2 (two) times daily., Disp: 60 capsule, Rfl: 2    rosuvastatin (CRESTOR) 10 MG tablet, TAKE 1 TABLET BY MOUTH ONCE DAILY IN THE EVENING, Disp: 90 tablet, Rfl: 1    sodium chloride (SALINE NASAL NASL), by Nasal route., Disp: , Rfl:     TRADJENTA 5 mg Tab tablet, Take 1 tablet by mouth once daily, Disp: 90 tablet, Rfl: 0    traZODone (DESYREL) 50 MG tablet, TAKE 1 TABLET BY MOUTH IN THE EVENING (MAY  TAKE  AN  ADDITIONAL  TABLET  AS  NEEDED) (Patient taking differently: TAKE 1 TABLET BY MOUTH IN THE EVENING (MAY  TAKE  AN  ADDITIONAL  TABLET  AS  NEEDED)), Disp: 90 tablet, Rfl: 0    blood-glucose meter kit, To check BG 2 times daily, to use with insurance preferred meter. Medical necessity., Disp:  "1 each, Rfl: 0    cetirizine (ZYRTEC) 5 MG tablet, Take 1 tablet (5 mg total) by mouth once daily. for 5 days, Disp: 5 tablet, Rfl: 0    triamcinolone acetonide 0.1% (KENALOG) 0.1 % ointment, Apply topically 2 (two) times daily. for 14 days, Disp: 30 g, Rfl: 0    Current Facility-Administered Medications:     dexAMETHasone injection 4 mg, 4 mg, Intramuscular, 1 time in Clinic/HOD, Stacia Anton NP    Review of Systems   Constitutional:  Negative for unexpected weight change.   HENT:  Negative for trouble swallowing.    Eyes:  Negative for visual disturbance.   Respiratory:  Negative for shortness of breath.    Cardiovascular:  Negative for chest pain, palpitations and leg swelling.   Gastrointestinal:  Negative for blood in stool.   Genitourinary:  Negative for hematuria.   Skin:  Positive for rash.   Allergic/Immunologic: Negative for immunocompromised state.   Neurological:  Negative for headaches.   Hematological:  Does not bruise/bleed easily.   Psychiatric/Behavioral:  Negative for agitation. The patient is not nervous/anxious.      Objective:      /72 (BP Location: Left arm, Patient Position: Sitting, BP Method: Small (Manual))   Pulse 70   Temp 97.1 °F (36.2 °C) (Oral)   Ht 5' 3" (1.6 m)   Wt 57.6 kg (126 lb 15.8 oz)   SpO2 95%   BMI 22.49 kg/m²   Physical Exam  Constitutional:       Appearance: She is well-developed.   HENT:      Head: Normocephalic.   Cardiovascular:      Rate and Rhythm: Normal rate.   Pulmonary:      Effort: Pulmonary effort is normal.   Musculoskeletal:         General: Normal range of motion.   Skin:     General: Skin is warm and dry.   Neurological:      Mental Status: She is alert and oriented to person, place, and time.   Psychiatric:         Behavior: Behavior normal.         Thought Content: Thought content normal.         Judgment: Judgment normal.       Assessment:       1. Allergic dermatitis        Plan:       Allergic dermatitis  -     dexAMETHasone " injection 4 mg  -     cetirizine (ZYRTEC) 5 MG tablet; Take 1 tablet (5 mg total) by mouth once daily. for 5 days  Dispense: 5 tablet; Refill: 0  -     triamcinolone acetonide 0.1% (KENALOG) 0.1 % ointment; Apply topically 2 (two) times daily. for 14 days  Dispense: 30 g; Refill: 0       Time spent with patient: 20 minutes    Patient with be reevaluated in 4 weeks or sooner prn    Greater than 50% of this visit was spent counseling as described in above documentation:Yes

## 2023-07-14 NOTE — TELEPHONE ENCOUNTER
----- Message from Marixa Smith sent at 7/14/2023 11:47 AM CDT -----  Regarding: advice  Contact: Patient  Type: Needs Medical Advice  Who Called:  Patient   Symptoms (please be specific):    How long has patient had these symptoms:    Pharmacy name and phone #:    Best Call Back Number: [phone] (home)     Additional Information: Patient stated that she is waiting on doctor to send over her prescriptions for medications. Patient would like to speak with the nurse or doctor concerning this issue. Patient stated that pharmacy hasn't gotten anything at the moment. Please contact patient to advise. Thanks!

## 2023-07-14 NOTE — PROGRESS NOTES
Identified name and . Administered 4 mg Dexamethasone, IM. Patient tolerated well, aseptic technique maintained. Pain scale 0/10 with injection. No residual bleeding at insertion site noted.

## 2023-07-18 ENCOUNTER — TELEPHONE (OUTPATIENT)
Dept: HEMATOLOGY/ONCOLOGY | Facility: CLINIC | Age: 78
End: 2023-07-18
Payer: MEDICARE

## 2023-07-18 NOTE — TELEPHONE ENCOUNTER
Called pt per request. Appt r/s'd to clinic visit. Pt refused sooner visit with diff provider.    ----- Message from Denise Mercado sent at 7/18/2023  8:07 AM CDT -----  Regarding: Reschedule appt  Contact: Pt  Type:  Sooner Appointment Request    Name of Caller:  Pt  When is the first available appointment?  07/21/23  Symptoms:    Best Call Back Number:  [phone]    Additional Information:  Pt states she will not be able to make her virtual appt 07/21/23 and would like to reschedule it for an in person visit at a different time. Please call patient to advise and schedule. Thanks!

## 2023-07-27 ENCOUNTER — TELEPHONE (OUTPATIENT)
Dept: HEMATOLOGY/ONCOLOGY | Facility: CLINIC | Age: 78
End: 2023-07-27
Payer: MEDICARE

## 2023-07-27 NOTE — TELEPHONE ENCOUNTER
Spoke to pt and notified dr out appt 07/31/23 and will need to reschedule with another provider, appt 08/01/23 @ 10:40 am w/NP.

## 2023-07-29 DIAGNOSIS — Z79.4 TYPE 2 DIABETES MELLITUS WITHOUT COMPLICATION, WITH LONG-TERM CURRENT USE OF INSULIN: ICD-10-CM

## 2023-07-29 DIAGNOSIS — E11.9 TYPE 2 DIABETES MELLITUS WITHOUT COMPLICATION, WITH LONG-TERM CURRENT USE OF INSULIN: ICD-10-CM

## 2023-07-29 DIAGNOSIS — E11.9 TYPE 2 DIABETES MELLITUS WITHOUT COMPLICATION, WITHOUT LONG-TERM CURRENT USE OF INSULIN: Chronic | ICD-10-CM

## 2023-07-29 NOTE — TELEPHONE ENCOUNTER
Care Due:                  Date            Visit Type   Department     Provider  --------------------------------------------------------------------------------                                EP -                              PRIMARY      [facility] FAMILY  Last Visit: 09-      CARE ([facility])   MEDICINE       LUÍS GOULD  Next Visit: None Scheduled  None         None Found                                                            Last  Test          Frequency    Reason                     Performed    Due Date  --------------------------------------------------------------------------------    Lipid Panel.  12 months..  rosuvastatin.............  08- 08-    [facility] Embedded Care Due Messages. Reference number: [id number].   7/29/2023 9:40:27 AM CDT

## 2023-07-30 RX ORDER — METFORMIN HYDROCHLORIDE 500 MG/1
TABLET ORAL
Qty: 180 TABLET | Refills: 0 | Status: SHIPPED | OUTPATIENT
Start: 2023-07-30 | End: 2023-11-09

## 2023-07-30 RX ORDER — LINAGLIPTIN 5 MG/1
TABLET, FILM COATED ORAL
Qty: 90 TABLET | Refills: 0 | Status: SHIPPED | OUTPATIENT
Start: 2023-07-30 | End: 2023-11-09

## 2023-07-30 NOTE — TELEPHONE ENCOUNTER
Refill Routing Note   Medication(s) are not appropriate for processing by [facility] for the following reason(s):      Patient seen in ED/Hospital since LOV with provider  Required labs outdated    [facility] action(s):  Defer Care Due:  Labs due          Extended chart review required: Yes     Appointments  past 12m or future 3m with PCP    Date Provider   Last Visit   9/19/2022 LUÍS Huggins MD   Next Visit   Visit date not found LUÍS Huggins MD   ED visits in past 90 days: 0        Note composed:3:36 AM 07/30/2023

## 2023-08-01 ENCOUNTER — OFFICE VISIT (OUTPATIENT)
Dept: HEMATOLOGY/ONCOLOGY | Facility: CLINIC | Age: 78
End: 2023-08-01
Payer: MEDICARE

## 2023-08-01 VITALS
OXYGEN SATURATION: 100 % | SYSTOLIC BLOOD PRESSURE: 135 MMHG | TEMPERATURE: 97 F | RESPIRATION RATE: 12 BRPM | BODY MASS INDEX: 23.32 KG/M2 | WEIGHT: 131.63 LBS | HEIGHT: 63 IN | HEART RATE: 79 BPM | DIASTOLIC BLOOD PRESSURE: 62 MMHG

## 2023-08-01 DIAGNOSIS — D50.0 IRON DEFICIENCY ANEMIA DUE TO CHRONIC BLOOD LOSS: Primary | ICD-10-CM

## 2023-08-01 PROCEDURE — 3288F FALL RISK ASSESSMENT DOCD: CPT | Mod: CPTII,S$GLB,, | Performed by: NURSE PRACTITIONER

## 2023-08-01 PROCEDURE — 1160F RVW MEDS BY RX/DR IN RCRD: CPT | Mod: CPTII,S$GLB,, | Performed by: NURSE PRACTITIONER

## 2023-08-01 PROCEDURE — 1101F PT FALLS ASSESS-DOCD LE1/YR: CPT | Mod: CPTII,S$GLB,, | Performed by: NURSE PRACTITIONER

## 2023-08-01 PROCEDURE — 1157F ADVNC CARE PLAN IN RCRD: CPT | Mod: CPTII,S$GLB,, | Performed by: NURSE PRACTITIONER

## 2023-08-01 PROCEDURE — 1125F PR PAIN SEVERITY QUANTIFIED, PAIN PRESENT: ICD-10-PCS | Mod: CPTII,S$GLB,, | Performed by: NURSE PRACTITIONER

## 2023-08-01 PROCEDURE — 99213 OFFICE O/P EST LOW 20 MIN: CPT | Mod: S$GLB,,, | Performed by: NURSE PRACTITIONER

## 2023-08-01 PROCEDURE — 3075F PR MOST RECENT SYSTOLIC BLOOD PRESS GE 130-139MM HG: ICD-10-PCS | Mod: CPTII,S$GLB,, | Performed by: NURSE PRACTITIONER

## 2023-08-01 PROCEDURE — 1125F AMNT PAIN NOTED PAIN PRSNT: CPT | Mod: CPTII,S$GLB,, | Performed by: NURSE PRACTITIONER

## 2023-08-01 PROCEDURE — 1101F PR PT FALLS ASSESS DOC 0-1 FALLS W/OUT INJ PAST YR: ICD-10-PCS | Mod: CPTII,S$GLB,, | Performed by: NURSE PRACTITIONER

## 2023-08-01 PROCEDURE — 1157F PR ADVANCE CARE PLAN OR EQUIV PRESENT IN MEDICAL RECORD: ICD-10-PCS | Mod: CPTII,S$GLB,, | Performed by: NURSE PRACTITIONER

## 2023-08-01 PROCEDURE — 1159F MED LIST DOCD IN RCRD: CPT | Mod: CPTII,S$GLB,, | Performed by: NURSE PRACTITIONER

## 2023-08-01 PROCEDURE — 99213 PR OFFICE/OUTPT VISIT, EST, LEVL III, 20-29 MIN: ICD-10-PCS | Mod: S$GLB,,, | Performed by: NURSE PRACTITIONER

## 2023-08-01 PROCEDURE — 99999 PR PBB SHADOW E&M-EST. PATIENT-LVL V: CPT | Mod: PBBFAC,,, | Performed by: NURSE PRACTITIONER

## 2023-08-01 PROCEDURE — 3075F SYST BP GE 130 - 139MM HG: CPT | Mod: CPTII,S$GLB,, | Performed by: NURSE PRACTITIONER

## 2023-08-01 PROCEDURE — 3078F DIAST BP <80 MM HG: CPT | Mod: CPTII,S$GLB,, | Performed by: NURSE PRACTITIONER

## 2023-08-01 PROCEDURE — 3078F PR MOST RECENT DIASTOLIC BLOOD PRESSURE < 80 MM HG: ICD-10-PCS | Mod: CPTII,S$GLB,, | Performed by: NURSE PRACTITIONER

## 2023-08-01 PROCEDURE — 99999 PR PBB SHADOW E&M-EST. PATIENT-LVL V: ICD-10-PCS | Mod: PBBFAC,,, | Performed by: NURSE PRACTITIONER

## 2023-08-01 PROCEDURE — 1160F PR REVIEW ALL MEDS BY PRESCRIBER/CLIN PHARMACIST DOCUMENTED: ICD-10-PCS | Mod: CPTII,S$GLB,, | Performed by: NURSE PRACTITIONER

## 2023-08-01 PROCEDURE — 3288F PR FALLS RISK ASSESSMENT DOCUMENTED: ICD-10-PCS | Mod: CPTII,S$GLB,, | Performed by: NURSE PRACTITIONER

## 2023-08-01 PROCEDURE — 1159F PR MEDICATION LIST DOCUMENTED IN MEDICAL RECORD: ICD-10-PCS | Mod: CPTII,S$GLB,, | Performed by: NURSE PRACTITIONER

## 2023-08-01 NOTE — PROGRESS NOTES
Subjective:      Patient ID: Pili Teixeira is a 78 y.o. female.    Chief Complaint:  Evaluation of anemia  HPI   Patient has seen me in 2016 and then was lost to follow-up.  At that time shows following for thrombocytopenia.  Hosiptal stay 5/4/23 with angioectasia cauterised, severe anemia and transfused    8/1/2023:  She returns today for follow-up.  See review of systems with pertinent finding      Review of Systems   Constitutional: Negative.    HENT: Negative.     Eyes: Negative.    Respiratory: Negative.     Cardiovascular: Negative.    Gastrointestinal: Negative.    Genitourinary: Negative.    Musculoskeletal: Negative.    Skin: Negative.    Neurological:         Positive for episodes of confusion and forgetful   Psychiatric/Behavioral: Negative.       Review of Systems   Constitutional: Negative.    HENT: Negative.     Eyes: Negative.    Respiratory: Negative.     Cardiovascular: Negative.    Gastrointestinal: Negative.    Genitourinary: Negative.    Musculoskeletal: Negative.    Skin: Negative.    Neurological:         Positive for episodes of confusion and forgetful   Endo/Heme/Allergies: Negative.    Psychiatric/Behavioral: Negative.          Past Medical History:   Diagnosis Date    Allergy     Anemia 2012    with thrombocytopenia; iron deficiency    Arthritis     Cervical spinal stenosis     with neuropathy, chronic neck,back,leg pain    Colon polyp     COPD (chronic obstructive pulmonary disease)     Coronary artery disease     COVID 4/27/2022    COVID-19     Diabetes mellitus     , sugars run 190's per patient    Diastolic dysfunction 7/13/2015    Diverticulitis     Diverticulosis     Hx diverticulitis,still has chronic diarrhea, abd pain    Dyspnea on exertion     sometimes with nausea, fatigue,dizziness, had cardiac cath June 2012, normal    Fibromyalgia     Fracture 2012    right thumb    GERD (gastroesophageal reflux disease)     Feb 2012, Hx H Pylori    Glaucoma     had LAser surgey, on no eye  drops    HEARING LOSS     Hemangioma     fatty liver    History of earache     frequent    History of TIA (transient ischemic attack) 5/28/2013    Hyperlipidemia     Hypertension     Hypertension associated with diabetes 3/14/2017    Laryngeal polyp     Myocardial infarction 2006 last    also MI in distant past    REJI (obstructive sleep apnea)     does not use CPAP    Otitis media     Pneumonia due to COVID-19 virus 2/20/2021    Renal stone     Sjogren's syndrome     SOB (shortness of breath) 6/8/2012    [address] [place] [address] 1. Normal coronary arteries. 2. Normal single renal arteries bilaterally. 3. Diastolic dysfunction.     Stroke     TIA, now on Plavix    TIA (transient ischemic attack)     TMJ disorder     Type II or unspecified type diabetes mellitus without mention of complication, uncontrolled 5/8/2014    UTI (lower urinary tract infection)     frequent    Venous insufficiency     with Hx blood clot in leg     Past Surgical History:   Procedure Laterality Date    ABDOMINAL SURGERY  2010 x2    expoloratory lap for pelvic cyst,adhesions; partial colonectomy     adhesiolysis   10/11/2012    CARDIAC CATHETERIZATION      no current blockages.    CHOLECYSTECTOMY      COLON SURGERY      r/t diverticuli    COLONOSCOPY  08/2014    repeat in 5 years    COLONOSCOPY N/A 1/7/2020    Procedure: COLONOSCOPY;  Surgeon: bK Nguyen MD;  Location: [facility];  Service: Endoscopy;  Laterality: N/A;    COLONOSCOPY N/A 3/13/2023    Procedure: COLONOSCOPY;  Surgeon: Jarrod Frost MD;  Location: [facility];  Service: Endoscopy;  Laterality: N/A;    ENDOSCOPIC ULTRASOUND OF UPPER GASTROINTESTINAL TRACT N/A 1/13/2021    Procedure: ULTRASOUND, UPPER GI TRACT, ENDOSCOPIC;  Surgeon: Sonido Castellano III, MD;  Location: [facility] ENDO;  Service: Endoscopy;  Laterality: N/A;    EPIDURAL BLOCK INJECTION  5/15/12    back,neck for pain    ESOPHAGOGASTRODUODENOSCOPY N/A 1/7/2020    Procedure: EGD (ESOPHAGOGASTRODUODENOSCOPY);  Surgeon: Kb CRAWFORD  MD Patrick;  Location: [facility];  Service: Endoscopy;  Laterality: N/A;    ESOPHAGOGASTRODUODENOSCOPY N/A 1/13/2021    Procedure: EGD (ESOPHAGOGASTRODUODENOSCOPY);  Surgeon: Sonido Castellano III, MD;  Location: [facility] ENDO;  Service: Endoscopy;  Laterality: N/A;    ESOPHAGOGASTRODUODENOSCOPY N/A 3/13/2023    Procedure: EGD (ESOPHAGOGASTRODUODENOSCOPY);  Surgeon: Jarrod Frost MD;  Location: [facility];  Service: Endoscopy;  Laterality: N/A;    ESOPHAGOGASTRODUODENOSCOPY N/A 5/5/2023    Procedure: EGD (ESOPHAGOGASTRODUODENOSCOPY);  Surgeon: Noel Caputo MD;  Location: [facility];  Service: Endoscopy;  Laterality: N/A;    EXPLORATORY LAPAROTOMY W/ BOWEL RESECTION  10/11/2012    EYE SURGERY      Laser for glaucoma    EYE SURGERY  May 2012    cataracts    HYSTERECTOMY      INTRALUMINAL GASTROINTESTINAL TRACT IMAGING VIA CAPSULE N/A 5/23/2023    Procedure: IMAGING PROCEDURE, GI TRACT, INTRALUMINAL, VIA CAPSULE;  Surgeon: Noel Caputo MD;  Location: [facility];  Service: Endoscopy;  Laterality: N/A;    LARYNX SURGERY      polypectomy    OOPHORECTOMY      TONSILLECTOMY      with adenoidectomy    UPPER GASTROINTESTINAL ENDOSCOPY  12/2015    VASCULAR SURGERY      laser to varicose vein leg     Family History   Problem Relation Age of Onset    Throat cancer Mother     Cancer Mother     Diabetes Mellitus Mother     Stroke Father     Hypertension Father     Heart disease Father     Diverticulitis Sister     Throat cancer Brother     Cancer Brother     Thyroid disease Daughter     Lupus Daughter     Pancreatic cancer Maternal Aunt 55    Pancreatic cancer Cousin 58    Breast cancer Neg Hx     Ovarian cancer Neg Hx     Colon cancer Neg Hx     Colon polyps Neg Hx     Celiac disease Neg Hx     Cirrhosis Neg Hx     Crohn's disease Neg Hx     Stomach cancer Neg Hx     Ulcerative colitis Neg Hx     Rectal cancer Neg Hx     Esophageal cancer Neg Hx     Inflammatory bowel disease Neg Hx     Rheum arthritis Neg Hx      Psoriasis Neg Hx     Osteoarthritis Neg Hx     Kidney disease Neg Hx     Hyperlipidemia Neg Hx     COPD Neg Hx     Depression Neg Hx     Chronic back pain Neg Hx     Asthma Neg Hx     Alcohol abuse Neg Hx       Social History     Socioeconomic History    Marital status: Significant Other   Tobacco Use    Smoking status: Never    Smokeless tobacco: Never   Substance and Sexual Activity    Alcohol use: No    Drug use: Yes     Types: Oxycodone    Sexual activity: Not Currently     Birth control/protection: Surgical     Comment: hysterectomy     Social Determinants of Health     Financial Resource Strain: Medium Risk (8/1/2022)    Overall Financial Resource Strain (CARDIA)     Difficulty of Paying Living Expenses: Somewhat hard   Food Insecurity: Food Insecurity Present (8/1/2022)    Hunger Vital Sign     Worried About Running Out of Food in the Last Year: Sometimes true     Ran Out of Food in the Last Year: Never true   Transportation Needs: No Transportation Needs (8/1/2022)    PRAPARE - Transportation     Lack of Transportation (Medical): No     Lack of Transportation (Non-Medical): No   Physical Activity: Inactive (8/1/2022)    Exercise Vital Sign     Days of Exercise per Week: 0 days     Minutes of Exercise per Session: 0 min   Stress: No Stress Concern Present (8/1/2022)    [employer] [place] [employer] - Occupational Stress Questionnaire     Feeling of Stress : Only a little   Social Connections: Moderately Isolated (8/1/2022)    Social Connection and Isolation Panel [NHANES]     Frequency of Communication with Friends and Family: More than three times a week     Frequency of Social Gatherings with Friends and Family: More than three times a week     Attends Bahai Services: More than 4 times per year     Active Member of Clubs or Organizations: No     Attends Club or Organization Meetings: Never     Marital Status:    Housing Stability: Low Risk  (8/1/2022)    Housing Stability Vital Sign      Unable to Pay for Housing in the Last Year: No     Number of Places Lived in the Last Year: 1     Unstable Housing in the Last Year: No     Review of patient's allergies indicates:   Allergen Reactions    Codeine Other (See Comments)     Chest pain    Clindamycin Other (See Comments)     Difficulty swallowing after taking, feels a something sits in epigastric area     Iodinated contrast media Hives and Rash       Current Outpatient Medications:     albuterol (PROVENTIL/VENTOLIN HFA) 90 mcg/actuation inhaler, Inhale 2 puffs into the lungs every 4 (four) hours as needed for Wheezing or Shortness of Breath. Rescue, Disp: 18 g, Rfl: 0    albuterol-ipratropium (DUO-NEB) 2.5 mg-0.5 mg/3 mL nebulizer solution, Take 3 mLs by nebulization every 4 to 6 hours as needed for Wheezing. Rescue, Disp: , Rfl:     blood sugar diagnostic (ACCU-CHEK GUIDE TEST STRIPS) Strp, USE TO TEST BLOOD GLUCOSE ONE TIME DAILY AS DIRECTED., Disp: 100 each, Rfl: 3    blood-glucose meter kit, To check BG 2 times daily, to use with insurance preferred meter. Medical necessity., Disp: 1 each, Rfl: 0    carboxymethylcellulose sodium (LUBRICANT EYE DROPS OPHT), Apply to eye., Disp: , Rfl:     cetirizine (ZYRTEC) 5 MG tablet, Take 1 tablet (5 mg total) by mouth once daily. for 5 days, Disp: 5 tablet, Rfl: 0    clopidogreL (PLAVIX) 75 mg tablet, Take 1 tablet by mouth once daily, Disp: 90 tablet, Rfl: 4    cyanocobalamin (VITAMIN B-12) 1000 MCG tablet, Take 100 mcg by mouth once daily., Disp: , Rfl:     diclofenac sodium 1 % Gel, Apply 2 g topically once daily., Disp: , Rfl:     docusate sodium (STOOL SOFTENER ORAL), Take by mouth., Disp: , Rfl:     doxepin (SINEQUAN) 10 MG capsule, TAKE 1 CAPSULE BY MOUTH EVERY NIGHT AS NEEDED, Disp: , Rfl:     famotidine (PEPCID) 40 MG tablet, Take 1 tablet (40 mg total) by mouth every evening., Disp: 30 tablet, Rfl: 2    fluticasone propionate (FLONASE) 50 mcg/actuation nasal spray, USE 2 SPRAYS IN EACH NOSTRIL ONE  TIME PER DAY, Disp: 18.2 mL, Rfl: 5    folic acid (FOLVITE) 1 MG tablet, Take 1 tablet (1,000 mcg total) by mouth once daily., Disp: 30 tablet, Rfl: 5    ipratropium (ATROVENT) 42 mcg (0.06 %) nasal spray, 2 sprays by Each Nostril route 3 (three) times daily., Disp: 15 mL, Rfl: 3    Lactobac no.41/Bifidobact no.7 (PROBIOTIC-10 ORAL), Take by mouth., Disp: , Rfl:     lancets (ACCU-CHEK SOFTCLIX LANCETS) Misc, Test TWICE DAILY;Twice a day, Disp: 100 each, Rfl: 3    metFORMIN (GLUCOPHAGE) 500 MG tablet, TAKE 1 TABLET BY MOUTH TWICE DAILY WITH MEALS, Disp: 180 tablet, Rfl: 0    multivit with min-folic acid 200 mcg Chew, Take 1 tablet by mouth once daily. , Disp: , Rfl:     NARCAN 4 mg/actuation Spry, as directed, Disp: , Rfl:     olopatadine (PATANOL) 0.1 % ophthalmic solution, Place 1 drop into both eyes daily as needed., Disp: , Rfl:     ondansetron (ZOFRAN-ODT) 4 MG TbDL, Take 1 tablet (4 mg total) by mouth every 8 (eight) hours as needed (nausea)., Disp: 30 tablet, Rfl: 1    oxyCODONE-acetaminophen (PERCOCET) 7.5-325 mg per tablet, Take 1 tablet by mouth 4 (four) times daily as needed., Disp: , Rfl:     pantoprazole (PROTONIX) 40 MG tablet, Take 1 tablet (40 mg total) by mouth once daily., Disp: 90 tablet, Rfl: 2    polyethylene glycol (GOLYTELY) 236-22.74-6.74 -5.86 gram suspension, AS DIRECTED, Disp: , Rfl:     pregabalin (LYRICA) 150 MG capsule, Take 1 capsule (150 mg total) by mouth 2 (two) times daily., Disp: 60 capsule, Rfl: 2    rosuvastatin (CRESTOR) 10 MG tablet, TAKE 1 TABLET BY MOUTH ONCE DAILY IN THE EVENING, Disp: 90 tablet, Rfl: 1    sodium chloride (SALINE NASAL NASL), by Nasal route., Disp: , Rfl:     TRADJENTA 5 mg Tab tablet, Take 1 tablet by mouth once daily, Disp: 90 tablet, Rfl: 0    traZODone (DESYREL) 50 MG tablet, TAKE 1 TABLET BY MOUTH IN THE EVENING (MAY  TAKE  AN  ADDITIONAL  TABLET  AS  NEEDED) (Patient taking differently: TAKE 1 TABLET BY MOUTH IN THE EVENING (MAY  TAKE  AN  ADDITIONAL   TABLET  AS  NEEDED)), Disp: 90 tablet, Rfl: 0    triamcinolone acetonide 0.1% (KENALOG) 0.1 % ointment, Apply topically 2 (two) times daily. for 14 days, Disp: 30 g, Rfl: 0    Physical Exam    Wt Readings from Last 3 Encounters:   07/14/23 57.6 kg (126 lb 15.8 oz)   06/05/23 57.4 kg (126 lb 9.6 oz)   05/29/23 60.1 kg (132 lb 6.4 oz)     Temp Readings from Last 3 Encounters:   07/14/23 97.1 °F (36.2 °C) (Oral)   06/05/23 97.2 °F (36.2 °C)   05/29/23 97.3 °F (36.3 °C)     BP Readings from Last 3 Encounters:   07/14/23 112/72   06/05/23 122/73   05/29/23 (!) 106/56     Pulse Readings from Last 3 Encounters:   07/14/23 70   06/05/23 66   05/29/23 64   .VITAL SIGNS:  as above   GENERAL: appears well-built, well-nourished.  No anxiety, no agitation, and in no distress.  Patient is awake, alert, oriented and cooperative.  HEENT:  Showed no congestion. Trachea is central no obvious icterus or pallor noted no hoarseness. no obvious JVD   NECK:  Supple.  No JVD. No obvious cervical submental or supraclavicular adenopathy.  RS:the visualized portion of  Chest expands well. chest appears symmetric, no audible wheezes.  No dyspnea recognized  ABDOMEN:  abdomen appears undistended.  EXTREMITIES:  Without edema.  NEUROLOGICAL:  The patient is appropriate, higher functions are normal.  No  obvious neurological deficits.  normal judgement normal thought content  No confusion, no speech impediment. Cranial nerves are intact and show no deficit. No gross motor deficits noted   SKIN MUSCULOSKELETAL: no joint or skeletal deformity, no clubbing of nails.  No visible rash ecchymosis or petechiae  Lab Results   Component Value Date    WBC 8.67 06/23/2023    HGB 12.0 06/23/2023    HCT 38.0 06/23/2023    MCV 89 06/23/2023     06/23/2023       BMP  Lab Results   Component Value Date     06/23/2023    K 4.0 06/23/2023     06/23/2023    CO2 26 06/23/2023    BUN 21 06/23/2023    CREATININE 0.9 06/23/2023    CALCIUM 9.9  06/23/2023    ANIONGAP 11 06/23/2023    ESTGFRAFRICA 56.8 (A) 04/30/2022    EGFRNONAA 49.2 (A) 04/30/2022     Lab Results   Component Value Date    IRON 30 05/11/2023    TIBC 293 05/11/2023    FERRITIN 129 05/11/2023         Patient Active Problem List   Diagnosis    GERD (gastroesophageal reflux disease)    REJI (obstructive sleep apnea)    DJD (degenerative joint disease), lumbosacral    Pelvic cyst - left    Night sweats    Iron deficiency anemia    Peritoneal adhesions (postoperative) (postinfection)    History of TIA (transient ischemic attack)    COPD (chronic obstructive pulmonary disease)    Insomnia    Change in bowel habits    Diastolic dysfunction    NICOLAS (iron deficiency anemia)    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    Fibromyalgia    Elevated lipase    Hernia of anterior abdominal wall    Ventral incisional hernia    Right inguinal hernia    Hypertension associated with diabetes    Noncompliance    Hyperlipidemia associated with type 2 diabetes mellitus    Type 2 diabetes mellitus, without long-term current use of insulin    Chronic pain, neck, back, leg on home narcotics    Pulmonary nodule    Mild neurocognitive 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    Adjustment disorder with mixed anxiety and depressed mood    Gait abnormality    Abdominal aortic atherosclerosis    CAD (coronary artery disease)    Glaucoma        Assessment and Plan     Iron deficiency anemia:   -resolved after IV iron  -we will continue to monitor and repeat labs in 4 months    Confusion and forgetful:   -offered patient neurology evaluation.  Patient states she has neurologist and will schedule a follow-up

## 2023-08-02 ENCOUNTER — TELEPHONE (OUTPATIENT)
Dept: HEMATOLOGY/ONCOLOGY | Facility: CLINIC | Age: 78
End: 2023-08-02
Payer: MEDICARE

## 2023-08-07 ENCOUNTER — OUTPATIENT CASE MANAGEMENT (OUTPATIENT)
Dept: ADMINISTRATIVE | Facility: OTHER | Age: 78
End: 2023-08-07
Payer: MEDICARE

## 2023-08-07 ENCOUNTER — TELEPHONE (OUTPATIENT)
Dept: FAMILY MEDICINE | Facility: CLINIC | Age: 78
End: 2023-08-07
Payer: MEDICARE

## 2023-08-07 NOTE — PROGRESS NOTES
Outpatient Care Management  Plan of Care Follow Up Visit    Patient: Pili Teixeira  MRN: [id number]  Date of Service: 08/07/2023  Completed by: Jenifer Garcia RN  Referral Date: 05/04/2023    Reason for Visit   Patient presents with    OPCM RN Follow Up Call       Brief Summary: Phone contact made with Ms. Teixeira today. With finished discussing her care plans. Sent message to provider for neruology referral. F/u discussed on or around 8/22/23.  Next Steps: f/u on neuro

## 2023-08-07 NOTE — TELEPHONE ENCOUNTER
----- Message from Jenifer Garcia RN sent at 8/7/2023 11:39 AM CDT -----  Good Morning,     I spoke with this patient today. She had inquired about the doctor she was supposed to follow up with, which was neurology. I reviewed her chart but could not find that she has seen neuro before. If possible, can you place a referral for neurology.  Please call and advise patient.       Thank you for your assistance,   Jenifer Garcia RN  Outpatient Complex Care Management  [phone]

## 2023-08-08 PROCEDURE — 91110 PR GI TRACT CAPSULE ENDOSCOPY: ICD-10-PCS | Mod: 26,,, | Performed by: INTERNAL MEDICINE

## 2023-08-08 PROCEDURE — 91110 GI TRC IMG INTRAL ESOPH-ILE: CPT | Mod: 26,,, | Performed by: INTERNAL MEDICINE

## 2023-08-08 NOTE — [employer]ATION PATIENT INSTRUCTIONS
Discharge Summary/Instructions after an Endoscopic Procedure  Patient Name: Pili Teixeira  Patient MRN: [id number]  Patient YOB: 1945  Tuesday, May 23, 2023  Noel Rivers MD  Dear patient,  As a result of recent federal legislation (The Federal Cures Act), you may   receive lab or pathology results from your procedure in your [facility]   account before your physician is able to contact you. Your physician or   their representative will relay the results to you with their   recommendations at their soonest availability.  Thank you,  RESTRICTIONS:  During your procedure today, you received medications for sedation.  These   medications may affect your judgment, balance and coordination.  Therefore,   for 24 hours, you have the following restrictions:   - DO NOT drive a car, operate machinery, make legal/financial decisions,   sign important papers or drink alcohol.    ACTIVITY:  Today: no heavy lifting, straining or running due to procedural   sedation/anesthesia.  The following day: return to full activity including work.  DIET:  Eat and drink normally unless instructed otherwise.     TREATMENT FOR COMMON SIDE EFFECTS:  - Mild abdominal pain, nausea, belching, bloating or excessive gas:  rest,   eat lightly and use a heating pad.  - Sore Throat: treat with throat lozenges and/or gargle with warm salt   water.  - Because air was used during the procedure, expelling large amounts of air   from your rectum or belching is normal.  - If a bowel prep was taken, you may not have a bowel movement for 1-3 days.    This is normal.  SYMPTOMS TO WATCH FOR AND REPORT TO YOUR PHYSICIAN:  1. Abdominal pain or bloating, other than gas cramps.  2. Chest pain.  3. Back pain.  4. Signs of infection such as: chills or fever occurring within 24 hours   after the procedure.  5. Rectal bleeding, which would show as bright red, maroon, or black stools.   (A tablespoon of blood from the rectum is not serious, especially if    hemorrhoids are present.)  6. Vomiting.  7. Weakness or dizziness.  GO DIRECTLY TO THE NEAREST EMERGENCY ROOM IF YOU HAVE ANY OF THE FOLLOWING:      Difficulty breathing              Chills and/or fever over 101 F   Persistent vomiting and/or vomiting blood   Severe abdominal pain   Severe chest pain   Black, tarry stools   Bleeding- more than one tablespoon   Any other symptom or condition that you feel may need urgent attention  Your doctor recommends these additional instructions:  If any biopsies were taken, your doctors clinic will contact you in 1 to 2   weeks with any results.  1.  Iron supplementation PRN  2.  Follow up with PCP  3.  No further GI endoscopy at this time unless overt bleeding occurs  4.  Follow up in GI office in 3-6 months.  For questions, problems or results please call your physician - Noel Rivers MD at Work:  [phone].  [facility], EMERGENCY ROOM PHONE NUMBER: [phone]  IF A COMPLICATION OR EMERGENCY SITUATION ARISES AND YOU ARE UNABLE TO REACH   YOUR PHYSICIAN - GO DIRECTLY TO THE EMERGENCY ROOM.  Noel Rivers MD  8/8/2023 12:49:32 PM  This report has been verified and signed electronically.  Dear patient,  As a result of recent federal legislation (The Federal Cures Act), you may   receive lab or pathology results from your procedure in your [facility]   account before your physician is able to contact you. Your physician or   their representative will relay the results to you with their   recommendations at their soonest availability.  Thank you,  [employer]

## 2023-08-09 NOTE — TELEPHONE ENCOUNTER
Called patient to see why she needs to see neurology. No answer , left voicemail to return call.    Diabetes

## 2023-08-09 NOTE — PROGRESS NOTES
Please advise patient that I am not sure why communication on this result did not come to her when the study was done.  Study only showed a small nonbleeding angioectasia (malformed vessel) in the small bowel which may be the source of her anemia.  Have her follow up in office to review symptoms and get repeat CBC and iron studies.  If anemia worse or if she sees visible blood, then we would refer her to a small bowel specialist.  Otherwise, recommend just monitoring.

## 2023-08-18 ENCOUNTER — OFFICE VISIT (OUTPATIENT)
Dept: ENDOCRINOLOGY | Facility: CLINIC | Age: 78
End: 2023-08-18
Payer: MEDICARE

## 2023-08-18 VITALS
SYSTOLIC BLOOD PRESSURE: 120 MMHG | TEMPERATURE: 98 F | BODY MASS INDEX: 23.4 KG/M2 | HEART RATE: 76 BPM | DIASTOLIC BLOOD PRESSURE: 82 MMHG | HEIGHT: 63 IN | WEIGHT: 132.06 LBS | OXYGEN SATURATION: 98 %

## 2023-08-18 DIAGNOSIS — E04.2 MULTIPLE THYROID NODULES: ICD-10-CM

## 2023-08-18 DIAGNOSIS — E11.65 TYPE 2 DIABETES MELLITUS WITH HYPERGLYCEMIA, WITHOUT LONG-TERM CURRENT USE OF INSULIN: Primary | ICD-10-CM

## 2023-08-18 PROCEDURE — 3288F PR FALLS RISK ASSESSMENT DOCUMENTED: ICD-10-PCS | Mod: CPTII,S$GLB,, | Performed by: PHYSICIAN ASSISTANT

## 2023-08-18 PROCEDURE — 99214 OFFICE O/P EST MOD 30 MIN: CPT | Mod: S$GLB,,, | Performed by: PHYSICIAN ASSISTANT

## 2023-08-18 PROCEDURE — 1125F PR PAIN SEVERITY QUANTIFIED, PAIN PRESENT: ICD-10-PCS | Mod: CPTII,S$GLB,, | Performed by: PHYSICIAN ASSISTANT

## 2023-08-18 PROCEDURE — 1100F PR PT FALLS ASSESS DOC 2+ FALLS/FALL W/INJURY/YR: ICD-10-PCS | Mod: CPTII,S$GLB,, | Performed by: PHYSICIAN ASSISTANT

## 2023-08-18 PROCEDURE — 3079F DIAST BP 80-89 MM HG: CPT | Mod: CPTII,S$GLB,, | Performed by: PHYSICIAN ASSISTANT

## 2023-08-18 PROCEDURE — 99999 PR PBB SHADOW E&M-EST. PATIENT-LVL V: ICD-10-PCS | Mod: PBBFAC,,, | Performed by: PHYSICIAN ASSISTANT

## 2023-08-18 PROCEDURE — 1125F AMNT PAIN NOTED PAIN PRSNT: CPT | Mod: CPTII,S$GLB,, | Performed by: PHYSICIAN ASSISTANT

## 2023-08-18 PROCEDURE — 1160F PR REVIEW ALL MEDS BY PRESCRIBER/CLIN PHARMACIST DOCUMENTED: ICD-10-PCS | Mod: CPTII,S$GLB,, | Performed by: PHYSICIAN ASSISTANT

## 2023-08-18 PROCEDURE — 1157F PR ADVANCE CARE PLAN OR EQUIV PRESENT IN MEDICAL RECORD: ICD-10-PCS | Mod: CPTII,S$GLB,, | Performed by: PHYSICIAN ASSISTANT

## 2023-08-18 PROCEDURE — 1100F PTFALLS ASSESS-DOCD GE2>/YR: CPT | Mod: CPTII,S$GLB,, | Performed by: PHYSICIAN ASSISTANT

## 2023-08-18 PROCEDURE — 3079F PR MOST RECENT DIASTOLIC BLOOD PRESSURE 80-89 MM HG: ICD-10-PCS | Mod: CPTII,S$GLB,, | Performed by: PHYSICIAN ASSISTANT

## 2023-08-18 PROCEDURE — 99214 PR OFFICE/OUTPT VISIT, EST, LEVL IV, 30-39 MIN: ICD-10-PCS | Mod: S$GLB,,, | Performed by: PHYSICIAN ASSISTANT

## 2023-08-18 PROCEDURE — 1159F PR MEDICATION LIST DOCUMENTED IN MEDICAL RECORD: ICD-10-PCS | Mod: CPTII,S$GLB,, | Performed by: PHYSICIAN ASSISTANT

## 2023-08-18 PROCEDURE — 3074F SYST BP LT 130 MM HG: CPT | Mod: CPTII,S$GLB,, | Performed by: PHYSICIAN ASSISTANT

## 2023-08-18 PROCEDURE — 1157F ADVNC CARE PLAN IN RCRD: CPT | Mod: CPTII,S$GLB,, | Performed by: PHYSICIAN ASSISTANT

## 2023-08-18 PROCEDURE — 1160F RVW MEDS BY RX/DR IN RCRD: CPT | Mod: CPTII,S$GLB,, | Performed by: PHYSICIAN ASSISTANT

## 2023-08-18 PROCEDURE — 3074F PR MOST RECENT SYSTOLIC BLOOD PRESSURE < 130 MM HG: ICD-10-PCS | Mod: CPTII,S$GLB,, | Performed by: PHYSICIAN ASSISTANT

## 2023-08-18 PROCEDURE — 3288F FALL RISK ASSESSMENT DOCD: CPT | Mod: CPTII,S$GLB,, | Performed by: PHYSICIAN ASSISTANT

## 2023-08-18 PROCEDURE — 1159F MED LIST DOCD IN RCRD: CPT | Mod: CPTII,S$GLB,, | Performed by: PHYSICIAN ASSISTANT

## 2023-08-18 PROCEDURE — 99999 PR PBB SHADOW E&M-EST. PATIENT-LVL V: CPT | Mod: PBBFAC,,, | Performed by: PHYSICIAN ASSISTANT

## 2023-08-18 NOTE — PROGRESS NOTES
"CC: Thyroid Nodule    HPI: Pili Teixeira is a 78 y.o. female here for thyroid nodule along with pending conditions listed in the Visit Diagnosis. New to endocrine. Diagnosed in  on CT. +FHx of thyroid disease in her daughters.   Diet is low in seafood, soy and cabbage. No hx of neck radiation. + occ sob. Feels like something gets stuck in her throat. No voice changes. + fatigue, hair loss, occ palpitations, cold intolerance, diarrhea. No constipation.     T2DM  Dx 10 years ago  Metformin 500 mg bid  Tradjenta 5 mg qd  Fastins  Eye exam: 10/23 Dr. Byrne  Podiatry exam: n/a    Dexa  wnl.  PMHx, PSHx: reviewed in epic.  Social Hx: no ETOH/tobacco use.     Wt Readings from Last 10 Encounters:   23 59.9 kg (132 lb 0.9 oz)   23 59.7 kg (131 lb 9.8 oz)   23 57.6 kg (126 lb 15.8 oz)   23 57.4 kg (126 lb 9.6 oz)   23 60.1 kg (132 lb 6.4 oz)   23 60.4 kg (133 lb 2.5 oz)   23 61.1 kg (134 lb 11.2 oz)   23 58.6 kg (129 lb 3 oz)   23 60.8 kg (134 lb)   23 59.4 kg (130 lb 14.4 oz)        ROS:   Constitutional: No recent significant weight change  Eyes: No recent visual changes  Cardiovascular: Denies current anginal symptoms  Respiratory: Denies current respiratory difficulty  Gastrointestinal: Denies recent bowel disturbances  GenitoUrinary - No dysuria  Skin: No new skin rash  Neurologic: No focal neurologic complaints  Musculoskeletal: no joint pain  Endocrine: no polyphagia, polydipsia or polyuria  Remainder ROS negative     /82 (BP Location: Left arm, Patient Position: Sitting, BP Method: Small (Manual))   Pulse 76   Temp 98.4 °F (36.9 °C) (Oral)   Ht 5' 3" (1.6 m)   Wt 59.9 kg (132 lb 0.9 oz)   SpO2 98%   BMI 23.39 kg/m²      Personally reviewed labs below:    Lab Results   Component Value Date    TSH 0.836 2023    FREET4 1.02 2022          Chemistry        Component Value Date/Time     2023 1441    K 4.0 " 06/23/2023 1441     06/23/2023 1441    CO2 26 06/23/2023 1441    BUN 21 06/23/2023 1441    CREATININE 0.9 06/23/2023 1441    CREATININE 0.9 09/01/2012 1320    GLU 80 06/23/2023 1441        Component Value Date/Time    CALCIUM 9.9 06/23/2023 1441    CALCIUM 9.9 09/01/2012 1320    ALKPHOS 71 06/23/2023 1441    ALKPHOS 80 09/01/2012 1320    AST 15 06/23/2023 1441    AST 17 09/01/2012 1320    ALT 11 06/23/2023 1441    BILITOT 0.2 06/23/2023 1441    ESTGFRAFRICA 56.8 (A) 04/30/2022 0335    EGFRNONAA 49.2 (A) 04/30/2022 0335           Lab Results   Component Value Date    HGBA1C 5.4 08/18/2022    HGBA1C 5.7 (H) 03/09/2022    HGBA1C 5.4 07/26/2021        PE:  GENERAL: Well developed, well nourished  NECK: Supple neck, normal thyroid. No bruit  LYMPHATIC: No cervical or supraclavicular lymphadenopathy  CARDIOVASCULAR: Normal heart sounds, no pedal edema  RESPIRATORY: Normal effort, clear to auscultation  MUSC: 2+ DTR UE/LE  NEURO: steady gait, CN ll-Xll grossly intact  PSYCH: normal mood and affect  8/23  Foot Exam: no sores or macerations noted.     Protective Sensation (w/ 10 gram monofilament):  Right: Decreased  Left: Decreased    Visual Inspection:  Normal -  Bilateral and Nails Intact - without Evidence of Foot Deformity- Bilateral    Pedal Pulses:   Right: Present  Left: Present    Posterior Tibialis Pulses:   Right:Present  Left: Present     Vibratory Sensation  Right:Positive  Left:Positive     Assessment/Plan:   1. Type 2 diabetes mellitus with hyperglycemia, without long-term current use of insulin  Hemoglobin A1C    US Soft Tissue Head Neck Thyroid    Comprehensive Metabolic Panel    Hemoglobin A1C      2. Multiple thyroid nodules  Ambulatory referral/consult to Endocrinology    TSH    T4, Free    US Soft Tissue Head Neck Thyroid    Calcitonin    Thyroid Peroxidase Antibody        T2DM-check a1c. Last a1c was low for age. Pt declines changing meds.  Thyroid Nodules-thyroid u/s. Recent TSH was wnl.      Labs  Thyroid u/s  F/u in one year-TFTs, a1c, thyroid u/s

## 2023-08-24 ENCOUNTER — OUTPATIENT CASE MANAGEMENT (OUTPATIENT)
Dept: ADMINISTRATIVE | Facility: OTHER | Age: 78
End: 2023-08-24
Payer: MEDICARE

## 2023-08-24 NOTE — PROGRESS NOTES
08/24/23 Phone contact made with Ms. Teixeira today. She denies needing any more resources or education. No other issues identified at this time, contact information provided to Ms. Teixeira if she has any needs in the future. I will discharge Ms. Teixeira from [facility] at this time due to program graduation. Ms. Teixeira agrees with this plan.

## 2023-09-01 ENCOUNTER — HOSPITAL ENCOUNTER (OUTPATIENT)
Dept: RADIOLOGY | Facility: HOSPITAL | Age: 78
Discharge: HOME | End: 2023-09-01
Attending: PHYSICIAN ASSISTANT
Payer: MEDICARE

## 2023-09-01 DIAGNOSIS — E04.2 MULTIPLE THYROID NODULES: ICD-10-CM

## 2023-09-01 PROCEDURE — 76536 US EXAM OF HEAD AND NECK: CPT | Mod: 26,,, | Performed by: RADIOLOGY

## 2023-09-01 PROCEDURE — 76536 US SOFT TISSUE HEAD NECK THYROID: ICD-10-PCS | Mod: 26,,, | Performed by: RADIOLOGY

## 2023-09-01 PROCEDURE — 76536 US EXAM OF HEAD AND NECK: CPT | Mod: TC

## 2023-09-20 DIAGNOSIS — E11.59 HYPERTENSION ASSOCIATED WITH DIABETES: ICD-10-CM

## 2023-09-20 DIAGNOSIS — I15.2 HYPERTENSION ASSOCIATED WITH DIABETES: ICD-10-CM

## 2023-09-20 DIAGNOSIS — R06.02 SOB (SHORTNESS OF BREATH): ICD-10-CM

## 2023-09-20 DIAGNOSIS — G45.8 OTHER SPECIFIED TRANSIENT CEREBRAL ISCHEMIAS: Chronic | ICD-10-CM

## 2023-09-20 DIAGNOSIS — N18.30 CKD (CHRONIC KIDNEY DISEASE) STAGE 3, GFR 30-59 ML/MIN: ICD-10-CM

## 2023-09-20 RX ORDER — ROSUVASTATIN CALCIUM 10 MG/1
TABLET, COATED ORAL
Qty: 90 TABLET | Refills: 0 | Status: SHIPPED | OUTPATIENT
Start: 2023-09-20 | End: 2023-12-18 | Stop reason: SDUPTHER

## 2023-09-20 NOTE — TELEPHONE ENCOUNTER
Care Due:                  Date            Visit Type   Department     Provider  --------------------------------------------------------------------------------                                EP -                              PRIMARY      [facility] FAMILY  Last Visit: 09-      CARE ([facility])   MEDICINE       LUÍS GOULD  Next Visit: None Scheduled  None         None Found                                                            Last  Test          Frequency    Reason                     Performed    Due Date  --------------------------------------------------------------------------------    Office Visit  12 months..  TRADJENTA, famotidine,     09- 09-                             metFORMIN, pantoprazole,                             rosuvastatin, traZODone..    [facility] Embedded Care Due Messages. Reference number: [id number].   9/20/2023 11:37:22 AM CDT

## 2023-10-03 DIAGNOSIS — K21.9 GASTROESOPHAGEAL REFLUX DISEASE WITHOUT ESOPHAGITIS: ICD-10-CM

## 2023-10-03 RX ORDER — FAMOTIDINE 40 MG/1
40 TABLET, FILM COATED ORAL NIGHTLY
Qty: 90 TABLET | Refills: 1 | Status: SHIPPED | OUTPATIENT
Start: 2023-10-03

## 2023-10-03 NOTE — TELEPHONE ENCOUNTER
Care Due:                  Date            Visit Type   Department     Provider  --------------------------------------------------------------------------------                                EP -                              PRIMARY      [facility] FAMILY  Last Visit: 09-      CARE ([facility])   MEDICINE       LUÍS GOULD  Next Visit: None Scheduled  None         None Found                                                            Last  Test          Frequency    Reason                     Performed    Due Date  --------------------------------------------------------------------------------    Lipid Panel.  12 months..  rosuvastatin.............  08- 08-    [facility] Embedded Care Due Messages. Reference number: [id number].   10/03/2023 9:33:11 AM CDT

## 2023-10-26 ENCOUNTER — TELEPHONE (OUTPATIENT)
Dept: HEMATOLOGY/ONCOLOGY | Facility: CLINIC | Age: 78
End: 2023-10-26
Payer: MEDICARE

## 2023-10-26 NOTE — TELEPHONE ENCOUNTER
Spoke to pt and notified dr out out appt 11/24/23 and will need to reschedule to next available, lab rescheduled to 12/14/23 and appt w/Dr Huggins 12/18/23.

## 2023-11-08 DIAGNOSIS — E11.9 TYPE 2 DIABETES MELLITUS WITHOUT COMPLICATION, WITHOUT LONG-TERM CURRENT USE OF INSULIN: Chronic | ICD-10-CM

## 2023-11-08 DIAGNOSIS — Z79.4 TYPE 2 DIABETES MELLITUS WITHOUT COMPLICATION, WITH LONG-TERM CURRENT USE OF INSULIN: ICD-10-CM

## 2023-11-08 DIAGNOSIS — M19.90 INFLAMMATORY ARTHRITIS: ICD-10-CM

## 2023-11-08 DIAGNOSIS — R76.8 RHEUMATOID FACTOR POSITIVE: ICD-10-CM

## 2023-11-08 DIAGNOSIS — E11.9 TYPE 2 DIABETES MELLITUS WITHOUT COMPLICATION, WITH LONG-TERM CURRENT USE OF INSULIN: ICD-10-CM

## 2023-11-08 NOTE — TELEPHONE ENCOUNTER
No care due was identified.  [facility] Embedded Care Due Messages. Reference number: [id number].   11/08/2023 2:49:15 PM CST

## 2023-11-09 RX ORDER — METFORMIN HYDROCHLORIDE 500 MG/1
TABLET ORAL
Qty: 180 TABLET | Refills: 0 | Status: SHIPPED | OUTPATIENT
Start: 2023-11-09 | End: 2024-02-05

## 2023-11-09 RX ORDER — FOLIC ACID 1 MG/1
1000 TABLET ORAL
Qty: 30 TABLET | Refills: 0 | Status: SHIPPED | OUTPATIENT
Start: 2023-11-09 | End: 2023-12-18 | Stop reason: SDUPTHER

## 2023-11-09 RX ORDER — LINAGLIPTIN 5 MG/1
TABLET, FILM COATED ORAL
Qty: 90 TABLET | Refills: 0 | Status: SHIPPED | OUTPATIENT
Start: 2023-11-09 | End: 2024-01-18

## 2023-12-05 ENCOUNTER — HOSPITAL ENCOUNTER (EMERGENCY)
Facility: HOSPITAL | Age: 78
Discharge: HOME | End: 2023-12-05
Attending: EMERGENCY MEDICINE
Payer: MEDICARE

## 2023-12-05 VITALS
BODY MASS INDEX: 22.68 KG/M2 | OXYGEN SATURATION: 98 % | TEMPERATURE: 98 F | HEIGHT: 63 IN | WEIGHT: 128 LBS | HEART RATE: 78 BPM | RESPIRATION RATE: 16 BRPM | DIASTOLIC BLOOD PRESSURE: 80 MMHG | SYSTOLIC BLOOD PRESSURE: 178 MMHG

## 2023-12-05 DIAGNOSIS — R10.32 LEFT LOWER QUADRANT PAIN: Primary | ICD-10-CM

## 2023-12-05 DIAGNOSIS — K59.00 CONSTIPATION: ICD-10-CM

## 2023-12-05 LAB
ALBUMIN SERPL BCP-MCNC: 4.5 G/DL (ref 3.5–5.2)
ALP SERPL-CCNC: 54 U/L (ref 55–135)
ALT SERPL W/O P-5'-P-CCNC: 7 U/L (ref 10–44)
ANION GAP SERPL CALC-SCNC: 9 MMOL/L (ref 8–16)
AST SERPL-CCNC: 14 U/L (ref 10–40)
BACTERIA #/AREA URNS HPF: NEGATIVE /HPF
BASOPHILS # BLD AUTO: 0.04 K/UL (ref 0–0.2)
BASOPHILS NFR BLD: 0.5 % (ref 0–1.9)
BILIRUB SERPL-MCNC: 0.4 MG/DL (ref 0.1–1)
BILIRUB UR QL STRIP: NEGATIVE
BUN SERPL-MCNC: 21 MG/DL (ref 8–23)
CALCIUM SERPL-MCNC: 10.5 MG/DL (ref 8.7–10.5)
CHLORIDE SERPL-SCNC: 102 MMOL/L (ref 95–110)
CLARITY UR: CLEAR
CO2 SERPL-SCNC: 27 MMOL/L (ref 23–29)
COLOR UR: YELLOW
CREAT SERPL-MCNC: 0.9 MG/DL (ref 0.5–1.4)
DIFFERENTIAL METHOD: NORMAL
EOSINOPHIL # BLD AUTO: 0.1 K/UL (ref 0–0.5)
EOSINOPHIL NFR BLD: 0.7 % (ref 0–8)
ERYTHROCYTE [DISTWIDTH] IN BLOOD BY AUTOMATED COUNT: 11.8 % (ref 11.5–14.5)
EST. GFR  (NO RACE VARIABLE): >60 ML/MIN/1.73 M^2
GLUCOSE SERPL-MCNC: 89 MG/DL (ref 70–110)
GLUCOSE UR QL STRIP: NEGATIVE
HCT VFR BLD AUTO: 38 % (ref 37–48.5)
HGB BLD-MCNC: 12.4 G/DL (ref 12–16)
HGB UR QL STRIP: ABNORMAL
HYALINE CASTS #/AREA URNS LPF: 8 /LPF
IMM GRANULOCYTES # BLD AUTO: 0.04 K/UL (ref 0–0.04)
IMM GRANULOCYTES NFR BLD AUTO: 0.5 % (ref 0–0.5)
KETONES UR QL STRIP: ABNORMAL
LEUKOCYTE ESTERASE UR QL STRIP: ABNORMAL
LIPASE SERPL-CCNC: 75 U/L (ref 4–60)
LYMPHOCYTES # BLD AUTO: 2.7 K/UL (ref 1–4.8)
LYMPHOCYTES NFR BLD: 32 % (ref 18–48)
MCH RBC QN AUTO: 29.6 PG (ref 27–31)
MCHC RBC AUTO-ENTMCNC: 32.6 G/DL (ref 32–36)
MCV RBC AUTO: 91 FL (ref 82–98)
MICROSCOPIC COMMENT: ABNORMAL
MONOCYTES # BLD AUTO: 0.9 K/UL (ref 0.3–1)
MONOCYTES NFR BLD: 10.9 % (ref 4–15)
NEUTROPHILS # BLD AUTO: 4.6 K/UL (ref 1.8–7.7)
NEUTROPHILS NFR BLD: 55.4 % (ref 38–73)
NITRITE UR QL STRIP: NEGATIVE
NRBC BLD-RTO: 0 /100 WBC
PH UR STRIP: 5 [PH] (ref 5–8)
PLATELET # BLD AUTO: 224 K/UL (ref 150–450)
PMV BLD AUTO: 10.6 FL (ref 9.2–12.9)
POTASSIUM SERPL-SCNC: 3.7 MMOL/L (ref 3.5–5.1)
PROT SERPL-MCNC: 7.4 G/DL (ref 6–8.4)
PROT UR QL STRIP: ABNORMAL
RBC # BLD AUTO: 4.19 M/UL (ref 4–5.4)
RBC #/AREA URNS HPF: 4 /HPF (ref 0–4)
SODIUM SERPL-SCNC: 138 MMOL/L (ref 136–145)
SP GR UR STRIP: 1.02 (ref 1–1.03)
SQUAMOUS #/AREA URNS HPF: 2 /HPF
TROPONIN I SERPL HS-MCNC: 5.5 PG/ML (ref 0–14.9)
URN SPEC COLLECT METH UR: ABNORMAL
UROBILINOGEN UR STRIP-ACNC: NEGATIVE EU/DL
WBC # BLD AUTO: 8.32 K/UL (ref 3.9–12.7)
WBC #/AREA URNS HPF: 23 /HPF (ref 0–5)

## 2023-12-05 PROCEDURE — 83690 ASSAY OF LIPASE: CPT | Performed by: EMERGENCY MEDICINE

## 2023-12-05 PROCEDURE — 99285 EMERGENCY DEPT VISIT HI MDM: CPT | Mod: 25

## 2023-12-05 PROCEDURE — 96374 THER/PROPH/DIAG INJ IV PUSH: CPT

## 2023-12-05 PROCEDURE — 93010 ELECTROCARDIOGRAM REPORT: CPT | Mod: ,,, | Performed by: GENERAL PRACTICE

## 2023-12-05 PROCEDURE — 93010 EKG 12-LEAD: ICD-10-PCS | Mod: ,,, | Performed by: GENERAL PRACTICE

## 2023-12-05 PROCEDURE — 81001 URINALYSIS AUTO W/SCOPE: CPT | Performed by: NURSE PRACTITIONER

## 2023-12-05 PROCEDURE — 85025 COMPLETE CBC W/AUTO DIFF WBC: CPT | Performed by: NURSE PRACTITIONER

## 2023-12-05 PROCEDURE — 93005 ELECTROCARDIOGRAM TRACING: CPT | Performed by: GENERAL PRACTICE

## 2023-12-05 PROCEDURE — 84484 ASSAY OF TROPONIN QUANT: CPT | Performed by: EMERGENCY MEDICINE

## 2023-12-05 PROCEDURE — 80053 COMPREHEN METABOLIC PANEL: CPT | Performed by: NURSE PRACTITIONER

## 2023-12-05 PROCEDURE — 25500020 PHARM REV CODE 255: Performed by: EMERGENCY MEDICINE

## 2023-12-05 PROCEDURE — 63600175 PHARM REV CODE 636 W HCPCS: Performed by: EMERGENCY MEDICINE

## 2023-12-05 PROCEDURE — 96375 TX/PRO/DX INJ NEW DRUG ADDON: CPT

## 2023-12-05 PROCEDURE — 87086 URINE CULTURE/COLONY COUNT: CPT | Performed by: NURSE PRACTITIONER

## 2023-12-05 RX ORDER — DIPHENHYDRAMINE HYDROCHLORIDE 50 MG/ML
25 INJECTION INTRAMUSCULAR; INTRAVENOUS
Status: COMPLETED | OUTPATIENT
Start: 2023-12-05 | End: 2023-12-05

## 2023-12-05 RX ORDER — METHYLPREDNISOLONE SOD SUCC 125 MG
125 VIAL (EA) INJECTION
Status: COMPLETED | OUTPATIENT
Start: 2023-12-05 | End: 2023-12-05

## 2023-12-05 RX ORDER — POLYETHYLENE GLYCOL 3350 17 G/17G
17 POWDER, FOR SOLUTION ORAL DAILY
Qty: 60 EACH | Refills: 0 | Status: SHIPPED | OUTPATIENT
Start: 2023-12-05 | End: 2024-02-03

## 2023-12-05 RX ADMIN — DIPHENHYDRAMINE HYDROCHLORIDE 25 MG: 50 INJECTION INTRAMUSCULAR; INTRAVENOUS at 07:12

## 2023-12-05 RX ADMIN — IOHEXOL 100 ML: 350 INJECTION, SOLUTION INTRAVENOUS at 09:12

## 2023-12-05 RX ADMIN — METHYLPREDNISOLONE SODIUM SUCCINATE 125 MG: 125 INJECTION, POWDER, FOR SOLUTION INTRAMUSCULAR; INTRAVENOUS at 06:12

## 2023-12-05 NOTE — FIRST PROVIDER EVALUATION
Emergency Department TeleTriage Encounter Note      CHIEF COMPLAINT    Chief Complaint   Patient presents with    Abdominal Pain     Lower abd pain that radiates to the flank.Also reports constipation last BM Sat    Nausea    Back Pain       VITAL SIGNS   Initial Vitals [12/05/23 1729]   BP Pulse Resp Temp SpO2   (!) 148/103 65 16 98.1 °F (36.7 °C) 96 %      MAP       --            ALLERGIES    Review of patient's allergies indicates:   Allergen Reactions    Codeine Other (See Comments)     Chest pain    Clindamycin Other (See Comments)     Difficulty swallowing after taking, feels a something sits in epigastric area     Iodinated contrast media Hives and Rash       PROVIDER TRIAGE NOTE  This is a teletriage evaluation of a 78 y.o. female presenting to the ED with c/o flank pain and abdominal pain since Friday.  Pt states she has history of diverticulitis but has not had a flair up in 2 years. Pt states she took  oxycodone with no relief of pain.  .     PE: hypertensive on initial exam.  All other VSS.  Mucous membranes pink and moist.      Plan: labs, imaging    Limited physical exam via telehealth: The patient is awake, alert, answering questions appropriately and is not in respiratory distress. All ED beds are full at present; patient notified of this status.  Patient seen and medically screened by Nurse Practitioner via teletriage.      Initial orders will be placed and care will be transferred to an alternate provider when patient is roomed for a full evaluation. Any additional orders and the final disposition will be determined by that provider.         ORDERS  Labs Reviewed   CBC W/ AUTO DIFFERENTIAL   COMPREHENSIVE METABOLIC PANEL   URINALYSIS, REFLEX TO URINE CULTURE       ED Orders (720h ago, onward)      Start Ordered     Status Ordering Provider    12/05/23 1736 12/05/23 1735  Saline lock IV  Once         Ordered KIA LONGORIA    12/05/23 1736 12/05/23 1735  CBC auto differential  STAT          Ordered KIA LONGORIA GRAY.    12/05/23 1736 12/05/23 1735  Comprehensive metabolic panel  STAT         Ordered KIA LONGORIA.    12/05/23 1736 12/05/23 1735  Urinalysis, Reflex to Urine Culture Urine, Clean Catch  STAT         Ordered KIA LONGORIA.    12/05/23 1736 12/05/23 1735  X-Ray Abdomen Flat And Erect  1 time imaging         Ordered VON KIA L.              Virtual Visit Note: The provider triage portion of this emergency department evaluation and documentation was performed via [employer], a HIPAA-compliant telemedicine application, in concert with a tele-presenter in the room. A face to face patient evaluation with one of my colleagues will occur once the patient is placed in an emergency department room.      DISCLAIMER: This note was prepared with [employer] voice recognition transcription software. Garbled syntax, mangled pronouns, and other bizarre constructions may be attributed to that software system.

## 2023-12-06 ENCOUNTER — OFFICE VISIT (OUTPATIENT)
Dept: FAMILY MEDICINE | Facility: CLINIC | Age: 78
End: 2023-12-06
Payer: MEDICARE

## 2023-12-06 VITALS
HEIGHT: 63 IN | SYSTOLIC BLOOD PRESSURE: 140 MMHG | DIASTOLIC BLOOD PRESSURE: 74 MMHG | BODY MASS INDEX: 22.6 KG/M2 | HEART RATE: 100 BPM | WEIGHT: 127.56 LBS | OXYGEN SATURATION: 99 %

## 2023-12-06 DIAGNOSIS — G89.4 CHRONIC PAIN SYNDROME: ICD-10-CM

## 2023-12-06 DIAGNOSIS — I15.2 HYPERTENSION ASSOCIATED WITH DIABETES: ICD-10-CM

## 2023-12-06 DIAGNOSIS — E11.69 HYPERLIPIDEMIA ASSOCIATED WITH TYPE 2 DIABETES MELLITUS: ICD-10-CM

## 2023-12-06 DIAGNOSIS — E11.59 HYPERTENSION ASSOCIATED WITH DIABETES: ICD-10-CM

## 2023-12-06 DIAGNOSIS — Z12.31 ENCOUNTER FOR SCREENING MAMMOGRAM FOR MALIGNANT NEOPLASM OF BREAST: Primary | ICD-10-CM

## 2023-12-06 DIAGNOSIS — K21.9 GASTROESOPHAGEAL REFLUX DISEASE WITHOUT ESOPHAGITIS: ICD-10-CM

## 2023-12-06 DIAGNOSIS — M47.817 DJD (DEGENERATIVE JOINT DISEASE), LUMBOSACRAL: ICD-10-CM

## 2023-12-06 DIAGNOSIS — E11.00 TYPE 2 DIABETES MELLITUS WITH HYPEROSMOLARITY WITHOUT COMA, WITHOUT LONG-TERM CURRENT USE OF INSULIN: Chronic | ICD-10-CM

## 2023-12-06 DIAGNOSIS — E78.5 HYPERLIPIDEMIA ASSOCIATED WITH TYPE 2 DIABETES MELLITUS: ICD-10-CM

## 2023-12-06 PROCEDURE — 1126F AMNT PAIN NOTED NONE PRSNT: CPT | Mod: CPTII,S$GLB,, | Performed by: FAMILY MEDICINE

## 2023-12-06 PROCEDURE — 1157F ADVNC CARE PLAN IN RCRD: CPT | Mod: CPTII,S$GLB,, | Performed by: FAMILY MEDICINE

## 2023-12-06 PROCEDURE — 3288F FALL RISK ASSESSMENT DOCD: CPT | Mod: CPTII,S$GLB,, | Performed by: FAMILY MEDICINE

## 2023-12-06 PROCEDURE — 3077F SYST BP >= 140 MM HG: CPT | Mod: CPTII,S$GLB,, | Performed by: FAMILY MEDICINE

## 2023-12-06 PROCEDURE — 1101F PT FALLS ASSESS-DOCD LE1/YR: CPT | Mod: CPTII,S$GLB,, | Performed by: FAMILY MEDICINE

## 2023-12-06 PROCEDURE — 99213 OFFICE O/P EST LOW 20 MIN: CPT | Mod: S$GLB,,, | Performed by: FAMILY MEDICINE

## 2023-12-06 PROCEDURE — 3078F DIAST BP <80 MM HG: CPT | Mod: CPTII,S$GLB,, | Performed by: FAMILY MEDICINE

## 2023-12-06 PROCEDURE — 99999 PR PBB SHADOW E&M-EST. PATIENT-LVL V: CPT | Mod: PBBFAC,,, | Performed by: FAMILY MEDICINE

## 2023-12-06 NOTE — PROGRESS NOTES
Subjective:       Patient ID: Pili Teixeira is a 78 y.o. female.    Chief Complaint: Follow-up    Pt is known to me.   The pt was in ER yesterday with constipation.  CT abd was negative.  She has had recent negative EGD and colonoscopy.  She ha a hx of NICOLAS but is not on iron.  She is on chronic opioids for chronic pain.  She was put on Mirlax in the ER.  Her first dose was today.  She has not had a BM today but is passing gas.  She has had no blood in her stool.  She is having good glucose control.  She has good BP control.  Her weight is stable.  Discussed health maintenance with pt and will schedule appropriate studies and/or immunizations.      Follow-up  Associated symptoms include arthralgias and neck pain. Pertinent negatives include no abdominal pain, chest pain, coughing, headaches, nausea, numbness, rash or vomiting.     Review of Systems   Constitutional:  Negative for activity change, appetite change and unexpected weight change.   Eyes:  Negative for visual disturbance.   Respiratory:  Negative for cough, chest tightness and shortness of breath.    Cardiovascular:  Negative for chest pain, palpitations and leg swelling.   Gastrointestinal:  Positive for constipation. Negative for abdominal pain, diarrhea, nausea and vomiting.   Endocrine: Negative for cold intolerance, heat intolerance and polyuria.   Genitourinary:  Negative for decreased urine volume and dysuria.   Musculoskeletal:  Positive for arthralgias and neck pain. Negative for back pain.   Skin:  Negative for rash.   Neurological:  Negative for numbness and headaches.       Objective:      Physical Exam  Vitals and nursing note reviewed.   Constitutional:       Appearance: Normal appearance. She is well-developed and normal weight. She is not ill-appearing.   HENT:      Head: Normocephalic.   Eyes:      Conjunctiva/sclera: Conjunctivae normal.      Pupils: Pupils are equal, round, and reactive to light.   Neck:      Thyroid: No thyromegaly.    Cardiovascular:      Rate and Rhythm: Normal rate and regular rhythm.      Pulses: Normal pulses.      Heart sounds: Normal heart sounds.   Pulmonary:      Effort: Pulmonary effort is normal.      Breath sounds: Normal breath sounds.   Abdominal:      General: Bowel sounds are normal.      Palpations: Abdomen is soft.      Tenderness: There is no abdominal tenderness.      Hernia: A hernia is present.   Musculoskeletal:         General: No tenderness or deformity. Normal range of motion.      Cervical back: Normal range of motion and neck supple.      Right lower leg: No edema.      Left lower leg: No edema.   Lymphadenopathy:      Cervical: No cervical adenopathy.   Skin:     General: Skin is warm and dry.   Neurological:      Mental Status: She is alert and oriented to person, place, and time.      Cranial Nerves: No cranial nerve deficit.      Motor: No abnormal muscle tone.      Coordination: Coordination normal.      Deep Tendon Reflexes: Reflexes normal.   Psychiatric:         Behavior: Behavior normal.         Assessment:       1. Encounter for screening mammogram for malignant neoplasm of breast    2. Chronic pain syndrome    3. DJD (degenerative joint disease), lumbosacral    4. Hypertension associated with diabetes    5. Hyperlipidemia associated with type 2 diabetes mellitus    6. Type 2 diabetes mellitus with hyperosmolarity without coma, without long-term current use of insulin    7. Gastroesophageal reflux disease without esophagitis        Plan:       Pili was seen today for follow-up.    Diagnoses and all orders for this visit:    Encounter for screening mammogram for malignant neoplasm of breast  -     Mammo Digital Screening Bilat w/ Ld; Future    Chronic pain syndrome    DJD (degenerative joint disease), lumbosacral    Hypertension associated with diabetes    Hyperlipidemia associated with type 2 diabetes mellitus    Type 2 diabetes mellitus with hyperosmolarity without coma, without  long-term current use of insulin    Gastroesophageal reflux disease without esophagitis      During this visit, I reviewed the pt's history, medications, allergies, and problem list.

## 2023-12-06 NOTE — ED PROVIDER NOTES
Encounter Date: 12/5/2023       History     Chief Complaint   Patient presents with    Abdominal Pain     Lower abd pain that radiates to the flank.Also reports constipation last BM Sat    Nausea    Back Pain     78-year-old female past medical history coronary artery disease, diabetes, reflux, hypertension, hyperlipidemia, presents emergency department with abdominal pain.  Patient describes pain on the left side of the abdomen.  Nonradiating.  No associated nausea or vomiting or diarrhea she is constipated says she has not had a bowel movement in the last 3-4 days.  She says that she is on chronic opioid therapy does take docusate daily.  She denies any urinary complaints such as frequency urgency or burning.  She denies any chest pain or any shortness of breath.  She is had prior hysterectomy and cholecystectomy.      Review of patient's allergies indicates:   Allergen Reactions    Codeine Other (See Comments)     Chest pain    Clindamycin Other (See Comments)     Difficulty swallowing after taking, feels a something sits in epigastric area     Iodinated contrast media Hives and Rash     Past Medical History:   Diagnosis Date    Allergy     Anemia 2012    with thrombocytopenia; iron deficiency    Arthritis     Cervical spinal stenosis     with neuropathy, chronic neck,back,leg pain    Colon polyp     COPD (chronic obstructive pulmonary disease)     Coronary artery disease     COVID 4/27/2022    COVID-19     Diabetes mellitus     , sugars run 190's per patient    Diastolic dysfunction 7/13/2015    Diverticulitis     Diverticulosis     Hx diverticulitis,still has chronic diarrhea, abd pain    Dyspnea on exertion     sometimes with nausea, fatigue,dizziness, had cardiac cath June 2012, normal    Fibromyalgia     Fracture 2012    right thumb    GERD (gastroesophageal reflux disease)     Feb 2012, Hx H Pylori    Glaucoma     had LAser surgey, on no eye drops    HEARING LOSS     Hemangioma     fatty liver    History of  earache     frequent    History of TIA (transient ischemic attack) 5/28/2013    Hyperlipidemia     Hypertension     Hypertension associated with diabetes 3/14/2017    Laryngeal polyp     Myocardial infarction 2006 last    also MI in distant past    REJI (obstructive sleep apnea)     does not use CPAP    Otitis media     Pneumonia due to COVID-19 virus 2/20/2021    Renal stone     Sjogren's syndrome     SOB (shortness of breath) 6/8/2012    [address] [place] [address] 1. Normal coronary arteries. 2. Normal single renal arteries bilaterally. 3. Diastolic dysfunction.     Stroke     TIA, now on Plavix    TIA (transient ischemic attack)     TMJ disorder     Type II or unspecified type diabetes mellitus without mention of complication, uncontrolled 5/8/2014    UTI (lower urinary tract infection)     frequent    Venous insufficiency     with Hx blood clot in leg     Past Surgical History:   Procedure Laterality Date    ABDOMINAL SURGERY  2010 x2    expoloratory lap for pelvic cyst,adhesions; partial colonectomy     adhesiolysis   10/11/2012    CARDIAC CATHETERIZATION      no current blockages.    CHOLECYSTECTOMY      COLON SURGERY      r/t diverticuli    COLONOSCOPY  08/2014    repeat in 5 years    COLONOSCOPY N/A 1/7/2020    Procedure: COLONOSCOPY;  Surgeon: Kb Nguyen MD;  Location: [facility];  Service: Endoscopy;  Laterality: N/A;    COLONOSCOPY N/A 3/13/2023    Procedure: COLONOSCOPY;  Surgeon: Jarrod Frost MD;  Location: [facility];  Service: Endoscopy;  Laterality: N/A;    ENDOSCOPIC ULTRASOUND OF UPPER GASTROINTESTINAL TRACT N/A 1/13/2021    Procedure: ULTRASOUND, UPPER GI TRACT, ENDOSCOPIC;  Surgeon: Sonido Castellano III, MD;  Location: [facility];  Service: Endoscopy;  Laterality: N/A;    EPIDURAL BLOCK INJECTION  5/15/12    back,neck for pain    ESOPHAGOGASTRODUODENOSCOPY N/A 1/7/2020    Procedure: EGD (ESOPHAGOGASTRODUODENOSCOPY);  Surgeon: Kb Nguyen MD;  Location: [facility];  Service: Endoscopy;   Laterality: N/A;    ESOPHAGOGASTRODUODENOSCOPY N/A 1/13/2021    Procedure: EGD (ESOPHAGOGASTRODUODENOSCOPY);  Surgeon: Sonido Castellano III, MD;  Location: [facility];  Service: Endoscopy;  Laterality: N/A;    ESOPHAGOGASTRODUODENOSCOPY N/A 3/13/2023    Procedure: EGD (ESOPHAGOGASTRODUODENOSCOPY);  Surgeon: Jarrod Frost MD;  Location: [facility];  Service: Endoscopy;  Laterality: N/A;    ESOPHAGOGASTRODUODENOSCOPY N/A 5/5/2023    Procedure: EGD (ESOPHAGOGASTRODUODENOSCOPY);  Surgeon: Noel Caputo MD;  Location: [facility];  Service: Endoscopy;  Laterality: N/A;    EXPLORATORY LAPAROTOMY W/ BOWEL RESECTION  10/11/2012    EYE SURGERY      Laser for glaucoma    EYE SURGERY  May 2012    cataracts    HYSTERECTOMY      INTRALUMINAL GASTROINTESTINAL TRACT IMAGING VIA CAPSULE N/A 5/23/2023    Procedure: IMAGING PROCEDURE, GI TRACT, INTRALUMINAL, VIA CAPSULE;  Surgeon: Noel Caputo MD;  Location: [facility];  Service: Endoscopy;  Laterality: N/A;    LARYNX SURGERY      polypectomy    OOPHORECTOMY      TONSILLECTOMY      with adenoidectomy    UPPER GASTROINTESTINAL ENDOSCOPY  12/2015    VASCULAR SURGERY      laser to varicose vein leg     Family History   Problem Relation Age of Onset    Throat cancer Mother     Cancer Mother     Diabetes Mellitus Mother     Stroke Father     Hypertension Father     Heart disease Father     Diverticulitis Sister     Throat cancer Brother     Cancer Brother     Thyroid disease Daughter     Lupus Daughter     Pancreatic cancer Maternal Aunt 55    Pancreatic cancer Cousin 58    Breast cancer Neg Hx     Ovarian cancer Neg Hx     Colon cancer Neg Hx     Colon polyps Neg Hx     Celiac disease Neg Hx     Cirrhosis Neg Hx     Crohn's disease Neg Hx     Stomach cancer Neg Hx     Ulcerative colitis Neg Hx     Rectal cancer Neg Hx     Esophageal cancer Neg Hx     Inflammatory bowel disease Neg Hx     Rheum arthritis Neg Hx     Psoriasis Neg Hx     Osteoarthritis Neg Hx     Kidney disease  Neg Hx     Hyperlipidemia Neg Hx     COPD Neg Hx     Depression Neg Hx     Chronic back pain Neg Hx     Asthma Neg Hx     Alcohol abuse Neg Hx      Social History     Tobacco Use    Smoking status: Never    Smokeless tobacco: Never   Substance Use Topics    Alcohol use: No    Drug use: Yes     Types: Oxycodone     Review of Systems   Constitutional:  Negative for chills and fever.   HENT:  Negative for sore throat.    Respiratory:  Negative for shortness of breath.    Cardiovascular:  Negative for chest pain and palpitations.   Gastrointestinal:  Positive for abdominal pain. Negative for nausea and vomiting.   Genitourinary:  Negative for dysuria.   Musculoskeletal:  Negative for back pain.   Skin:  Negative for rash.   Neurological:  Negative for weakness and headaches.   Hematological:  Does not bruise/bleed easily.   All other systems reviewed and are negative.      Physical Exam     Initial Vitals [12/05/23 1729]   BP Pulse Resp Temp SpO2   (!) 148/103 65 16 98.1 °F (36.7 °C) 96 %      MAP       --         Physical Exam    Nursing note and vitals reviewed.  Constitutional: She appears well-developed and well-nourished. No distress.   HENT:   Head: Normocephalic and atraumatic.   Mouth/Throat: No oropharyngeal exudate.   Eyes: Conjunctivae and EOM are normal. Pupils are equal, round, and reactive to light.   Neck: Neck supple. No tracheal deviation present.   Cardiovascular:  Normal rate, regular rhythm, normal heart sounds and intact distal pulses.           No murmur heard.  Pulmonary/Chest: Breath sounds normal. No stridor. No respiratory distress. She has no wheezes. She has no rhonchi. She has no rales.   Abdominal: Abdomen is soft. She exhibits no distension. There is abdominal tenderness.   Left upper quadrant left mid abdomen tenderness to palpation There is no rebound.   Musculoskeletal:         General: No tenderness or edema. Normal range of motion.      Cervical back: Neck supple.     Neurological:  She is alert and oriented to person, place, and time. She has normal strength. No cranial nerve deficit or sensory deficit.   Skin: Skin is warm and dry. Capillary refill takes less than 2 seconds. No rash noted. No erythema. No pallor.   Psychiatric: She has a normal mood and affect. Thought content normal.         ED Course   Procedures  Labs Reviewed   COMPREHENSIVE METABOLIC PANEL - Abnormal; Notable for the following components:       Result Value    Alkaline Phosphatase 54 (*)     ALT 7 (*)     All other components within normal limits   URINALYSIS, REFLEX TO URINE CULTURE - Abnormal; Notable for the following components:    Protein, UA Trace (*)     Ketones, UA Trace (*)     Occult Blood UA 1+ (*)     Leukocytes, UA 2+ (*)     All other components within normal limits    Narrative:     Specimen Source->Urine   LIPASE - Abnormal; Notable for the following components:    Lipase 75 (*)     All other components within normal limits   URINALYSIS MICROSCOPIC - Abnormal; Notable for the following components:    WBC, UA 23 (*)     Hyaline Casts, UA 8 (*)     All other components within normal limits    Narrative:     Specimen Source->Urine   CULTURE, URINE    Narrative:     Specimen Source->Urine   CBC W/ AUTO DIFFERENTIAL   TROPONIN I HIGH SENSITIVITY     Results for orders placed or performed during the hospital encounter of 12/05/23   Urine culture    Specimen: Urine   Result Value Ref Range    Urine Culture, Routine No growth to date    CBC auto differential   Result Value Ref Range    WBC 8.32 3.90 - 12.70 K/uL    RBC 4.19 4.00 - 5.40 M/uL    Hemoglobin 12.4 12.0 - 16.0 g/dL    Hematocrit 38.0 37.0 - 48.5 %    MCV 91 82 - 98 fL    MCH 29.6 27.0 - 31.0 pg    MCHC 32.6 32.0 - 36.0 g/dL    RDW 11.8 11.5 - 14.5 %    Platelets 224 150 - 450 K/uL    MPV 10.6 9.2 - 12.9 fL    Immature Granulocytes 0.5 0.0 - 0.5 %    Gran # (ANC) 4.6 1.8 - 7.7 K/uL    Immature Grans (Abs) 0.04 0.00 - 0.04 K/uL    Lymph # 2.7 1.0 - 4.8  K/uL    Mono # 0.9 0.3 - 1.0 K/uL    Eos # 0.1 0.0 - 0.5 K/uL    Baso # 0.04 0.00 - 0.20 K/uL    nRBC 0 0 /100 WBC    Gran % 55.4 38.0 - 73.0 %    Lymph % 32.0 18.0 - 48.0 %    Mono % 10.9 4.0 - 15.0 %    Eosinophil % 0.7 0.0 - 8.0 %    Basophil % 0.5 0.0 - 1.9 %    Differential Method Automated    Comprehensive metabolic panel   Result Value Ref Range    Sodium 138 136 - 145 mmol/L    Potassium 3.7 3.5 - 5.1 mmol/L    Chloride 102 95 - 110 mmol/L    CO2 27 23 - 29 mmol/L    Glucose 89 70 - 110 mg/dL    BUN 21 8 - 23 mg/dL    Creatinine 0.9 0.5 - 1.4 mg/dL    Calcium 10.5 8.7 - 10.5 mg/dL    Total Protein 7.4 6.0 - 8.4 g/dL    Albumin 4.5 3.5 - 5.2 g/dL    Total Bilirubin 0.4 0.1 - 1.0 mg/dL    Alkaline Phosphatase 54 (L) 55 - 135 U/L    AST 14 10 - 40 U/L    ALT 7 (L) 10 - 44 U/L    eGFR >60.0 >60 mL/min/1.73 m^2    Anion Gap 9 8 - 16 mmol/L   Urinalysis, Reflex to Urine Culture Urine, Clean Catch    Specimen: Urine, Clean Catch   Result Value Ref Range    Specimen UA Urine, Clean Catch     Color, UA Yellow Yellow, Straw, Sammi    Appearance, UA Clear Clear    pH, UA 5.0 5.0 - 8.0    Specific Gravity, UA 1.020 1.005 - 1.030    Protein, UA Trace (A) Negative    Glucose, UA Negative Negative    Ketones, UA Trace (A) Negative    Bilirubin (UA) Negative Negative    Occult Blood UA 1+ (A) Negative    Nitrite, UA Negative Negative    Urobilinogen, UA Negative Negative EU/dL    Leukocytes, UA 2+ (A) Negative   Lipase   Result Value Ref Range    Lipase 75 (H) 4 - 60 U/L   Troponin I High Sensitivity   Result Value Ref Range    Troponin I High Sensitivity 5.5 0.0 - 14.9 pg/mL   Urinalysis Microscopic   Result Value Ref Range    RBC, UA 4 0 - 4 /hpf    WBC, UA 23 (H) 0 - 5 /hpf    Bacteria Negative None-Occ /hpf    Squam Epithel, UA 2 /hpf    Hyaline Casts, UA 8 (A) 0-1/lpf /lpf    Microscopic Comment SEE COMMENT      *Note: Due to a large number of results and/or encounters for the requested time period, some results have  not been displayed. A complete set of results can be found in Results Review.          ECG Results              EKG 12-lead (In process)  Result time 12/06/23 05:12:49      In process by Interface, Lab In [facility] (12/06/23 05:12:49)                   Narrative:    Test Reason : R10.9,    Vent. Rate : 071 BPM     Atrial Rate : 071 BPM     P-R Int : 142 ms          QRS Dur : 064 ms      QT Int : 378 ms       P-R-T Axes : 079 040 066 degrees     QTc Int : 410 ms    Normal sinus rhythm  Possible Left atrial enlargement  Septal infarct (cited on or before 03-MAY-2023)  Abnormal ECG  When compared with ECG of 03-MAY-2023 13:59,  No significant change was found    Referred By: [email or web address]   SELF           Confirmed By:                                   Imaging Results              CT Abdomen Pelvis With IV Contrast NO Oral Contrast (Final result)  Result time 12/05/23 22:16:38      Final result by Alfie Ohara MD (12/05/23 22:16:38)                   Narrative:    EXAM: CT Abdomen and Pelvis with contrast    INDICATION:  Epigastric pain    TECHNIQUE: Helical CT of the abdomen and pelvis was obtained from the diaphragm through the ischial tuberosities using contrast. Axial, coronal and sagittal images were obtained.    Dose reduction techniques were used including automated exposure control and/or adjustment of the mA and/or kV according to patient size.    COMPARISON: CT from 5/3/2023, 4/29/2022    FINDINGS:  LUNG BASES: No significant abnormality.    STOMACH/DUODENUM: No significant finding.    LIVER: No significant abnormality.    BILIARY: Cholecystectomy. Biliary ductal dilation is favored to represent post reservoir effect.    PANCREAS: No significant abnormality.    SPLEEN: No significant abnormality.    ADRENAL GLANDS: No significant abnormality.    KIDNEYS/BLADDER:  No significant abnormality.    VESSELS: Nonaneurysmal abdominal aorta.    LYMPH NODES: No enlarged abdominal or pelvic lymph nodes.    OTHER  PELVIC: No free pelvic fluid.    GI TRACT: Distal colonic anastomosis appears unremarkable. No evidence of bowel obstruction. Normal appendix.    ABDOMINAL WALL: Lower ventral abdominal wall hernia contains multiple loops of small bowel without evidence of obstruction. There is a second ventral abdominal wall hernia at midline contains a portion of the transverse colon without evidence of obstruction.    PERITONEUM/MESENTERY: No pneumoperitoneum.    BONES/SPINE: No acute abnormality.      IMPRESSION:    No acute finding in the abdomen or pelvis. No bowel obstruction.    Biliary ductal dilation. This is favored to be post reservoir effect from cholecystectomy, however correlation for biliary obstruction may be beneficial.    There are 2 ventral abdominal wall hernias. Lower ventral abdominal wall hernia contains multiple loops of small bowel, and a mid ventral abdominal hernia contains a portion of the transverse colon. No evidence of complication/obstruction.      Electronically signed by:  Alfie Ohara MD  12/05/2023 10:16 PM CST Workstation: [id number]                                     Medications   methylPREDNISolone sodium succinate injection 125 mg (125 mg Intravenous Given 12/5/23 1820)   diphenhydrAMINE injection 25 mg (25 mg Intravenous Given 12/5/23 1927)   iohexoL (OMNIPAQUE 350) injection 100 mL (100 mLs Intravenous Given 12/5/23 2118)     Medical Decision Making  Differential includes but not limited to diverticulitis, dehydration, constipation, UTI  , pyelonephritis    Emergent evaluation of a 78-year-old female presents emergency department with left lower quadrant pain.  Patient also on opioids.  Patient more than likely has evidence of constipation.  Patient has no evidence of any acute abdominal/pelvic pathology on CT scan.  No obstruction.  Patient recommended to increase her MiraLax continue taking stool softeners.  Recommended she be discharged home follow up with primary care provider.   She will return if her symptoms change or worsen.    A dictation software program was used for this note.  Please expect some simple typographical  errors in this note.    I had a detailed discussion with the patient and/or guardian regarding: The historical points, exam findings, and diagnostic results supporting the discharge diagnosis, lab results, pertinent radiology results, and the need for outpatient follow-up, for definitive care with a family practitioner and to return to the emergency department if symptoms worsen or persist or if there are any questions or concerns that arise at home. All questions have been answered in detail. Strict return to Emergency Department precautions have been provided       Amount and/or Complexity of Data Reviewed  Radiology: ordered.    Risk  Prescription drug management.               ED Course as of 12/07/23 1001   Tue Dec 05, 2023   2027 EKG 6:15 p.m. normal sinus rhythm rate of 71.  No ST elevation or depression.  No evidence of ischemia.  Normal intervals.  No STEMI.  EKG interpreted independently by me. [JR]      ED Course User Index  [JR] Alfa Tran DO                           Clinical Impression:  Final diagnoses:  [K59.00] Constipation  [R10.32] Left lower quadrant pain (Primary)          ED Disposition Condition    Discharge Stable          ED Prescriptions       Medication Sig Dispense Start Date End Date Auth. Provider    polyethylene glycol (GLYCOLAX) 17 gram PwPk Take 17 g by mouth once daily. 60 each 12/5/2023 2/3/2024 Ed Huynh PA-C          Follow-up Information       Follow up With Specialties Details Why Contact Info Additional Information    LUÍS Huggins MD Family Medicine In 3 days  [address] [place] [address]  [phone]       [facility] - Emergency Dept Emergency Medicine  If symptoms worsen [address] [facility] [address]  [phone] 1st floor             Alfa Tran DO  12/07/23  1001

## 2023-12-08 LAB
BACTERIA UR CULT: NORMAL
BACTERIA UR CULT: NORMAL

## 2023-12-14 ENCOUNTER — LAB VISIT (OUTPATIENT)
Dept: LAB | Facility: HOSPITAL | Age: 78
End: 2023-12-14
Attending: NURSE PRACTITIONER
Payer: MEDICARE

## 2023-12-14 DIAGNOSIS — D50.0 IRON DEFICIENCY ANEMIA DUE TO CHRONIC BLOOD LOSS: ICD-10-CM

## 2023-12-14 LAB
ALBUMIN SERPL BCP-MCNC: 4.2 G/DL (ref 3.5–5.2)
ALP SERPL-CCNC: 76 U/L (ref 55–135)
ALT SERPL W/O P-5'-P-CCNC: 10 U/L (ref 10–44)
ANION GAP SERPL CALC-SCNC: 11 MMOL/L (ref 8–16)
AST SERPL-CCNC: 13 U/L (ref 10–40)
BASOPHILS # BLD AUTO: 0.05 K/UL (ref 0–0.2)
BASOPHILS NFR BLD: 0.6 % (ref 0–1.9)
BILIRUB SERPL-MCNC: 0.3 MG/DL (ref 0.1–1)
BUN SERPL-MCNC: 18 MG/DL (ref 8–23)
CALCIUM SERPL-MCNC: 10.3 MG/DL (ref 8.7–10.5)
CHLORIDE SERPL-SCNC: 102 MMOL/L (ref 95–110)
CO2 SERPL-SCNC: 26 MMOL/L (ref 23–29)
CREAT SERPL-MCNC: 0.9 MG/DL (ref 0.5–1.4)
DIFFERENTIAL METHOD: ABNORMAL
EOSINOPHIL # BLD AUTO: 0.1 K/UL (ref 0–0.5)
EOSINOPHIL NFR BLD: 0.9 % (ref 0–8)
ERYTHROCYTE [DISTWIDTH] IN BLOOD BY AUTOMATED COUNT: 11.9 % (ref 11.5–14.5)
EST. GFR  (NO RACE VARIABLE): >60 ML/MIN/1.73 M^2
FERRITIN SERPL-MCNC: 496 NG/ML (ref 20–300)
GLUCOSE SERPL-MCNC: 72 MG/DL (ref 70–110)
HCT VFR BLD AUTO: 39.2 % (ref 37–48.5)
HGB BLD-MCNC: 12.5 G/DL (ref 12–16)
IMM GRANULOCYTES # BLD AUTO: 0.04 K/UL (ref 0–0.04)
IMM GRANULOCYTES NFR BLD AUTO: 0.5 % (ref 0–0.5)
IRON SERPL-MCNC: 69 UG/DL (ref 30–160)
LYMPHOCYTES # BLD AUTO: 2.7 K/UL (ref 1–4.8)
LYMPHOCYTES NFR BLD: 32.7 % (ref 18–48)
MCH RBC QN AUTO: 29.1 PG (ref 27–31)
MCHC RBC AUTO-ENTMCNC: 31.9 G/DL (ref 32–36)
MCV RBC AUTO: 91 FL (ref 82–98)
MONOCYTES # BLD AUTO: 0.8 K/UL (ref 0.3–1)
MONOCYTES NFR BLD: 10.1 % (ref 4–15)
NEUTROPHILS # BLD AUTO: 4.5 K/UL (ref 1.8–7.7)
NEUTROPHILS NFR BLD: 55.2 % (ref 38–73)
NRBC BLD-RTO: 0 /100 WBC
PLATELET # BLD AUTO: 188 K/UL (ref 150–450)
PMV BLD AUTO: 12.1 FL (ref 9.2–12.9)
POTASSIUM SERPL-SCNC: 3.9 MMOL/L (ref 3.5–5.1)
PROT SERPL-MCNC: 7.4 G/DL (ref 6–8.4)
RBC # BLD AUTO: 4.29 M/UL (ref 4–5.4)
SATURATED IRON: 26 % (ref 20–50)
SODIUM SERPL-SCNC: 139 MMOL/L (ref 136–145)
TOTAL IRON BINDING CAPACITY: 266 UG/DL (ref 250–450)
TRANSFERRIN SERPL-MCNC: 190 MG/DL (ref 200–375)
WBC # BLD AUTO: 8.14 K/UL (ref 3.9–12.7)

## 2023-12-14 PROCEDURE — 36415 COLL VENOUS BLD VENIPUNCTURE: CPT | Performed by: NURSE PRACTITIONER

## 2023-12-14 PROCEDURE — 83540 ASSAY OF IRON: CPT | Performed by: NURSE PRACTITIONER

## 2023-12-14 PROCEDURE — 84466 ASSAY OF TRANSFERRIN: CPT | Performed by: NURSE PRACTITIONER

## 2023-12-14 PROCEDURE — 82728 ASSAY OF FERRITIN: CPT | Performed by: NURSE PRACTITIONER

## 2023-12-14 PROCEDURE — 85025 COMPLETE CBC W/AUTO DIFF WBC: CPT | Performed by: NURSE PRACTITIONER

## 2023-12-14 PROCEDURE — 80053 COMPREHEN METABOLIC PANEL: CPT | Performed by: NURSE PRACTITIONER

## 2023-12-15 ENCOUNTER — TELEPHONE (OUTPATIENT)
Dept: HEMATOLOGY/ONCOLOGY | Facility: CLINIC | Age: 78
End: 2023-12-15
Payer: MEDICARE

## 2023-12-15 DIAGNOSIS — M19.90 INFLAMMATORY ARTHRITIS: ICD-10-CM

## 2023-12-15 DIAGNOSIS — R76.8 RHEUMATOID FACTOR POSITIVE: ICD-10-CM

## 2023-12-15 DIAGNOSIS — N18.30 CKD (CHRONIC KIDNEY DISEASE) STAGE 3, GFR 30-59 ML/MIN: ICD-10-CM

## 2023-12-15 DIAGNOSIS — I15.2 HYPERTENSION ASSOCIATED WITH DIABETES: ICD-10-CM

## 2023-12-15 DIAGNOSIS — R06.02 SOB (SHORTNESS OF BREATH): ICD-10-CM

## 2023-12-15 DIAGNOSIS — E11.59 HYPERTENSION ASSOCIATED WITH DIABETES: ICD-10-CM

## 2023-12-15 DIAGNOSIS — G45.8 OTHER SPECIFIED TRANSIENT CEREBRAL ISCHEMIAS: Chronic | ICD-10-CM

## 2023-12-15 NOTE — TELEPHONE ENCOUNTER
Spoke to pt and notified dr fox appt 12/18/23 pt can be seen by Dr Huggins NP next day, pt declines would like next available w/Dr Huggins, appt 01/03/24 @ 10:40a pt agrees.

## 2023-12-15 NOTE — TELEPHONE ENCOUNTER
Refill Routing Note   Medication(s) are not appropriate for processing by [facility] for the following reason(s):      Medication outside of protocol  Required labs outdated    [facility] action(s):  Route  Defer Care Due:  Labs due            Appointments  past 12m or future 3m with PCP    Date Provider   Last Visit   12/6/2023 LUÍS Huggins MD   Next Visit   Visit date not found LUÍS Huggins MD   ED visits in past 90 days: 1        Note composed:1:12 PM 12/15/2023           no

## 2023-12-15 NOTE — TELEPHONE ENCOUNTER
Care Due:                  Date            Visit Type   Department     Provider  --------------------------------------------------------------------------------                                EP -                              PRIMARY      [facility] FAMILY  Last Visit: 12-      CARE ([facility])   MEDICINE       LUÍS GOULD  Next Visit: None Scheduled  None         None Found                                                            Last  Test          Frequency    Reason                     Performed    Due Date  --------------------------------------------------------------------------------    HBA1C.......  6 months...  TRADJENTA, metFORMIN.....  09- 02-    Lipid Panel.  12 months..  rosuvastatin.............  08- 08-    [facility] Embedded Care Due Messages. Reference number: [id number].   12/15/2023 10:17:07 AM CST

## 2023-12-18 RX ORDER — FOLIC ACID 1 MG/1
1000 TABLET ORAL
Qty: 30 TABLET | Refills: 0 | Status: ON HOLD | OUTPATIENT
Start: 2023-12-18 | End: 2024-01-06

## 2023-12-18 RX ORDER — ROSUVASTATIN CALCIUM 10 MG/1
TABLET, COATED ORAL
Qty: 90 TABLET | Refills: 0 | Status: SHIPPED | OUTPATIENT
Start: 2023-12-18

## 2023-12-26 NOTE — TELEPHONE ENCOUNTER
----- Message from Miracle Trinidad sent at 6/5/2018  4:35 PM CDT -----  Contact: janneth  Type: Needs Medical Advice    Who Called:  Janneth with [facility]  Symptoms (please be specific):    How long has patient had these symptoms:    Pharmacy name and phone #:  [facility] pharmacy [phone]  Best Call Back Number:   Additional Information: called to advise of interaction with Cartia and simvastatin (ZOCOR) 40 MG tablet  
Pharmacy advised she wanted to speak with Dr. Page and advised she was calling his office to get clarification.   
stated

## 2024-01-02 ENCOUNTER — TELEPHONE (OUTPATIENT)
Dept: FAMILY MEDICINE | Facility: CLINIC | Age: 79
End: 2024-01-02
Payer: MEDICARE

## 2024-01-02 ENCOUNTER — HOSPITAL ENCOUNTER (OUTPATIENT)
Facility: HOSPITAL | Age: 79
Discharge: HOME | End: 2024-01-06
Attending: EMERGENCY MEDICINE | Admitting: HOSPITALIST
Payer: MEDICARE

## 2024-01-02 DIAGNOSIS — N30.01 ACUTE CYSTITIS WITH HEMATURIA: ICD-10-CM

## 2024-01-02 DIAGNOSIS — R76.8 RHEUMATOID FACTOR POSITIVE: ICD-10-CM

## 2024-01-02 DIAGNOSIS — M19.90 INFLAMMATORY ARTHRITIS: ICD-10-CM

## 2024-01-02 DIAGNOSIS — K56.609 SMALL BOWEL OBSTRUCTION: ICD-10-CM

## 2024-01-02 DIAGNOSIS — K56.600 PARTIAL SMALL BOWEL OBSTRUCTION: Primary | ICD-10-CM

## 2024-01-02 LAB
ALBUMIN SERPL BCP-MCNC: 4.1 G/DL (ref 3.5–5.2)
ALP SERPL-CCNC: 73 U/L (ref 55–135)
ALT SERPL W/O P-5'-P-CCNC: 13 U/L (ref 10–44)
ANION GAP SERPL CALC-SCNC: 10 MMOL/L (ref 8–16)
AST SERPL-CCNC: 17 U/L (ref 10–40)
BACTERIA #/AREA URNS HPF: ABNORMAL /HPF
BASOPHILS # BLD AUTO: 0.05 K/UL (ref 0–0.2)
BASOPHILS NFR BLD: 0.7 % (ref 0–1.9)
BILIRUB SERPL-MCNC: 0.4 MG/DL (ref 0.1–1)
BILIRUB UR QL STRIP: NEGATIVE
BUN SERPL-MCNC: 21 MG/DL (ref 8–23)
CALCIUM SERPL-MCNC: 9.9 MG/DL (ref 8.7–10.5)
CHLORIDE SERPL-SCNC: 103 MMOL/L (ref 95–110)
CLARITY UR: CLEAR
CO2 SERPL-SCNC: 26 MMOL/L (ref 23–29)
COLOR UR: YELLOW
CREAT SERPL-MCNC: 0.8 MG/DL (ref 0.5–1.4)
DIFFERENTIAL METHOD BLD: NORMAL
EOSINOPHIL # BLD AUTO: 0.1 K/UL (ref 0–0.5)
EOSINOPHIL NFR BLD: 1.3 % (ref 0–8)
ERYTHROCYTE [DISTWIDTH] IN BLOOD BY AUTOMATED COUNT: 12 % (ref 11.5–14.5)
EST. GFR  (NO RACE VARIABLE): >60 ML/MIN/1.73 M^2
GLUCOSE SERPL-MCNC: 79 MG/DL (ref 70–110)
GLUCOSE UR QL STRIP: NEGATIVE
HCT VFR BLD AUTO: 37.3 % (ref 37–48.5)
HGB BLD-MCNC: 12.1 G/DL (ref 12–16)
HGB UR QL STRIP: ABNORMAL
IMM GRANULOCYTES # BLD AUTO: 0.03 K/UL (ref 0–0.04)
IMM GRANULOCYTES NFR BLD AUTO: 0.4 % (ref 0–0.5)
KETONES UR QL STRIP: NEGATIVE
LEUKOCYTE ESTERASE UR QL STRIP: ABNORMAL
LIPASE SERPL-CCNC: 78 U/L (ref 4–60)
LYMPHOCYTES # BLD AUTO: 2.8 K/UL (ref 1–4.8)
LYMPHOCYTES NFR BLD: 39.4 % (ref 18–48)
MCH RBC QN AUTO: 29.2 PG (ref 27–31)
MCHC RBC AUTO-ENTMCNC: 32.4 G/DL (ref 32–36)
MCV RBC AUTO: 90 FL (ref 82–98)
MICROSCOPIC COMMENT: ABNORMAL
MONOCYTES # BLD AUTO: 0.7 K/UL (ref 0.3–1)
MONOCYTES NFR BLD: 10.3 % (ref 4–15)
NEUTROPHILS # BLD AUTO: 3.4 K/UL (ref 1.8–7.7)
NEUTROPHILS NFR BLD: 47.9 % (ref 38–73)
NITRITE UR QL STRIP: NEGATIVE
NRBC BLD-RTO: 0 /100 WBC
PH UR STRIP: 5 [PH] (ref 5–8)
PLATELET # BLD AUTO: 177 K/UL (ref 150–450)
PMV BLD AUTO: 12.2 FL (ref 9.2–12.9)
POTASSIUM SERPL-SCNC: 3.7 MMOL/L (ref 3.5–5.1)
PROT SERPL-MCNC: 7.1 G/DL (ref 6–8.4)
PROT UR QL STRIP: ABNORMAL
RBC # BLD AUTO: 4.14 M/UL (ref 4–5.4)
RBC #/AREA URNS HPF: 5 /HPF (ref 0–4)
SODIUM SERPL-SCNC: 139 MMOL/L (ref 136–145)
SP GR UR STRIP: 1.02 (ref 1–1.03)
SQUAMOUS #/AREA URNS HPF: 2 /HPF
URN SPEC COLLECT METH UR: ABNORMAL
UROBILINOGEN UR STRIP-ACNC: NEGATIVE EU/DL
WBC # BLD AUTO: 7.08 K/UL (ref 3.9–12.7)
WBC #/AREA URNS HPF: 20 /HPF (ref 0–5)

## 2024-01-02 PROCEDURE — 25000003 PHARM REV CODE 250: Performed by: EMERGENCY MEDICINE

## 2024-01-02 PROCEDURE — 80053 COMPREHEN METABOLIC PANEL: CPT | Performed by: NURSE PRACTITIONER

## 2024-01-02 PROCEDURE — 85025 COMPLETE CBC W/AUTO DIFF WBC: CPT | Performed by: NURSE PRACTITIONER

## 2024-01-02 PROCEDURE — 99285 EMERGENCY DEPT VISIT HI MDM: CPT | Mod: 25

## 2024-01-02 PROCEDURE — 87086 URINE CULTURE/COLONY COUNT: CPT | Performed by: NURSE PRACTITIONER

## 2024-01-02 PROCEDURE — 83690 ASSAY OF LIPASE: CPT | Performed by: NURSE PRACTITIONER

## 2024-01-02 PROCEDURE — 81000 URINALYSIS NONAUTO W/SCOPE: CPT | Performed by: NURSE PRACTITIONER

## 2024-01-02 PROCEDURE — 96375 TX/PRO/DX INJ NEW DRUG ADDON: CPT

## 2024-01-02 PROCEDURE — 36415 COLL VENOUS BLD VENIPUNCTURE: CPT | Performed by: NURSE PRACTITIONER

## 2024-01-02 PROCEDURE — 82962 GLUCOSE BLOOD TEST: CPT

## 2024-01-02 PROCEDURE — 96361 HYDRATE IV INFUSION ADD-ON: CPT

## 2024-01-02 PROCEDURE — 63600175 PHARM REV CODE 636 W HCPCS: Performed by: EMERGENCY MEDICINE

## 2024-01-02 RX ORDER — ONDANSETRON 2 MG/ML
4 INJECTION INTRAMUSCULAR; INTRAVENOUS
Status: COMPLETED | OUTPATIENT
Start: 2024-01-02 | End: 2024-01-02

## 2024-01-02 RX ORDER — HYDROMORPHONE HYDROCHLORIDE 1 MG/ML
1 INJECTION, SOLUTION INTRAMUSCULAR; INTRAVENOUS; SUBCUTANEOUS
Status: COMPLETED | OUTPATIENT
Start: 2024-01-02 | End: 2024-01-02

## 2024-01-02 RX ADMIN — SODIUM CHLORIDE 1000 ML: 9 INJECTION, SOLUTION INTRAVENOUS at 11:01

## 2024-01-02 RX ADMIN — HYDROMORPHONE HYDROCHLORIDE 1 MG: 1 INJECTION, SOLUTION INTRAMUSCULAR; INTRAVENOUS; SUBCUTANEOUS at 11:01

## 2024-01-02 RX ADMIN — ONDANSETRON 4 MG: 2 INJECTION INTRAMUSCULAR; INTRAVENOUS at 11:01

## 2024-01-02 NOTE — TELEPHONE ENCOUNTER
----- Message from Cecy Sharif sent at 1/2/2024  8:16 AM CST -----  Type: Needs Medical Advice  Who Called:  patient  Symptoms (please be specific):  severe pain from stomach to back   How long has patient had these symptoms:  weeks   Best Call Back Number: [phone] (home)   Additional Information: patient requesting a call back asap

## 2024-01-03 PROBLEM — K56.600 PARTIAL SMALL BOWEL OBSTRUCTION: Status: ACTIVE | Noted: 2017-07-08

## 2024-01-03 LAB
ALBUMIN SERPL BCP-MCNC: 3.4 G/DL (ref 3.5–5.2)
ALP SERPL-CCNC: 51 U/L (ref 55–135)
ALT SERPL W/O P-5'-P-CCNC: 10 U/L (ref 10–44)
ANION GAP SERPL CALC-SCNC: 8 MMOL/L (ref 8–16)
AST SERPL-CCNC: 15 U/L (ref 10–40)
BASOPHILS # BLD AUTO: 0.03 K/UL (ref 0–0.2)
BASOPHILS NFR BLD: 0.5 % (ref 0–1.9)
BILIRUB SERPL-MCNC: 0.4 MG/DL (ref 0.1–1)
BUN SERPL-MCNC: 17 MG/DL (ref 8–23)
CALCIUM SERPL-MCNC: 8.8 MG/DL (ref 8.7–10.5)
CHLORIDE SERPL-SCNC: 106 MMOL/L (ref 95–110)
CO2 SERPL-SCNC: 26 MMOL/L (ref 23–29)
CREAT SERPL-MCNC: 0.7 MG/DL (ref 0.5–1.4)
DIFFERENTIAL METHOD BLD: ABNORMAL
EOSINOPHIL # BLD AUTO: 0.1 K/UL (ref 0–0.5)
EOSINOPHIL NFR BLD: 1.3 % (ref 0–8)
ERYTHROCYTE [DISTWIDTH] IN BLOOD BY AUTOMATED COUNT: 12.1 % (ref 11.5–14.5)
EST. GFR  (NO RACE VARIABLE): >60 ML/MIN/1.73 M^2
GLUCOSE SERPL-MCNC: 97 MG/DL (ref 70–110)
HCT VFR BLD AUTO: 32 % (ref 37–48.5)
HGB BLD-MCNC: 10.3 G/DL (ref 12–16)
IMM GRANULOCYTES # BLD AUTO: 0.02 K/UL (ref 0–0.04)
IMM GRANULOCYTES NFR BLD AUTO: 0.3 % (ref 0–0.5)
LYMPHOCYTES # BLD AUTO: 2.6 K/UL (ref 1–4.8)
LYMPHOCYTES NFR BLD: 40.8 % (ref 18–48)
MAGNESIUM SERPL-MCNC: 1 MG/DL (ref 1.6–2.6)
MAGNESIUM SERPL-MCNC: 1.5 MG/DL (ref 1.6–2.6)
MCH RBC QN AUTO: 29.2 PG (ref 27–31)
MCHC RBC AUTO-ENTMCNC: 32.2 G/DL (ref 32–36)
MCV RBC AUTO: 91 FL (ref 82–98)
MONOCYTES # BLD AUTO: 0.7 K/UL (ref 0.3–1)
MONOCYTES NFR BLD: 11.4 % (ref 4–15)
NEUTROPHILS # BLD AUTO: 2.9 K/UL (ref 1.8–7.7)
NEUTROPHILS NFR BLD: 45.7 % (ref 38–73)
NRBC BLD-RTO: 0 /100 WBC
PLATELET # BLD AUTO: 193 K/UL (ref 150–450)
PMV BLD AUTO: ABNORMAL FL (ref 9.2–12.9)
POCT GLUCOSE: 81 MG/DL (ref 70–110)
POCT GLUCOSE: 81 MG/DL (ref 70–110)
POCT GLUCOSE: 82 MG/DL (ref 70–110)
POCT GLUCOSE: 89 MG/DL (ref 70–110)
POTASSIUM SERPL-SCNC: 3.3 MMOL/L (ref 3.5–5.1)
PROT SERPL-MCNC: 5.8 G/DL (ref 6–8.4)
RBC # BLD AUTO: 3.53 M/UL (ref 4–5.4)
SODIUM SERPL-SCNC: 140 MMOL/L (ref 136–145)
WBC # BLD AUTO: 6.39 K/UL (ref 3.9–12.7)

## 2024-01-03 PROCEDURE — 63600175 PHARM REV CODE 636 W HCPCS: Performed by: EMERGENCY MEDICINE

## 2024-01-03 PROCEDURE — 25000003 PHARM REV CODE 250

## 2024-01-03 PROCEDURE — 63600175 PHARM REV CODE 636 W HCPCS

## 2024-01-03 PROCEDURE — 96375 TX/PRO/DX INJ NEW DRUG ADDON: CPT

## 2024-01-03 PROCEDURE — 36415 COLL VENOUS BLD VENIPUNCTURE: CPT

## 2024-01-03 PROCEDURE — 83735 ASSAY OF MAGNESIUM: CPT

## 2024-01-03 PROCEDURE — A4216 STERILE WATER/SALINE, 10 ML: HCPCS

## 2024-01-03 PROCEDURE — 83735 ASSAY OF MAGNESIUM: CPT | Mod: 91

## 2024-01-03 PROCEDURE — 25000242 PHARM REV CODE 250 ALT 637 W/ HCPCS

## 2024-01-03 PROCEDURE — 25000003 PHARM REV CODE 250: Performed by: EMERGENCY MEDICINE

## 2024-01-03 PROCEDURE — G0378 HOSPITAL OBSERVATION PER HR: HCPCS

## 2024-01-03 PROCEDURE — 96366 THER/PROPH/DIAG IV INF ADDON: CPT

## 2024-01-03 PROCEDURE — 94761 N-INVAS EAR/PLS OXIMETRY MLT: CPT

## 2024-01-03 PROCEDURE — 80053 COMPREHEN METABOLIC PANEL: CPT

## 2024-01-03 PROCEDURE — 27000221 HC OXYGEN, UP TO 24 HOURS

## 2024-01-03 PROCEDURE — 96376 TX/PRO/DX INJ SAME DRUG ADON: CPT

## 2024-01-03 PROCEDURE — C9113 INJ PANTOPRAZOLE SODIUM, VIA: HCPCS

## 2024-01-03 PROCEDURE — 85025 COMPLETE CBC W/AUTO DIFF WBC: CPT

## 2024-01-03 PROCEDURE — 96367 TX/PROPH/DG ADDL SEQ IV INF: CPT

## 2024-01-03 PROCEDURE — 99214 OFFICE O/P EST MOD 30 MIN: CPT | Mod: ,,, | Performed by: SURGERY

## 2024-01-03 PROCEDURE — 96365 THER/PROPH/DIAG IV INF INIT: CPT

## 2024-01-03 RX ORDER — HYDRALAZINE HYDROCHLORIDE 20 MG/ML
5 INJECTION INTRAMUSCULAR; INTRAVENOUS EVERY 6 HOURS PRN
Status: DISCONTINUED | OUTPATIENT
Start: 2024-01-03 | End: 2024-01-06 | Stop reason: HOSPADM

## 2024-01-03 RX ORDER — HYDROMORPHONE HYDROCHLORIDE 1 MG/ML
1 INJECTION, SOLUTION INTRAMUSCULAR; INTRAVENOUS; SUBCUTANEOUS EVERY 6 HOURS PRN
Status: DISCONTINUED | OUTPATIENT
Start: 2024-01-03 | End: 2024-01-06 | Stop reason: HOSPADM

## 2024-01-03 RX ORDER — IPRATROPIUM BROMIDE AND ALBUTEROL SULFATE 2.5; .5 MG/3ML; MG/3ML
3 SOLUTION RESPIRATORY (INHALATION) EVERY 4 HOURS PRN
Status: DISCONTINUED | OUTPATIENT
Start: 2024-01-03 | End: 2024-01-06 | Stop reason: HOSPADM

## 2024-01-03 RX ORDER — NALOXONE HCL 0.4 MG/ML
0.02 VIAL (ML) INJECTION
Status: DISCONTINUED | OUTPATIENT
Start: 2024-01-03 | End: 2024-01-06 | Stop reason: HOSPADM

## 2024-01-03 RX ORDER — FLUTICASONE PROPIONATE 50 MCG
2 SPRAY, SUSPENSION (ML) NASAL DAILY
Status: DISCONTINUED | OUTPATIENT
Start: 2024-01-03 | End: 2024-01-06 | Stop reason: HOSPADM

## 2024-01-03 RX ORDER — SODIUM CHLORIDE 0.9 % (FLUSH) 0.9 %
10 SYRINGE (ML) INJECTION EVERY 8 HOURS
Status: DISCONTINUED | OUTPATIENT
Start: 2024-01-03 | End: 2024-01-06 | Stop reason: HOSPADM

## 2024-01-03 RX ORDER — ONDANSETRON 2 MG/ML
4 INJECTION INTRAMUSCULAR; INTRAVENOUS EVERY 6 HOURS PRN
Status: DISCONTINUED | OUTPATIENT
Start: 2024-01-03 | End: 2024-01-06 | Stop reason: HOSPADM

## 2024-01-03 RX ORDER — MORPHINE SULFATE 4 MG/ML
4 INJECTION, SOLUTION INTRAMUSCULAR; INTRAVENOUS EVERY 4 HOURS PRN
Status: DISCONTINUED | OUTPATIENT
Start: 2024-01-03 | End: 2024-01-03

## 2024-01-03 RX ORDER — LANOLIN ALCOHOL/MO/W.PET/CERES
800 CREAM (GRAM) TOPICAL
Status: DISCONTINUED | OUTPATIENT
Start: 2024-01-03 | End: 2024-01-06 | Stop reason: HOSPADM

## 2024-01-03 RX ORDER — INSULIN ASPART 100 [IU]/ML
0-5 INJECTION, SOLUTION INTRAVENOUS; SUBCUTANEOUS
Status: DISCONTINUED | OUTPATIENT
Start: 2024-01-03 | End: 2024-01-06 | Stop reason: HOSPADM

## 2024-01-03 RX ORDER — ACETAMINOPHEN 325 MG/1
650 TABLET ORAL EVERY 4 HOURS PRN
Status: DISCONTINUED | OUTPATIENT
Start: 2024-01-03 | End: 2024-01-06 | Stop reason: HOSPADM

## 2024-01-03 RX ORDER — MAGNESIUM SULFATE HEPTAHYDRATE 40 MG/ML
2 INJECTION, SOLUTION INTRAVENOUS ONCE
Status: COMPLETED | OUTPATIENT
Start: 2024-01-03 | End: 2024-01-03

## 2024-01-03 RX ORDER — MAG HYDROX/ALUMINUM HYD/SIMETH 200-200-20
30 SUSPENSION, ORAL (FINAL DOSE FORM) ORAL 4 TIMES DAILY PRN
Status: DISCONTINUED | OUTPATIENT
Start: 2024-01-03 | End: 2024-01-06 | Stop reason: HOSPADM

## 2024-01-03 RX ORDER — PROCHLORPERAZINE EDISYLATE 5 MG/ML
5 INJECTION INTRAMUSCULAR; INTRAVENOUS EVERY 6 HOURS PRN
Status: DISCONTINUED | OUTPATIENT
Start: 2024-01-03 | End: 2024-01-06 | Stop reason: HOSPADM

## 2024-01-03 RX ORDER — PANTOPRAZOLE SODIUM 40 MG/10ML
40 INJECTION, POWDER, LYOPHILIZED, FOR SOLUTION INTRAVENOUS DAILY
Status: DISCONTINUED | OUTPATIENT
Start: 2024-01-03 | End: 2024-01-04

## 2024-01-03 RX ORDER — GLUCAGON 1 MG
1 KIT INJECTION
Status: DISCONTINUED | OUTPATIENT
Start: 2024-01-03 | End: 2024-01-06 | Stop reason: HOSPADM

## 2024-01-03 RX ORDER — PREGABALIN 75 MG/1
150 CAPSULE ORAL 2 TIMES DAILY
Status: DISCONTINUED | OUTPATIENT
Start: 2024-01-03 | End: 2024-01-03

## 2024-01-03 RX ORDER — IBUPROFEN 200 MG
16 TABLET ORAL
Status: DISCONTINUED | OUTPATIENT
Start: 2024-01-03 | End: 2024-01-06 | Stop reason: HOSPADM

## 2024-01-03 RX ORDER — IBUPROFEN 200 MG
24 TABLET ORAL
Status: DISCONTINUED | OUTPATIENT
Start: 2024-01-03 | End: 2024-01-06 | Stop reason: HOSPADM

## 2024-01-03 RX ORDER — ATORVASTATIN CALCIUM 40 MG/1
40 TABLET, FILM COATED ORAL NIGHTLY
Status: DISCONTINUED | OUTPATIENT
Start: 2024-01-04 | End: 2024-01-06 | Stop reason: HOSPADM

## 2024-01-03 RX ORDER — MORPHINE SULFATE 2 MG/ML
2 INJECTION, SOLUTION INTRAMUSCULAR; INTRAVENOUS EVERY 4 HOURS PRN
Status: DISCONTINUED | OUTPATIENT
Start: 2024-01-03 | End: 2024-01-03

## 2024-01-03 RX ORDER — ATORVASTATIN CALCIUM 40 MG/1
40 TABLET, FILM COATED ORAL NIGHTLY
Status: DISCONTINUED | OUTPATIENT
Start: 2024-01-03 | End: 2024-01-03

## 2024-01-03 RX ORDER — TALC
9 POWDER (GRAM) TOPICAL NIGHTLY PRN
Status: DISCONTINUED | OUTPATIENT
Start: 2024-01-03 | End: 2024-01-06 | Stop reason: HOSPADM

## 2024-01-03 RX ORDER — SODIUM CHLORIDE 9 MG/ML
INJECTION, SOLUTION INTRAVENOUS CONTINUOUS
Status: DISCONTINUED | OUTPATIENT
Start: 2024-01-03 | End: 2024-01-05

## 2024-01-03 RX ADMIN — HYDROMORPHONE HYDROCHLORIDE 1 MG: 1 INJECTION, SOLUTION INTRAMUSCULAR; INTRAVENOUS; SUBCUTANEOUS at 08:01

## 2024-01-03 RX ADMIN — HYDRALAZINE HYDROCHLORIDE 5 MG: 20 INJECTION INTRAMUSCULAR; INTRAVENOUS at 02:01

## 2024-01-03 RX ADMIN — SODIUM CHLORIDE: 9 INJECTION, SOLUTION INTRAVENOUS at 04:01

## 2024-01-03 RX ADMIN — ONDANSETRON 4 MG: 2 INJECTION INTRAMUSCULAR; INTRAVENOUS at 10:01

## 2024-01-03 RX ADMIN — MAGNESIUM SULFATE HEPTAHYDRATE 2 G: 40 INJECTION, SOLUTION INTRAVENOUS at 08:01

## 2024-01-03 RX ADMIN — SODIUM CHLORIDE: 9 INJECTION, SOLUTION INTRAVENOUS at 07:01

## 2024-01-03 RX ADMIN — ACETAMINOPHEN 650 MG: 325 TABLET ORAL at 10:01

## 2024-01-03 RX ADMIN — ACETAMINOPHEN, ASPIRIN, CAFFEINE 1 TABLET: 250; 65; 250 TABLET, FILM COATED ORAL at 10:01

## 2024-01-03 RX ADMIN — Medication 10 ML: at 02:01

## 2024-01-03 RX ADMIN — PANTOPRAZOLE SODIUM 40 MG: 40 INJECTION, POWDER, FOR SOLUTION INTRAVENOUS at 08:01

## 2024-01-03 RX ADMIN — CEFTRIAXONE SODIUM 1 G: 1 INJECTION, POWDER, FOR SOLUTION INTRAMUSCULAR; INTRAVENOUS at 03:01

## 2024-01-03 RX ADMIN — ACETAMINOPHEN, ASPIRIN, CAFFEINE 1 TABLET: 250; 65; 250 TABLET, FILM COATED ORAL at 03:01

## 2024-01-03 RX ADMIN — FLUTICASONE PROPIONATE 100 MCG: 50 SPRAY, METERED NASAL at 08:01

## 2024-01-03 RX ADMIN — ONDANSETRON 4 MG: 2 INJECTION INTRAMUSCULAR; INTRAVENOUS at 03:01

## 2024-01-03 NOTE — FIRST PROVIDER EVALUATION
Emergency Department TeleTriage Encounter Note      CHIEF COMPLAINT    Chief Complaint   Patient presents with    Abdominal Pain     CO right upper abd pain radiating around back to left upper abd.  Denies N&V or fever.       VITAL SIGNS   Initial Vitals [01/02/24 1813]   BP Pulse Resp Temp SpO2   (!) 171/70 79 17 98.1 °F (36.7 °C) 99 %      MAP       --            ALLERGIES    Review of patient's allergies indicates:   Allergen Reactions    Codeine Other (See Comments)     Chest pain    Clindamycin Other (See Comments)     Difficulty swallowing after taking, feels a something sits in epigastric area     Iodinated contrast media Hives and Rash       PROVIDER TRIAGE NOTE  This is a teletriage evaluation of a 78 y.o. female presenting to the ED complaining of upper abd pain for one week. Denies n/v/d and urinary complaints. Pain radiates to back.     Pt sitting upright, no resp distress.     Initial orders will be placed and care will be transferred to an alternate provider when patient is roomed for a full evaluation. Any additional orders and the final disposition will be determined by that provider.   u    ORDERS  Labs Reviewed - No data to display    ED Orders (720h ago, onward)      Start Ordered     Status Ordering Provider    01/02/24 1821 01/02/24 1820  Vital signs  Every 2 hours         Ordered LEONELA YAN NCammie    01/02/24 1820 01/02/24 1820  Diet NPO  Diet effective now         Ordered LEONELA YAN NCammie    01/02/24 1820 01/02/24 1820  Insert peripheral IV  Once         Ordered LEONELA YAN NCammie    01/02/24 1820 01/02/24 1820  CBC W/ AUTO DIFFERENTIAL  STAT         Ordered LEONELA YAN    01/02/24 1820 01/02/24 1820  Comp. Metabolic Panel  STAT         Ordered LEONELA YAN NCammie    01/02/24 1820 01/02/24 1820  ISTAT CHEM8  Once         Ordered LEONELA YAN    01/02/24 1820 01/02/24 1820  Lipase  STAT         Ordered LEONELA YAN     01/02/24 1820 01/02/24 1820  Urinalysis, Reflex to Urine Culture Urine, Clean Catch  STAT         Ordered LEONELA YAN              Virtual Visit Note: The provider triage portion of this emergency department evaluation and documentation was performed via [employer], a HIPAA-compliant telemedicine application, in concert with a tele-presenter in the room. A face to face patient evaluation with one of my colleagues will occur once the patient is placed in an emergency department room.      DISCLAIMER: This note was prepared with [employer] voice recognition transcription software. Garbled syntax, mangled pronouns, and other bizarre constructions may be attributed to that software system.

## 2024-01-03 NOTE — CARE UPDATE
Patient seen and examined.  Overnight notes reviewed.  Patient reports abdominal pain.  She denies nausea or vomiting.  No chest pain or shortness of breath.  She does report intermittent headache.  General surgery consulted.        Plan:  Diet per surgery   On Rocephin for UTI   Urine culture in process   IV Mag was ordered for hypomagnesemia

## 2024-01-03 NOTE — H&P
[facility] Medicine  History & Physical    Patient Name: Pili Teixeira  MRN: [id number]  Patient Class: OP- Observation  Admission Date: 1/2/2024  Attending Physician: Ciara Gordillo MD   Primary Care Provider: LUÍS Huggins MD         Patient information was obtained from patient, past medical records, and ER records.     Subjective:     Principal Problem:Small bowel obstruction    Chief Complaint:   Chief Complaint   Patient presents with    Abdominal Pain     CO right upper abd pain radiating around back to left upper abd.  Denies N&V or fever.        HPI: Pili Teixeira is a 77 year old female with a past medical history of anemia, COPD, CAD, TIA on Plavix, HTN, HLD, DM, fibromyalgia, GERD, REJI, NICOLAS with iron transfusions, abdominal hernia, and diverticulosis who presented with moderate midepigastric abdominal pain that radiates into the right upper quadrant and back associated with constipation and nausea x2 weeks. Denies chest pain, shortness of breath, fever, chills, cough, dizziness, lightheadedness, vomiting, hematuria, diarrhea, hematochezia, or LOC. She reports receiving a CT 2 weeks ago at [facility] which showed constipation. She began taking laxatives with little relief. ED work-up reveals CBC unremarkable, CMP shows mildly elevated lipase 78, and UA positive . Abdominal/pelvis CT concerning for ileus/enteritis or low-grade partial obstruction. Started on IV rocephin, maintenance fluids,  and consult placed to general surgery, Dr. Guidry. Admitted to hospital medicine for further evaluation and treatment.                Past Medical History:   Diagnosis Date    Allergy     Anemia 2012    with thrombocytopenia; iron deficiency    Arthritis     Cervical spinal stenosis     with neuropathy, chronic neck,back,leg pain    Colon polyp     COPD (chronic obstructive pulmonary disease)     Coronary artery disease     COVID 4/27/2022    COVID-19     Diabetes mellitus     ,  sugars run 190's per patient    Diastolic dysfunction 7/13/2015    Diverticulitis     Diverticulosis     Hx diverticulitis,still has chronic diarrhea, abd pain    Dyspnea on exertion     sometimes with nausea, fatigue,dizziness, had cardiac cath June 2012, normal    Fibromyalgia     Fracture 2012    right thumb    GERD (gastroesophageal reflux disease)     Feb 2012, Hx H Pylori    Glaucoma     had LAser surgey, on no eye drops    HEARING LOSS     Hemangioma     fatty liver    History of earache     frequent    History of TIA (transient ischemic attack) 5/28/2013    Hyperlipidemia     Hypertension     Hypertension associated with diabetes 3/14/2017    Laryngeal polyp     Myocardial infarction 2006 last    also MI in distant past    REJI (obstructive sleep apnea)     does not use CPAP    Otitis media     Pneumonia due to COVID-19 virus 2/20/2021    Renal stone     Sjogren's syndrome     SOB (shortness of breath) 6/8/2012 2012 [facility] 1. Normal coronary arteries. 2. Normal single renal arteries bilaterally. 3. Diastolic dysfunction.     Stroke     TIA, now on Plavix    TIA (transient ischemic attack)     TMJ disorder     Type II or unspecified type diabetes mellitus without mention of complication, uncontrolled 5/8/2014    UTI (lower urinary tract infection)     frequent    Venous insufficiency     with Hx blood clot in leg       Past Surgical History:   Procedure Laterality Date    ABDOMINAL SURGERY  2010 x2    expoloratory lap for pelvic cyst,adhesions; partial colonectomy     adhesiolysis   10/11/2012    CARDIAC CATHETERIZATION      no current blockages.    CHOLECYSTECTOMY      COLON SURGERY      r/t diverticuli    COLONOSCOPY  08/2014    repeat in 5 years    COLONOSCOPY N/A 1/7/2020    Procedure: COLONOSCOPY;  Surgeon: Kb Nguyen MD;  Location: [facility];  Service: Endoscopy;  Laterality: N/A;    COLONOSCOPY N/A 3/13/2023    Procedure: COLONOSCOPY;  Surgeon: Jarrod Frost MD;  [facility];   Service: Endoscopy;  Laterality: N/A;    ENDOSCOPIC ULTRASOUND OF UPPER GASTROINTESTINAL TRACT N/A 1/13/2021    Procedure: ULTRASOUND, UPPER GI TRACT, ENDOSCOPIC;  Surgeon: Sonido Castellano III, MD;  Location: [facility];  Service: Endoscopy;  Laterality: N/A;    EPIDURAL BLOCK INJECTION  5/15/12    back,neck for pain    ESOPHAGOGASTRODUODENOSCOPY N/A 1/7/2020    Procedure: EGD (ESOPHAGOGASTRODUODENOSCOPY);  Surgeon: Kb Nguyen MD;  Location: [facility];  Service: Endoscopy;  Laterality: N/A;    ESOPHAGOGASTRODUODENOSCOPY N/A 1/13/2021    Procedure: EGD (ESOPHAGOGASTRODUODENOSCOPY);  Surgeon: Sonido Castellano III, MD;  Location: [facility];  Service: Endoscopy;  Laterality: N/A;    ESOPHAGOGASTRODUODENOSCOPY N/A 3/13/2023    Procedure: EGD (ESOPHAGOGASTRODUODENOSCOPY);  Surgeon: Jarrod Frost MD;  Location: [facility];  Service: Endoscopy;  Laterality: N/A;    ESOPHAGOGASTRODUODENOSCOPY N/A 5/5/2023    Procedure: EGD (ESOPHAGOGASTRODUODENOSCOPY);  Surgeon: Noel Caputo MD;  Location: [facility];  Service: Endoscopy;  Laterality: N/A;    EXPLORATORY LAPAROTOMY W/ BOWEL RESECTION  10/11/2012    EYE SURGERY      Laser for glaucoma    EYE SURGERY  May 2012    cataracts    HYSTERECTOMY      INTRALUMINAL GASTROINTESTINAL TRACT IMAGING VIA CAPSULE N/A 5/23/2023    Procedure: IMAGING PROCEDURE, GI TRACT, INTRALUMINAL, VIA CAPSULE;  Surgeon: Noel Caputo MD;  Location: [facility];  Service: Endoscopy;  Laterality: N/A;    LARYNX SURGERY      polypectomy    OOPHORECTOMY      TONSILLECTOMY      with adenoidectomy    UPPER GASTROINTESTINAL ENDOSCOPY  12/2015    VASCULAR SURGERY      laser to varicose vein leg       Review of patient's allergies indicates:   Allergen Reactions    Codeine Other (See Comments)     Chest pain    Clindamycin Other (See Comments)     Difficulty swallowing after taking, feels a something sits in epigastric area     Iodinated contrast media Hives and Rash       No current  facility-administered medications on file prior to encounter.     Current Outpatient Medications on File Prior to Encounter   Medication Sig    albuterol (PROVENTIL/VENTOLIN HFA) 90 mcg/actuation inhaler Inhale 2 puffs into the lungs every 4 (four) hours as needed for Wheezing or Shortness of Breath. Rescue    albuterol-ipratropium (DUO-NEB) 2.5 mg-0.5 mg/3 mL nebulizer solution Take 3 mLs by nebulization every 4 to 6 hours as needed for Wheezing. Rescue    blood sugar diagnostic (ACCU-CHEK GUIDE TEST STRIPS) Strp USE TO TEST BLOOD GLUCOSE ONE TIME DAILY AS DIRECTED.    blood-glucose meter kit To check BG 2 times daily, to use with insurance preferred meter. Medical necessity.    carboxymethylcellulose sodium (LUBRICANT EYE DROPS OPHT) Apply to eye.    cetirizine (ZYRTEC) 5 MG tablet Take 1 tablet (5 mg total) by mouth once daily. for 5 days    clopidogreL (PLAVIX) 75 mg tablet Take 1 tablet (75 mg total) by mouth every Mon, Wed, Fri.    cyanocobalamin (VITAMIN B-12) 1000 MCG tablet Take 100 mcg by mouth once daily.    diclofenac sodium 1 % Gel Apply 2 g topically once daily.    docusate sodium (STOOL SOFTENER ORAL) Take by mouth.    doxepin (SINEQUAN) 10 MG capsule TAKE 1 CAPSULE BY MOUTH EVERY NIGHT AS NEEDED    famotidine (PEPCID) 40 MG tablet TAKE 1 TABLET BY MOUTH ONCE DAILY IN THE EVENING    fluticasone propionate (FLONASE) 50 mcg/actuation nasal spray USE 2 SPRAYS IN EACH NOSTRIL ONE TIME PER DAY    folic acid (FOLVITE) 1 MG tablet Take 1 tablet by mouth once daily    ipratropium (ATROVENT) 42 mcg (0.06 %) nasal spray 2 sprays by Each Nostril route 3 (three) times daily.    Lactobac no.41/Bifidobact no.7 (PROBIOTIC-10 ORAL) Take by mouth.    lancets (ACCU-CHEK SOFTCLIX LANCETS) Misc Test TWICE DAILY;Twice a day    metFORMIN (GLUCOPHAGE) 500 MG tablet TAKE 1 TABLET BY MOUTH TWICE DAILY WITH MEALS    multivit with min-folic acid 200 mcg Chew Take 1 tablet by mouth once daily.     NARCAN 4 mg/actuation Spry as  directed    olopatadine (PATANOL) 0.1 % ophthalmic solution Place 1 drop into both eyes daily as needed.    ondansetron (ZOFRAN-ODT) 4 MG TbDL Take 1 tablet (4 mg total) by mouth every 8 (eight) hours as needed (nausea).    oxyCODONE-acetaminophen (PERCOCET) 7.5-325 mg per tablet Take 1 tablet by mouth 4 (four) times daily as needed.    pantoprazole (PROTONIX) 40 MG tablet Take 1 tablet (40 mg total) by mouth once daily.    polyethylene glycol (GLYCOLAX) 17 gram PwPk Take 17 g by mouth once daily.    polyethylene glycol (GOLYTELY) 236-22.74-6.74 -5.86 gram suspension AS DIRECTED    pregabalin (LYRICA) 150 MG capsule Take 1 capsule (150 mg total) by mouth 2 (two) times daily.    rosuvastatin (CRESTOR) 10 MG tablet TAKE 1 TABLET BY MOUTH ONCE DAILY IN THE EVENING    sodium chloride (SALINE NASAL NASL) by Nasal route.    TRADJENTA 5 mg Tab tablet Take 1 tablet by mouth once daily    traZODone (DESYREL) 50 MG tablet TAKE 1 TABLET BY MOUTH IN THE EVENING (MAY  TAKE  AN  ADDITIONAL  TABLET  AS  NEEDED) (Patient taking differently: TAKE 1 TABLET BY MOUTH IN THE EVENING (MAY  TAKE  AN  ADDITIONAL  TABLET  AS  NEEDED))    triamcinolone acetonide 0.1% (KENALOG) 0.1 % ointment Apply topically 2 (two) times daily. for 14 days     Family History       Problem Relation (Age of Onset)    Cancer Mother, Brother    Diabetes Mellitus Mother    Diverticulitis Sister    Heart disease Father    Hypertension Father    Lupus Daughter    Pancreatic cancer Maternal Aunt (55), Cousin (58)    Stroke Father    Throat cancer Mother, Brother    Thyroid disease Daughter          Tobacco Use    Smoking status: Never    Smokeless tobacco: Never   Substance and Sexual Activity    Alcohol use: No    Drug use: Yes     Types: Oxycodone    Sexual activity: Not Currently     Birth control/protection: Surgical     Comment: hysterectomy     Review of Systems   Constitutional:  Positive for appetite change. Negative for activity change, chills, fatigue and  fever.   Respiratory:  Negative for cough, shortness of breath and wheezing.    Cardiovascular:  Negative for chest pain.   Gastrointestinal:  Positive for abdominal pain, constipation and nausea. Negative for abdominal distention, blood in stool, diarrhea and vomiting.   Genitourinary:  Negative for difficulty urinating and hematuria.   Musculoskeletal:  Positive for arthralgias, back pain and myalgias.   Neurological:  Positive for light-headedness. Negative for dizziness and headaches.     Objective:     Vital Signs (Most Recent):  Temp: 98.1 °F (36.7 °C) (01/02/24 1813)  Pulse: 66 (01/03/24 0130)  Resp: 18 (01/03/24 0130)  BP: (!) 167/88 (01/03/24 0130)  SpO2: 100 % (01/03/24 0130) Vital Signs (24h Range):  Temp:  [98.1 °F (36.7 °C)] 98.1 °F (36.7 °C)  Pulse:  [] 66  Resp:  [17-18] 18  SpO2:  [47 %-100 %] 100 %  BP: (165-188)/() 167/88     Weight: 57.6 kg (126 lb 14 oz)  Body mass index is 22.47 kg/m².     Physical Exam  Constitutional:       General: She is not in acute distress.     Appearance: Normal appearance. She is not ill-appearing or toxic-appearing.   Cardiovascular:      Rate and Rhythm: Regular rhythm.   Pulmonary:      Effort: Pulmonary effort is normal. No respiratory distress.      Breath sounds: Normal breath sounds. No wheezing.   Abdominal:      General: Abdomen is flat. Bowel sounds are decreased. There is no distension.      Palpations: Abdomen is soft.      Tenderness: There is abdominal tenderness in the epigastric area.      Hernia: A hernia is present.   Musculoskeletal:         General: No swelling.      Right lower leg: No edema.      Left lower leg: No edema.   Skin:     General: Skin is warm and dry.      Capillary Refill: Capillary refill takes less than 2 seconds.   Neurological:      Mental Status: She is alert and oriented to person, place, and time. Mental status is at baseline.   Psychiatric:         Mood and Affect: Mood normal.                Significant Labs: All  pertinent labs within the past 24 hours have been reviewed.  BMP:   Recent Labs   Lab 01/02/24  1849   GLU 79      K 3.7      CO2 26   BUN 21   CREATININE 0.8   CALCIUM 9.9     CBC:   Recent Labs   Lab 01/02/24  1849   WBC 7.08   HGB 12.1   HCT 37.3        CMP:   Recent Labs   Lab 01/02/24  1849      K 3.7      CO2 26   GLU 79   BUN 21   CREATININE 0.8   CALCIUM 9.9   PROT 7.1   ALBUMIN 4.1   BILITOT 0.4   ALKPHOS 73   AST 17   ALT 13   ANIONGAP 10         Urine Studies:   Recent Labs   Lab 01/02/24  2045   COLORU Yellow   APPEARANCEUA Clear   PHUR 5.0   SPECGRAV 1.025   PROTEINUA Trace*   GLUCUA Negative   KETONESU Negative   BILIRUBINUA Negative   OCCULTUA 1+*   NITRITE Negative   UROBILINOGEN Negative   LEUKOCYTESUR 3+*   RBCUA 5*   WBCUA 20*   BACTERIA Moderate*   SQUAMEPITHEL 2       Significant Imaging: I have reviewed all pertinent imaging results/findings within the past 24 hours.    PROCEDURE INFORMATION:   Exam: CT Abdomen And Pelvis Without Contrast   Exam date and time: 1/2/2024 11:27 PM   Age: 78 years old Clinical indication: Abdominal pain; Other: Upper abd pain; Prior surgery; Surgery date: 6+ months; Surgery type: Cholecystectomy, colon due to diverticulitis, hysterectomy TECHNIQUE: Imaging protocol: Computed tomography of the abdomen and pelvis without contrast. COMPARISON: CT ABDOMEN PELVIS WITH IV CONTRAST 12/5/2023 9:14 PM FINDINGS: Liver: Unremarkable nonenhanced appearance of the liver. Gallbladder and bile ducts: Status post cholecystectomy biliary ductal dilation. Pancreas: No ductal dilation. Spleen: No splenomegaly. Adrenal glands: Normal. No mass. Kidneys and ureters: No hydronephrosis. Stomach and bowel: Ventral wall hernia containing loops of large and small bowel. Prior postsurgical changes noted large bowel. Scattered air-fluid levels noted in nondilated loops of small bowel in the lower abdomen and pelvis. No abrupt transition point is identified.  "Findings may be seen with ileus/enteritis or low-grade partial obstruction. Appendix: No evidence of appendicitis. Intraperitoneal space: No free air. Vasculature: No portal venous gas. Lymph nodes: No enlarged lymph nodes. Urinary bladder: Unremarkable as visualized. Reproductive: Status post hysterectomy. Bones/joints: Degenerative changes of the lumbar spine. Soft tissues: Unremarkable. IMPRESSION: Scattered air-fluid levels noted in nondilated loops of small bowel in the lower abdomen and pelvis. No abrupt transition point is identified. Findings may be seen with ileus/enteritis or low-grade partial obstruction. BENJY FERRIS Accession: [id number] MRN: [id number]  Preliminary Radiology Report  (QA) DISCREPANCY? If there is a discrepancy between the preliminary and final interpretation, please notify [employer] via [email or web address]. If you do not have access to our QA portal, call our QA team at [phone] CONFIDENTIALITY STATEMENT This report is intended only for the use of the referring physician, and only in accordance with law, If you received this in error, call [phone] Page 2 of 2 Dictated and Authenticated by: Joseluis Alberts MD 01/03/2024 2:59 AM Central Time ([place] & [place])  Assessment/Plan:     * Small bowel obstruction  Abdominal/pelvis CT ileus/enteritis or low-grade partial obstruction; consult general surgery Dr. Guidry  NPO with maintenance  IVFs  Analgesic and antiemetics      Type 2 diabetes mellitus, without long-term current use of insulin    Patient's FSGs are controlled on current medication regimen.  Last A1c reviewed-   Lab Results   Component Value Date    HGBA1C 5.9 09/01/2023     Most recent fingerstick glucose reviewed- No results for input(s): "POCTGLUCOSE" in the last 24 hours.  Current correctional scale  Low  Maintain anti-hyperglycemic dose as follows-   Antihyperglycemics (From admission, onward)      Start     Stop Route Frequency Ordered    01/03/24 7580  " insulin aspart U-100 pen 0-5 Units         -- SubQ Before meals & nightly PRN 01/03/24 0324          Hold Oral hypoglycemics while patient is in the hospital.      Hyperlipidemia associated with type 2 diabetes mellitus  Chronic condition noted  Continue statin      Hypertension associated with diabetes    Latest blood pressure and vitals reviewed-   Temp:  [98.1 °F (36.7 °C)]   Pulse:  []   Resp:  [17-18]   BP: (158-188)/()   SpO2:  [47 %-100 %] .   Patient currently  IV antihypertensives.   Home meds for hypertension were reviewed and noted below.       Medication adjustment for hospital antihypertensives is as follows- adjust as needed    Will goal for controlled BP reduction as noted be medications noted above and monitor and mitigate end organ damage as indicated.          COPD (chronic obstructive pulmonary disease)  Patient's COPD is controlled currently.  Patient is currently off COPD Pathway. Continue scheduled inhalers and Supplemental oxygen prn and monitor respiratory status closely.     Iron deficiency anemia  Patient's anemia is currently controlled. Has not received any PRBCs to date. Etiology likely d/t Iron deficiency  Current CBC reviewed-   Lab Results   Component Value Date    HGB 12.1 01/02/2024    HCT 37.3 01/02/2024     Monitor serial CBC and transfuse if patient becomes hemodynamically unstable, symptomatic or H/H drops below 7/21.    GERD (gastroesophageal reflux disease)  Chronic condition noted  Continue IV PPI        VTE Risk Mitigation (From admission, onward)           Ordered     Place sequential compression device  Until discontinued         01/03/24 0324                         On 01/03/2024, patient should be placed in hospital observation services under my care in collaboration with Sylvie Back NP.           Sylvie Back DNP  Department of Hospital Medicine  [facility]

## 2024-01-03 NOTE — ASSESSMENT & PLAN NOTE
Patient's anemia is currently controlled. Has not received any PRBCs to date. Etiology likely d/t Iron deficiency  Current CBC reviewed-   Lab Results   Component Value Date    HGB 12.1 01/02/2024    HCT 37.3 01/02/2024     Monitor serial CBC and transfuse if patient becomes hemodynamically unstable, symptomatic or H/H drops below 7/21.

## 2024-01-03 NOTE — SUBJECTIVE & OBJECTIVE
Past Medical History:   Diagnosis Date    Allergy     Anemia 2012    with thrombocytopenia; iron deficiency    Arthritis     Cervical spinal stenosis     with neuropathy, chronic neck,back,leg pain    Colon polyp     COPD (chronic obstructive pulmonary disease)     Coronary artery disease     COVID 4/27/2022    COVID-19     Diabetes mellitus     , sugars run 190's per patient    Diastolic dysfunction 7/13/2015    Diverticulitis     Diverticulosis     Hx diverticulitis,still has chronic diarrhea, abd pain    Dyspnea on exertion     sometimes with nausea, fatigue,dizziness, had cardiac cath June 2012, normal    Fibromyalgia     Fracture 2012    right thumb    GERD (gastroesophageal reflux disease)     Feb 2012, Hx H Pylori    Glaucoma     had LAser surgey, on no eye drops    HEARING LOSS     Hemangioma     fatty liver    History of earache     frequent    History of TIA (transient ischemic attack) 5/28/2013    Hyperlipidemia     Hypertension     Hypertension associated with diabetes 3/14/2017    Laryngeal polyp     Myocardial infarction 2006 last    also MI in distant past    REJI (obstructive sleep apnea)     does not use CPAP    Otitis media     Pneumonia due to COVID-19 virus 2/20/2021    Renal stone     Sjogren's syndrome     SOB (shortness of breath) 6/8/2012    [address] [place] [address] 1. Normal coronary arteries. 2. Normal single renal arteries bilaterally. 3. Diastolic dysfunction.     Stroke     TIA, now on Plavix    TIA (transient ischemic attack)     TMJ disorder     Type II or unspecified type diabetes mellitus without mention of complication, uncontrolled 5/8/2014    UTI (lower urinary tract infection)     frequent    Venous insufficiency     with Hx blood clot in leg       Past Surgical History:   Procedure Laterality Date    ABDOMINAL SURGERY  2010 x2    expoloratory lap for pelvic cyst,adhesions; partial colonectomy     adhesiolysis   10/11/2012    CARDIAC CATHETERIZATION      no current blockages.    CHOLECYSTECTOMY       COLON SURGERY      r/t diverticuli    COLONOSCOPY  08/2014    repeat in 5 years    COLONOSCOPY N/A 1/7/2020    Procedure: COLONOSCOPY;  Surgeon: Kb Nguyen MD;  Location: [facility];  Service: Endoscopy;  Laterality: N/A;    COLONOSCOPY N/A 3/13/2023    Procedure: COLONOSCOPY;  Surgeon: Jarrod Frost MD;  Location: [facility];  Service: Endoscopy;  Laterality: N/A;    ENDOSCOPIC ULTRASOUND OF UPPER GASTROINTESTINAL TRACT N/A 1/13/2021    Procedure: ULTRASOUND, UPPER GI TRACT, ENDOSCOPIC;  Surgeon: Sonido Castellano III, MD;  Location: [facility];  Service: Endoscopy;  Laterality: N/A;    EPIDURAL BLOCK INJECTION  5/15/12    back,neck for pain    ESOPHAGOGASTRODUODENOSCOPY N/A 1/7/2020    Procedure: EGD (ESOPHAGOGASTRODUODENOSCOPY);  Surgeon: Kb Nguyen MD;  Location: [facility];  Service: Endoscopy;  Laterality: N/A;    ESOPHAGOGASTRODUODENOSCOPY N/A 1/13/2021    Procedure: EGD (ESOPHAGOGASTRODUODENOSCOPY);  Surgeon: Sonido Castellano III, MD;  Location: [facility];  Service: Endoscopy;  Laterality: N/A;    ESOPHAGOGASTRODUODENOSCOPY N/A 3/13/2023    Procedure: EGD (ESOPHAGOGASTRODUODENOSCOPY);  Surgeon: Jarrod Frost MD;  Location: [facility];  Service: Endoscopy;  Laterality: N/A;    ESOPHAGOGASTRODUODENOSCOPY N/A 5/5/2023    Procedure: EGD (ESOPHAGOGASTRODUODENOSCOPY);  Surgeon: Noel Caputo MD;  Location: [facility];  Service: Endoscopy;  Laterality: N/A;    EXPLORATORY LAPAROTOMY W/ BOWEL RESECTION  10/11/2012    EYE SURGERY      Laser for glaucoma    EYE SURGERY  May 2012    cataracts    HYSTERECTOMY      INTRALUMINAL GASTROINTESTINAL TRACT IMAGING VIA CAPSULE N/A 5/23/2023    Procedure: IMAGING PROCEDURE, GI TRACT, INTRALUMINAL, VIA CAPSULE;  Surgeon: Noel Caputo MD;  Location: [facility];  Service: Endoscopy;  Laterality: N/A;    LARYNX SURGERY      polypectomy    OOPHORECTOMY      TONSILLECTOMY      with adenoidectomy    UPPER GASTROINTESTINAL ENDOSCOPY  12/2015     VASCULAR SURGERY      laser to varicose vein leg       Review of patient's allergies indicates:   Allergen Reactions    Codeine Other (See Comments)     Chest pain    Clindamycin Other (See Comments)     Difficulty swallowing after taking, feels a something sits in epigastric area     Iodinated contrast media Hives and Rash       No current facility-administered medications on file prior to encounter.     Current Outpatient Medications on File Prior to Encounter   Medication Sig    albuterol (PROVENTIL/VENTOLIN HFA) 90 mcg/actuation inhaler Inhale 2 puffs into the lungs every 4 (four) hours as needed for Wheezing or Shortness of Breath. Rescue    albuterol-ipratropium (DUO-NEB) 2.5 mg-0.5 mg/3 mL nebulizer solution Take 3 mLs by nebulization every 4 to 6 hours as needed for Wheezing. Rescue    blood sugar diagnostic (ACCU-CHEK GUIDE TEST STRIPS) Strp USE TO TEST BLOOD GLUCOSE ONE TIME DAILY AS DIRECTED.    blood-glucose meter kit To check BG 2 times daily, to use with insurance preferred meter. Medical necessity.    carboxymethylcellulose sodium (LUBRICANT EYE DROPS OPHT) Apply to eye.    cetirizine (ZYRTEC) 5 MG tablet Take 1 tablet (5 mg total) by mouth once daily. for 5 days    clopidogreL (PLAVIX) 75 mg tablet Take 1 tablet (75 mg total) by mouth every Mon, Wed, Fri.    cyanocobalamin (VITAMIN B-12) 1000 MCG tablet Take 100 mcg by mouth once daily.    diclofenac sodium 1 % Gel Apply 2 g topically once daily.    docusate sodium (STOOL SOFTENER ORAL) Take by mouth.    doxepin (SINEQUAN) 10 MG capsule TAKE 1 CAPSULE BY MOUTH EVERY NIGHT AS NEEDED    famotidine (PEPCID) 40 MG tablet TAKE 1 TABLET BY MOUTH ONCE DAILY IN THE EVENING    fluticasone propionate (FLONASE) 50 mcg/actuation nasal spray USE 2 SPRAYS IN EACH NOSTRIL ONE TIME PER DAY    folic acid (FOLVITE) 1 MG tablet Take 1 tablet by mouth once daily    ipratropium (ATROVENT) 42 mcg (0.06 %) nasal spray 2 sprays by Each Nostril route 3 (three) times daily.     Lactobac no.41/Bifidobact no.7 (PROBIOTIC-10 ORAL) Take by mouth.    lancets (ACCU-CHEK SOFTCLIX LANCETS) Misc Test TWICE DAILY;Twice a day    metFORMIN (GLUCOPHAGE) 500 MG tablet TAKE 1 TABLET BY MOUTH TWICE DAILY WITH MEALS    multivit with min-folic acid 200 mcg Chew Take 1 tablet by mouth once daily.     NARCAN 4 mg/actuation Spry as directed    olopatadine (PATANOL) 0.1 % ophthalmic solution Place 1 drop into both eyes daily as needed.    ondansetron (ZOFRAN-ODT) 4 MG TbDL Take 1 tablet (4 mg total) by mouth every 8 (eight) hours as needed (nausea).    oxyCODONE-acetaminophen (PERCOCET) 7.5-325 mg per tablet Take 1 tablet by mouth 4 (four) times daily as needed.    pantoprazole (PROTONIX) 40 MG tablet Take 1 tablet (40 mg total) by mouth once daily.    polyethylene glycol (GLYCOLAX) 17 gram PwPk Take 17 g by mouth once daily.    polyethylene glycol (GOLYTELY) 236-22.74-6.74 -5.86 gram suspension AS DIRECTED    pregabalin (LYRICA) 150 MG capsule Take 1 capsule (150 mg total) by mouth 2 (two) times daily.    rosuvastatin (CRESTOR) 10 MG tablet TAKE 1 TABLET BY MOUTH ONCE DAILY IN THE EVENING    sodium chloride (SALINE NASAL NASL) by Nasal route.    TRADJENTA 5 mg Tab tablet Take 1 tablet by mouth once daily    traZODone (DESYREL) 50 MG tablet TAKE 1 TABLET BY MOUTH IN THE EVENING (MAY  TAKE  AN  ADDITIONAL  TABLET  AS  NEEDED) (Patient taking differently: TAKE 1 TABLET BY MOUTH IN THE EVENING (MAY  TAKE  AN  ADDITIONAL  TABLET  AS  NEEDED))    triamcinolone acetonide 0.1% (KENALOG) 0.1 % ointment Apply topically 2 (two) times daily. for 14 days     Family History       Problem Relation (Age of Onset)    Cancer Mother, Brother    Diabetes Mellitus Mother    Diverticulitis Sister    Heart disease Father    Hypertension Father    Lupus Daughter    Pancreatic cancer Maternal Aunt (55), Cousin (58)    Stroke Father    Throat cancer Mother, Brother    Thyroid disease Daughter          Tobacco Use    Smoking status:  Never    Smokeless tobacco: Never   Substance and Sexual Activity    Alcohol use: No    Drug use: Yes     Types: Oxycodone    Sexual activity: Not Currently     Birth control/protection: Surgical     Comment: hysterectomy     Review of Systems   Constitutional:  Positive for appetite change. Negative for activity change, chills, fatigue and fever.   Respiratory:  Negative for cough, shortness of breath and wheezing.    Cardiovascular:  Negative for chest pain.   Gastrointestinal:  Positive for abdominal pain, constipation and nausea. Negative for abdominal distention, blood in stool, diarrhea and vomiting.   Genitourinary:  Negative for difficulty urinating and hematuria.   Musculoskeletal:  Positive for arthralgias, back pain and myalgias.   Neurological:  Positive for light-headedness. Negative for dizziness and headaches.     Objective:     Vital Signs (Most Recent):  Temp: 98.1 °F (36.7 °C) (01/02/24 1813)  Pulse: 66 (01/03/24 0130)  Resp: 18 (01/03/24 0130)  BP: (!) 167/88 (01/03/24 0130)  SpO2: 100 % (01/03/24 0130) Vital Signs (24h Range):  Temp:  [98.1 °F (36.7 °C)] 98.1 °F (36.7 °C)  Pulse:  [] 66  Resp:  [17-18] 18  SpO2:  [47 %-100 %] 100 %  BP: (165-188)/() 167/88     Weight: 57.6 kg (126 lb 14 oz)  Body mass index is 22.47 kg/m².     Physical Exam  Constitutional:       General: She is not in acute distress.     Appearance: Normal appearance. She is not ill-appearing or toxic-appearing.   Cardiovascular:      Rate and Rhythm: Regular rhythm.   Pulmonary:      Effort: Pulmonary effort is normal. No respiratory distress.      Breath sounds: Normal breath sounds. No wheezing.   Abdominal:      General: Abdomen is flat. Bowel sounds are decreased. There is no distension.      Palpations: Abdomen is soft.      Tenderness: There is abdominal tenderness in the epigastric area.      Hernia: A hernia is present.   Musculoskeletal:         General: No swelling.      Right lower leg: No edema.       Left lower leg: No edema.   Skin:     General: Skin is warm and dry.      Capillary Refill: Capillary refill takes less than 2 seconds.   Neurological:      Mental Status: She is alert and oriented to person, place, and time. Mental status is at baseline.   Psychiatric:         Mood and Affect: Mood normal.                Significant Labs: All pertinent labs within the past 24 hours have been reviewed.  BMP:   Recent Labs   Lab 01/02/24  1849   GLU 79      K 3.7      CO2 26   BUN 21   CREATININE 0.8   CALCIUM 9.9     CBC:   Recent Labs   Lab 01/02/24  1849   WBC 7.08   HGB 12.1   HCT 37.3        CMP:   Recent Labs   Lab 01/02/24  1849      K 3.7      CO2 26   GLU 79   BUN 21   CREATININE 0.8   CALCIUM 9.9   PROT 7.1   ALBUMIN 4.1   BILITOT 0.4   ALKPHOS 73   AST 17   ALT 13   ANIONGAP 10         Urine Studies:   Recent Labs   Lab 01/02/24  2045   COLORU Yellow   APPEARANCEUA Clear   PHUR 5.0   SPECGRAV 1.025   PROTEINUA Trace*   GLUCUA Negative   KETONESU Negative   BILIRUBINUA Negative   OCCULTUA 1+*   NITRITE Negative   UROBILINOGEN Negative   LEUKOCYTESUR 3+*   RBCUA 5*   WBCUA 20*   BACTERIA Moderate*   SQUAMEPITHEL 2       Significant Imaging: I have reviewed all pertinent imaging results/findings within the past 24 hours.    PROCEDURE INFORMATION:   Exam: CT Abdomen And Pelvis Without Contrast   Exam date and time: 1/2/2024 11:27 PM   Age: 78 years old Clinical indication: Abdominal pain; Other: Upper abd pain; Prior surgery; Surgery date: 6+ months; Surgery type: Cholecystectomy, colon due to diverticulitis, hysterectomy TECHNIQUE: Imaging protocol: Computed tomography of the abdomen and pelvis without contrast. COMPARISON: CT ABDOMEN PELVIS WITH IV CONTRAST 12/5/2023 9:14 PM FINDINGS: Liver: Unremarkable nonenhanced appearance of the liver. Gallbladder and bile ducts: Status post cholecystectomy biliary ductal dilation. Pancreas: No ductal dilation. Spleen: No splenomegaly.  Adrenal glands: Normal. No mass. Kidneys and ureters: No hydronephrosis. Stomach and bowel: Ventral wall hernia containing loops of large and small bowel. Prior postsurgical changes noted large bowel. Scattered air-fluid levels noted in nondilated loops of small bowel in the lower abdomen and pelvis. No abrupt transition point is identified. Findings may be seen with ileus/enteritis or low-grade partial obstruction. Appendix: No evidence of appendicitis. Intraperitoneal space: No free air. Vasculature: No portal venous gas. Lymph nodes: No enlarged lymph nodes. Urinary bladder: Unremarkable as visualized. Reproductive: Status post hysterectomy. Bones/joints: Degenerative changes of the lumbar spine. Soft tissues: Unremarkable. IMPRESSION: Scattered air-fluid levels noted in nondilated loops of small bowel in the lower abdomen and pelvis. No abrupt transition point is identified. Findings may be seen with ileus/enteritis or low-grade partial obstruction. BENJY FERRIS Accession: [id number] MRN: [id number]  Preliminary Radiology Report  (QA) DISCREPANCY? If there is a discrepancy between the preliminary and final interpretation, please notify [employer] via [email or web address]. If you do not have access to our QA portal, call our QA team at [phone] CONFIDENTIALITY STATEMENT This report is intended only for the use of the referring physician, and only in accordance with law, If you received this in error, call [phone] Page 2 of 2 Dictated and Authenticated by: Joseluis Alberts MD 01/03/2024 2:59 AM Central Time ([place] & [place])

## 2024-01-03 NOTE — ASSESSMENT & PLAN NOTE
"  Patient's FSGs are controlled on current medication regimen.  Last A1c reviewed-   Lab Results   Component Value Date    HGBA1C 5.9 09/01/2023     Most recent fingerstick glucose reviewed- No results for input(s): "POCTGLUCOSE" in the last 24 hours.  Current correctional scale  Low  Maintain anti-hyperglycemic dose as follows-   Antihyperglycemics (From admission, onward)      Start     Stop Route Frequency Ordered    01/03/24 0421  insulin aspart U-100 pen 0-5 Units         -- SubQ Before meals & nightly PRN 01/03/24 0324          Hold Oral hypoglycemics while patient is in the hospital.    "

## 2024-01-03 NOTE — ASSESSMENT & PLAN NOTE
Patient's COPD is controlled currently.  Patient is currently off COPD Pathway. Continue scheduled inhalers and Supplemental oxygen prn and monitor respiratory status closely.

## 2024-01-03 NOTE — ED PROVIDER NOTES
Encounter Date: 1/2/2024       History     Chief Complaint   Patient presents with    Abdominal Pain     CO right upper abd pain radiating around back to left upper abd.  Denies N&V or fever.      This patient presents the emergency department with midepigastric abdominal pain that radiates into the right upper quadrant.  Patient states he has been having similar symptoms for the has 2 weeks and received a CT evaluation that was read is constipation only.  Patient states that the pain in the midepigastrium was getting worse.  Patient does have a prior history of gastric ulcers and had to have 1 cauterized in the remote past.  Patient has had to receive blood transfusion because of this.  She denies any fever denies any vomiting or diarrhea.  Patient denies any blood in her stool.    The history is provided by the patient.     Review of patient's allergies indicates:   Allergen Reactions    Codeine Other (See Comments)     Chest pain    Clindamycin Other (See Comments)     Difficulty swallowing after taking, feels a something sits in epigastric area     Iodinated contrast media Hives and Rash     Past Medical History:   Diagnosis Date    Allergy     Anemia 2012    with thrombocytopenia; iron deficiency    Arthritis     Cervical spinal stenosis     with neuropathy, chronic neck,back,leg pain    Colon polyp     COPD (chronic obstructive pulmonary disease)     Coronary artery disease     COVID 4/27/2022    COVID-19     Diabetes mellitus     , sugars run 190's per patient    Diastolic dysfunction 7/13/2015    Diverticulitis     Diverticulosis     Hx diverticulitis,still has chronic diarrhea, abd pain    Dyspnea on exertion     sometimes with nausea, fatigue,dizziness, had cardiac cath June 2012, normal    Fibromyalgia     Fracture 2012    right thumb    GERD (gastroesophageal reflux disease)     Feb 2012, Hx H Pylori    Glaucoma     had LAser surgey, on no eye drops    HEARING LOSS     Hemangioma     fatty liver     History of earache     frequent    History of TIA (transient ischemic attack) 5/28/2013    Hyperlipidemia     Hypertension     Hypertension associated with diabetes 3/14/2017    Laryngeal polyp     Myocardial infarction 2006 last    also MI in distant past    REJI (obstructive sleep apnea)     does not use CPAP    Otitis media     Pneumonia due to COVID-19 virus 2/20/2021    Renal stone     Sjogren's syndrome     SOB (shortness of breath) 6/8/2012    [address] [place] [address] 1. Normal coronary arteries. 2. Normal single renal arteries bilaterally. 3. Diastolic dysfunction.     Stroke     TIA, now on Plavix    TIA (transient ischemic attack)     TMJ disorder     Type II or unspecified type diabetes mellitus without mention of complication, uncontrolled 5/8/2014    UTI (lower urinary tract infection)     frequent    Venous insufficiency     with Hx blood clot in leg     Past Surgical History:   Procedure Laterality Date    ABDOMINAL SURGERY  2010 x2    expoloratory lap for pelvic cyst,adhesions; partial colonectomy     adhesiolysis   10/11/2012    CARDIAC CATHETERIZATION      no current blockages.    CHOLECYSTECTOMY      COLON SURGERY      r/t diverticuli    COLONOSCOPY  08/2014    repeat in 5 years    COLONOSCOPY N/A 1/7/2020    Procedure: COLONOSCOPY;  Surgeon: Kb Nguyen MD;  Location: [facility];  Service: Endoscopy;  Laterality: N/A;    COLONOSCOPY N/A 3/13/2023    Procedure: COLONOSCOPY;  Surgeon: Jarrod Frost MD;  Location: [facility];  Service: Endoscopy;  Laterality: N/A;    ENDOSCOPIC ULTRASOUND OF UPPER GASTROINTESTINAL TRACT N/A 1/13/2021    Procedure: ULTRASOUND, UPPER GI TRACT, ENDOSCOPIC;  Surgeon: Sonido Castellano III, MD;  Location: [facility];  Service: Endoscopy;  Laterality: N/A;    EPIDURAL BLOCK INJECTION  5/15/12    back,neck for pain    ESOPHAGOGASTRODUODENOSCOPY N/A 1/7/2020    Procedure: EGD (ESOPHAGOGASTRODUODENOSCOPY);  Surgeon: Kb Nguyen MD;  Location: [facility];  Service:  Endoscopy;  Laterality: N/A;    ESOPHAGOGASTRODUODENOSCOPY N/A 1/13/2021    Procedure: EGD (ESOPHAGOGASTRODUODENOSCOPY);  Surgeon: Sonido Castellano III, MD;  Location: [facility];  Service: Endoscopy;  Laterality: N/A;    ESOPHAGOGASTRODUODENOSCOPY N/A 3/13/2023    Procedure: EGD (ESOPHAGOGASTRODUODENOSCOPY);  Surgeon: Jarrod Frost MD;  Location: [facility];  Service: Endoscopy;  Laterality: N/A;    ESOPHAGOGASTRODUODENOSCOPY N/A 5/5/2023    Procedure: EGD (ESOPHAGOGASTRODUODENOSCOPY);  Surgeon: Noel Caputo MD;  Location: [facility];  Service: Endoscopy;  Laterality: N/A;    EXPLORATORY LAPAROTOMY W/ BOWEL RESECTION  10/11/2012    EYE SURGERY      Laser for glaucoma    EYE SURGERY  May 2012    cataracts    HYSTERECTOMY      INTRALUMINAL GASTROINTESTINAL TRACT IMAGING VIA CAPSULE N/A 5/23/2023    Procedure: IMAGING PROCEDURE, GI TRACT, INTRALUMINAL, VIA CAPSULE;  Surgeon: Noel Caputo MD;  Location: [facility];  Service: Endoscopy;  Laterality: N/A;    LARYNX SURGERY      polypectomy    OOPHORECTOMY      TONSILLECTOMY      with adenoidectomy    UPPER GASTROINTESTINAL ENDOSCOPY  12/2015    VASCULAR SURGERY      laser to varicose vein leg     Family History   Problem Relation Age of Onset    Throat cancer Mother     Cancer Mother     Diabetes Mellitus Mother     Stroke Father     Hypertension Father     Heart disease Father     Diverticulitis Sister     Throat cancer Brother     Cancer Brother     Thyroid disease Daughter     Lupus Daughter     Pancreatic cancer Maternal Aunt 55    Pancreatic cancer Cousin 58    Breast cancer Neg Hx     Ovarian cancer Neg Hx     Colon cancer Neg Hx     Colon polyps Neg Hx     Celiac disease Neg Hx     Cirrhosis Neg Hx     Crohn's disease Neg Hx     Stomach cancer Neg Hx     Ulcerative colitis Neg Hx     Rectal cancer Neg Hx     Esophageal cancer Neg Hx     Inflammatory bowel disease Neg Hx     Rheum arthritis Neg Hx     Psoriasis Neg Hx     Osteoarthritis Neg Hx      Kidney disease Neg Hx     Hyperlipidemia Neg Hx     COPD Neg Hx     Depression Neg Hx     Chronic back pain Neg Hx     Asthma Neg Hx     Alcohol abuse Neg Hx      Social History     Tobacco Use    Smoking status: Never    Smokeless tobacco: Never   Substance Use Topics    Alcohol use: No    Drug use: Yes     Types: Oxycodone     Review of Systems   Constitutional: Negative.    HENT: Negative.     Eyes: Negative.    Respiratory: Negative.     Cardiovascular: Negative.    Gastrointestinal:  Positive for abdominal pain and constipation. Negative for diarrhea, nausea and vomiting.   Endocrine: Negative.    Genitourinary: Negative.    Musculoskeletal: Negative.    Skin: Negative.    Allergic/Immunologic: Negative.    Neurological: Negative.    Hematological: Negative.    Psychiatric/Behavioral: Negative.     All other systems reviewed and are negative.      Physical Exam     Initial Vitals [01/02/24 1813]   BP Pulse Resp Temp SpO2   (!) 171/70 79 17 98.1 °F (36.7 °C) 99 %      MAP       --         Physical Exam    ED Course   Critical Care    Date/Time: 1/2/2024 6:01 PM    Performed by: Jonny Bergeron MD  Authorized by: Jonny Bergeron MD  Direct patient critical care time: 12 minutes  Additional history critical care time: 11 minutes  Ordering / reviewing critical care time: 11 minutes  Documentation critical care time: 12 minutes  Consulting other physicians critical care time: 4 minutes  Total critical care time (exclusive of procedural time) : 50 minutes  Critical care time was exclusive of separately billable procedures and treating other patients and teaching time.  Critical care was time spent personally by me on the following activities: ordering and performing treatments and interventions, ordering and review of laboratory studies, ordering and review of radiographic studies, pulse oximetry, re-evaluation of patient's condition and review of old charts.        Labs Reviewed   LIPASE - Abnormal;  Notable for the following components:       Result Value    Lipase 78 (*)     All other components within normal limits   URINALYSIS, REFLEX TO URINE CULTURE - Abnormal; Notable for the following components:    Protein, UA Trace (*)     Occult Blood UA 1+ (*)     Leukocytes, UA 3+ (*)     All other components within normal limits    Narrative:     Specimen Source->Urine   URINALYSIS MICROSCOPIC - Abnormal; Notable for the following components:    RBC, UA 5 (*)     WBC, UA 20 (*)     Bacteria Moderate (*)     All other components within normal limits    Narrative:     Specimen Source->Urine   COMPREHENSIVE METABOLIC PANEL - Abnormal; Notable for the following components:    Potassium 3.3 (*)     Total Protein 5.8 (*)     Albumin 3.4 (*)     Alkaline Phosphatase 51 (*)     All other components within normal limits   MAGNESIUM - Abnormal; Notable for the following components:    Magnesium 1.0 (*)     All other components within normal limits   CBC W/ AUTO DIFFERENTIAL - Abnormal; Notable for the following components:    RBC 3.53 (*)     Hemoglobin 10.3 (*)     Hematocrit 32.0 (*)     All other components within normal limits   CULTURE, URINE   CBC W/ AUTO DIFFERENTIAL   COMPREHENSIVE METABOLIC PANEL   POCT GLUCOSE, HAND-HELD DEVICE   POCT GLUCOSE          Imaging Results              CT Abdomen Pelvis  Without Contrast (In process)                      Medications   fluticasone propionate 50 mcg/actuation nasal spray 100 mcg (has no administration in time range)   sodium chloride 0.9% flush 10 mL (has no administration in time range)   albuterol-ipratropium 2.5 mg-0.5 mg/3 mL nebulizer solution 3 mL (has no administration in time range)   melatonin tablet 9 mg (has no administration in time range)   ondansetron injection 4 mg (has no administration in time range)   aluminum-magnesium hydroxide-simethicone 200-200-20 mg/5 mL suspension 30 mL (has no administration in time range)   naloxone 0.4 mg/mL injection 0.02 mg  (has no administration in time range)   potassium bicarbonate disintegrating tablet 50 mEq (has no administration in time range)   potassium bicarbonate disintegrating tablet 35 mEq (has no administration in time range)   potassium bicarbonate disintegrating tablet 60 mEq (has no administration in time range)   magnesium oxide tablet 800 mg (has no administration in time range)   magnesium oxide tablet 800 mg (has no administration in time range)   glucose chewable tablet 16 g (has no administration in time range)   glucose chewable tablet 24 g (has no administration in time range)   glucagon (human recombinant) injection 1 mg (has no administration in time range)   0.9%  NaCl infusion ( Intravenous New Bag 1/3/24 0425)   acetaminophen tablet 650 mg (has no administration in time range)   prochlorperazine injection Soln 5 mg (has no administration in time range)   insulin aspart U-100 pen 0-5 Units (has no administration in time range)   pantoprazole injection 40 mg (has no administration in time range)   atorvastatin tablet 40 mg (has no administration in time range)   cefTRIAXone (ROCEPHIN) 2 g in dextrose 5 % in water (D5W) 100 mL IVPB (MB+) (has no administration in time range)   dextrose 10% bolus 125 mL 125 mL (has no administration in time range)   dextrose 10% bolus 250 mL 250 mL (has no administration in time range)   HYDROmorphone injection 1 mg (has no administration in time range)   HYDROmorphone injection 1 mg (1 mg Intravenous Given 1/2/24 2337)   ondansetron injection 4 mg (4 mg Intravenous Given 1/2/24 2338)   sodium chloride 0.9% bolus 1,000 mL 1,000 mL (0 mLs Intravenous Stopped 1/3/24 0232)   cefTRIAXone (ROCEPHIN) 1 g in dextrose 5 % in water (D5W) 100 mL IVPB (MB+) (0 g Intravenous Stopped 1/3/24 0420)     Medical Decision Making   This patient presents with diffuse abdominal pain that is reproducible on examination but no rebound or guarding.  Bowel obstructions highest on my differential.  But  also other GI  vascular infectious obstructive etiologies.  Patient's CT evaluation is consistent with partial bowel obstruction patient's symptoms have been improved with medications here in the emergency department subsequently the patient will be admitted to the hospital for further workup by Dr. Wharton field and Hospital Medicine.    Amount and/or Complexity of Data Reviewed  Labs:  Decision-making details documented in ED Course.  Radiology: ordered. Decision-making details documented in ED Course.    Risk  Prescription drug management.                                      Clinical Impression:  Final diagnoses:  [K56.600] Partial small bowel obstruction (Primary)  [N30.01] Acute cystitis with hematuria          ED Disposition Condition    Observation Stable                Jonny Bergeron MD  01/03/24 1645

## 2024-01-03 NOTE — PLAN OF CARE
[facility] - Med/Surg  Initial Discharge Assessment       Primary Care Provider: LUÍS Huggins MD    Admission Diagnosis: Partial small bowel obstruction [K56.600]    Admission Date: 1/2/2024  Expected Discharge Date:     DC assessment completed. PCP Priscilla Huggins. Pharm- [facility]. DME listed. Lives with spouse. No new needs anticipated at dc    Transition of Care Barriers: None    Payor: HUMANA MANAGED MEDICARE / Plan: HUMANA SNP HMO PPO SPECIAL NEEDS / Product Type: Medicare Advantage /     Extended Emergency Contact Information  Primary Emergency Contact: CheriseKareen  Address: [address] [place] [address] [place] [address] [place]  Mobile Phone: [phone]  Relation: Daughter  Preferred language: English   needed? No  Secondary Emergency Contact: LATOYA FLOREZ  Mobile Phone: [phone]  Relation: Significant other   needed? No    Discharge Plan A: Home with family  Discharge Plan B: Home      [id number] - [place], [place] - [address]  [address]  [place] [address]  Phone: [phone] Fax: [phone]    [id number] - [place], [place] - [address]  [address]  [place] [address]  Phone: [phone] Fax: [phone]      Initial Assessment (most recent)       Adult Discharge Assessment - 01/03/24 1545          Discharge Assessment    Assessment Type Discharge Planning Assessment     Confirmed/corrected address, phone number and insurance Yes     Confirmed Demographics Correct on Facesheet     Source of Information patient     People in Home spouse     Do you expect to return to your current living situation? Yes     Prior to hospitilization cognitive status: Alert/Oriented     Current cognitive status: Alert/Oriented     Walking or Climbing Stairs Difficulty no     Dressing/Bathing Difficulty no     Equipment Currently Used at Home walker, rolling;glucometer      Readmission within 30 days? No     Patient currently being followed by outpatient case management? No     Do you currently have service(s) that help you manage your care at home? No     Do you take prescription medications? Yes     Do you have prescription coverage? Yes     Coverage human[employer]     Do you have any problems affording any of your prescribed medications? No     Is the patient taking medications as prescribed? yes     How do you get to doctors appointments? family or friend will provide     Are you on dialysis? No     Do you take coumadin? No     Discharge Plan A Home with family     Discharge Plan B Home     DME Needed Upon Discharge  none     Discharge Plan discussed with: Patient     Transition of Care Barriers None

## 2024-01-03 NOTE — ASSESSMENT & PLAN NOTE
Abdominal/pelvis CT ileus/enteritis or low-grade partial obstruction; consult general surgery Dr. Guidry  NPO with maintenance  IVFs  Analgesic and antiemetics

## 2024-01-03 NOTE — PROGRESS NOTES
General Surgery consult for partial SBO noted. Labs and imaging reviewed. Last mostly unremarkable. UA appears dirty though may be non clean catch. CT imaging compared to recent imaging performed in December.  No significant changes. Has known incisional hernias that contain bowel but are without obvious obstruction or bowel compromise. Overall suspicion for SBO low. Recommend continued bowel rest and IV fluids and reassessment of symptoms. If patient was passing flatus or bowel movement okay to start clear liquid diet as tolerated. If symptoms persist could obtain Gastrografin study to confirm or rule out obstruction. Also may consider re obtaining clean catch UA and urine culture. Full consult note to follow later this afternoon when I am available to see the patient at [facility].    Please call with any questions or concerns.    Italo Guidry MD  General Surgery  [phone]

## 2024-01-03 NOTE — ASSESSMENT & PLAN NOTE
Latest blood pressure and vitals reviewed-   Temp:  [98.1 °F (36.7 °C)]   Pulse:  []   Resp:  [17-18]   BP: (158-188)/()   SpO2:  [47 %-100 %] .   Patient currently  IV antihypertensives.   Home meds for hypertension were reviewed and noted below.       Medication adjustment for hospital antihypertensives is as follows- adjust as needed    Will goal for controlled BP reduction as noted be medications noted above and monitor and mitigate end organ damage as indicated.

## 2024-01-03 NOTE — PHARMACY MED REC
"Admission Medication History     The home medication history was taken by Apolinar Howell.    You may go to "Admission" then "Reconcile Home Medications" tabs to review and/or act upon these items.     The home medication list has been updated by the Pharmacy department.   Please read ALL comments highlighted in yellow.   Please address this information as you see fit.    Feel free to contact us if you have any questions or require assistance.      The medications listed below were removed from the home medication list. Please reorder if appropriate:  Patient reports no longer taking the following medication(s):  Albuterol HFA  Duo-Neb  Doxepin 10 mg  Golytely  Sodium Chloride Nasal Spray    Medications listed below were obtained from: Patient/family and Analytic software- [employer]  No current facility-administered medications on file prior to encounter.     Current Outpatient Medications on File Prior to Encounter   Medication Sig Dispense Refill    carboxymethylcellulose sodium (LUBRICANT EYE DROPS OPHT) Place 1 drop into both eyes daily as needed (dry eyes).      cetirizine (ZYRTEC) 5 MG tablet Take 1 tablet (5 mg total) by mouth once daily. for 5 days 5 tablet 0    clopidogreL (PLAVIX) 75 mg tablet Take 1 tablet (75 mg total) by mouth every Mon, Wed, Fri. 90 tablet 4    cyanocobalamin (VITAMIN B-12) 1000 MCG tablet Take 100 mcg by mouth once daily.      diclofenac sodium 1 % Gel Apply 2 g topically once daily.      docusate sodium (STOOL SOFTENER ORAL) Take 1 capsule by mouth every evening.      famotidine (PEPCID) 40 MG tablet TAKE 1 TABLET BY MOUTH ONCE DAILY IN THE EVENING 90 tablet 1    fluticasone propionate (FLONASE) 50 mcg/actuation nasal spray USE 2 SPRAYS IN EACH NOSTRIL ONE TIME PER DAY (Patient taking differently: 2 sprays by Each Nostril route Daily. USE 2 SPRAYS IN EACH NOSTRIL ONE TIME PER DAY) 18.2 mL 5    folic acid (FOLVITE) 1 MG tablet Take 1 tablet by mouth once daily (Patient taking " differently: Take 1,000 mcg by mouth once daily.) 30 tablet 0    Lactobac no.41/Bifidobact no.7 (PROBIOTIC-10 ORAL) Take 1 capsule by mouth Daily.      metFORMIN (GLUCOPHAGE) 500 MG tablet TAKE 1 TABLET BY MOUTH TWICE DAILY WITH MEALS (Patient taking differently: Take 500 mg by mouth 2 (two) times daily with meals.) 180 tablet 0    multivit with min-folic acid 200 mcg Chew Take 1 tablet by mouth once daily.       olopatadine (PATANOL) 0.1 % ophthalmic solution Place 1 drop into both eyes daily as needed for Allergies.      ondansetron (ZOFRAN-ODT) 4 MG TbDL Take 1 tablet (4 mg total) by mouth every 8 (eight) hours as needed (nausea). 30 tablet 1    oxyCODONE-acetaminophen (PERCOCET) 7.5-325 mg per tablet Take 1 tablet by mouth 4 (four) times daily as needed for Pain.      pantoprazole (PROTONIX) 40 MG tablet Take 1 tablet (40 mg total) by mouth once daily. 90 tablet 2    polyethylene glycol (GLYCOLAX) 17 gram PwPk Take 17 g by mouth once daily. 60 each 0    rosuvastatin (CRESTOR) 10 MG tablet TAKE 1 TABLET BY MOUTH ONCE DAILY IN THE EVENING (Patient taking differently: Take 10 mg by mouth every evening.) 90 tablet 0    TRADJENTA 5 mg Tab tablet Take 1 tablet by mouth once daily (Patient taking differently: Take 5 mg by mouth once daily.) 90 tablet 0    blood sugar diagnostic (ACCU-CHEK GUIDE TEST STRIPS) Strp USE TO TEST BLOOD GLUCOSE ONE TIME DAILY AS DIRECTED. 100 each 3    blood-glucose meter kit To check BG 2 times daily, to use with insurance preferred meter. Medical necessity. 1 each 0    ipratropium (ATROVENT) 42 mcg (0.06 %) nasal spray 2 sprays by Each Nostril route 3 (three) times daily. (Patient not taking: Reported on 1/3/2024) 15 mL 3    lancets (ACCU-CHEK SOFTCLIX LANCETS) Misc Test TWICE DAILY;Twice a day 100 each 3    NARCAN 4 mg/actuation Spry as directed      pregabalin (LYRICA) 150 MG capsule Take 1 capsule (150 mg total) by mouth 2 (two) times daily. (Patient not taking: Reported on 1/3/2024) 60  capsule 2    traZODone (DESYREL) 50 MG tablet TAKE 1 TABLET BY MOUTH IN THE EVENING (MAY  TAKE  AN  ADDITIONAL  TABLET  AS  NEEDED) (Patient not taking: Reported on 1/3/2024) 90 tablet 0    triamcinolone acetonide 0.1% (KENALOG) 0.1 % ointment Apply topically 2 (two) times daily. for 14 days (Patient not taking: Reported on 1/3/2024) 30 g 0    [DISCONTINUED] albuterol (PROVENTIL/VENTOLIN HFA) 90 mcg/actuation inhaler Inhale 2 puffs into the lungs every 4 (four) hours as needed for Wheezing or Shortness of Breath. Rescue 18 g 0    [DISCONTINUED] albuterol-ipratropium (DUO-NEB) 2.5 mg-0.5 mg/3 mL nebulizer solution Take 3 mLs by nebulization every 4 to 6 hours as needed for Wheezing. Rescue      [DISCONTINUED] doxepin (SINEQUAN) 10 MG capsule TAKE 1 CAPSULE BY MOUTH EVERY NIGHT AS NEEDED      [DISCONTINUED] polyethylene glycol (GOLYTELY) 236-22.74-6.74 -5.86 gram suspension AS DIRECTED      [DISCONTINUED] sodium chloride (SALINE NASAL NASL) by Nasal route.             Apolinar Howell  EXT 1924                .

## 2024-01-03 NOTE — HPI
Pili Teixeira is a 77 year old female with a past medical history of anemia, COPD, CAD, TIA on Plavix, HTN, HLD, DM, fibromyalgia, GERD, REJI, NICOLAS with iron transfusions, abdominal hernia, and diverticulosis who presented with moderate midepigastric abdominal pain that radiates into the right upper quadrant and back associated with constipation and nausea x2 weeks. Denies chest pain, shortness of breath, fever, chills, cough, dizziness, lightheadedness, vomiting, hematuria, diarrhea, hematochezia, or LOC. She reports receiving a CT 2 weeks ago at [facility] which showed constipation. She began taking laxatives with little relief. ED work-up reveals CBC unremarkable, CMP shows mildly elevated lipase 78, and UA positive . Abdominal/pelvis CT concerning for ileus/enteritis or low-grade partial obstruction. Started on IV rocephin, maintenance fluids,  and consult placed to general surgery, Dr. Guidry. Admitted to hospital medicine for further evaluation and treatment.

## 2024-01-04 PROBLEM — G89.4 CHRONIC PAIN SYNDROME: Status: ACTIVE | Noted: 2024-01-04

## 2024-01-04 PROBLEM — N30.01 ACUTE CYSTITIS WITH HEMATURIA: Status: ACTIVE | Noted: 2024-01-04

## 2024-01-04 LAB
ALBUMIN SERPL BCP-MCNC: 2.9 G/DL (ref 3.5–5.2)
ALP SERPL-CCNC: 48 U/L (ref 55–135)
ALT SERPL W/O P-5'-P-CCNC: 11 U/L (ref 10–44)
ANION GAP SERPL CALC-SCNC: 9 MMOL/L (ref 8–16)
AST SERPL-CCNC: 14 U/L (ref 10–40)
BASOPHILS # BLD AUTO: 0.04 K/UL (ref 0–0.2)
BASOPHILS NFR BLD: 0.6 % (ref 0–1.9)
BILIRUB SERPL-MCNC: 0.4 MG/DL (ref 0.1–1)
BUN SERPL-MCNC: 11 MG/DL (ref 8–23)
CALCIUM SERPL-MCNC: 8.7 MG/DL (ref 8.7–10.5)
CHLORIDE SERPL-SCNC: 108 MMOL/L (ref 95–110)
CO2 SERPL-SCNC: 22 MMOL/L (ref 23–29)
CREAT SERPL-MCNC: 0.7 MG/DL (ref 0.5–1.4)
DIFFERENTIAL METHOD BLD: ABNORMAL
EOSINOPHIL # BLD AUTO: 0.1 K/UL (ref 0–0.5)
EOSINOPHIL NFR BLD: 1.3 % (ref 0–8)
ERYTHROCYTE [DISTWIDTH] IN BLOOD BY AUTOMATED COUNT: 12.1 % (ref 11.5–14.5)
EST. GFR  (NO RACE VARIABLE): >60 ML/MIN/1.73 M^2
GLUCOSE SERPL-MCNC: 88 MG/DL (ref 70–110)
HCT VFR BLD AUTO: 32.1 % (ref 37–48.5)
HGB BLD-MCNC: 10.3 G/DL (ref 12–16)
IMM GRANULOCYTES # BLD AUTO: 0.03 K/UL (ref 0–0.04)
IMM GRANULOCYTES NFR BLD AUTO: 0.4 % (ref 0–0.5)
LYMPHOCYTES # BLD AUTO: 2.2 K/UL (ref 1–4.8)
LYMPHOCYTES NFR BLD: 33.1 % (ref 18–48)
MAGNESIUM SERPL-MCNC: 1.3 MG/DL (ref 1.6–2.6)
MCH RBC QN AUTO: 29.2 PG (ref 27–31)
MCHC RBC AUTO-ENTMCNC: 32.1 G/DL (ref 32–36)
MCV RBC AUTO: 91 FL (ref 82–98)
MONOCYTES # BLD AUTO: 0.7 K/UL (ref 0.3–1)
MONOCYTES NFR BLD: 9.8 % (ref 4–15)
NEUTROPHILS # BLD AUTO: 3.7 K/UL (ref 1.8–7.7)
NEUTROPHILS NFR BLD: 54.8 % (ref 38–73)
NRBC BLD-RTO: 0 /100 WBC
PLATELET # BLD AUTO: 180 K/UL (ref 150–450)
PMV BLD AUTO: 10.7 FL (ref 9.2–12.9)
POCT GLUCOSE: 78 MG/DL (ref 70–110)
POCT GLUCOSE: 78 MG/DL (ref 70–110)
POCT GLUCOSE: 85 MG/DL (ref 70–110)
POCT GLUCOSE: 96 MG/DL (ref 70–110)
POTASSIUM SERPL-SCNC: 3.5 MMOL/L (ref 3.5–5.1)
PROT SERPL-MCNC: 5.4 G/DL (ref 6–8.4)
RBC # BLD AUTO: 3.53 M/UL (ref 4–5.4)
SODIUM SERPL-SCNC: 139 MMOL/L (ref 136–145)
WBC # BLD AUTO: 6.73 K/UL (ref 3.9–12.7)

## 2024-01-04 PROCEDURE — 63600175 PHARM REV CODE 636 W HCPCS: Performed by: NURSE PRACTITIONER

## 2024-01-04 PROCEDURE — 85025 COMPLETE CBC W/AUTO DIFF WBC: CPT

## 2024-01-04 PROCEDURE — 25000003 PHARM REV CODE 250: Performed by: NURSE PRACTITIONER

## 2024-01-04 PROCEDURE — 25000003 PHARM REV CODE 250

## 2024-01-04 PROCEDURE — C9113 INJ PANTOPRAZOLE SODIUM, VIA: HCPCS

## 2024-01-04 PROCEDURE — 83735 ASSAY OF MAGNESIUM: CPT

## 2024-01-04 PROCEDURE — 63600175 PHARM REV CODE 636 W HCPCS

## 2024-01-04 PROCEDURE — 96376 TX/PRO/DX INJ SAME DRUG ADON: CPT

## 2024-01-04 PROCEDURE — A4216 STERILE WATER/SALINE, 10 ML: HCPCS

## 2024-01-04 PROCEDURE — 97161 PT EVAL LOW COMPLEX 20 MIN: CPT

## 2024-01-04 PROCEDURE — 25500020 PHARM REV CODE 255

## 2024-01-04 PROCEDURE — 99900035 HC TECH TIME PER 15 MIN (STAT)

## 2024-01-04 PROCEDURE — G0378 HOSPITAL OBSERVATION PER HR: HCPCS

## 2024-01-04 PROCEDURE — 94761 N-INVAS EAR/PLS OXIMETRY MLT: CPT

## 2024-01-04 PROCEDURE — 36415 COLL VENOUS BLD VENIPUNCTURE: CPT

## 2024-01-04 PROCEDURE — 80053 COMPREHEN METABOLIC PANEL: CPT

## 2024-01-04 PROCEDURE — 96366 THER/PROPH/DIAG IV INF ADDON: CPT

## 2024-01-04 PROCEDURE — 99213 OFFICE O/P EST LOW 20 MIN: CPT | Mod: ,,, | Performed by: SURGERY

## 2024-01-04 RX ORDER — MAGNESIUM SULFATE HEPTAHYDRATE 40 MG/ML
2 INJECTION, SOLUTION INTRAVENOUS ONCE
Status: COMPLETED | OUTPATIENT
Start: 2024-01-04 | End: 2024-01-04

## 2024-01-04 RX ORDER — OXYCODONE AND ACETAMINOPHEN 7.5; 325 MG/1; MG/1
1 TABLET ORAL EVERY 6 HOURS PRN
Status: DISCONTINUED | OUTPATIENT
Start: 2024-01-04 | End: 2024-01-06 | Stop reason: HOSPADM

## 2024-01-04 RX ORDER — DIPHENHYDRAMINE HYDROCHLORIDE 50 MG/ML
25 INJECTION, SOLUTION INTRAMUSCULAR; INTRAVENOUS EVERY 6 HOURS PRN
Status: DISCONTINUED | OUTPATIENT
Start: 2024-01-04 | End: 2024-01-06 | Stop reason: HOSPADM

## 2024-01-04 RX ORDER — PANTOPRAZOLE SODIUM 40 MG/1
40 TABLET, DELAYED RELEASE ORAL DAILY
Status: DISCONTINUED | OUTPATIENT
Start: 2024-01-05 | End: 2024-01-06 | Stop reason: HOSPADM

## 2024-01-04 RX ADMIN — DIATRIZOATE MEGLUMINE AND DIATRIZOATE SODIUM 60 ML: 660; 100 LIQUID ORAL; RECTAL at 06:01

## 2024-01-04 RX ADMIN — PANTOPRAZOLE SODIUM 40 MG: 40 INJECTION, POWDER, FOR SOLUTION INTRAVENOUS at 09:01

## 2024-01-04 RX ADMIN — SODIUM CHLORIDE: 9 INJECTION, SOLUTION INTRAVENOUS at 04:01

## 2024-01-04 RX ADMIN — CEFTRIAXONE SODIUM 2 G: 2 INJECTION, POWDER, FOR SOLUTION INTRAMUSCULAR; INTRAVENOUS at 04:01

## 2024-01-04 RX ADMIN — MELATONIN TAB 3 MG 9 MG: 3 TAB at 10:01

## 2024-01-04 RX ADMIN — Medication 800 MG: at 06:01

## 2024-01-04 RX ADMIN — OXYCODONE AND ACETAMINOPHEN 1 TABLET: 7.5; 325 TABLET ORAL at 04:01

## 2024-01-04 RX ADMIN — MAGNESIUM SULFATE HEPTAHYDRATE 2 G: 40 INJECTION, SOLUTION INTRAVENOUS at 09:01

## 2024-01-04 RX ADMIN — ATORVASTATIN CALCIUM 40 MG: 40 TABLET, FILM COATED ORAL at 08:01

## 2024-01-04 RX ADMIN — MELATONIN TAB 3 MG 9 MG: 3 TAB at 12:01

## 2024-01-04 RX ADMIN — OXYCODONE AND ACETAMINOPHEN 1 TABLET: 7.5; 325 TABLET ORAL at 10:01

## 2024-01-04 RX ADMIN — HYDROMORPHONE HYDROCHLORIDE 1 MG: 1 INJECTION, SOLUTION INTRAMUSCULAR; INTRAVENOUS; SUBCUTANEOUS at 09:01

## 2024-01-04 RX ADMIN — Medication 10 ML: at 04:01

## 2024-01-04 NOTE — ASSESSMENT & PLAN NOTE
HX of fibromyalgia and chronic back pain  Resume home pain regimen  Utilize IV narcotic pain medicine for breakthrough pain only  Monitor

## 2024-01-04 NOTE — PROGRESS NOTES
[facility] Medicine  Progress Note    Patient Name: Pili Teixeira  MRN: [id number]  Patient Class: OP- Observation   Admission Date: 1/2/2024  Length of Stay: 0 days  Attending Physician: Ciara Gordillo MD  Primary Care Provider: LUÍS Huggins MD        Subjective:     Principal Problem:Small bowel obstruction        HPI:  Pili Teixeira is a 77 year old female with a past medical history of anemia, COPD, CAD, TIA on Plavix, HTN, HLD, DM, fibromyalgia, GERD, REJI, NICOLAS with iron transfusions, abdominal hernia, and diverticulosis who presented with moderate midepigastric abdominal pain that radiates into the right upper quadrant and back associated with constipation and nausea x2 weeks. Denies chest pain, shortness of breath, fever, chills, cough, dizziness, lightheadedness, vomiting, hematuria, diarrhea, hematochezia, or LOC. She reports receiving a CT 2 weeks ago at [facility] which showed constipation. She began taking laxatives with little relief. ED work-up reveals CBC unremarkable, CMP shows mildly elevated lipase 78, and UA positive . Abdominal/pelvis CT concerning for ileus/enteritis or low-grade partial obstruction. Started on IV rocephin, maintenance fluids,  and consult placed to general surgery, Dr. Guidry. Admitted to hospital medicine for further evaluation and treatment.                Overview/Hospital Course:  Patient was met in the hospital with partial small bowel obstruction.  The patient was monitored closely throughout hospital stay.  General surgery was consulted on admission.  Patient was hypomagnesemia and replacement magnesium ordered.  UA significant for UTI and patient was started on Rocephin.  Urine culture in process.  Diet was advanced to CLD per surgery recommendations.    Interval History:  Sitting up in bed, awake and alert.  Nursing bedside giving morning meds.  Requests to get back on her home pain medication for her neck.  Having same  abdominal pain.  Urine culture still pending.    Review of Systems   Constitutional:  Positive for fatigue. Negative for activity change and chills.   Respiratory:  Negative for cough, chest tightness and shortness of breath.    Cardiovascular:  Negative for chest pain, palpitations and leg swelling.   Gastrointestinal:  Positive for abdominal pain (RLQ pain that radiates around to the back.) and constipation. Negative for diarrhea, nausea and vomiting.   Genitourinary:  Negative for decreased urine volume, dysuria and hematuria.   Musculoskeletal:  Positive for arthralgias and neck pain (Request home pain regimen). Negative for back pain, gait problem and myalgias.   Skin:  Negative for color change and wound.   Neurological:  Positive for weakness (Generalized). Negative for dizziness, speech difficulty and numbness.   Psychiatric/Behavioral:  Negative for agitation, confusion, hallucinations and sleep disturbance.      Objective:     Vital Signs (Most Recent):  Temp: 98 °F (36.7 °C) (01/04/24 0849)  Pulse: (!) 58 (01/04/24 0849)  Resp: 17 (01/04/24 0940)  BP: (!) 153/70 (01/04/24 0849)  SpO2: 99 % (01/04/24 0849) Vital Signs (24h Range):  Temp:  [97.6 °F (36.4 °C)-98 °F (36.7 °C)] 98 °F (36.7 °C)  Pulse:  [54-67] 58  Resp:  [16-18] 17  SpO2:  [95 %-100 %] 99 %  BP: (136-183)/(63-86) 153/70     Weight: 52 kg (114 lb 10.2 oz)  Body mass index is 20.31 kg/m².    Intake/Output Summary (Last 24 hours) at 1/4/2024 1108  Last data filed at 1/4/2024 0535  Gross per 24 hour   Intake 2607.88 ml   Output --   Net 2607.88 ml         Physical Exam  Constitutional:       General: She is not in acute distress.     Appearance: Normal appearance. She is normal weight.   HENT:      Head: Normocephalic and atraumatic.      Nose: Nose normal.      Mouth/Throat:      Mouth: Mucous membranes are moist.      Pharynx: Oropharynx is clear.   Eyes:      Extraocular Movements: Extraocular movements intact.      Conjunctiva/sclera:  Conjunctivae normal.      Pupils: Pupils are equal, round, and reactive to light.   Cardiovascular:      Rate and Rhythm: Normal rate and regular rhythm.      Pulses: Normal pulses.      Heart sounds: Normal heart sounds.   Pulmonary:      Effort: Pulmonary effort is normal. No respiratory distress.      Breath sounds: Normal breath sounds.   Abdominal:      General: Abdomen is flat. Bowel sounds are normal. There is no distension.      Palpations: Abdomen is soft.      Tenderness: There is abdominal tenderness (RLQ area). There is no guarding or rebound.   Musculoskeletal:         General: No swelling or tenderness. Normal range of motion.      Cervical back: Normal range of motion and neck supple. No tenderness.      Right lower leg: No edema.      Left lower leg: No edema.   Skin:     General: Skin is warm and dry.      Capillary Refill: Capillary refill takes less than 2 seconds.      Findings: No lesion.   Neurological:      General: No focal deficit present.      Mental Status: She is alert and oriented to person, place, and time.      Motor: Weakness (Generalized) present.   Psychiatric:         Mood and Affect: Mood normal.         Behavior: Behavior normal.         Thought Content: Thought content normal.         Judgment: Judgment normal.             Significant Labs: All pertinent labs within the past 24 hours have been reviewed.  CBC:   Recent Labs   Lab 01/02/24  1849 01/03/24  0405 01/04/24  0441   WBC 7.08 6.39 6.73   HGB 12.1 10.3* 10.3*   HCT 37.3 32.0* 32.1*    193 180     CMP:   Recent Labs   Lab 01/02/24  1849 01/03/24  0405 01/04/24  0441    140 139   K 3.7 3.3* 3.5    106 108   CO2 26 26 22*   GLU 79 97 88   BUN 21 17 11   CREATININE 0.8 0.7 0.7   CALCIUM 9.9 8.8 8.7   PROT 7.1 5.8* 5.4*   ALBUMIN 4.1 3.4* 2.9*   BILITOT 0.4 0.4 0.4   ALKPHOS 73 51* 48*   AST 17 15 14   ALT 13 10 11   ANIONGAP 10 8 9     POCT Glucose:   Recent Labs   Lab 01/03/24  1131 01/03/24 2056  01/04/24  0737   POCTGLUCOSE 82 81 78     Microbiology Results (last 7 days)       Procedure Component Value Units Date/Time    Urine culture [[id number]] Collected: 01/02/24 2045    Order Status: Completed Specimen: Urine Updated: 01/04/24 0827     Urine Culture, Routine No growth to date    Narrative:      Specimen Source->Urine              Significant Imaging: I have reviewed all pertinent imaging results/findings within the past 24 hours.    Assessment/Plan:      * Small bowel obstruction  General surgeon, Dr. Wood quiñonez, following  Tolerating clear liquid diet  Consult PT to mobilize  Monitor      Acute cystitis with hematuria  Urine culture negative so far   Continue IV Rocephin  Await final urine culture      Hypomagnesemia  Patient has Abnormal Magnesium: hypomagnesemia. Will continue to monitor electrolytes closely. Will replace the affected electrolytes and repeat labs to be done after interventions completed. The patient's magnesium results have been reviewed and are listed below.  Recent Labs   Lab 01/04/24  0441   MG 1.3*        Chronic pain syndrome  HX of fibromyalgia and chronic back pain  Resume home pain regimen  Utilize IV narcotic pain medicine for breakthrough pain only  Monitor      Coronary artery disease involving native coronary artery of native heart without angina pectoris  Patient with known CAD, which is controlled Will continue Statin and monitor for S/Sx of angina/ACS. Continue to monitor on telemetry.       Unknown while patient is on Plavix  Plavix on hold in case patient needs possible surgical intervention    Type 2 diabetes mellitus, without long-term current use of insulin    Patient's FSGs are controlled on current medication regimen.  Last A1c reviewed-   Lab Results   Component Value Date    HGBA1C 5.9 09/01/2023     Most recent fingerstick glucose reviewed-   Recent Labs   Lab 01/03/24  1131 01/03/24 2056 01/04/24  0737   POCTGLUCOSE 82 81 78     Current correctional scale   Low  Maintain anti-hyperglycemic dose as follows-   Antihyperglycemics (From admission, onward)      Start     Stop Route Frequency Ordered    01/03/24 0421  insulin aspart U-100 pen 0-5 Units         -- SubQ Before meals & nightly PRN 01/03/24 0324          Hold Oral hypoglycemics while patient is in the hospital.      Chronic diastolic heart failure  Chronic/stable.  Not on chronic medications.  Monitor for volume overload      Hyperlipidemia associated with type 2 diabetes mellitus  Chronic condition noted  Continue statin      Hypertension associated with diabetes   Chronic, better controlled today.  Did not require any IV antihypertensive during the night.  Latest blood pressure and vitals reviewed-     Temp:  [97.6 °F (36.4 °C)-98 °F (36.7 °C)]   Pulse:  [54-67]   Resp:  [16-18]   BP: (136-183)/(63-86)   SpO2:  [95 %-100 %] .     Not on Home meds for hypertension      While in the hospital, will manage blood pressure as follows;   Will utilize p.r.n. blood pressure medication only if patient's blood pressure greater than 180/110 and she develops symptoms such as worsening chest pain or shortness of breath.         COPD (chronic obstructive pulmonary disease)  Patient's COPD is controlled currently.  Patient is currently off COPD Pathway. Continue scheduled inhalers and Supplemental oxygen prn and monitor respiratory status closely.     Iron deficiency anemia  Patient's anemia is currently controlled. Has not received any PRBCs to date. Etiology likely d/t Iron deficiency  Current CBC reviewed-   Lab Results   Component Value Date    HGB 10.3 (L) 01/04/2024    HCT 32.1 (L) 01/04/2024     Monitor serial CBC and transfuse if patient becomes hemodynamically unstable, symptomatic or H/H drops below 7/21.    GERD (gastroesophageal reflux disease)  Chronic/stable  Transition to oral Protonix        VTE Risk Mitigation (From admission, onward)           Ordered     Place sequential compression device  Until  discontinued         01/03/24 0324                    Discharge Planning   MONSTER: 1/5/2024     Code Status: Full Code   Is the patient medically ready for discharge?:     Reason for patient still in hospital (select all that apply): Patient trending condition and Treatment  Discharge Plan A: Home with family                  Wilda Murry NP  Department of Hospital Medicine   [place]/Surg

## 2024-01-04 NOTE — PT/OT/SLP EVAL
Physical Therapy Evaluation and Discharge Note    Patient Name:  Pili Teixeira   MRN:  [id number]    Recommendations:     Discharge Recommendations: No Therapy Indicated  Discharge Equipment Recommendations: none   Barriers to discharge: None    Assessment:     Pili Teixeira is a 78 y.o. female admitted with a medical diagnosis of Small bowel obstruction. .  At this time, patient is functioning at their prior level of function and does not require further acute PT services.     Recent Surgery: * No surgery found *      Plan:     During this hospitalization, patient does not require further acute PT services.  Please re-consult if situation changes.      Subjective     Chief Complaint: fatigue  Patient/Family Comments/goals: none given  Pain/Comfort:  Pain Rating 1: 8/10  Location - Side 1: Bilateral  Location - Orientation 1: anterior  Location 1: abdomen  Pain Addressed 1: Reposition, Cessation of Activity  Pain Rating Post-Intervention 1: 8/10    Patients cultural, spiritual, [employer] conflicts given the current situation:      Living Environment:  Currently lives with a friend in a 1 story home.  Prior to admission, patients level of function was independent.  Equipment used at home: none.  DME owned (not currently used): none.  Upon discharge, patient will have assistance from friend.    Objective:     Communicated with nurse prior to session.  Patient found supine with bed alarm, peripheral IV, telemetry upon PT entry to room.    General Precautions: Standard, fall    Orthopedic Precautions:N/A   Braces:    Respiratory Status: Room air    Exams:  RLE ROM: WFL  RLE Strength: WNL  LLE ROM: WFL  LLE Strength: Deficits: 4+/5 overall    Functional Mobility:  Bed Mobility:     Supine to Sit: independence  Sit to Supine: independence  Transfers:     Sit to Stand:  independence with no AD  Gait: x 250 feet no Ad SBA    AM-PAC 6 CLICK MOBILITY  Total Score:24       Treatment and Education:  None given    AM-PAC 6  CLICK MOBILITY  Total Score:24     Patient left supine with call button in reach, bed alarm on, and nurse notified.    GOALS:   Multidisciplinary Problems       Physical Therapy Goals       Not on file                    History:     Past Medical History:   Diagnosis Date    Allergy     Anemia 2012    with thrombocytopenia; iron deficiency    Arthritis     Cervical spinal stenosis     with neuropathy, chronic neck,back,leg pain    Colon polyp     COPD (chronic obstructive pulmonary disease)     Coronary artery disease     COVID 4/27/2022    COVID-19     Diabetes mellitus     , sugars run 190's per patient    Diastolic dysfunction 7/13/2015    Diverticulitis     Diverticulosis     Hx diverticulitis,still has chronic diarrhea, abd pain    Dyspnea on exertion     sometimes with nausea, fatigue,dizziness, had cardiac cath June 2012, normal    Fibromyalgia     Fracture 2012    right thumb    GERD (gastroesophageal reflux disease)     Feb 2012, Hx H Pylori    Glaucoma     had LAser surgey, on no eye drops    HEARING LOSS     Hemangioma     fatty liver    History of earache     frequent    History of TIA (transient ischemic attack) 5/28/2013    Hyperlipidemia     Hypertension     Hypertension associated with diabetes 3/14/2017    Laryngeal polyp     Myocardial infarction 2006 last    also MI in distant past    REJI (obstructive sleep apnea)     does not use CPAP    Otitis media     Pneumonia due to COVID-19 virus 2/20/2021    Renal stone     Sjogren's syndrome     SOB (shortness of breath) 6/8/2012    2012 [facility] 1. Normal coronary arteries. 2. Normal single renal arteries bilaterally. 3. Diastolic dysfunction.     Stroke     TIA, now on Plavix    TIA (transient ischemic attack)     TMJ disorder     Type II or unspecified type diabetes mellitus without mention of complication, uncontrolled 5/8/2014    UTI (lower urinary tract infection)     frequent    Venous insufficiency     with Hx blood clot in leg       Past Surgical  History:   Procedure Laterality Date    ABDOMINAL SURGERY  2010 x2    expoloratory lap for pelvic cyst,adhesions; partial colonectomy     adhesiolysis   10/11/2012    CARDIAC CATHETERIZATION      no current blockages.    CHOLECYSTECTOMY      COLON SURGERY      r/t diverticuli    COLONOSCOPY  08/2014    repeat in 5 years    COLONOSCOPY N/A 1/7/2020    Procedure: COLONOSCOPY;  Surgeon: Kb Nguyen MD;  Location: [facility];  Service: Endoscopy;  Laterality: N/A;    COLONOSCOPY N/A 3/13/2023    Procedure: COLONOSCOPY;  Surgeon: Jarrod Frost MD;  Location: [facility];  Service: Endoscopy;  Laterality: N/A;    ENDOSCOPIC ULTRASOUND OF UPPER GASTROINTESTINAL TRACT N/A 1/13/2021    Procedure: ULTRASOUND, UPPER GI TRACT, ENDOSCOPIC;  Surgeon: Sonido Castellano III, MD;  Location: [facility];  Service: Endoscopy;  Laterality: N/A;    EPIDURAL BLOCK INJECTION  5/15/12    back,neck for pain    ESOPHAGOGASTRODUODENOSCOPY N/A 1/7/2020    Procedure: EGD (ESOPHAGOGASTRODUODENOSCOPY);  Surgeon: Kb Nguyen MD;  Location: [facility];  Service: Endoscopy;  Laterality: N/A;    ESOPHAGOGASTRODUODENOSCOPY N/A 1/13/2021    Procedure: EGD (ESOPHAGOGASTRODUODENOSCOPY);  Surgeon: Sonido Castellano III, MD;  Location: [facility];  Service: Endoscopy;  Laterality: N/A;    ESOPHAGOGASTRODUODENOSCOPY N/A 3/13/2023    Procedure: EGD (ESOPHAGOGASTRODUODENOSCOPY);  Surgeon: Jarrod Frost MD;  Location: [facility];  Service: Endoscopy;  Laterality: N/A;    ESOPHAGOGASTRODUODENOSCOPY N/A 5/5/2023    Procedure: EGD (ESOPHAGOGASTRODUODENOSCOPY);  Surgeon: Noel Caputo MD;  Location: [facility];  Service: Endoscopy;  Laterality: N/A;    EXPLORATORY LAPAROTOMY W/ BOWEL RESECTION  10/11/2012    EYE SURGERY      Laser for glaucoma    EYE SURGERY  May 2012    cataracts    HYSTERECTOMY      INTRALUMINAL GASTROINTESTINAL TRACT IMAGING VIA CAPSULE N/A 5/23/2023    Procedure: IMAGING PROCEDURE, GI TRACT, INTRALUMINAL, VIA CAPSULE;   Surgeon: Noel Caputo MD;  Location: [facility];  Service: Endoscopy;  Laterality: N/A;    LARYNX SURGERY      polypectomy    OOPHORECTOMY      TONSILLECTOMY      with adenoidectomy    UPPER GASTROINTESTINAL ENDOSCOPY  12/2015    VASCULAR SURGERY      laser to varicose vein leg       Time Tracking:     PT Received On: 01/04/24  PT Start Time: 1342     PT Stop Time: 1359  PT Total Time (min): 17 min     Billable Minutes: Evaluation 17      01/04/2024

## 2024-01-04 NOTE — ASSESSMENT & PLAN NOTE
General surgeon, Dr. Wood quiñonez, following  Tolerating clear liquid diet  Consult PT to mobilize  Monitor

## 2024-01-04 NOTE — PLAN OF CARE
Plan of care reviewed with patient. Verbalized understanding. IV intact and patent with fluids infusing. No redness or swelling. Tolerating clear liquids with no nausea noted. Pain managed with prn medications. Glucose monitored and covered if needed with sliding scale insulin. Patient alert and able to make needs known. Safety maintained. Call light in reach and instructed to call for assistance. Will continue to monitor.

## 2024-01-04 NOTE — ASSESSMENT & PLAN NOTE
Patient with known CAD, which is controlled Will continue Statin and monitor for S/Sx of angina/ACS. Continue to monitor on telemetry.       Unknown while patient is on Plavix  Plavix on hold in case patient needs possible surgical intervention

## 2024-01-04 NOTE — ASSESSMENT & PLAN NOTE
Patient has Abnormal Magnesium: hypomagnesemia. Will continue to monitor electrolytes closely. Will replace the affected electrolytes and repeat labs to be done after interventions completed. The patient's magnesium results have been reviewed and are listed below.  Recent Labs   Lab 01/04/24  0441   MG 1.3*

## 2024-01-04 NOTE — CARE UPDATE
01/04/24 0831   Patient Assessment/Suction   Level of Consciousness (AVPU) alert   Respiratory Effort Unlabored   PRE-TX-O2   Device (Oxygen Therapy) room air   SpO2 98 %   Pulse Oximetry Type Intermittent   $ Pulse Oximetry - Multiple Charge Pulse Oximetry - Multiple

## 2024-01-04 NOTE — ASSESSMENT & PLAN NOTE
Chronic, better controlled today.  Did not require any IV antihypertensive during the night.  Latest blood pressure and vitals reviewed-     Temp:  [97.6 °F (36.4 °C)-98 °F (36.7 °C)]   Pulse:  [54-67]   Resp:  [16-18]   BP: (136-183)/(63-86)   SpO2:  [95 %-100 %] .     Not on Home meds for hypertension      While in the hospital, will manage blood pressure as follows;   Will utilize p.r.n. blood pressure medication only if patient's blood pressure greater than 180/110 and she develops symptoms such as worsening chest pain or shortness of breath.

## 2024-01-04 NOTE — CONSULTS
GENERAL SURGERY  INPATIENT CONSULT    REASON FOR CONSULT: Partial small-bowel obstruction    HPI: Pili Teixeira is a 78 y.o. female with history COPD, CAD, TIA on Plavix, hypertension, hyperlipidemia, diabetes, REJI, anemia, GERD, diverticular disease, history of multiple abdominal surgeries including colectomy with known incisional hernias who presented with a 2 week history of generalized abdominal pain mostly in the midepigastric region radiating to the right upper quadrant and back associated with constipation and nausea.  Previously was seen at [facility] ER where CT scan was mostly unremarkable other than chronic conditions and she was felt to have constipation. Returned to [facility] ER for ongoing symptoms. Labs were unremarkable however UA was positive.  Underwent repeat CT scan concerning for ileus versus enteritis versus low-grade partial small-bowel obstruction. She has been started on antibiotics for possible UTI.  General surgery consulted for possible partial obstruction. Patient reports ongoing abdominal discomfort.  Has passed flatus and had small pebble like bowel movements.  Reports previously when she took MiraLax she had loose stools.  No significant relief in pain with bowel movements. Does have history of chronic back pain is concerned that she may possibly have herniated disc leading to her discomfort.  She has known incisional hernias which contained bowel but do not show signs of incarceration on exam or on imaging. She has had her gallbladder removed as well as a total abdominal hysterectomy.    I have reviewed the patient's chart including prior progress notes, procedures and testing.     ROS:   Review of Systems    PROBLEM LIST:  Patient Active Problem List   Diagnosis    GERD (gastroesophageal reflux disease)    REJI (obstructive sleep apnea)    DJD (degenerative joint disease), lumbosacral    Pelvic cyst - left    Night sweats    Iron deficiency anemia    Peritoneal adhesions (postoperative)  (postinfection)    History of TIA (transient ischemic attack)    COPD (chronic obstructive pulmonary disease)    Insomnia    Change in bowel habits    Diastolic dysfunction    NICOLAS (iron deficiency anemia)    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    Fibromyalgia    Elevated lipase    Hernia of anterior abdominal wall    Ventral incisional hernia    Right inguinal hernia    Hypertension associated with diabetes    Small bowel obstruction    Noncompliance    Hyperlipidemia associated with type 2 diabetes mellitus    Type 2 diabetes mellitus, without long-term current use of insulin    Chronic pain, neck, back, leg on home narcotics    Pulmonary nodule    Mild neurocognitive 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    Adjustment disorder with mixed anxiety and depressed mood    Gait abnormality    Abdominal aortic atherosclerosis    CAD (coronary artery disease)    Glaucoma         HISTORY  Past Medical History:   Diagnosis Date    Allergy     Anemia 2012    with thrombocytopenia; iron deficiency    Arthritis     Cervical spinal stenosis     with neuropathy, chronic neck,back,leg pain    Colon polyp     COPD (chronic obstructive pulmonary disease)     Coronary artery disease     COVID 4/27/2022    COVID-19     Diabetes mellitus     , sugars run 190's per patient    Diastolic dysfunction 7/13/2015    Diverticulitis     Diverticulosis     Hx diverticulitis,still has chronic diarrhea, abd pain    Dyspnea on exertion     sometimes with nausea, fatigue,dizziness, had cardiac cath June 2012, normal    Fibromyalgia     Fracture 2012    right thumb    GERD (gastroesophageal reflux disease)     Feb 2012, Hx H Pylori    Glaucoma     had LAser surgey, on no eye drops    HEARING LOSS     Hemangioma     fatty liver    History of earache     frequent    History of TIA (transient ischemic attack) 5/28/2013    Hyperlipidemia     Hypertension     Hypertension associated with diabetes 3/14/2017    Laryngeal polyp     Myocardial infarction 2006 last     also MI in distant past    REJI (obstructive sleep apnea)     does not use CPAP    Otitis media     Pneumonia due to COVID-19 virus 2/20/2021    Renal stone     Sjogren's syndrome     SOB (shortness of breath) 6/8/2012    [address] [place] [address] 1. Normal coronary arteries. 2. Normal single renal arteries bilaterally. 3. Diastolic dysfunction.     Stroke     TIA, now on Plavix    TIA (transient ischemic attack)     TMJ disorder     Type II or unspecified type diabetes mellitus without mention of complication, uncontrolled 5/8/2014    UTI (lower urinary tract infection)     frequent    Venous insufficiency     with Hx blood clot in leg       Past Surgical History:   Procedure Laterality Date    ABDOMINAL SURGERY  2010 x2    expoloratory lap for pelvic cyst,adhesions; partial colonectomy     adhesiolysis   10/11/2012    CARDIAC CATHETERIZATION      no current blockages.    CHOLECYSTECTOMY      COLON SURGERY      r/t diverticuli    COLONOSCOPY  08/2014    repeat in 5 years    COLONOSCOPY N/A 1/7/2020    Procedure: COLONOSCOPY;  Surgeon: Kb Nguyen MD;  Location: [facility];  Service: Endoscopy;  Laterality: N/A;    COLONOSCOPY N/A 3/13/2023    Procedure: COLONOSCOPY;  Surgeon: Jarrod Frost MD;  Location: [facility];  Service: Endoscopy;  Laterality: N/A;    ENDOSCOPIC ULTRASOUND OF UPPER GASTROINTESTINAL TRACT N/A 1/13/2021    Procedure: ULTRASOUND, UPPER GI TRACT, ENDOSCOPIC;  Surgeon: Sonido Castellano III, MD;  Location: [facility];  Service: Endoscopy;  Laterality: N/A;    EPIDURAL BLOCK INJECTION  5/15/12    back,neck for pain    ESOPHAGOGASTRODUODENOSCOPY N/A 1/7/2020    Procedure: EGD (ESOPHAGOGASTRODUODENOSCOPY);  Surgeon: Kb Nguyen MD;  Location: [facility];  Service: Endoscopy;  Laterality: N/A;    ESOPHAGOGASTRODUODENOSCOPY N/A 1/13/2021    Procedure: EGD (ESOPHAGOGASTRODUODENOSCOPY);  Surgeon: Sonido Castellano III, MD;  Location: [facility];  Service: Endoscopy;  Laterality: N/A;     ESOPHAGOGASTRODUODENOSCOPY N/A 3/13/2023    Procedure: EGD (ESOPHAGOGASTRODUODENOSCOPY);  Surgeon: Jarrod Frost MD;  Location: [facility] ENDO;  Service: Endoscopy;  Laterality: N/A;    ESOPHAGOGASTRODUODENOSCOPY N/A 5/5/2023    Procedure: EGD (ESOPHAGOGASTRODUODENOSCOPY);  Surgeon: Noel Caputo MD;  Location: [facility] ENDO;  Service: Endoscopy;  Laterality: N/A;    EXPLORATORY LAPAROTOMY W/ BOWEL RESECTION  10/11/2012    EYE SURGERY      Laser for glaucoma    EYE SURGERY  May 2012    cataracts    HYSTERECTOMY      INTRALUMINAL GASTROINTESTINAL TRACT IMAGING VIA CAPSULE N/A 5/23/2023    Procedure: IMAGING PROCEDURE, GI TRACT, INTRALUMINAL, VIA CAPSULE;  Surgeon: Noel Caputo MD;  Location: [facility] ENDO;  Service: Endoscopy;  Laterality: N/A;    LARYNX SURGERY      polypectomy    OOPHORECTOMY      TONSILLECTOMY      with adenoidectomy    UPPER GASTROINTESTINAL ENDOSCOPY  12/2015    VASCULAR SURGERY      laser to varicose vein leg       Social History     Tobacco Use    Smoking status: Never    Smokeless tobacco: Never   Substance Use Topics    Alcohol use: No    Drug use: Yes     Types: Oxycodone       Family History   Problem Relation Age of Onset    Throat cancer Mother     Cancer Mother     Diabetes Mellitus Mother     Stroke Father     Hypertension Father     Heart disease Father     Diverticulitis Sister     Throat cancer Brother     Cancer Brother     Thyroid disease Daughter     Lupus Daughter     Pancreatic cancer Maternal Aunt 55    Pancreatic cancer Cousin 58    Breast cancer Neg Hx     Ovarian cancer Neg Hx     Colon cancer Neg Hx     Colon polyps Neg Hx     Celiac disease Neg Hx     Cirrhosis Neg Hx     Crohn's disease Neg Hx     Stomach cancer Neg Hx     Ulcerative colitis Neg Hx     Rectal cancer Neg Hx     Esophageal cancer Neg Hx     Inflammatory bowel disease Neg Hx     Rheum arthritis Neg Hx     Psoriasis Neg Hx     Osteoarthritis Neg Hx     Kidney disease Neg Hx     Hyperlipidemia Neg Hx      COPD Neg Hx     Depression Neg Hx     Chronic back pain Neg Hx     Asthma Neg Hx     Alcohol abuse Neg Hx          MEDS:  No current facility-administered medications on file prior to encounter.     Current Outpatient Medications on File Prior to Encounter   Medication Sig Dispense Refill    carboxymethylcellulose sodium (LUBRICANT EYE DROPS OPHT) Place 1 drop into both eyes daily as needed (dry eyes).      cetirizine (ZYRTEC) 5 MG tablet Take 1 tablet (5 mg total) by mouth once daily. for 5 days 5 tablet 0    clopidogreL (PLAVIX) 75 mg tablet Take 1 tablet (75 mg total) by mouth every Mon, Wed, Fri. 90 tablet 4    cyanocobalamin (VITAMIN B-12) 1000 MCG tablet Take 100 mcg by mouth once daily.      diclofenac sodium 1 % Gel Apply 2 g topically once daily.      docusate sodium (STOOL SOFTENER ORAL) Take 1 capsule by mouth every evening.      famotidine (PEPCID) 40 MG tablet TAKE 1 TABLET BY MOUTH ONCE DAILY IN THE EVENING 90 tablet 1    fluticasone propionate (FLONASE) 50 mcg/actuation nasal spray USE 2 SPRAYS IN EACH NOSTRIL ONE TIME PER DAY (Patient taking differently: 2 sprays by Each Nostril route Daily. USE 2 SPRAYS IN EACH NOSTRIL ONE TIME PER DAY) 18.2 mL 5    folic acid (FOLVITE) 1 MG tablet Take 1 tablet by mouth once daily (Patient taking differently: Take 1,000 mcg by mouth once daily.) 30 tablet 0    Lactobac no.41/Bifidobact no.7 (PROBIOTIC-10 ORAL) Take 1 capsule by mouth Daily.      metFORMIN (GLUCOPHAGE) 500 MG tablet TAKE 1 TABLET BY MOUTH TWICE DAILY WITH MEALS (Patient taking differently: Take 500 mg by mouth 2 (two) times daily with meals.) 180 tablet 0    multivit with min-folic acid 200 mcg Chew Take 1 tablet by mouth once daily.       olopatadine (PATANOL) 0.1 % ophthalmic solution Place 1 drop into both eyes daily as needed for Allergies.      ondansetron (ZOFRAN-ODT) 4 MG TbDL Take 1 tablet (4 mg total) by mouth every 8 (eight) hours as needed (nausea). 30 tablet 1    oxyCODONE-acetaminophen  (PERCOCET) 7.5-325 mg per tablet Take 1 tablet by mouth 4 (four) times daily as needed for Pain.      pantoprazole (PROTONIX) 40 MG tablet Take 1 tablet (40 mg total) by mouth once daily. 90 tablet 2    polyethylene glycol (GLYCOLAX) 17 gram PwPk Take 17 g by mouth once daily. 60 each 0    rosuvastatin (CRESTOR) 10 MG tablet TAKE 1 TABLET BY MOUTH ONCE DAILY IN THE EVENING (Patient taking differently: Take 10 mg by mouth every evening.) 90 tablet 0    TRADJENTA 5 mg Tab tablet Take 1 tablet by mouth once daily (Patient taking differently: Take 5 mg by mouth once daily.) 90 tablet 0    blood sugar diagnostic (ACCU-CHEK GUIDE TEST STRIPS) Strp USE TO TEST BLOOD GLUCOSE ONE TIME DAILY AS DIRECTED. 100 each 3    blood-glucose meter kit To check BG 2 times daily, to use with insurance preferred meter. Medical necessity. 1 each 0    ipratropium (ATROVENT) 42 mcg (0.06 %) nasal spray 2 sprays by Each Nostril route 3 (three) times daily. (Patient not taking: Reported on 1/3/2024) 15 mL 3    lancets (ACCU-CHEK SOFTCLIX LANCETS) Misc Test TWICE DAILY;Twice a day 100 each 3    NARCAN 4 mg/actuation Spry as directed      pregabalin (LYRICA) 150 MG capsule Take 1 capsule (150 mg total) by mouth 2 (two) times daily. (Patient not taking: Reported on 1/3/2024) 60 capsule 2    traZODone (DESYREL) 50 MG tablet TAKE 1 TABLET BY MOUTH IN THE EVENING (MAY  TAKE  AN  ADDITIONAL  TABLET  AS  NEEDED) (Patient not taking: Reported on 1/3/2024) 90 tablet 0    triamcinolone acetonide 0.1% (KENALOG) 0.1 % ointment Apply topically 2 (two) times daily. for 14 days (Patient not taking: Reported on 1/3/2024) 30 g 0    [DISCONTINUED] albuterol (PROVENTIL/VENTOLIN HFA) 90 mcg/actuation inhaler Inhale 2 puffs into the lungs every 4 (four) hours as needed for Wheezing or Shortness of Breath. Rescue 18 g 0    [DISCONTINUED] albuterol-ipratropium (DUO-NEB) 2.5 mg-0.5 mg/3 mL nebulizer solution Take 3 mLs by nebulization every 4 to 6 hours as needed for  Wheezing. Rescue      [DISCONTINUED] doxepin (SINEQUAN) 10 MG capsule TAKE 1 CAPSULE BY MOUTH EVERY NIGHT AS NEEDED      [DISCONTINUED] polyethylene glycol (GOLYTELY) 236-22.74-6.74 -5.86 gram suspension AS DIRECTED      [DISCONTINUED] sodium chloride (SALINE NASAL NASL) by Nasal route.         ALLERGIES:  Review of patient's allergies indicates:   Allergen Reactions    Codeine Other (See Comments)     Chest pain    Clindamycin Other (See Comments)     Difficulty swallowing after taking, feels a something sits in epigastric area     Iodinated contrast media Hives and Rash         VITALS:  Temp:  [98 °F (36.7 °C)-98.4 °F (36.9 °C)] 98 °F (36.7 °C)  Pulse:  [] 63  Resp:  [17-18] 18  SpO2:  [47 %-100 %] 100 %  BP: (143-188)/() 146/69    No intake/output data recorded.      PHYSICAL EXAM:  Physical Exam  Vitals reviewed.   Constitutional:       General: She is not in acute distress.     Appearance: Normal appearance. She is well-developed.   HENT:      Head: Normocephalic and atraumatic.      Nose: Nose normal.   Eyes:      General: No scleral icterus.     Conjunctiva/sclera: Conjunctivae normal.   Neck:      Trachea: No tracheal tenderness or tracheal deviation.   Cardiovascular:      Rate and Rhythm: Normal rate and regular rhythm.      Pulses: Normal pulses.   Pulmonary:      Effort: Pulmonary effort is normal. No accessory muscle usage or respiratory distress.      Breath sounds: Normal breath sounds.   Abdominal:      General: There is no distension.      Palpations: Abdomen is soft.      Tenderness: There is no abdominal tenderness.          Comments: Reducible midline incisional hernias, no significant overlying skin changes, positive tenderness palpation however   Musculoskeletal:         General: No swelling or tenderness. Normal range of motion.      Cervical back: Normal range of motion and neck supple. No rigidity.   Skin:     General: Skin is warm and dry.      Coloration: Skin is not jaundiced.       Findings: No erythema.   Neurological:      General: No focal deficit present.      Mental Status: She is alert and oriented to person, place, and time.      Motor: No weakness or abnormal muscle tone.   Psychiatric:         Mood and Affect: Mood normal.         Behavior: Behavior normal.         Thought Content: Thought content normal.         Judgment: Judgment normal.           LABS:  Lab Results   Component Value Date    WBC 6.39 01/03/2024    RBC 3.53 (L) 01/03/2024    HGB 10.3 (L) 01/03/2024    HGB 14.2 09/01/2012    HCT 32.0 (L) 01/03/2024     01/03/2024     Lab Results   Component Value Date    GLU 97 01/03/2024     01/03/2024    K 3.3 (L) 01/03/2024     01/03/2024    CO2 26 01/03/2024    BUN 17 01/03/2024    CREATININE 0.7 01/03/2024    CREATININE 0.9 09/01/2012    CALCIUM 8.8 01/03/2024    CALCIUM 9.9 09/01/2012     Lab Results   Component Value Date    ALT 10 01/03/2024    AST 15 01/03/2024    ALKPHOS 51 (L) 01/03/2024    BILITOT 0.4 01/03/2024     Lab Results   Component Value Date    MG 1.0 (L) 01/03/2024    PHOS 3.3 05/05/2023       STUDIES:  Images and reports were personally reviewed.   FINDINGS:  The liver is of normal size contour and CT density without focal defect.  A gallbladder is not seen.  There is mild dilation of the common bile duct measuring 10 mm with taper in the head of the pancreas.  A mass or stone is not seen.  The pancreas is of normal contour and CT density without edema or mass.  The spleen is of normal size and CT density.     The adrenal glands are not enlarged.  The kidneys are of normal size contour and CT density without mass stone or hydronephrosis.  The abdominal aorta and inferior vena cava are of normal caliber.     The patient has 2 ventral hernias.  The upper hernia is midline in cane intake Anes AP loop of transverse colon the mouth is wide measuring 6 cm.  The more inferior hernia in the midline of the pelvis contains multiple small bowel  loops.  There are multiple surgical clips as well.  The mouth of this hernia is wide measuring 6.5 cm.  No bowel obstruction is identified.  The stomach is of normal configuration.  Small bowel distention or air-fluid levels are not seen.  The colon is otherwise of normal configuration and a normal appendix is seen.  No free fluid or free air is noted.     The bladder is of normal contour without mass or asymmetry.  There is a bowel anastomosis noted in the rectum without recurrent mass seen.  A uterus is not seen.  No free fluid is noted.     Impression:     Two ventral hernias 1 in the lower abdomen containing a loop of transverse colon and 1 in the midline pelvis containing multiple loops of small bowel without evidence of bowel obstruction.  Prior partial resection at the rectosigmoid.  Prior hysterectomy.  Prior cholecystectomy.      ASSESSMENT & PLAN:  78 y.o. female with chronic abdominal pain, nausea, incisional hernias, UTI, possible partial obstruction  -imaging reviewed, no significant changes since imaging obtained at [facility] ER, known incisional hernias present without obvious signs of bowel compromise or incarceration, no definitive transition point identified though there was some mildly dilated loops of small bowel  -currently passing flatus and small hard bowel movements but pain persist   -continue clear liquids, if symptoms fail to improve will obtain Gastrografin challenge in the morning, if not would recommend continued treatment for UTI and follow up with pain management as outpatient   -possible patient could be having some of her pain from hernias however these are reducible showed no sign of bowel compromise based off imaging and on exam, could potentially entertain incisional hernia repair in the future though at this time no immediate surgical intervention recommended  -will continue to follow

## 2024-01-04 NOTE — PLAN OF CARE
Problem: Adult Inpatient Plan of Care  Goal: Plan of Care Review  Outcome: Ongoing, Progressing   Sitting on side of bed. NS infusing at 125 cc/hr into left ac. DDI. No redness or swelling at site. POC and medications reviewed with pt. Verbalized understanding. Call light in reach. Will continue to monitor.   Problem: Adult Inpatient Plan of Care  Goal: Optimal Comfort and Wellbeing  Outcome: Ongoing, Progressing   Q 2 hourly rounds made through out shift and pain, position and IV site monitored. PRN meds given per MD order.   Problem: Diabetes Comorbidity  Goal: Blood Glucose Level Within Targeted Range  Outcome: Ongoing, Progressing   CBGs monitored through out shift per MD order.

## 2024-01-04 NOTE — ASSESSMENT & PLAN NOTE
Patient's FSGs are controlled on current medication regimen.  Last A1c reviewed-   Lab Results   Component Value Date    HGBA1C 5.9 09/01/2023     Most recent fingerstick glucose reviewed-   Recent Labs   Lab 01/03/24  1131 01/03/24 2056 01/04/24  0737   POCTGLUCOSE 82 81 78     Current correctional scale  Low  Maintain anti-hyperglycemic dose as follows-   Antihyperglycemics (From admission, onward)    Start     Stop Route Frequency Ordered    01/03/24 0421  insulin aspart U-100 pen 0-5 Units         -- SubQ Before meals & nightly PRN 01/03/24 0324        Hold Oral hypoglycemics while patient is in the hospital.

## 2024-01-04 NOTE — ASSESSMENT & PLAN NOTE
Patient's anemia is currently controlled. Has not received any PRBCs to date. Etiology likely d/t Iron deficiency  Current CBC reviewed-   Lab Results   Component Value Date    HGB 10.3 (L) 01/04/2024    HCT 32.1 (L) 01/04/2024     Monitor serial CBC and transfuse if patient becomes hemodynamically unstable, symptomatic or H/H drops below 7/21.

## 2024-01-04 NOTE — SUBJECTIVE & OBJECTIVE
Interval History:  Sitting up in bed, awake and alert.  Nursing bedside giving morning meds.  Requests to get back on her home pain medication for her neck.  Having same abdominal pain.  Urine culture still pending.    Review of Systems   Constitutional:  Positive for fatigue. Negative for activity change and chills.   Respiratory:  Negative for cough, chest tightness and shortness of breath.    Cardiovascular:  Negative for chest pain, palpitations and leg swelling.   Gastrointestinal:  Positive for abdominal pain (RLQ pain that radiates around to the back.) and constipation. Negative for diarrhea, nausea and vomiting.   Genitourinary:  Negative for decreased urine volume, dysuria and hematuria.   Musculoskeletal:  Positive for arthralgias and neck pain (Request home pain regimen). Negative for back pain, gait problem and myalgias.   Skin:  Negative for color change and wound.   Neurological:  Positive for weakness (Generalized). Negative for dizziness, speech difficulty and numbness.   Psychiatric/Behavioral:  Negative for agitation, confusion, hallucinations and sleep disturbance.      Objective:     Vital Signs (Most Recent):  Temp: 98 °F (36.7 °C) (01/04/24 0849)  Pulse: (!) 58 (01/04/24 0849)  Resp: 17 (01/04/24 0940)  BP: (!) 153/70 (01/04/24 0849)  SpO2: 99 % (01/04/24 0849) Vital Signs (24h Range):  Temp:  [97.6 °F (36.4 °C)-98 °F (36.7 °C)] 98 °F (36.7 °C)  Pulse:  [54-67] 58  Resp:  [16-18] 17  SpO2:  [95 %-100 %] 99 %  BP: (136-183)/(63-86) 153/70     Weight: 52 kg (114 lb 10.2 oz)  Body mass index is 20.31 kg/m².    Intake/Output Summary (Last 24 hours) at 1/4/2024 1108  Last data filed at 1/4/2024 0535  Gross per 24 hour   Intake 2607.88 ml   Output --   Net 2607.88 ml         Physical Exam  Constitutional:       General: She is not in acute distress.     Appearance: Normal appearance. She is normal weight.   HENT:      Head: Normocephalic and atraumatic.      Nose: Nose normal.      Mouth/Throat:       Mouth: Mucous membranes are moist.      Pharynx: Oropharynx is clear.   Eyes:      Extraocular Movements: Extraocular movements intact.      Conjunctiva/sclera: Conjunctivae normal.      Pupils: Pupils are equal, round, and reactive to light.   Cardiovascular:      Rate and Rhythm: Normal rate and regular rhythm.      Pulses: Normal pulses.      Heart sounds: Normal heart sounds.   Pulmonary:      Effort: Pulmonary effort is normal. No respiratory distress.      Breath sounds: Normal breath sounds.   Abdominal:      General: Abdomen is flat. Bowel sounds are normal. There is no distension.      Palpations: Abdomen is soft.      Tenderness: There is abdominal tenderness (RLQ area). There is no guarding or rebound.   Musculoskeletal:         General: No swelling or tenderness. Normal range of motion.      Cervical back: Normal range of motion and neck supple. No tenderness.      Right lower leg: No edema.      Left lower leg: No edema.   Skin:     General: Skin is warm and dry.      Capillary Refill: Capillary refill takes less than 2 seconds.      Findings: No lesion.   Neurological:      General: No focal deficit present.      Mental Status: She is alert and oriented to person, place, and time.      Motor: Weakness (Generalized) present.   Psychiatric:         Mood and Affect: Mood normal.         Behavior: Behavior normal.         Thought Content: Thought content normal.         Judgment: Judgment normal.             Significant Labs: All pertinent labs within the past 24 hours have been reviewed.  CBC:   Recent Labs   Lab 01/02/24  1849 01/03/24  0405 01/04/24  0441   WBC 7.08 6.39 6.73   HGB 12.1 10.3* 10.3*   HCT 37.3 32.0* 32.1*    193 180     CMP:   Recent Labs   Lab 01/02/24  1849 01/03/24  0405 01/04/24  0441    140 139   K 3.7 3.3* 3.5    106 108   CO2 26 26 22*   GLU 79 97 88   BUN 21 17 11   CREATININE 0.8 0.7 0.7   CALCIUM 9.9 8.8 8.7   PROT 7.1 5.8* 5.4*   ALBUMIN 4.1 3.4* 2.9*    BILITOT 0.4 0.4 0.4   ALKPHOS 73 51* 48*   AST 17 15 14   ALT 13 10 11   ANIONGAP 10 8 9     POCT Glucose:   Recent Labs   Lab 01/03/24  1131 01/03/24 2056 01/04/24  0737   POCTGLUCOSE 82 81 78     Microbiology Results (last 7 days)       Procedure Component Value Units Date/Time    Urine culture [[id number]] Collected: 01/02/24 2045    Order Status: Completed Specimen: Urine Updated: 01/04/24 0827     Urine Culture, Routine No growth to date    Narrative:      Specimen Source->Urine              Significant Imaging: I have reviewed all pertinent imaging results/findings within the past 24 hours.

## 2024-01-05 LAB
ALBUMIN SERPL BCP-MCNC: 3.2 G/DL (ref 3.5–5.2)
ALP SERPL-CCNC: 50 U/L (ref 55–135)
ALT SERPL W/O P-5'-P-CCNC: 14 U/L (ref 10–44)
ANION GAP SERPL CALC-SCNC: 8 MMOL/L (ref 8–16)
AST SERPL-CCNC: 18 U/L (ref 10–40)
BACTERIA UR CULT: NORMAL
BACTERIA UR CULT: NORMAL
BASOPHILS # BLD AUTO: 0.04 K/UL (ref 0–0.2)
BASOPHILS NFR BLD: 0.7 % (ref 0–1.9)
BILIRUB SERPL-MCNC: 0.5 MG/DL (ref 0.1–1)
BUN SERPL-MCNC: 7 MG/DL (ref 8–23)
CALCIUM SERPL-MCNC: 9.2 MG/DL (ref 8.7–10.5)
CHLORIDE SERPL-SCNC: 107 MMOL/L (ref 95–110)
CO2 SERPL-SCNC: 24 MMOL/L (ref 23–29)
CREAT SERPL-MCNC: 0.7 MG/DL (ref 0.5–1.4)
DIFFERENTIAL METHOD BLD: ABNORMAL
EOSINOPHIL # BLD AUTO: 0.1 K/UL (ref 0–0.5)
EOSINOPHIL NFR BLD: 1.5 % (ref 0–8)
ERYTHROCYTE [DISTWIDTH] IN BLOOD BY AUTOMATED COUNT: 12.1 % (ref 11.5–14.5)
EST. GFR  (NO RACE VARIABLE): >60 ML/MIN/1.73 M^2
GLUCOSE SERPL-MCNC: 92 MG/DL (ref 70–110)
HCT VFR BLD AUTO: 35.5 % (ref 37–48.5)
HGB BLD-MCNC: 11.2 G/DL (ref 12–16)
IMM GRANULOCYTES # BLD AUTO: 0.02 K/UL (ref 0–0.04)
IMM GRANULOCYTES NFR BLD AUTO: 0.3 % (ref 0–0.5)
LYMPHOCYTES # BLD AUTO: 2.1 K/UL (ref 1–4.8)
LYMPHOCYTES NFR BLD: 35.4 % (ref 18–48)
MAGNESIUM SERPL-MCNC: 1.4 MG/DL (ref 1.6–2.6)
MCH RBC QN AUTO: 28.9 PG (ref 27–31)
MCHC RBC AUTO-ENTMCNC: 31.5 G/DL (ref 32–36)
MCV RBC AUTO: 92 FL (ref 82–98)
MONOCYTES # BLD AUTO: 0.6 K/UL (ref 0.3–1)
MONOCYTES NFR BLD: 10.1 % (ref 4–15)
NEUTROPHILS # BLD AUTO: 3.1 K/UL (ref 1.8–7.7)
NEUTROPHILS NFR BLD: 52 % (ref 38–73)
NRBC BLD-RTO: 0 /100 WBC
PLATELET # BLD AUTO: 134 K/UL (ref 150–450)
PMV BLD AUTO: 12.1 FL (ref 9.2–12.9)
POCT GLUCOSE: 81 MG/DL (ref 70–110)
POCT GLUCOSE: 83 MG/DL (ref 70–110)
POCT GLUCOSE: 85 MG/DL (ref 70–110)
POCT GLUCOSE: 88 MG/DL (ref 70–110)
POTASSIUM SERPL-SCNC: 3.6 MMOL/L (ref 3.5–5.1)
PROT SERPL-MCNC: 5.8 G/DL (ref 6–8.4)
RBC # BLD AUTO: 3.88 M/UL (ref 4–5.4)
SODIUM SERPL-SCNC: 139 MMOL/L (ref 136–145)
WBC # BLD AUTO: 6.04 K/UL (ref 3.9–12.7)

## 2024-01-05 PROCEDURE — 83735 ASSAY OF MAGNESIUM: CPT

## 2024-01-05 PROCEDURE — G0378 HOSPITAL OBSERVATION PER HR: HCPCS

## 2024-01-05 PROCEDURE — 36415 COLL VENOUS BLD VENIPUNCTURE: CPT

## 2024-01-05 PROCEDURE — 94761 N-INVAS EAR/PLS OXIMETRY MLT: CPT

## 2024-01-05 PROCEDURE — 85025 COMPLETE CBC W/AUTO DIFF WBC: CPT

## 2024-01-05 PROCEDURE — 99213 OFFICE O/P EST LOW 20 MIN: CPT | Mod: ,,, | Performed by: SURGERY

## 2024-01-05 PROCEDURE — 25000003 PHARM REV CODE 250: Performed by: NURSE PRACTITIONER

## 2024-01-05 PROCEDURE — 25000003 PHARM REV CODE 250

## 2024-01-05 PROCEDURE — 63600175 PHARM REV CODE 636 W HCPCS

## 2024-01-05 PROCEDURE — A4216 STERILE WATER/SALINE, 10 ML: HCPCS

## 2024-01-05 PROCEDURE — 99900035 HC TECH TIME PER 15 MIN (STAT)

## 2024-01-05 PROCEDURE — 96376 TX/PRO/DX INJ SAME DRUG ADON: CPT

## 2024-01-05 PROCEDURE — 80053 COMPREHEN METABOLIC PANEL: CPT

## 2024-01-05 RX ORDER — NITROFURANTOIN MACROCRYSTALS 50 MG/1
100 CAPSULE ORAL 2 TIMES DAILY
Status: DISCONTINUED | OUTPATIENT
Start: 2024-01-05 | End: 2024-01-06 | Stop reason: HOSPADM

## 2024-01-05 RX ORDER — NITROFURANTOIN MACROCRYSTALS 50 MG/1
100 CAPSULE ORAL 2 TIMES DAILY
Status: DISCONTINUED | OUTPATIENT
Start: 2024-01-05 | End: 2024-01-05

## 2024-01-05 RX ADMIN — Medication 10 ML: at 02:01

## 2024-01-05 RX ADMIN — OXYCODONE AND ACETAMINOPHEN 1 TABLET: 7.5; 325 TABLET ORAL at 04:01

## 2024-01-05 RX ADMIN — HYDROMORPHONE HYDROCHLORIDE 1 MG: 1 INJECTION, SOLUTION INTRAMUSCULAR; INTRAVENOUS; SUBCUTANEOUS at 08:01

## 2024-01-05 RX ADMIN — PANTOPRAZOLE SODIUM 40 MG: 40 TABLET, DELAYED RELEASE ORAL at 08:01

## 2024-01-05 RX ADMIN — NITROFURANTOIN 100 MG: 50 CAPSULE ORAL at 08:01

## 2024-01-05 RX ADMIN — SODIUM CHLORIDE: 9 INJECTION, SOLUTION INTRAVENOUS at 02:01

## 2024-01-05 RX ADMIN — OXYCODONE AND ACETAMINOPHEN 1 TABLET: 7.5; 325 TABLET ORAL at 02:01

## 2024-01-05 RX ADMIN — ATORVASTATIN CALCIUM 40 MG: 40 TABLET, FILM COATED ORAL at 08:01

## 2024-01-05 RX ADMIN — CEFTRIAXONE SODIUM 2 G: 2 INJECTION, POWDER, FOR SOLUTION INTRAMUSCULAR; INTRAVENOUS at 04:01

## 2024-01-05 RX ADMIN — Medication 800 MG: at 10:01

## 2024-01-05 RX ADMIN — OXYCODONE AND ACETAMINOPHEN 1 TABLET: 7.5; 325 TABLET ORAL at 08:01

## 2024-01-05 RX ADMIN — Medication 800 MG: at 06:01

## 2024-01-05 NOTE — PLAN OF CARE
Plan of care reviewed with patient. Verbalized understanding. IV intact and patent with fluids infusing. No redness or swelling. Tolerating clear liquids with no nausea noted. Pain managed with prn medications. Glucose monitored and covered if needed with sliding scale insulin. Patient is ambulatory to bathroom with standby assistance. Patient alert and able to make needs known. Safety maintained. Call light in reach and instructed to call for assistance. Will continue to monitor.

## 2024-01-05 NOTE — ASSESSMENT & PLAN NOTE
Had BM x1 last shift  General surgeon, Dr. Guidry, following  Tolerating clear liquid diet  PT following  Had Gastrografin and awaiting surgeon to review results and make recommendations.

## 2024-01-05 NOTE — SUBJECTIVE & OBJECTIVE
Interval History:  Sitting up in bed, awake and alert.  Had a BM last night.  No changes in her pain, and now reports having pain to the mid epigastric which radiates across upper abdomen.  Tolerating clear liquid diet.  Awaiting General surgery disposition.        Review of Systems   Constitutional:  Positive for fatigue. Negative for activity change and chills.   Respiratory:  Negative for cough, chest tightness and shortness of breath.    Cardiovascular:  Negative for chest pain, palpitations and leg swelling.   Gastrointestinal:  Positive for abdominal pain (RLQ pain that radiates around to the back.). Negative for constipation, diarrhea, nausea and vomiting.   Genitourinary:  Negative for decreased urine volume, dysuria and hematuria.   Musculoskeletal:  Positive for arthralgias and neck pain (Request home pain regimen). Negative for back pain, gait problem and myalgias.   Skin:  Negative for color change and wound.   Neurological:  Positive for weakness (Generalized). Negative for dizziness, speech difficulty and numbness.   Psychiatric/Behavioral:  Negative for agitation, confusion, hallucinations and sleep disturbance.      Objective:     Vital Signs (Most Recent):  Temp: 97.3 °F (36.3 °C) (01/05/24 1221)  Pulse: 60 (01/05/24 1221)  Resp: 18 (01/05/24 1426)  BP: (!) 173/78 (01/05/24 1221)  SpO2: 98 % (01/05/24 1221) Vital Signs (24h Range):  Temp:  [97.3 °F (36.3 °C)-98.1 °F (36.7 °C)] 97.3 °F (36.3 °C)  Pulse:  [52-60] 60  Resp:  [16-18] 18  SpO2:  [96 %-100 %] 98 %  BP: (163-193)/(72-84) 173/78     Weight: 52 kg (114 lb 10.2 oz)  Body mass index is 20.31 kg/m².    Intake/Output Summary (Last 24 hours) at 1/5/2024 7288  Last data filed at 1/5/2024 0536  Gross per 24 hour   Intake 2738.59 ml   Output --   Net 2738.59 ml           Physical Exam  Constitutional:       General: She is not in acute distress.     Appearance: Normal appearance. She is normal weight.   HENT:      Head: Normocephalic and atraumatic.       Nose: Nose normal.      Mouth/Throat:      Mouth: Mucous membranes are moist.      Pharynx: Oropharynx is clear.   Eyes:      Extraocular Movements: Extraocular movements intact.      Conjunctiva/sclera: Conjunctivae normal.      Pupils: Pupils are equal, round, and reactive to light.   Cardiovascular:      Rate and Rhythm: Normal rate and regular rhythm.      Pulses: Normal pulses.      Heart sounds: Normal heart sounds.   Pulmonary:      Effort: Pulmonary effort is normal. No respiratory distress.      Breath sounds: Normal breath sounds.   Abdominal:      General: Abdomen is flat. Bowel sounds are normal. There is no distension.      Palpations: Abdomen is soft.      Tenderness: There is abdominal tenderness (RLQ area). There is no guarding or rebound.   Musculoskeletal:         General: No swelling or tenderness. Normal range of motion.      Cervical back: Normal range of motion and neck supple. No tenderness.      Right lower leg: No edema.      Left lower leg: No edema.   Skin:     General: Skin is warm and dry.      Capillary Refill: Capillary refill takes less than 2 seconds.      Findings: No lesion.   Neurological:      General: No focal deficit present.      Mental Status: She is alert and oriented to person, place, and time.      Motor: Weakness (Generalized) present.   Psychiatric:         Mood and Affect: Mood normal.         Behavior: Behavior normal.         Thought Content: Thought content normal.         Judgment: Judgment normal.             Significant Labs: All pertinent labs within the past 24 hours have been reviewed.  CBC:   Recent Labs   Lab 01/04/24 0441 01/05/24 0451   WBC 6.73 6.04   HGB 10.3* 11.2*   HCT 32.1* 35.5*    134*       CMP:   Recent Labs   Lab 01/04/24  0441 01/05/24  0451    139   K 3.5 3.6    107   CO2 22* 24   GLU 88 92   BUN 11 7*   CREATININE 0.7 0.7   CALCIUM 8.7 9.2   PROT 5.4* 5.8*   ALBUMIN 2.9* 3.2*   BILITOT 0.4 0.5   ALKPHOS 48* 50*   AST  14 18   ALT 11 14   ANIONGAP 9 8       POCT Glucose:   Recent Labs   Lab 01/04/24 2058 01/05/24  0809 01/05/24  1130   POCTGLUCOSE 78 85 83       Microbiology Results (last 7 days)       Procedure Component Value Units Date/Time    Urine culture [[id number]] Collected: 01/02/24 2045    Order Status: Completed Specimen: Urine Updated: 01/05/24 0816     Urine Culture, Routine Multiple organisms isolated. None in predominance.  Repeat if      clinically necessary.    Narrative:      Specimen Source->Urine              Significant Imaging: I have reviewed all pertinent imaging results/findings within the past 24 hours.

## 2024-01-05 NOTE — ASSESSMENT & PLAN NOTE
Final urine culture result showed Multiple organisms isolated. None in predominance.   Discontinue IV Rocephin  Macrodantin for abnormal UA

## 2024-01-05 NOTE — ASSESSMENT & PLAN NOTE
Patient with known CAD, which is controlled Will continue Statin and monitor for S/Sx of angina/ACS. Continue to monitor on telemetry.       Unknown why patient is on Plavix  Plavix on hold in case patient needs possible surgical intervention

## 2024-01-05 NOTE — PROGRESS NOTES
General Surgery Progress Note    Admit Date: 1/2/2024  S/P: * No surgery found *    Post-operative Day:      Hospital Day: 3    SUBJECTIVE:   Still with left-sided abdominal pain.  Is tolerating some clear liquids.  Had very small amount of flatus.    OBJECTIVE:     Vital Signs (Most Recent)  Temp:  [97.6 °F (36.4 °C)-98 °F (36.7 °C)] 97.9 °F (36.6 °C)  Pulse:  [54-72] 60  Resp:  [16-18] 17  SpO2:  [95 %-99 %] 98 %  BP: (136-156)/(63-70) 156/68    I&Os:  I/O last 3 completed shifts:  In: 2607.9 [P.O.:480; I.V.:2027.9; IV Piggyback:100]  Out: -     Physical Exam:  Gen: NAD, AAOx3  HEENT: Anicteric sclera  Pulm: unlabored, symmetrical   Abd: Soft, mild tenderness, no significant distention, hernias reducible    Laboratory:  CBC:   Recent Labs   Lab 01/04/24  0441   WBC 6.73   RBC 3.53*   HGB 10.3*   HCT 32.1*      MCV 91   MCH 29.2   MCHC 32.1     BMP:   Recent Labs   Lab 01/04/24 0441   GLU 88      K 3.5      CO2 22*   BUN 11   CREATININE 0.7   CALCIUM 8.7     Labs within the past 24 hours have been reviewed.    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    Adjustment disorder with mixed anxiety and depressed mood 01/31/2021    Hypomagnesemia 01/12/2021    Type 2 diabetes mellitus, without long-term current use of insulin 01/12/2021    Chronic pain, neck, back, leg on home narcotics 01/12/2021    Pulmonary nodule 01/12/2021    Chronic diastolic heart failure     Hyperlipidemia associated with type 2 diabetes mellitus 06/03/2019    Noncompliance 03/21/2018    Small bowel obstruction 07/08/2017     Hypertension associated with diabetes 03/14/2017    Right inguinal hernia 10/20/2016    Ventral incisional hernia 09/22/2016    Elevated lipase 07/04/2016    Hernia of anterior abdominal wall 07/04/2016    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 11/18/2015    Fibromyalgia 11/18/2015    NICOLAS (iron deficiency anemia) 09/02/2015    Diastolic dysfunction 07/13/2015    Change in bowel habits 08/14/2014    Insomnia 08/10/2014    COPD (chronic obstructive pulmonary disease) 01/07/2014    History of TIA (transient ischemic attack) 05/28/2013    Peritoneal adhesions (postoperative) (postinfection) 09/29/2012    Iron deficiency anemia 07/16/2012    Night sweats 06/06/2012    Pelvic cyst - left 03/22/2012    REJI (obstructive sleep apnea) 03/12/2012    DJD (degenerative joint disease), lumbosacral 03/12/2012    GERD (gastroesophageal reflux disease) 02/15/2012         78 y.o. female possible partial small-bowel obstruction, abdominal pain, chronic hernias, possible UTI  -had a small amount of flatus and tolerating clears still with persistent pain  -will obtain Gastrografin study to evaluate transit of contrast  -Gastrografin has now been obtained will be given, 1st x-ray will be obtained around 10:00 p.m. tonight  -may continue clear liquids for now, if the patient continues to have bowel function will plan to advance diet  -hernias remain reducible no immediate intervention needed  -continue antibiotics for possible UTI

## 2024-01-05 NOTE — CARE UPDATE
01/05/24 0724   PRE-TX-O2   SpO2 96 %   Pulse Oximetry Type Intermittent   $ Pulse Oximetry - Multiple Charge Pulse Oximetry - Multiple   Pulse (!) 57   Aerosol Therapy   $ Aerosol Therapy Charges PRN treatment not required   Respiratory Treatment Status (SVN) PRN treatment not required

## 2024-01-05 NOTE — PROGRESS NOTES
General Surgery Progress Note    Admit Date: 1/2/2024  S/P: * No surgery found *    Post-operative Day:      Hospital Day: 4    SUBJECTIVE:   Gastrografin given yesterday.  Contrast made its way into the colon.  Patient has had loose bowel movements. Still with left-sided abdominal pain but improved. Is tolerating liquids.    OBJECTIVE:     Vital Signs (Most Recent)  Temp:  [97.3 °F (36.3 °C)-98.1 °F (36.7 °C)] 97.3 °F (36.3 °C)  Pulse:  [52-60] 60  Resp:  [16-18] 18  SpO2:  [96 %-100 %] 98 %  BP: (163-193)/(72-84) 173/78    I&Os:  I/O last 3 completed shifts:  In: 5096.5 [P.O.:440; I.V.:4456.5; IV Piggyback:200]  Out: -     Physical Exam:  Gen: NAD, AAOx3  HEENT: Anicteric sclera  Pulm: unlabored, symmetrical   Abd: Soft, mild tenderness, no significant distention, midline incisional hernias soft and reducible    Laboratory:  CBC:   Recent Labs   Lab 01/05/24 0451   WBC 6.04   RBC 3.88*   HGB 11.2*   HCT 35.5*   *   MCV 92   MCH 28.9   MCHC 31.5*       BMP:   Recent Labs   Lab 01/05/24  0451   GLU 92      K 3.6      CO2 24   BUN 7*   CREATININE 0.7   CALCIUM 9.2       Labs within the past 24 hours have been reviewed.    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    Adjustment disorder with mixed anxiety and depressed mood 01/31/2021    Hypomagnesemia 01/12/2021    Type 2 diabetes mellitus, without long-term current use of insulin 01/12/2021    Chronic pain, neck, back, leg on home narcotics 01/12/2021    Pulmonary nodule 01/12/2021    Chronic diastolic heart failure     Hyperlipidemia associated with type  2 diabetes mellitus 06/03/2019    Noncompliance 03/21/2018    Partial small bowel obstruction 07/08/2017    Hypertension associated with diabetes 03/14/2017    Right inguinal hernia 10/20/2016    Ventral incisional hernia 09/22/2016    Elevated lipase 07/04/2016    Hernia of anterior abdominal wall 07/04/2016    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 11/18/2015    Fibromyalgia 11/18/2015    NICOLAS (iron deficiency anemia) 09/02/2015    Diastolic dysfunction 07/13/2015    Change in bowel habits 08/14/2014    Insomnia 08/10/2014    COPD (chronic obstructive pulmonary disease) 01/07/2014    History of TIA (transient ischemic attack) 05/28/2013    Peritoneal adhesions (postoperative) (postinfection) 09/29/2012    Iron deficiency anemia 07/16/2012    Night sweats 06/06/2012    Pelvic cyst - left 03/22/2012    REJI (obstructive sleep apnea) 03/12/2012    DJD (degenerative joint disease), lumbosacral 03/12/2012    GERD (gastroesophageal reflux disease) 02/15/2012         78 y.o. female possible partial small-bowel obstruction, abdominal pain, chronic hernias, possible UTI  -contrast has made its way into the colon and patient was having bowel function, very little concern for any ongoing obstruction  -will stop IV fluids and advance diet to low residue  -likely okay for discharge home tomorrow if pain is adequately controlled and she was tolerating a diet  -hernias can be followed as an outpatient but at this time do not need immediate surgical intervention

## 2024-01-06 VITALS
WEIGHT: 114.63 LBS | TEMPERATURE: 98 F | OXYGEN SATURATION: 98 % | BODY MASS INDEX: 20.31 KG/M2 | HEART RATE: 63 BPM | SYSTOLIC BLOOD PRESSURE: 164 MMHG | RESPIRATION RATE: 18 BRPM | HEIGHT: 63 IN | DIASTOLIC BLOOD PRESSURE: 75 MMHG

## 2024-01-06 LAB
ALBUMIN SERPL BCP-MCNC: 3 G/DL (ref 3.5–5.2)
ALP SERPL-CCNC: 52 U/L (ref 55–135)
ALT SERPL W/O P-5'-P-CCNC: 11 U/L (ref 10–44)
ANION GAP SERPL CALC-SCNC: 7 MMOL/L (ref 8–16)
AST SERPL-CCNC: 15 U/L (ref 10–40)
BASOPHILS # BLD AUTO: 0.03 K/UL (ref 0–0.2)
BASOPHILS NFR BLD: 0.5 % (ref 0–1.9)
BILIRUB SERPL-MCNC: 0.3 MG/DL (ref 0.1–1)
BUN SERPL-MCNC: 9 MG/DL (ref 8–23)
CALCIUM SERPL-MCNC: 9.1 MG/DL (ref 8.7–10.5)
CHLORIDE SERPL-SCNC: 107 MMOL/L (ref 95–110)
CO2 SERPL-SCNC: 25 MMOL/L (ref 23–29)
CREAT SERPL-MCNC: 0.8 MG/DL (ref 0.5–1.4)
DIFFERENTIAL METHOD BLD: ABNORMAL
EOSINOPHIL # BLD AUTO: 0.1 K/UL (ref 0–0.5)
EOSINOPHIL NFR BLD: 1.7 % (ref 0–8)
ERYTHROCYTE [DISTWIDTH] IN BLOOD BY AUTOMATED COUNT: 12 % (ref 11.5–14.5)
EST. GFR  (NO RACE VARIABLE): >60 ML/MIN/1.73 M^2
GLUCOSE SERPL-MCNC: 88 MG/DL (ref 70–110)
HCT VFR BLD AUTO: 32.1 % (ref 37–48.5)
HGB BLD-MCNC: 10.4 G/DL (ref 12–16)
IMM GRANULOCYTES # BLD AUTO: 0.03 K/UL (ref 0–0.04)
IMM GRANULOCYTES NFR BLD AUTO: 0.5 % (ref 0–0.5)
LYMPHOCYTES # BLD AUTO: 2.4 K/UL (ref 1–4.8)
LYMPHOCYTES NFR BLD: 36.3 % (ref 18–48)
MAGNESIUM SERPL-MCNC: 1.2 MG/DL (ref 1.6–2.6)
MCH RBC QN AUTO: 29.3 PG (ref 27–31)
MCHC RBC AUTO-ENTMCNC: 32.4 G/DL (ref 32–36)
MCV RBC AUTO: 90 FL (ref 82–98)
MONOCYTES # BLD AUTO: 0.7 K/UL (ref 0.3–1)
MONOCYTES NFR BLD: 10.5 % (ref 4–15)
NEUTROPHILS # BLD AUTO: 3.3 K/UL (ref 1.8–7.7)
NEUTROPHILS NFR BLD: 50.5 % (ref 38–73)
NRBC BLD-RTO: 0 /100 WBC
PLATELET # BLD AUTO: 165 K/UL (ref 150–450)
PMV BLD AUTO: 11.5 FL (ref 9.2–12.9)
POCT GLUCOSE: 113 MG/DL (ref 70–110)
POCT GLUCOSE: 96 MG/DL (ref 70–110)
POTASSIUM SERPL-SCNC: 4.1 MMOL/L (ref 3.5–5.1)
PROT SERPL-MCNC: 5.4 G/DL (ref 6–8.4)
RBC # BLD AUTO: 3.55 M/UL (ref 4–5.4)
SODIUM SERPL-SCNC: 139 MMOL/L (ref 136–145)
WBC # BLD AUTO: 6.56 K/UL (ref 3.9–12.7)

## 2024-01-06 PROCEDURE — 94760 N-INVAS EAR/PLS OXIMETRY 1: CPT

## 2024-01-06 PROCEDURE — 85025 COMPLETE CBC W/AUTO DIFF WBC: CPT

## 2024-01-06 PROCEDURE — 99900035 HC TECH TIME PER 15 MIN (STAT)

## 2024-01-06 PROCEDURE — 36415 COLL VENOUS BLD VENIPUNCTURE: CPT

## 2024-01-06 PROCEDURE — 80053 COMPREHEN METABOLIC PANEL: CPT

## 2024-01-06 PROCEDURE — 25000003 PHARM REV CODE 250: Performed by: NURSE PRACTITIONER

## 2024-01-06 PROCEDURE — G0378 HOSPITAL OBSERVATION PER HR: HCPCS

## 2024-01-06 PROCEDURE — 25000242 PHARM REV CODE 250 ALT 637 W/ HCPCS

## 2024-01-06 PROCEDURE — 94761 N-INVAS EAR/PLS OXIMETRY MLT: CPT

## 2024-01-06 PROCEDURE — 25000003 PHARM REV CODE 250

## 2024-01-06 PROCEDURE — 83735 ASSAY OF MAGNESIUM: CPT

## 2024-01-06 RX ORDER — FOLIC ACID 1 MG/1
1000 TABLET ORAL DAILY
Start: 2024-01-06 | End: 2024-01-15

## 2024-01-06 RX ORDER — NITROFURANTOIN (MACROCRYSTALS) 100 MG/1
100 CAPSULE ORAL 2 TIMES DAILY
Qty: 6 CAPSULE | Refills: 0 | Status: SHIPPED | OUTPATIENT
Start: 2024-01-06 | End: 2024-01-09

## 2024-01-06 RX ORDER — CALCIUM CARBONATE 300MG(750)
1 TABLET,CHEWABLE ORAL DAILY
Qty: 30 TABLET | Refills: 0 | Status: SHIPPED | OUTPATIENT
Start: 2024-01-06 | End: 2024-01-06

## 2024-01-06 RX ORDER — CALCIUM CARBONATE 300MG(750)
1 TABLET,CHEWABLE ORAL DAILY
Qty: 30 TABLET | Refills: 0 | Status: SHIPPED | OUTPATIENT
Start: 2024-01-06 | End: 2024-01-17

## 2024-01-06 RX ORDER — NITROFURANTOIN (MACROCRYSTALS) 100 MG/1
100 CAPSULE ORAL 2 TIMES DAILY
Qty: 6 CAPSULE | Refills: 0 | Status: SHIPPED | OUTPATIENT
Start: 2024-01-06 | End: 2024-01-06

## 2024-01-06 RX ADMIN — FLUTICASONE PROPIONATE 100 MCG: 50 SPRAY, METERED NASAL at 08:01

## 2024-01-06 RX ADMIN — Medication 800 MG: at 11:01

## 2024-01-06 RX ADMIN — PANTOPRAZOLE SODIUM 40 MG: 40 TABLET, DELAYED RELEASE ORAL at 08:01

## 2024-01-06 RX ADMIN — MELATONIN TAB 3 MG 9 MG: 3 TAB at 12:01

## 2024-01-06 RX ADMIN — OXYCODONE AND ACETAMINOPHEN 1 TABLET: 7.5; 325 TABLET ORAL at 06:01

## 2024-01-06 RX ADMIN — NITROFURANTOIN 100 MG: 50 CAPSULE ORAL at 08:01

## 2024-01-06 RX ADMIN — Medication 800 MG: at 05:01

## 2024-01-06 NOTE — PLAN OF CARE
The pt is cleared for discharge home from case management    01/06/24 1000   Final Note   Assessment Type Final Discharge Note   Anticipated Discharge Disposition Home   What phone number can be called within the next 1-3 days to see how you are doing after discharge? [phone]

## 2024-01-06 NOTE — NURSING
Pt has had 2 BM today. Pt cleared for dc per CM. Pts  has arrived and is at bedside. IV in left ac removed with cath intact. Gauze applied and secured with tape. No bleeding at site. Dc instructions given and reviewed with pt. Verbalized understanding. Pt dcd via wheel chair to her husbands car and going home.

## 2024-01-06 NOTE — DISCHARGE SUMMARY
[facility] Medicine  Discharge Summary      Patient Name: Pili Teixeira  MRN: [id number]  SUSAN: [id number]  Patient Class: OP- Observation  Admission Date: 1/2/2024  Hospital Length of Stay: 0 days  Discharge Date and Time:  01/06/2024 9:48 AM  Attending Physician: Dr. Kraig MD   Discharging Provider: Wilda Murry NP  Primary Care Provider: LUÍS Huggins MD    Primary Care Team: Networked reference to record PCT     HPI:   Pili Teixeira is a 77 year old female with a past medical history of anemia, COPD, CAD, TIA on Plavix, HTN, HLD, DM, fibromyalgia, GERD, REJI, NICOLAS with iron transfusions, abdominal hernia, and diverticulosis who presented with moderate midepigastric abdominal pain that radiates into the right upper quadrant and back associated with constipation and nausea x2 weeks. Denies chest pain, shortness of breath, fever, chills, cough, dizziness, lightheadedness, vomiting, hematuria, diarrhea, hematochezia, or LOC. She reports receiving a CT 2 weeks ago at [facility] which showed constipation. She began taking laxatives with little relief. ED work-up reveals CBC unremarkable, CMP shows mildly elevated lipase 78, and UA positive . Abdominal/pelvis CT concerning for ileus/enteritis or low-grade partial obstruction. Started on IV rocephin, maintenance fluids,  and consult placed to general surgery, Dr. Guidry. Admitted to hospital medicine for further evaluation and treatment.                * No surgery found *      Hospital Course:   While here, Ms. Teixeira' labs and vital signs were monitored.  Her UA showed a UTI and she was started on IV Rocephin.  Her final urine culture showed multiple organisms without any in predominance and she was changed to oral Macrobid for empiric treatment.  General surgery was consulted to help manage her partial small bowel obstruction.  A Gastrograffin test was done and the contrast did make its way to the colon. She was slow to have  bowel function return, and she ultimately did start to have bowel movements.  Her diet was advanced from clear liquid to solids which she is tolerating.  She reports that her pain is much better today since she had a bowel movement. General surgery has cleared her for discharge and she will be discharged to home today.  She will continue a course of empiric Macrobid since her UA showed a UTI.  Her magnesium was noted to be low and replaced as needed and she will continue on a daily magnesium supplement.  Her blood pressure was mildly elevated during her stay, which may have been due to her pain, as well as IV fluids.  She remained asymptomatic with this and will continue to monitor her BP at home and notify her PCP if the elevation persists or worsens.  She will follow up with her PCP and general surgeon.  She did not have any adverse events while here.     Goals of Care Treatment Preferences:  Code Status: Full Code      Consults:   Consults (From admission, onward)          Status Ordering Provider     Inpatient consult to General Surgery  Once        Provider:  Italo Guidry Jr., MD    Completed TRINIDAD MCCARTHY            No new Assessment & Plan notes have been filed under this hospital service since the last note was generated.  Service: Hospital Medicine    Final Active Diagnoses:    Diagnosis Date Noted POA    PRINCIPAL PROBLEM:  Partial small bowel obstruction [K56.600] 07/08/2017 Yes    Acute cystitis with hematuria [N30.01] 01/04/2024 Yes    Hypomagnesemia [E83.42] 01/12/2021 Yes    Chronic pain syndrome [G89.4] 01/04/2024 Yes    Coronary artery disease involving native coronary artery of native heart without angina pectoris [I25.10] 05/03/2023 Yes    Type 2 diabetes mellitus, without long-term current use of insulin [E11.9] 01/12/2021 Yes     Chronic    Chronic diastolic heart failure [I50.32]  Yes    Hyperlipidemia associated with type 2 diabetes mellitus [E11.69, E78.5] 06/03/2019 Yes     Hypertension associated with diabetes [E11.59, I15.2] 03/14/2017 Yes    COPD (chronic obstructive pulmonary disease) [J44.9] 01/07/2014 Yes    Iron deficiency anemia [D50.9] 07/16/2012 Yes    GERD (gastroesophageal reflux disease) [K21.9] 02/15/2012 Yes      Problems Resolved During this Admission:       Discharged Condition: stable    Disposition: Home or Self Care    Follow Up:   Follow-up Information       ULÍS Huggins MD. Schedule an appointment as soon as possible for a visit in 7 day(s).    Specialty: Family Medicine  Why: F/U in 7-10 days  Contact information:  [address] [place] [address]  [phone]               Italo Guidry Jr., MD Follow up.    Specialty: General Surgery  Why: F/U in 7-10 days  Contact information:  [address] [facility]  [address]  [place] [address]  [phone]                           Patient Instructions:      Diet Adult Regular     Order Specific Question Answer Comments   Na restriction, if any: 2gNa      Notify your health care provider if you experience any of the following:  temperature >100.4     Notify your health care provider if you experience any of the following:  persistent nausea and vomiting or diarrhea     Notify your health care provider if you experience any of the following:  severe uncontrolled pain     Notify your health care provider if you experience any of the following:  difficulty breathing or increased cough     Notify your health care provider if you experience any of the following:  persistent dizziness, light-headedness, or visual disturbances     Notify your health care provider if you experience any of the following:  increased confusion or weakness     Activity as tolerated       Significant Diagnostic Studies: Labs: CMP   Recent Labs   Lab 01/05/24  0451 01/06/24  0339    139   K 3.6 4.1    107   CO2 24 25   GLU 92 88   BUN 7* 9   CREATININE 0.7 0.8   CALCIUM 9.2 9.1   PROT 5.8* 5.4*   ALBUMIN 3.2* 3.0*   BILITOT 0.5 0.3    ALKPHOS 50* 52*   AST 18 15   ALT 14 11   ANIONGAP 8 7*   , CBC   Recent Labs   Lab 01/05/24  0451 01/06/24  0339   WBC 6.04 6.56   HGB 11.2* 10.4*   HCT 35.5* 32.1*   * 165   , All labs within the past 24 hours have been reviewed, and   Magnesium   1/6/24 1.3       Microbiology:   Microbiology Results (last 7 days)       Procedure Component Value Units Date/Time    Urine culture [[id number]] Collected: 01/02/24 2045    Order Status: Completed Specimen: Urine Updated: 01/05/24 0816     Urine Culture, Routine Multiple organisms isolated. None in predominance.  Repeat if      clinically necessary.    Narrative:      Specimen Source->Urine            Radiology: X-Ray Abdomen Portable  EXAMINATION:  XR ABDOMEN PORTABLE    CLINICAL HISTORY:  Evaluate contrast transit;    TECHNIQUE:  AP View(s) of the abdomen was performed.    COMPARISON:  01/02/2024    FINDINGS:  There is scattered bowel gas.  No overtly dilated bowel loops.  There is contrast in the colon.  Several surgical clips project over the lower pelvis.  Multilevel spinal degenerative change.  Osteopenia.    Electronically signed by: Bashir Melgar  Date:    01/05/2024  Time:    09:43       Pending Diagnostic Studies:       None           Medications:  Reconciled Home Medications:      Medication List        START taking these medications      magnesium oxide 400 mg magnesium Tab  Take 1 tablet by mouth once daily.     nitrofurantoin 100 MG capsule  Commonly known as: MACRODANTIN  Take 1 capsule (100 mg total) by mouth 2 (two) times a day. for 3 days            CHANGE how you take these medications      fluticasone propionate 50 mcg/actuation nasal spray  Commonly known as: FLONASE  USE 2 SPRAYS IN EACH NOSTRIL ONE TIME PER DAY  What changed:   how much to take  how to take this  when to take this     TRADJENTA 5 mg Tab tablet  Generic drug: linaGLIPtin  Take 1 tablet by mouth once daily  What changed:   how much to take  when to take this             CONTINUE taking these medications      blood sugar diagnostic Strp  Commonly known as: ACCU-CHEK GUIDE TEST STRIPS  USE TO TEST BLOOD GLUCOSE ONE TIME DAILY AS DIRECTED.     blood-glucose meter kit  To check BG 2 times daily, to use with insurance preferred meter. Medical necessity.     cetirizine 5 MG tablet  Commonly known as: ZYRTEC  Take 1 tablet (5 mg total) by mouth once daily. for 5 days     clopidogreL 75 mg tablet  Commonly known as: PLAVIX  Take 1 tablet (75 mg total) by mouth every Mon, Wed, Fri.     cyanocobalamin 1000 MCG tablet  Commonly known as: VITAMIN B-12  Take 100 mcg by mouth once daily.     diclofenac sodium 1 % Gel  Commonly known as: VOLTAREN  Apply 2 g topically once daily.     famotidine 40 MG tablet  Commonly known as: PEPCID  TAKE 1 TABLET BY MOUTH ONCE DAILY IN THE EVENING     folic acid 1 MG tablet  Commonly known as: FOLVITE  Take 1 tablet (1,000 mcg total) by mouth once daily.     lancets Misc  Commonly known as: ACCU-CHEK SOFTCLIX LANCETS  Test TWICE DAILY;Twice a day     LUBRICANT EYE DROPS OPHT  Place 1 drop into both eyes daily as needed (dry eyes).     metFORMIN 500 MG tablet  Commonly known as: GLUCOPHAGE  TAKE 1 TABLET BY MOUTH TWICE DAILY WITH MEALS     multivit with min-folic acid 200 mcg Chew  Take 1 tablet by mouth once daily.     NARCAN 4 mg/actuation Spry  Generic drug: naloxone  as directed     olopatadine 0.1 % ophthalmic solution  Commonly known as: PATANOL  Place 1 drop into both eyes daily as needed for Allergies.     ondansetron 4 MG Tbdl  Commonly known as: ZOFRAN-ODT  Take 1 tablet (4 mg total) by mouth every 8 (eight) hours as needed (nausea).     oxyCODONE-acetaminophen 7.5-325 mg per tablet  Commonly known as: PERCOCET  Take 1 tablet by mouth 4 (four) times daily as needed for Pain.     pantoprazole 40 MG tablet  Commonly known as: PROTONIX  Take 1 tablet (40 mg total) by mouth once daily.     polyethylene glycol 17 gram Pwpk  Commonly known as:  GLYCOLAX  Take 17 g by mouth once daily.     PROBIOTIC-10 ORAL  Take 1 capsule by mouth Daily.     rosuvastatin 10 MG tablet  Commonly known as: CRESTOR  TAKE 1 TABLET BY MOUTH ONCE DAILY IN THE EVENING     STOOL SOFTENER ORAL  Take 1 capsule by mouth every evening.     triamcinolone acetonide 0.1% 0.1 % ointment  Commonly known as: KENALOG  Apply topically 2 (two) times daily. for 14 days            ASK your doctor about these medications      ipratropium 42 mcg (0.06 %) nasal spray  Commonly known as: ATROVENT  2 sprays by Each Nostril route 3 (three) times daily.     pregabalin 150 MG capsule  Commonly known as: LYRICA  Take 1 capsule (150 mg total) by mouth 2 (two) times daily.     traZODone 50 MG tablet  Commonly known as: DESYREL  TAKE 1 TABLET BY MOUTH IN THE EVENING (MAY  TAKE  AN  ADDITIONAL  TABLET  AS  NEEDED)              Indwelling Lines/Drains at time of discharge:   Lines/Drains/Airways       None                   Time spent on the discharge of patient: 50 minutes         Wilda Muryr NP  Department of Hospital Medicine  [facility]/Surg

## 2024-01-06 NOTE — PLAN OF CARE
Problem: Adult Inpatient Plan of Care  Goal: Plan of Care Review  Outcome: Ongoing, Progressing   Safety maintained, call light within reach, bed locked and in low position, and side rails up. Patient remains free from injury.      Monitoring and following care plan.

## 2024-01-09 ENCOUNTER — PATIENT OUTREACH (OUTPATIENT)
Dept: ADMINISTRATIVE | Facility: CLINIC | Age: 79
End: 2024-01-09
Payer: MEDICARE

## 2024-01-09 NOTE — PROGRESS NOTES
C3 nurse attempted to contact Pili Teixeira for a TCC post hospital discharge follow up call. No answer. Left voicemail with callback information. The patient does not have a scheduled HOSFU appointment. Message sent to PCP staff for assistance with scheduling visit with patient.

## 2024-01-10 NOTE — PROGRESS NOTES
2nd attempt to make TCC Call. Left voicemail. Please call [phone] leave your first and last name and date of birth for Juan. I will return your call.

## 2024-01-10 NOTE — PROGRESS NOTES
3rd attempt for TCC Call; Left message with pt's daughter for pt to please call [phone],  leave first and last name and  for Juan. We will return her call.

## 2024-01-12 DIAGNOSIS — M19.90 INFLAMMATORY ARTHRITIS: ICD-10-CM

## 2024-01-12 DIAGNOSIS — R76.8 RHEUMATOID FACTOR POSITIVE: ICD-10-CM

## 2024-01-12 NOTE — TELEPHONE ENCOUNTER
No care due was identified.  [facility] Embedded Care Due Messages. Reference number: [id number].   1/12/2024 11:48:16 AM CST

## 2024-01-13 NOTE — TELEPHONE ENCOUNTER
Refill Routing Note   Medication(s) are not appropriate for processing by [facility] for the following reason(s):        Outside of protocol    [facility] action(s):  Route               Appointments  past 12m or future 3m with PCP    Date Provider   Last Visit   12/6/2023 LUÍS Huggins MD   Next Visit   1/18/2024 LUÍS Huggins MD   ED visits in past 90 days: 1        Note composed:11:22 PM 01/12/2024

## 2024-01-15 RX ORDER — FOLIC ACID 1 MG/1
1000 TABLET ORAL
Qty: 90 TABLET | Refills: 1 | Status: SHIPPED | OUTPATIENT
Start: 2024-01-15

## 2024-01-17 ENCOUNTER — OFFICE VISIT (OUTPATIENT)
Dept: SURGERY | Facility: CLINIC | Age: 79
End: 2024-01-17
Payer: MEDICARE

## 2024-01-17 VITALS
DIASTOLIC BLOOD PRESSURE: 80 MMHG | BODY MASS INDEX: 22.95 KG/M2 | HEIGHT: 63 IN | SYSTOLIC BLOOD PRESSURE: 188 MMHG | WEIGHT: 129.5 LBS | TEMPERATURE: 97 F | HEART RATE: 76 BPM

## 2024-01-17 DIAGNOSIS — K43.2 INCISIONAL HERNIA, WITHOUT OBSTRUCTION OR GANGRENE: Primary | ICD-10-CM

## 2024-01-17 PROCEDURE — 99214 OFFICE O/P EST MOD 30 MIN: CPT | Mod: S$GLB,,, | Performed by: SURGERY

## 2024-01-17 PROCEDURE — 1126F AMNT PAIN NOTED NONE PRSNT: CPT | Mod: CPTII,S$GLB,, | Performed by: SURGERY

## 2024-01-17 PROCEDURE — 3079F DIAST BP 80-89 MM HG: CPT | Mod: CPTII,S$GLB,, | Performed by: SURGERY

## 2024-01-17 PROCEDURE — 1157F ADVNC CARE PLAN IN RCRD: CPT | Mod: CPTII,S$GLB,, | Performed by: SURGERY

## 2024-01-17 PROCEDURE — 99999 PR PBB SHADOW E&M-EST. PATIENT-LVL III: CPT | Mod: PBBFAC,,, | Performed by: SURGERY

## 2024-01-17 PROCEDURE — 3077F SYST BP >= 140 MM HG: CPT | Mod: CPTII,S$GLB,, | Performed by: SURGERY

## 2024-01-17 RX ORDER — LANOLIN ALCOHOL/MO/W.PET/CERES
1 CREAM (GRAM) TOPICAL
COMMUNITY
Start: 2024-01-06 | End: 2024-02-27 | Stop reason: SDUPTHER

## 2024-01-18 ENCOUNTER — OFFICE VISIT (OUTPATIENT)
Dept: FAMILY MEDICINE | Facility: CLINIC | Age: 79
End: 2024-01-18
Payer: MEDICARE

## 2024-01-18 ENCOUNTER — LAB VISIT (OUTPATIENT)
Dept: LAB | Facility: HOSPITAL | Age: 79
End: 2024-01-18
Attending: FAMILY MEDICINE
Payer: MEDICARE

## 2024-01-18 VITALS
BODY MASS INDEX: 22.6 KG/M2 | WEIGHT: 127.56 LBS | DIASTOLIC BLOOD PRESSURE: 76 MMHG | HEART RATE: 80 BPM | SYSTOLIC BLOOD PRESSURE: 152 MMHG | OXYGEN SATURATION: 98 % | HEIGHT: 63 IN

## 2024-01-18 DIAGNOSIS — E11.00 TYPE 2 DIABETES MELLITUS WITH HYPEROSMOLARITY WITHOUT COMA, WITHOUT LONG-TERM CURRENT USE OF INSULIN: Chronic | ICD-10-CM

## 2024-01-18 DIAGNOSIS — E83.42 HYPOMAGNESEMIA: ICD-10-CM

## 2024-01-18 DIAGNOSIS — I15.2 HYPERTENSION ASSOCIATED WITH DIABETES: Primary | ICD-10-CM

## 2024-01-18 DIAGNOSIS — G89.4 CHRONIC PAIN SYNDROME: ICD-10-CM

## 2024-01-18 DIAGNOSIS — E11.59 HYPERTENSION ASSOCIATED WITH DIABETES: ICD-10-CM

## 2024-01-18 DIAGNOSIS — K56.600 PARTIAL SMALL BOWEL OBSTRUCTION: ICD-10-CM

## 2024-01-18 DIAGNOSIS — D50.0 IRON DEFICIENCY ANEMIA DUE TO CHRONIC BLOOD LOSS: ICD-10-CM

## 2024-01-18 DIAGNOSIS — E11.59 HYPERTENSION ASSOCIATED WITH DIABETES: Primary | ICD-10-CM

## 2024-01-18 DIAGNOSIS — I15.2 HYPERTENSION ASSOCIATED WITH DIABETES: ICD-10-CM

## 2024-01-18 DIAGNOSIS — G47.00 INSOMNIA, UNSPECIFIED TYPE: ICD-10-CM

## 2024-01-18 LAB
ALBUMIN SERPL BCP-MCNC: 4.1 G/DL (ref 3.5–5.2)
ALP SERPL-CCNC: 74 U/L (ref 55–135)
ALT SERPL W/O P-5'-P-CCNC: 9 U/L (ref 10–44)
ANION GAP SERPL CALC-SCNC: 10 MMOL/L (ref 8–16)
AST SERPL-CCNC: 16 U/L (ref 10–40)
BILIRUB SERPL-MCNC: 0.4 MG/DL (ref 0.1–1)
BUN SERPL-MCNC: 20 MG/DL (ref 8–23)
CALCIUM SERPL-MCNC: 10.7 MG/DL (ref 8.7–10.5)
CHLORIDE SERPL-SCNC: 102 MMOL/L (ref 95–110)
CO2 SERPL-SCNC: 26 MMOL/L (ref 23–29)
CREAT SERPL-MCNC: 0.9 MG/DL (ref 0.5–1.4)
EST. GFR  (NO RACE VARIABLE): >60 ML/MIN/1.73 M^2
GLUCOSE SERPL-MCNC: 83 MG/DL (ref 70–110)
MAGNESIUM SERPL-MCNC: 1.3 MG/DL (ref 1.6–2.6)
POTASSIUM SERPL-SCNC: 4.8 MMOL/L (ref 3.5–5.1)
PROT SERPL-MCNC: 7.5 G/DL (ref 6–8.4)
SODIUM SERPL-SCNC: 138 MMOL/L (ref 136–145)

## 2024-01-18 PROCEDURE — 3077F SYST BP >= 140 MM HG: CPT | Mod: CPTII,S$GLB,, | Performed by: FAMILY MEDICINE

## 2024-01-18 PROCEDURE — 1157F ADVNC CARE PLAN IN RCRD: CPT | Mod: CPTII,S$GLB,, | Performed by: FAMILY MEDICINE

## 2024-01-18 PROCEDURE — 99214 OFFICE O/P EST MOD 30 MIN: CPT | Mod: S$GLB,,, | Performed by: FAMILY MEDICINE

## 2024-01-18 PROCEDURE — 85025 COMPLETE CBC W/AUTO DIFF WBC: CPT | Performed by: FAMILY MEDICINE

## 2024-01-18 PROCEDURE — 1126F AMNT PAIN NOTED NONE PRSNT: CPT | Mod: CPTII,S$GLB,, | Performed by: FAMILY MEDICINE

## 2024-01-18 PROCEDURE — 80053 COMPREHEN METABOLIC PANEL: CPT | Performed by: FAMILY MEDICINE

## 2024-01-18 PROCEDURE — 36415 COLL VENOUS BLD VENIPUNCTURE: CPT | Mod: PO | Performed by: FAMILY MEDICINE

## 2024-01-18 PROCEDURE — 3288F FALL RISK ASSESSMENT DOCD: CPT | Mod: CPTII,S$GLB,, | Performed by: FAMILY MEDICINE

## 2024-01-18 PROCEDURE — 1160F RVW MEDS BY RX/DR IN RCRD: CPT | Mod: CPTII,S$GLB,, | Performed by: FAMILY MEDICINE

## 2024-01-18 PROCEDURE — 99999 PR PBB SHADOW E&M-EST. PATIENT-LVL V: CPT | Mod: PBBFAC,,, | Performed by: FAMILY MEDICINE

## 2024-01-18 PROCEDURE — 3078F DIAST BP <80 MM HG: CPT | Mod: CPTII,S$GLB,, | Performed by: FAMILY MEDICINE

## 2024-01-18 PROCEDURE — 83735 ASSAY OF MAGNESIUM: CPT | Performed by: FAMILY MEDICINE

## 2024-01-18 PROCEDURE — 1101F PT FALLS ASSESS-DOCD LE1/YR: CPT | Mod: CPTII,S$GLB,, | Performed by: FAMILY MEDICINE

## 2024-01-18 PROCEDURE — 1159F MED LIST DOCD IN RCRD: CPT | Mod: CPTII,S$GLB,, | Performed by: FAMILY MEDICINE

## 2024-01-18 RX ORDER — VALSARTAN 40 MG/1
40 TABLET ORAL DAILY
Qty: 90 TABLET | Refills: 3 | Status: SHIPPED | OUTPATIENT
Start: 2024-01-18 | End: 2025-01-17

## 2024-01-18 RX ORDER — TRAZODONE HYDROCHLORIDE 50 MG/1
TABLET ORAL
Qty: 90 TABLET | Refills: 3 | Status: SHIPPED | OUTPATIENT
Start: 2024-01-18

## 2024-01-18 NOTE — PROGRESS NOTES
Subjective:       Patient ID: Pili Teixeira is a 78 y.o. female.    Chief Complaint: Hospital Follow Up    Pt is known to me.  The pt was recently in [facility] with a partial SBO that gradually resolved without NG suction.  She is feeling much better and is having easy Bms.  She saw her surgeon yesterday and she is to consider surgery for the hernias that are responsible for the SBO.  The pt's weight has stabilized after a 50 pound weight loss.  Dr. Page discontinued her valsartan a few months ago due to low BP after the weight loss  however she has been having high readings.  Her last Hba1c was 5.9.  She sees ASAEL Pino for DM and is on metformin and Tradjenta.  She thinks she may have had some low glucoses since she lost weight.  Discussed health maintenance with pt and will schedule appropriate studies and/or immunizations.        Review of Systems   Constitutional:  Negative for activity change, appetite change, fatigue and unexpected weight change.   Eyes:  Negative for visual disturbance.   Respiratory:  Negative for cough, chest tightness and shortness of breath.    Cardiovascular:  Negative for chest pain, palpitations and leg swelling.   Gastrointestinal:  Negative for abdominal pain, constipation, diarrhea, nausea and vomiting.   Endocrine: Negative for cold intolerance, heat intolerance and polyuria.   Genitourinary:  Negative for decreased urine volume and dysuria.   Musculoskeletal:  Negative for arthralgias and back pain.   Skin:  Negative for rash.   Neurological:  Negative for numbness and headaches.       Objective:      Physical Exam  Constitutional:       Appearance: She is well-developed.      Comments: Pt is slim   HENT:      Head: Normocephalic.   Eyes:      Conjunctiva/sclera: Conjunctivae normal.      Pupils: Pupils are equal, round, and reactive to light.   Neck:      Thyroid: No thyromegaly.   Cardiovascular:      Rate and Rhythm: Normal rate and regular rhythm.      Heart sounds: Normal  heart sounds.   Pulmonary:      Effort: Pulmonary effort is normal.      Breath sounds: Normal breath sounds.   Abdominal:      General: Bowel sounds are normal.      Palpations: Abdomen is soft.      Tenderness: There is no abdominal tenderness.   Musculoskeletal:         General: No tenderness or deformity. Normal range of motion.      Cervical back: Normal range of motion and neck supple.      Right lower leg: No edema.      Left lower leg: No edema.   Lymphadenopathy:      Cervical: No cervical adenopathy.   Skin:     General: Skin is warm and dry.   Neurological:      Mental Status: She is alert and oriented to person, place, and time.      Cranial Nerves: No cranial nerve deficit.      Motor: No abnormal muscle tone.      Coordination: Coordination normal.      Deep Tendon Reflexes: Reflexes normal.   Psychiatric:         Behavior: Behavior normal.         Assessment:       1. Hypertension associated with diabetes    2. Insomnia, unspecified type    3. Chronic pain syndrome    4. Hypomagnesemia    5. Iron deficiency anemia due to chronic blood loss    6. Type 2 diabetes mellitus with hyperosmolarity without coma, without long-term current use of insulin    7. Partial small bowel obstruction        Plan:       Pili was seen today for hospital follow up.    Diagnoses and all orders for this visit:    Hypertension associated with diabetes  Comments:  restart Valsartan 40 mg daily.  Nurse BP visit in 2 weeks.  Orders:  -     valsartan (DIOVAN) 40 MG tablet; Take 1 tablet (40 mg total) by mouth once daily.  -     Comprehensive Metabolic Panel; Future    Insomnia, unspecified type  -     traZODone (DESYREL) 50 MG tablet; TAKE 1 TABLET BY MOUTH IN THE EVENING (MAY  TAKE  AN  ADDITIONAL  TABLET  AS  NEEDED)    Chronic pain syndrome    Hypomagnesemia  -     Magnesium; Future    Iron deficiency anemia due to chronic blood loss  -     CBC Auto Differential; Future    Type 2 diabetes mellitus with hyperosmolarity  without coma, without long-term current use of insulin  Comments:  Last Hba1c 5.9--will stop Tradjenta.  Orders:  -     Hemoglobin A1C; Future    Partial small bowel obstruction      During this visit, I reviewed the pt's history, medications, allergies, and problem list.

## 2024-01-19 LAB
BASOPHILS # BLD AUTO: 0.04 K/UL (ref 0–0.2)
BASOPHILS NFR BLD: 0.5 % (ref 0–1.9)
DIFFERENTIAL METHOD BLD: ABNORMAL
EOSINOPHIL # BLD AUTO: 0.1 K/UL (ref 0–0.5)
EOSINOPHIL NFR BLD: 0.9 % (ref 0–8)
ERYTHROCYTE [DISTWIDTH] IN BLOOD BY AUTOMATED COUNT: 12.4 % (ref 11.5–14.5)
HCT VFR BLD AUTO: 40.9 % (ref 37–48.5)
HGB BLD-MCNC: 12.6 G/DL (ref 12–16)
IMM GRANULOCYTES # BLD AUTO: 0.02 K/UL (ref 0–0.04)
IMM GRANULOCYTES NFR BLD AUTO: 0.3 % (ref 0–0.5)
LYMPHOCYTES # BLD AUTO: 2.3 K/UL (ref 1–4.8)
LYMPHOCYTES NFR BLD: 31.1 % (ref 18–48)
MCH RBC QN AUTO: 28.8 PG (ref 27–31)
MCHC RBC AUTO-ENTMCNC: 30.8 G/DL (ref 32–36)
MCV RBC AUTO: 94 FL (ref 82–98)
MONOCYTES # BLD AUTO: 0.6 K/UL (ref 0.3–1)
MONOCYTES NFR BLD: 8.5 % (ref 4–15)
NEUTROPHILS # BLD AUTO: 4.4 K/UL (ref 1.8–7.7)
NEUTROPHILS NFR BLD: 58.7 % (ref 38–73)
NRBC BLD-RTO: 0 /100 WBC
PLATELET # BLD AUTO: 180 K/UL (ref 150–450)
PMV BLD AUTO: 12 FL (ref 9.2–12.9)
RBC # BLD AUTO: 4.37 M/UL (ref 4–5.4)
WBC # BLD AUTO: 7.53 K/UL (ref 3.9–12.7)

## 2024-01-25 NOTE — PROGRESS NOTES
GENERAL SURGERY  OUTPATIENT H&P    REASON FOR VISIT/CC: Incisional hernias    HPI: Pili Teixeira is a 78 y.o. female with a history of COPD, CAD, TIA on Plavix, hypertension, hyperlipidemia, REJI, GERD, diverticular disease status post multiple abdominal surgeries including colectomy with known incisional hernias. She was recently admitted for small-bowel obstruction treated conservatively.  She is here today for follow-up. Patient reports she was tolerating diet having bowel function per normal. No severe pain at the hernia sites.  Hernia sites relatively unchanged.    I have reviewed the patient's chart including prior progress notes, procedures and testing.     ROS:   Review of Systems    PROBLEM LIST:  Patient Active Problem List   Diagnosis    GERD (gastroesophageal reflux disease)    REJI (obstructive sleep apnea)    DJD (degenerative joint disease), lumbosacral    Pelvic cyst - left    Night sweats    Iron deficiency anemia    Peritoneal adhesions (postoperative) (postinfection)    History of TIA (transient ischemic attack)    COPD (chronic obstructive pulmonary disease)    Insomnia    Change in bowel habits    Diastolic dysfunction    NICOLAS (iron deficiency anemia)    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    Fibromyalgia    Elevated lipase    Hernia of anterior abdominal wall    Ventral incisional hernia    Right inguinal hernia    Hypertension associated with diabetes    Partial small bowel obstruction    Noncompliance    Hyperlipidemia associated with type 2 diabetes mellitus    Chronic diastolic heart failure    Hypomagnesemia    Type 2 diabetes mellitus, without long-term current use of insulin    Chronic pain, neck, back, leg on home narcotics    Pulmonary nodule    Mild neurocognitive 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    Adjustment disorder with mixed anxiety and depressed mood    Gait abnormality    Abdominal aortic atherosclerosis    Coronary artery disease involving native coronary artery of native  heart without angina pectoris    Glaucoma    Chronic pain syndrome    Acute cystitis with hematuria         HISTORY  Past Medical History:   Diagnosis Date    Allergy     Anemia 2012    with thrombocytopenia; iron deficiency    Arthritis     Cervical spinal stenosis     with neuropathy, chronic neck,back,leg pain    Colon polyp     COPD (chronic obstructive pulmonary disease)     Coronary artery disease     COVID 4/27/2022    COVID-19     Diabetes mellitus     , sugars run 190's per patient    Diastolic dysfunction 7/13/2015    Diverticulitis     Diverticulosis     Hx diverticulitis,still has chronic diarrhea, abd pain    Dyspnea on exertion     sometimes with nausea, fatigue,dizziness, had cardiac cath June 2012, normal    Fibromyalgia     Fracture 2012    right thumb    GERD (gastroesophageal reflux disease)     Feb 2012, Hx H Pylori    Glaucoma     had LAser surgey, on no eye drops    HEARING LOSS     Hemangioma     fatty liver    History of earache     frequent    History of TIA (transient ischemic attack) 5/28/2013    Hyperlipidemia     Hypertension     Hypertension associated with diabetes 3/14/2017    Laryngeal polyp     Myocardial infarction 2006 last    also MI in distant past    REJI (obstructive sleep apnea)     does not use CPAP    Otitis media     Pneumonia due to COVID-19 virus 2/20/2021    Renal stone     Sjogren's syndrome     SOB (shortness of breath) 6/8/2012    [address] [place] [address] 1. Normal coronary arteries. 2. Normal single renal arteries bilaterally. 3. Diastolic dysfunction.     Stroke     TIA, now on Plavix    TIA (transient ischemic attack)     TMJ disorder     Type II or unspecified type diabetes mellitus without mention of complication, uncontrolled 5/8/2014    UTI (lower urinary tract infection)     frequent    Venous insufficiency     with Hx blood clot in leg       Past Surgical History:   Procedure Laterality Date    ABDOMINAL SURGERY  2010 x2    expoloratory lap for pelvic cyst,adhesions; partial  colonectomy     adhesiolysis   10/11/2012    CARDIAC CATHETERIZATION      no current blockages.    CHOLECYSTECTOMY      COLON SURGERY      r/t diverticuli    COLONOSCOPY  08/2014    repeat in 5 years    COLONOSCOPY N/A 1/7/2020    Procedure: COLONOSCOPY;  Surgeon: Kb Nguyen MD;  Location: [facility] ENDO;  Service: Endoscopy;  Laterality: N/A;    COLONOSCOPY N/A 3/13/2023    Procedure: COLONOSCOPY;  Surgeon: Jarrod Frost MD;  Location: [facility] ENDO;  Service: Endoscopy;  Laterality: N/A;    ENDOSCOPIC ULTRASOUND OF UPPER GASTROINTESTINAL TRACT N/A 1/13/2021    Procedure: ULTRASOUND, UPPER GI TRACT, ENDOSCOPIC;  Surgeon: Sonido Castellano III, MD;  Location: [facility] ENDO;  Service: Endoscopy;  Laterality: N/A;    EPIDURAL BLOCK INJECTION  5/15/12    back,neck for pain    ESOPHAGOGASTRODUODENOSCOPY N/A 1/7/2020    Procedure: EGD (ESOPHAGOGASTRODUODENOSCOPY);  Surgeon: Kb Nguyen MD;  Location: [facility] ENDO;  Service: Endoscopy;  Laterality: N/A;    ESOPHAGOGASTRODUODENOSCOPY N/A 1/13/2021    Procedure: EGD (ESOPHAGOGASTRODUODENOSCOPY);  Surgeon: Sonido Castellano III, MD;  Location: [facility];  Service: Endoscopy;  Laterality: N/A;    ESOPHAGOGASTRODUODENOSCOPY N/A 3/13/2023    Procedure: EGD (ESOPHAGOGASTRODUODENOSCOPY);  Surgeon: Jarrod Frost MD;  Location: [facility];  Service: Endoscopy;  Laterality: N/A;    ESOPHAGOGASTRODUODENOSCOPY N/A 5/5/2023    Procedure: EGD (ESOPHAGOGASTRODUODENOSCOPY);  Surgeon: Noel Caputo MD;  Location: [facility] ENDO;  Service: Endoscopy;  Laterality: N/A;    EXPLORATORY LAPAROTOMY W/ BOWEL RESECTION  10/11/2012    EYE SURGERY      Laser for glaucoma    EYE SURGERY  May 2012    cataracts    HYSTERECTOMY      INTRALUMINAL GASTROINTESTINAL TRACT IMAGING VIA CAPSULE N/A 5/23/2023    Procedure: IMAGING PROCEDURE, GI TRACT, INTRALUMINAL, VIA CAPSULE;  Surgeon: Noel Caputo MD;  Location: [facility] ENDO;  Service: Endoscopy;  Laterality: N/A;    LARYNX SURGERY       polypectomy    OOPHORECTOMY      TONSILLECTOMY      with adenoidectomy    UPPER GASTROINTESTINAL ENDOSCOPY  12/2015    VASCULAR SURGERY      laser to varicose vein leg       Social History     Tobacco Use    Smoking status: Never    Smokeless tobacco: Never   Substance Use Topics    Alcohol use: No    Drug use: Yes     Types: Oxycodone       Family History   Problem Relation Age of Onset    Throat cancer Mother     Cancer Mother     Diabetes Mellitus Mother     Stroke Father     Hypertension Father     Heart disease Father     Diverticulitis Sister     Throat cancer Brother     Cancer Brother     Thyroid disease Daughter     Lupus Daughter     Pancreatic cancer Maternal Aunt 55    Pancreatic cancer Cousin 58    Breast cancer Neg Hx     Ovarian cancer Neg Hx     Colon cancer Neg Hx     Colon polyps Neg Hx     Celiac disease Neg Hx     Cirrhosis Neg Hx     Crohn's disease Neg Hx     Stomach cancer Neg Hx     Ulcerative colitis Neg Hx     Rectal cancer Neg Hx     Esophageal cancer Neg Hx     Inflammatory bowel disease Neg Hx     Rheum arthritis Neg Hx     Psoriasis Neg Hx     Osteoarthritis Neg Hx     Kidney disease Neg Hx     Hyperlipidemia Neg Hx     COPD Neg Hx     Depression Neg Hx     Chronic back pain Neg Hx     Asthma Neg Hx     Alcohol abuse Neg Hx          MEDS:  Current Outpatient Medications on File Prior to Visit   Medication Sig Dispense Refill    blood sugar diagnostic (ACCU-CHEK GUIDE TEST STRIPS) Strp USE TO TEST BLOOD GLUCOSE ONE TIME DAILY AS DIRECTED. 100 each 3    carboxymethylcellulose sodium (LUBRICANT EYE DROPS OPHT) Place 1 drop into both eyes daily as needed (dry eyes).      clopidogreL (PLAVIX) 75 mg tablet Take 1 tablet (75 mg total) by mouth every Mon, Wed, Fri. 90 tablet 4    cyanocobalamin (VITAMIN B-12) 1000 MCG tablet Take 100 mcg by mouth once daily.      diclofenac sodium 1 % Gel Apply 2 g topically once daily.      docusate sodium (STOOL SOFTENER ORAL) Take 1 capsule by mouth every  evening.      famotidine (PEPCID) 40 MG tablet TAKE 1 TABLET BY MOUTH ONCE DAILY IN THE EVENING 90 tablet 1    fluticasone propionate (FLONASE) 50 mcg/actuation nasal spray USE 2 SPRAYS IN EACH NOSTRIL ONE TIME PER DAY (Patient taking differently: 2 sprays by Each Nostril route Daily. USE 2 SPRAYS IN EACH NOSTRIL ONE TIME PER DAY) 18.2 mL 5    folic acid (FOLVITE) 1 MG tablet Take 1 tablet by mouth once daily 90 tablet 1    ipratropium (ATROVENT) 42 mcg (0.06 %) nasal spray 2 sprays by Each Nostril route 3 (three) times daily. 15 mL 3    Lactobac no.41/Bifidobact no.7 (PROBIOTIC-10 ORAL) Take 1 capsule by mouth Daily.      lancets (ACCU-CHEK SOFTCLIX LANCETS) Misc Test TWICE DAILY;Twice a day 100 each 3    magnesium oxide (MAG-OX) 400 mg (241.3 mg magnesium) tablet Take 1 tablet by mouth.      metFORMIN (GLUCOPHAGE) 500 MG tablet TAKE 1 TABLET BY MOUTH TWICE DAILY WITH MEALS (Patient taking differently: Take 500 mg by mouth 2 (two) times daily with meals.) 180 tablet 0    multivit with min-folic acid 200 mcg Chew Take 1 tablet by mouth once daily.       NARCAN 4 mg/actuation Spry as directed      olopatadine (PATANOL) 0.1 % ophthalmic solution Place 1 drop into both eyes daily as needed for Allergies.      ondansetron (ZOFRAN-ODT) 4 MG TbDL Take 1 tablet (4 mg total) by mouth every 8 (eight) hours as needed (nausea). 30 tablet 1    oxyCODONE-acetaminophen (PERCOCET) 7.5-325 mg per tablet Take 1 tablet by mouth 4 (four) times daily as needed for Pain.      pantoprazole (PROTONIX) 40 MG tablet Take 1 tablet (40 mg total) by mouth once daily. 90 tablet 2    polyethylene glycol (GLYCOLAX) 17 gram PwPk Take 17 g by mouth once daily. 60 each 0    pregabalin (LYRICA) 150 MG capsule Take 1 capsule (150 mg total) by mouth 2 (two) times daily. 60 capsule 2    rosuvastatin (CRESTOR) 10 MG tablet TAKE 1 TABLET BY MOUTH ONCE DAILY IN THE EVENING (Patient taking differently: Take 10 mg by mouth every evening.) 90 tablet 0     blood-glucose meter kit To check BG 2 times daily, to use with insurance preferred meter. Medical necessity. 1 each 0    cetirizine (ZYRTEC) 5 MG tablet Take 1 tablet (5 mg total) by mouth once daily. for 5 days 5 tablet 0     No current facility-administered medications on file prior to visit.       ALLERGIES:  Review of patient's allergies indicates:   Allergen Reactions    Codeine Other (See Comments)     Chest pain    Clindamycin Other (See Comments)     Difficulty swallowing after taking, feels a something sits in epigastric area     Iodinated contrast media Hives and Rash         VITALS:  Vitals:    01/17/24 1438   BP: (!) 188/80   Pulse: 76   Temp: 97.3 °F (36.3 °C)         PHYSICAL EXAM:  Physical Exam  Vitals reviewed.   Constitutional:       General: She is not in acute distress.     Appearance: Normal appearance. She is well-developed.   HENT:      Head: Normocephalic and atraumatic.      Nose: Nose normal.   Eyes:      General: No scleral icterus.     Conjunctiva/sclera: Conjunctivae normal.   Neck:      Trachea: No tracheal tenderness or tracheal deviation.   Cardiovascular:      Rate and Rhythm: Normal rate and regular rhythm.      Pulses: Normal pulses.   Pulmonary:      Effort: Pulmonary effort is normal. No accessory muscle usage or respiratory distress.      Breath sounds: Normal breath sounds.   Abdominal:      General: There is no distension.      Palpations: Abdomen is soft.      Tenderness: There is no abdominal tenderness.          Comments: Well-healed midline laparotomy incision sites.  There are supraumbilical and infraumbilical hernias with large defects. Currently reducible. No overlying skin changes.   Musculoskeletal:         General: No swelling or tenderness. Normal range of motion.      Cervical back: Normal range of motion and neck supple. No rigidity.   Skin:     General: Skin is warm and dry.      Coloration: Skin is not jaundiced.      Findings: No erythema.   Neurological:       General: No focal deficit present.      Mental Status: She is alert and oriented to person, place, and time.      Motor: No weakness or abnormal muscle tone.   Psychiatric:         Mood and Affect: Mood normal.         Behavior: Behavior normal.         Thought Content: Thought content normal.         Judgment: Judgment normal.           LABS:  Lab Results   Component Value Date    WBC 7.53 01/18/2024    RBC 4.37 01/18/2024    HGB 12.6 01/18/2024    HGB 14.2 09/01/2012    HCT 40.9 01/18/2024     01/18/2024     Lab Results   Component Value Date    GLU 83 01/18/2024     01/18/2024    K 4.8 01/18/2024     01/18/2024    CO2 26 01/18/2024    BUN 20 01/18/2024    CREATININE 0.9 01/18/2024    CREATININE 0.9 09/01/2012    CALCIUM 10.7 (H) 01/18/2024    CALCIUM 9.9 09/01/2012     Lab Results   Component Value Date    ALT 9 (L) 01/18/2024    AST 16 01/18/2024    ALKPHOS 74 01/18/2024    BILITOT 0.4 01/18/2024     Lab Results   Component Value Date    MG 1.3 (L) 01/18/2024    PHOS 3.3 05/05/2023       STUDIES:  Images and reports were personally reviewed.  CT A/P  FINDINGS:  The liver is of normal size contour and CT density without focal defect.  A gallbladder is not seen.  There is mild dilation of the common bile duct measuring 10 mm with taper in the head of the pancreas.  A mass or stone is not seen.  The pancreas is of normal contour and CT density without edema or mass.  The spleen is of normal size and CT density.     The adrenal glands are not enlarged.  The kidneys are of normal size contour and CT density without mass stone or hydronephrosis.  The abdominal aorta and inferior vena cava are of normal caliber.     The patient has 2 ventral hernias.  The upper hernia is midline in cane intake Anes AP loop of transverse colon the mouth is wide measuring 6 cm.  The more inferior hernia in the midline of the pelvis contains multiple small bowel loops.  There are multiple surgical clips as well.  The  mouth of this hernia is wide measuring 6.5 cm.  No bowel obstruction is identified.  The stomach is of normal configuration.  Small bowel distention or air-fluid levels are not seen.  The colon is otherwise of normal configuration and a normal appendix is seen.  No free fluid or free air is noted.     The bladder is of normal contour without mass or asymmetry.  There is a bowel anastomosis noted in the rectum without recurrent mass seen.  A uterus is not seen.  No free fluid is noted.     Impression:     Two ventral hernias 1 in the lower abdomen containing a loop of transverse colon and 1 in the midline pelvis containing multiple loops of small bowel without evidence of bowel obstruction.  Prior partial resection at the rectosigmoid.  Prior hysterectomy.  Prior cholecystectom      ASSESSMENT & PLAN:  78 y.o. female with large incisional abdominal wall hernias, history of obstruction   -patient was doing well since discharge for potential partial small-bowel obstruction, this was patient's 1st episode  -has large incisional hernias which she had been present for some time, dating back to 2015 based off imaging  -superior hernia defect measures 5.5 cm wide by 4.5 cm tall, inferior hernia defect measured 6.5 cm wide by 5.5 cm tall  -these hernias by themselves are large and may require retrorectus repair or tar, combined defect size would be very tall require extensive abdominal wall reconstruction to repair both with adequate release of tension and mesh overlap  -other options would consider repair of each separately were to continue observation as this is the 1st episode of obstruction the patient has had despite having hernias for least 8+ years  -at this time patient will consider her options and contact the office if she wishes to discuss surgical intervention further  -ER precautions discussed

## 2024-02-05 DIAGNOSIS — Z79.4 TYPE 2 DIABETES MELLITUS WITHOUT COMPLICATION, WITH LONG-TERM CURRENT USE OF INSULIN: ICD-10-CM

## 2024-02-05 DIAGNOSIS — E11.9 TYPE 2 DIABETES MELLITUS WITHOUT COMPLICATION, WITH LONG-TERM CURRENT USE OF INSULIN: ICD-10-CM

## 2024-02-05 RX ORDER — METFORMIN HYDROCHLORIDE 500 MG/1
TABLET ORAL
Qty: 180 TABLET | Refills: 0 | Status: SHIPPED | OUTPATIENT
Start: 2024-02-05 | End: 2024-05-14

## 2024-02-05 NOTE — TELEPHONE ENCOUNTER
Refill Decision Note   Pili Teixeira  is requesting a refill authorization.  Brief Assessment and Rationale for Refill:  Approve     Medication Therapy Plan:         Comments:     Note composed:9:58 AM 02/05/2024

## 2024-02-05 NOTE — TELEPHONE ENCOUNTER
No care due was identified.  [facility] Embedded Care Due Messages. Reference number: [id number].   2/05/2024 9:49:31 AM CST

## 2024-02-27 ENCOUNTER — TELEPHONE (OUTPATIENT)
Dept: FAMILY MEDICINE | Facility: CLINIC | Age: 79
End: 2024-02-27
Payer: MEDICARE

## 2024-02-27 DIAGNOSIS — E83.42 HYPOMAGNESEMIA: Primary | ICD-10-CM

## 2024-02-27 RX ORDER — LANOLIN ALCOHOL/MO/W.PET/CERES
1 CREAM (GRAM) TOPICAL DAILY
Qty: 90 TABLET | Refills: 3 | Status: SHIPPED | OUTPATIENT
Start: 2024-02-27

## 2024-02-27 NOTE — TELEPHONE ENCOUNTER
----- Message from LUÍS Huggins MD sent at 2/26/2024  8:46 PM CST -----  Is she taking her magnesium daily?

## 2024-02-27 NOTE — TELEPHONE ENCOUNTER
Care Due:                  Date            Visit Type   Department     Provider  --------------------------------------------------------------------------------                                [facility]  Last Visit: 01-      FOLLOW UP    MEDICINE       LUÍS GOULD  Next Visit: None Scheduled  None         None Found                                                            Last  Test          Frequency    Reason                     Performed    Due Date  --------------------------------------------------------------------------------    HBA1C.......  6 months...  metFORMIN................  09- 02-    Lipid Panel.  12 months..  rosuvastatin.............  08- 08-    [employer] Embedded Care Due Messages. Reference number: [id number].   2/27/2024 2:07:47 PM CST

## 2024-02-27 NOTE — TELEPHONE ENCOUNTER
----- Message from Melissa Cordova sent at 2/27/2024  2:00 PM CST -----  Type:  Patient Returning Call    Who Called:  pt  Who Left Message for Patient:  Lina  Does the patient know what this is regarding?:  no  Best Call Back Number:  [phone]  Additional Information:  Please call back to advise. Thanks!

## 2024-03-18 ENCOUNTER — HOSPITAL ENCOUNTER (OUTPATIENT)
Dept: RADIOLOGY | Facility: HOSPITAL | Age: 79
Discharge: HOME | End: 2024-03-18
Attending: FAMILY MEDICINE
Payer: MEDICARE

## 2024-03-18 DIAGNOSIS — Z12.31 ENCOUNTER FOR SCREENING MAMMOGRAM FOR MALIGNANT NEOPLASM OF BREAST: ICD-10-CM

## 2024-03-18 PROCEDURE — 77063 BREAST TOMOSYNTHESIS BI: CPT | Mod: TC

## 2024-03-18 PROCEDURE — 77067 SCR MAMMO BI INCL CAD: CPT | Mod: 26,,, | Performed by: RADIOLOGY

## 2024-03-18 PROCEDURE — 77063 BREAST TOMOSYNTHESIS BI: CPT | Mod: 26,,, | Performed by: RADIOLOGY

## 2024-04-04 ENCOUNTER — PATIENT MESSAGE (OUTPATIENT)
Dept: ADMINISTRATIVE | Facility: HOSPITAL | Age: 79
End: 2024-04-04
Payer: MEDICARE

## 2024-04-05 ENCOUNTER — TELEPHONE (OUTPATIENT)
Dept: FAMILY MEDICINE | Facility: CLINIC | Age: 79
End: 2024-04-05
Payer: MEDICARE

## 2024-04-05 ENCOUNTER — OFFICE VISIT (OUTPATIENT)
Dept: URGENT CARE | Facility: CLINIC | Age: 79
End: 2024-04-05
Payer: MEDICARE

## 2024-04-05 VITALS
DIASTOLIC BLOOD PRESSURE: 69 MMHG | WEIGHT: 133.38 LBS | HEART RATE: 81 BPM | OXYGEN SATURATION: 99 % | TEMPERATURE: 97 F | BODY MASS INDEX: 24.54 KG/M2 | SYSTOLIC BLOOD PRESSURE: 133 MMHG | RESPIRATION RATE: 15 BRPM | HEIGHT: 62 IN

## 2024-04-05 DIAGNOSIS — U07.1 COVID-19 VIRUS INFECTION: ICD-10-CM

## 2024-04-05 DIAGNOSIS — E78.5 TYPE 2 DIABETES MELLITUS WITH HYPERLIPIDEMIA: ICD-10-CM

## 2024-04-05 DIAGNOSIS — K21.9 GASTROESOPHAGEAL REFLUX DISEASE WITHOUT ESOPHAGITIS: ICD-10-CM

## 2024-04-05 DIAGNOSIS — U07.1 COVID-19 VIRUS DETECTED: ICD-10-CM

## 2024-04-05 DIAGNOSIS — Z20.822 EXPOSURE TO COVID-19 VIRUS: Primary | ICD-10-CM

## 2024-04-05 DIAGNOSIS — E08.00 DIABETES MELLITUS DUE TO UNDERLYING CONDITION WITH HYPEROSMOLARITY WITHOUT COMA, WITHOUT LONG-TERM CURRENT USE OF INSULIN: ICD-10-CM

## 2024-04-05 DIAGNOSIS — J06.9 UPPER RESPIRATORY TRACT INFECTION, UNSPECIFIED TYPE: ICD-10-CM

## 2024-04-05 DIAGNOSIS — E11.69 TYPE 2 DIABETES MELLITUS WITH HYPERLIPIDEMIA: ICD-10-CM

## 2024-04-05 DIAGNOSIS — Z87.09 HISTORY OF COPD: ICD-10-CM

## 2024-04-05 LAB
CTP QC/QA: YES
SARS-COV-2 AG RESP QL IA.RAPID: POSITIVE

## 2024-04-05 PROCEDURE — 87811 SARS-COV-2 COVID19 W/OPTIC: CPT | Mod: QW,S$GLB,,

## 2024-04-05 PROCEDURE — 99214 OFFICE O/P EST MOD 30 MIN: CPT | Mod: S$GLB,,,

## 2024-04-05 RX ORDER — METHYLPREDNISOLONE 4 MG/1
4 TABLET ORAL DAILY
Status: ON HOLD | COMMUNITY
Start: 2024-04-04 | End: 2024-05-09 | Stop reason: HOSPADM

## 2024-04-05 RX ORDER — AZITHROMYCIN 250 MG/1
250 TABLET, FILM COATED ORAL DAILY
Status: ON HOLD | COMMUNITY
Start: 2024-04-04 | End: 2024-05-09 | Stop reason: HOSPADM

## 2024-04-05 RX ORDER — ALBUTEROL SULFATE 90 UG/1
2 AEROSOL, METERED RESPIRATORY (INHALATION) EVERY 4 HOURS PRN
COMMUNITY
Start: 2024-04-04

## 2024-04-05 NOTE — TELEPHONE ENCOUNTER
----- Message from Prisca Zendejas sent at 4/5/2024  9:03 AM CDT -----  Regarding: Same Day Appt  Type:  Same Day Appointment Request    Caller is requesting a same day appointment.  Caller declined first available appointment listed below.      Name of Caller:  pt   When is the first available appointment?  unk  Symptoms:  poss covid   Best Call Back Number:  [phone] (home)   Additional Information:  requesting a call back and appt asap  please advise thank you

## 2024-04-05 NOTE — PROGRESS NOTES
"Subjective:      Patient ID: Pili Teixeira is a 78 y.o. female.    Vitals:  height is 5' 2" (1.575 m) and weight is 60.5 kg (133 lb 6.4 oz). Her oral temperature is 97.2 °F (36.2 °C). Her blood pressure is 133/69 and her pulse is 81. Her respiration is 15 and oxygen saturation is 99%.     Chief Complaint: URI (+ covid exposure )    Patient reports her symptoms started Monday and include sinus congestion, cough, and body aches.  Denies fever, shortness of breath, difficulty breathing. patient does have a past medical history of COPD, chronic pain, and diabetes.  She reports her grand daughter in-law prescribed her some medications yesterday to include a Z-Addison, Medrol Dosepak, and albuterol inhaler.  Discussed with patient I can send her some cough medication or something to help with her symptoms and she would like to continue over-the-counter medications that she has a home.    URI   This is a new problem. Episode onset: Thursday. The problem has been gradually worsening. There has been no fever. Associated symptoms include congestion, coughing, headaches, sinus pain, sneezing and a sore throat. Pertinent negatives include no wheezing. She has tried nothing for the symptoms.       Constitution: Positive for chills and fatigue. Negative for fever.   HENT:  Positive for congestion, postnasal drip, sinus pain and sore throat.    Neck: neck negative.   Cardiovascular: Negative.    Respiratory:  Positive for cough and COPD. Negative for sputum production, shortness of breath and wheezing.    Gastrointestinal: Negative.    Musculoskeletal: Negative.    Skin: Negative.    Allergic/Immunologic: Positive for sneezing.   Neurological:  Positive for headaches.   Psychiatric/Behavioral: Negative.        Objective:     Physical Exam   Constitutional: She is oriented to person, place, and time. She appears well-developed. She is cooperative.  Non-toxic appearance. She does not appear ill. No distress.   HENT:   Head: " Normocephalic and atraumatic.   Ears:   Right Ear: Hearing and external ear normal.   Left Ear: Hearing and external ear normal.   Nose: Rhinorrhea and congestion present. No mucosal edema or nasal deformity. No epistaxis. Right sinus exhibits no maxillary sinus tenderness and no frontal sinus tenderness. Left sinus exhibits no maxillary sinus tenderness and no frontal sinus tenderness.   Mouth/Throat: Uvula is midline, oropharynx is clear and moist and mucous membranes are normal. Mucous membranes are moist. No trismus in the jaw. Normal dentition. No uvula swelling. No posterior oropharyngeal edema.   Eyes: Conjunctivae and lids are normal. No scleral icterus.   Neck: Trachea normal and phonation normal. Neck supple. No edema present. No erythema present. No neck rigidity present.   Cardiovascular: Normal rate, regular rhythm and normal heart sounds.   Pulmonary/Chest: Effort normal and breath sounds normal. No respiratory distress. She has no decreased breath sounds. She has no wheezes. She has no rhonchi. She has no rales.   Abdominal: Normal appearance.   Musculoskeletal: Normal range of motion.         General: No deformity. Normal range of motion.   Neurological: She is alert and oriented to person, place, and time. She displays no weakness. She exhibits normal muscle tone.   Skin: Skin is warm, dry, intact, not diaphoretic and not pale.   Psychiatric: Her speech is normal and behavior is normal. Mood, judgment and thought content normal.   Nursing note and vitals reviewed.      Assessment:     1. Exposure to COVID-19 virus    2. Upper respiratory tract infection, unspecified type    3. History of COPD    4. COVID-19 virus infection        Plan:       Exposure to COVID-19 virus  -     SARS Coronavirus 2 Antigen, POCT Manual Read    Upper respiratory tract infection, unspecified type  -     SARS Coronavirus 2 Antigen, POCT Manual Read    History of COPD    COVID-19 virus infection      COVID:  Positive    Discussed medication with patient who acknowledges understanding and is agreeable to POC. Follow up with primary care. Increase fluid intake. Red flags for ER discussed.  COVID risk score: 7         Additional MDM:     Heart Failure Score:   COPD = Yes

## 2024-04-05 NOTE — TELEPHONE ENCOUNTER
----- Message from Shanna Rosas sent at 4/5/2024 11:49 AM CDT -----  Regarding: refill  Contact: pt  Type:  RX Refill Request    Who Called: pt  Refill or New Rx:refill  RX Name and Strength:famotidine (PEPCID) 40 MG tablet  How is the patient currently taking it? (ex. 1XDay):1X  Is this a 30 day or 90 day RX:90  Preferred Pharmacy with phone number:  [facility] [address] [place], [place] - [address] [address]  Phone: [phone] Fax: [phone]  Local or Mail Order:local  Ordering Provider:Joseluis  Would the patient rather a call back or a response via [facility]? Call back  Best Call Back Number:[phone]    Additional Information: sts she needs a refill and wanted to inform the Dr that she has COVID again--please advise

## 2024-04-05 NOTE — TELEPHONE ENCOUNTER
Spoke with the patient she stated that her daughter tested positive for covid and she was wanting to know if she should go to the urgent care to get tested being that they were together the whole weekend and she is now having symptoms I advised the patient that it would be good for her to get tested. Patient verbalized understanding.    This patient was just seen by SARA Pettit in neurosurgery on Friday.  This needs to go to the neurosurgery clinical pool.  Thanks.

## 2024-04-05 NOTE — TELEPHONE ENCOUNTER
----- Message from Shannasadie Rosas sent at 4/5/2024 11:52 AM CDT -----  Regarding: refill  Contact: pt  Type:  RX Refill Request    Who Called: pt  Refill or New Rx:refill  RX Name and Strength:blood sugar diagnostic (ACCU-CHEK GUIDE TEST STRIPS) Strp  How is the patient currently taking it? (ex. 1XDay):USE TO TEST BLOOD GLUCOSE ONE TIME DAILY AS DIRECTED.  Is this a 30 day or 90 day RX:100 each  Preferred Pharmacy with phone number:  [id number] - [place], [place] - [address] [address]  Phone: [phone] Fax: [phone]    Local or Mail Order:local  Ordering Provider:Joseluis  Would the patient rather a call back or a response via [facility]? Call back  Best Call Back Number:[phone]    Additional Information: sts she needs a refill

## 2024-04-05 NOTE — TELEPHONE ENCOUNTER
No care due was identified.  [facility] Embedded Care Due Messages. Reference number: [id number].   4/05/2024 11:46:34 AM CDT

## 2024-04-06 RX ORDER — BLOOD SUGAR DIAGNOSTIC
STRIP MISCELLANEOUS
Qty: 100 EACH | Refills: 3 | Status: SHIPPED | OUTPATIENT
Start: 2024-04-06

## 2024-04-06 RX ORDER — FAMOTIDINE 40 MG/1
40 TABLET, FILM COATED ORAL NIGHTLY
Qty: 90 TABLET | Refills: 3 | Status: SHIPPED | OUTPATIENT
Start: 2024-04-06

## 2024-04-06 NOTE — TELEPHONE ENCOUNTER
Refill Decision Note   Pilichelsy Teixeira  is requesting a refill authorization.  Brief Assessment and Rationale for Refill:  Approve     Medication Therapy Plan:        Comments:     Note composed:12:35 PM 04/06/2024

## 2024-04-08 DIAGNOSIS — R06.02 SOB (SHORTNESS OF BREATH): ICD-10-CM

## 2024-04-08 DIAGNOSIS — G45.8 OTHER SPECIFIED TRANSIENT CEREBRAL ISCHEMIAS: Chronic | ICD-10-CM

## 2024-04-08 DIAGNOSIS — E11.59 HYPERTENSION ASSOCIATED WITH DIABETES: ICD-10-CM

## 2024-04-08 DIAGNOSIS — I15.2 HYPERTENSION ASSOCIATED WITH DIABETES: ICD-10-CM

## 2024-04-08 DIAGNOSIS — N18.30 CKD (CHRONIC KIDNEY DISEASE) STAGE 3, GFR 30-59 ML/MIN: ICD-10-CM

## 2024-04-08 NOTE — TELEPHONE ENCOUNTER
No care due was identified.  [facility] Embedded Care Due Messages. Reference number: [id number].   4/08/2024 9:40:06 AM CDT

## 2024-04-09 NOTE — TELEPHONE ENCOUNTER
Refill Routing Note   Medication(s) are not appropriate for processing by [facility] for the following reason(s):        Required labs outdated    [facility] action(s):  Defer               Appointments  past 12m or future 3m with PCP    Date Provider   Last Visit   1/18/2024 LUÍS Huggins MD   Next Visit   Visit date not found LUÍS Huggins MD   ED visits in past 90 days: 0        Note composed:2:37 AM 04/09/2024

## 2024-04-10 RX ORDER — ROSUVASTATIN CALCIUM 10 MG/1
10 TABLET, COATED ORAL NIGHTLY
Qty: 90 TABLET | Refills: 1 | Status: SHIPPED | OUTPATIENT
Start: 2024-04-10

## 2024-04-18 ENCOUNTER — LAB VISIT (OUTPATIENT)
Dept: LAB | Facility: HOSPITAL | Age: 79
End: 2024-04-18
Attending: FAMILY MEDICINE
Payer: MEDICARE

## 2024-04-18 DIAGNOSIS — E11.00 TYPE 2 DIABETES MELLITUS WITH HYPEROSMOLARITY WITHOUT COMA, WITHOUT LONG-TERM CURRENT USE OF INSULIN: Chronic | ICD-10-CM

## 2024-04-18 LAB
ESTIMATED AVG GLUCOSE: 114 MG/DL (ref 68–131)
HBA1C MFR BLD: 5.6 % (ref 4.5–6.2)

## 2024-04-18 PROCEDURE — 83036 HEMOGLOBIN GLYCOSYLATED A1C: CPT | Performed by: FAMILY MEDICINE

## 2024-04-18 PROCEDURE — 36415 COLL VENOUS BLD VENIPUNCTURE: CPT | Performed by: FAMILY MEDICINE

## 2024-05-06 ENCOUNTER — HOSPITAL ENCOUNTER (INPATIENT)
Facility: HOSPITAL | Age: 79
LOS: 1 days | Discharge: HOME | DRG: 287 | End: 2024-05-09
Attending: EMERGENCY MEDICINE | Admitting: STUDENT IN AN ORGANIZED HEALTH CARE EDUCATION/TRAINING PROGRAM
Payer: MEDICARE

## 2024-05-06 DIAGNOSIS — R79.89 ELEVATED TROPONIN: ICD-10-CM

## 2024-05-06 DIAGNOSIS — I25.10 CAD (CORONARY ARTERY DISEASE): ICD-10-CM

## 2024-05-06 DIAGNOSIS — R07.9 CHEST PAIN OF UNKNOWN ETIOLOGY: ICD-10-CM

## 2024-05-06 DIAGNOSIS — R07.9 CHEST PAIN, UNSPECIFIED TYPE: ICD-10-CM

## 2024-05-06 DIAGNOSIS — R07.9 CHEST PAIN: Primary | ICD-10-CM

## 2024-05-06 LAB
ALBUMIN SERPL BCP-MCNC: 3.8 G/DL (ref 3.5–5.2)
ALP SERPL-CCNC: 78 U/L (ref 55–135)
ALT SERPL W/O P-5'-P-CCNC: 9 U/L (ref 10–44)
ANION GAP SERPL CALC-SCNC: 10 MMOL/L (ref 8–16)
APTT PPP: 36.6 SEC (ref 21–32)
AST SERPL-CCNC: 15 U/L (ref 10–40)
BASOPHILS # BLD AUTO: 0.04 K/UL (ref 0–0.2)
BASOPHILS NFR BLD: 0.5 % (ref 0–1.9)
BILIRUB SERPL-MCNC: 0.2 MG/DL (ref 0.1–1)
BUN SERPL-MCNC: 16 MG/DL (ref 8–23)
CALCIUM SERPL-MCNC: 9.8 MG/DL (ref 8.7–10.5)
CHLORIDE SERPL-SCNC: 103 MMOL/L (ref 95–110)
CO2 SERPL-SCNC: 26 MMOL/L (ref 23–29)
CREAT SERPL-MCNC: 0.9 MG/DL (ref 0.5–1.4)
DIFFERENTIAL METHOD BLD: ABNORMAL
EOSINOPHIL # BLD AUTO: 0.1 K/UL (ref 0–0.5)
EOSINOPHIL NFR BLD: 1.6 % (ref 0–8)
ERYTHROCYTE [DISTWIDTH] IN BLOOD BY AUTOMATED COUNT: 12.6 % (ref 11.5–14.5)
EST. GFR  (NO RACE VARIABLE): >60 ML/MIN/1.73 M^2
GLUCOSE SERPL-MCNC: 78 MG/DL (ref 70–110)
HCT VFR BLD AUTO: 36.1 % (ref 37–48.5)
HGB BLD-MCNC: 11.4 G/DL (ref 12–16)
IMM GRANULOCYTES # BLD AUTO: 0.02 K/UL (ref 0–0.04)
IMM GRANULOCYTES NFR BLD AUTO: 0.3 % (ref 0–0.5)
INR PPP: 1 (ref 0.8–1.2)
LYMPHOCYTES # BLD AUTO: 3.2 K/UL (ref 1–4.8)
LYMPHOCYTES NFR BLD: 43.2 % (ref 18–48)
MAGNESIUM SERPL-MCNC: 1.2 MG/DL (ref 1.6–2.6)
MCH RBC QN AUTO: 29.3 PG (ref 27–31)
MCHC RBC AUTO-ENTMCNC: 31.6 G/DL (ref 32–36)
MCV RBC AUTO: 93 FL (ref 82–98)
MONOCYTES # BLD AUTO: 0.7 K/UL (ref 0.3–1)
MONOCYTES NFR BLD: 9.6 % (ref 4–15)
NEUTROPHILS # BLD AUTO: 3.3 K/UL (ref 1.8–7.7)
NEUTROPHILS NFR BLD: 44.8 % (ref 38–73)
NRBC BLD-RTO: 0 /100 WBC
PHOSPHATE SERPL-MCNC: 2.7 MG/DL (ref 2.7–4.5)
PLATELET # BLD AUTO: 130 K/UL (ref 150–450)
PMV BLD AUTO: 12.8 FL (ref 9.2–12.9)
POCT GLUCOSE: 124 MG/DL (ref 70–110)
POCT GLUCOSE: 73 MG/DL (ref 70–110)
POTASSIUM SERPL-SCNC: 4 MMOL/L (ref 3.5–5.1)
PROT SERPL-MCNC: 6.8 G/DL (ref 6–8.4)
PROTHROMBIN TIME: 11.1 SEC (ref 9–12.5)
RBC # BLD AUTO: 3.89 M/UL (ref 4–5.4)
SODIUM SERPL-SCNC: 139 MMOL/L (ref 136–145)
TROPONIN I SERPL DL<=0.01 NG/ML-MCNC: 0.02 NG/ML (ref 0–0.03)
TROPONIN I SERPL DL<=0.01 NG/ML-MCNC: 0.97 NG/ML (ref 0–0.03)
TROPONIN I SERPL DL<=0.01 NG/ML-MCNC: 1.57 NG/ML (ref 0–0.03)
TSH SERPL DL<=0.005 MIU/L-ACNC: 2.46 UIU/ML (ref 0.4–4)
WBC # BLD AUTO: 7.39 K/UL (ref 3.9–12.7)

## 2024-05-06 PROCEDURE — 93010 ELECTROCARDIOGRAM REPORT: CPT | Mod: ,,, | Performed by: GENERAL PRACTICE

## 2024-05-06 PROCEDURE — 84100 ASSAY OF PHOSPHORUS: CPT

## 2024-05-06 PROCEDURE — 93010 ELECTROCARDIOGRAM REPORT: CPT | Mod: 76,,, | Performed by: GENERAL PRACTICE

## 2024-05-06 PROCEDURE — 85025 COMPLETE CBC W/AUTO DIFF WBC: CPT | Performed by: PHYSICIAN ASSISTANT

## 2024-05-06 PROCEDURE — 25000003 PHARM REV CODE 250

## 2024-05-06 PROCEDURE — 36415 COLL VENOUS BLD VENIPUNCTURE: CPT

## 2024-05-06 PROCEDURE — 93005 ELECTROCARDIOGRAM TRACING: CPT

## 2024-05-06 PROCEDURE — 96366 THER/PROPH/DIAG IV INF ADDON: CPT

## 2024-05-06 PROCEDURE — 80053 COMPREHEN METABOLIC PANEL: CPT | Performed by: PHYSICIAN ASSISTANT

## 2024-05-06 PROCEDURE — 82962 GLUCOSE BLOOD TEST: CPT

## 2024-05-06 PROCEDURE — 36415 COLL VENOUS BLD VENIPUNCTURE: CPT | Performed by: PHYSICIAN ASSISTANT

## 2024-05-06 PROCEDURE — 96372 THER/PROPH/DIAG INJ SC/IM: CPT

## 2024-05-06 PROCEDURE — 84484 ASSAY OF TROPONIN QUANT: CPT | Performed by: PHYSICIAN ASSISTANT

## 2024-05-06 PROCEDURE — 83735 ASSAY OF MAGNESIUM: CPT

## 2024-05-06 PROCEDURE — 99285 EMERGENCY DEPT VISIT HI MDM: CPT | Mod: 25

## 2024-05-06 PROCEDURE — 63600175 PHARM REV CODE 636 W HCPCS

## 2024-05-06 PROCEDURE — G0378 HOSPITAL OBSERVATION PER HR: HCPCS

## 2024-05-06 PROCEDURE — 96365 THER/PROPH/DIAG IV INF INIT: CPT

## 2024-05-06 PROCEDURE — 85610 PROTHROMBIN TIME: CPT

## 2024-05-06 PROCEDURE — 96375 TX/PRO/DX INJ NEW DRUG ADDON: CPT

## 2024-05-06 PROCEDURE — 85730 THROMBOPLASTIN TIME PARTIAL: CPT

## 2024-05-06 PROCEDURE — 84484 ASSAY OF TROPONIN QUANT: CPT | Mod: 91

## 2024-05-06 PROCEDURE — 84443 ASSAY THYROID STIM HORMONE: CPT

## 2024-05-06 RX ORDER — PNV NO.95/FERROUS FUM/FOLIC AC 28MG-0.8MG
100 TABLET ORAL DAILY
Status: DISCONTINUED | OUTPATIENT
Start: 2024-05-07 | End: 2024-05-08

## 2024-05-06 RX ORDER — SODIUM CHLORIDE 0.9 % (FLUSH) 0.9 %
10 SYRINGE (ML) INJECTION EVERY 12 HOURS PRN
Status: DISCONTINUED | OUTPATIENT
Start: 2024-05-06 | End: 2024-05-09 | Stop reason: HOSPADM

## 2024-05-06 RX ORDER — PREGABALIN 75 MG/1
150 CAPSULE ORAL 2 TIMES DAILY
Status: DISCONTINUED | OUTPATIENT
Start: 2024-05-06 | End: 2024-05-09 | Stop reason: HOSPADM

## 2024-05-06 RX ORDER — ONDANSETRON HYDROCHLORIDE 2 MG/ML
4 INJECTION, SOLUTION INTRAVENOUS EVERY 8 HOURS PRN
Status: DISCONTINUED | OUTPATIENT
Start: 2024-05-06 | End: 2024-05-09 | Stop reason: HOSPADM

## 2024-05-06 RX ORDER — IPRATROPIUM BROMIDE AND ALBUTEROL SULFATE 2.5; .5 MG/3ML; MG/3ML
3 SOLUTION RESPIRATORY (INHALATION) EVERY 6 HOURS PRN
Status: DISCONTINUED | OUTPATIENT
Start: 2024-05-06 | End: 2024-05-09 | Stop reason: HOSPADM

## 2024-05-06 RX ORDER — ENOXAPARIN SODIUM 100 MG/ML
1 INJECTION SUBCUTANEOUS EVERY 24 HOURS
Status: DISCONTINUED | OUTPATIENT
Start: 2024-05-07 | End: 2024-05-06

## 2024-05-06 RX ORDER — GLUCAGON 1 MG
1 KIT INJECTION
Status: DISCONTINUED | OUTPATIENT
Start: 2024-05-06 | End: 2024-05-09 | Stop reason: HOSPADM

## 2024-05-06 RX ORDER — SIMETHICONE 80 MG
1 TABLET,CHEWABLE ORAL 4 TIMES DAILY PRN
Status: DISCONTINUED | OUTPATIENT
Start: 2024-05-06 | End: 2024-05-09 | Stop reason: HOSPADM

## 2024-05-06 RX ORDER — METOPROLOL TARTRATE 25 MG/1
25 TABLET, FILM COATED ORAL 2 TIMES DAILY
Status: DISCONTINUED | OUTPATIENT
Start: 2024-05-06 | End: 2024-05-09 | Stop reason: HOSPADM

## 2024-05-06 RX ORDER — ALUMINUM HYDROXIDE, MAGNESIUM HYDROXIDE, AND SIMETHICONE 1200; 120; 1200 MG/30ML; MG/30ML; MG/30ML
30 SUSPENSION ORAL 4 TIMES DAILY PRN
Status: DISCONTINUED | OUTPATIENT
Start: 2024-05-06 | End: 2024-05-09 | Stop reason: HOSPADM

## 2024-05-06 RX ORDER — CLOPIDOGREL BISULFATE 75 MG/1
75 TABLET ORAL DAILY
Status: DISCONTINUED | OUTPATIENT
Start: 2024-05-08 | End: 2024-05-06

## 2024-05-06 RX ORDER — TALC
6 POWDER (GRAM) TOPICAL NIGHTLY PRN
Status: DISCONTINUED | OUTPATIENT
Start: 2024-05-06 | End: 2024-05-09 | Stop reason: HOSPADM

## 2024-05-06 RX ORDER — LIDOCAINE 50 MG/G
1 PATCH TOPICAL
Status: DISCONTINUED | OUTPATIENT
Start: 2024-05-06 | End: 2024-05-09 | Stop reason: HOSPADM

## 2024-05-06 RX ORDER — IBUPROFEN 200 MG
16 TABLET ORAL
Status: DISCONTINUED | OUTPATIENT
Start: 2024-05-06 | End: 2024-05-09 | Stop reason: HOSPADM

## 2024-05-06 RX ORDER — LANOLIN ALCOHOL/MO/W.PET/CERES
800 CREAM (GRAM) TOPICAL
Status: DISCONTINUED | OUTPATIENT
Start: 2024-05-06 | End: 2024-05-09 | Stop reason: HOSPADM

## 2024-05-06 RX ORDER — NALOXONE HCL 0.4 MG/ML
0.02 VIAL (ML) INJECTION
Status: DISCONTINUED | OUTPATIENT
Start: 2024-05-06 | End: 2024-05-09 | Stop reason: HOSPADM

## 2024-05-06 RX ORDER — CLOPIDOGREL BISULFATE 75 MG/1
75 TABLET ORAL DAILY
Status: DISCONTINUED | OUTPATIENT
Start: 2024-05-07 | End: 2024-05-09 | Stop reason: HOSPADM

## 2024-05-06 RX ORDER — METHOCARBAMOL 750 MG/1
750 TABLET, FILM COATED ORAL 3 TIMES DAILY
Status: DISCONTINUED | OUTPATIENT
Start: 2024-05-06 | End: 2024-05-09 | Stop reason: HOSPADM

## 2024-05-06 RX ORDER — DOCUSATE SODIUM 100 MG/1
100 CAPSULE, LIQUID FILLED ORAL NIGHTLY
Status: DISCONTINUED | OUTPATIENT
Start: 2024-05-06 | End: 2024-05-09 | Stop reason: HOSPADM

## 2024-05-06 RX ORDER — ENOXAPARIN SODIUM 100 MG/ML
1 INJECTION SUBCUTANEOUS EVERY 12 HOURS
Status: DISCONTINUED | OUTPATIENT
Start: 2024-05-06 | End: 2024-05-09 | Stop reason: HOSPADM

## 2024-05-06 RX ORDER — MAGNESIUM SULFATE HEPTAHYDRATE 40 MG/ML
2 INJECTION, SOLUTION INTRAVENOUS ONCE
Status: COMPLETED | OUTPATIENT
Start: 2024-05-06 | End: 2024-05-06

## 2024-05-06 RX ORDER — MORPHINE SULFATE 2 MG/ML
4 INJECTION, SOLUTION INTRAMUSCULAR; INTRAVENOUS ONCE
Status: COMPLETED | OUTPATIENT
Start: 2024-05-06 | End: 2024-05-06

## 2024-05-06 RX ORDER — ASPIRIN 325 MG
325 TABLET ORAL DAILY
Status: DISCONTINUED | OUTPATIENT
Start: 2024-05-06 | End: 2024-05-09 | Stop reason: HOSPADM

## 2024-05-06 RX ORDER — VALSARTAN 40 MG/1
40 TABLET ORAL NIGHTLY
Status: DISCONTINUED | OUTPATIENT
Start: 2024-05-06 | End: 2024-05-09 | Stop reason: HOSPADM

## 2024-05-06 RX ORDER — PANTOPRAZOLE SODIUM 40 MG/1
40 TABLET, DELAYED RELEASE ORAL
Qty: 90 TABLET | Refills: 2 | Status: SHIPPED | OUTPATIENT
Start: 2024-05-06 | End: 2024-05-29 | Stop reason: SDUPTHER

## 2024-05-06 RX ORDER — HYDRALAZINE HYDROCHLORIDE 20 MG/ML
5 INJECTION INTRAMUSCULAR; INTRAVENOUS ONCE
Status: DISCONTINUED | OUTPATIENT
Start: 2024-05-06 | End: 2024-05-06

## 2024-05-06 RX ORDER — CLOPIDOGREL BISULFATE 75 MG/1
75 TABLET ORAL
Status: DISCONTINUED | OUTPATIENT
Start: 2024-05-08 | End: 2024-05-06

## 2024-05-06 RX ORDER — OXYCODONE AND ACETAMINOPHEN 10; 325 MG/1; MG/1
1 TABLET ORAL EVERY 6 HOURS PRN
Status: CANCELLED | OUTPATIENT
Start: 2024-05-06

## 2024-05-06 RX ORDER — IBUPROFEN 200 MG
24 TABLET ORAL
Status: DISCONTINUED | OUTPATIENT
Start: 2024-05-06 | End: 2024-05-09 | Stop reason: HOSPADM

## 2024-05-06 RX ORDER — OXYCODONE AND ACETAMINOPHEN 10; 325 MG/1; MG/1
1 TABLET ORAL EVERY 6 HOURS PRN
Status: DISCONTINUED | OUTPATIENT
Start: 2024-05-06 | End: 2024-05-09 | Stop reason: HOSPADM

## 2024-05-06 RX ORDER — TRAZODONE HYDROCHLORIDE 50 MG/1
50 TABLET ORAL NIGHTLY
Status: DISCONTINUED | OUTPATIENT
Start: 2024-05-06 | End: 2024-05-09 | Stop reason: HOSPADM

## 2024-05-06 RX ORDER — ONDANSETRON HYDROCHLORIDE 4 MG/5ML
4 SOLUTION ORAL ONCE
Status: DISCONTINUED | OUTPATIENT
Start: 2024-05-06 | End: 2024-05-09 | Stop reason: HOSPADM

## 2024-05-06 RX ORDER — ATORVASTATIN CALCIUM 40 MG/1
40 TABLET, FILM COATED ORAL NIGHTLY
Status: DISCONTINUED | OUTPATIENT
Start: 2024-05-06 | End: 2024-05-09 | Stop reason: HOSPADM

## 2024-05-06 RX ORDER — LANOLIN ALCOHOL/MO/W.PET/CERES
400 CREAM (GRAM) TOPICAL DAILY
Status: DISCONTINUED | OUTPATIENT
Start: 2024-05-07 | End: 2024-05-09 | Stop reason: HOSPADM

## 2024-05-06 RX ORDER — CLOPIDOGREL BISULFATE 75 MG/1
75 TABLET ORAL DAILY
Status: DISCONTINUED | OUTPATIENT
Start: 2024-05-07 | End: 2024-05-06

## 2024-05-06 RX ORDER — ACETAMINOPHEN 325 MG/1
650 TABLET ORAL EVERY 4 HOURS PRN
Status: DISCONTINUED | OUTPATIENT
Start: 2024-05-06 | End: 2024-05-09 | Stop reason: HOSPADM

## 2024-05-06 RX ORDER — PANTOPRAZOLE SODIUM 40 MG/1
40 TABLET, DELAYED RELEASE ORAL DAILY
Status: DISCONTINUED | OUTPATIENT
Start: 2024-05-07 | End: 2024-05-09 | Stop reason: HOSPADM

## 2024-05-06 RX ORDER — INSULIN ASPART 100 [IU]/ML
0-5 INJECTION, SOLUTION INTRAVENOUS; SUBCUTANEOUS
Status: DISCONTINUED | OUTPATIENT
Start: 2024-05-06 | End: 2024-05-09 | Stop reason: HOSPADM

## 2024-05-06 RX ADMIN — ENOXAPARIN SODIUM 60 MG: 60 INJECTION SUBCUTANEOUS at 09:05

## 2024-05-06 RX ADMIN — MAGNESIUM SULFATE HEPTAHYDRATE 2 G: 40 INJECTION, SOLUTION INTRAVENOUS at 09:05

## 2024-05-06 RX ADMIN — OXYCODONE HYDROCHLORIDE AND ACETAMINOPHEN 1 TABLET: 10; 325 TABLET ORAL at 07:05

## 2024-05-06 RX ADMIN — LIDOCAINE 5% 1 PATCH: 700 PATCH TOPICAL at 04:05

## 2024-05-06 RX ADMIN — ATORVASTATIN CALCIUM 40 MG: 40 TABLET, FILM COATED ORAL at 09:05

## 2024-05-06 RX ADMIN — METHOCARBAMOL TABLETS 750 MG: 750 TABLET, COATED ORAL at 04:05

## 2024-05-06 RX ADMIN — MORPHINE SULFATE 4 MG: 2 INJECTION, SOLUTION INTRAMUSCULAR; INTRAVENOUS at 11:05

## 2024-05-06 RX ADMIN — VALSARTAN 40 MG: 40 TABLET, FILM COATED ORAL at 09:05

## 2024-05-06 RX ADMIN — TRAZODONE HYDROCHLORIDE 50 MG: 50 TABLET ORAL at 09:05

## 2024-05-06 RX ADMIN — METHOCARBAMOL TABLETS 750 MG: 750 TABLET, COATED ORAL at 09:05

## 2024-05-06 RX ADMIN — DOCUSATE SODIUM 100 MG: 100 CAPSULE, LIQUID FILLED ORAL at 09:05

## 2024-05-06 RX ADMIN — PREGABALIN 150 MG: 75 CAPSULE ORAL at 09:05

## 2024-05-06 RX ADMIN — ASPIRIN 325 MG ORAL TABLET 325 MG: 325 PILL ORAL at 04:05

## 2024-05-06 NOTE — HPI
Pili Teixeira is a 78 year old female with a  previous medical history of cervical spine stenosis and long-term use of opioid pain medication, COPD, CAD, GERD, HTN, HLD, TIA on plavix Monday, Wednesday, Friday, and DMII who presents for chest pain starting today. Patient reports she awoke this morning feeling generally unwell and noted she had a heaviness in the left side of her chest that radiated to her left shoulder. She reports that there were no other adverse symptoms such as nausea, vomiting, shortness of breath, or diaphoresis  that occurred at the same time nor did anything aggravate or alleviate the chest heaviness. Patient reports eating light breakfast and then the pain resolved after some time. She reports that during her physical therapy appointment for her chronic neck pain and the left sided chest heaviness returned and again radiated to her left shoulder. The physical therapist took her blood pressure and noted the systolic to be 175 and advised her to present to the emergency room. Initial ED workup showed a negative troponin, normal chest xray, stable anemia on CBC and unremarkable CMP. EKG was normal sinus rhythm. Patient to be admitted by hospital medicine for further evaluation and management.

## 2024-05-06 NOTE — ASSESSMENT & PLAN NOTE
Patient with known CAD which is controlled Will continue ASA, Plavix, and Statin and monitor for S/Sx of angina/ACS. Continue to monitor on telemetry.

## 2024-05-06 NOTE — TELEPHONE ENCOUNTER
Care Due:                  Date            Visit Type   Department     Provider  --------------------------------------------------------------------------------                                [facility]  Last Visit: 01-      FOLLOW UP    MEDICINE       LUÍS GOULD  Next Visit: None Scheduled  None         None Found                                                            Last  Test          Frequency    Reason                     Performed    Due Date  --------------------------------------------------------------------------------    Lipid Panel.  12 months..  rosuvastatin.............  08- 08-    [facility] Embedded Care Due Messages. Reference number: [id number].   5/06/2024 9:38:52 AM CDT

## 2024-05-06 NOTE — TELEPHONE ENCOUNTER
Provider Staff:  Action required for this patient    Requires labs      Please see care gap opportunities below in Care Due Message.    Thanks!  [facility]     Appointments      Date Provider   Last Visit   1/18/2024 LUÍS Huggins MD   Next Visit   Visit date not found LUÍS Huggins MD     Refill Decision Note   Pili Teixeira  is requesting a refill authorization.  Brief Assessment and Rationale for Refill:  Approve     Medication Therapy Plan:         Alert overridden per protocol: Yes   Comments:     Note composed:1:24 PM 05/06/2024

## 2024-05-06 NOTE — ASSESSMENT & PLAN NOTE
Chronic condition noted. Stable    -Continued oxycodone 10-325mg four times daily PRN pain  -Added robaxin 750mg three times daily PRN  -Added Lidocaine patch every 12 hours to left scapula

## 2024-05-06 NOTE — ASSESSMENT & PLAN NOTE
Patient's COPD is controlled currently.  Patient is currently off COPD Pathway. Continue scheduled inhalers prn Supplemental oxygen prn  and monitor respiratory status closely.

## 2024-05-06 NOTE — ASSESSMENT & PLAN NOTE
"Patient's FSGs are controlled on current medication regimen.  Last A1c reviewed-   Lab Results   Component Value Date    HGBA1C 5.6 04/18/2024     Most recent fingerstick glucose reviewed- No results for input(s): "POCTGLUCOSE" in the last 24 hours.  Current correctional scale  Low  Maintain anti-hyperglycemic dose as follows-   Antihyperglycemics (From admission, onward)      Start     Stop Route Frequency Ordered    05/06/24 1657  insulin aspart U-100 pen 0-5 Units         -- SubQ Before meals & nightly PRN 05/06/24 1603          Hold Oral hypoglycemics while patient is in the hospital.     "

## 2024-05-06 NOTE — ED PROVIDER NOTES
Encounter Date: 5/6/2024       History     Chief Complaint   Patient presents with    Chest Pain     Started at 1030, radiating down left arm     Patient is a very pleasant 78-year-old woman with a history of hypertension, diabetes, coronary artery disease presents emergency department for evaluation of waxing and waning chest pain that began this morning.  Pain is located in the left side of her chest and radiates to her left shoulder neck and was associated with some tingling in the left side of her face.  She had associated nausea.  She took her pain medication and it temporarily relieved it but the pain returned.  Is worse it was 9/10 in severity currently much improved.  She sees Dr. Page of for Cardiology and had a negative stress test 6 months ago.      Review of patient's allergies indicates:   Allergen Reactions    Iodinated contrast media Anaphylaxis, Hives and Rash     (Intubated)    Codeine Other (See Comments)     Chest pain    Clindamycin Other (See Comments)     Difficulty swallowing after taking, feels a something sits in epigastric area      Past Medical History:   Diagnosis Date    Allergy     Anemia 2012    with thrombocytopenia; iron deficiency    Arthritis     Cervical spinal stenosis     with neuropathy, chronic neck,back,leg pain    Colon polyp     COPD (chronic obstructive pulmonary disease)     Coronary artery disease     COVID 4/27/2022    COVID-19     Diabetes mellitus     , sugars run 190's per patient    Diastolic dysfunction 7/13/2015    Diverticulitis     Diverticulosis     Hx diverticulitis,still has chronic diarrhea, abd pain    Dyspnea on exertion     sometimes with nausea, fatigue,dizziness, had cardiac cath June 2012, normal    Fibromyalgia     Fracture 2012    right thumb    GERD (gastroesophageal reflux disease)     Feb 2012, Hx H Pylori    Glaucoma     had LAser surgey, on no eye drops    HEARING LOSS     Hemangioma     fatty liver    History of earache     frequent    History  of TIA (transient ischemic attack) 5/28/2013    Hyperlipidemia     Hypertension     Hypertension associated with diabetes 3/14/2017    Laryngeal polyp     Myocardial infarction 2006 last    also MI in distant past    REJI (obstructive sleep apnea)     does not use CPAP    Otitis media     Pneumonia due to COVID-19 virus 2/20/2021    Renal stone     Sjogren's syndrome     SOB (shortness of breath) 6/8/2012    [address] [place] [address] 1. Normal coronary arteries. 2. Normal single renal arteries bilaterally. 3. Diastolic dysfunction.     Stroke     TIA, now on Plavix    TIA (transient ischemic attack)     TMJ disorder     Type II or unspecified type diabetes mellitus without mention of complication, uncontrolled 5/8/2014    UTI (lower urinary tract infection)     frequent    Venous insufficiency     with Hx blood clot in leg     Past Surgical History:   Procedure Laterality Date    ABDOMINAL SURGERY  2010 x2    expoloratory lap for pelvic cyst,adhesions; partial colonectomy     adhesiolysis   10/11/2012    CARDIAC CATHETERIZATION      no current blockages.    CHOLECYSTECTOMY      COLON SURGERY      r/t diverticuli    COLONOSCOPY  08/2014    repeat in 5 years    COLONOSCOPY N/A 1/7/2020    Procedure: COLONOSCOPY;  Surgeon: Kb Nguyen MD;  Location: [facility];  Service: Endoscopy;  Laterality: N/A;    COLONOSCOPY N/A 3/13/2023    Procedure: COLONOSCOPY;  Surgeon: Jarrod Frost MD;  Location: [facility];  Service: Endoscopy;  Laterality: N/A;    ENDOSCOPIC ULTRASOUND OF UPPER GASTROINTESTINAL TRACT N/A 1/13/2021    Procedure: ULTRASOUND, UPPER GI TRACT, ENDOSCOPIC;  Surgeon: Sonido Castellano III, MD;  Location: [facility];  Service: Endoscopy;  Laterality: N/A;    EPIDURAL BLOCK INJECTION  5/15/12    back,neck for pain    ESOPHAGOGASTRODUODENOSCOPY N/A 1/7/2020    Procedure: EGD (ESOPHAGOGASTRODUODENOSCOPY);  Surgeon: Kb Nguyen MD;  Location: [facility];  Service: Endoscopy;  Laterality: N/A;     ESOPHAGOGASTRODUODENOSCOPY N/A 1/13/2021    Procedure: EGD (ESOPHAGOGASTRODUODENOSCOPY);  Surgeon: Sonido Castellano III, MD;  Location: [facility];  Service: Endoscopy;  Laterality: N/A;    ESOPHAGOGASTRODUODENOSCOPY N/A 3/13/2023    Procedure: EGD (ESOPHAGOGASTRODUODENOSCOPY);  Surgeon: Jarrod Frost MD;  Location: [facility];  Service: Endoscopy;  Laterality: N/A;    ESOPHAGOGASTRODUODENOSCOPY N/A 5/5/2023    Procedure: EGD (ESOPHAGOGASTRODUODENOSCOPY);  Surgeon: Noel Caputo MD;  Location: [facility];  Service: Endoscopy;  Laterality: N/A;    EXPLORATORY LAPAROTOMY W/ BOWEL RESECTION  10/11/2012    EYE SURGERY      Laser for glaucoma    EYE SURGERY  May 2012    cataracts    HYSTERECTOMY      INTRALUMINAL GASTROINTESTINAL TRACT IMAGING VIA CAPSULE N/A 5/23/2023    Procedure: IMAGING PROCEDURE, GI TRACT, INTRALUMINAL, VIA CAPSULE;  Surgeon: Noel Caputo MD;  Location: [facility];  Service: Endoscopy;  Laterality: N/A;    LARYNX SURGERY      polypectomy    OOPHORECTOMY      TONSILLECTOMY      with adenoidectomy    UPPER GASTROINTESTINAL ENDOSCOPY  12/2015    VASCULAR SURGERY      laser to varicose vein leg     Family History   Problem Relation Name Age of Onset    Throat cancer Mother      Cancer Mother      Diabetes Mellitus Mother      Stroke Father      Hypertension Father      Heart disease Father      Diverticulitis Sister      Throat cancer Brother      Cancer Brother      Thyroid disease Daughter      Lupus Daughter      Pancreatic cancer Maternal Aunt  55    Pancreatic cancer Cousin first cousin Aunt's son 58    Breast cancer Neg Hx      Ovarian cancer Neg Hx      Colon cancer Neg Hx      Colon polyps Neg Hx      Celiac disease Neg Hx      Cirrhosis Neg Hx      Crohn's disease Neg Hx      Stomach cancer Neg Hx      Ulcerative colitis Neg Hx      Rectal cancer Neg Hx      Esophageal cancer Neg Hx      Inflammatory bowel disease Neg Hx      Rheum arthritis Neg Hx      Psoriasis Neg Hx       Osteoarthritis Neg Hx      Kidney disease Neg Hx      Hyperlipidemia Neg Hx      COPD Neg Hx      Depression Neg Hx      Chronic back pain Neg Hx      Asthma Neg Hx      Alcohol abuse Neg Hx       Social History     Tobacco Use    Smoking status: Never    Smokeless tobacco: Never   Substance Use Topics    Alcohol use: No    Drug use: Yes     Types: Oxycodone     Review of Systems   Constitutional:  Negative for fever.   HENT:  Negative for sore throat.    Respiratory:  Negative for shortness of breath.    Cardiovascular:  Positive for chest pain.   Gastrointestinal:  Positive for nausea.   Genitourinary:  Negative for dysuria.   Musculoskeletal:  Positive for neck pain. Negative for back pain.   Skin:  Negative for rash.   Neurological:  Positive for numbness. Negative for weakness.   Hematological:  Does not bruise/bleed easily.       Physical Exam     Initial Vitals [05/06/24 1326]   BP Pulse Resp Temp SpO2   (!) 175/74 74 20 97.9 °F (36.6 °C) 99 %      MAP       --         Physical Exam    Constitutional: Vital signs are normal. She appears well-developed and well-nourished.  Non-toxic appearance. No distress.   HENT:   Head: Normocephalic and atraumatic.   Eyes: EOM are normal. Pupils are equal, round, and reactive to light.   Neck: Neck supple. No JVD present.   Normal range of motion.  Cardiovascular:  Normal rate, regular rhythm, normal heart sounds and intact distal pulses.     Exam reveals no gallop and no friction rub.       No murmur heard.  Pulmonary/Chest: Breath sounds normal. She has no wheezes. She has no rhonchi. She has no rales.   Abdominal: Abdomen is soft. Bowel sounds are normal. There is no abdominal tenderness. There is no rebound and no guarding.   Musculoskeletal:         General: Normal range of motion.      Cervical back: Normal range of motion and neck supple. No rigidity.     Neurological: She is alert and oriented to person, place, and time. She has normal strength and normal reflexes.  No cranial nerve deficit or sensory deficit. She exhibits normal muscle tone. Coordination normal. GCS eye subscore is 4. GCS verbal subscore is 5. GCS motor subscore is 6.   Skin: Skin is warm and dry.   Psychiatric: She has a normal mood and affect. Her speech is normal and behavior is normal. She is not actively hallucinating.         ED Course   Procedures  Labs Reviewed   CBC W/ AUTO DIFFERENTIAL - Abnormal; Notable for the following components:       Result Value    RBC 3.89 (*)     Hemoglobin 11.4 (*)     Hematocrit 36.1 (*)     MCHC 31.6 (*)     Platelets 130 (*)     All other components within normal limits   COMPREHENSIVE METABOLIC PANEL - Abnormal; Notable for the following components:    ALT 9 (*)     All other components within normal limits   TROPONIN I     EKG Readings: (Independently Interpreted)   Initial Reading: No STEMI.   Normal sinus rhythm, 67 beats per minute with septal infarct, age undetermined.  Cited on or before May 23, 2023.  Normal T-waves, normal ST segments.       Imaging Results              X-Ray Chest AP Portable (Final result)  Result time 05/06/24 14:40:36      Final result by June Hedrick MD (05/06/24 14:40:36)                   Impression:      No acute abnormality.      Electronically signed by: June Hedrick MD  Date:    05/06/2024  Time:    14:40               Narrative:    EXAMINATION:  XR CHEST AP PORTABLE    CLINICAL HISTORY:  Chest pain, unspecified    TECHNIQUE:  Single frontal view of the chest was performed.    COMPARISON:  None    FINDINGS:  The lungs are clear, with normal appearance of pulmonary vasculature and no pleural effusion or pneumothorax.    The cardiac silhouette is normal in size. The hilar and mediastinal contours are unremarkable.    Bones are intact.                                       Medications - No data to display  Medical Decision Making  Assessment/Plan:  Pili Teixeira is a 78 y.o. female with chest pain.    This is an emergent  evaluation.  Patient is complaining of chest pain.  My differential diagnosis includes: Acute MI, unstable angina, stable angina, congestive heart failure, pneumonia, pneumothorax, cervical radicular pain.    An EKG was performed and was negative for ST segment elevation.  A chest x-ray was performed and was negative for signs of heart failure or pulmonary edema.  There was no evidence of pneumonia or pneumothorax or mass lesion.    Cardiac marker-troponin is initially negative.  No evidence for NSTEMI or CHF.    This patient has multiple cardiac risk factors. Risk stratification with serial cardiac markers and stress testing is warranted in this patient.  I believe the patient should be admitted for observation to the definitively rule out acute coronary syndrome.    I discussed the patient's presentation and workup with the hospitalist who agrees with placing the patient in the hospital.    Reggie Carpenter M.D. 3:18 PM 5/6/2024          Amount and/or Complexity of Data Reviewed  External Data Reviewed: ECG.     Details: Reviewed revious ECG for comparison  Labs: ordered.     Details: Hemoglobin 11.4, potassium 4.0, creatinine 0.9.  Troponin 0.018.  Radiology: ordered and independent interpretation performed.     Details: No evidence of pulmonary edema, pneumothorax or pleural effusion.  No widening mediastinum.  ECG/medicine tests:  Decision-making details documented in ED Course.      Additional MDM:   Heart Score:    History:          Moderately suspicious.  ECG:             Nonspecific repolarisation disturbance  Age:               >65 years  Risk factors: >= 3 risk factors or history of atherosclerotic disease  Troponin:       Less than or equal to normal limit  Heart Score = 6                ED Course as of 05/06/24 1521   Mon May 06, 2024   1344 EKG independently interpreted by me as rate 67 normal sinus rate rhythm axis and intervals no ST segment elevation or depression.  Q-wave anterior lead. [JS]       ED Course User Index  [JS] Kishan Diaz MD                           Clinical Impression:  Final diagnoses:  [R07.9] Chest pain  [R07.9] Chest pain, unspecified type (Primary)          ED Disposition Condition    Observation Stable                Reggie Carpenter MD  05/06/24 2456

## 2024-05-06 NOTE — ASSESSMENT & PLAN NOTE
-trend troponin x3  -EKG Prn chest pain  -CBC, CMP daily  -Mag and phos checked upon admit  -TSH checked upon admit  -Fasting lipid panel in the AM  -PRN oxygen  -ASA 325mg upon admit and daily  -Stress test ordered for AM

## 2024-05-06 NOTE — SUBJECTIVE & OBJECTIVE
Past Medical History:   Diagnosis Date    Allergy     Anemia 2012    with thrombocytopenia; iron deficiency    Arthritis     Cervical spinal stenosis     with neuropathy, chronic neck,back,leg pain    Colon polyp     COPD (chronic obstructive pulmonary disease)     Coronary artery disease     COVID 4/27/2022    COVID-19     Diabetes mellitus     , sugars run 190's per patient    Diastolic dysfunction 7/13/2015    Diverticulitis     Diverticulosis     Hx diverticulitis,still has chronic diarrhea, abd pain    Dyspnea on exertion     sometimes with nausea, fatigue,dizziness, had cardiac cath June 2012, normal    Fibromyalgia     Fracture 2012    right thumb    GERD (gastroesophageal reflux disease)     Feb 2012, Hx H Pylori    Glaucoma     had LAser surgey, on no eye drops    HEARING LOSS     Hemangioma     fatty liver    History of earache     frequent    History of TIA (transient ischemic attack) 5/28/2013    Hyperlipidemia     Hypertension     Hypertension associated with diabetes 3/14/2017    Laryngeal polyp     Myocardial infarction 2006 last    also MI in distant past    REJI (obstructive sleep apnea)     does not use CPAP    Otitis media     Pneumonia due to COVID-19 virus 2/20/2021    Renal stone     Sjogren's syndrome     SOB (shortness of breath) 6/8/2012    [address] [place] [address] 1. Normal coronary arteries. 2. Normal single renal arteries bilaterally. 3. Diastolic dysfunction.     Stroke     TIA, now on Plavix    TIA (transient ischemic attack)     TMJ disorder     Type II or unspecified type diabetes mellitus without mention of complication, uncontrolled 5/8/2014    UTI (lower urinary tract infection)     frequent    Venous insufficiency     with Hx blood clot in leg       Past Surgical History:   Procedure Laterality Date    ABDOMINAL SURGERY  2010 x2    expoloratory lap for pelvic cyst,adhesions; partial colonectomy     adhesiolysis   10/11/2012    CARDIAC CATHETERIZATION      no current blockages.    CHOLECYSTECTOMY       COLON SURGERY      r/t diverticuli    COLONOSCOPY  08/2014    repeat in 5 years    COLONOSCOPY N/A 1/7/2020    Procedure: COLONOSCOPY;  Surgeon: Kb Nguyen MD;  [facility];  Service: Endoscopy;  Laterality: N/A;    COLONOSCOPY N/A 3/13/2023    Procedure: COLONOSCOPY;  Surgeon: Jarrod Frost MD;  Location: [facility];  Service: Endoscopy;  Laterality: N/A;    ENDOSCOPIC ULTRASOUND OF UPPER GASTROINTESTINAL TRACT N/A 1/13/2021    Procedure: ULTRASOUND, UPPER GI TRACT, ENDOSCOPIC;  Surgeon: Sonido Castellano III, MD;  Location: [facility];  Service: Endoscopy;  Laterality: N/A;    EPIDURAL BLOCK INJECTION  5/15/12    back,neck for pain    ESOPHAGOGASTRODUODENOSCOPY N/A 1/7/2020    Procedure: EGD (ESOPHAGOGASTRODUODENOSCOPY);  Surgeon: Kb Nguyen MD;  Location: [facility];  Service: Endoscopy;  Laterality: N/A;    ESOPHAGOGASTRODUODENOSCOPY N/A 1/13/2021    Procedure: EGD (ESOPHAGOGASTRODUODENOSCOPY);  Surgeon: Sonido Castellano III, MD;  Location: [facility];  Service: Endoscopy;  Laterality: N/A;    ESOPHAGOGASTRODUODENOSCOPY N/A 3/13/2023    Procedure: EGD (ESOPHAGOGASTRODUODENOSCOPY);  Surgeon: Jarrod Frost MD;  Location: [facility];  Service: Endoscopy;  Laterality: N/A;    ESOPHAGOGASTRODUODENOSCOPY N/A 5/5/2023    Procedure: EGD (ESOPHAGOGASTRODUODENOSCOPY);  Surgeon: Noel Caputo MD;  Location: [facility];  Service: Endoscopy;  Laterality: N/A;    EXPLORATORY LAPAROTOMY W/ BOWEL RESECTION  10/11/2012    EYE SURGERY      Laser for glaucoma    EYE SURGERY  May 2012    cataracts    HYSTERECTOMY      INTRALUMINAL GASTROINTESTINAL TRACT IMAGING VIA CAPSULE N/A 5/23/2023    Procedure: IMAGING PROCEDURE, GI TRACT, INTRALUMINAL, VIA CAPSULE;  Surgeon: Noel Caputo MD;  Location: [facility];  Service: Endoscopy;  Laterality: N/A;    LARYNX SURGERY      polypectomy    OOPHORECTOMY      TONSILLECTOMY      with adenoidectomy    UPPER GASTROINTESTINAL ENDOSCOPY  12/2015     VASCULAR SURGERY      laser to varicose vein leg       Review of patient's allergies indicates:   Allergen Reactions    Iodinated contrast media Anaphylaxis, Hives and Rash     (Intubated)    Codeine Other (See Comments)     Chest pain    Clindamycin Other (See Comments)     Difficulty swallowing after taking, feels a something sits in epigastric area        Current Facility-Administered Medications   Medication Dose Route Frequency Provider Last Rate Last Admin    acetaminophen tablet 650 mg  650 mg Oral Q4H PRN TonimaMillie mcfarland, NP        albuterol-ipratropium 2.5 mg-0.5 mg/3 mL nebulizer solution 3 mL  3 mL Nebulization Q6H PRN TommaoMillie, NP        aluminum-magnesium hydroxide-simethicone 200-200-20 mg/5 mL suspension 30 mL  30 mL Oral QID PRN Millie Castillo, NP        aspirin tablet 325 mg  325 mg Oral Daily TonimaseoMillie, NP        dextrose 10% bolus 125 mL 125 mL  12.5 g Intravenous PRN TonimaMillie mcfarland, NP        dextrose 10% bolus 250 mL 250 mL  25 g Intravenous PRN TonimaseoMillie, NP        glucagon (human recombinant) injection 1 mg  1 mg Intramuscular PRN TonimaseoMillie, NP        glucose chewable tablet 16 g  16 g Oral PRN TonimaMillie mcfarland, NP        glucose chewable tablet 24 g  24 g Oral PRN TonimaoMillie, NP        hydrALAZINE injection 5 mg  5 mg Intravenous Once TonimaseMillie hooks, NP        insulin aspart U-100 pen 0-5 Units  0-5 Units Subcutaneous QID (AC + HS) PRN Millie Castillo, NP        LIDOcaine 5 % patch 1 patch  1 patch Transdermal Q24H TonimaseoMillie, NP        magnesium oxide tablet 800 mg  800 mg Oral PRN TonimaseoMillie, NP        magnesium oxide tablet 800 mg  800 mg Oral PRN TonimaseMillie hooks, NP        melatonin tablet 6 mg  6 mg Oral Nightly PRN Millie Castillo, NP        methocarbamoL tablet 750 mg  750 mg Oral TID TonimaMillie mcfarland, NP        naloxone 0.4 mg/mL injection 0.02 mg  0.02 mg Intravenous PRN TonimaseMillie hooks, NP        ondansetron  injection 4 mg  4 mg Intravenous Q8H PRN Millie Castillo, NP        oxyCODONE-acetaminophen  mg per tablet 1 tablet  1 tablet Oral Q6H PRN Millie Castillo, NP        potassium bicarbonate disintegrating tablet 35 mEq  35 mEq Oral PRN Millie Castillo, NP        potassium bicarbonate disintegrating tablet 50 mEq  50 mEq Oral PRN Millie Castillo, NP        potassium bicarbonate disintegrating tablet 60 mEq  60 mEq Oral PRN Millie Castillo, NP        simethicone chewable tablet 80 mg  1 tablet Oral QID PRN Millie Castillo, NP        sodium chloride 0.9% flush 10 mL  10 mL Intravenous Q12H PRN Millie Castillo, NP         Current Outpatient Medications   Medication Sig Dispense Refill    metFORMIN (GLUCOPHAGE) 500 MG tablet TAKE 1 TABLET BY MOUTH TWICE DAILY WITH MEALS (Patient taking differently: Take 500 mg by mouth 2 (two) times daily with meals.) 180 tablet 0    ACCU-CHEK GUIDE TEST STRIPS Strp USE 1 STRIP TO TEST BLOOD GLUCOSE ONCE DAILY AS DIRECTED 100 each 3    albuterol (PROVENTIL/VENTOLIN HFA) 90 mcg/actuation inhaler Inhale 2 puffs into the lungs every 4 (four) hours as needed.      azithromycin (Z-WILSON) 250 MG tablet Take 250 mg by mouth once daily.      blood-glucose meter kit To check BG 2 times daily, to use with insurance preferred meter. Medical necessity. 1 each 0    carboxymethylcellulose sodium (LUBRICANT EYE DROPS OPHT) Place 1 drop into both eyes daily as needed (dry eyes).      cetirizine (ZYRTEC) 5 MG tablet Take 1 tablet (5 mg total) by mouth once daily. for 5 days 5 tablet 0    clopidogreL (PLAVIX) 75 mg tablet Take 1 tablet (75 mg total) by mouth every Mon, Wed, Fri. 90 tablet 4    cyanocobalamin (VITAMIN B-12) 1000 MCG tablet Take 100 mcg by mouth once daily.      diclofenac sodium 1 % Gel Apply 2 g topically once daily.      docusate sodium (STOOL SOFTENER ORAL) Take 1 capsule by mouth every evening.      famotidine (PEPCID) 40 MG tablet TAKE 1 TABLET BY MOUTH ONCE DAILY IN  THE EVENING 90 tablet 3    fluticasone propionate (FLONASE) 50 mcg/actuation nasal spray USE 2 SPRAYS IN EACH NOSTRIL ONE TIME PER DAY (Patient taking differently: 2 sprays by Each Nostril route Daily. USE 2 SPRAYS IN EACH NOSTRIL ONE TIME PER DAY) 18.2 mL 5    folic acid (FOLVITE) 1 MG tablet Take 1 tablet by mouth once daily 90 tablet 1    ipratropium (ATROVENT) 42 mcg (0.06 %) nasal spray 2 sprays by Each Nostril route 3 (three) times daily. 15 mL 3    Lactobac no.41/Bifidobact no.7 (PROBIOTIC-10 ORAL) Take 1 capsule by mouth Daily.      lancets (ACCU-CHEK SOFTCLIX LANCETS) Misc Test TWICE DAILY;Twice a day 100 each 3    magnesium oxide (MAG-OX) 400 mg (241.3 mg magnesium) tablet Take 1 tablet (400 mg total) by mouth once daily. 90 tablet 3    methylPREDNISolone (MEDROL DOSEPACK) 4 mg tablet Take 4 mg by mouth once daily.      multivit with min-folic acid 200 mcg Chew Take 1 tablet by mouth once daily.       NARCAN 4 mg/actuation Spry as directed      olopatadine (PATANOL) 0.1 % ophthalmic solution Place 1 drop into both eyes daily as needed for Allergies.      ondansetron (ZOFRAN-ODT) 4 MG TbDL Take 1 tablet (4 mg total) by mouth every 8 (eight) hours as needed (nausea). 30 tablet 1    oxyCODONE-acetaminophen (PERCOCET) 7.5-325 mg per tablet Take 1 tablet by mouth 4 (four) times daily as needed for Pain.      pantoprazole (PROTONIX) 40 MG tablet Take 1 tablet by mouth once daily 90 tablet 2    pregabalin (LYRICA) 150 MG capsule Take 1 capsule (150 mg total) by mouth 2 (two) times daily. 60 capsule 2    rosuvastatin (CRESTOR) 10 MG tablet Take 1 tablet (10 mg total) by mouth every evening. 90 tablet 1    traZODone (DESYREL) 50 MG tablet TAKE 1 TABLET BY MOUTH IN THE EVENING (MAY  TAKE  AN  ADDITIONAL  TABLET  AS  NEEDED) (Patient taking differently: Take 50 mg by mouth every evening. TAKE 1 TABLET BY MOUTH IN THE EVENING (MAY  TAKE  AN  ADDITIONAL  TABLET  AS  NEEDED)) 90 tablet 3    valsartan (DIOVAN) 40 MG  tablet Take 1 tablet (40 mg total) by mouth once daily. 90 tablet 3     Family History       Problem Relation (Age of Onset)    Cancer Mother, Brother    Diabetes Mellitus Mother    Diverticulitis Sister    Heart disease Father    Hypertension Father    Lupus Daughter    Pancreatic cancer Maternal Aunt (55), Cousin (58)    Stroke Father    Throat cancer Mother, Brother    Thyroid disease Daughter          Tobacco Use    Smoking status: Never    Smokeless tobacco: Never   Substance and Sexual Activity    Alcohol use: No    Drug use: Yes     Types: Oxycodone    Sexual activity: Not Currently     Birth control/protection: Surgical     Comment: hysterectomy     Review of Systems   Respiratory:  Positive for chest tightness.    Cardiovascular:  Positive for chest pain.   Musculoskeletal:  Positive for arthralgias, back pain, myalgias and neck pain.   All other systems reviewed and are negative.    Objective:     Vital Signs (Most Recent):  Temp: 97.9 °F (36.6 °C) (05/06/24 1326)  Pulse: 69 (05/06/24 1503)  Resp: 20 (05/06/24 1326)  BP: (!) 157/67 (05/06/24 1503)  SpO2: 99 % (05/06/24 1503) Vital Signs (24h Range):  Temp:  [97.9 °F (36.6 °C)] 97.9 °F (36.6 °C)  Pulse:  [67-76] 69  Resp:  [20] 20  SpO2:  [98 %-100 %] 99 %  BP: (146-175)/(63-76) 157/67     Weight: 60.3 kg (133 lb)  Body mass index is 24.33 kg/m².     Physical Exam  Vitals reviewed.   Constitutional:       Appearance: Normal appearance.   HENT:      Head: Normocephalic and atraumatic.      Nose: Nose normal.      Mouth/Throat:      Mouth: Mucous membranes are moist.      Pharynx: Oropharynx is clear.   Eyes:      Extraocular Movements: Extraocular movements intact.      Pupils: Pupils are equal, round, and reactive to light.   Neck:      Comments: Patient endorses chronic neck tenderness  Cardiovascular:      Rate and Rhythm: Normal rate and regular rhythm.      Pulses: Normal pulses.      Heart sounds: Normal heart sounds.   Pulmonary:      Effort:  Pulmonary effort is normal.      Breath sounds: Normal breath sounds.   Abdominal:      General: Bowel sounds are normal.      Palpations: Abdomen is soft.   Musculoskeletal:         General: Tenderness present. Normal range of motion.      Cervical back: Normal range of motion. Tenderness present.      Comments: Tenderness in left scapula with palpation   Skin:     General: Skin is warm and dry.      Capillary Refill: Capillary refill takes less than 2 seconds.   Neurological:      General: No focal deficit present.      Mental Status: She is alert and oriented to person, place, and time. Mental status is at baseline.   Psychiatric:         Mood and Affect: Mood normal.         Behavior: Behavior normal.         Thought Content: Thought content normal.         Judgment: Judgment normal.              CRANIAL NERVES     CN III, IV, VI   Pupils are equal, round, and reactive to light.       Significant Labs: All pertinent labs within the past 24 hours have been reviewed.  CBC:   Recent Labs   Lab 05/06/24  1346   WBC 7.39   HGB 11.4*   HCT 36.1*   *     CMP:   Recent Labs   Lab 05/06/24  1346      K 4.0      CO2 26   GLU 78   BUN 16   CREATININE 0.9   CALCIUM 9.8   PROT 6.8   ALBUMIN 3.8   BILITOT 0.2   ALKPHOS 78   AST 15   ALT 9*   ANIONGAP 10     Troponin:   Recent Labs   Lab 05/06/24  1346   TROPONINI 0.018       Significant Imaging: I have reviewed all pertinent imaging results/findings within the past 24 hours.  EXAMINATION:  XR CHEST AP PORTABLE     CLINICAL HISTORY:  Chest pain, unspecified     TECHNIQUE:  Single frontal view of the chest was performed.     COMPARISON:  None     FINDINGS:  The lungs are clear, with normal appearance of pulmonary vasculature and no pleural effusion or pneumothorax.     The cardiac silhouette is normal in size. The hilar and mediastinal contours are unremarkable.     Bones are intact.     Impression:     No acute abnormality.        Electronically signed  by:June Hedrick MD  Date:                                            05/06/2024  Time:                                           14:40

## 2024-05-06 NOTE — ASSESSMENT & PLAN NOTE
Chronic, uncontrolled. Latest blood pressure and vitals reviewed-     Temp:  [97.9 °F (36.6 °C)]   Pulse:  [67-76]   Resp:  [20]   BP: (146-175)/(63-76)   SpO2:  [98 %-100 %] .   Home meds for hypertension were reviewed and noted below.   Hypertension Medications               valsartan (DIOVAN) 40 MG tablet Take 1 tablet (40 mg total) by mouth once daily.            While in the hospital, will manage blood pressure as follows; Continue home antihypertensive regimen    Will utilize p.r.n. blood pressure medication only if patient's blood pressure greater than 180/110 and she develops symptoms such as worsening chest pain or shortness of breath.

## 2024-05-06 NOTE — H&P
Formerly [facility], later [facility]  History & Physical    Patient Name: Pili Teixeira  MRN: [id number]  Patient Class: OP- Observation  Admission Date: 5/6/2024  Attending Physician: Vince Mccrary MD   Primary Care Provider: LUÍS Huggins MD         Patient information was obtained from patient, past medical records, and ER records.     Subjective:     Principal Problem:Chest pain    Chief Complaint:   Chief Complaint   Patient presents with    Chest Pain     Started at 1030, radiating down left arm        HPI: Pili Teixeira is a 78 year old female with a  previous medical history of cervical spine stenosis and long-term use of opioid pain medication, COPD, CAD, GERD, HTN, HLD, TIA on plavix Monday, Wednesday, Friday, and DMII who presents for chest pain starting today. Patient reports she awoke this morning feeling generally unwell and noted she had a heaviness in the left side of her chest that radiated to her left shoulder. She reports that there were no other adverse symptoms such as nausea, vomiting, shortness of breath, or diaphoresis  that occurred at the same time nor did anything aggravate or alleviate the chest heaviness. Patient reports eating light breakfast and then the pain resolved after some time. She reports that during her physical therapy appointment for her chronic neck pain and the left sided chest heaviness returned and again radiated to her left shoulder. The physical therapist took her blood pressure and noted the systolic to be 175 and advised her to present to the emergency room. Initial ED workup showed a negative troponin, normal chest xray, stable anemia on CBC and unremarkable CMP. EKG was normal sinus rhythm. Patient to be admitted by hospital medicine for further evaluation and management.     Past Medical History:   Diagnosis Date    Allergy     Anemia 2012    with thrombocytopenia; iron deficiency    Arthritis     Cervical spinal stenosis     with neuropathy,  chronic neck,back,leg pain    Colon polyp     COPD (chronic obstructive pulmonary disease)     Coronary artery disease     COVID 4/27/2022    COVID-19     Diabetes mellitus     , sugars run 190's per patient    Diastolic dysfunction 7/13/2015    Diverticulitis     Diverticulosis     Hx diverticulitis,still has chronic diarrhea, abd pain    Dyspnea on exertion     sometimes with nausea, fatigue,dizziness, had cardiac cath June 2012, normal    Fibromyalgia     Fracture 2012    right thumb    GERD (gastroesophageal reflux disease)     Feb 2012, Hx H Pylori    Glaucoma     had LAser surgey, on no eye drops    HEARING LOSS     Hemangioma     fatty liver    History of earache     frequent    History of TIA (transient ischemic attack) 5/28/2013    Hyperlipidemia     Hypertension     Hypertension associated with diabetes 3/14/2017    Laryngeal polyp     Myocardial infarction 2006 last    also MI in distant past    REJI (obstructive sleep apnea)     does not use CPAP    Otitis media     Pneumonia due to COVID-19 virus 2/20/2021    Renal stone     Sjogren's syndrome     SOB (shortness of breath) 6/8/2012    2012 c 1. Normal coronary arteries. 2. Normal single renal arteries bilaterally. 3. Diastolic dysfunction.     Stroke     TIA, now on Plavix    TIA (transient ischemic attack)     TMJ disorder     Type II or unspecified type diabetes mellitus without mention of complication, uncontrolled 5/8/2014    UTI (lower urinary tract infection)     frequent    Venous insufficiency     with Hx blood clot in leg       Past Surgical History:   Procedure Laterality Date    ABDOMINAL SURGERY  2010 x2    expoloratory lap for pelvic cyst,adhesions; partial colonectomy     adhesiolysis   10/11/2012    CARDIAC CATHETERIZATION      no current blockages.    CHOLECYSTECTOMY      COLON SURGERY      r/t diverticuli    COLONOSCOPY  08/2014    repeat in 5 years    COLONOSCOPY N/A 1/7/2020    Procedure: COLONOSCOPY;  Surgeon: Kb Nguyen MD;   Location: [facility] ENDO;  Service: Endoscopy;  Laterality: N/A;    COLONOSCOPY N/A 3/13/2023    Procedure: COLONOSCOPY;  Surgeon: Jarrod Frost MD;  Location: [facility];  Service: Endoscopy;  Laterality: N/A;    ENDOSCOPIC ULTRASOUND OF UPPER GASTROINTESTINAL TRACT N/A 1/13/2021    Procedure: ULTRASOUND, UPPER GI TRACT, ENDOSCOPIC;  Surgeon: Sonido Castellano III, MD;  Location: [facility];  Service: Endoscopy;  Laterality: N/A;    EPIDURAL BLOCK INJECTION  5/15/12    back,neck for pain    ESOPHAGOGASTRODUODENOSCOPY N/A 1/7/2020    Procedure: EGD (ESOPHAGOGASTRODUODENOSCOPY);  Surgeon: Kb Nguyen MD;  Location: [facility] ENDO;  Service: Endoscopy;  Laterality: N/A;    ESOPHAGOGASTRODUODENOSCOPY N/A 1/13/2021    Procedure: EGD (ESOPHAGOGASTRODUODENOSCOPY);  Surgeon: Sonido Castellano III, MD;  Location: [facility];  Service: Endoscopy;  Laterality: N/A;    ESOPHAGOGASTRODUODENOSCOPY N/A 3/13/2023    Procedure: EGD (ESOPHAGOGASTRODUODENOSCOPY);  Surgeon: Jarrod Frost MD;  Location: [facility];  Service: Endoscopy;  Laterality: N/A;    ESOPHAGOGASTRODUODENOSCOPY N/A 5/5/2023    Procedure: EGD (ESOPHAGOGASTRODUODENOSCOPY);  Surgeon: Noel Caputo MD;  Location: [facility];  Service: Endoscopy;  Laterality: N/A;    EXPLORATORY LAPAROTOMY W/ BOWEL RESECTION  10/11/2012    EYE SURGERY      Laser for glaucoma    EYE SURGERY  May 2012    cataracts    HYSTERECTOMY      INTRALUMINAL GASTROINTESTINAL TRACT IMAGING VIA CAPSULE N/A 5/23/2023    Procedure: IMAGING PROCEDURE, GI TRACT, INTRALUMINAL, VIA CAPSULE;  Surgeon: Noel Caputo MD;  Location: [facility];  Service: Endoscopy;  Laterality: N/A;    LARYNX SURGERY      polypectomy    OOPHORECTOMY      TONSILLECTOMY      with adenoidectomy    UPPER GASTROINTESTINAL ENDOSCOPY  12/2015    VASCULAR SURGERY      laser to varicose vein leg       Review of patient's allergies indicates:   Allergen Reactions    Iodinated contrast media Anaphylaxis, Hives and Rash      (Intubated)    Codeine Other (See Comments)     Chest pain    Clindamycin Other (See Comments)     Difficulty swallowing after taking, feels a something sits in epigastric area        Current Facility-Administered Medications   Medication Dose Route Frequency Provider Last Rate Last Admin    acetaminophen tablet 650 mg  650 mg Oral Q4H PRN Millie Castillo, NP        albuterol-ipratropium 2.5 mg-0.5 mg/3 mL nebulizer solution 3 mL  3 mL Nebulization Q6H PRN Millie Castillo, NP        aluminum-magnesium hydroxide-simethicone 200-200-20 mg/5 mL suspension 30 mL  30 mL Oral QID PRN Millie Castillo, NP        aspirin tablet 325 mg  325 mg Oral Daily Millie Castillo, NP        dextrose 10% bolus 125 mL 125 mL  12.5 g Intravenous PRN Millie Castillo, NP        dextrose 10% bolus 250 mL 250 mL  25 g Intravenous PRN Millie Castillo NP        glucagon (human recombinant) injection 1 mg  1 mg Intramuscular PRN Millie Castillo, NP        glucose chewable tablet 16 g  16 g Oral PRN Millie Castillo, NP        glucose chewable tablet 24 g  24 g Oral PRN Millie Castillo, NP        hydrALAZINE injection 5 mg  5 mg Intravenous Once Millie Castillo NP        insulin aspart U-100 pen 0-5 Units  0-5 Units Subcutaneous QID (AC + HS) PRN Millie Castillo NP        LIDOcaine 5 % patch 1 patch  1 patch Transdermal Q24H Millie Castillo NP        magnesium oxide tablet 800 mg  800 mg Oral PRN Millie Castillo, NP        magnesium oxide tablet 800 mg  800 mg Oral PRN TonimaseoMillie, NP        melatonin tablet 6 mg  6 mg Oral Nightly PRN Millie Castillo, ZIA        methocarbamoL tablet 750 mg  750 mg Oral TID Millie Castillo NP        naloxone 0.4 mg/mL injection 0.02 mg  0.02 mg Intravenous PRN Millie Castillo, NP        ondansetron injection 4 mg  4 mg Intravenous Q8H PRN Millie Castillo, NP        oxyCODONE-acetaminophen  mg per tablet 1 tablet  1 tablet Oral Q6H PRN Millie Castillo, NP         potassium bicarbonate disintegrating tablet 35 mEq  35 mEq Oral PRN TonimaseMillie hooks N, NP        potassium bicarbonate disintegrating tablet 50 mEq  50 mEq Oral PRN PrasannaseMillie hooks N, NP        potassium bicarbonate disintegrating tablet 60 mEq  60 mEq Oral PRN Millie Castillo, NP        simethicone chewable tablet 80 mg  1 tablet Oral QID PRN Millie Castillo, NP        sodium chloride 0.9% flush 10 mL  10 mL Intravenous Q12H PRN Millie Castillo, NP         Current Outpatient Medications   Medication Sig Dispense Refill    metFORMIN (GLUCOPHAGE) 500 MG tablet TAKE 1 TABLET BY MOUTH TWICE DAILY WITH MEALS (Patient taking differently: Take 500 mg by mouth 2 (two) times daily with meals.) 180 tablet 0    ACCU-CHEK GUIDE TEST STRIPS Strp USE 1 STRIP TO TEST BLOOD GLUCOSE ONCE DAILY AS DIRECTED 100 each 3    albuterol (PROVENTIL/VENTOLIN HFA) 90 mcg/actuation inhaler Inhale 2 puffs into the lungs every 4 (four) hours as needed.      azithromycin (Z-WILSON) 250 MG tablet Take 250 mg by mouth once daily.      blood-glucose meter kit To check BG 2 times daily, to use with insurance preferred meter. Medical necessity. 1 each 0    carboxymethylcellulose sodium (LUBRICANT EYE DROPS OPHT) Place 1 drop into both eyes daily as needed (dry eyes).      cetirizine (ZYRTEC) 5 MG tablet Take 1 tablet (5 mg total) by mouth once daily. for 5 days 5 tablet 0    clopidogreL (PLAVIX) 75 mg tablet Take 1 tablet (75 mg total) by mouth every Mon, Wed, Fri. 90 tablet 4    cyanocobalamin (VITAMIN B-12) 1000 MCG tablet Take 100 mcg by mouth once daily.      diclofenac sodium 1 % Gel Apply 2 g topically once daily.      docusate sodium (STOOL SOFTENER ORAL) Take 1 capsule by mouth every evening.      famotidine (PEPCID) 40 MG tablet TAKE 1 TABLET BY MOUTH ONCE DAILY IN THE EVENING 90 tablet 3    fluticasone propionate (FLONASE) 50 mcg/actuation nasal spray USE 2 SPRAYS IN EACH NOSTRIL ONE TIME PER DAY (Patient taking differently: 2 sprays by  Each Nostril route Daily. USE 2 SPRAYS IN EACH NOSTRIL ONE TIME PER DAY) 18.2 mL 5    folic acid (FOLVITE) 1 MG tablet Take 1 tablet by mouth once daily 90 tablet 1    ipratropium (ATROVENT) 42 mcg (0.06 %) nasal spray 2 sprays by Each Nostril route 3 (three) times daily. 15 mL 3    Lactobac no.41/Bifidobact no.7 (PROBIOTIC-10 ORAL) Take 1 capsule by mouth Daily.      lancets (ACCU-CHEK SOFTCLIX LANCETS) Misc Test TWICE DAILY;Twice a day 100 each 3    magnesium oxide (MAG-OX) 400 mg (241.3 mg magnesium) tablet Take 1 tablet (400 mg total) by mouth once daily. 90 tablet 3    methylPREDNISolone (MEDROL DOSEPACK) 4 mg tablet Take 4 mg by mouth once daily.      multivit with min-folic acid 200 mcg Chew Take 1 tablet by mouth once daily.       NARCAN 4 mg/actuation Spry as directed      olopatadine (PATANOL) 0.1 % ophthalmic solution Place 1 drop into both eyes daily as needed for Allergies.      ondansetron (ZOFRAN-ODT) 4 MG TbDL Take 1 tablet (4 mg total) by mouth every 8 (eight) hours as needed (nausea). 30 tablet 1    oxyCODONE-acetaminophen (PERCOCET) 7.5-325 mg per tablet Take 1 tablet by mouth 4 (four) times daily as needed for Pain.      pantoprazole (PROTONIX) 40 MG tablet Take 1 tablet by mouth once daily 90 tablet 2    pregabalin (LYRICA) 150 MG capsule Take 1 capsule (150 mg total) by mouth 2 (two) times daily. 60 capsule 2    rosuvastatin (CRESTOR) 10 MG tablet Take 1 tablet (10 mg total) by mouth every evening. 90 tablet 1    traZODone (DESYREL) 50 MG tablet TAKE 1 TABLET BY MOUTH IN THE EVENING (MAY  TAKE  AN  ADDITIONAL  TABLET  AS  NEEDED) (Patient taking differently: Take 50 mg by mouth every evening. TAKE 1 TABLET BY MOUTH IN THE EVENING (MAY  TAKE  AN  ADDITIONAL  TABLET  AS  NEEDED)) 90 tablet 3    valsartan (DIOVAN) 40 MG tablet Take 1 tablet (40 mg total) by mouth once daily. 90 tablet 3     Family History       Problem Relation (Age of Onset)    Cancer Mother, Brother    Diabetes Mellitus Mother     Diverticulitis Sister    Heart disease Father    Hypertension Father    Lupus Daughter    Pancreatic cancer Maternal Aunt (55), Cousin (58)    Stroke Father    Throat cancer Mother, Brother    Thyroid disease Daughter          Tobacco Use    Smoking status: Never    Smokeless tobacco: Never   Substance and Sexual Activity    Alcohol use: No    Drug use: Yes     Types: Oxycodone    Sexual activity: Not Currently     Birth control/protection: Surgical     Comment: hysterectomy     Review of Systems   Respiratory:  Positive for chest tightness.    Cardiovascular:  Positive for chest pain.   Musculoskeletal:  Positive for arthralgias, back pain, myalgias and neck pain.   All other systems reviewed and are negative.    Objective:     Vital Signs (Most Recent):  Temp: 97.9 °F (36.6 °C) (05/06/24 1326)  Pulse: 69 (05/06/24 1503)  Resp: 20 (05/06/24 1326)  BP: (!) 157/67 (05/06/24 1503)  SpO2: 99 % (05/06/24 1503) Vital Signs (24h Range):  Temp:  [97.9 °F (36.6 °C)] 97.9 °F (36.6 °C)  Pulse:  [67-76] 69  Resp:  [20] 20  SpO2:  [98 %-100 %] 99 %  BP: (146-175)/(63-76) 157/67     Weight: 60.3 kg (133 lb)  Body mass index is 24.33 kg/m².     Physical Exam  Vitals reviewed.   Constitutional:       Appearance: Normal appearance.   HENT:      Head: Normocephalic and atraumatic.      Nose: Nose normal.      Mouth/Throat:      Mouth: Mucous membranes are moist.      Pharynx: Oropharynx is clear.   Eyes:      Extraocular Movements: Extraocular movements intact.      Pupils: Pupils are equal, round, and reactive to light.   Neck:      Comments: Patient endorses chronic neck tenderness  Cardiovascular:      Rate and Rhythm: Normal rate and regular rhythm.      Pulses: Normal pulses.      Heart sounds: Normal heart sounds.   Pulmonary:      Effort: Pulmonary effort is normal.      Breath sounds: Normal breath sounds.   Abdominal:      General: Bowel sounds are normal.      Palpations: Abdomen is soft.   Musculoskeletal:          General: Tenderness present. Normal range of motion.      Cervical back: Normal range of motion. Tenderness present.      Comments: Tenderness in left scapula with palpation   Skin:     General: Skin is warm and dry.      Capillary Refill: Capillary refill takes less than 2 seconds.   Neurological:      General: No focal deficit present.      Mental Status: She is alert and oriented to person, place, and time. Mental status is at baseline.   Psychiatric:         Mood and Affect: Mood normal.         Behavior: Behavior normal.         Thought Content: Thought content normal.         Judgment: Judgment normal.              CRANIAL NERVES     CN III, IV, VI   Pupils are equal, round, and reactive to light.       Significant Labs: All pertinent labs within the past 24 hours have been reviewed.  CBC:   Recent Labs   Lab 05/06/24  1346   WBC 7.39   HGB 11.4*   HCT 36.1*   *     CMP:   Recent Labs   Lab 05/06/24  1346      K 4.0      CO2 26   GLU 78   BUN 16   CREATININE 0.9   CALCIUM 9.8   PROT 6.8   ALBUMIN 3.8   BILITOT 0.2   ALKPHOS 78   AST 15   ALT 9*   ANIONGAP 10     Troponin:   Recent Labs   Lab 05/06/24  1346   TROPONINI 0.018       Significant Imaging: I have reviewed all pertinent imaging results/findings within the past 24 hours.  EXAMINATION:  XR CHEST AP PORTABLE     CLINICAL HISTORY:  Chest pain, unspecified     TECHNIQUE:  Single frontal view of the chest was performed.     COMPARISON:  None     FINDINGS:  The lungs are clear, with normal appearance of pulmonary vasculature and no pleural effusion or pneumothorax.     The cardiac silhouette is normal in size. The hilar and mediastinal contours are unremarkable.     Bones are intact.     Impression:     No acute abnormality.        Electronically signed by:June Hedrick MD  Date:                                            05/06/2024  Time:                                           14:40  Assessment/Plan:     * Chest pain  -trend  "troponin x3  -EKG Prn chest pain and with three ordered troponins  -CBC, CMP daily  -Mag 1.2- 2grams IV magnesium administered   - Phos checked upon admit  -TSH checked upon admit  -Fasting lipid panel in the AM  -PRN oxygen  -ASA 325mg upon admit and daily  -Stress test ordered for AM      Coronary artery disease involving native coronary artery of native heart without angina pectoris  Patient with known CAD which is controlled Will continue ASA, Plavix, and Statin and monitor for S/Sx of angina/ACS. Continue to monitor on telemetry.     Chronic pain, neck, back, leg on home narcotics  Chronic condition noted. Stable    -Continued oxycodone 10-325mg four times daily PRN pain  -Added robaxin 750mg three times daily PRN  -Added Lidocaine patch every 12 hours to left scapula       Type 2 diabetes mellitus, without long-term current use of insulin  Patient's FSGs are controlled on current medication regimen.  Last A1c reviewed-   Lab Results   Component Value Date    HGBA1C 5.6 04/18/2024     Most recent fingerstick glucose reviewed- No results for input(s): "POCTGLUCOSE" in the last 24 hours.  Current correctional scale  Low  Maintain anti-hyperglycemic dose as follows-   Antihyperglycemics (From admission, onward)      Start     Stop Route Frequency Ordered    05/06/24 1657  insulin aspart U-100 pen 0-5 Units         -- SubQ Before meals & nightly PRN 05/06/24 1603          Hold Oral hypoglycemics while patient is in the hospital.       Hypertension associated with diabetes  Chronic, uncontrolled. Latest blood pressure and vitals reviewed-     Temp:  [97.9 °F (36.6 °C)]   Pulse:  [67-76]   Resp:  [20]   BP: (146-175)/(63-76)   SpO2:  [98 %-100 %] .   Home meds for hypertension were reviewed and noted below.   Hypertension Medications               valsartan (DIOVAN) 40 MG tablet Take 1 tablet (40 mg total) by mouth once daily.            While in the hospital, will manage blood pressure as follows; Continue home " antihypertensive regimen    Will utilize p.r.n. blood pressure medication only if patient's blood pressure greater than 180/110 and she develops symptoms such as worsening chest pain or shortness of breath.       COPD (chronic obstructive pulmonary disease)  Patient's COPD is controlled currently.  Patient is currently off COPD Pathway. Continue scheduled inhalers prn Supplemental oxygen prn  and monitor respiratory status closely.     GERD (gastroesophageal reflux disease)  Chronic condition noted.    -Pantoprazole 40mg PO daily        VTE Risk Mitigation (From admission, onward)           Ordered     Reason for No Pharmacological VTE Prophylaxis  Once        Question:  Reasons:  Answer:  Already adequately anticoagulated on oral Anticoagulants    05/06/24 1603     IP VTE HIGH RISK PATIENT  Once         05/06/24 1603     Place sequential compression device  Until discontinued         05/06/24 1603                         On 05/06/2024, patient should be placed in hospital observation services under my care in collaboration with Dr. Vince Mccrary MD.           Millie Castillo NP  Department of Hospital Medicine  [facility] hospital

## 2024-05-06 NOTE — NURSING
Nurses Note -- 4 Eyes      5/6/2024   6:48 PM      Skin assessed during: Admit      [x] No Altered Skin Integrity Present    []Prevention Measures Documented      [] Yes- Altered Skin Integrity Present or Discovered   [] LDA Added if Not in Epic (Describe Wound)   [] New Altered Skin Integrity was Present on Admit and Documented in LDA   [] Wound Image Taken    Wound Care Consulted? No    Attending Nurse:  Devyn RAJAN    Second RN/Staff Member:   Khadra Vela RN

## 2024-05-07 LAB
ALBUMIN SERPL BCP-MCNC: 3.4 G/DL (ref 3.5–5.2)
ALBUMIN SERPL BCP-MCNC: 3.7 G/DL (ref 3.5–5.2)
ALP SERPL-CCNC: 60 U/L (ref 55–135)
ALP SERPL-CCNC: 68 U/L (ref 55–135)
ALT SERPL W/O P-5'-P-CCNC: 10 U/L (ref 10–44)
ALT SERPL W/O P-5'-P-CCNC: 8 U/L (ref 10–44)
ANION GAP SERPL CALC-SCNC: 6 MMOL/L (ref 8–16)
ANION GAP SERPL CALC-SCNC: 9 MMOL/L (ref 8–16)
AORTIC ROOT ANNULUS: 2.28 CM
AORTIC VALVE CUSP SEPERATION: 1.82 CM
ASCENDING AORTA: 3.15 CM
AST SERPL-CCNC: 14 U/L (ref 10–40)
AST SERPL-CCNC: 17 U/L (ref 10–40)
AV INDEX (PROSTH): 0.74
AV MEAN GRADIENT: 3 MMHG
AV PEAK GRADIENT: 4 MMHG
AV VALVE AREA BY VELOCITY RATIO: 2.49 CM²
AV VALVE AREA: 2.29 CM²
AV VELOCITY RATIO: 0.8
BASOPHILS # BLD AUTO: 0.03 K/UL (ref 0–0.2)
BASOPHILS NFR BLD: 0.5 % (ref 0–1.9)
BILIRUB SERPL-MCNC: 0.2 MG/DL (ref 0.1–1)
BILIRUB SERPL-MCNC: 0.3 MG/DL (ref 0.1–1)
BSA FOR ECHO PROCEDURE: 1.67 M2
BUN SERPL-MCNC: 19 MG/DL (ref 8–23)
BUN SERPL-MCNC: 25 MG/DL (ref 8–23)
CALCIUM SERPL-MCNC: 9.4 MG/DL (ref 8.7–10.5)
CALCIUM SERPL-MCNC: 9.7 MG/DL (ref 8.7–10.5)
CHLORIDE SERPL-SCNC: 102 MMOL/L (ref 95–110)
CHLORIDE SERPL-SCNC: 107 MMOL/L (ref 95–110)
CHOLEST SERPL-MCNC: 132 MG/DL (ref 120–199)
CHOLEST/HDLC SERPL: 2.3 {RATIO} (ref 2–5)
CO2 SERPL-SCNC: 25 MMOL/L (ref 23–29)
CO2 SERPL-SCNC: 29 MMOL/L (ref 23–29)
CREAT SERPL-MCNC: 0.8 MG/DL (ref 0.5–1.4)
CREAT SERPL-MCNC: 1.1 MG/DL (ref 0.5–1.4)
CV ECHO LV RWT: 0.37 CM
DIFFERENTIAL METHOD BLD: ABNORMAL
DOP CALC AO PEAK VEL: 1.05 M/S
DOP CALC AO VTI: 24.8 CM
DOP CALC LVOT AREA: 3.1 CM2
DOP CALC LVOT DIAMETER: 1.99 CM
DOP CALC LVOT PEAK VEL: 0.84 M/S
DOP CALC LVOT STROKE VOLUME: 56.89 CM3
DOP CALC MV VTI: 21.7 CM
DOP CALCLVOT PEAK VEL VTI: 18.3 CM
E WAVE DECELERATION TIME: 289.88 MSEC
E/A RATIO: 0.61
E/E' RATIO: 8.36 M/S
ECHO LV POSTERIOR WALL: 0.78 CM (ref 0.6–1.1)
EOSINOPHIL # BLD AUTO: 0.1 K/UL (ref 0–0.5)
EOSINOPHIL NFR BLD: 1.7 % (ref 0–8)
ERYTHROCYTE [DISTWIDTH] IN BLOOD BY AUTOMATED COUNT: 12.5 % (ref 11.5–14.5)
EST. GFR  (NO RACE VARIABLE): 51.8 ML/MIN/1.73 M^2
EST. GFR  (NO RACE VARIABLE): >60 ML/MIN/1.73 M^2
FRACTIONAL SHORTENING: 27 % (ref 28–44)
GLUCOSE SERPL-MCNC: 112 MG/DL (ref 70–110)
GLUCOSE SERPL-MCNC: 126 MG/DL (ref 70–110)
GLUCOSE SERPL-MCNC: 80 MG/DL (ref 70–110)
HCT VFR BLD AUTO: 34.5 % (ref 37–48.5)
HDLC SERPL-MCNC: 58 MG/DL (ref 40–75)
HDLC SERPL: 43.9 % (ref 20–50)
HGB BLD-MCNC: 11 G/DL (ref 12–16)
IMM GRANULOCYTES # BLD AUTO: 0.02 K/UL (ref 0–0.04)
IMM GRANULOCYTES NFR BLD AUTO: 0.3 % (ref 0–0.5)
INTERVENTRICULAR SEPTUM: 0.8 CM (ref 0.6–1.1)
IVRT: 129.4 MSEC
LA MAJOR: 4.82 CM
LDLC SERPL CALC-MCNC: 53.8 MG/DL (ref 63–159)
LEFT ATRIUM VOLUME INDEX MOD: 24.3 ML/M2
LEFT ATRIUM VOLUME MOD: 40.11 CM3
LEFT INTERNAL DIMENSION IN SYSTOLE: 3.08 CM (ref 2.1–4)
LEFT VENTRICLE DIASTOLIC VOLUME INDEX: 47.76 ML/M2
LEFT VENTRICLE DIASTOLIC VOLUME: 78.8 ML
LEFT VENTRICLE MASS INDEX: 60 G/M2
LEFT VENTRICLE SYSTOLIC VOLUME INDEX: 22.6 ML/M2
LEFT VENTRICLE SYSTOLIC VOLUME: 37.37 ML
LEFT VENTRICULAR INTERNAL DIMENSION IN DIASTOLE: 4.2 CM (ref 3.5–6)
LEFT VENTRICULAR MASS: 99.62 G
LV LATERAL E/E' RATIO: 7.67 M/S
LV SEPTAL E/E' RATIO: 9.2 M/S
LVOT MG: 1.52 MMHG
LVOT MV: 0.58 CM/S
LYMPHOCYTES # BLD AUTO: 2.8 K/UL (ref 1–4.8)
LYMPHOCYTES NFR BLD: 43.1 % (ref 18–48)
MAGNESIUM SERPL-MCNC: 1.7 MG/DL (ref 1.6–2.6)
MCH RBC QN AUTO: 29.2 PG (ref 27–31)
MCHC RBC AUTO-ENTMCNC: 31.9 G/DL (ref 32–36)
MCV RBC AUTO: 92 FL (ref 82–98)
MONOCYTES # BLD AUTO: 0.6 K/UL (ref 0.3–1)
MONOCYTES NFR BLD: 9.8 % (ref 4–15)
MV MEAN GRADIENT: 1 MMHG
MV PEAK A VEL: 0.76 M/S
MV PEAK E VEL: 0.46 M/S
MV PEAK GRADIENT: 5 MMHG
MV STENOSIS PRESSURE HALF TIME: 73.29 MS
MV VALVE AREA BY CONTINUITY EQUATION: 2.62 CM2
MV VALVE AREA P 1/2 METHOD: 3 CM2
NEUTROPHILS # BLD AUTO: 2.9 K/UL (ref 1.8–7.7)
NEUTROPHILS NFR BLD: 44.6 % (ref 38–73)
NONHDLC SERPL-MCNC: 74 MG/DL
NRBC BLD-RTO: 0 /100 WBC
OHS CV RV/LV RATIO: 0.63 CM
PISA MRMAX VEL: 4.6 M/S
PISA TR MAX VEL: 2.63 M/S
PLATELET # BLD AUTO: 125 K/UL (ref 150–450)
PMV BLD AUTO: 12.5 FL (ref 9.2–12.9)
POCT GLUCOSE: 107 MG/DL (ref 70–110)
POCT GLUCOSE: 77 MG/DL (ref 70–110)
POCT GLUCOSE: 88 MG/DL (ref 70–110)
POTASSIUM SERPL-SCNC: 3.7 MMOL/L (ref 3.5–5.1)
POTASSIUM SERPL-SCNC: 4.1 MMOL/L (ref 3.5–5.1)
PROT SERPL-MCNC: 6 G/DL (ref 6–8.4)
PROT SERPL-MCNC: 6.1 G/DL (ref 6–8.4)
PV MV: 0.55 M/S
PV PEAK GRADIENT: 2 MMHG
PV PEAK VELOCITY: 0.7 M/S
RA MAJOR: 3.94 CM
RA PRESSURE ESTIMATED: 3 MMHG
RBC # BLD AUTO: 3.77 M/UL (ref 4–5.4)
RIGHT VENTRICULAR END-DIASTOLIC DIMENSION: 2.66 CM
RIGHT VENTRICULAR LENGTH IN DIASTOLE (APICAL 4-CHAMBER VIEW): 4.29 CM
RV TB RVSP: 6 MMHG
RV TISSUE DOPPLER FREE WALL SYSTOLIC VELOCITY 1 (APICAL 4 CHAMBER VIEW): 17.07 CM/S
SODIUM SERPL-SCNC: 137 MMOL/L (ref 136–145)
SODIUM SERPL-SCNC: 141 MMOL/L (ref 136–145)
STJ: 2.88 CM
TDI LATERAL: 0.06 M/S
TDI SEPTAL: 0.05 M/S
TDI: 0.06 M/S
TR MAX PG: 28 MMHG
TRICUSPID ANNULAR PLANE SYSTOLIC EXCURSION: 2.36 CM
TRIGL SERPL-MCNC: 101 MG/DL (ref 30–150)
TROPONIN I SERPL DL<=0.01 NG/ML-MCNC: 0.96 NG/ML (ref 0–0.03)
TROPONIN I SERPL DL<=0.01 NG/ML-MCNC: 1.39 NG/ML (ref 0–0.03)
TV REST PULMONARY ARTERY PRESSURE: 31 MMHG
WBC # BLD AUTO: 6.55 K/UL (ref 3.9–12.7)
Z-SCORE OF LEFT VENTRICULAR DIMENSION IN END DIASTOLE: -0.98
Z-SCORE OF LEFT VENTRICULAR DIMENSION IN END SYSTOLE: 0.56

## 2024-05-07 PROCEDURE — 99214 OFFICE O/P EST MOD 30 MIN: CPT | Mod: 25,,, | Performed by: INTERNAL MEDICINE

## 2024-05-07 PROCEDURE — 80053 COMPREHEN METABOLIC PANEL: CPT

## 2024-05-07 PROCEDURE — 36415 COLL VENOUS BLD VENIPUNCTURE: CPT | Performed by: INTERNAL MEDICINE

## 2024-05-07 PROCEDURE — 96366 THER/PROPH/DIAG IV INF ADDON: CPT

## 2024-05-07 PROCEDURE — 63600175 PHARM REV CODE 636 W HCPCS

## 2024-05-07 PROCEDURE — 96372 THER/PROPH/DIAG INJ SC/IM: CPT

## 2024-05-07 PROCEDURE — 63600175 PHARM REV CODE 636 W HCPCS: Performed by: INTERNAL MEDICINE

## 2024-05-07 PROCEDURE — G0378 HOSPITAL OBSERVATION PER HR: HCPCS

## 2024-05-07 PROCEDURE — 93005 ELECTROCARDIOGRAM TRACING: CPT

## 2024-05-07 PROCEDURE — 25000003 PHARM REV CODE 250

## 2024-05-07 PROCEDURE — 36415 COLL VENOUS BLD VENIPUNCTURE: CPT

## 2024-05-07 PROCEDURE — 96367 TX/PROPH/DG ADDL SEQ IV INF: CPT

## 2024-05-07 PROCEDURE — 84484 ASSAY OF TROPONIN QUANT: CPT | Mod: 91 | Performed by: INTERNAL MEDICINE

## 2024-05-07 PROCEDURE — 83735 ASSAY OF MAGNESIUM: CPT | Performed by: INTERNAL MEDICINE

## 2024-05-07 PROCEDURE — 80053 COMPREHEN METABOLIC PANEL: CPT | Mod: 91 | Performed by: INTERNAL MEDICINE

## 2024-05-07 PROCEDURE — 85025 COMPLETE CBC W/AUTO DIFF WBC: CPT

## 2024-05-07 PROCEDURE — A9502 TC99M TETROFOSMIN: HCPCS | Performed by: INTERNAL MEDICINE

## 2024-05-07 PROCEDURE — 94761 N-INVAS EAR/PLS OXIMETRY MLT: CPT

## 2024-05-07 PROCEDURE — 96368 THER/DIAG CONCURRENT INF: CPT

## 2024-05-07 PROCEDURE — 93010 ELECTROCARDIOGRAM REPORT: CPT | Mod: ,,, | Performed by: GENERAL PRACTICE

## 2024-05-07 PROCEDURE — 99900035 HC TECH TIME PER 15 MIN (STAT)

## 2024-05-07 PROCEDURE — 80061 LIPID PANEL: CPT

## 2024-05-07 PROCEDURE — 84484 ASSAY OF TROPONIN QUANT: CPT

## 2024-05-07 PROCEDURE — 99900031 HC PATIENT EDUCATION (STAT)

## 2024-05-07 RX ORDER — MAGNESIUM SULFATE HEPTAHYDRATE 40 MG/ML
2 INJECTION, SOLUTION INTRAVENOUS ONCE
Status: COMPLETED | OUTPATIENT
Start: 2024-05-07 | End: 2024-05-07

## 2024-05-07 RX ORDER — SODIUM CHLORIDE AND POTASSIUM CHLORIDE 150; 900 MG/100ML; MG/100ML
INJECTION, SOLUTION INTRAVENOUS CONTINUOUS
Status: DISPENSED | OUTPATIENT
Start: 2024-05-07 | End: 2024-05-08

## 2024-05-07 RX ORDER — REGADENOSON 0.08 MG/ML
0.4 INJECTION, SOLUTION INTRAVENOUS ONCE
Status: DISCONTINUED | OUTPATIENT
Start: 2024-05-07 | End: 2024-05-09 | Stop reason: HOSPADM

## 2024-05-07 RX ORDER — MAGNESIUM SULFATE HEPTAHYDRATE 40 MG/ML
4 INJECTION, SOLUTION INTRAVENOUS ONCE
Status: COMPLETED | OUTPATIENT
Start: 2024-05-07 | End: 2024-05-08

## 2024-05-07 RX ADMIN — METHOCARBAMOL TABLETS 750 MG: 750 TABLET, COATED ORAL at 03:05

## 2024-05-07 RX ADMIN — MAGNESIUM SULFATE HEPTAHYDRATE 4 G: 40 INJECTION, SOLUTION INTRAVENOUS at 10:05

## 2024-05-07 RX ADMIN — OXYCODONE HYDROCHLORIDE AND ACETAMINOPHEN 1 TABLET: 10; 325 TABLET ORAL at 07:05

## 2024-05-07 RX ADMIN — METOPROLOL TARTRATE 25 MG: 25 TABLET, FILM COATED ORAL at 09:05

## 2024-05-07 RX ADMIN — MAGNESIUM SULFATE HEPTAHYDRATE 2 G: 40 INJECTION, SOLUTION INTRAVENOUS at 12:05

## 2024-05-07 RX ADMIN — VALSARTAN 40 MG: 40 TABLET, FILM COATED ORAL at 09:05

## 2024-05-07 RX ADMIN — TETROFOSMIN 11.7 MILLICURIE: 1.38 INJECTION, POWDER, LYOPHILIZED, FOR SOLUTION INTRAVENOUS at 12:05

## 2024-05-07 RX ADMIN — SODIUM CHLORIDE AND POTASSIUM CHLORIDE: .9; .15 SOLUTION INTRAVENOUS at 10:05

## 2024-05-07 RX ADMIN — PREGABALIN 150 MG: 75 CAPSULE ORAL at 09:05

## 2024-05-07 RX ADMIN — ATORVASTATIN CALCIUM 40 MG: 40 TABLET, FILM COATED ORAL at 09:05

## 2024-05-07 RX ADMIN — ENOXAPARIN SODIUM 60 MG: 60 INJECTION SUBCUTANEOUS at 09:05

## 2024-05-07 RX ADMIN — OXYCODONE HYDROCHLORIDE AND ACETAMINOPHEN 1 TABLET: 10; 325 TABLET ORAL at 01:05

## 2024-05-07 RX ADMIN — TRAZODONE HYDROCHLORIDE 50 MG: 50 TABLET ORAL at 09:05

## 2024-05-07 RX ADMIN — METHOCARBAMOL TABLETS 750 MG: 750 TABLET, COATED ORAL at 09:05

## 2024-05-07 RX ADMIN — DOCUSATE SODIUM 100 MG: 100 CAPSULE, LIQUID FILLED ORAL at 09:05

## 2024-05-07 RX ADMIN — LIDOCAINE 5% 1 PATCH: 700 PATCH TOPICAL at 04:05

## 2024-05-07 NOTE — PROGRESS NOTES
[facility] Medicine  Progress Note    Patient Name: Pili Teixeira  MRN: [id number]  Patient Class: OP- Observation   Admission Date: 5/6/2024  Length of Stay: 0 days  Attending Physician: Jadiel Hardy MD  Primary Care Provider: LUÍS Huggins MD        Subjective:     Principal Problem:Chest pain        HPI:  Pili Teixeira is a 78 year old female with a  previous medical history of cervical spine stenosis and long-term use of opioid pain medication, COPD, CAD, GERD, HTN, HLD, TIA on plavix Monday, Wednesday, Friday, and DMII who presents for chest pain starting today. Patient reports she awoke this morning feeling generally unwell and noted she had a heaviness in the left side of her chest that radiated to her left shoulder. She reports that there were no other adverse symptoms such as nausea, vomiting, shortness of breath, or diaphoresis  that occurred at the same time nor did anything aggravate or alleviate the chest heaviness. Patient reports eating light breakfast and then the pain resolved after some time. She reports that during her physical therapy appointment for her chronic neck pain and the left sided chest heaviness returned and again radiated to her left shoulder. The physical therapist took her blood pressure and noted the systolic to be 175 and advised her to present to the emergency room. Initial ED workup showed a negative troponin, normal chest xray, stable anemia on CBC and unremarkable CMP. EKG was normal sinus rhythm. Patient to be admitted by hospital medicine for further evaluation and management.     Overview/Hospital Course:  Patient got admitted due to chest pain rule out ACS.  Troponin was elevated and trended.  Cardiology was consulted.  Patient started on therapeutic Lovenox.  Echo and stress test ordered. IV Mg replaced.  Cardiology evaluated the patient and canceled stress test and advised transfer to [facility] for angiogram.  Patient will be  transferred to [facility] for the procedure; transfer orders in.    Interval History: see hospital course as above     Review of Systems   Constitutional:  Negative for fever.   HENT:  Negative for congestion.    Respiratory:  Negative for shortness of breath.    Cardiovascular:  Positive for chest pain.   Gastrointestinal:  Negative for abdominal pain and vomiting.   Genitourinary:  Negative for dysuria.   Neurological:  Negative for syncope.   Psychiatric/Behavioral:  Negative for agitation.      Objective:     Vital Signs (Most Recent):  Temp: 98.2 °F (36.8 °C) (05/07/24 1112)  Pulse: 62 (05/07/24 1112)  Resp: 16 (05/07/24 1112)  BP: (!) 145/77 (05/07/24 1112)  SpO2: 98 % (05/07/24 1112) Vital Signs (24h Range):  Temp:  [97.7 °F (36.5 °C)-98.4 °F (36.9 °C)] 98.2 °F (36.8 °C)  Pulse:  [57-89] 62  Resp:  [16-20] 16  SpO2:  [96 %-100 %] 98 %  BP: (133-176)/(63-83) 145/77     Weight: 62.6 kg (138 lb)  Body mass index is 24.45 kg/m².    Intake/Output Summary (Last 24 hours) at 5/7/2024 1219  Last data filed at 5/7/2024 0549  Gross per 24 hour   Intake 60 ml   Output --   Net 60 ml         Physical Exam  Constitutional:       General: She is not in acute distress.  HENT:      Head: Normocephalic.   Cardiovascular:      Rate and Rhythm: Normal rate.   Pulmonary:      Effort: No respiratory distress.   Abdominal:      Tenderness: There is no abdominal tenderness.   Musculoskeletal:         General: No swelling.   Skin:     General: Skin is warm.      Capillary Refill: Capillary refill takes less than 2 seconds.   Neurological:      Mental Status: She is alert and oriented to person, place, and time.   Psychiatric:         Behavior: Behavior normal.             Significant Labs: All pertinent labs within the past 24 hours have been reviewed.  CBC:   Recent Labs   Lab 05/06/24  1346 05/07/24  0150   WBC 7.39 6.55   HGB 11.4* 11.0*   HCT 36.1* 34.5*   * 125*     CMP:   Recent Labs   Lab 05/06/24  1346 05/07/24  6805     141   K 4.0 3.7    107   CO2 26 25   GLU 78 80   BUN 16 19   CREATININE 0.9 0.8   CALCIUM 9.8 9.7   PROT 6.8 6.0   ALBUMIN 3.8 3.4*   BILITOT 0.2 0.2   ALKPHOS 78 68   AST 15 17   ALT 9* 10   ANIONGAP 10 9     Lipid Panel:   Recent Labs   Lab 05/07/24  0150   CHOL 132   HDL 58   LDLCALC 53.8*   TRIG 101   CHOLHDL 43.9     Magnesium:   Recent Labs   Lab 05/06/24  1722   MG 1.2*     Troponin:   Recent Labs   Lab 05/06/24  2153 05/07/24  0150 05/07/24  0919   TROPONINI 1.567* 1.385* 0.958*       Significant Imaging: I have reviewed all pertinent imaging results/findings within the past 24 hours.    Assessment/Plan:      * Chest pain  -EKG   -CBC, CMP daily  -Mag and phos checked upon admit  -TSH   -Fasting lipid panel  -PRN oxygen  -ASA   -Stress test ordered     Cardiology consulted  trend Troponin   on therapeutic Lovenox.    Echo and stress test ordered.    Cardiology evaluated the patient and canceled stress test and advised transfer to [facility] for angiogram.    Patient will be transferred to [facility] for the procedure; transfer orders in.      Coronary artery disease involving native coronary artery of native heart without angina pectoris  Patient with known CAD which is controlled Will continue ASA, Plavix, and Statin and monitor for S/Sx of angina/ACS. Continue to monitor on telemetry.     Chronic pain, neck, back, leg on home narcotics  Chronic condition noted. Stable    -Continued oxycodone 10-325mg four times daily PRN pain  -Added robaxin 750mg three times daily PRN  -Added Lidocaine patch every 12 hours to left scapula       Type 2 diabetes mellitus, without long-term current use of insulin  Patient's FSGs are controlled on current medication regimen.  Last A1c reviewed-   Lab Results   Component Value Date    HGBA1C 5.6 04/18/2024     Most recent fingerstick glucose reviewed-   Recent Labs   Lab 05/06/24  1802 05/06/24 2052 05/07/24  0741 05/07/24  1136   POCTGLUCOSE 73 124* 88 77      Current correctional scale  Low  Maintain anti-hyperglycemic dose as follows-   Antihyperglycemics (From admission, onward)      Start     Stop Route Frequency Ordered    05/06/24 1657  insulin aspart U-100 pen 0-5 Units         -- SubQ Before meals & nightly PRN 05/06/24 1603          Hold Oral hypoglycemics while patient is in the hospital.       Hypertension associated with diabetes  Chronic, uncontrolled. Latest blood pressure and vitals reviewed-     Temp:  [97.7 °F (36.5 °C)-98.4 °F (36.9 °C)]   Pulse:  [57-89]   Resp:  [16-20]   BP: (133-176)/(63-83)   SpO2:  [96 %-100 %] .   Home meds for hypertension were reviewed and noted below.   Hypertension Medications               valsartan (DIOVAN) 40 MG tablet Take 1 tablet (40 mg total) by mouth once daily.            While in the hospital, will manage blood pressure as follows; Continue home antihypertensive regimen    Will utilize p.r.n. blood pressure medication only if patient's blood pressure greater than 180/110 and she develops symptoms such as worsening chest pain or shortness of breath.       COPD (chronic obstructive pulmonary disease)  Patient's COPD is controlled currently.  Patient is currently off COPD Pathway. Continue scheduled inhalers prn Supplemental oxygen prn  and monitor respiratory status closely.     GERD (gastroesophageal reflux disease)  Chronic condition noted.    -Pantoprazole 40mg PO daily        VTE Risk Mitigation (From admission, onward)           Ordered     enoxaparin injection 60 mg  Every 12 hours         05/06/24 1922     Reason for No Pharmacological VTE Prophylaxis  Once        Question:  Reasons:  Answer:  Already adequately anticoagulated on oral Anticoagulants    05/06/24 1603     IP VTE HIGH RISK PATIENT  Once         05/06/24 1603     Place sequential compression device  Until discontinued         05/06/24 1603                    Discharge Planning   MONSTER: 5/8/2024     Code Status: Full Code   Is the patient medically  ready for discharge?:     Reason for patient still in hospital (select all that apply): Treatment  Discharge Plan A: Home                  Jadiel Hardy MD  Department of Hospital Medicine   [facility]/Surg

## 2024-05-07 NOTE — PLAN OF CARE
[facility] - Med/Surg  Initial Discharge Assessment       Primary Care Provider: LUÍS Huggins MD    Admission Diagnosis: Chest pain, unspecified type [R07.9]  Chest pain, unspecified type [R07.9]    Admission Date: 5/6/2024  Expected Discharge Date:     Transition of Care Barriers: None    Payor: HU[employer] MANAGED MEDICARE / Plan: HU[employer] SNP HMO PPO SPECIAL NEEDS / Product Type: Medicare Advantage /     Extended Emergency Contact Information  Primary Emergency Contact: Kareen Luong  Address: [address] [place] [address] [place] [id number] [place] [address]  Mobile Phone: [phone]  Relation: Daughter  Preferred language: English   needed? No  Secondary Emergency Contact: LATOYA FLOREZ  Mobile Phone: [phone]  Relation: Significant other   needed? No    Discharge Plan A: Home  Discharge Plan B: Home      [id number] - [place], [place] - [address]  [address]  [place] [address]  Phone: [phone] Fax: [phone]    [id number] - [place], [place] - [address]  [address]  [place] [address]  Phone: [phone] Fax: [phone]        DC assessment completed at bedside with patient. Verified information on facesheet as correct. Reports daughter, Kareen, as POA. Lives at listed address with friend, Latoya. PCP is Dr. Huggins- reports last apt was within 3-4 months ago. [facility] [address]. Denies hh/hd/blood thinners. DME- glucometer and bp monitor. Does outpt PT/OT 3 days a week on Latoya. Independent at baseline. Drives herself to apts. Her friend Latoya or one of her children will provide transportation home upon DC. Reports taking home medications as prescribed and can currently afford them. Denies recent inpt stay in last 30 days. Verified insurance on file. DC plan is home.   Initial Assessment (most recent)       Adult Discharge Assessment - 05/07/24 0921           Discharge Assessment    Assessment Type Discharge Planning Assessment     Confirmed/corrected address, phone number and insurance Yes     Confirmed Demographics Correct on Facesheet     Source of Information patient     Does patient/caregiver understand observation status Yes     Communicated MONSTER with patient/caregiver Yes     Reason For Admission chest pain     People in Home friend(s)     Facility Arrived From: home     Do you expect to return to your current living situation? Yes     Do you have help at home or someone to help you manage your care at home? Yes     Prior to hospitilization cognitive status: Alert/Oriented     Current cognitive status: Alert/Oriented     Walking or Climbing Stairs Difficulty no     Dressing/Bathing Difficulty no     Equipment Currently Used at Home blood pressure machine;glucometer     Readmission within 30 days? No     Patient currently being followed by outpatient case management? No     Do you currently have service(s) that help you manage your care at home? No     Do you take prescription medications? Yes     Do you have prescription coverage? Yes     Do you have any problems affording any of your prescribed medications? No     Is the patient taking medications as prescribed? yes     Who is going to help you get home at discharge? melany or one of her children     How do you get to doctors appointments? car, drives self     Are you on dialysis? No     Do you take coumadin? No     Discharge Plan A Home     Discharge Plan B Home     DME Needed Upon Discharge  none     Discharge Plan discussed with: Patient     Transition of Care Barriers None

## 2024-05-07 NOTE — PLAN OF CARE
Problem: Adult Inpatient Plan of Care  Goal: Plan of Care Review  Outcome: Progressing  Goal: Patient-Specific Goal (Individualized)  Outcome: Progressing  Goal: Absence of Hospital-Acquired Illness or Injury  Outcome: Progressing  Goal: Optimal Comfort and Wellbeing  Outcome: Progressing     Problem: Diabetes Comorbidity  Goal: Blood Glucose Level Within Targeted Range  Outcome: Progressing
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
## 2024-05-07 NOTE — ASSESSMENT & PLAN NOTE
Chronic, uncontrolled. Latest blood pressure and vitals reviewed-     Temp:  [97.7 °F (36.5 °C)-98.4 °F (36.9 °C)]   Pulse:  [57-89]   Resp:  [16-20]   BP: (133-176)/(63-83)   SpO2:  [96 %-100 %] .   Home meds for hypertension were reviewed and noted below.   Hypertension Medications               valsartan (DIOVAN) 40 MG tablet Take 1 tablet (40 mg total) by mouth once daily.            While in the hospital, will manage blood pressure as follows; Continue home antihypertensive regimen    Will utilize p.r.n. blood pressure medication only if patient's blood pressure greater than 180/110 and she develops symptoms such as worsening chest pain or shortness of breath.

## 2024-05-07 NOTE — SUBJECTIVE & OBJECTIVE
Interval History: see hospital course as above     Review of Systems   Constitutional:  Negative for fever.   HENT:  Negative for congestion.    Respiratory:  Negative for shortness of breath.    Cardiovascular:  Positive for chest pain.   Gastrointestinal:  Negative for abdominal pain and vomiting.   Genitourinary:  Negative for dysuria.   Neurological:  Negative for syncope.   Psychiatric/Behavioral:  Negative for agitation.      Objective:     Vital Signs (Most Recent):  Temp: 98.2 °F (36.8 °C) (05/07/24 1112)  Pulse: 62 (05/07/24 1112)  Resp: 16 (05/07/24 1112)  BP: (!) 145/77 (05/07/24 1112)  SpO2: 98 % (05/07/24 1112) Vital Signs (24h Range):  Temp:  [97.7 °F (36.5 °C)-98.4 °F (36.9 °C)] 98.2 °F (36.8 °C)  Pulse:  [57-89] 62  Resp:  [16-20] 16  SpO2:  [96 %-100 %] 98 %  BP: (133-176)/(63-83) 145/77     Weight: 62.6 kg (138 lb)  Body mass index is 24.45 kg/m².    Intake/Output Summary (Last 24 hours) at 5/7/2024 1219  Last data filed at 5/7/2024 0549  Gross per 24 hour   Intake 60 ml   Output --   Net 60 ml         Physical Exam  Constitutional:       General: She is not in acute distress.  HENT:      Head: Normocephalic.   Cardiovascular:      Rate and Rhythm: Normal rate.   Pulmonary:      Effort: No respiratory distress.   Abdominal:      Tenderness: There is no abdominal tenderness.   Musculoskeletal:         General: No swelling.   Skin:     General: Skin is warm.      Capillary Refill: Capillary refill takes less than 2 seconds.   Neurological:      Mental Status: She is alert and oriented to person, place, and time.   Psychiatric:         Behavior: Behavior normal.             Significant Labs: All pertinent labs within the past 24 hours have been reviewed.  CBC:   Recent Labs   Lab 05/06/24  1346 05/07/24  0150   WBC 7.39 6.55   HGB 11.4* 11.0*   HCT 36.1* 34.5*   * 125*     CMP:   Recent Labs   Lab 05/06/24  1346 05/07/24  0150    141   K 4.0 3.7    107   CO2 26 25   GLU 78 80   BUN  16 19   CREATININE 0.9 0.8   CALCIUM 9.8 9.7   PROT 6.8 6.0   ALBUMIN 3.8 3.4*   BILITOT 0.2 0.2   ALKPHOS 78 68   AST 15 17   ALT 9* 10   ANIONGAP 10 9     Lipid Panel:   Recent Labs   Lab 05/07/24  0150   CHOL 132   HDL 58   LDLCALC 53.8*   TRIG 101   CHOLHDL 43.9     Magnesium:   Recent Labs   Lab 05/06/24  1722   MG 1.2*     Troponin:   Recent Labs   Lab 05/06/24  2153 05/07/24  0150 05/07/24  0919   TROPONINI 1.567* 1.385* 0.958*       Significant Imaging: I have reviewed all pertinent imaging results/findings within the past 24 hours.

## 2024-05-07 NOTE — HOSPITAL COURSE
Patient was initially admitted to [facility] due to chest pain.  Troponin was elevated, peaked at 1.567 and then downtrended.  Cardiology was consulted and recommended transfer to [facility] for angiogram.  Patient was started on therapeutic Lovenox.  Patient has allergy to contrast dye and was pre-medicated prior to angiogram.  Angiogram was done 5/9, there was no evidence of epicardial obstructive coronary lesions.  Chest pain/tightness resolved.  Cleared by cardiology for discharge.  Follow up with PCP and cardiology outpatient.  POC discussed with patient and in agreement.    Patient was seen and examined on the day of discharge.

## 2024-05-07 NOTE — CONSULTS
[facility]  Department of Cardiology  Consult Note      PATIENT NAME: Pili Teixeira    MRN: [id number]  TODAY'S DATE: 05/07/2024  ADMIT DATE: 5/6/2024                          CONSULT REQUESTED BY: Jadiel Hardy MD    SUBJECTIVE     PRINCIPAL PROBLEM: Chest pain  Hospital Medicine History & Physical   Pili Teixeira is a 78 year old female with a  previous medical history of cervical spine stenosis and long-term use of opioid pain medication, COPD, CAD, GERD, HTN, HLD, TIA on plavix Monday, Wednesday, Friday, and DMII who presents for chest pain starting today. Patient reports she awoke this morning feeling generally unwell and noted she had a heaviness in the left side of her chest that radiated to her left shoulder. She reports that there were no other adverse symptoms such as nausea, vomiting, shortness of breath, or diaphoresis  that occurred at the same time nor did anything aggravate or alleviate the chest heaviness. Patient reports eating light breakfast and then the pain resolved after some time. She reports that during her physical therapy appointment for her chronic neck pain and the left sided chest heaviness returned and again radiated to her left shoulder. The physical therapist took her blood pressure and noted the systolic to be 175 and advised her to present to the emergency room. Initial ED workup showed a negative troponin, normal chest xray, stable anemia on CBC and unremarkable CMP. EKG was normal sinus rhythm. Patient to be admitted by hospital medicine for further evaluation and management.      REASON FOR CONSULT:  Chest pain       HPI:  Patient is a 78-year-old lady with known history of coronary artery status post cardiac catheterization and coronary angiography 6 months ago she also had a stress test about 6 months ago.  According to patient patient had arterial occlusion in 1 of the vessels and she is getting collateral circulation to that area.  She is known  history of cervical spinal stenosis and currently being treated by Neurology also has COPD gastroesophageal reflux disease, hypertension and hyperlipidemia had TIA in the past and was placed on Plavix on alternate days.  Patient presented with complaints of having chest discomfort intermittently.  And she is unsure if her chest pain has something to do with her spinal stenosis or if this is her coronary artery disease.    At the present time patient complaints of chest discomfort and is substernal mostly on the left side.  Denies any shortness a breath nausea vomiting or diaphoresis.    Patient was scheduled for stress myocardial perfusion study today and it is canceled.  She also had a positive troponins and they are slowly trending down.        Review of patient's allergies indicates:   Allergen Reactions    Iodinated contrast media Anaphylaxis, Hives and Rash     (Intubated)    Codeine Other (See Comments)     Chest pain    Clindamycin Other (See Comments)     Difficulty swallowing after taking, feels a something sits in epigastric area        Past Medical History:   Diagnosis Date    Allergy     Anemia 2012    with thrombocytopenia; iron deficiency    Arthritis     Cervical spinal stenosis     with neuropathy, chronic neck,back,leg pain    Colon polyp     COPD (chronic obstructive pulmonary disease)     Coronary artery disease     COVID 4/27/2022    COVID-19     Diabetes mellitus     , sugars run 190's per patient    Diastolic dysfunction 7/13/2015    Diverticulitis     Diverticulosis     Hx diverticulitis,still has chronic diarrhea, abd pain    Dyspnea on exertion     sometimes with nausea, fatigue,dizziness, had cardiac cath June 2012, normal    Fibromyalgia     Fracture 2012    right thumb    GERD (gastroesophageal reflux disease)     Feb 2012, Hx H Pylori    Glaucoma     had LAser surgey, on no eye drops    HEARING LOSS     Hemangioma     fatty liver    History of earache     frequent    History of TIA  (transient ischemic attack) 5/28/2013    Hyperlipidemia     Hypertension     Hypertension associated with diabetes 3/14/2017    Laryngeal polyp     Myocardial infarction 2006 last    also MI in distant past    REJI (obstructive sleep apnea)     does not use CPAP    Otitis media     Pneumonia due to COVID-19 virus 2/20/2021    Renal stone     Sjogren's syndrome     SOB (shortness of breath) 6/8/2012    [address] [place] [address] 1. Normal coronary arteries. 2. Normal single renal arteries bilaterally. 3. Diastolic dysfunction.     Stroke     TIA, now on Plavix    TIA (transient ischemic attack)     TMJ disorder     Type II or unspecified type diabetes mellitus without mention of complication, uncontrolled 5/8/2014    UTI (lower urinary tract infection)     frequent    Venous insufficiency     with Hx blood clot in leg     Past Surgical History:   Procedure Laterality Date    ABDOMINAL SURGERY  2010 x2    expoloratory lap for pelvic cyst,adhesions; partial colonectomy     adhesiolysis   10/11/2012    CARDIAC CATHETERIZATION      no current blockages.    CHOLECYSTECTOMY      COLON SURGERY      r/t diverticuli    COLONOSCOPY  08/2014    repeat in 5 years    COLONOSCOPY N/A 1/7/2020    Procedure: COLONOSCOPY;  Surgeon: Kb Nguyen MD;  Location: [facility];  Service: Endoscopy;  Laterality: N/A;    COLONOSCOPY N/A 3/13/2023    Procedure: COLONOSCOPY;  Surgeon: Jarrod Frost MD;  Location: [facility];  Service: Endoscopy;  Laterality: N/A;    ENDOSCOPIC ULTRASOUND OF UPPER GASTROINTESTINAL TRACT N/A 1/13/2021    Procedure: ULTRASOUND, UPPER GI TRACT, ENDOSCOPIC;  Surgeon: Sonido Castellano III, MD;  Location: [facility];  Service: Endoscopy;  Laterality: N/A;    EPIDURAL BLOCK INJECTION  5/15/12    back,neck for pain    ESOPHAGOGASTRODUODENOSCOPY N/A 1/7/2020    Procedure: EGD (ESOPHAGOGASTRODUODENOSCOPY);  Surgeon: Kb Nguyen MD;  Location: [facility];  Service: Endoscopy;  Laterality: N/A;    ESOPHAGOGASTRODUODENOSCOPY  N/A 1/13/2021    Procedure: EGD (ESOPHAGOGASTRODUODENOSCOPY);  Surgeon: Sonido Castellano III, MD;  Location: [facility] ENDO;  Service: Endoscopy;  Laterality: N/A;    ESOPHAGOGASTRODUODENOSCOPY N/A 3/13/2023    Procedure: EGD (ESOPHAGOGASTRODUODENOSCOPY);  Surgeon: Jarrod Frost MD;  Location: [facility] ENDO;  Service: Endoscopy;  Laterality: N/A;    ESOPHAGOGASTRODUODENOSCOPY N/A 5/5/2023    Procedure: EGD (ESOPHAGOGASTRODUODENOSCOPY);  Surgeon: Noel Caputo MD;  Location: [facility] ENDO;  Service: Endoscopy;  Laterality: N/A;    EXPLORATORY LAPAROTOMY W/ BOWEL RESECTION  10/11/2012    EYE SURGERY      Laser for glaucoma    EYE SURGERY  May 2012    cataracts    HYSTERECTOMY      INTRALUMINAL GASTROINTESTINAL TRACT IMAGING VIA CAPSULE N/A 5/23/2023    Procedure: IMAGING PROCEDURE, GI TRACT, INTRALUMINAL, VIA CAPSULE;  Surgeon: Noel Caputo MD;  Location: [facility];  Service: Endoscopy;  Laterality: N/A;    LARYNX SURGERY      polypectomy    OOPHORECTOMY      TONSILLECTOMY      with adenoidectomy    UPPER GASTROINTESTINAL ENDOSCOPY  12/2015    VASCULAR SURGERY      laser to varicose vein leg     Social History     Tobacco Use    Smoking status: Never    Smokeless tobacco: Never   Substance Use Topics    Alcohol use: No    Drug use: Yes     Types: Oxycodone        REVIEW OF SYSTEMS  CONSTITUTIONAL: Negative for chills, fatigue and fever.   EYES: No double vision, No blurred vision  NEURO: No headaches, No dizziness, cervical stenosis.  RESPIRATORY: Negative for cough, shortness of breath and wheezing.    CARDIOVASCULAR:  Positive for chest pain. Negative for palpitations and leg swelling.   GI: Negative for abdominal pain, No melena, diarrhea, nausea and vomiting.   : Negative for dysuria and frequency, Negative for hematuria  SKIN: Negative for bruising, Negative for edema or discoloration noted.   ENDOCRINE: Negative for polyphagia, Negative for heat intolerance, Negative for cold intolerance  PSYCHIATRIC:  Negative for depression, Negative for anxiety, Negative for memory loss  MUSCULOSKELETAL: Negative for neck pain, Negative for muscle weakness, Negative for back pain     OBJECTIVE     VITAL SIGNS (Most Recent)  Temp: 98.2 °F (36.8 °C) (05/07/24 0857)  Pulse: 61 (05/07/24 0857)  Resp: 16 (05/07/24 0857)  BP: (!) 151/68 (05/07/24 0857)  SpO2: 96 % (05/07/24 0857)    VENTILATION STATUS  Resp: 16 (05/07/24 0857)  SpO2: 96 % (05/07/24 0857)           I & O (Last 24H):  Intake/Output Summary (Last 24 hours) at 5/7/2024 0921  Last data filed at 5/7/2024 0549  Gross per 24 hour   Intake 60 ml   Output --   Net 60 ml       WEIGHTS  Wt Readings from Last 1 Encounters:   05/06/24 2100 62.8 kg (138 lb 7.2 oz)   05/06/24 1326 60.3 kg (133 lb)       PHYSICAL EXAM  GENERAL:  Adequately nourished and in no apparent distress alert and oriented.   HEENT: Normocephalic. Pupils normal and conjunctivae normal.  .   NECK: No JVD. No bruit..   THYROID: Thyroid not evaluated..   CARDIAC: Regular rate and rhythm. S1 is normal.S2 is normal.  Systolic murmur audible   CHEST ANATOMY: normal.   LUNGS: Clear to auscultation. No wheezing or rhonchi..   ABDOMEN: Soft    URINARY: No toribio catheter   EXTREMITIES: No cyanosis, clubbing or edema noted at this time., no calf tenderness bilaterally.   PERIPHERAL VASCULAR SYSTEM: Good palpable distal pulses.   CENTRAL NERVOUS SYSTEM:  Not evaluated   SKIN: Skin without lesions, moist, well perfused.   MUSCLE STRENGTH & TONE:  As per   Neurology/hospitalist  HOME MEDICATIONS:  No current facility-administered medications on file prior to encounter.     Current Outpatient Medications on File Prior to Encounter   Medication Sig Dispense Refill    metFORMIN (GLUCOPHAGE) 500 MG tablet TAKE 1 TABLET BY MOUTH TWICE DAILY WITH MEALS (Patient taking differently: Take 500 mg by mouth 2 (two) times daily with meals.) 180 tablet 0    ACCU-CHEK GUIDE TEST STRIPS Strp USE 1 STRIP TO TEST BLOOD GLUCOSE ONCE DAILY AS  DIRECTED 100 each 3    albuterol (PROVENTIL/VENTOLIN HFA) 90 mcg/actuation inhaler Inhale 2 puffs into the lungs every 4 (four) hours as needed.      azithromycin (Z-WILSON) 250 MG tablet Take 250 mg by mouth once daily.      blood-glucose meter kit To check BG 2 times daily, to use with insurance preferred meter. Medical necessity. 1 each 0    carboxymethylcellulose sodium (LUBRICANT EYE DROPS OPHT) Place 1 drop into both eyes daily as needed (dry eyes).      cetirizine (ZYRTEC) 5 MG tablet Take 1 tablet (5 mg total) by mouth once daily. for 5 days 5 tablet 0    clopidogreL (PLAVIX) 75 mg tablet Take 1 tablet (75 mg total) by mouth every Mon, Wed, Fri. 90 tablet 4    cyanocobalamin (VITAMIN B-12) 1000 MCG tablet Take 100 mcg by mouth once daily.      diclofenac sodium 1 % Gel Apply 2 g topically once daily.      docusate sodium (STOOL SOFTENER ORAL) Take 1 capsule by mouth every evening.      famotidine (PEPCID) 40 MG tablet TAKE 1 TABLET BY MOUTH ONCE DAILY IN THE EVENING 90 tablet 3    fluticasone propionate (FLONASE) 50 mcg/actuation nasal spray USE 2 SPRAYS IN EACH NOSTRIL ONE TIME PER DAY (Patient taking differently: 2 sprays by Each Nostril route Daily. USE 2 SPRAYS IN EACH NOSTRIL ONE TIME PER DAY) 18.2 mL 5    folic acid (FOLVITE) 1 MG tablet Take 1 tablet by mouth once daily 90 tablet 1    ipratropium (ATROVENT) 42 mcg (0.06 %) nasal spray 2 sprays by Each Nostril route 3 (three) times daily. 15 mL 3    Lactobac no.41/Bifidobact no.7 (PROBIOTIC-10 ORAL) Take 1 capsule by mouth Daily.      lancets (ACCU-CHEK SOFTCLIX LANCETS) Misc Test TWICE DAILY;Twice a day 100 each 3    magnesium oxide (MAG-OX) 400 mg (241.3 mg magnesium) tablet Take 1 tablet (400 mg total) by mouth once daily. 90 tablet 3    methylPREDNISolone (MEDROL DOSEPACK) 4 mg tablet Take 4 mg by mouth once daily.      multivit with min-folic acid 200 mcg Chew Take 1 tablet by mouth once daily.       NARCAN 4 mg/actuation Spry as directed       olopatadine (PATANOL) 0.1 % ophthalmic solution Place 1 drop into both eyes daily as needed for Allergies.      ondansetron (ZOFRAN-ODT) 4 MG TbDL Take 1 tablet (4 mg total) by mouth every 8 (eight) hours as needed (nausea). 30 tablet 1    oxyCODONE-acetaminophen (PERCOCET) 7.5-325 mg per tablet Take 1 tablet by mouth 4 (four) times daily as needed for Pain.      pantoprazole (PROTONIX) 40 MG tablet Take 1 tablet by mouth once daily 90 tablet 2    pregabalin (LYRICA) 150 MG capsule Take 1 capsule (150 mg total) by mouth 2 (two) times daily. 60 capsule 2    rosuvastatin (CRESTOR) 10 MG tablet Take 1 tablet (10 mg total) by mouth every evening. 90 tablet 1    traZODone (DESYREL) 50 MG tablet TAKE 1 TABLET BY MOUTH IN THE EVENING (MAY  TAKE  AN  ADDITIONAL  TABLET  AS  NEEDED) (Patient taking differently: Take 50 mg by mouth every evening. TAKE 1 TABLET BY MOUTH IN THE EVENING (MAY  TAKE  AN  ADDITIONAL  TABLET  AS  NEEDED)) 90 tablet 3    valsartan (DIOVAN) 40 MG tablet Take 1 tablet (40 mg total) by mouth once daily. 90 tablet 3       SCHEDULED MEDS:   aspirin  325 mg Oral Daily    atorvastatin  40 mg Oral QHS    clopidogreL  75 mg Oral Daily    cyanocobalamin  100 mcg Oral Daily    docusate sodium  100 mg Oral QHS    enoxparin  1 mg/kg Subcutaneous Q12H (prophylaxis, 0900/2100)    Lactobacillus rhamnosus GG  1 packet Oral Daily    LIDOcaine  1 patch Transdermal Q24H    magnesium oxide  400 mg Oral Daily    methocarbamoL  750 mg Oral TID    metoprolol tartrate  25 mg Oral BID    multivitamin  1 tablet Oral Daily    ondansetron  4 mg Oral Once    pantoprazole  40 mg Oral Daily    pregabalin  150 mg Oral BID    regadenoson  0.4 mg Intravenous Once    traZODone  50 mg Oral QHS    valsartan  40 mg Oral QHS       CONTINUOUS INFUSIONS:    PRN MEDS:  Current Facility-Administered Medications:     acetaminophen, 650 mg, Oral, Q4H PRN    albuterol-ipratropium, 3 mL, Nebulization, Q6H PRN    aluminum-magnesium  hydroxide-simethicone, 30 mL, Oral, QID PRN    artificial tears, 2 drop, Both Eyes, TID PRN    dextrose 10%, 12.5 g, Intravenous, PRN    dextrose 10%, 25 g, Intravenous, PRN    glucagon (human recombinant), 1 mg, Intramuscular, PRN    glucose, 16 g, Oral, PRN    glucose, 24 g, Oral, PRN    insulin aspart U-100, 0-5 Units, Subcutaneous, QID (AC + HS) PRN    magnesium oxide, 800 mg, Oral, PRN    magnesium oxide, 800 mg, Oral, PRN    melatonin, 6 mg, Oral, Nightly PRN    naloxone, 0.02 mg, Intravenous, PRN    ondansetron, 4 mg, Intravenous, Q8H PRN    oxyCODONE-acetaminophen, 1 tablet, Oral, Q6H PRN    potassium bicarbonate, 35 mEq, Oral, PRN    potassium bicarbonate, 50 mEq, Oral, PRN    potassium bicarbonate, 60 mEq, Oral, PRN    simethicone, 1 tablet, Oral, QID PRN    sodium chloride 0.9%, 10 mL, Intravenous, Q12H PRN    LABS AND DIAGNOSTICS     CBC LAST 3 DAYS  Recent Labs   Lab 05/06/24  1346 05/07/24  0150   WBC 7.39 6.55   RBC 3.89* 3.77*   HGB 11.4* 11.0*   HCT 36.1* 34.5*   MCV 93 92   MCH 29.3 29.2   MCHC 31.6* 31.9*   RDW 12.6 12.5   * 125*   MPV 12.8 12.5   GRAN 44.8  3.3 44.6  2.9   LYMPH 43.2  3.2 43.1  2.8   MONO 9.6  0.7 9.8  0.6   BASO 0.04 0.03   NRBC 0 0       COAGULATION LAST 3 DAYS  Recent Labs   Lab 05/06/24  2302   INR 1.0   APTT 36.6*       CHEMISTRY LAST 3 DAYS  Recent Labs   Lab 05/06/24  1346 05/06/24  1722 05/07/24  0150     --  141   K 4.0  --  3.7     --  107   CO2 26  --  25   ANIONGAP 10  --  9   BUN 16  --  19   CREATININE 0.9  --  0.8   GLU 78  --  80   CALCIUM 9.8  --  9.7   MG  --  1.2*  --    ALBUMIN 3.8  --  3.4*   PROT 6.8  --  6.0   ALKPHOS 78  --  68   ALT 9*  --  10   AST 15  --  17   BILITOT 0.2  --  0.2       CARDIAC PROFILE LAST 3 DAYS  Recent Labs   Lab 05/06/24  1722 05/06/24  2153 05/07/24  0150   TROPONINI 0.975* 1.567* 1.385*       ENDOCRINE LAST 3 DAYS  Recent Labs   Lab 05/06/24  1722   TSH 2.458       LAST ARTERIAL BLOOD GAS  ABG  No results  "for input(s): "PH", "PO2", "PCO2", "HCO3", "BE" in the last 168 hours.    LAST 7 DAYS MICROBIOLOGY   Microbiology Results (last 7 days)       ** No results found for the last 168 hours. **            MOST RECENT IMAGING  X-Ray Chest AP Portable  Narrative: EXAMINATION:  XR CHEST AP PORTABLE    CLINICAL HISTORY:  Chest pain, unspecified    TECHNIQUE:  Single frontal view of the chest was performed.    COMPARISON:  None    FINDINGS:  The lungs are clear, with normal appearance of pulmonary vasculature and no pleural effusion or pneumothorax.    The cardiac silhouette is normal in size. The hilar and mediastinal contours are unremarkable.    Bones are intact.  Impression: No acute abnormality.    Electronically signed by: June Hedrick MD  Date:    05/06/2024  Time:    14:40      ECHOCARDIOGRAM RESULTS (last 5)  Results for orders placed during the hospital encounter of 03/08/23    Echo    Interpretation Summary  · The left ventricle is normal in size with normal systolic function. The estimated ejection fraction is 60%.  · Normal right ventricular size with normal right ventricular systolic function.  · Grade I left ventricular diastolic dysfunction.  · Normal central venous pressure (3 mmHg).      Results for orders placed in visit on 01/15/20    Echo Color Flow Doppler? Yes    Interpretation Summary  · Concentric left ventricular remodeling.  · Normal left ventricular systolic function. The estimated ejection fraction is 55%.  · Grade I (mild) left ventricular diastolic dysfunction consistent with impaired relaxation.  · The estimated PA systolic pressure is 40 mmHg.  · No wall motion abnormalities.  · Normal right ventricular systolic function.  · Normal central venous pressure (3 mmHg).  · Mild tricuspid regurgitation.  · Mild mitral regurgitation.      CURRENT/PREVIOUS VISIT EKG  Results for orders placed or performed during the hospital encounter of 12/05/23   EKG 12-lead    Collection Time: 12/05/23  6:15 PM "    Narrative    Test Reason : R10.9,    Vent. Rate : 071 BPM     Atrial Rate : 071 BPM     P-R Int : 142 ms          QRS Dur : 064 ms      QT Int : 378 ms       P-R-T Axes : 079 040 066 degrees     QTc Int : 410 ms    Normal sinus rhythm  Possible Left atrial enlargement  Septal infarct (cited on or before 03-MAY-2023)  Abnormal ECG  When compared with ECG of 03-MAY-2023 13:59,  No significant change was found  Confirmed by Franklin MORA, Nato MATTHEW ([id number]) on 12/14/2023 11:26:53 PM    Referred By: EVE   SELF           Confirmed By:Nato Reid MD           ASSESSMENT/PLAN:     Active Hospital Problems    Diagnosis    *Chest pain    Coronary artery disease involving native coronary artery of native heart without angina pectoris    Type 2 diabetes mellitus, without long-term current use of insulin    Chronic pain, neck, back, leg on home narcotics    Hypertension associated with diabetes    COPD (chronic obstructive pulmonary disease)    GERD (gastroesophageal reflux disease)       ASSESSMENT & PLAN:   1. Chest pain with a abnormal troponin  2. CAD in native artery   3. Chronic neck and back pain/spinal stenosis   4. Type 2 diabetes mellitus  5. Essential hypertension diabetes  6. COPD  7. Hypomagnesemia  8. Gastroesophageal reflux disease        RECOMMENDATIONS:  1. Patient has been having intermittent chest pain with elevated troponin.  She is currently on aspirin and Plavix.    Would cancel stress myocardial perfusion study as she had it just 6 months ago.  2. Patient has had coronary artery disease, had cardiac catheterization and coronary angiography performed by Dr. Acosta, and this was about 6 months ago as per patient.  I can not find the documentation for the same.  And she was told that she has chronically occluded artery and the gets good collateral circulation from the other vessels.  3. Will call Dr. Acosta's office and see if she would need a repeat coronary angiogram.  And if so he can perform it at  [place].  4. Consideration to transfer the patient to [place] if okay with his primary cardiologist.  Otherwise patient can be transferred to [facility] for cardiac catheterization and coronary angiography.  5. Replace magnesium her magnesium is severely low would give a 2 g of magnesium sulfate IV piggyback.  6. An echocardiogram was done just now will review it.    7. Thank you for the consultation will follow with you      Terence Casas MD  Date of Service: 05/07/2024  9:21 AM

## 2024-05-07 NOTE — ASSESSMENT & PLAN NOTE
-EKG   -CBC, CMP daily  -Mag and phos checked upon admit  -TSH   -Fasting lipid panel  -PRN oxygen  -ASA   -Stress test ordered     Cardiology consulted  trend Troponin   on therapeutic Lovenox.    Echo and stress test ordered.    Cardiology evaluated the patient and canceled stress test and advised transfer to [facility] for angiogram.    Patient will be transferred to [facility] for the procedure; transfer orders in.

## 2024-05-07 NOTE — CARE UPDATE
Spoke with Dr. Casas to inform him of increase in troponin. EKG obtained with each troponin and show no ST elevation. He recommended to add metoprolol 25mg BID and echo.  Patient AAOx4 and stable. Will continue with plan of care of full dose lovenox, daily aspirin, magnesium replacement and  stress test in the AM.    2323: Contacted by Nurse and informed that patient having epigastric pain. No shortness of breath or nausea or diaphoresis. 4mg of morphine and 4mg of zofran ordered

## 2024-05-07 NOTE — ASSESSMENT & PLAN NOTE
Patient's FSGs are controlled on current medication regimen.  Last A1c reviewed-   Lab Results   Component Value Date    HGBA1C 5.6 04/18/2024     Most recent fingerstick glucose reviewed-   Recent Labs   Lab 05/06/24  1802 05/06/24 2052 05/07/24  0741 05/07/24  1136   POCTGLUCOSE 73 124* 88 77     Current correctional scale  Low  Maintain anti-hyperglycemic dose as follows-   Antihyperglycemics (From admission, onward)    Start     Stop Route Frequency Ordered    05/06/24 1657  insulin aspart U-100 pen 0-5 Units         -- SubQ Before meals & nightly PRN 05/06/24 1603        Hold Oral hypoglycemics while patient is in the hospital.

## 2024-05-07 NOTE — NURSING
Nurses Note -- 4 Eyes      5/7/2024   2:23 AM      Skin assessed during: Admit      [x] No Altered Skin Integrity Present    [x]Prevention Measures Documented      [] Yes- Altered Skin Integrity Present or Discovered   [] LDA Added if Not in Epic (Describe Wound)   [] New Altered Skin Integrity was Present on Admit and Documented in LDA   [] Wound Image Taken    Wound Care Consulted? No    Attending Nurse:  Lisa Weaver RN/Staff Member:  Victor M

## 2024-05-07 NOTE — PLAN OF CARE
05/07/24 0820   BENITEZ Message   Medicare Outpatient and Observation Notification regarding financial responsibility Given to patient/caregiver;Explained to patient/caregiver;Signed/date by patient/caregiver   Date BENITEZ was signed 05/07/24   Time BENITEZ was signed 0820     EBNITEZ explained to pt. Pt verbalized understanding and signed form.

## 2024-05-08 LAB
ALBUMIN SERPL BCP-MCNC: 3.7 G/DL (ref 3.5–5.2)
ALP SERPL-CCNC: 55 U/L (ref 55–135)
ALT SERPL W/O P-5'-P-CCNC: 7 U/L (ref 10–44)
ANION GAP SERPL CALC-SCNC: 5 MMOL/L (ref 8–16)
AST SERPL-CCNC: 16 U/L (ref 10–40)
BASOPHILS # BLD AUTO: 0.06 K/UL (ref 0–0.2)
BASOPHILS NFR BLD: 0.9 % (ref 0–1.9)
BILIRUB SERPL-MCNC: 0.3 MG/DL (ref 0.1–1)
BUN SERPL-MCNC: 24 MG/DL (ref 8–23)
CALCIUM SERPL-MCNC: 9.2 MG/DL (ref 8.7–10.5)
CHLORIDE SERPL-SCNC: 106 MMOL/L (ref 95–110)
CO2 SERPL-SCNC: 26 MMOL/L (ref 23–29)
CREAT SERPL-MCNC: 0.8 MG/DL (ref 0.5–1.4)
DIFFERENTIAL METHOD BLD: ABNORMAL
EOSINOPHIL # BLD AUTO: 0.1 K/UL (ref 0–0.5)
EOSINOPHIL NFR BLD: 1.6 % (ref 0–8)
ERYTHROCYTE [DISTWIDTH] IN BLOOD BY AUTOMATED COUNT: 12.7 % (ref 11.5–14.5)
EST. GFR  (NO RACE VARIABLE): >60 ML/MIN/1.73 M^2
GLUCOSE SERPL-MCNC: 116 MG/DL (ref 70–110)
GLUCOSE SERPL-MCNC: 157 MG/DL (ref 70–110)
GLUCOSE SERPL-MCNC: 85 MG/DL (ref 70–110)
GLUCOSE SERPL-MCNC: 86 MG/DL (ref 70–110)
GLUCOSE SERPL-MCNC: 99 MG/DL (ref 70–110)
HCT VFR BLD AUTO: 37.5 % (ref 37–48.5)
HGB BLD-MCNC: 11.6 G/DL (ref 12–16)
IMM GRANULOCYTES # BLD AUTO: 0.03 K/UL (ref 0–0.04)
IMM GRANULOCYTES NFR BLD AUTO: 0.4 % (ref 0–0.5)
LYMPHOCYTES # BLD AUTO: 2.6 K/UL (ref 1–4.8)
LYMPHOCYTES NFR BLD: 38.6 % (ref 18–48)
MAGNESIUM SERPL-MCNC: 2.3 MG/DL (ref 1.6–2.6)
MCH RBC QN AUTO: 28.7 PG (ref 27–31)
MCHC RBC AUTO-ENTMCNC: 30.9 G/DL (ref 32–36)
MCV RBC AUTO: 93 FL (ref 82–98)
MONOCYTES # BLD AUTO: 0.7 K/UL (ref 0.3–1)
MONOCYTES NFR BLD: 9.8 % (ref 4–15)
NEUTROPHILS # BLD AUTO: 3.3 K/UL (ref 1.8–7.7)
NEUTROPHILS NFR BLD: 48.7 % (ref 38–73)
NRBC BLD-RTO: 0 /100 WBC
PLATELET # BLD AUTO: 158 K/UL (ref 150–450)
PMV BLD AUTO: 11.3 FL (ref 9.2–12.9)
POTASSIUM SERPL-SCNC: 5.2 MMOL/L (ref 3.5–5.1)
PROT SERPL-MCNC: 6 G/DL (ref 6–8.4)
RBC # BLD AUTO: 4.04 M/UL (ref 4–5.4)
SODIUM SERPL-SCNC: 137 MMOL/L (ref 136–145)
WBC # BLD AUTO: 6.73 K/UL (ref 3.9–12.7)

## 2024-05-08 PROCEDURE — 96366 THER/PROPH/DIAG IV INF ADDON: CPT

## 2024-05-08 PROCEDURE — 94761 N-INVAS EAR/PLS OXIMETRY MLT: CPT

## 2024-05-08 PROCEDURE — 25000003 PHARM REV CODE 250: Performed by: NURSE PRACTITIONER

## 2024-05-08 PROCEDURE — 94760 N-INVAS EAR/PLS OXIMETRY 1: CPT

## 2024-05-08 PROCEDURE — 83735 ASSAY OF MAGNESIUM: CPT | Performed by: INTERNAL MEDICINE

## 2024-05-08 PROCEDURE — 80053 COMPREHEN METABOLIC PANEL: CPT

## 2024-05-08 PROCEDURE — 36415 COLL VENOUS BLD VENIPUNCTURE: CPT

## 2024-05-08 PROCEDURE — 21400001 HC TELEMETRY ROOM

## 2024-05-08 PROCEDURE — 63600175 PHARM REV CODE 636 W HCPCS: Performed by: NURSE PRACTITIONER

## 2024-05-08 PROCEDURE — 25000003 PHARM REV CODE 250: Performed by: HOSPITALIST

## 2024-05-08 PROCEDURE — 63600175 PHARM REV CODE 636 W HCPCS

## 2024-05-08 PROCEDURE — 99223 1ST HOSP IP/OBS HIGH 75: CPT | Mod: GC,,, | Performed by: STUDENT IN AN ORGANIZED HEALTH CARE EDUCATION/TRAINING PROGRAM

## 2024-05-08 PROCEDURE — 25000003 PHARM REV CODE 250: Performed by: INTERNAL MEDICINE

## 2024-05-08 PROCEDURE — 99900035 HC TECH TIME PER 15 MIN (STAT)

## 2024-05-08 PROCEDURE — 85025 COMPLETE CBC W/AUTO DIFF WBC: CPT

## 2024-05-08 PROCEDURE — 25000003 PHARM REV CODE 250

## 2024-05-08 RX ORDER — DIPHENHYDRAMINE HCL 25 MG
25 CAPSULE ORAL ONCE
Status: COMPLETED | OUTPATIENT
Start: 2024-05-08 | End: 2024-05-08

## 2024-05-08 RX ORDER — L. ACIDOPHILUS/L.BULGARICUS 100MM CELL
GRANULES IN PACKET (EA) ORAL DAILY
Status: DISCONTINUED | OUTPATIENT
Start: 2024-05-09 | End: 2024-05-08

## 2024-05-08 RX ORDER — LANOLIN ALCOHOL/MO/W.PET/CERES
1000 CREAM (GRAM) TOPICAL DAILY
Status: DISCONTINUED | OUTPATIENT
Start: 2024-05-08 | End: 2024-05-09 | Stop reason: HOSPADM

## 2024-05-08 RX ORDER — DIPHENHYDRAMINE HCL 25 MG
25 CAPSULE ORAL ONCE
Status: COMPLETED | OUTPATIENT
Start: 2024-05-09 | End: 2024-05-09

## 2024-05-08 RX ORDER — L. ACIDOPHILUS/L.BULGARICUS 100MM CELL
1 GRANULES IN PACKET (EA) ORAL DAILY
Status: DISCONTINUED | OUTPATIENT
Start: 2024-05-08 | End: 2024-05-09 | Stop reason: HOSPADM

## 2024-05-08 RX ADMIN — TRAZODONE HYDROCHLORIDE 50 MG: 50 TABLET ORAL at 08:05

## 2024-05-08 RX ADMIN — CLOPIDOGREL BISULFATE 75 MG: 75 TABLET, FILM COATED ORAL at 11:05

## 2024-05-08 RX ADMIN — DOCUSATE SODIUM 100 MG: 100 CAPSULE, LIQUID FILLED ORAL at 08:05

## 2024-05-08 RX ADMIN — ASPIRIN 325 MG ORAL TABLET 325 MG: 325 PILL ORAL at 11:05

## 2024-05-08 RX ADMIN — Medication 400 MG: at 11:05

## 2024-05-08 RX ADMIN — PANTOPRAZOLE SODIUM 40 MG: 40 TABLET, DELAYED RELEASE ORAL at 11:05

## 2024-05-08 RX ADMIN — METHOCARBAMOL TABLETS 750 MG: 750 TABLET, COATED ORAL at 02:05

## 2024-05-08 RX ADMIN — PREDNISONE 50 MG: 20 TABLET ORAL at 08:05

## 2024-05-08 RX ADMIN — METOPROLOL TARTRATE 25 MG: 25 TABLET, FILM COATED ORAL at 08:05

## 2024-05-08 RX ADMIN — LIDOCAINE 5% 1 PATCH: 700 PATCH TOPICAL at 05:05

## 2024-05-08 RX ADMIN — OXYCODONE HYDROCHLORIDE AND ACETAMINOPHEN 1 TABLET: 10; 325 TABLET ORAL at 03:05

## 2024-05-08 RX ADMIN — CYANOCOBALAMIN TAB 1000 MCG 1000 MCG: 1000 TAB at 11:05

## 2024-05-08 RX ADMIN — LACTOBACILLUS ACIDOPHILUS / LACTOBACILLUS BULGARICUS 1 EACH: 100 MILLION CFU STRENGTH GRANULES at 02:05

## 2024-05-08 RX ADMIN — ATORVASTATIN CALCIUM 40 MG: 40 TABLET, FILM COATED ORAL at 08:05

## 2024-05-08 RX ADMIN — METHOCARBAMOL TABLETS 750 MG: 750 TABLET, COATED ORAL at 11:05

## 2024-05-08 RX ADMIN — ENOXAPARIN SODIUM 60 MG: 60 INJECTION SUBCUTANEOUS at 11:05

## 2024-05-08 RX ADMIN — METHOCARBAMOL TABLETS 750 MG: 750 TABLET, COATED ORAL at 08:05

## 2024-05-08 RX ADMIN — PREGABALIN 150 MG: 75 CAPSULE ORAL at 08:05

## 2024-05-08 RX ADMIN — PREGABALIN 150 MG: 75 CAPSULE ORAL at 11:05

## 2024-05-08 RX ADMIN — METOPROLOL TARTRATE 25 MG: 25 TABLET, FILM COATED ORAL at 11:05

## 2024-05-08 RX ADMIN — DIPHENHYDRAMINE HYDROCHLORIDE 25 MG: 25 CAPSULE ORAL at 08:05

## 2024-05-08 RX ADMIN — MULTIVITAMIN TABLET 1 TABLET: TABLET at 11:05

## 2024-05-08 RX ADMIN — VALSARTAN 40 MG: 40 TABLET, FILM COATED ORAL at 09:05

## 2024-05-08 RX ADMIN — ENOXAPARIN SODIUM 60 MG: 60 INJECTION SUBCUTANEOUS at 08:05

## 2024-05-08 NOTE — ASSESSMENT & PLAN NOTE
Cardiology consulted  Troponin peaked at 1.567 then trended down  On therapeutic Lovenox.    Plan for angiogram 5/9

## 2024-05-08 NOTE — SUBJECTIVE & OBJECTIVE
Interval History: Patient seen and examined.  NAD.  Complains of intermittent chest pain/tightness.  Plan for angiogram tomorrow morning.     Review of Systems   Constitutional:  Positive for fatigue.   Respiratory:  Positive for chest tightness and shortness of breath.    Cardiovascular:  Positive for chest pain. Negative for palpitations.   Gastrointestinal:  Negative for abdominal distention, abdominal pain, nausea and vomiting.     Objective:     Vital Signs (Most Recent):  Temp: 97.6 °F (36.4 °C) (05/08/24 1543)  Pulse: 64 (05/08/24 1543)  Resp: 18 (05/08/24 1543)  BP: 117/68 (88) (05/08/24 1543)  SpO2: 97 % (05/08/24 1543) Vital Signs (24h Range):  Temp:  [97.5 °F (36.4 °C)-99 °F (37.2 °C)] 97.6 °F (36.4 °C)  Pulse:  [55-72] 64  Resp:  [15-18] 18  SpO2:  [94 %-100 %] 97 %  BP: (117-162)/(66-77) 117/68     Weight: 62.6 kg (138 lb)  Body mass index is 24.45 kg/m².    Intake/Output Summary (Last 24 hours) at 5/8/2024 1549  Last data filed at 5/8/2024 0142  Gross per 24 hour   Intake 360 ml   Output --   Net 360 ml         Physical Exam  Vitals and nursing note reviewed.   Constitutional:       General: She is not in acute distress.     Appearance: Normal appearance.   HENT:      Head: Normocephalic.      Mouth/Throat:      Mouth: Mucous membranes are dry.   Cardiovascular:      Rate and Rhythm: Normal rate and regular rhythm.      Heart sounds: Normal heart sounds.   Pulmonary:      Effort: Pulmonary effort is normal. No respiratory distress.      Breath sounds: Normal breath sounds.   Abdominal:      General: Bowel sounds are normal. There is no distension.      Palpations: Abdomen is soft.      Tenderness: There is no abdominal tenderness.   Musculoskeletal:         General: Normal range of motion.   Skin:     General: Skin is warm and dry.   Neurological:      General: No focal deficit present.      Mental Status: She is alert and oriented to person, place, and time.   Psychiatric:         Mood and Affect:  Mood normal.             Significant Labs: All pertinent labs within the past 24 hours have been reviewed.  Recent Lab Results  (Last 5 results in the past 24 hours)        05/08/24  1130   05/08/24  0825   05/08/24  0520   05/07/24 2129   05/07/24  1935        Albumin     3.7   3.7         ALP     55   60         ALT     7   8         Anion Gap     5   6         AST     16   14         Baso #     0.06           Basophil %     0.9           BILIRUBIN TOTAL     0.3  Comment: For infants and newborns, interpretation of results should be based  on gestational age, weight and in agreement with clinical  observations.    Premature Infant recommended reference ranges:  Up to 24 hours.............<8.0 mg/dL  Up to 48 hours............<12.0 mg/dL  3-5 days..................<15.0 mg/dL  6-29 days.................<15.0 mg/dL     0.3  Comment: For infants and newborns, interpretation of results should be based  on gestational age, weight and in agreement with clinical  observations.    Premature Infant recommended reference ranges:  Up to 24 hours.............<8.0 mg/dL  Up to 48 hours............<12.0 mg/dL  3-5 days..................<15.0 mg/dL  6-29 days.................<15.0 mg/dL           BUN     24   25         Calcium     9.2   9.4         Chloride     106   102         CO2     26   29         Creatinine     0.8   1.1         Differential Method     Automated           eGFR     >60.0   51.8         Eos #     0.1           Eos %     1.6           Glucose     99   112         Gran # (ANC)     3.3           Gran %     48.7           Hematocrit     37.5           Hemoglobin     11.6           Immature Grans (Abs)     0.03  Comment: Mild elevation in immature granulocytes is non specific and   can be seen in a variety of conditions including stress response,   acute inflammation, trauma and pregnancy. Correlation with other   laboratory and clinical findings is essential.             Immature Granulocytes     0.4            Lymph #     2.6           Lymph %     38.6           Magnesium      2.3   1.7         MCH     28.7           MCHC     30.9           MCV     93           Mono #     0.7           Mono %     9.8           MPV     11.3           nRBC     0           Platelet Count     158           POC Glucose 85   86       126       Potassium     5.2   4.1         PROTEIN TOTAL     6.0   6.1         RBC     4.04           RDW     12.7           Sodium     137   137         WBC     6.73                                  Significant Imaging: I have reviewed all pertinent imaging results/findings within the past 24 hours.

## 2024-05-08 NOTE — ASSESSMENT & PLAN NOTE
Patient's FSGs are controlled on current medication regimen.  Last A1c reviewed-   Lab Results   Component Value Date    HGBA1C 5.6 04/18/2024     Most recent fingerstick glucose reviewed-   Recent Labs   Lab 05/07/24  1626   POCTGLUCOSE 107       Current correctional scale  Low  Maintain anti-hyperglycemic dose as follows-   Antihyperglycemics (From admission, onward)    Start     Stop Route Frequency Ordered    05/06/24 1657  insulin aspart U-100 pen 0-5 Units         -- SubQ Before meals & nightly PRN 05/06/24 1603        Hold Oral hypoglycemics while patient is in the hospital.

## 2024-05-08 NOTE — ASSESSMENT & PLAN NOTE
Chronic condition noted. Stable    -Continued oxycodone 10-325mg four times daily PRN pain, robaxin, and lidocaine patch

## 2024-05-08 NOTE — ASSESSMENT & PLAN NOTE
Chronic, uncontrolled. Latest blood pressure and vitals reviewed-     Temp:  [97.5 °F (36.4 °C)-99 °F (37.2 °C)]   Pulse:  [55-72]   Resp:  [15-18]   BP: (117-162)/(66-77)   SpO2:  [94 %-100 %] .   Home meds for hypertension were reviewed and noted below.   Hypertension Medications               valsartan (DIOVAN) 40 MG tablet Take 1 tablet (40 mg total) by mouth once daily.            While in the hospital, will manage blood pressure as follows; Continue home antihypertensive regimen    Will utilize p.r.n. blood pressure medication only if patient's blood pressure greater than 180/110 and she develops symptoms such as worsening chest pain or shortness of breath.

## 2024-05-08 NOTE — PLAN OF CARE
Problem: Adult Inpatient Plan of Care  Goal: Plan of Care Review  Outcome: Progressing  Goal: Patient-Specific Goal (Individualized)  Outcome: Progressing  Goal: Absence of Hospital-Acquired Illness or Injury  Outcome: Progressing  Goal: Optimal Comfort and Wellbeing  Outcome: Progressing  Goal: Readiness for Transition of Care  Outcome: Progressing     Problem: Diabetes Comorbidity  Goal: Blood Glucose Level Within Targeted Range  Outcome: Progressing     Problem: Wound  Goal: Optimal Coping  Outcome: Progressing  Goal: Optimal Functional Ability  Outcome: Progressing  Goal: Absence of Infection Signs and Symptoms  Outcome: Progressing  Goal: Improved Oral Intake  Outcome: Progressing  Goal: Optimal Pain Control and Function  Outcome: Progressing  Goal: Skin Health and Integrity  Outcome: Progressing  Goal: Optimal Wound Healing  Outcome: Progressing     Problem: Fall Injury Risk  Goal: Absence of Fall and Fall-Related Injury  Outcome: Progressing

## 2024-05-08 NOTE — CONSULTS
[facility]  Department of Cardiology  Consult Note      PATIENT NAME: Pili Teixeira    MRN: [id number]  TODAY'S DATE: 05/08/2024  ADMIT DATE: 5/6/2024                          CONSULT REQUESTED BY: Kashif Jose MD    SUBJECTIVE     PRINCIPAL PROBLEM: Chest pain    Hospital Medicine History & Physical   Pili Teixeira is a 78 year old female with a  previous medical history of cervical spine stenosis and long-term use of opioid pain medication, COPD, CAD, GERD, HTN, HLD, TIA on plavix Monday, Wednesday, Friday, and DMII who presents for chest pain starting today. Patient reports she awoke this morning feeling generally unwell and noted she had a heaviness in the left side of her chest that radiated to her left shoulder. She reports that there were no other adverse symptoms such as nausea, vomiting, shortness of breath, or diaphoresis  that occurred at the same time nor did anything aggravate or alleviate the chest heaviness. Patient reports eating light breakfast and then the pain resolved after some time. She reports that during her physical therapy appointment for her chronic neck pain and the left sided chest heaviness returned and again radiated to her left shoulder. The physical therapist took her blood pressure and noted the systolic to be 175 and advised her to present to the emergency room. Initial ED workup showed a negative troponin, normal chest xray, stable anemia on CBC and unremarkable CMP. EKG was normal sinus rhythm. Patient to be admitted by hospital medicine for further evaluation and management.      REASON FOR CONSULT:  Chest pain       HPI:  Patient is a 78-year-old lady with known history of coronary artery status post cardiac catheterization and coronary angiography 6 months ago she also had a stress test about 6 months ago.  According to patient patient had arterial occlusion in 1 of the vessels and she is getting collateral circulation to that area.  She is known history  of cervical spinal stenosis and currently being treated by Neurology also has COPD gastroesophageal reflux disease, hypertension and hyperlipidemia had TIA in the past and was placed on Plavix on alternate days.  Patient presented with complaints of having chest discomfort intermittently.  And she is unsure if her chest pain has something to do with her spinal stenosis or if this is her coronary artery disease.    At the present time patient complaints of chest discomfort and is substernal mostly on the left side.  Denies any shortness a breath nausea vomiting or diaphoresis.            Review of patient's allergies indicates:   Allergen Reactions    Iodinated contrast media Anaphylaxis, Hives and Rash     (Intubated)    Codeine Other (See Comments)     Chest pain    Clindamycin Other (See Comments)     Difficulty swallowing after taking, feels a something sits in epigastric area        Past Medical History:   Diagnosis Date    Allergy     Anemia 2012    with thrombocytopenia; iron deficiency    Arthritis     Cervical spinal stenosis     with neuropathy, chronic neck,back,leg pain    Colon polyp     COPD (chronic obstructive pulmonary disease)     Coronary artery disease     COVID 4/27/2022    COVID-19     Diabetes mellitus     , sugars run 190's per patient    Diastolic dysfunction 7/13/2015    Diverticulitis     Diverticulosis     Hx diverticulitis,still has chronic diarrhea, abd pain    Dyspnea on exertion     sometimes with nausea, fatigue,dizziness, had cardiac cath June 2012, normal    Fibromyalgia     Fracture 2012    right thumb    GERD (gastroesophageal reflux disease)     Feb 2012, Hx H Pylori    Glaucoma     had LAser surgey, on no eye drops    HEARING LOSS     Hemangioma     fatty liver    History of earache     frequent    History of TIA (transient ischemic attack) 5/28/2013    Hyperlipidemia     Hypertension     Hypertension associated with diabetes 3/14/2017    Laryngeal polyp     Myocardial  infarction 2006 last    also MI in distant past    REJI (obstructive sleep apnea)     does not use CPAP    Otitis media     Pneumonia due to COVID-19 virus 2/20/2021    Renal stone     Sjogren's syndrome     SOB (shortness of breath) 6/8/2012    [address] [place] [address] 1. Normal coronary arteries. 2. Normal single renal arteries bilaterally. 3. Diastolic dysfunction.     Stroke     TIA, now on Plavix    TIA (transient ischemic attack)     TMJ disorder     Type II or unspecified type diabetes mellitus without mention of complication, uncontrolled 5/8/2014    UTI (lower urinary tract infection)     frequent    Venous insufficiency     with Hx blood clot in leg     Past Surgical History:   Procedure Laterality Date    ABDOMINAL SURGERY  2010 x2    expoloratory lap for pelvic cyst,adhesions; partial colonectomy     adhesiolysis   10/11/2012    CARDIAC CATHETERIZATION      no current blockages.    CHOLECYSTECTOMY      COLON SURGERY      r/t diverticuli    COLONOSCOPY  08/2014    repeat in 5 years    COLONOSCOPY N/A 1/7/2020    Procedure: COLONOSCOPY;  Surgeon: Kb Nguyen MD;  Location: [facility];  Service: Endoscopy;  Laterality: N/A;    COLONOSCOPY N/A 3/13/2023    Procedure: COLONOSCOPY;  Surgeon: Jarrod Frost MD;  Location: [facility];  Service: Endoscopy;  Laterality: N/A;    ENDOSCOPIC ULTRASOUND OF UPPER GASTROINTESTINAL TRACT N/A 1/13/2021    Procedure: ULTRASOUND, UPPER GI TRACT, ENDOSCOPIC;  Surgeon: Sonido Castellano III, MD;  Location: [facility];  Service: Endoscopy;  Laterality: N/A;    EPIDURAL BLOCK INJECTION  5/15/12    back,neck for pain    ESOPHAGOGASTRODUODENOSCOPY N/A 1/7/2020    Procedure: EGD (ESOPHAGOGASTRODUODENOSCOPY);  Surgeon: Kb Nguyen MD;  Location: [facility];  Service: Endoscopy;  Laterality: N/A;    ESOPHAGOGASTRODUODENOSCOPY N/A 1/13/2021    Procedure: EGD (ESOPHAGOGASTRODUODENOSCOPY);  Surgeon: Sonido Castellano III, MD;  Location: [facility];  Service: Endoscopy;  Laterality:  N/A;    ESOPHAGOGASTRODUODENOSCOPY N/A 3/13/2023    Procedure: EGD (ESOPHAGOGASTRODUODENOSCOPY);  Surgeon: Jarrod Frost MD;  Location: [facility] ENDO;  Service: Endoscopy;  Laterality: N/A;    ESOPHAGOGASTRODUODENOSCOPY N/A 5/5/2023    Procedure: EGD (ESOPHAGOGASTRODUODENOSCOPY);  Surgeon: Noel Caputo MD;  Location: [facility] ENDO;  Service: Endoscopy;  Laterality: N/A;    EXPLORATORY LAPAROTOMY W/ BOWEL RESECTION  10/11/2012    EYE SURGERY      Laser for glaucoma    EYE SURGERY  May 2012    cataracts    HYSTERECTOMY      INTRALUMINAL GASTROINTESTINAL TRACT IMAGING VIA CAPSULE N/A 5/23/2023    Procedure: IMAGING PROCEDURE, GI TRACT, INTRALUMINAL, VIA CAPSULE;  Surgeon: Noel Caputo MD;  Location: [facility];  Service: Endoscopy;  Laterality: N/A;    LARYNX SURGERY      polypectomy    OOPHORECTOMY      TONSILLECTOMY      with adenoidectomy    UPPER GASTROINTESTINAL ENDOSCOPY  12/2015    VASCULAR SURGERY      laser to varicose vein leg     Social History     Tobacco Use    Smoking status: Never    Smokeless tobacco: Never   Substance Use Topics    Alcohol use: No    Drug use: Yes     Types: Oxycodone        REVIEW OF SYSTEMS  CONSTITUTIONAL: Negative for chills, fatigue and fever.   EYES: No double vision, No blurred vision  NEURO: No headaches, No dizziness, cervical stenosis.  RESPIRATORY: Negative for cough, shortness of breath and wheezing.    CARDIOVASCULAR:  Positive for chest pain. Negative for palpitations and leg swelling.   GI: Negative for abdominal pain, No melena, diarrhea, nausea and vomiting.   : Negative for dysuria and frequency, Negative for hematuria  SKIN: Negative for bruising, Negative for edema or discoloration noted.   ENDOCRINE: Negative for polyphagia, Negative for heat intolerance, Negative for cold intolerance  PSYCHIATRIC: Negative for depression, Negative for anxiety, Negative for memory loss  MUSCULOSKELETAL: Negative for neck pain, Negative for muscle weakness, Negative for  back pain     OBJECTIVE     VITAL SIGNS (Most Recent)  Temp: 98.1 °F (36.7 °C) (05/08/24 1127)  Pulse: 61 (05/08/24 1127)  Resp: 18 (05/08/24 1127)  BP: (!) 162/72 (05/08/24 1127)  SpO2: 100 % (05/08/24 1438)    VENTILATION STATUS  Resp: 18 (05/08/24 1127)  SpO2: 100 % (05/08/24 1438)           I & O (Last 24H):  Intake/Output Summary (Last 24 hours) at 5/8/2024 1534  Last data filed at 5/8/2024 0142  Gross per 24 hour   Intake 360 ml   Output --   Net 360 ml       WEIGHTS  Wt Readings from Last 1 Encounters:   05/07/24 0857 62.6 kg (138 lb)   05/06/24 2100 62.8 kg (138 lb 7.2 oz)   05/06/24 1326 60.3 kg (133 lb)       PHYSICAL EXAM  GENERAL:  Adequately nourished and in no apparent distress alert and oriented.   HEENT: Normocephalic. Pupils normal and conjunctivae normal.  .   NECK: No JVD. No bruit..   THYROID: Thyroid not evaluated..   CARDIAC: Regular rate and rhythm. S1 is normal.S2 is normal.  Systolic murmur audible   CHEST ANATOMY: normal.   LUNGS: Clear to auscultation. No wheezing or rhonchi..   ABDOMEN: Soft    URINARY: No toribio catheter   EXTREMITIES: No cyanosis, clubbing or edema noted at this time., no calf tenderness bilaterally.   PERIPHERAL VASCULAR SYSTEM: Good palpable distal pulses.   CENTRAL NERVOUS SYSTEM:  Not evaluated   SKIN: Skin without lesions, moist, well perfused.   MUSCLE STRENGTH & TONE:  As per   Neurology/hospitalist  HOME MEDICATIONS:  No current facility-administered medications on file prior to encounter.     Current Outpatient Medications on File Prior to Encounter   Medication Sig Dispense Refill    metFORMIN (GLUCOPHAGE) 500 MG tablet TAKE 1 TABLET BY MOUTH TWICE DAILY WITH MEALS (Patient taking differently: Take 500 mg by mouth 2 (two) times daily with meals.) 180 tablet 0    ACCU-CHEK GUIDE TEST STRIPS Strp USE 1 STRIP TO TEST BLOOD GLUCOSE ONCE DAILY AS DIRECTED 100 each 3    albuterol (PROVENTIL/VENTOLIN HFA) 90 mcg/actuation inhaler Inhale 2 puffs into the lungs every 4  (four) hours as needed.      azithromycin (Z-WILSON) 250 MG tablet Take 250 mg by mouth once daily.      blood-glucose meter kit To check BG 2 times daily, to use with insurance preferred meter. Medical necessity. 1 each 0    carboxymethylcellulose sodium (LUBRICANT EYE DROPS OPHT) Place 1 drop into both eyes daily as needed (dry eyes).      cetirizine (ZYRTEC) 5 MG tablet Take 1 tablet (5 mg total) by mouth once daily. for 5 days 5 tablet 0    clopidogreL (PLAVIX) 75 mg tablet Take 1 tablet (75 mg total) by mouth every Mon, Wed, Fri. 90 tablet 4    cyanocobalamin (VITAMIN B-12) 1000 MCG tablet Take 100 mcg by mouth once daily.      diclofenac sodium 1 % Gel Apply 2 g topically once daily.      docusate sodium (STOOL SOFTENER ORAL) Take 1 capsule by mouth every evening.      famotidine (PEPCID) 40 MG tablet TAKE 1 TABLET BY MOUTH ONCE DAILY IN THE EVENING 90 tablet 3    fluticasone propionate (FLONASE) 50 mcg/actuation nasal spray USE 2 SPRAYS IN EACH NOSTRIL ONE TIME PER DAY (Patient taking differently: 2 sprays by Each Nostril route Daily. USE 2 SPRAYS IN EACH NOSTRIL ONE TIME PER DAY) 18.2 mL 5    folic acid (FOLVITE) 1 MG tablet Take 1 tablet by mouth once daily 90 tablet 1    ipratropium (ATROVENT) 42 mcg (0.06 %) nasal spray 2 sprays by Each Nostril route 3 (three) times daily. 15 mL 3    Lactobac no.41/Bifidobact no.7 (PROBIOTIC-10 ORAL) Take 1 capsule by mouth Daily.      lancets (ACCU-CHEK SOFTCLIX LANCETS) Misc Test TWICE DAILY;Twice a day 100 each 3    magnesium oxide (MAG-OX) 400 mg (241.3 mg magnesium) tablet Take 1 tablet (400 mg total) by mouth once daily. 90 tablet 3    methylPREDNISolone (MEDROL DOSEPACK) 4 mg tablet Take 4 mg by mouth once daily.      multivit with min-folic acid 200 mcg Chew Take 1 tablet by mouth once daily.       NARCAN 4 mg/actuation Spry as directed      olopatadine (PATANOL) 0.1 % ophthalmic solution Place 1 drop into both eyes daily as needed for Allergies.      ondansetron  (ZOFRAN-ODT) 4 MG TbDL Take 1 tablet (4 mg total) by mouth every 8 (eight) hours as needed (nausea). 30 tablet 1    oxyCODONE-acetaminophen (PERCOCET) 7.5-325 mg per tablet Take 1 tablet by mouth 4 (four) times daily as needed for Pain.      pantoprazole (PROTONIX) 40 MG tablet Take 1 tablet by mouth once daily 90 tablet 2    pregabalin (LYRICA) 150 MG capsule Take 1 capsule (150 mg total) by mouth 2 (two) times daily. 60 capsule 2    rosuvastatin (CRESTOR) 10 MG tablet Take 1 tablet (10 mg total) by mouth every evening. 90 tablet 1    traZODone (DESYREL) 50 MG tablet TAKE 1 TABLET BY MOUTH IN THE EVENING (MAY  TAKE  AN  ADDITIONAL  TABLET  AS  NEEDED) (Patient taking differently: Take 50 mg by mouth every evening. TAKE 1 TABLET BY MOUTH IN THE EVENING (MAY  TAKE  AN  ADDITIONAL  TABLET  AS  NEEDED)) 90 tablet 3    valsartan (DIOVAN) 40 MG tablet Take 1 tablet (40 mg total) by mouth once daily. 90 tablet 3       SCHEDULED MEDS:   aspirin  325 mg Oral Daily    atorvastatin  40 mg Oral QHS    clopidogreL  75 mg Oral Daily    cyanocobalamin  1,000 mcg Oral Daily    diphenhydrAMINE  25 mg Oral Once    [START ON 5/9/2024] diphenhydrAMINE  25 mg Oral Once    docusate sodium  100 mg Oral QHS    enoxparin  1 mg/kg Subcutaneous Q12H (prophylaxis, 0900/2100)    lactobacillus acidophilus & bulgar  1 packet Oral Daily    LIDOcaine  1 patch Transdermal Q24H    magnesium oxide  400 mg Oral Daily    methocarbamoL  750 mg Oral TID    metoprolol tartrate  25 mg Oral BID    multivitamin  1 tablet Oral Daily    ondansetron  4 mg Oral Once    pantoprazole  40 mg Oral Daily    predniSONE  50 mg Oral Once    [START ON 5/9/2024] predniSONE  50 mg Oral Once    predniSONE  50 mg Oral Once    pregabalin  150 mg Oral BID    regadenoson  0.4 mg Intravenous Once    traZODone  50 mg Oral QHS    valsartan  40 mg Oral QHS       CONTINUOUS INFUSIONS:    PRN MEDS:  Current Facility-Administered Medications:     acetaminophen, 650 mg, Oral, Q4H PRN     albuterol-ipratropium, 3 mL, Nebulization, Q6H PRN    aluminum-magnesium hydroxide-simethicone, 30 mL, Oral, QID PRN    artificial tears, 2 drop, Both Eyes, TID PRN    dextrose 10%, 12.5 g, Intravenous, PRN    dextrose 10%, 25 g, Intravenous, PRN    glucagon (human recombinant), 1 mg, Intramuscular, PRN    glucose, 16 g, Oral, PRN    glucose, 24 g, Oral, PRN    insulin aspart U-100, 0-5 Units, Subcutaneous, QID (AC + HS) PRN    magnesium oxide, 800 mg, Oral, PRN    magnesium oxide, 800 mg, Oral, PRN    melatonin, 6 mg, Oral, Nightly PRN    naloxone, 0.02 mg, Intravenous, PRN    ondansetron, 4 mg, Intravenous, Q8H PRN    oxyCODONE-acetaminophen, 1 tablet, Oral, Q6H PRN    potassium bicarbonate, 35 mEq, Oral, PRN    potassium bicarbonate, 50 mEq, Oral, PRN    potassium bicarbonate, 60 mEq, Oral, PRN    simethicone, 1 tablet, Oral, QID PRN    sodium chloride 0.9%, 10 mL, Intravenous, Q12H PRN    LABS AND DIAGNOSTICS     CBC LAST 3 DAYS  Recent Labs   Lab 05/06/24  1346 05/07/24  0150 05/08/24  0520   WBC 7.39 6.55 6.73   RBC 3.89* 3.77* 4.04   HGB 11.4* 11.0* 11.6*   HCT 36.1* 34.5* 37.5   MCV 93 92 93   MCH 29.3 29.2 28.7   MCHC 31.6* 31.9* 30.9*   RDW 12.6 12.5 12.7   * 125* 158   MPV 12.8 12.5 11.3   GRAN 44.8  3.3 44.6  2.9 48.7  3.3   LYMPH 43.2  3.2 43.1  2.8 38.6  2.6   MONO 9.6  0.7 9.8  0.6 9.8  0.7   BASO 0.04 0.03 0.06   NRBC 0 0 0       COAGULATION LAST 3 DAYS  Recent Labs   Lab 05/06/24  2302   INR 1.0   APTT 36.6*       CHEMISTRY LAST 3 DAYS  Recent Labs   Lab 05/06/24  1722 05/07/24  0150 05/07/24 2129 05/08/24  0520   NA  --  141 137 137   K  --  3.7 4.1 5.2*   CL  --  107 102 106   CO2  --  25 29 26   ANIONGAP  --  9 6* 5*   BUN  --  19 25* 24*   CREATININE  --  0.8 1.1 0.8   GLU  --  80 112* 99   CALCIUM  --  9.7 9.4 9.2   MG 1.2*  --  1.7 2.3   ALBUMIN  --  3.4* 3.7 3.7   PROT  --  6.0 6.1 6.0   ALKPHOS  --  68 60 55   ALT  --  10 8* 7*   AST  --  17 14 16   BILITOT  --  0.2 0.3 0.3  "      CARDIAC PROFILE LAST 3 DAYS  Recent Labs   Lab 05/06/24  2153 05/07/24  0150 05/07/24  0919   TROPONINI 1.567* 1.385* 0.958*       ENDOCRINE LAST 3 DAYS  Recent Labs   Lab 05/06/24  1722   TSH 2.458       LAST ARTERIAL BLOOD GAS  ABG  No results for input(s): "PH", "PO2", "PCO2", "HCO3", "BE" in the last 168 hours.    LAST 7 DAYS MICROBIOLOGY   Microbiology Results (last 7 days)       ** No results found for the last 168 hours. **            MOST RECENT IMAGING  NM Myocardial Perfusion Spect Single  Narrative: EXAMINATION:  NM MYOCARDIAL PERFUSION SPECT SINGLE    CLINICAL HISTORY:  Chest pain/anginal equiv, high CAD risk, not treadmill candidate;    TECHNIQUE:  The patient was scheduled for of myocardial perfusion scan and received an injection of the 11.7 mCi technetium 99 M tetrofosmin.  The study was then canceled by Dr. Casas prior to performance of the rest imaging..  SPECT stress imaging was not performed.  Impression: This myocardial perfusion scan was canceled by Dr. Casas between the injection of 11.5 mCi technetium 99 M tetrofosmin and the study.  No images were obtained    Electronically signed by: Sanford Vidales MD  Date:    05/07/2024  Time:    13:28  Echo Saline Bubble? No    Left Ventricle: The left ventricle is normal in size. Normal wall   thickness. There is low normal systolic function with a visually estimated   ejection fraction of 50 - 55%. Grade I diastolic dysfunction.    Right Ventricle: Normal right ventricular cavity size. Wall thickness   is normal. Systolic function is normal.    Pulmonary Artery: The estimated pulmonary artery systolic pressure is   31 mmHg.    IVC/SVC: Normal venous pressure at 3 mmHg.      ECHOCARDIOGRAM RESULTS (last 5)  Results for orders placed during the hospital encounter of 03/08/23    Echo    Interpretation Summary  · The left ventricle is normal in size with normal systolic function. The estimated ejection fraction is 60%.  · Normal right " ventricular size with normal right ventricular systolic function.  · Grade I left ventricular diastolic dysfunction.  · Normal central venous pressure (3 mmHg).      Results for orders placed in visit on 01/15/20    Echo Color Flow Doppler? Yes    Interpretation Summary  · Concentric left ventricular remodeling.  · Normal left ventricular systolic function. The estimated ejection fraction is 55%.  · Grade I (mild) left ventricular diastolic dysfunction consistent with impaired relaxation.  · The estimated PA systolic pressure is 40 mmHg.  · No wall motion abnormalities.  · Normal right ventricular systolic function.  · Normal central venous pressure (3 mmHg).  · Mild tricuspid regurgitation.  · Mild mitral regurgitation.      CURRENT/PREVIOUS VISIT EKG  Results for orders placed or performed during the hospital encounter of 12/05/23   EKG 12-lead    Collection Time: 12/05/23  6:15 PM    Narrative    Test Reason : R10.9,    Vent. Rate : 071 BPM     Atrial Rate : 071 BPM     P-R Int : 142 ms          QRS Dur : 064 ms      QT Int : 378 ms       P-R-T Axes : 079 040 066 degrees     QTc Int : 410 ms    Normal sinus rhythm  Possible Left atrial enlargement  Septal infarct (cited on or before 03-MAY-2023)  Abnormal ECG  When compared with ECG of 03-MAY-2023 13:59,  No significant change was found  Confirmed by Franklin MORA, Nato MATTHEW ([id number]) on 12/14/2023 11:26:53 PM    Referred By: [email or web address]   SELF           Confirmed By:Nato Reid MD           ASSESSMENT/PLAN:     Active Hospital Problems    Diagnosis    *Chest pain    Coronary artery disease involving native coronary artery of native heart without angina pectoris    Type 2 diabetes mellitus, without long-term current use of insulin    Chronic pain, neck, back, leg on home narcotics    Hypertension associated with diabetes    COPD (chronic obstructive pulmonary disease)    GERD (gastroesophageal reflux disease)       ASSESSMENT & PLAN:   1. Chest pain with a abnormal  troponin  2. CAD in native artery   3. Chronic neck and back pain/spinal stenosis   4. Type 2 diabetes mellitus  5. Essential hypertension diabetes  6. COPD  7. Hypomagnesemia  8. Gastroesophageal reflux disease        RECOMMENDATIONS:  Patient has been having intermittent chest pain with elevated troponin.    Continue aspirin, plavix and statin     Discussed recommendation for cardiac catheterization. Discussed with patient the risks and benefits of the procedure including, but not limited to, the following 1:1000 risk of Heart attack, stroke and death with 3-5% Risk of bleeding, vessel damage, and the need for emergent CABG Surgery. All questions have been answered to patient's satisfaction. Informed consent obtained. Will keep her nothing by mouth post midnight and proceed with the coronary angiography possible PCI in the morning.    Potassium 5.2/ Magnesium 2.3. Continue to check and replace potassium and magnesium. Goal for potassium is 4.0, and goal for magnesium is 2.0.     Echocardiogram showed Efx 50-55% and grade I diastolic dysfunction    Premedicate for prior history of  iodine allergy:  Give Prednisone 50 mg 13 hrs prior to procedure, 7 hrs prior to procedure and 1 hour prior to procedure.  Give 25 mg benadryl PO tonight and in AM 1 hr prior to procedure    Glenys Gallagher NP  Date of Service: 05/08/2024  9:21 AM

## 2024-05-08 NOTE — CARE UPDATE
NOCTURNIST NOTE      Notified by RN that patient is here from [facility] aware    Having more V tach runs      Mag was 1.2 earlier and got 2 grams Mag    I see a K was 3.7 earlier in am             PLAN      - give 4 grams IV MAG more now     - give NS with 20 KCL 1 liter over 4 hours for volume and more KCL     - give extra oral KCL also now once.    20 meq      Repeat labs in am       Keep mag over 2 and K over 4

## 2024-05-08 NOTE — NURSING
Nurses Note -- 4 Eyes      5/7/2024   7:07 PM      Skin assessed during: Admit      [] No Altered Skin Integrity Present    []Prevention Measures Documented      [x] Yes- Altered Skin Integrity Present or Discovered   [x] LDA Added if Not in Epic (Describe Wound)   [x] New Altered Skin Integrity was Present on Admit and Documented in LDA   [x] Wound Image Taken    Wound Care Consulted? No    Attending Nurse:  Lissa Weaver RN/Staff Member:  [id number]

## 2024-05-09 VITALS
BODY MASS INDEX: 24.45 KG/M2 | TEMPERATURE: 99 F | WEIGHT: 138 LBS | SYSTOLIC BLOOD PRESSURE: 179 MMHG | OXYGEN SATURATION: 98 % | DIASTOLIC BLOOD PRESSURE: 75 MMHG | HEIGHT: 63 IN | RESPIRATION RATE: 12 BRPM | HEART RATE: 64 BPM

## 2024-05-09 LAB
ALBUMIN SERPL BCP-MCNC: 4.1 G/DL (ref 3.5–5.2)
ALP SERPL-CCNC: 60 U/L (ref 55–135)
ALT SERPL W/O P-5'-P-CCNC: 10 U/L (ref 10–44)
ANION GAP SERPL CALC-SCNC: 5 MMOL/L (ref 8–16)
APTT PPP: 29.3 SEC (ref 21–32)
AST SERPL-CCNC: 18 U/L (ref 10–40)
BASOPHILS # BLD AUTO: 0.01 K/UL (ref 0–0.2)
BASOPHILS NFR BLD: 0.2 % (ref 0–1.9)
BILIRUB SERPL-MCNC: 0.4 MG/DL (ref 0.1–1)
BUN SERPL-MCNC: 23 MG/DL (ref 8–23)
CALCIUM SERPL-MCNC: 9.9 MG/DL (ref 8.7–10.5)
CHLORIDE SERPL-SCNC: 106 MMOL/L (ref 95–110)
CO2 SERPL-SCNC: 26 MMOL/L (ref 23–29)
CREAT SERPL-MCNC: 0.8 MG/DL (ref 0.5–1.4)
DIFFERENTIAL METHOD BLD: ABNORMAL
EOSINOPHIL # BLD AUTO: 0 K/UL (ref 0–0.5)
EOSINOPHIL NFR BLD: 0 % (ref 0–8)
ERYTHROCYTE [DISTWIDTH] IN BLOOD BY AUTOMATED COUNT: 12.8 % (ref 11.5–14.5)
EST. GFR  (NO RACE VARIABLE): >60 ML/MIN/1.73 M^2
GLUCOSE SERPL-MCNC: 138 MG/DL (ref 70–110)
GLUCOSE SERPL-MCNC: 150 MG/DL (ref 70–110)
GLUCOSE SERPL-MCNC: 163 MG/DL (ref 70–110)
HCT VFR BLD AUTO: 39 % (ref 37–48.5)
HGB BLD-MCNC: 12.8 G/DL (ref 12–16)
IMM GRANULOCYTES # BLD AUTO: 0.03 K/UL (ref 0–0.04)
IMM GRANULOCYTES NFR BLD AUTO: 0.5 % (ref 0–0.5)
INR PPP: 0.9 (ref 0.8–1.2)
LYMPHOCYTES # BLD AUTO: 1.1 K/UL (ref 1–4.8)
LYMPHOCYTES NFR BLD: 18.1 % (ref 18–48)
MAGNESIUM SERPL-MCNC: 1.5 MG/DL (ref 1.6–2.6)
MCH RBC QN AUTO: 29.6 PG (ref 27–31)
MCHC RBC AUTO-ENTMCNC: 32.8 G/DL (ref 32–36)
MCV RBC AUTO: 90 FL (ref 82–98)
MONOCYTES # BLD AUTO: 0.1 K/UL (ref 0.3–1)
MONOCYTES NFR BLD: 1.9 % (ref 4–15)
NEUTROPHILS # BLD AUTO: 4.9 K/UL (ref 1.8–7.7)
NEUTROPHILS NFR BLD: 79.3 % (ref 38–73)
NRBC BLD-RTO: 0 /100 WBC
PLATELET # BLD AUTO: 167 K/UL (ref 150–450)
PMV BLD AUTO: 11.1 FL (ref 9.2–12.9)
POTASSIUM SERPL-SCNC: 4.1 MMOL/L (ref 3.5–5.1)
PROT SERPL-MCNC: 6.8 G/DL (ref 6–8.4)
PROTHROMBIN TIME: 10.4 SEC (ref 9–12.5)
RBC # BLD AUTO: 4.33 M/UL (ref 4–5.4)
SODIUM SERPL-SCNC: 137 MMOL/L (ref 136–145)
WBC # BLD AUTO: 6.23 K/UL (ref 3.9–12.7)

## 2024-05-09 PROCEDURE — C1769 GUIDE WIRE: HCPCS | Performed by: STUDENT IN AN ORGANIZED HEALTH CARE EDUCATION/TRAINING PROGRAM

## 2024-05-09 PROCEDURE — 99152 MOD SED SAME PHYS/QHP 5/>YRS: CPT | Mod: ,,, | Performed by: STUDENT IN AN ORGANIZED HEALTH CARE EDUCATION/TRAINING PROGRAM

## 2024-05-09 PROCEDURE — 99153 MOD SED SAME PHYS/QHP EA: CPT | Performed by: STUDENT IN AN ORGANIZED HEALTH CARE EDUCATION/TRAINING PROGRAM

## 2024-05-09 PROCEDURE — 93458 L HRT ARTERY/VENTRICLE ANGIO: CPT | Performed by: STUDENT IN AN ORGANIZED HEALTH CARE EDUCATION/TRAINING PROGRAM

## 2024-05-09 PROCEDURE — 85610 PROTHROMBIN TIME: CPT | Performed by: NURSE PRACTITIONER

## 2024-05-09 PROCEDURE — 85730 THROMBOPLASTIN TIME PARTIAL: CPT | Performed by: NURSE PRACTITIONER

## 2024-05-09 PROCEDURE — 83735 ASSAY OF MAGNESIUM: CPT

## 2024-05-09 PROCEDURE — 4A023N7 MEASUREMENT OF CARDIAC SAMPLING AND PRESSURE, LEFT HEART, PERCUTANEOUS APPROACH: ICD-10-PCS | Performed by: STUDENT IN AN ORGANIZED HEALTH CARE EDUCATION/TRAINING PROGRAM

## 2024-05-09 PROCEDURE — 94761 N-INVAS EAR/PLS OXIMETRY MLT: CPT

## 2024-05-09 PROCEDURE — 25000003 PHARM REV CODE 250: Performed by: INTERNAL MEDICINE

## 2024-05-09 PROCEDURE — 25000003 PHARM REV CODE 250: Performed by: STUDENT IN AN ORGANIZED HEALTH CARE EDUCATION/TRAINING PROGRAM

## 2024-05-09 PROCEDURE — 93458 L HRT ARTERY/VENTRICLE ANGIO: CPT | Mod: 26,,, | Performed by: STUDENT IN AN ORGANIZED HEALTH CARE EDUCATION/TRAINING PROGRAM

## 2024-05-09 PROCEDURE — C1887 CATHETER, GUIDING: HCPCS | Performed by: STUDENT IN AN ORGANIZED HEALTH CARE EDUCATION/TRAINING PROGRAM

## 2024-05-09 PROCEDURE — 25000003 PHARM REV CODE 250

## 2024-05-09 PROCEDURE — 63600175 PHARM REV CODE 636 W HCPCS

## 2024-05-09 PROCEDURE — B211YZZ FLUOROSCOPY OF MULTIPLE CORONARY ARTERIES USING OTHER CONTRAST: ICD-10-PCS | Performed by: STUDENT IN AN ORGANIZED HEALTH CARE EDUCATION/TRAINING PROGRAM

## 2024-05-09 PROCEDURE — 99152 MOD SED SAME PHYS/QHP 5/>YRS: CPT | Performed by: STUDENT IN AN ORGANIZED HEALTH CARE EDUCATION/TRAINING PROGRAM

## 2024-05-09 PROCEDURE — C1894 INTRO/SHEATH, NON-LASER: HCPCS | Performed by: STUDENT IN AN ORGANIZED HEALTH CARE EDUCATION/TRAINING PROGRAM

## 2024-05-09 PROCEDURE — 63600175 PHARM REV CODE 636 W HCPCS: Performed by: STUDENT IN AN ORGANIZED HEALTH CARE EDUCATION/TRAINING PROGRAM

## 2024-05-09 PROCEDURE — 85025 COMPLETE CBC W/AUTO DIFF WBC: CPT

## 2024-05-09 PROCEDURE — 80053 COMPREHEN METABOLIC PANEL: CPT

## 2024-05-09 PROCEDURE — 36415 COLL VENOUS BLD VENIPUNCTURE: CPT

## 2024-05-09 PROCEDURE — 25000003 PHARM REV CODE 250: Performed by: NURSE PRACTITIONER

## 2024-05-09 PROCEDURE — 99900035 HC TECH TIME PER 15 MIN (STAT)

## 2024-05-09 PROCEDURE — 63600175 PHARM REV CODE 636 W HCPCS: Performed by: NURSE PRACTITIONER

## 2024-05-09 PROCEDURE — 25500020 PHARM REV CODE 255: Performed by: STUDENT IN AN ORGANIZED HEALTH CARE EDUCATION/TRAINING PROGRAM

## 2024-05-09 PROCEDURE — 36415 COLL VENOUS BLD VENIPUNCTURE: CPT | Performed by: NURSE PRACTITIONER

## 2024-05-09 RX ORDER — SODIUM CHLORIDE 0.9 % (FLUSH) 0.9 %
2 SYRINGE (ML) INJECTION
Status: DISCONTINUED | OUTPATIENT
Start: 2024-05-09 | End: 2024-05-09 | Stop reason: HOSPADM

## 2024-05-09 RX ORDER — HEPARIN SODIUM 10000 [USP'U]/ML
INJECTION, SOLUTION INTRAVENOUS; SUBCUTANEOUS
Status: DISCONTINUED | OUTPATIENT
Start: 2024-05-09 | End: 2024-05-09 | Stop reason: HOSPADM

## 2024-05-09 RX ORDER — VERAPAMIL HYDROCHLORIDE 2.5 MG/ML
INJECTION, SOLUTION INTRAVENOUS
Status: DISCONTINUED | OUTPATIENT
Start: 2024-05-09 | End: 2024-05-09 | Stop reason: HOSPADM

## 2024-05-09 RX ORDER — MIDAZOLAM HYDROCHLORIDE 1 MG/ML
INJECTION INTRAMUSCULAR; INTRAVENOUS
Status: DISCONTINUED | OUTPATIENT
Start: 2024-05-09 | End: 2024-05-09 | Stop reason: HOSPADM

## 2024-05-09 RX ORDER — NITROGLYCERIN 5 MG/ML
INJECTION, SOLUTION INTRAVENOUS
Status: DISCONTINUED | OUTPATIENT
Start: 2024-05-09 | End: 2024-05-09 | Stop reason: HOSPADM

## 2024-05-09 RX ORDER — IODIXANOL 320 MG/ML
INJECTION, SOLUTION INTRAVASCULAR
Status: DISCONTINUED | OUTPATIENT
Start: 2024-05-09 | End: 2024-05-09 | Stop reason: HOSPADM

## 2024-05-09 RX ORDER — SODIUM CHLORIDE 9 MG/ML
INJECTION, SOLUTION INTRAVENOUS
Status: DISCONTINUED | OUTPATIENT
Start: 2024-05-09 | End: 2024-05-09 | Stop reason: HOSPADM

## 2024-05-09 RX ORDER — DIPHENHYDRAMINE HCL 25 MG
50 CAPSULE ORAL
Status: DISCONTINUED | OUTPATIENT
Start: 2024-05-09 | End: 2024-05-09 | Stop reason: HOSPADM

## 2024-05-09 RX ORDER — METHOCARBAMOL 750 MG/1
750 TABLET, FILM COATED ORAL 3 TIMES DAILY PRN
Qty: 15 TABLET | Refills: 0 | Status: SHIPPED | OUTPATIENT
Start: 2024-05-09

## 2024-05-09 RX ORDER — FENTANYL CITRATE 50 UG/ML
INJECTION, SOLUTION INTRAMUSCULAR; INTRAVENOUS
Status: DISCONTINUED | OUTPATIENT
Start: 2024-05-09 | End: 2024-05-09 | Stop reason: HOSPADM

## 2024-05-09 RX ORDER — MAGNESIUM SULFATE HEPTAHYDRATE 40 MG/ML
2 INJECTION, SOLUTION INTRAVENOUS ONCE
Status: COMPLETED | OUTPATIENT
Start: 2024-05-09 | End: 2024-05-09

## 2024-05-09 RX ORDER — METOPROLOL TARTRATE 25 MG/1
25 TABLET, FILM COATED ORAL 2 TIMES DAILY
Qty: 60 TABLET | Refills: 0 | Status: SHIPPED | OUTPATIENT
Start: 2024-05-09 | End: 2024-05-14 | Stop reason: SDUPTHER

## 2024-05-09 RX ORDER — SODIUM CHLORIDE 9 MG/ML
INJECTION, SOLUTION INTRAVENOUS CONTINUOUS
Status: DISCONTINUED | OUTPATIENT
Start: 2024-05-09 | End: 2024-05-09 | Stop reason: HOSPADM

## 2024-05-09 RX ORDER — LIDOCAINE HYDROCHLORIDE 10 MG/ML
INJECTION INFILTRATION; PERINEURAL
Status: DISCONTINUED | OUTPATIENT
Start: 2024-05-09 | End: 2024-05-09 | Stop reason: HOSPADM

## 2024-05-09 RX ADMIN — Medication 400 MG: at 10:05

## 2024-05-09 RX ADMIN — OXYCODONE HYDROCHLORIDE AND ACETAMINOPHEN 1 TABLET: 10; 325 TABLET ORAL at 06:05

## 2024-05-09 RX ADMIN — CYANOCOBALAMIN TAB 1000 MCG 1000 MCG: 1000 TAB at 10:05

## 2024-05-09 RX ADMIN — LIDOCAINE 5% 1 PATCH: 700 PATCH TOPICAL at 04:05

## 2024-05-09 RX ADMIN — ASPIRIN 325 MG ORAL TABLET 325 MG: 325 PILL ORAL at 10:05

## 2024-05-09 RX ADMIN — CLOPIDOGREL BISULFATE 75 MG: 75 TABLET, FILM COATED ORAL at 10:05

## 2024-05-09 RX ADMIN — MULTIVITAMIN TABLET 1 TABLET: TABLET at 10:05

## 2024-05-09 RX ADMIN — MAGNESIUM SULFATE HEPTAHYDRATE 2 G: 40 INJECTION, SOLUTION INTRAVENOUS at 10:05

## 2024-05-09 RX ADMIN — DIPHENHYDRAMINE HYDROCHLORIDE 25 MG: 25 CAPSULE ORAL at 10:05

## 2024-05-09 RX ADMIN — PANTOPRAZOLE SODIUM 40 MG: 40 TABLET, DELAYED RELEASE ORAL at 10:05

## 2024-05-09 RX ADMIN — METHOCARBAMOL TABLETS 750 MG: 750 TABLET, COATED ORAL at 04:05

## 2024-05-09 RX ADMIN — METOPROLOL TARTRATE 25 MG: 25 TABLET, FILM COATED ORAL at 04:05

## 2024-05-09 RX ADMIN — PREDNISONE 50 MG: 20 TABLET ORAL at 10:05

## 2024-05-09 NOTE — PLAN OF CARE
DC orders and chart reviewed. No discharge needs noted.  Patient cleared for discharge from .    Patient is discharging to home.  FU Appointment scheduled and added to AVS.        05/09/24 1621   Final Note   Assessment Type Final Discharge Note   Anticipated Discharge Disposition Home   What phone number can be called within the next 1-3 days to see how you are doing after discharge? [phone]   Hospital Resources/Appts/Education Provided Appointments scheduled and added to AVS   Post-Acute Status   Discharge Delays None known at this time

## 2024-05-09 NOTE — SUBJECTIVE & OBJECTIVE
Interval History: Patient seen and examined.  NAD.  Complains of intermittent chest pain/tightness.  Plan for angiogram today.     Review of Systems   Constitutional:  Positive for fatigue.   Respiratory:  Positive for chest tightness and shortness of breath.    Cardiovascular:  Positive for chest pain. Negative for palpitations.   Gastrointestinal:  Negative for abdominal distention, abdominal pain, nausea and vomiting.   Neurological:  Positive for headaches (mild).     Objective:     Vital Signs (Most Recent):  Temp: 98 °F (36.7 °C) (05/09/24 0720)  Pulse: 69 (05/09/24 0844)  Resp: 17 (05/09/24 0844)  BP: 136/64 (05/09/24 0720)  SpO2: 95 % (05/09/24 0844) Vital Signs (24h Range):  Temp:  [97.4 °F (36.3 °C)-98 °F (36.7 °C)] 98 °F (36.7 °C)  Pulse:  [53-69] 69  Resp:  [16-20] 17  SpO2:  [94 %-100 %] 95 %  BP: (110-154)/(64-71) 136/64     Weight: 62.6 kg (138 lb)  Body mass index is 24.45 kg/m².    Intake/Output Summary (Last 24 hours) at 5/9/2024 1144  Last data filed at 5/9/2024 0443  Gross per 24 hour   Intake --   Output 400 ml   Net -400 ml         Physical Exam  Vitals and nursing note reviewed.   Constitutional:       General: She is not in acute distress.     Appearance: Normal appearance.   HENT:      Head: Normocephalic.      Mouth/Throat:      Mouth: Mucous membranes are dry.   Cardiovascular:      Rate and Rhythm: Normal rate and regular rhythm.      Heart sounds: Normal heart sounds.   Pulmonary:      Effort: Pulmonary effort is normal. No respiratory distress.      Breath sounds: Normal breath sounds.   Abdominal:      General: Bowel sounds are normal. There is no distension.      Palpations: Abdomen is soft.      Tenderness: There is no abdominal tenderness.   Musculoskeletal:         General: Normal range of motion.   Skin:     General: Skin is warm and dry.   Neurological:      General: No focal deficit present.      Mental Status: She is alert and oriented to person, place, and time.   Psychiatric:          Mood and Affect: Mood normal.             Significant Labs: All pertinent labs within the past 24 hours have been reviewed.  Recent Lab Results  (Last 5 results in the past 24 hours)        05/09/24  0836   05/09/24 0722 05/09/24 0536 05/08/24 2027 05/08/24  1546        Albumin     4.1           ALP     60           ALT     10           Anion Gap     5           PTT 29.3  Comment: Refer to local heparin nomogram for intensity/dose specific   therapeutic   range.                 AST     18           Baso #     0.01           Basophil %     0.2           BILIRUBIN TOTAL     0.4  Comment: For infants and newborns, interpretation of results should be based  on gestational age, weight and in agreement with clinical  observations.    Premature Infant recommended reference ranges:  Up to 24 hours.............<8.0 mg/dL  Up to 48 hours............<12.0 mg/dL  3-5 days..................<15.0 mg/dL  6-29 days.................<15.0 mg/dL             BUN     23           Calcium     9.9           Chloride     106           CO2     26           Creatinine     0.8           Differential Method     Automated           eGFR     >60.0           Eos #     0.0           Eos %     0.0           Glucose     163           Gran # (ANC)     4.9           Gran %     79.3           Hematocrit     39.0           Hemoglobin     12.8           Immature Grans (Abs)     0.03  Comment: Mild elevation in immature granulocytes is non specific and   can be seen in a variety of conditions including stress response,   acute inflammation, trauma and pregnancy. Correlation with other   laboratory and clinical findings is essential.             Immature Granulocytes     0.5           INR 0.9  Comment: Coumadin Therapy:  2.0 - 3.0 for INR for all indicators except mechanical heart valves  and antiphospholipid syndromes which should use 2.5 - 3.5.                 Lymph #     1.1           Lymph %     18.1           Magnesium      1.5            MCH     29.6           MCHC     32.8           MCV     90           Mono #     0.1           Mono %     1.9           MPV     11.1           nRBC     0           Platelet Count     167           POC Glucose   138     116   157       Potassium     4.1           PROTEIN TOTAL     6.8           PT 10.4               RBC     4.33           RDW     12.8           Sodium     137           WBC     6.23                                  Significant Imaging: I have reviewed all pertinent imaging results/findings within the past 24 hours.

## 2024-05-09 NOTE — PLAN OF CARE
Nursing Transfer Note    Report called to SATINDER Andrade at 1426.  Patient transfer at 1438 with bedside handoff to VALENTIN Claros.    Transfer To: 2514    Transfer From: [facility]    Transfer via stretcher    Transfer with cardiac monitoring    Transported by VALENTIN Urbina    Chart send with patient: Yes    Notified: daughter via telephone    Upon arrival to floor: cardiac monitor applied, patient oriented to room, call bell in reach, and bed in lowest position

## 2024-05-09 NOTE — ASSESSMENT & PLAN NOTE
Cardiology consulted  Troponin peaked at 1.567 then trended down  On therapeutic Lovenox.    Plan for angiogram today

## 2024-05-09 NOTE — PROCEDURES
Brief cardiac cath report:    No evidence of epicardial obstructive coronary lesions. Patient tolerated the procedure well.    Full report to follow.

## 2024-05-09 NOTE — ASSESSMENT & PLAN NOTE
Chronic, uncontrolled. Latest blood pressure and vitals reviewed-     Temp:  [97.4 °F (36.3 °C)-98 °F (36.7 °C)]   Pulse:  [53-69]   Resp:  [16-20]   BP: (110-154)/(64-71)   SpO2:  [94 %-100 %] .   Home meds for hypertension were reviewed and noted below.   Hypertension Medications               valsartan (DIOVAN) 40 MG tablet Take 1 tablet (40 mg total) by mouth once daily.            While in the hospital, will manage blood pressure as follows; Continue home antihypertensive regimen    Will utilize p.r.n. blood pressure medication only if patient's blood pressure greater than 180/110 and she develops symptoms such as worsening chest pain or shortness of breath.

## 2024-05-09 NOTE — NURSING
18:00- Pt is discharged.. She was given verbal and written instructions for upcoming appointment and updated prescriptions. IV removed. Tele monitor off. Pt discharged. Pt went home with grand daughter.

## 2024-05-09 NOTE — PLAN OF CARE
Report received from cath lab RN. Arrived from cath lab via stretcher. No complaints of pain or distress noted. TR Band to right radial CDI with 14ml of pressure per cath lab report. Post angio instructions given. Resting in bed with call light in reach. Daughter at bedside.

## 2024-05-09 NOTE — PROGRESS NOTES
[facility]  Progress Note    Patient Name: Pili Teixeira  MRN: [id number]  Patient Class: IP- Inpatient   Admission Date: 5/6/2024  Length of Stay: 1 days  Attending Physician: Kashif Jose MD  Primary Care Provider: LUÍS Huggins MD        Subjective:     Principal Problem:Chest pain        HPI:  Pili Teixeira is a 78 year old female with a  previous medical history of cervical spine stenosis and long-term use of opioid pain medication, COPD, CAD, GERD, HTN, HLD, TIA on plavix Monday, Wednesday, Friday, and DMII who presents for chest pain starting today. Patient reports she awoke this morning feeling generally unwell and noted she had a heaviness in the left side of her chest that radiated to her left shoulder. She reports that there were no other adverse symptoms such as nausea, vomiting, shortness of breath, or diaphoresis  that occurred at the same time nor did anything aggravate or alleviate the chest heaviness. Patient reports eating light breakfast and then the pain resolved after some time. She reports that during her physical therapy appointment for her chronic neck pain and the left sided chest heaviness returned and again radiated to her left shoulder. The physical therapist took her blood pressure and noted the systolic to be 175 and advised her to present to the emergency room. Initial ED workup showed a negative troponin, normal chest xray, stable anemia on CBC and unremarkable CMP. EKG was normal sinus rhythm. Patient to be admitted by hospital medicine for further evaluation and management.     Overview/Hospital Course:  Patient was initially admitted to [facility] due to chest pain rule out ACS.  Troponin was elevated, peaked at 1.567 and then downtrended.  Cardiology was consulted and recommended transfer to [facility] for angiogram.  Patient started on therapeutic Lovenox.  Patient has allergy to contrast dye and will require premedication.  Patient is to started  premedicating tonight and plan for angiogram tomorrow morning.     Interval History: Patient seen and examined.  NAD.  Complains of intermittent chest pain/tightness.  Plan for angiogram today.     Review of Systems   Constitutional:  Positive for fatigue.   Respiratory:  Positive for chest tightness and shortness of breath.    Cardiovascular:  Positive for chest pain. Negative for palpitations.   Gastrointestinal:  Negative for abdominal distention, abdominal pain, nausea and vomiting.   Neurological:  Positive for headaches (mild).     Objective:     Vital Signs (Most Recent):  Temp: 98 °F (36.7 °C) (05/09/24 0720)  Pulse: 69 (05/09/24 0844)  Resp: 17 (05/09/24 0844)  BP: 136/64 (05/09/24 0720)  SpO2: 95 % (05/09/24 0844) Vital Signs (24h Range):  Temp:  [97.4 °F (36.3 °C)-98 °F (36.7 °C)] 98 °F (36.7 °C)  Pulse:  [53-69] 69  Resp:  [16-20] 17  SpO2:  [94 %-100 %] 95 %  BP: (110-154)/(64-71) 136/64     Weight: 62.6 kg (138 lb)  Body mass index is 24.45 kg/m².    Intake/Output Summary (Last 24 hours) at 5/9/2024 1144  Last data filed at 5/9/2024 0443  Gross per 24 hour   Intake --   Output 400 ml   Net -400 ml         Physical Exam  Vitals and nursing note reviewed.   Constitutional:       General: She is not in acute distress.     Appearance: Normal appearance.   HENT:      Head: Normocephalic.      Mouth/Throat:      Mouth: Mucous membranes are dry.   Cardiovascular:      Rate and Rhythm: Normal rate and regular rhythm.      Heart sounds: Normal heart sounds.   Pulmonary:      Effort: Pulmonary effort is normal. No respiratory distress.      Breath sounds: Normal breath sounds.   Abdominal:      General: Bowel sounds are normal. There is no distension.      Palpations: Abdomen is soft.      Tenderness: There is no abdominal tenderness.   Musculoskeletal:         General: Normal range of motion.   Skin:     General: Skin is warm and dry.   Neurological:      General: No focal deficit present.      Mental Status:  She is alert and oriented to person, place, and time.   Psychiatric:         Mood and Affect: Mood normal.             Significant Labs: All pertinent labs within the past 24 hours have been reviewed.  Recent Lab Results  (Last 5 results in the past 24 hours)        05/09/24  0836   05/09/24  0722   05/09/24  0536   05/08/24 2027 05/08/24  1546        Albumin     4.1           ALP     60           ALT     10           Anion Gap     5           PTT 29.3  Comment: Refer to local heparin nomogram for intensity/dose specific   therapeutic   range.                 AST     18           Baso #     0.01           Basophil %     0.2           BILIRUBIN TOTAL     0.4  Comment: For infants and newborns, interpretation of results should be based  on gestational age, weight and in agreement with clinical  observations.    Premature Infant recommended reference ranges:  Up to 24 hours.............<8.0 mg/dL  Up to 48 hours............<12.0 mg/dL  3-5 days..................<15.0 mg/dL  6-29 days.................<15.0 mg/dL             BUN     23           Calcium     9.9           Chloride     106           CO2     26           Creatinine     0.8           Differential Method     Automated           eGFR     >60.0           Eos #     0.0           Eos %     0.0           Glucose     163           Gran # (ANC)     4.9           Gran %     79.3           Hematocrit     39.0           Hemoglobin     12.8           Immature Grans (Abs)     0.03  Comment: Mild elevation in immature granulocytes is non specific and   can be seen in a variety of conditions including stress response,   acute inflammation, trauma and pregnancy. Correlation with other   laboratory and clinical findings is essential.             Immature Granulocytes     0.5           INR 0.9  Comment: Coumadin Therapy:  2.0 - 3.0 for INR for all indicators except mechanical heart valves  and antiphospholipid syndromes which should use 2.5 - 3.5.                 Lymph #    "  1.1           Lymph %     18.1           Magnesium      1.5           MCH     29.6           MCHC     32.8           MCV     90           Mono #     0.1           Mono %     1.9           MPV     11.1           nRBC     0           Platelet Count     167           POC Glucose   138     116   157       Potassium     4.1           PROTEIN TOTAL     6.8           PT 10.4               RBC     4.33           RDW     12.8           Sodium     137           WBC     6.23                                  Significant Imaging: I have reviewed all pertinent imaging results/findings within the past 24 hours.    Assessment/Plan:      * Chest pain  Cardiology consulted  Troponin peaked at 1.567 then trended down  On therapeutic Lovenox.    Plan for angiogram today      Coronary artery disease involving native coronary artery of native heart without angina pectoris  Patient with known CAD which is controlled Will continue ASA, Plavix, and Statin and monitor for S/Sx of angina/ACS. Continue to monitor on telemetry.     Chronic pain, neck, back, leg on home narcotics  Chronic condition noted. Stable    -Continued oxycodone 10-325mg four times daily PRN pain, robaxin, and lidocaine patch      Type 2 diabetes mellitus, without long-term current use of insulin  Patient's FSGs are controlled on current medication regimen.  Last A1c reviewed-   Lab Results   Component Value Date    HGBA1C 5.6 04/18/2024     Most recent fingerstick glucose reviewed- No results for input(s): "POCTGLUCOSE" in the last 24 hours.    Current correctional scale  Low  Maintain anti-hyperglycemic dose as follows-   Antihyperglycemics (From admission, onward)      Start     Stop Route Frequency Ordered    05/06/24 1657  insulin aspart U-100 pen 0-5 Units         -- SubQ Before meals & nightly PRN 05/06/24 1603          Hold Oral hypoglycemics while patient is in the hospital.       Hypertension associated with diabetes  Chronic, uncontrolled. Latest blood " pressure and vitals reviewed-     Temp:  [97.4 °F (36.3 °C)-98 °F (36.7 °C)]   Pulse:  [53-69]   Resp:  [16-20]   BP: (110-154)/(64-71)   SpO2:  [94 %-100 %] .   Home meds for hypertension were reviewed and noted below.   Hypertension Medications               valsartan (DIOVAN) 40 MG tablet Take 1 tablet (40 mg total) by mouth once daily.            While in the hospital, will manage blood pressure as follows; Continue home antihypertensive regimen    Will utilize p.r.n. blood pressure medication only if patient's blood pressure greater than 180/110 and she develops symptoms such as worsening chest pain or shortness of breath.       COPD (chronic obstructive pulmonary disease)  Patient's COPD is controlled currently.  Patient is currently off COPD Pathway. Continue scheduled inhalers prn Supplemental oxygen prn  and monitor respiratory status closely.     GERD (gastroesophageal reflux disease)  Chronic condition noted.    -Pantoprazole 40mg PO daily        VTE Risk Mitigation (From admission, onward)           Ordered     heparin (porcine) injection  As needed (PRN)         05/09/24 1121     enoxaparin injection 60 mg  Every 12 hours         05/06/24 1922     Reason for No Pharmacological VTE Prophylaxis  Once        Question:  Reasons:  Answer:  Already adequately anticoagulated on oral Anticoagulants    05/06/24 1603     IP VTE HIGH RISK PATIENT  Once         05/06/24 1603     Place sequential compression device  Until discontinued         05/06/24 1603                    Discharge Planning   MONSTER: 5/9/2024     Code Status: Full Code   Is the patient medically ready for discharge?:     Reason for patient still in hospital (select all that apply): Patient trending condition and Consult recommendations  Discharge Plan A: Home                  KEZIA Max  Department of Hospital Medicine   [facility]

## 2024-05-09 NOTE — PLAN OF CARE
Problem: Adult Inpatient Plan of Care  Goal: Plan of Care Review  Outcome: Met  Goal: Patient-Specific Goal (Individualized)  Outcome: Met  Goal: Absence of Hospital-Acquired Illness or Injury  Outcome: Met  Goal: Optimal Comfort and Wellbeing  Outcome: Met  Goal: Readiness for Transition of Care  Outcome: Met     Problem: Diabetes Comorbidity  Goal: Blood Glucose Level Within Targeted Range  Outcome: Met     Problem: Wound  Goal: Optimal Coping  Outcome: Met  Goal: Optimal Functional Ability  Outcome: Met  Goal: Absence of Infection Signs and Symptoms  Outcome: Met  Goal: Improved Oral Intake  Outcome: Met  Goal: Optimal Pain Control and Function  Outcome: Met  Goal: Skin Health and Integrity  Outcome: Met  Goal: Optimal Wound Healing  Outcome: Met     Problem: Fall Injury Risk  Goal: Absence of Fall and Fall-Related Injury  Outcome: Met

## 2024-05-10 LAB
OHS QRS DURATION: 72 MS
OHS QTC CALCULATION: 414 MS

## 2024-05-10 NOTE — DISCHARGE SUMMARY
Critical access hospital Medicine  Discharge Summary      Patient Name: Pili Teixeira  MRN: [id number]  SUSAN: [id number]  Patient Class: IP- Inpatient  Admission Date: 5/6/2024  Hospital Length of Stay: 1 days  Discharge Date and Time: 5/9/2024  6:40 PM  Attending Physician: Kashif Jose MD  Discharging Provider: KEZIA Max  Primary Care Provider: LUÍS Huggins MD    Primary Care Team: Networked reference to record PCT     HPI:   Pili Teixeira is a 78 year old female with a  previous medical history of cervical spine stenosis and long-term use of opioid pain medication, COPD, CAD, GERD, HTN, HLD, TIA on plavix Monday, Wednesday, Friday, and DMII who presents for chest pain starting today. Patient reports she awoke this morning feeling generally unwell and noted she had a heaviness in the left side of her chest that radiated to her left shoulder. She reports that there were no other adverse symptoms such as nausea, vomiting, shortness of breath, or diaphoresis  that occurred at the same time nor did anything aggravate or alleviate the chest heaviness. Patient reports eating light breakfast and then the pain resolved after some time. She reports that during her physical therapy appointment for her chronic neck pain and the left sided chest heaviness returned and again radiated to her left shoulder. The physical therapist took her blood pressure and noted the systolic to be 175 and advised her to present to the emergency room. Initial ED workup showed a negative troponin, normal chest xray, stable anemia on CBC and unremarkable CMP. EKG was normal sinus rhythm. Patient to be admitted by hospital medicine for further evaluation and management.     Procedure(s) (LRB):  Left heart cath (Left)      Hospital Course:   Patient was initially admitted to [facility] due to chest pain.  Troponin was elevated, peaked at 1.567 and then downtrended.  Cardiology was consulted and recommended transfer to [facility] for  angiogram.  Patient was started on therapeutic Lovenox.  Patient has allergy to contrast dye and was pre-medicated prior to angiogram.  Angiogram was done 5/9, there was no evidence of epicardial obstructive coronary lesions.  Chest pain/tightness resolved.  Cleared by cardiology for discharge.  Follow up with PCP and cardiology outpatient.  POC discussed with patient and in agreement.    Patient was seen and examined on the day of discharge.     Goals of Care Treatment Preferences:  Code Status: Full Code      Consults:     No new Assessment & Plan notes have been filed under this hospital service since the last note was generated.  Service: Hospital Medicine    Final Active Diagnoses:    Diagnosis Date Noted POA    PRINCIPAL PROBLEM:  Chest pain [R07.9] 05/06/2024 Yes    Coronary artery disease involving native coronary artery of native heart without angina pectoris [I25.10] 05/03/2023 Yes    Type 2 diabetes mellitus, without long-term current use of insulin [E11.9] 01/12/2021 Yes     Chronic    Chronic pain, neck, back, leg on home narcotics [M54.9, G89.29] 01/12/2021 Yes     Chronic    Hypertension associated with diabetes [E11.59, I15.2] 03/14/2017 Yes    COPD (chronic obstructive pulmonary disease) [J44.9] 01/07/2014 Yes    GERD (gastroesophageal reflux disease) [K21.9] 02/15/2012 Yes      Problems Resolved During this Admission:       Discharged Condition: good    Disposition: Home or Self Care    Follow Up:   Follow-up Information       LUÍS Huggins MD. Go on 5/14/2024.    Specialty: Family Medicine  Why: at 11:00 am.  Contact information:  [address] [place] [address]  [phone]               Terence Casas MD Follow up in 1 week(s).    Specialties: Interventional Cardiology, Cardiology, Cardiovascular Disease  Contact information:  [address] [facility]  [address]  [facility] [address]  [phone]                           Patient Instructions:      Diet Cardiac     Notify your health care  provider if you experience any of the following:  temperature >100.4     Notify your health care provider if you experience any of the following:  persistent nausea and vomiting or diarrhea     Notify your health care provider if you experience any of the following:  severe uncontrolled pain     Notify your health care provider if you experience any of the following:  difficulty breathing or increased cough     Notify your health care provider if you experience any of the following:  severe persistent headache     Notify your health care provider if you experience any of the following:  worsening rash     Notify your health care provider if you experience any of the following:  persistent dizziness, light-headedness, or visual disturbances     Notify your health care provider if you experience any of the following:  increased confusion or weakness     Notify your health care provider if you experience any of the following:   Order Comments: Chest pain, shortness of breath     Activity as tolerated       Significant Diagnostic Studies: Labs: CMP   Recent Labs   Lab 05/09/24  0536      K 4.1      CO2 26   *   BUN 23   CREATININE 0.8   CALCIUM 9.9   PROT 6.8   ALBUMIN 4.1   BILITOT 0.4   ALKPHOS 60   AST 18   ALT 10   ANIONGAP 5*    and CBC   Recent Labs   Lab 05/09/24  0536   WBC 6.23   HGB 12.8   HCT 39.0          Pending Diagnostic Studies:       None           Medications:  Reconciled Home Medications:      Medication List        START taking these medications      methocarbamoL 750 MG Tab  Commonly known as: ROBAXIN  Take 1 tablet (750 mg total) by mouth 3 (three) times daily as needed (muscle spasm).     metoprolol tartrate 25 MG tablet  Commonly known as: LOPRESSOR  Take 1 tablet (25 mg total) by mouth 2 (two) times daily.            CHANGE how you take these medications      fluticasone propionate 50 mcg/actuation nasal spray  Commonly known as: FLONASE  USE 2 SPRAYS IN EACH NOSTRIL  ONE TIME PER DAY  What changed:   how much to take  how to take this  when to take this     traZODone 50 MG tablet  Commonly known as: DESYREL  TAKE 1 TABLET BY MOUTH IN THE EVENING (MAY  TAKE  AN  ADDITIONAL  TABLET  AS  NEEDED)  What changed:   how much to take  how to take this  when to take this            CONTINUE taking these medications      ACCU-CHEK GUIDE TEST STRIPS Strp  Generic drug: blood sugar diagnostic  USE 1 STRIP TO TEST BLOOD GLUCOSE ONCE DAILY AS DIRECTED     albuterol 90 mcg/actuation inhaler  Commonly known as: PROVENTIL/VENTOLIN HFA  Inhale 2 puffs into the lungs every 4 (four) hours as needed.     blood-glucose meter kit  To check BG 2 times daily, to use with insurance preferred meter. Medical necessity.     cetirizine 5 MG tablet  Commonly known as: ZYRTEC  Take 1 tablet (5 mg total) by mouth once daily. for 5 days     clopidogreL 75 mg tablet  Commonly known as: PLAVIX  Take 1 tablet (75 mg total) by mouth every Mon, Wed, Fri.     cyanocobalamin 1000 MCG tablet  Commonly known as: VITAMIN B-12  Take 100 mcg by mouth once daily.     diclofenac sodium 1 % Gel  Commonly known as: VOLTAREN  Apply 2 g topically once daily.     famotidine 40 MG tablet  Commonly known as: PEPCID  TAKE 1 TABLET BY MOUTH ONCE DAILY IN THE EVENING     folic acid 1 MG tablet  Commonly known as: FOLVITE  Take 1 tablet by mouth once daily     ipratropium 42 mcg (0.06 %) nasal spray  Commonly known as: ATROVENT  2 sprays by Each Nostril route 3 (three) times daily.     lancets Misc  Commonly known as: ACCU-CHEK SOFTCLIX LANCETS  Test TWICE DAILY;Twice a day     LUBRICANT EYE DROPS OPHT  Place 1 drop into both eyes daily as needed (dry eyes).     magnesium oxide 400 mg (241.3 mg magnesium) tablet  Commonly known as: MAG-OX  Take 1 tablet (400 mg total) by mouth once daily.     metFORMIN 500 MG tablet  Commonly known as: GLUCOPHAGE  TAKE 1 TABLET BY MOUTH TWICE DAILY WITH MEALS     multivit with min-folic acid 200 mcg  Chew  Take 1 tablet by mouth once daily.     NARCAN 4 mg/actuation Spry  Generic drug: naloxone  as directed     olopatadine 0.1 % ophthalmic solution  Commonly known as: PATANOL  Place 1 drop into both eyes daily as needed for Allergies.     ondansetron 4 MG Tbdl  Commonly known as: ZOFRAN-ODT  Take 1 tablet (4 mg total) by mouth every 8 (eight) hours as needed (nausea).     oxyCODONE-acetaminophen 7.5-325 mg per tablet  Commonly known as: PERCOCET  Take 1 tablet by mouth 4 (four) times daily as needed for Pain.     pantoprazole 40 MG tablet  Commonly known as: PROTONIX  Take 1 tablet by mouth once daily     pregabalin 150 MG capsule  Commonly known as: LYRICA  Take 1 capsule (150 mg total) by mouth 2 (two) times daily.     PROBIOTIC-10 ORAL  Take 1 capsule by mouth Daily.     rosuvastatin 10 MG tablet  Commonly known as: CRESTOR  Take 1 tablet (10 mg total) by mouth every evening.     STOOL SOFTENER ORAL  Take 1 capsule by mouth every evening.     valsartan 40 MG tablet  Commonly known as: DIOVAN  Take 1 tablet (40 mg total) by mouth once daily.            STOP taking these medications      azithromycin 250 MG tablet  Commonly known as: Z-WILSON     methylPREDNISolone 4 mg tablet  Commonly known as: MEDROL DOSEPACK              Indwelling Lines/Drains at time of discharge:   Lines/Drains/Airways       None                   Time spent on the discharge of patient: 35 minutes         KEZIA Max  Department of Hospital Medicine  [facility]

## 2024-05-11 LAB
OHS QRS DURATION: 70 MS
OHS QRS DURATION: 74 MS
OHS QTC CALCULATION: 409 MS
OHS QTC CALCULATION: 416 MS

## 2024-05-14 ENCOUNTER — HOSPITAL ENCOUNTER (OUTPATIENT)
Dept: RADIOLOGY | Facility: HOSPITAL | Age: 79
Discharge: HOME | End: 2024-05-14
Attending: FAMILY MEDICINE
Payer: MEDICARE

## 2024-05-14 ENCOUNTER — OFFICE VISIT (OUTPATIENT)
Dept: FAMILY MEDICINE | Facility: CLINIC | Age: 79
End: 2024-05-14
Payer: MEDICARE

## 2024-05-14 VITALS
BODY MASS INDEX: 23.51 KG/M2 | DIASTOLIC BLOOD PRESSURE: 90 MMHG | HEIGHT: 63 IN | HEART RATE: 80 BPM | OXYGEN SATURATION: 99 % | WEIGHT: 132.69 LBS | SYSTOLIC BLOOD PRESSURE: 156 MMHG

## 2024-05-14 DIAGNOSIS — R10.13 EPIGASTRIC PAIN: ICD-10-CM

## 2024-05-14 DIAGNOSIS — E11.59 HYPERTENSION ASSOCIATED WITH DIABETES: ICD-10-CM

## 2024-05-14 DIAGNOSIS — E83.42 HYPOMAGNESEMIA: ICD-10-CM

## 2024-05-14 DIAGNOSIS — E11.00 TYPE 2 DIABETES MELLITUS WITH HYPEROSMOLARITY WITHOUT COMA, WITHOUT LONG-TERM CURRENT USE OF INSULIN: Chronic | ICD-10-CM

## 2024-05-14 DIAGNOSIS — E87.6 HYPOKALEMIA: ICD-10-CM

## 2024-05-14 DIAGNOSIS — I15.2 HYPERTENSION ASSOCIATED WITH DIABETES: ICD-10-CM

## 2024-05-14 DIAGNOSIS — R10.13 EPIGASTRIC PAIN: Primary | ICD-10-CM

## 2024-05-14 DIAGNOSIS — I25.10 CORONARY ARTERY DISEASE INVOLVING NATIVE CORONARY ARTERY OF NATIVE HEART WITHOUT ANGINA PECTORIS: ICD-10-CM

## 2024-05-14 PROCEDURE — 3077F SYST BP >= 140 MM HG: CPT | Mod: CPTII,S$GLB,, | Performed by: FAMILY MEDICINE

## 2024-05-14 PROCEDURE — 1126F AMNT PAIN NOTED NONE PRSNT: CPT | Mod: CPTII,S$GLB,, | Performed by: FAMILY MEDICINE

## 2024-05-14 PROCEDURE — 74018 RADEX ABDOMEN 1 VIEW: CPT | Mod: 26,,, | Performed by: STUDENT IN AN ORGANIZED HEALTH CARE EDUCATION/TRAINING PROGRAM

## 2024-05-14 PROCEDURE — 1157F ADVNC CARE PLAN IN RCRD: CPT | Mod: CPTII,S$GLB,, | Performed by: FAMILY MEDICINE

## 2024-05-14 PROCEDURE — 1101F PT FALLS ASSESS-DOCD LE1/YR: CPT | Mod: CPTII,S$GLB,, | Performed by: FAMILY MEDICINE

## 2024-05-14 PROCEDURE — 3288F FALL RISK ASSESSMENT DOCD: CPT | Mod: CPTII,S$GLB,, | Performed by: FAMILY MEDICINE

## 2024-05-14 PROCEDURE — 1111F DSCHRG MED/CURRENT MED MERGE: CPT | Mod: CPTII,S$GLB,, | Performed by: FAMILY MEDICINE

## 2024-05-14 PROCEDURE — 1160F RVW MEDS BY RX/DR IN RCRD: CPT | Mod: CPTII,S$GLB,, | Performed by: FAMILY MEDICINE

## 2024-05-14 PROCEDURE — 1159F MED LIST DOCD IN RCRD: CPT | Mod: CPTII,S$GLB,, | Performed by: FAMILY MEDICINE

## 2024-05-14 PROCEDURE — 3080F DIAST BP >= 90 MM HG: CPT | Mod: CPTII,S$GLB,, | Performed by: FAMILY MEDICINE

## 2024-05-14 PROCEDURE — 74018 RADEX ABDOMEN 1 VIEW: CPT | Mod: TC,FY,PO

## 2024-05-14 PROCEDURE — 99214 OFFICE O/P EST MOD 30 MIN: CPT | Mod: S$GLB,,, | Performed by: FAMILY MEDICINE

## 2024-05-14 PROCEDURE — 99999 PR PBB SHADOW E&M-EST. PATIENT-LVL V: CPT | Mod: PBBFAC,,, | Performed by: FAMILY MEDICINE

## 2024-05-14 RX ORDER — METOPROLOL TARTRATE 25 MG/1
25 TABLET, FILM COATED ORAL 2 TIMES DAILY
Qty: 180 TABLET | Refills: 1 | Status: SHIPPED | OUTPATIENT
Start: 2024-05-14

## 2024-05-14 NOTE — PROGRESS NOTES
Subjective:       Patient ID: Pili Teixeira is a 78 y.o. female.    Chief Complaint: Hospital Follow Up    Pt is known to me.  The pt was in hospital with chest pain.  She had a good angiogram.  She still has lower chest/epigastric pain that radiates all the way around to her back.   She denies heartburn--she is on Pepcid and pantoprazole.  She occasionally has constipation.  She goes to PT for her back.  She is in pain mgt outside of [facility].  She had a low magnesium in the hospital.  She is on 400 mg mag oxide daily for a long time.  The pt's HbA1c has been in the mid 5s for several years.  She is on metformin.  She feels tired a lot.      Review of Systems    Objective:      Physical Exam  Constitutional:       Appearance: She is well-developed.   HENT:      Head: Normocephalic.   Eyes:      Conjunctiva/sclera: Conjunctivae normal.      Pupils: Pupils are equal, round, and reactive to light.   Neck:      Thyroid: No thyromegaly.   Cardiovascular:      Rate and Rhythm: Normal rate and regular rhythm.      Heart sounds: Normal heart sounds.   Pulmonary:      Effort: Pulmonary effort is normal.      Breath sounds: Normal breath sounds.   Abdominal:      General: Bowel sounds are normal.      Palpations: Abdomen is soft.      Tenderness: There is abdominal tenderness (epigastum, milder diffusely).   Musculoskeletal:         General: No tenderness or deformity. Normal range of motion.      Cervical back: Normal range of motion and neck supple.   Lymphadenopathy:      Cervical: No cervical adenopathy.   Skin:     General: Skin is warm and dry.   Neurological:      Mental Status: She is alert and oriented to person, place, and time.      Cranial Nerves: No cranial nerve deficit.      Motor: No abnormal muscle tone.      Coordination: Coordination normal.      Deep Tendon Reflexes: Reflexes normal.   Psychiatric:         Behavior: Behavior normal.         Assessment:       1. Epigastric pain    2. Hypomagnesemia    3.  Hypokalemia    4. Type 2 diabetes mellitus with hyperosmolarity without coma, without long-term current use of insulin    5. Hypertension associated with diabetes    6. Coronary artery disease involving native coronary artery of native heart without angina pectoris        Plan:       Pili was seen today for hospital follow up.    Diagnoses and all orders for this visit:    Epigastric pain  -     X-Ray Abdomen AP 1 View; Future  -     Ambulatory referral/consult to Gastroenterology; Future  -     CBC Auto Differential; Future    Hypomagnesemia  -     Magnesium; Future    Hypokalemia  -     BASIC METABOLIC PANEL; Future    Type 2 diabetes mellitus with hyperosmolarity without coma, without long-term current use of insulin  Comments:  Stop metformin    Hypertension associated with diabetes  -     metoprolol tartrate (LOPRESSOR) 25 MG tablet; Take 1 tablet (25 mg total) by mouth 2 (two) times daily.    Coronary artery disease involving native coronary artery of native heart without angina pectoris  -     metoprolol tartrate (LOPRESSOR) 25 MG tablet; Take 1 tablet (25 mg total) by mouth 2 (two) times daily.      During this visit, I reviewed the pt's history, medications, allergies, and problem list.

## 2024-05-15 ENCOUNTER — PATIENT OUTREACH (OUTPATIENT)
Dept: ADMINISTRATIVE | Facility: CLINIC | Age: 79
End: 2024-05-15
Payer: MEDICARE

## 2024-05-15 ENCOUNTER — PATIENT MESSAGE (OUTPATIENT)
Dept: ADMINISTRATIVE | Facility: CLINIC | Age: 79
End: 2024-05-15
Payer: MEDICARE

## 2024-05-15 NOTE — PROGRESS NOTES
C3 nurse attempted to contact Pili Teixeira for a [facility] post hospital discharge follow up call. No answer. Left voicemail with callback information. The patient has completed a scheduled HOSFU appointment with LUÍS Huggins MD on 05/14/2024 @ 1100.

## 2024-05-22 NOTE — TELEPHONE ENCOUNTER
Please advise, spoke with patient reschedule appointment, wanted to let you know had angiogram 5-9 @ [facility] wants to know if she can resume PT?

## 2024-05-23 ENCOUNTER — PATIENT MESSAGE (OUTPATIENT)
Dept: ADMINISTRATIVE | Facility: HOSPITAL | Age: 79
End: 2024-05-23
Payer: MEDICARE

## 2024-05-29 ENCOUNTER — OFFICE VISIT (OUTPATIENT)
Dept: GASTROENTEROLOGY | Facility: CLINIC | Age: 79
End: 2024-05-29
Payer: MEDICARE

## 2024-05-29 ENCOUNTER — TELEPHONE (OUTPATIENT)
Dept: GASTROENTEROLOGY | Facility: CLINIC | Age: 79
End: 2024-05-29
Payer: MEDICARE

## 2024-05-29 VITALS
BODY MASS INDEX: 24.25 KG/M2 | HEART RATE: 56 BPM | DIASTOLIC BLOOD PRESSURE: 74 MMHG | SYSTOLIC BLOOD PRESSURE: 154 MMHG | HEIGHT: 63 IN | WEIGHT: 136.88 LBS

## 2024-05-29 DIAGNOSIS — Z79.01 ANTICOAGULANT LONG-TERM USE: ICD-10-CM

## 2024-05-29 DIAGNOSIS — R74.8 ELEVATED LIPASE: ICD-10-CM

## 2024-05-29 DIAGNOSIS — R93.3 ABNORMAL CT SCAN, GASTROINTESTINAL TRACT: ICD-10-CM

## 2024-05-29 DIAGNOSIS — K44.9 HIATAL HERNIA: ICD-10-CM

## 2024-05-29 DIAGNOSIS — Z90.49 S/P CHOLECYSTECTOMY: ICD-10-CM

## 2024-05-29 DIAGNOSIS — Z87.19 HISTORY OF CONSTIPATION: ICD-10-CM

## 2024-05-29 DIAGNOSIS — E83.42 HYPOMAGNESEMIA: ICD-10-CM

## 2024-05-29 DIAGNOSIS — R11.0 NAUSEA: ICD-10-CM

## 2024-05-29 DIAGNOSIS — R07.9 NONSPECIFIC CHEST PAIN: ICD-10-CM

## 2024-05-29 DIAGNOSIS — R10.13 EPIGASTRIC PAIN: Primary | ICD-10-CM

## 2024-05-29 DIAGNOSIS — K43.9 VENTRAL HERNIA WITHOUT OBSTRUCTION OR GANGRENE: ICD-10-CM

## 2024-05-29 DIAGNOSIS — R03.0 ELEVATED BLOOD PRESSURE READING: ICD-10-CM

## 2024-05-29 DIAGNOSIS — R12 HEARTBURN: ICD-10-CM

## 2024-05-29 DIAGNOSIS — D64.9 NORMOCYTIC ANEMIA: ICD-10-CM

## 2024-05-29 PROCEDURE — 1159F MED LIST DOCD IN RCRD: CPT | Mod: CPTII,S$GLB,, | Performed by: NURSE PRACTITIONER

## 2024-05-29 PROCEDURE — 99999 PR PBB SHADOW E&M-EST. PATIENT-LVL III: CPT | Mod: PBBFAC,,, | Performed by: NURSE PRACTITIONER

## 2024-05-29 PROCEDURE — 3077F SYST BP >= 140 MM HG: CPT | Mod: CPTII,S$GLB,, | Performed by: NURSE PRACTITIONER

## 2024-05-29 PROCEDURE — 1157F ADVNC CARE PLAN IN RCRD: CPT | Mod: CPTII,S$GLB,, | Performed by: NURSE PRACTITIONER

## 2024-05-29 PROCEDURE — 1126F AMNT PAIN NOTED NONE PRSNT: CPT | Mod: CPTII,S$GLB,, | Performed by: NURSE PRACTITIONER

## 2024-05-29 PROCEDURE — 1160F RVW MEDS BY RX/DR IN RCRD: CPT | Mod: CPTII,S$GLB,, | Performed by: NURSE PRACTITIONER

## 2024-05-29 PROCEDURE — 1111F DSCHRG MED/CURRENT MED MERGE: CPT | Mod: CPTII,S$GLB,, | Performed by: NURSE PRACTITIONER

## 2024-05-29 PROCEDURE — 1101F PT FALLS ASSESS-DOCD LE1/YR: CPT | Mod: CPTII,S$GLB,, | Performed by: NURSE PRACTITIONER

## 2024-05-29 PROCEDURE — 3078F DIAST BP <80 MM HG: CPT | Mod: CPTII,S$GLB,, | Performed by: NURSE PRACTITIONER

## 2024-05-29 PROCEDURE — 3288F FALL RISK ASSESSMENT DOCD: CPT | Mod: CPTII,S$GLB,, | Performed by: NURSE PRACTITIONER

## 2024-05-29 PROCEDURE — 99215 OFFICE O/P EST HI 40 MIN: CPT | Mod: S$GLB,,, | Performed by: NURSE PRACTITIONER

## 2024-05-29 RX ORDER — PANTOPRAZOLE SODIUM 40 MG/1
TABLET, DELAYED RELEASE ORAL
Qty: 90 TABLET | Refills: 0 | Status: SHIPPED | OUTPATIENT
Start: 2024-05-29 | End: 2024-07-27

## 2024-05-29 RX ORDER — BROMPHENIRAMINE MALEATE, DEXTROMETHORPHAN HBR, PHENYLEPHRINE HCL, DIPHENHYDRAMINE HCL, PHENYLEPHRINE HCL 0.52G
0.52 KIT ORAL DAILY PRN
COMMUNITY

## 2024-05-29 NOTE — TELEPHONE ENCOUNTER
Patient is scheduled for an EGD on 8/13. Is it ok for her to hold her Plavix for 5 days prior?    Thank you

## 2024-05-29 NOTE — PROGRESS NOTES
"Subjective:       Patient ID: Pili Teixeira is a 79 y.o. female Body mass index is 24.25 kg/m².    Chief Complaint: Abdominal Pain    Established patient of Dr. Frost, Dr. Nguyen, ALBER Mcfarland NP, & myself.    Reviewed hospital discharge summary: "Admission Date: 5/6/2024  Hospital Length of Stay: 1 days  Discharge Date and Time: 5/9/2024  6:40 PM  Attending Physician: Kashif Jose MD  Discharging Provider: KEZIA Max  Primary Care Provider: LUÍS Huggins MD  Primary Care Team: Networked reference to record PCT  HPI:   Pili Teixeira is a 78 year old female with a  previous medical history of cervical spine stenosis and long-term use of opioid pain medication, COPD, CAD, GERD, HTN, HLD, TIA on plavix Monday, Wednesday, Friday, and DMII who presents for chest pain starting today. Patient reports she awoke this morning feeling generally unwell and noted she had a heaviness in the left side of her chest that radiated to her left shoulder. She reports that there were no other adverse symptoms such as nausea, vomiting, shortness of breath, or diaphoresis  that occurred at the same time nor did anything aggravate or alleviate the chest heaviness. Patient reports eating light breakfast and then the pain resolved after some time. She reports that during her physical therapy appointment for her chronic neck pain and the left sided chest heaviness returned and again radiated to her left shoulder. The physical therapist took her blood pressure and noted the systolic to be 175 and advised her to present to the emergency room. Initial ED workup showed a negative troponin, normal chest xray, stable anemia on CBC and unremarkable CMP. EKG was normal sinus rhythm. Patient to be admitted by hospital medicine for further evaluation and management.  Procedure(s) (LRB):  Left heart cath (Left)   Hospital Course:   Patient was initially admitted to [facility] due to chest pain.  Troponin was elevated, peaked " "at 1.567 and then downtrended.  Cardiology was consulted and recommended transfer to [facility] for angiogram.  Patient was started on therapeutic Lovenox.  Patient has allergy to contrast dye and was pre-medicated prior to angiogram.  Angiogram was done 5/9, there was no evidence of epicardial obstructive coronary lesions.  Chest pain/tightness resolved.  Cleared by cardiology for discharge.  Follow up with PCP and cardiology outpatient.  POC discussed with patient and in agreement."    Abdominal Pain  This is a chronic problem. Episode onset: reports "always had it"; worsened recently, went to the ER for it and was admitted. The problem occurs daily. Duration: lasts a few minutes to a few hours. The problem has been gradually improving. The pain is located in the epigastric region. The pain is at a severity of 0/10 (currently). Quality: described as pressure. The abdominal pain radiates to the chest and back. Associated symptoms include belching, constipation (chronic; bowel movements are currently once daily with taking colace nightly, probiotic once daily, prune juice & metamucil PRN), flatus and nausea (occasional, improving, less frequent; zofran PRN). Pertinent negatives include no anorexia, diarrhea, dysuria, fever, frequency, hematochezia, hematuria, melena, vomiting or weight loss. Exacerbated by: pressure to site if she leans against counter. The pain is relieved by Bowel movements. She has tried oral narcotic analgesics (protonix 40 mg once daily, pepcid 40 mg once daily) for the symptoms. The treatment provided mild relief. Prior diagnostic workup includes CT scan, GI consult, lower endoscopy and upper endoscopy. Her past medical history is significant for GERD (heartburn occurs maybe daily despite taking medications) and irritable bowel syndrome. There is no history of colon cancer, Crohn's disease, pancreatitis, PUD or ulcerative colitis. s/p cholecystectomy     Review of Systems   Constitutional:  Negative " for appetite change, chills, fatigue, fever and weight loss.        Taking oxycodone TID for chronic back pain (seeing pain management for it); denies NSAID use   HENT:  Negative for sore throat and trouble swallowing.    Respiratory:  Negative for cough, choking, chest tightness and shortness of breath.    Cardiovascular:  Positive for chest pain (denies currently, seeing cardiology). Negative for palpitations.   Gastrointestinal:  Positive for abdominal pain, constipation (chronic; bowel movements are currently once daily with taking colace nightly, probiotic once daily, prune juice & metamucil PRN), flatus and nausea (occasional, improving, less frequent; zofran PRN). Negative for anal bleeding, anorexia, blood in stool, diarrhea, hematochezia, melena, rectal pain and vomiting.   Genitourinary:  Negative for difficulty urinating, dysuria, flank pain, frequency, hematuria, pelvic pain and urgency.   Musculoskeletal:  Negative for back pain.   Neurological:  Negative for weakness.       No LMP recorded. Patient is postmenopausal.    Past Medical History:   Diagnosis Date    Allergy     Anemia 2012    with thrombocytopenia; iron deficiency    Arthritis     Cervical spinal stenosis     with neuropathy, chronic neck,back,leg pain    Colon polyp     COPD (chronic obstructive pulmonary disease)     Coronary artery disease     COVID 4/27/2022    COVID-19     Diabetes mellitus     , sugars run 190's per patient    Diastolic dysfunction 7/13/2015    Diverticulitis     Diverticulosis     Hx diverticulitis,still has chronic diarrhea, abd pain    Dyspnea on exertion     sometimes with nausea, fatigue,dizziness, had cardiac cath June 2012, normal    Fibromyalgia     Fracture 2012    right thumb    GERD (gastroesophageal reflux disease)     Feb 2012, Hx H Pylori    Glaucoma     had LAser surgey, on no eye drops    HEARING LOSS     Hemangioma     fatty liver    History of earache     frequent    History of TIA (transient  ischemic attack) 5/28/2013    Hyperlipidemia     Hypertension     Hypertension associated with diabetes 3/14/2017    Laryngeal polyp     Myocardial infarction 2006 last    also MI in distant past    REJI (obstructive sleep apnea)     does not use CPAP    Otitis media     Pneumonia due to COVID-19 virus 2/20/2021    Renal stone     Sjogren's syndrome     SOB (shortness of breath) 6/8/2012    [address] [place] [address] 1. Normal coronary arteries. 2. Normal single renal arteries bilaterally. 3. Diastolic dysfunction.     Stroke     TIA, now on Plavix    TIA (transient ischemic attack)     TMJ disorder     Type II or unspecified type diabetes mellitus without mention of complication, uncontrolled 5/8/2014    UTI (lower urinary tract infection)     frequent    Venous insufficiency     with Hx blood clot in leg     Past Surgical History:   Procedure Laterality Date    ABDOMINAL SURGERY  2010 x2    expoloratory lap for pelvic cyst,adhesions; partial colonectomy     adhesiolysis   10/11/2012    CARDIAC CATHETERIZATION      no current blockages.    CHOLECYSTECTOMY      COLON SURGERY      r/t diverticuli    COLONOSCOPY  08/2014    repeat in 5 years    COLONOSCOPY N/A 1/7/2020    Procedure: COLONOSCOPY;  Surgeon: Kb Nguyen MD;  Location: [facility];  Service: Endoscopy;  Laterality: N/A;    COLONOSCOPY N/A 3/13/2023    Procedure: COLONOSCOPY;  Surgeon: Jarrod Frost MD;  Location: [facility];  Service: Endoscopy;  Laterality: N/A;    ENDOSCOPIC ULTRASOUND OF UPPER GASTROINTESTINAL TRACT N/A 1/13/2021    Procedure: ULTRASOUND, UPPER GI TRACT, ENDOSCOPIC;  Surgeon: Sonido Castellano III, MD;  Location: [facility];  Service: Endoscopy;  Laterality: N/A;    EPIDURAL BLOCK INJECTION  5/15/12    back,neck for pain    ESOPHAGOGASTRODUODENOSCOPY N/A 1/7/2020    Procedure: EGD (ESOPHAGOGASTRODUODENOSCOPY);  Surgeon: Kb Nguyen MD;  Location: [facility];  Service: Endoscopy;  Laterality: N/A;    ESOPHAGOGASTRODUODENOSCOPY N/A  1/13/2021    Procedure: EGD (ESOPHAGOGASTRODUODENOSCOPY);  Surgeon: Sonido Castellano III, MD;  Location: [facility] ENDO;  Service: Endoscopy;  Laterality: N/A;    ESOPHAGOGASTRODUODENOSCOPY N/A 3/13/2023    Procedure: EGD (ESOPHAGOGASTRODUODENOSCOPY);  Surgeon: Jarrod Frost MD;  [facility] ENDO;  Service: Endoscopy;  Laterality: N/A;    ESOPHAGOGASTRODUODENOSCOPY N/A 5/5/2023    Procedure: EGD (ESOPHAGOGASTRODUODENOSCOPY);  Surgeon: Noel Caputo MD;  Location: [facility] ENDO;  Service: Endoscopy;  Laterality: N/A;    EXPLORATORY LAPAROTOMY W/ BOWEL RESECTION  10/11/2012    EYE SURGERY      Laser for glaucoma    EYE SURGERY  May 2012    cataracts    HYSTERECTOMY      INTRALUMINAL GASTROINTESTINAL TRACT IMAGING VIA CAPSULE N/A 5/23/2023    Procedure: IMAGING PROCEDURE, GI TRACT, INTRALUMINAL, VIA CAPSULE;  Surgeon: Noel Caputo MD;  Location: [facility] ENDO;  Service: Endoscopy;  Laterality: N/A;    LARYNX SURGERY      polypectomy    LEFT HEART CATHETERIZATION Left 5/9/2024    Procedure: Left heart cath;  Surgeon: Aaron Marquez MD;  Location: [facility] CATH/EP LAB;  Service: Cardiology;  Laterality: Left;    OOPHORECTOMY      TONSILLECTOMY      with adenoidectomy    UPPER GASTROINTESTINAL ENDOSCOPY  12/2015    VASCULAR SURGERY      laser to varicose vein leg     Family History   Problem Relation Name Age of Onset    Throat cancer Mother      Cancer Mother      Diabetes Mellitus Mother      Stroke Father      Hypertension Father      Heart disease Father      Diverticulitis Sister      Throat cancer Brother      Cancer Brother      Thyroid disease Daughter      Lupus Daughter      Pancreatic cancer Maternal Aunt  55    Pancreatic cancer Cousin first cousin Aunt's son 58    Breast cancer Neg Hx      Ovarian cancer Neg Hx      Colon cancer Neg Hx      Colon polyps Neg Hx      Celiac disease Neg Hx      Cirrhosis Neg Hx      Crohn's disease Neg Hx      Stomach cancer Neg Hx      Ulcerative colitis Neg Hx       Rectal cancer Neg Hx      Esophageal cancer Neg Hx      Inflammatory bowel disease Neg Hx      Rheum arthritis Neg Hx      Psoriasis Neg Hx      Osteoarthritis Neg Hx      Kidney disease Neg Hx      Hyperlipidemia Neg Hx      COPD Neg Hx      Depression Neg Hx      Chronic back pain Neg Hx      Asthma Neg Hx      Alcohol abuse Neg Hx       Social History     Tobacco Use    Smoking status: Never    Smokeless tobacco: Never   Substance Use Topics    Alcohol use: Not Currently     Comment: rarely    Drug use: Yes     Types: Oxycodone     Wt Readings from Last 10 Encounters:   05/29/24 62.1 kg (136 lb 14.5 oz)   05/14/24 60.2 kg (132 lb 11.5 oz)   05/07/24 62.6 kg (138 lb)   04/05/24 60.5 kg (133 lb 6.4 oz)   01/18/24 57.9 kg (127 lb 8.6 oz)   01/17/24 58.7 kg (129 lb 8 oz)   01/03/24 52 kg (114 lb 10.2 oz)   12/06/23 57.9 kg (127 lb 8.6 oz)   12/05/23 58.1 kg (128 lb)   09/11/23 59.6 kg (131 lb 8 oz)     Lab Results   Component Value Date    WBC 9.74 05/14/2024    HGB 11.5 (L) 05/14/2024    HCT 37.0 05/14/2024    MCV 93 05/14/2024     05/14/2024     CMP  Sodium   Date Value Ref Range Status   05/14/2024 143 136 - 145 mmol/L Final     Potassium   Date Value Ref Range Status   05/14/2024 4.3 3.5 - 5.1 mmol/L Final     Chloride   Date Value Ref Range Status   05/14/2024 107 95 - 110 mmol/L Final     CO2   Date Value Ref Range Status   05/14/2024 27 23 - 29 mmol/L Final     Glucose   Date Value Ref Range Status   05/14/2024 118 (H) 70 - 110 mg/dL Final     BUN   Date Value Ref Range Status   05/14/2024 17 8 - 23 mg/dL Final     Creatinine   Date Value Ref Range Status   05/14/2024 0.9 0.5 - 1.4 mg/dL Final   09/01/2012 0.9 0.2 - 1.4 mg/dl Final     Calcium   Date Value Ref Range Status   05/14/2024 9.9 8.7 - 10.5 mg/dL Final   09/01/2012 9.9 8.6 - 10.2 mg/dl Final     Total Protein   Date Value Ref Range Status   05/09/2024 6.8 6.0 - 8.4 g/dL Final     Albumin   Date Value Ref Range Status   05/09/2024 4.1 3.5 - 5.2  g/dL Final     Total Bilirubin   Date Value Ref Range Status   05/09/2024 0.4 0.1 - 1.0 mg/dL Final     Comment:     For infants and newborns, interpretation of results should be based  on gestational age, weight and in agreement with clinical  observations.    Premature Infant recommended reference ranges:  Up to 24 hours.............<8.0 mg/dL  Up to 48 hours............<12.0 mg/dL  3-5 days..................<15.0 mg/dL  6-29 days.................<15.0 mg/dL       Alkaline Phosphatase   Date Value Ref Range Status   05/09/2024 60 55 - 135 U/L Final   09/01/2012 80 23 - 119 UNIT/L Final     AST   Date Value Ref Range Status   05/09/2024 18 10 - 40 U/L Final   09/01/2012 17 10 - 30 UNIT/L Final     ALT   Date Value Ref Range Status   05/09/2024 10 10 - 44 U/L Final     Anion Gap   Date Value Ref Range Status   05/14/2024 9 8 - 16 mmol/L Final   09/01/2012 13 5 - 15 meq/L Final     eGFR   Date Value Ref Range Status   05/14/2024 >60.0 >60 mL/min/1.73 m^2 Final     Lab Results   Component Value Date    LIPASE 78 (H) 01/02/2024     Lab Results   Component Value Date    TSH 2.458 05/06/2024 5/14/2024 magnesium 1.4 (L)    Reviewed prior medical records including radiology report of 5/14/2024 abdominal x-ray; 1/3/2024 and 12/5/2023 CT scans of abdomen pelvis; 1/10/2020 CTA abdomen pelvis; 7/8/2017 small bowel follow through; 1/13/2021 EUS; 5/14/2024 visit note with Dr. Huggins; 1/17/2024 visit note with general surgery with Dr. Guidry; & endoscopy history (see surgical history).    Objective:      Physical Exam  Vitals and nursing note reviewed.   Constitutional:       General: She is not in acute distress.     Appearance: Normal appearance. She is well-developed. She is not diaphoretic.   HENT:      Mouth/Throat:      Lips: Pink. No lesions.      Mouth: Mucous membranes are moist. No oral lesions.      Tongue: No lesions.      Pharynx: Oropharynx is clear. No pharyngeal swelling or posterior oropharyngeal  erythema.   Eyes:      General: No scleral icterus.     Conjunctiva/sclera: Conjunctivae normal.   Pulmonary:      Effort: Pulmonary effort is normal. No respiratory distress.      Breath sounds: Normal breath sounds. No wheezing.   Abdominal:      General: Bowel sounds are normal. There is no distension or abdominal bruit.      Palpations: Abdomen is soft. Abdomen is not rigid. There is no mass.      Tenderness: There is no abdominal tenderness. There is no guarding or rebound. Negative signs include Black's sign and McBurney's sign.      Hernia: A hernia (ventral hernia palpated, nontender; abdominal binder in place) is present.   Skin:     General: Skin is warm and dry.      Coloration: Skin is not jaundiced or pale.      Findings: No erythema or rash.   Neurological:      Mental Status: She is alert and oriented to person, place, and time.   Psychiatric:         Behavior: Behavior normal.         Thought Content: Thought content normal.         Judgment: Judgment normal.         Assessment:       1. Epigastric pain    2. S/P cholecystectomy    3. Nonspecific chest pain    4. Heartburn    5. Hiatal hernia    6. Nausea    7. Elevated lipase    8. Abnormal CT scan, gastrointestinal tract    9. Ventral hernia without obstruction or gangrene    10. Normocytic anemia    11. Hypomagnesemia    12. History of constipation    13. Anticoagulant long-term use    14. Elevated blood pressure reading          Plan:       Epigastric pain & S/P cholecystectomy  -     Lipase; Future; Expected date: 05/29/2024  -  TAKE   pantoprazole (PROTONIX) 40 MG tablet; Take 1 tablet (40 mg total) by mouth 2 (two) times daily before meals for 30 days, THEN 1 tablet (40 mg total) before breakfast.  Dispense: 90 tablet; Refill: 0  - schedule EGD, discussed procedure with patient, including risks and benefits, patient verbalized understanding  - Possible additional abdominal imaging pending results of testing and if symptoms  persist/worsen    Nonspecific chest pain & Elevated blood pressure reading  - follow-up with PCP & cardiologist for continued evaluation and management ASAP  - if experiencing symptoms of headache, chest pain, shortness of breath, and/or blurred vision, recommend going to ER for further evaluation and management    Heartburn & Hiatal hernia  -  TAKE   pantoprazole (PROTONIX) 40 MG tablet; Take 1 tablet (40 mg total) by mouth 2 (two) times daily before meals for 30 days, THEN 1 tablet (40 mg total) before breakfast.  Dispense: 90 tablet; Refill: 0  - CONTINUE PEPCID 40 MG ONCE DAILY AS DIRECTED  - schedule EGD, discussed procedure with patient, including risks and benefits, patient verbalized understanding    Nausea  - schedule EGD, discussed procedure with patient, including risks and benefits, patient verbalized understanding  - CONTINUE ZOFRAN PRN AS DIRECTED FOR NAUSEA    Elevated lipase  -     Lipase; Future; Expected date: 05/29/2024  - Stay well-hydrated, avoid alcohol & smoking, recommend high protein & low fat diet, & activity as tolerated. If no improvement in symptoms or symptoms worsen, call/follow-up at clinic or go to ER.    Abnormal CT scan, gastrointestinal tract: Ventral hernia without obstruction or gangrene  Recommend follow-up with general surgery for continued evaluation and management.    Normocytic anemia & Hypomagnesemia  Recommend follow-up with Primary Care Provider for continued evaluation and management.    History of constipation  - discussed with patient that a side effect of narcotic pain medications is constipation, advised patient to talk to provider who manages pain medication, patient verbalized understanding  - Recommend daily exercise as tolerated, adequate water intake (six 8-oz glasses of water daily), and high fiber diet. OTC fiber supplements are recommended if diet does not reach daily fiber goal (20-30 grams daily), such as Metamucil, Citrucel, or FiberCon (take as directed,  separate from other oral medications by >2 hours).  - CONTINUE OTC stool softener such as Colace as directed to avoid hard stools and straining with bowel movements PRN  -If still no improvement with these measures, call/follow-up    Anticoagulant long-term use  - anticoagulant(s) will likely need to be held for endoscopy, nurse will confirm with endoscopist, cardiologist, and/or PCP.    Follow up in about 1 month (around 6/29/2024), or if symptoms worsen or fail to improve.    If no improvement in symptoms or symptoms worsen, call/follow-up at clinic or go to ER.        47 minutes of total time spent on the encounter, which includes face to face time and non-face to face time preparing to see the patient (e.g., review of tests), Obtaining and/or reviewing separately obtained history, Documenting clinical information in the electronic or other health record, Independently interpreting results (not separately reported) and communicating results to the patient/family/caregiver, or Care coordination (not separately reported).

## 2024-05-29 NOTE — PATIENT INSTRUCTIONS
Acid Reflux and GERD in Adults Discharge Instructions   About this topic   GERD stands for gastroesophageal reflux disease. It is sometimes called reflux or acid reflux. Acid reflux happens when your stomach acid backs up into your esophagus, the tube that carries your food from your mouth to your stomach. This can be uncomfortable. You may have stomach or chest pain (heartburn), trouble swallowing, or an upset stomach. Some people have a cough or sore throat.  Most of the time, you can use over-the-counter medicines to help with this problem.       What care is needed at home?   Ask your doctor what you need to do when you go home. Make sure you ask questions if you do not understand what the doctor says.  Raise the head of your bed by 6 to 8 inches (15 to 20 cm). Use wood or rubber blocks under 2 legs or try a foam wedge under your mattress. Just sleeping with your head raised on pillows is not enough.  Avoid beer, wine, and mixed drinks and avoid caffeine.  Keep a healthy weight. If you are too heavy, lose weight.  If you smoke, try to quit. Your doctor or nurse can help.  Keep a diary of your signs. Write down what you had to eat before you had reflux. This will help you learn which foods cause you problems. For some people, they need to avoid coffee, chocolate, alcohol, spicy or fatty foods, or peppermint.  Avoid eating for 2 to 3 hours before bedtime. Lying down after you eat can make reflux worse.  Avoid belts and clothes that are too tight.  What follow-up care is needed?   Your doctor may ask you to make visits to the office to check on your progress. Be sure to keep these visits.  What drugs may be needed?   The doctor may order drugs to:  Relieve heartburn  Prevent reflux  Lessen acid production  Heal the esophageal lining  Will physical activity be limited?   Your physical activities will not be limited.  What problems could happen?   Asthma  Precancerous changes in the food pipe  Long-term cough  Dental  problems  Higher risk of cancer of the food pipe. This is esophageal cancer.  Narrowing of the food pipe. This is a stricture.  Open sore in the food pipe. This is an ulcer.  When do I need to call the doctor?   You have signs of a heart attack, which may include:  Severe chest pain, pressure, or discomfort with:  Breathing trouble, sweating, upset stomach, or cold, clammy skin.  Pain in your arms, back, or jaw.  Worse pain with activity like walking up stairs.  Fast or irregular heartbeat.  Feeling dizzy, faint, or weak.  You have sudden, severe belly pain or the belly pain is constant.  You have blood in the undigested food and acid that comes up, or stool that looks red, black, or like tar.  You feel like your food gets stuck or you have pain when you swallow.  You lose weight when you are not trying to.  You choke when you are eating.  Your reflux is very bad, very frequent, or not helped by over-the-counter medicines.  You keep throwing up.  Teach Back: Helping You Understand   The Teach Back Method helps you understand the information we are giving you. After you talk with the staff, tell them in your own words what you learned. This helps to make sure the staff has described each thing clearly. It also helps to explain things that may have been confusing. Before going home, make sure you can do these:  I can tell you about my condition.  I can tell you what changes I need to make with my eating habits to ease the reflux.  I can tell you what I will do if I am throwing up fluid that looks like blood or coffee grounds.  Where can I learn more?   American Academy of Family Physicians  https://familydoctor.org/condition/refluxacid-reflux/   NHS Choices  https://www.nhs.uk/conditions/heartburn-and-acid-reflux/   Last Reviewed Date   2021-06-09  Consumer Information Use and Disclaimer   This information is not specific medical advice and does not replace information you receive from your health care provider. This  is only a brief summary of general information. It does NOT include all information about conditions, illnesses, injuries, tests, procedures, treatments, therapies, discharge instructions or life-style choices that may apply to you. You must talk with your health care provider for complete information about your health and treatment options. This information should not be used to decide whether or not to accept your health care providers advice, instructions or recommendations. Only your health care provider has the knowledge and training to provide advice that is right for you.  Copyright   Copyright © 2021 UpToDate, Inc. and its affiliates and/or licensors. All rights reserved. Chest Pain   About this topic   Chest pain is felt in the upper part of your body from your neck to your belly. You may feel pain, pressure, or tightness. Many things can cause chest pain. Some are serious things like heart disease or lung disease. Less serious things like stomach problems can also cause chest pain.  The doctors may not be able to find all serious causes of chest pain the first time they see you. It is important that you follow up with your doctor. Treatment will depend on what is causing your chest pain.  What are the causes?   Some of the problems related to your heart include:  Heart attack. This is a blockage of blood supply to part of the heart.  Angina. This is similar to a heart attack, but without long-lasting heart damage.       Arrhythmia. This is abnormal heartbeats.  Pericarditis. This is irritation of the sac around the heart.  Some of the problems that may not be related to your heart include:  Digestive problems like ulcers, indigestion, gastric reflux, gallstones  Lung problems like collapsed lung, blood clots, asthma, pneumonia  Rib injuries or muscle pain  Panic attack  Pinched nerve  Shingles  What are the main signs?   Chest pain of any type should be checked by a doctor.  Signs that may show a more  serious cause of chest pain are:  Squeezing or tightness in the chest which may spread to the back, neck, jaw, shoulders, or arms  Shortness of breath  Sweating  Dizziness  Upset stomach, nausea, or throwing up  Weakness  Feeling faint  If you have chest pain with any of these signs, call for emergency help.  How does the doctor diagnose this health problem?   Your doctor will do an exam. The doctor may ask you questions about your pain. Your doctor may order:  Lab tests  Chest x-ray  Electrocardiogram (ECG)     Echocardiogram  Stress test  Nuclear heart scanning  Computed tomography (CT)  Heart cath or catheterization  How does the doctor treat this health problem?   Your treatment will depend on what is causing the pain. Many times, drugs may be given to treat the problem. Any chest pain could be serious. More serious problems can be treated by opening the vessel in your heart with a balloon or a metal straw called a stent. You may need surgery to replace the vessels in your heart. Always talk to your doctor about chest pain.  What lifestyle changes are needed?   Once your doctor finds the cause of your chest pain, you may be asked to make changes to your diet, activity, weight, and drugs you take. These changes will depend on the cause of your pain.  What drugs may be needed?   If chest pain is caused by a heart-related problem, the doctor may order drugs to:  Thin the blood  Dissolve a blood clot  Lower cholesterol  Lessen the work of your heart  Correct or prevent an abnormal heartbeat  Lower your blood pressure  Increase blood flow to the heart muscle  Relax the heart and help avoid spasms in the arteries  Check with your doctor before you take drugs like ibuprofen, naproxen, vitamins, or hormone replacement therapy.  If chest pain is caused by a something other than your heart, the doctor may order drugs to:  Help with pain  Treat stomach problems  Help with breathing  Help you relax  Control coughing  What  problems could happen?   If the pain is due to a serious problem with the heart or lungs, the following things could happen:  Heart attack with long-lasting damage to the heart  Abnormal heartbeat that is too fast, too slow, or irregular  The heart stops beating. This is cardiac arrest. If not promptly treated, it can result in death.  What can be done to prevent this health problem?   If you smoke, stop.  Keep a healthy weight. If you are too heavy, lose weight.  Keep blood pressure, cholesterol, and high blood sugar (diabetes) under control.  Be active. Walk, garden, or do something active for 30 minutes or more on most days of the week.  Eat lots of fiber, fruits, starches, and vegetables and stay away from foods that are high in fats.  When do I need to call the doctor?   Activate the emergency medical system right away if you have signs of a heart attack. Call 911 in the [place] or [place]. The sooner treatment begins, the better your chances for recovery. Call for emergency help right away if you have:  Signs of heart attack:  Chest pain  Trouble breathing  Fast heartbeat  Feeling dizzy  Not had chest pain before and it does not go away with rest after 5 minutes. Do not drive yourself to the hospital or have someone drive you. The emergency rescue people can begin to treat you the minute they arrive.       If you are to take nitroglycerin pills or spray with chest pain:  Rest. Sit or lie down.  Spray or place one pill under your tongue and let it melt.  If the pain is not better or is getting worse after 5 minutes, call for emergency help. Then take one more spray or pill under your tongue.  If the pain does not get better after another 3 to 5 minutes, take a third spray or pill under your tongue.  Drink a sip of water to wet your mouth if it is too dry to let the pills break down.  Where can I learn more?   American Heart  Association  http://www.heart.org/HEARTORG/Conditions/HeartAttack/AboutHeartAttacks/About-Heart-Attacks_UCM_002038_Article.jsp   American Heart Association  http://www.heart.org/HEARTORG/Conditions/HeartAttack/SymptomsDiagnosisofHeartAttack/Angina-Chest-Pain_UCM_450308_Article.jsp   FamilyDoctor.org  http://familydoctor.org/familydoctor/en/diseases-conditions/angina.printerview.all.html   NHS Choices  https://www.nhs.uk/conditions/chest-pain/   Last Reviewed Date   2021-06-21  Consumer Information Use and Disclaimer   This information is not specific medical advice and does not replace information you receive from your health care provider. This is only a brief summary of general information. It does NOT include all information about conditions, illnesses, injuries, tests, procedures, treatments, therapies, discharge instructions or life-style choices that may apply to you. You must talk with your health care provider for complete information about your health and treatment options. This information should not be used to decide whether or not to accept your health care providers advice, instructions or recommendations. Only your health care provider has the knowledge and training to provide advice that is right for you.  Copyright   Copyright © 2021 UpToDate, Inc. and its affiliates and/or licensors. All rights reserved. Constipation in Adults   The Basics   Written by the doctors and editors at [facility]   What is constipation? -- Constipation is a common problem that makes it hard to have bowel movements. Your bowel movements might be:  Too hard  Too small  Hard to get out  Happening fewer than 3 times a week  What causes constipation? -- Constipation can be caused by:  Side effects of some medicines  Poor diet  Diseases of the digestive system (figure 1)  What other symptoms should I watch for? -- These symptoms could signal a more serious problem:  Blood in the toilet or on the toilet paper after having a bowel  "movement  Fever  Weight loss  Feeling weak  It could also be a sign of a problem if you have new constipation without a change in your medicines or diet, and have never had constipation in the past.   Is there anything I can do on my own to get rid of constipation? -- Yes. Try these steps:  Eat foods that have a lot of fiber. Good choices are fruits, vegetables, prune juice, and cereal (table 1).  Drink plenty of water and other fluids.  When you feel the need to go to the bathroom, go to the bathroom. Don't hold it.  Take laxatives. These are medicines that help make bowel movements easier to get out. Some are pills that you swallow. Others go into the rectum. These are called "suppositories."  Should I see a doctor or nurse? -- See your doctor or nurse if:   Your symptoms are new or not normal for you  You do not have a bowel movement for a few days  The problem comes and goes, but lasts for longer than 3 weeks  You are in a lot of pain  You have other symptoms that also worry you (for example, bleeding, weakness, weight loss, or fever)  Other people in your family have had colorectal cancer or inflammatory bowel disease  Are there tests I should have? -- Your doctor or nurse will decide which tests you should have based on your age, other symptoms, and individual situation. There are lots of tests, but you might not need any.  Here are the most common tests doctors use to find the cause of constipation:  Rectal exam - Your doctor will look at the outside of your anus. They will also use a finger to feel inside the opening.  Sigmoidoscopy or colonoscopy - For these tests, the doctor puts a thin tube into your anus. Then, they advance the tube into your large intestine. The large intestine is also called the colon. The tube has a camera attached to it, so the doctor can look inside your intestines. During these tests, the doctor can also take samples of tissue to look at under a microscope (figure 2).  X-rays, CT " "scan, or MRI - These create images of the inside of your body.  Manometry studies - Manometry allows the doctor to measure the pressure inside the rectum at various points. It can help the doctor find out if the muscles that control bowel movements are working right. The test also shows whether the person's rectum can feel normally.  How is constipation treated? -- That depends on what is causing your constipation. First, your doctor will want you to try eating more fiber and drinking more water. If that doesn't help, your doctor might suggest:  Medicines that you swallow or put in your rectum  Changing the medicines you are taking for other conditions  A treatment called an "enema" - For this treatment, a doctor or nurse will squirt water into your rectum. They might also use a thin tool to help break up bowel movements that are still inside you.  You might also be able to give yourself enema treatments at home, too. Enemas can be just water, or they can contain medicine to help with constipation.   Biofeedback - This is a technique that teaches you to relax your muscles so you can let go and push bowel movements out.  Can constipation be prevented? -- You can reduce your chances of getting constipation again by:  Eating a diet that is full of fiber (table 1)  Drinking water and other fluids during the day  Going to the bathroom at regular times every day  All topics are updated as new evidence becomes available and our peer review process is complete.  This topic retrieved from [employer] on: Sep 21, 2021.  Topic 20617 Version 8.0  Release: 29.4.2 - C29.263  © 2021 UpToDate, Inc. and/or its affiliates. All rights reserved.  figure 1: Digestive system     This drawing shows the organs in the body that process food. Together these organs are called "the digestive system," or "digestive tract." As food travels through this system, the body absorbs nutrients and water.  Graphic 59671 Version 4.0    table 1: Amount of " fiber in different foods  Food  Serving  Grams of fiber    Fruits    Apple (with skin) 1 medium apple 4.4   Banana 1 medium banana 3.1   Oranges 1 orange 3.1   Prunes 1 cup, pitted 12.4   Juices    Apple, unsweetened, with added ascorbic acid 1 cup 0.5   Grapefruit, white, canned, sweetened 1 cup 0.2   Grape, unsweetened, with added ascorbic acid 1 cup 0.5   Orange 1 cup 0.7   Vegetables    Cooked   Green beans 1 cup 4.0   Carrots 1/2 cup sliced 2.3   Peas 1 cup 8.8   Potato (baked, with skin) 1 medium potato 3.8   Raw   Cucumber (with peel) 1 cucumber 1.5   Lettuce 1 cup shredded 0.5   Tomato 1 medium tomato 1.5   Spinach 1 cup 0.7   Legumes   Baked beans, canned, no salt added 1 cup 13.9   Kidney beans, canned 1 cup 13.6   Lima beans, canned 1 cup 11.6   Lentils, boiled 1 cup 15.6   Breads, pastas, flours    Bran muffins 1 medium muffin 5.2   Oatmeal, cooked 1 cup 4.0   White bread 1 slice 0.6   Whole-wheat bread 1 slice 1.9   Pasta and rice, cooked   Macaroni 1 cup 2.5   Rice, brown 1 cup 3.5   Rice, white 1 cup 0.6   Spaghetti (regular) 1 cup 2.5   Nuts    Almonds 1/2 cup 8.7   Peanuts 1/2 cup 7.9   To learn how much fiber and other nutrients are in different foods, visit the [place] Department of Agriculture (USDA) FoodData Central website.  Graphic 92690 Version 6.0  figure 2: Colonoscopy     During a colonoscopy, you lie on your side and the doctor puts a thin tube with a camera into your anus (from behind). Then the doctor advances the tube into the rectum and colon. The camera sends pictures from inside your colon to a television screen.  Graphic 26420 Version 6.0    Consumer Information Use and Disclaimer   This information is not specific medical advice and does not replace information you receive from your health care provider. This is only a brief summary of general information. It does NOT include all information about conditions, illnesses, injuries, tests, procedures, treatments, therapies,  discharge instructions or life-style choices that may apply to you. You must talk with your health care provider for complete information about your health and treatment options. This information should not be used to decide whether or not to accept your health care provider's advice, instructions or recommendations. Only your health care provider has the knowledge and training to provide advice that is right for you. The use of this information is governed by the [employer] End User License Agreement, available at https://www.eTect.Soundflavor/en/solutions/Wild Needle/about/shreya.The use of [employer] content is governed by the [employer] Terms of Use. ©2021 [employer] All rights reserved.  Copyright   © 2021 UpToDate, Inc. and/or its affiliates. All rights reserved.

## 2024-05-30 NOTE — TELEPHONE ENCOUNTER
Malia Gallagher MA  You6 hours ago (8:30 AM)     NM  Unless patient has had a coronary PCI within the last 6 months that is not known to me/available in epic it is ok  to hold plavix (Effient, Brilinta)/ASA for 5-7 days preprocedure, resume ASAP post procedure.  Understands there is less than 1% chance of coronary thrombosis.     Leonidas Page MD Martin, Nichelle R., MA7 hours ago (8:06 AM)       Unless patient has had a coronary PCI within the last 6 months that is not known to me/available in epic it is ok  to hold plavix (Effient, Brilinta)/ASA for 5-7 days preprocedure, resume ASAP post procedure.  Understands there is less than 1% chance of coronary thrombosis.     Malia Gallagher MA Isa, George F., MD23 hours ago (3:57 PM)     NM  Request for cardiac clearance. Patient is scheduled for an EGD on 8/13. Is it ok for her to hold her Plavix for 5 days prior?     Thank you

## 2024-07-02 ENCOUNTER — PATIENT MESSAGE (OUTPATIENT)
Dept: ADMINISTRATIVE | Facility: HOSPITAL | Age: 79
End: 2024-07-02
Payer: MEDICARE

## 2024-07-16 DIAGNOSIS — M19.90 INFLAMMATORY ARTHRITIS: ICD-10-CM

## 2024-07-16 DIAGNOSIS — R76.8 RHEUMATOID FACTOR POSITIVE: ICD-10-CM

## 2024-07-16 RX ORDER — FOLIC ACID 1 MG/1
1000 TABLET ORAL
Qty: 90 TABLET | Refills: 3 | Status: SHIPPED | OUTPATIENT
Start: 2024-07-16

## 2024-07-16 NOTE — TELEPHONE ENCOUNTER
No care due was identified.  [facility] Embedded Care Due Messages. Reference number: [id number].   7/16/2024 12:09:05 PM CDT   none

## 2024-08-07 ENCOUNTER — HOSPITAL ENCOUNTER (OUTPATIENT)
Dept: RADIOLOGY | Facility: HOSPITAL | Age: 79
Discharge: HOME | End: 2024-08-07
Attending: PHYSICIAN ASSISTANT
Payer: MEDICARE

## 2024-08-07 DIAGNOSIS — E11.65 TYPE 2 DIABETES MELLITUS WITH HYPERGLYCEMIA, WITHOUT LONG-TERM CURRENT USE OF INSULIN: ICD-10-CM

## 2024-08-07 PROCEDURE — 76536 US EXAM OF HEAD AND NECK: CPT | Mod: TC

## 2024-08-07 PROCEDURE — 76536 US EXAM OF HEAD AND NECK: CPT | Mod: 26,,, | Performed by: RADIOLOGY

## 2024-08-12 ENCOUNTER — TELEPHONE (OUTPATIENT)
Dept: GASTROENTEROLOGY | Facility: CLINIC | Age: 79
End: 2024-08-12
Payer: MEDICARE

## 2024-08-12 NOTE — TELEPHONE ENCOUNTER
----- Message from Margot Dumont sent at 8/12/2024 10:14 AM CDT -----  Regarding: advice  Contact: patient  Type: Needs Medical Advice  Who Called:  patient  Symptoms (please be specific):  endo  How long has patient had these symptoms:    Pharmacy name and phone #:    Best Call Back Number: [phone] (home)     Additional Information: Please call patient with times and place for procedure tomorrow.  Thanks!

## 2024-08-27 LAB
LEFT EYE DM RETINOPATHY: POSITIVE
RIGHT EYE DM RETINOPATHY: POSITIVE

## 2024-10-03 DIAGNOSIS — E83.42 HYPOMAGNESEMIA: ICD-10-CM

## 2024-10-03 RX ORDER — LANOLIN ALCOHOL/MO/W.PET/CERES
1 CREAM (GRAM) TOPICAL
Qty: 90 TABLET | Refills: 0 | Status: SHIPPED | OUTPATIENT
Start: 2024-10-03

## 2024-10-03 NOTE — TELEPHONE ENCOUNTER
No care due was identified.  [facility] Embedded Care Due Messages. Reference number: [id number].   10/03/2024 12:07:27 PM CDT

## 2024-11-14 DIAGNOSIS — K44.9 HIATAL HERNIA: ICD-10-CM

## 2024-11-14 DIAGNOSIS — R10.13 EPIGASTRIC PAIN: ICD-10-CM

## 2024-11-18 DIAGNOSIS — K44.9 HIATAL HERNIA: ICD-10-CM

## 2024-11-18 DIAGNOSIS — R10.13 EPIGASTRIC PAIN: ICD-10-CM

## 2024-11-18 RX ORDER — PANTOPRAZOLE SODIUM 40 MG/1
40 TABLET, DELAYED RELEASE ORAL
Qty: 90 TABLET | Refills: 0 | Status: SHIPPED | OUTPATIENT
Start: 2024-11-18

## 2024-11-20 RX ORDER — PANTOPRAZOLE SODIUM 40 MG/1
TABLET, DELAYED RELEASE ORAL
Qty: 90 TABLET | Refills: 0 | OUTPATIENT
Start: 2024-11-20 | End: 2025-01-19

## 2025-01-13 DIAGNOSIS — G47.00 INSOMNIA, UNSPECIFIED TYPE: ICD-10-CM

## 2025-01-13 RX ORDER — TRAZODONE HYDROCHLORIDE 50 MG/1
TABLET ORAL
Qty: 90 TABLET | Refills: 1 | Status: SHIPPED | OUTPATIENT
Start: 2025-01-13

## 2025-01-13 NOTE — TELEPHONE ENCOUNTER
Refill Decision Note   Pili Lluvia  is requesting a refill authorization.  Brief Assessment and Rationale for Refill:  Approve     Medication Therapy Plan:         Comments:     Note composed:2:16 PM 01/13/2025

## 2025-01-13 NOTE — TELEPHONE ENCOUNTER
No care due was identified.  [facility] Embedded Care Due Messages. Reference number: [id number].   1/13/2025 10:23:11 AM CST

## 2025-03-10 ENCOUNTER — PATIENT MESSAGE (OUTPATIENT)
Dept: ADMINISTRATIVE | Facility: HOSPITAL | Age: 80
End: 2025-03-10
Payer: MEDICARE

## 2025-04-03 DIAGNOSIS — K21.9 GASTROESOPHAGEAL REFLUX DISEASE WITHOUT ESOPHAGITIS: ICD-10-CM

## 2025-04-03 RX ORDER — FAMOTIDINE 40 MG/1
40 TABLET, FILM COATED ORAL NIGHTLY
Qty: 90 TABLET | Refills: 0 | Status: SHIPPED | OUTPATIENT
Start: 2025-04-03

## 2025-04-03 NOTE — TELEPHONE ENCOUNTER
Provider Staff:  Action required for this patient    Requires appointment   Requires labs      Please see care gap opportunities below in Care Due Message.    Thanks!  [facility]     Appointments      Date Provider   Last Visit   5/14/2024 LUÍS Huggins MD   Next Visit   Visit date not found LUÍS Huggins MD     Refill Decision Note   Pili Teixeira  is requesting a refill authorization.  Brief Assessment and Rationale for Refill:  Approve     Medication Therapy Plan:         Alert overridden per protocol: Yes   Comments:     Note composed:12:03 PM 04/03/2025

## 2025-04-03 NOTE — TELEPHONE ENCOUNTER
Care Due:                  Date            Visit Type   Department     Provider  --------------------------------------------------------------------------------                                [facility]  Last Visit: 05-      FOLLOW UP    MEDICINE       ANKIT Huggins  Next Visit: None Scheduled  None         None Found                                                            Last  Test          Frequency    Reason                     Performed    Due Date  --------------------------------------------------------------------------------    Office Visit  12 months..  folic, magnesium.........  05- 05-    Cr..........  12 months..  famotidine, magnesium....  05-   05-    Mg Level....  12 months..  magnesium................  05-   05-    [employer] Embedded Care Due Messages. Reference number: [id number].   4/03/2025 9:42:26 AM CDT

## 2025-05-07 ENCOUNTER — PATIENT MESSAGE (OUTPATIENT)
Dept: ADMINISTRATIVE | Facility: HOSPITAL | Age: 80
End: 2025-05-07
Payer: MEDICAID

## 2025-05-16 DIAGNOSIS — R10.13 EPIGASTRIC PAIN: ICD-10-CM

## 2025-05-16 DIAGNOSIS — K44.9 HIATAL HERNIA: ICD-10-CM

## 2025-05-16 RX ORDER — PANTOPRAZOLE SODIUM 40 MG/1
40 TABLET, DELAYED RELEASE ORAL
Qty: 90 TABLET | Refills: 0 | Status: SHIPPED | OUTPATIENT
Start: 2025-05-16

## 2025-05-16 NOTE — TELEPHONE ENCOUNTER
Refill Decision Note   Pili Lluvia  is requesting a refill authorization.  Brief Assessment and Rationale for Refill:  Approve     Medication Therapy Plan:        Comments:     Note composed:12:12 PM 05/16/2025

## 2025-05-16 NOTE — TELEPHONE ENCOUNTER
No care due was identified.  [facility] Embedded Care Due Messages. Reference number: [id number].   5/16/2025 10:49:27 AM CDT

## 2025-07-15 DIAGNOSIS — G47.00 INSOMNIA, UNSPECIFIED TYPE: ICD-10-CM

## 2025-07-15 DIAGNOSIS — R76.8 RHEUMATOID FACTOR POSITIVE: ICD-10-CM

## 2025-07-15 DIAGNOSIS — K21.9 GASTROESOPHAGEAL REFLUX DISEASE WITHOUT ESOPHAGITIS: ICD-10-CM

## 2025-07-15 DIAGNOSIS — M19.90 INFLAMMATORY ARTHRITIS: ICD-10-CM

## 2025-07-15 NOTE — TELEPHONE ENCOUNTER
Care Due:                  Date            Visit Type   Department     Provider  --------------------------------------------------------------------------------                                [facility]  Last Visit: 05-      FOLLOW UP    MEDICINE       ANKIT Huggins                              EP -                              PRIMARY      [facility]  Next Visit: 07-      CARE ([facility])   MEDICINE       ANKIT Huggins                                                            Last  Test          Frequency    Reason                     Performed    Due Date  --------------------------------------------------------------------------------    Cr..........  12 months..  famotidine, magnesium....  05-   05-    Mg Level....  12 months..  magnesium................  05-   05-    [employer] Embedded Care Due Messages. Reference number: [id number].   7/15/2025 2:20:37 PM CDT

## 2025-07-16 ENCOUNTER — LAB VISIT (OUTPATIENT)
Dept: LAB | Facility: HOSPITAL | Age: 80
End: 2025-07-16
Attending: FAMILY MEDICINE
Payer: MEDICARE

## 2025-07-16 ENCOUNTER — PATIENT OUTREACH (OUTPATIENT)
Dept: ADMINISTRATIVE | Facility: HOSPITAL | Age: 80
End: 2025-07-16
Payer: MEDICARE

## 2025-07-16 ENCOUNTER — OFFICE VISIT (OUTPATIENT)
Dept: FAMILY MEDICINE | Facility: CLINIC | Age: 80
End: 2025-07-16
Payer: MEDICARE

## 2025-07-16 VITALS
HEIGHT: 63 IN | SYSTOLIC BLOOD PRESSURE: 132 MMHG | BODY MASS INDEX: 25.08 KG/M2 | WEIGHT: 141.56 LBS | OXYGEN SATURATION: 97 % | HEART RATE: 57 BPM | DIASTOLIC BLOOD PRESSURE: 84 MMHG

## 2025-07-16 DIAGNOSIS — D50.0 IRON DEFICIENCY ANEMIA DUE TO CHRONIC BLOOD LOSS: ICD-10-CM

## 2025-07-16 DIAGNOSIS — Z12.31 ENCOUNTER FOR SCREENING MAMMOGRAM FOR MALIGNANT NEOPLASM OF BREAST: ICD-10-CM

## 2025-07-16 DIAGNOSIS — E11.69 HYPERLIPIDEMIA ASSOCIATED WITH TYPE 2 DIABETES MELLITUS: ICD-10-CM

## 2025-07-16 DIAGNOSIS — I15.2 HYPERTENSION ASSOCIATED WITH DIABETES: ICD-10-CM

## 2025-07-16 DIAGNOSIS — E11.59 HYPERTENSION ASSOCIATED WITH DIABETES: ICD-10-CM

## 2025-07-16 DIAGNOSIS — E78.5 HYPERLIPIDEMIA ASSOCIATED WITH TYPE 2 DIABETES MELLITUS: ICD-10-CM

## 2025-07-16 DIAGNOSIS — J44.9 CHRONIC OBSTRUCTIVE PULMONARY DISEASE, UNSPECIFIED COPD TYPE: ICD-10-CM

## 2025-07-16 DIAGNOSIS — E11.00 TYPE 2 DIABETES MELLITUS WITH HYPEROSMOLARITY WITHOUT COMA, WITHOUT LONG-TERM CURRENT USE OF INSULIN: Chronic | ICD-10-CM

## 2025-07-16 DIAGNOSIS — I83.813 VARICOSE VEINS OF BOTH LOWER EXTREMITIES WITH PAIN: ICD-10-CM

## 2025-07-16 DIAGNOSIS — E11.00 TYPE 2 DIABETES MELLITUS WITH HYPEROSMOLARITY WITHOUT COMA, WITHOUT LONG-TERM CURRENT USE OF INSULIN: Primary | Chronic | ICD-10-CM

## 2025-07-16 DIAGNOSIS — I25.10 CORONARY ARTERY DISEASE INVOLVING NATIVE CORONARY ARTERY OF NATIVE HEART WITHOUT ANGINA PECTORIS: ICD-10-CM

## 2025-07-16 DIAGNOSIS — K59.09 CHRONIC CONSTIPATION: ICD-10-CM

## 2025-07-16 LAB
ABSOLUTE EOSINOPHIL (OHS): 0.06 K/UL
ABSOLUTE MONOCYTE (OHS): 0.67 K/UL (ref 0.3–1)
ABSOLUTE NEUTROPHIL COUNT (OHS): 4.94 K/UL (ref 1.8–7.7)
ALBUMIN SERPL BCP-MCNC: 4.2 G/DL (ref 3.5–5.2)
ALBUMIN/CREAT UR: 5.9 UG/MG
ALP SERPL-CCNC: 83 UNIT/L (ref 40–150)
ALT SERPL W/O P-5'-P-CCNC: 9 UNIT/L (ref 10–44)
ANION GAP (OHS): 8 MMOL/L (ref 8–16)
AST SERPL-CCNC: 17 UNIT/L (ref 11–45)
BASOPHILS # BLD AUTO: 0.06 K/UL
BASOPHILS NFR BLD AUTO: 0.7 %
BILIRUB SERPL-MCNC: 0.4 MG/DL (ref 0.1–1)
BUN SERPL-MCNC: 25 MG/DL (ref 8–23)
CALCIUM SERPL-MCNC: 10.1 MG/DL (ref 8.7–10.5)
CHLORIDE SERPL-SCNC: 104 MMOL/L (ref 95–110)
CHOLEST SERPL-MCNC: 195 MG/DL (ref 120–199)
CHOLEST/HDLC SERPL: 3.5 {RATIO} (ref 2–5)
CO2 SERPL-SCNC: 27 MMOL/L (ref 23–29)
CREAT SERPL-MCNC: 1 MG/DL (ref 0.5–1.4)
CREAT UR-MCNC: 136 MG/DL (ref 15–325)
EAG (OHS): 126 MG/DL (ref 68–131)
ERYTHROCYTE [DISTWIDTH] IN BLOOD BY AUTOMATED COUNT: 11.9 % (ref 11.5–14.5)
GFR SERPLBLD CREATININE-BSD FMLA CKD-EPI: 57 ML/MIN/1.73/M2
GLUCOSE SERPL-MCNC: 109 MG/DL (ref 70–110)
HBA1C MFR BLD: 6 % (ref 4–5.6)
HCT VFR BLD AUTO: 41.3 % (ref 37–48.5)
HDLC SERPL-MCNC: 55 MG/DL (ref 40–75)
HDLC SERPL: 28.2 % (ref 20–50)
HGB BLD-MCNC: 13 GM/DL (ref 12–16)
IMM GRANULOCYTES # BLD AUTO: 0.03 K/UL (ref 0–0.04)
IMM GRANULOCYTES NFR BLD AUTO: 0.4 % (ref 0–0.5)
LDLC SERPL CALC-MCNC: 119.6 MG/DL (ref 63–159)
LYMPHOCYTES # BLD AUTO: 2.34 K/UL (ref 1–4.8)
MCH RBC QN AUTO: 28.8 PG (ref 27–31)
MCHC RBC AUTO-ENTMCNC: 31.5 G/DL (ref 32–36)
MCV RBC AUTO: 92 FL (ref 82–98)
MICROALBUMIN UR-MCNC: 8 UG/ML (ref ?–5000)
NONHDLC SERPL-MCNC: 140 MG/DL
NUCLEATED RBC (/100WBC) (OHS): 0 /100 WBC
PLATELET # BLD AUTO: 160 K/UL (ref 150–450)
PMV BLD AUTO: 11.7 FL (ref 9.2–12.9)
POTASSIUM SERPL-SCNC: 4.3 MMOL/L (ref 3.5–5.1)
PROT SERPL-MCNC: 7.5 GM/DL (ref 6–8.4)
RBC # BLD AUTO: 4.51 M/UL (ref 4–5.4)
RELATIVE EOSINOPHIL (OHS): 0.7 %
RELATIVE LYMPHOCYTE (OHS): 28.9 % (ref 18–48)
RELATIVE MONOCYTE (OHS): 8.3 % (ref 4–15)
RELATIVE NEUTROPHIL (OHS): 61 % (ref 38–73)
SODIUM SERPL-SCNC: 139 MMOL/L (ref 136–145)
TRIGL SERPL-MCNC: 102 MG/DL (ref 30–150)
WBC # BLD AUTO: 8.1 K/UL (ref 3.9–12.7)

## 2025-07-16 PROCEDURE — 3079F DIAST BP 80-89 MM HG: CPT | Mod: CPTII,S$GLB,, | Performed by: FAMILY MEDICINE

## 2025-07-16 PROCEDURE — 1160F RVW MEDS BY RX/DR IN RCRD: CPT | Mod: CPTII,S$GLB,, | Performed by: FAMILY MEDICINE

## 2025-07-16 PROCEDURE — 3075F SYST BP GE 130 - 139MM HG: CPT | Mod: CPTII,S$GLB,, | Performed by: FAMILY MEDICINE

## 2025-07-16 PROCEDURE — 1157F ADVNC CARE PLAN IN RCRD: CPT | Mod: CPTII,S$GLB,, | Performed by: FAMILY MEDICINE

## 2025-07-16 PROCEDURE — 80061 LIPID PANEL: CPT

## 2025-07-16 PROCEDURE — 99214 OFFICE O/P EST MOD 30 MIN: CPT | Mod: S$GLB,,, | Performed by: FAMILY MEDICINE

## 2025-07-16 PROCEDURE — 36415 COLL VENOUS BLD VENIPUNCTURE: CPT | Mod: PO

## 2025-07-16 PROCEDURE — 1159F MED LIST DOCD IN RCRD: CPT | Mod: CPTII,S$GLB,, | Performed by: FAMILY MEDICINE

## 2025-07-16 PROCEDURE — 1126F AMNT PAIN NOTED NONE PRSNT: CPT | Mod: CPTII,S$GLB,, | Performed by: FAMILY MEDICINE

## 2025-07-16 PROCEDURE — 83036 HEMOGLOBIN GLYCOSYLATED A1C: CPT

## 2025-07-16 PROCEDURE — 99999 PR PBB SHADOW E&M-EST. PATIENT-LVL III: CPT | Mod: PBBFAC,,, | Performed by: FAMILY MEDICINE

## 2025-07-16 PROCEDURE — 3288F FALL RISK ASSESSMENT DOCD: CPT | Mod: CPTII,S$GLB,, | Performed by: FAMILY MEDICINE

## 2025-07-16 PROCEDURE — 1101F PT FALLS ASSESS-DOCD LE1/YR: CPT | Mod: CPTII,S$GLB,, | Performed by: FAMILY MEDICINE

## 2025-07-16 PROCEDURE — 82040 ASSAY OF SERUM ALBUMIN: CPT

## 2025-07-16 PROCEDURE — G2211 COMPLEX E/M VISIT ADD ON: HCPCS | Mod: S$GLB,,, | Performed by: FAMILY MEDICINE

## 2025-07-16 PROCEDURE — 85025 COMPLETE CBC W/AUTO DIFF WBC: CPT

## 2025-07-16 PROCEDURE — 82043 UR ALBUMIN QUANTITATIVE: CPT

## 2025-07-16 RX ORDER — FOLIC ACID 1 MG/1
1000 TABLET ORAL
Qty: 90 TABLET | Refills: 3 | Status: SHIPPED | OUTPATIENT
Start: 2025-07-16

## 2025-07-16 RX ORDER — FAMOTIDINE 40 MG/1
40 TABLET, FILM COATED ORAL NIGHTLY
Qty: 90 TABLET | Refills: 3 | Status: SHIPPED | OUTPATIENT
Start: 2025-07-16

## 2025-07-16 RX ORDER — POLYETHYLENE GLYCOL 3350 17 G/17G
17 POWDER, FOR SOLUTION ORAL NIGHTLY
Qty: 90 EACH | Refills: 3 | Status: SHIPPED | OUTPATIENT
Start: 2025-07-16

## 2025-07-16 RX ORDER — TRAZODONE HYDROCHLORIDE 50 MG/1
TABLET ORAL
Qty: 90 TABLET | Refills: 3 | Status: SHIPPED | OUTPATIENT
Start: 2025-07-16

## 2025-07-16 NOTE — PROGRESS NOTES
Subjective:       Patient ID: Pili Teixeira is a 80 y.o. female.    Chief Complaint: Hyperglycemia    Pt is known to me.  Pt is here for followup of chronic medical issues.  The pt reports frequent  hand and leg cramps mostly at night or with lying down.  She reports that she drinks a lot of water.    She has been off her cholesterol med thinking it was causing the cramps but they have not abated.  The pt has varicose veins mostly in her thighs.  She wants some compression panty hose.  She thinks she has had high blood sugars--she reports 120 this morning.   She continues with Dr. Page--her CAD and CHF are stable.  The pt reports increasing problem with constipation.  She is using prn Metamucil without a lot of success.  Discussed health maintenance with pt and will schedule appropriate studies and/or immunizations.     Review of Systems    Objective:      Physical Exam  Vitals and nursing note reviewed.   Constitutional:       Appearance: She is well-developed.   HENT:      Head: Normocephalic.   Eyes:      Conjunctiva/sclera: Conjunctivae normal.      Pupils: Pupils are equal, round, and reactive to light.   Neck:      Thyroid: No thyromegaly.      Vascular: No carotid bruit.   Cardiovascular:      Rate and Rhythm: Normal rate and regular rhythm.      Heart sounds: Normal heart sounds.      Comments: Varicosities BLE--thighs  Pulmonary:      Effort: Pulmonary effort is normal.      Breath sounds: Normal breath sounds.   Abdominal:      General: Bowel sounds are normal.      Palpations: Abdomen is soft.      Tenderness: There is no abdominal tenderness.   Musculoskeletal:         General: No tenderness or deformity. Normal range of motion.      Cervical back: Normal range of motion and neck supple.      Right lower leg: No edema.      Left lower leg: No edema.   Lymphadenopathy:      Cervical: No cervical adenopathy.   Skin:     General: Skin is warm and dry.   Neurological:      Mental Status: She is alert and  oriented to person, place, and time.      Cranial Nerves: No cranial nerve deficit.      Motor: No abnormal muscle tone.      Coordination: Coordination normal.      Deep Tendon Reflexes: Reflexes normal.   Psychiatric:         Behavior: Behavior normal.         Assessment:       1. Type 2 diabetes mellitus with hyperosmolarity without coma, without long-term current use of insulin    2. Encounter for screening mammogram for malignant neoplasm of breast    3. Chronic obstructive pulmonary disease, unspecified COPD type    4. Coronary artery disease involving native coronary artery of native heart without angina pectoris    5. Hypertension associated with diabetes    6. Hyperlipidemia associated with type 2 diabetes mellitus    7. Iron deficiency anemia due to chronic blood loss    8. Varicose veins of both lower extremities with pain    9. Chronic constipation        Plan:       Pili was seen today for hyperglycemia.    Diagnoses and all orders for this visit:    Type 2 diabetes mellitus with hyperosmolarity without coma, without long-term current use of insulin  -     Comprehensive Metabolic Panel; Future  -     Hemoglobin A1C; Future  -     Microalbumin/Creatinine Ratio, Urine; Future    Encounter for screening mammogram for malignant neoplasm of breast  -     Mammo Digital Screening Bilat w/ Ld (XPD); Future    Chronic obstructive pulmonary disease, unspecified COPD type    Coronary artery disease involving native coronary artery of native heart without angina pectoris    Hypertension associated with diabetes  -     CBC Auto Differential; Future  -     Comprehensive Metabolic Panel; Future  -     Lipid Panel; Future    Hyperlipidemia associated with type 2 diabetes mellitus  -     CBC Auto Differential; Future  -     Comprehensive Metabolic Panel; Future  -     Lipid Panel; Future    Iron deficiency anemia due to chronic blood loss  -     CBC Auto Differential; Future    Varicose veins of both lower  extremities with pain  -     HME - OTHER    Chronic constipation  -     polyethylene glycol (GLYCOLAX) 17 gram PwPk; Take 17 g by mouth every evening.      During this visit, I reviewed the pt's history, medications, allergies, and problem list.    Visit today included increased complexity associated with the care of the episodic problem constipation addressed and managing the longitudinal care of the patient due to the serious and/or complex managed problem(s) HLD, HTN, DM.

## 2025-07-16 NOTE — TELEPHONE ENCOUNTER
Refill Routing Note   Medication(s) are not appropriate for processing by [facility] for the following reason(s):        Drug-disease interaction: Drug-Disease: traZODone and Hypomagnesemia   Outside of protocol: Folic Acid  Required labs outdated: CMP    [facility] action(s):  Defer  Route     Requires labs : Yes    Medication Therapy Plan: Lab(s) recommended: [facility]    Pharmacist review requested: Yes     Appointments  past 12m or future 3m with PCP    Date Provider   Last Visit   5/14/2024 LUÍS Huggins MD   Next Visit   7/16/2025 LUÍS Huggins MD   ED visits in past 90 days: 0        Note composed:9:58 PM 07/15/2025

## 2025-07-16 NOTE — TELEPHONE ENCOUNTER
Refill Routing Note   Medication(s) are not appropriate for processing by [facility] for the following reason(s):        Drug-disease interaction  Outside of protocol  Required labs outdated    [facility] action(s):  Defer  Route   Requires labs : Yes           Pharmacist review requested: Yes     Appointments  past 12m or future 3m with PCP    Date Provider   Last Visit   5/14/2024 LUÍS Huggins MD   Next Visit   7/16/2025 LUÍS Huggins MD   ED visits in past 90 days: 0        Note composed:10:14 PM 07/15/2025

## 2025-07-16 NOTE — LETTER
AUTHORIZATION FOR RELEASE OF   CONFIDENTIAL INFORMATION    Dear Igor Garcia MD,    We are seeing Pili Teixeira, date of birth 1945, in the clinic at [facility]. LUÍS Huggins MD is the patient's PCP. Pili Teixeira has an outstanding lab/procedure at the time we reviewed her chart. In order to help keep her health information updated, she has authorized us to request the following medical record(s):        (  )  MAMMOGRAM                                      (  )  COLONOSCOPY      (  )  PAP SMEAR                                          (  )  OUTSIDE LAB RESULTS     (  )  DEXA SCAN                                          ( X) DIABETIC EYE EXAM            (  )  FOOT EXAM                                          (  )  ENTIRE RECORD     (  )  OUTSIDE IMMUNIZATIONS                 (  )  _______________         Please fax records to [facility], Smith, M. Kelli, MD, [phone] or email to [email or web address].    If you have any questions, please contact Josse Grimm LPN Care Coordinator  at [phone].            Patient Name: Pili Teixeira  : 1945  Patient Phone #: [phone]                       Pili Teixeira  MRN: [id number]  : 1945  Age: 80 y.o.  Sex: female         Patient/Legal Guardian Signature  This signature was collected at 2025    Pili Teixeira     Self  _______________________________   Printed Name/Relationship to Patient      Consent for Examination and Treatment: I hereby authorize the providers and employees of [facility] ([facility]) to provide medical treatment/services which includes, but is not limited to, performing and administering tests and diagnostic procedures that are deemed necessary, including, but not limited to, imaging examinations, blood tests and other laboratory procedures as may be required by the hospital, clinic, or may be ordered by my physician(s) or persons working under the general and/or  special instructions of my physician(s).      I understand and agree that this consent covers all authorized persons, including but not limited to physicians, residents, nurse practitioners, physicians' assistants, specialists, consultants, student nurses, and independently contracted physicians, who are called upon by the physician in charge, to carry out the diagnostic procedures and medical or surgical treatment.     I hereby authorize [facility] to retain or dispose of any specimens or tissue, should there be such remaining from any test or procedure.     I hereby authorize and give consent for [facility] providers and employees to take photographs, images or videotapes of such diagnostic, surgical or treatment procedures of Patient as may be required by [facility] or as may be ordered by a physician. I further acknowledge and agree that [facility] may use cameras or other devices for patient monitoring.     I am aware that the practice of medicine is not an exact science, and I acknowledge that no guarantees have been made to me as to the outcome of any tests, procedures or treatment.     Authorization for Release of Information: I understand that my insurance company and/or their agents may need information necessary to make determinations about payment/reimbursement. I hereby provide authorization to release to all insurance companies, their successors, assignees, other parties with whom they may have contracted, or others acting on their behalf, that are involved with payment for any hospital and/or clinic charges incurred by the patient, any information that they request and deem necessary for payment/reimbursement, and/or quality review.  I further authorize the release of my health information to physicians or other health care practitioners on staff who are involved in my health care now and in the future, and to other health care providers, entities, or institutions for the purpose of my continued care and  treatment, including referrals.     REGISTRATION AUTHORIZATION  Form No. 93500 (Rev. 3/25/2024)    Page 1 of 3                       Medicare Patient's Certification and Authorization to Release Information and Payment Request:  I certify that the information given by me in applying for payment under Title XVIII of the Social Security Act is correct. I authorize any reddy of medical or other information about me to release to the Social SecurityAdministration, or its intermediaries or carriers, any information needed for this or a related Medicare claim. I request that payment of authorized benefits be made on my behalf.     Assignment of Insurance Benefits:   I hereby authorize any and all insurance companies, health plans, defined   benefit plans, health insurers or any entity that is or may be responsible for payment of my medical expenses to pay all hospital and medical benefits now due, and to become due and payable to me under any hospital benefits, sick benefits, injury benefits or any other benefit for services rendered to me, including Major Medical Benefits, direct to [facility] and all independently contracted physicians. I assign any and all rights that I may have against any and all insurance companies, health plans, defined benefit plans, health insurers or any entity that is or may be responsible for payment of my medical expenses, including, but not limited to any right to appeal a denial of a claim, any right to bring any action, lawsuit, administrative proceeding, or other cause of action on my behalf. I specifically assign my right to pursue litigation against any and all insurance companies, health plans, defined benefit plans, health insurers or any entity that is or may be responsible for payment of my medical expenses based upon a refusal to pay charges.            E. Valuables: It is understood and agreed that [facility] is not liable for the damage to or loss of any money, jewelry,   documents,  dentures, eye glasses, hearing aids, prosthetics, or other property of value.     F. Computer Equipment: I understand and agree that should I choose to use computer equipment owned by [facility] or if I choose to access the Internet via [facility]s network, I do so at my own risk. [facility] is not responsible for any damage to my computer equipment or to any damages of any type that might arise from my loss of equipment or data.     G. Acceptance of Financial Responsibility:  I agree that in consideration of the services and   supplies that have been   or will be furnished to the patient, I am hereby obligated to pay all charges made for or on the account of the patient according to the standard rates (in effect at the time the services and supplies are delivered) established by [facility], including its Patient Financial Assistance Policy to the extent it is applicable. I understand that I am responsible for all charges, or portions thereof, not covered by insurance or other sources. Patient refunds will be distributed only after balances at all [facility] facilities are paid.     H. Communication Authorization:  I hereby authorize [facility] and its representatives, along with any billing service   or  who may work on their behalf, to contact me on   my cell phone and/or home phone using pre- recorded messages, artificial voice messages, automatic telephone dialing devices or other computer assisted technology, or by electronic      mail, text messaging, or by any other form of electronic communication. This includes, but is not limited to, appointment reminders, yearly physical exam reminders, preventive care reminders, patient campaigns, welcome calls, and calls about account balances on my account or any account on which I am listed as a guarantor. I understand I have the right to opt out of these communications at any time.      Relationship  Between  Facility and  Provider:      I understand that some, but  not all, providers furnishing services to the patient are not employees or agents of [facility]. The patient is under the care and supervision of his/her attending physician, and it is the responsibility of the facility and its nursing staff to carry out the instructions of such physicians. It is the responsibility of the patient's physician/designee to obtain the patient's informed consent, when required, for medical or surgical treatment, special diagnostic or therapeutic procedures, or hospital services rendered for the patient under the special instructions of the physician/designee.           REGISTRATION AUTHORIZATION  Form No. 08240 (Rev. 3/25/2024)    Page 2 of 3                       Immunizations: [facility] shares immunization information with state sponsored health departments to help you and your doctor keep track of your immunization records. By signing, you consent to have this information shared with the health department in your state:                                [place] - LINKS ([place] Immunization Network for Kids Statewide)                                [place] - MIIX ([place] Immunization Information eXchange)                                [place] - ImmPRINT (Immunization Patient Registry with Integrated Technology)     TERM: This authorization is valid for this and subsequent care/treatment I receive at [facility] and will remain valid unless/until revoked in writing by me.     [facility]: As used in this document, [facility] means all [facility] owned and managed facilities, including, but not limited to, all health centers, surgery centers, clinics, urgent care centers, and hospitals.         [facility] complies with applicable Federal civil rights laws and does not discriminate on the basis of race, color, national origin, age, disability, or sex.  ATENCIÓN: si habla crystalañol, tiene a ortez disposición servicios gratuitos de asistencia lingüística. Llame al  [phone].  MARU Ý: N?u b?n nói Ti?ng Vi?t, có các d?ch v? h? tr? ngôn ng? mi?n phí dành cho b?n. G?i s? [phone].        REGISTRATION AUTHORIZATION  Form No. 17519 (Rev. 3/25/2024)   Page 3 of 3

## 2025-07-18 ENCOUNTER — HOSPITAL ENCOUNTER (OUTPATIENT)
Dept: RADIOLOGY | Facility: HOSPITAL | Age: 80
Discharge: HOME | End: 2025-07-18
Attending: FAMILY MEDICINE
Payer: MEDICAID

## 2025-07-18 DIAGNOSIS — Z12.31 ENCOUNTER FOR SCREENING MAMMOGRAM FOR MALIGNANT NEOPLASM OF BREAST: ICD-10-CM

## 2025-07-18 PROCEDURE — 77067 SCR MAMMO BI INCL CAD: CPT | Mod: TC

## 2025-07-18 PROCEDURE — 77067 SCR MAMMO BI INCL CAD: CPT | Mod: 26,,, | Performed by: RADIOLOGY

## 2025-07-18 PROCEDURE — 77063 BREAST TOMOSYNTHESIS BI: CPT | Mod: 26,,, | Performed by: RADIOLOGY

## 2025-08-08 DIAGNOSIS — K44.9 HIATAL HERNIA: ICD-10-CM

## 2025-08-08 DIAGNOSIS — R10.13 EPIGASTRIC PAIN: ICD-10-CM

## 2025-08-10 RX ORDER — PANTOPRAZOLE SODIUM 40 MG/1
40 TABLET, DELAYED RELEASE ORAL
Qty: 90 TABLET | Refills: 3 | Status: SHIPPED | OUTPATIENT
Start: 2025-08-10

## (undated) DEVICE — CATH DXTERITY JR40 100CM 5FR

## (undated) DEVICE — GUIDEWIRE INQWIRE SE 3MM JTIP

## (undated) DEVICE — KIT GLIDESHEATH SLEND 6FR 10CM

## (undated) DEVICE — HEMOSTAT VASC BAND REG 24CM

## (undated) DEVICE — CATH DXTERITY JL35 100CM 5FR